# Patient Record
Sex: MALE | Race: WHITE | NOT HISPANIC OR LATINO | Employment: OTHER | URBAN - NONMETROPOLITAN AREA
[De-identification: names, ages, dates, MRNs, and addresses within clinical notes are randomized per-mention and may not be internally consistent; named-entity substitution may affect disease eponyms.]

---

## 2017-09-05 ENCOUNTER — OFFICE VISIT (OUTPATIENT)
Dept: FAMILY MEDICINE CLINIC | Facility: CLINIC | Age: 42
End: 2017-09-05

## 2017-09-05 VITALS
BODY MASS INDEX: 28.28 KG/M2 | DIASTOLIC BLOOD PRESSURE: 80 MMHG | OXYGEN SATURATION: 97 % | HEIGHT: 71 IN | TEMPERATURE: 98.2 F | WEIGHT: 202 LBS | SYSTOLIC BLOOD PRESSURE: 140 MMHG | HEART RATE: 80 BPM

## 2017-09-05 DIAGNOSIS — Z83.3 FAMILY HISTORY OF DIABETES MELLITUS IN MOTHER: ICD-10-CM

## 2017-09-05 DIAGNOSIS — N52.9 ERECTILE DYSFUNCTION, UNSPECIFIED ERECTILE DYSFUNCTION TYPE: Primary | ICD-10-CM

## 2017-09-05 DIAGNOSIS — I10 ESSENTIAL HYPERTENSION: ICD-10-CM

## 2017-09-05 DIAGNOSIS — R35.1 NOCTURIA: ICD-10-CM

## 2017-09-05 DIAGNOSIS — Z13.1 SCREENING FOR DIABETES MELLITUS: ICD-10-CM

## 2017-09-05 DIAGNOSIS — E78.2 MIXED HYPERLIPIDEMIA: ICD-10-CM

## 2017-09-05 DIAGNOSIS — Z13.9 SCREENING: ICD-10-CM

## 2017-09-05 DIAGNOSIS — K21.9 GASTROESOPHAGEAL REFLUX DISEASE WITHOUT ESOPHAGITIS: ICD-10-CM

## 2017-09-05 PROBLEM — E78.5 HYPERLIPIDEMIA: Status: ACTIVE | Noted: 2017-09-05

## 2017-09-05 LAB
ALBUMIN SERPL-MCNC: 5 G/DL (ref 3.5–5)
ALBUMIN/GLOB SERPL: 1.4 G/DL (ref 1.5–2.5)
ALP SERPL-CCNC: 120 U/L (ref 40–129)
ALT SERPL W P-5'-P-CCNC: 25 U/L (ref 10–44)
ANION GAP SERPL CALCULATED.3IONS-SCNC: 8.7 MMOL/L (ref 3.6–11.2)
AST SERPL-CCNC: 26 U/L (ref 10–34)
BASOPHILS # BLD AUTO: 0.04 10*3/MM3 (ref 0–0.3)
BASOPHILS NFR BLD AUTO: 0.5 % (ref 0–2)
BILIRUB SERPL-MCNC: 0.5 MG/DL (ref 0.2–1.8)
BUN BLD-MCNC: 8 MG/DL (ref 7–21)
BUN/CREAT SERPL: 8.3 (ref 7–25)
CALCIUM SPEC-SCNC: 10.4 MG/DL (ref 7.7–10)
CHLORIDE SERPL-SCNC: 106 MMOL/L (ref 99–112)
CHOLEST SERPL-MCNC: 170 MG/DL (ref 0–200)
CO2 SERPL-SCNC: 28.3 MMOL/L (ref 24.3–31.9)
CREAT BLD-MCNC: 0.96 MG/DL (ref 0.43–1.29)
DEPRECATED RDW RBC AUTO: 43.1 FL (ref 37–54)
EOSINOPHIL # BLD AUTO: 0.14 10*3/MM3 (ref 0–0.7)
EOSINOPHIL NFR BLD AUTO: 1.8 % (ref 0–5)
ERYTHROCYTE [DISTWIDTH] IN BLOOD BY AUTOMATED COUNT: 13.4 % (ref 11.5–14.5)
GFR SERPL CREATININE-BSD FRML MDRD: 86 ML/MIN/1.73
GLOBULIN UR ELPH-MCNC: 3.7 GM/DL
GLUCOSE BLD-MCNC: 100 MG/DL (ref 70–110)
HCT VFR BLD AUTO: 45 % (ref 42–52)
HDLC SERPL-MCNC: 44 MG/DL (ref 60–100)
HGB BLD-MCNC: 14.7 G/DL (ref 14–18)
IMM GRANULOCYTES # BLD: 0.02 10*3/MM3 (ref 0–0.03)
IMM GRANULOCYTES NFR BLD: 0.3 % (ref 0–0.5)
LDLC SERPL CALC-MCNC: 111 MG/DL (ref 0–100)
LDLC/HDLC SERPL: 2.53 {RATIO}
LYMPHOCYTES # BLD AUTO: 2.43 10*3/MM3 (ref 1–3)
LYMPHOCYTES NFR BLD AUTO: 31.8 % (ref 21–51)
MCH RBC QN AUTO: 29.2 PG (ref 27–33)
MCHC RBC AUTO-ENTMCNC: 32.7 G/DL (ref 33–37)
MCV RBC AUTO: 89.5 FL (ref 80–94)
MONOCYTES # BLD AUTO: 0.84 10*3/MM3 (ref 0.1–0.9)
MONOCYTES NFR BLD AUTO: 11 % (ref 0–10)
NEUTROPHILS # BLD AUTO: 4.17 10*3/MM3 (ref 1.4–6.5)
NEUTROPHILS NFR BLD AUTO: 54.6 % (ref 30–70)
OSMOLALITY SERPL CALC.SUM OF ELEC: 283.4 MOSM/KG (ref 273–305)
PLATELET # BLD AUTO: 298 10*3/MM3 (ref 130–400)
PMV BLD AUTO: 12.2 FL (ref 6–10)
POTASSIUM BLD-SCNC: 4.1 MMOL/L (ref 3.5–5.3)
PROT SERPL-MCNC: 8.7 G/DL (ref 6–8)
RBC # BLD AUTO: 5.03 10*6/MM3 (ref 4.7–6.1)
SODIUM BLD-SCNC: 143 MMOL/L (ref 135–153)
TRIGL SERPL-MCNC: 73 MG/DL (ref 0–150)
TSH SERPL DL<=0.05 MIU/L-ACNC: 1.02 MIU/ML (ref 0.55–4.78)
VIT B12 BLD-MCNC: 378 PG/ML (ref 211–911)
VLDLC SERPL-MCNC: 14.6 MG/DL
WBC NRBC COR # BLD: 7.64 10*3/MM3 (ref 4.5–12.5)

## 2017-09-05 PROCEDURE — 82607 VITAMIN B-12: CPT | Performed by: NURSE PRACTITIONER

## 2017-09-05 PROCEDURE — 99204 OFFICE O/P NEW MOD 45 MIN: CPT | Performed by: NURSE PRACTITIONER

## 2017-09-05 PROCEDURE — 80061 LIPID PANEL: CPT | Performed by: NURSE PRACTITIONER

## 2017-09-05 PROCEDURE — 85025 COMPLETE CBC W/AUTO DIFF WBC: CPT | Performed by: NURSE PRACTITIONER

## 2017-09-05 PROCEDURE — 84153 ASSAY OF PSA TOTAL: CPT | Performed by: NURSE PRACTITIONER

## 2017-09-05 PROCEDURE — 84443 ASSAY THYROID STIM HORMONE: CPT | Performed by: NURSE PRACTITIONER

## 2017-09-05 PROCEDURE — 84154 ASSAY OF PSA FREE: CPT | Performed by: NURSE PRACTITIONER

## 2017-09-05 PROCEDURE — 80053 COMPREHEN METABOLIC PANEL: CPT | Performed by: NURSE PRACTITIONER

## 2017-09-05 PROCEDURE — 36415 COLL VENOUS BLD VENIPUNCTURE: CPT | Performed by: NURSE PRACTITIONER

## 2017-09-05 RX ORDER — TRAMADOL HYDROCHLORIDE 50 MG/1
TABLET ORAL
Refills: 2 | COMMUNITY
Start: 2017-08-25 | End: 2019-08-06

## 2017-09-05 RX ORDER — GABAPENTIN 300 MG/1
600 CAPSULE ORAL 3 TIMES DAILY
Refills: 5 | COMMUNITY
Start: 2017-08-10 | End: 2023-01-25 | Stop reason: DRUGHIGH

## 2017-09-05 RX ORDER — ESOMEPRAZOLE MAGNESIUM 40 MG/1
40 CAPSULE, DELAYED RELEASE ORAL
COMMUNITY
End: 2017-09-05 | Stop reason: SDUPTHER

## 2017-09-05 RX ORDER — LOSARTAN POTASSIUM AND HYDROCHLOROTHIAZIDE 25; 100 MG/1; MG/1
1 TABLET ORAL DAILY
COMMUNITY
End: 2017-09-05 | Stop reason: SDUPTHER

## 2017-09-05 RX ORDER — OXYCODONE HYDROCHLORIDE 10 MG/1
10 TABLET ORAL EVERY 4 HOURS PRN
Refills: 0 | COMMUNITY
Start: 2017-08-31

## 2017-09-05 RX ORDER — PRAVASTATIN SODIUM 20 MG
20 TABLET ORAL DAILY
Qty: 30 TABLET | Refills: 4 | Status: SHIPPED | OUTPATIENT
Start: 2017-09-05 | End: 2018-01-30 | Stop reason: SDUPTHER

## 2017-09-05 RX ORDER — AMLODIPINE BESYLATE 10 MG/1
10 TABLET ORAL DAILY
COMMUNITY
End: 2017-09-05 | Stop reason: SDUPTHER

## 2017-09-05 RX ORDER — FAMOTIDINE 40 MG/1
40 TABLET, FILM COATED ORAL DAILY
COMMUNITY
End: 2017-09-05 | Stop reason: SDUPTHER

## 2017-09-05 RX ORDER — SILDENAFIL 100 MG/1
50 TABLET, FILM COATED ORAL DAILY PRN
Qty: 10 TABLET | Refills: 4 | Status: SHIPPED | OUTPATIENT
Start: 2017-09-05 | End: 2018-01-30

## 2017-09-05 RX ORDER — IBUPROFEN 800 MG/1
TABLET ORAL
Refills: 5 | COMMUNITY
Start: 2017-08-10 | End: 2019-01-28 | Stop reason: SDUPTHER

## 2017-09-05 RX ORDER — PRAVASTATIN SODIUM 20 MG
20 TABLET ORAL DAILY
COMMUNITY
End: 2017-09-05 | Stop reason: SDUPTHER

## 2017-09-05 RX ORDER — AMLODIPINE BESYLATE 10 MG/1
10 TABLET ORAL DAILY
Qty: 30 TABLET | Refills: 4 | Status: SHIPPED | OUTPATIENT
Start: 2017-09-05 | End: 2018-01-30 | Stop reason: SDUPTHER

## 2017-09-05 RX ORDER — ESOMEPRAZOLE MAGNESIUM 40 MG/1
40 CAPSULE, DELAYED RELEASE ORAL
Qty: 30 CAPSULE | Refills: 4 | Status: SHIPPED | OUTPATIENT
Start: 2017-09-05 | End: 2018-01-30 | Stop reason: SDUPTHER

## 2017-09-05 RX ORDER — METAXALONE 800 MG/1
800 TABLET ORAL DAILY
Refills: 1 | COMMUNITY
Start: 2017-08-25

## 2017-09-05 RX ORDER — LOSARTAN POTASSIUM AND HYDROCHLOROTHIAZIDE 25; 100 MG/1; MG/1
1 TABLET ORAL DAILY
Qty: 30 TABLET | Refills: 4 | Status: SHIPPED | OUTPATIENT
Start: 2017-09-05 | End: 2018-01-30 | Stop reason: SDUPTHER

## 2017-09-05 NOTE — PROGRESS NOTES
Subjective   Taiwo Pierce is a 42 y.o. male.     Chief Complaint   Patient presents with   • Hypertension   • Hyperlipidemia   • Back Pain     surgery for lumbar disc, Dr. Joshi 2014.    • Osteoarthritis     left knee surgery, [facility] more than 10 years    • Allergic Rhinitis   • Depression     patient feels this is controlled    • Heartburn   • Knee Pain       History of Present Illness     Hypertension - patient reports his hypertension is under good control with current Hyzaar and Norvasc.  Reports having had blood pressure problems for years.  He does feel he is stable on current medications.    Hyperlipidemia - no recent evaluation of a lipid panel.  Patient takes pravastatin, denies side effects.    Chronic back pain - patient is currently under management of pain clinic for his chronic pain.  He reports having had back surgery on his lumbar spine several years ago performed by Dr. Joshi in [facility].    Depression - patient states that his depression is resolved.  When he initially became disabled and needed back surgery he became depressed.  He states he feels his depression came from not being able to work full-time any longer.  He did take antidepressants for a few months.  He does report that he had side effects from antidepressant medication of erectile dysfunction.  He denies any thoughts of hurting self or others.  He is currently engaged to be  and reports being happy.    GERD - patient states he is currently taking Prevacid and Nexium.  She reports that the Prevacid does not control his symptoms.  The Nexium does control his symptoms alone.  He does have some breakthrough heartburn with certain foods or if he eats prior to reclining.    Family history of type 2 diabetes.  His mother had diabetes.  He has never had any symptoms.    Erectile dysfunction - patient reports he has recently developed erectile dysfunction.  In the past he had some difficulty with erectile dysfunction  while on antidepressants.  He reports that in the mornings he does not have any difficulty with obtaining or maintaining an erection.  If he attempts to have sex in the evening he does have difficulty maintaining an erection.  In the past Viagra did work.  He would like to try Cialis but states he is unable to afford the cost.  He has not been evaluated by urology for this complaint.  He does report having nocturia.       The following portions of the patient's history were reviewed and updated as appropriate: allergies, current medications, past family history, past medical history, past social history, past surgical history and problem list.    Review of Systems   Constitutional: Negative for activity change, appetite change, chills, diaphoresis, fatigue, fever and unexpected weight change.   HENT: Negative.  Negative for ear pain, facial swelling, hearing loss, sinus pressure, sore throat, trouble swallowing and voice change.    Eyes: Negative for pain, discharge and visual disturbance.   Respiratory: Negative for apnea, cough, chest tightness, shortness of breath and wheezing.    Cardiovascular: Negative for chest pain, palpitations and leg swelling.   Gastrointestinal: Negative for abdominal pain, blood in stool, constipation, diarrhea, nausea and vomiting.   Genitourinary: Negative for decreased urine volume, dysuria, flank pain, penile pain, penile swelling, testicular pain and urgency.        Frequent night time urination    Musculoskeletal: Positive for arthralgias and back pain. Negative for myalgias, neck pain and neck stiffness.        Under the care of pain clinic   Skin: Negative for color change and rash.   Neurological: Negative.  Negative for headaches.   Hematological: Negative.    Psychiatric/Behavioral: Negative for behavioral problems, confusion, dysphoric mood and suicidal ideas. The patient is not nervous/anxious.    All other systems reviewed and are negative.      Objective     /80   "Pulse 80  Temp 98.2 °F (36.8 °C) (Temporal Artery )   Ht 71\" (180.3 cm)  Wt 202 lb (91.6 kg)  SpO2 97%  BMI 28.17 kg/m2  Hospital Outpatient Visit on 07/08/2014   Component Date Value Ref Range Status   • Creatinine 07/08/2014 0.9  0.6 - 1.3 mg/mL Final       Physical Exam   Constitutional: He is oriented to person, place, and time. He appears well-developed and well-nourished. No distress.   HENT:   Head: Normocephalic.   Right Ear: External ear normal.   Left Ear: External ear normal.   Nose: Nose normal.   Mouth/Throat: Oropharynx is clear and moist.   Eyes: Pupils are equal, round, and reactive to light.   Neck: Normal range of motion. Neck supple. No tracheal deviation present. No thyromegaly present.   Cardiovascular: Normal rate, regular rhythm and normal heart sounds.  Exam reveals no gallop and no friction rub.    No murmur heard.  Pulmonary/Chest: Effort normal and breath sounds normal. No respiratory distress. He has no wheezes. He has no rales. He exhibits no tenderness.   Abdominal: Soft. Bowel sounds are normal. He exhibits no distension and no mass. There is no tenderness. There is no rebound and no guarding.   Musculoskeletal: Normal range of motion.   Neurological: He is alert and oriented to person, place, and time. He has normal reflexes.   CN 2-12 grossly intact    Skin: Skin is warm and dry. No rash noted. He is not diaphoretic. No erythema.   Psychiatric: He has a normal mood and affect. His speech is normal and behavior is normal. Judgment and thought content normal. Cognition and memory are normal.   Vitals reviewed.      Assessment/Plan     Problem List Items Addressed This Visit        Cardiovascular and Mediastinum    Hyperlipidemia    Relevant Medications    esomeprazole (nexIUM) 40 MG capsule    pravastatin (PRAVACHOL) 20 MG tablet    Hypertension    Relevant Medications    amLODIPine (NORVASC) 10 MG tablet    losartan-hydrochlorothiazide (HYZAAR) 100-25 MG per tablet    pravastatin " (PRAVACHOL) 20 MG tablet    Other Relevant Orders    CBC & Differential    Comprehensive Metabolic Panel    Lipid Panel    TSH    MicroAlbumin, Urine, Random    Vitamin B12    CBC Auto Differential       Digestive    GERD (gastroesophageal reflux disease)    Relevant Medications    esomeprazole (nexIUM) 40 MG capsule       Genitourinary    Erectile dysfunction - Primary    Relevant Medications    sildenafil (VIAGRA) 100 MG tablet    Other Relevant Orders    Ambulatory Referral to Urology    Testosterone    PSA, Total & Free       Other    Family history of diabetes mellitus in mother    Screening for diabetes mellitus      Other Visit Diagnoses     Screening        Relevant Orders    CBC & Differential    Lipid Panel    TSH    MicroAlbumin, Urine, Random    Vitamin B12    Testosterone    PSA, Total & Free    CBC Auto Differential    Nocturia        Relevant Orders    Testosterone    PSA, Total & Free        New patient establish care.  Send for old records.    Thorough review of current medications has been performed today.  We will take him to to help his erectile dysfunction by referring him to urology and starting him on Viagra.  We have reviewed the possible side effects and recent stopped this medication or seek emergent medical attention.  Stop  Prevacid.  Continue Nexium daily.   Fasting labs today.  I have discussed diagnosis in detail today allowing time for questions and answers. Pt is aware of reasons to seek urgent or emergent medical care as well as reasons to return to the clinic for evaluation. Possible side effects, interactions and progression of symptoms discussed as well. Pt / family states understanding.   Medication changes or referrals will be placed once lab results are reviewed.  Follow-up 3-4 months, sooner if needed.           This document has been electronically signed by:  ENRICO Caceres, NP-C

## 2017-09-07 LAB
PSA FREE MFR SERPL: 30 %
PSA FREE SERPL-MCNC: 0.09 NG/ML
PSA SERPL-MCNC: 0.3 NG/ML (ref 0–4)

## 2017-09-25 ENCOUNTER — OFFICE VISIT (OUTPATIENT)
Dept: UROLOGY | Facility: CLINIC | Age: 42
End: 2017-09-25

## 2017-09-25 VITALS
WEIGHT: 201 LBS | HEART RATE: 67 BPM | DIASTOLIC BLOOD PRESSURE: 99 MMHG | HEIGHT: 71 IN | SYSTOLIC BLOOD PRESSURE: 141 MMHG | BODY MASS INDEX: 28.14 KG/M2

## 2017-09-25 DIAGNOSIS — N52.2 DRUG-INDUCED ERECTILE DYSFUNCTION: Primary | ICD-10-CM

## 2017-09-25 LAB
BILIRUB BLD-MCNC: NEGATIVE MG/DL
CLARITY, POC: CLEAR
COLOR UR: YELLOW
GLUCOSE UR STRIP-MCNC: NEGATIVE MG/DL
KETONES UR QL: NEGATIVE
LEUKOCYTE EST, POC: NEGATIVE
NITRITE UR-MCNC: NEGATIVE MG/ML
PH UR: 6 [PH] (ref 5–8)
PROT UR STRIP-MCNC: NEGATIVE MG/DL
RBC # UR STRIP: NEGATIVE /UL
SP GR UR: 1.02 (ref 1–1.03)
UROBILINOGEN UR QL: NORMAL

## 2017-09-25 PROCEDURE — 99214 OFFICE O/P EST MOD 30 MIN: CPT | Performed by: NURSE PRACTITIONER

## 2017-09-25 PROCEDURE — 81003 URINALYSIS AUTO W/O SCOPE: CPT | Performed by: NURSE PRACTITIONER

## 2017-09-25 RX ORDER — SILDENAFIL CITRATE 20 MG/1
TABLET ORAL
Qty: 30 TABLET | Refills: 6 | Status: SHIPPED | OUTPATIENT
Start: 2017-09-25 | End: 2018-01-25 | Stop reason: SDUPTHER

## 2017-09-25 NOTE — PROGRESS NOTES
Chief Complaint:          Chief Complaint   Patient presents with   • Erectile Dysfunction       HPI:   42 y.o. male being seen in the office today with his girlfriend for history of erectile dysfunction. Patient does have issues with his back and is status post back surgery 3 years ago for 3 herniated disks in the lumbar region of the L4-S1. He is on chronic pain medication of oxycodone along with medication for hypertension and cholesterol. Patient denies any difficulty with morning erections but does state at times he has difficulty getting and maintaining an erection. He states is used Viagra in the past which did help his symptoms but could not afford the medication.  Urinalysis today reveals negative results.    HPI        Past Medical History:        Past Medical History:   Diagnosis Date   • GERD (gastroesophageal reflux disease)    • Hyperlipidemia    • Hypertension          Current Meds:     Current Outpatient Prescriptions   Medication Sig Dispense Refill   • amLODIPine (NORVASC) 10 MG tablet Take 1 tablet by mouth Daily. 30 tablet 4   • esomeprazole (nexIUM) 40 MG capsule Take 1 capsule by mouth Every Morning Before Breakfast. 30 capsule 4   • gabapentin (NEURONTIN) 300 MG capsule TAKE 2 CS TID  5   • ibuprofen (ADVIL,MOTRIN) 800 MG tablet TK 1 T PO QD PRN P  5   • losartan-hydrochlorothiazide (HYZAAR) 100-25 MG per tablet Take 1 tablet by mouth Daily. 30 tablet 4   • metaxalone (SKELAXIN) 800 MG tablet   1   • oxyCODONE (ROXICODONE) 10 MG tablet TK 1 T PO  TID PRN P  0   • pravastatin (PRAVACHOL) 20 MG tablet Take 1 tablet by mouth Daily. 30 tablet 4   • sildenafil (VIAGRA) 100 MG tablet Take 0.5 tablets by mouth Daily As Needed for erectile dysfunction. 10 tablet 4   • traMADol (ULTRAM) 50 MG tablet TK 1 T PO QID PRN P  2   • sildenafil (REVATIO) 20 MG tablet Take 3 -5 tablets as directed as needed 30 tablet 6     No current facility-administered medications for this visit.         Allergies:       Allergies   Allergen Reactions   • Augmentin [Amoxicillin-Pot Clavulanate]    • Metoprolol         Past Surgical History:     Past Surgical History:   Procedure Laterality Date   • BACK SURGERY     • KNEE ARTHROSCOPY           Social History:     Social History     Social History   • Marital status: Single     Spouse name: N/A   • Number of children: N/A   • Years of education: N/A     Occupational History   • Not on file.     Social History Main Topics   • Smoking status: Never Smoker   • Smokeless tobacco: Current User     Types: Snuff   • Alcohol use No   • Drug use: No   • Sexual activity: Defer     Other Topics Concern   • Not on file     Social History Narrative       Family History:     Family History   Problem Relation Age of Onset   • Breast cancer Mother    • Prostate cancer Paternal Grandfather    • Prostate cancer Maternal Grandfather        Review of Systems:     Review of Systems   Constitutional: Negative for chills, fatigue and fever.   Respiratory: Negative for cough, shortness of breath and wheezing.    Cardiovascular: Negative for leg swelling.   Gastrointestinal: Negative for abdominal pain, nausea and vomiting.   Musculoskeletal: Positive for back pain. Negative for joint swelling.   Neurological: Negative for dizziness and headaches.   Psychiatric/Behavioral: Negative for confusion.       Physical Exam:     Physical Exam   Constitutional: He is oriented to person, place, and time. He appears well-developed and well-nourished. No distress.   Abdominal: Soft. Bowel sounds are normal. He exhibits no distension and no mass. There is no tenderness. There is no rebound and no guarding. No hernia.   Musculoskeletal: Normal range of motion.   Neurological: He is alert and oriented to person, place, and time.   Skin: Skin is warm and dry. No rash noted. He is not diaphoretic. No pallor.   Psychiatric: He has a normal mood and affect. His behavior is normal. Judgment and thought content normal.    Nursing note and vitals reviewed.      Procedure:     No notes on file      Assessment:     Encounter Diagnosis   Name Primary?   • Drug-induced erectile dysfunction Yes     Orders Placed This Encounter   Procedures   • POC Urinalysis Dipstick, Automated       Plan:   Discussed erectile dysfunction in detail with the patient including have a long-term pain control medications currently to problems with erectile dysfunction with smoking, hypertension and cholesterol. We will give the patient prescription for sildenafil 20 mg to take 3-5 tablets as directed as needed. He is to return to the office in 6 months for follow-up.    Counseling was given to patient and family for the following topics diagnostic results, patient and family education, impressions and risks and benefits of treatment options. and the interim medical history and current results were reviewed.  A treatment plan with follow-up was made. Total time of the encounter was 25 minutes and 25 minutes were spent discussing Drug-induced erectile dysfunction [N52.2] face-to-face.       This document has been electronically signed by ENRICO Gallegos September 25, 2017 12:48 PM

## 2018-01-25 DIAGNOSIS — N52.2 DRUG-INDUCED ERECTILE DYSFUNCTION: ICD-10-CM

## 2018-01-25 RX ORDER — SILDENAFIL CITRATE 20 MG/1
TABLET ORAL
Qty: 30 TABLET | Refills: 6 | Status: SHIPPED | OUTPATIENT
Start: 2018-01-25 | End: 2018-01-30 | Stop reason: SDUPTHER

## 2018-01-25 RX ORDER — SILDENAFIL CITRATE 20 MG/1
TABLET ORAL
Qty: 30 TABLET | Refills: 6 | Status: SHIPPED | OUTPATIENT
Start: 2018-01-25 | End: 2018-01-25 | Stop reason: SDUPTHER

## 2018-01-30 ENCOUNTER — OFFICE VISIT (OUTPATIENT)
Dept: FAMILY MEDICINE CLINIC | Facility: CLINIC | Age: 43
End: 2018-01-30

## 2018-01-30 VITALS
HEART RATE: 77 BPM | DIASTOLIC BLOOD PRESSURE: 82 MMHG | SYSTOLIC BLOOD PRESSURE: 120 MMHG | HEIGHT: 71 IN | BODY MASS INDEX: 28.14 KG/M2 | TEMPERATURE: 97.7 F | WEIGHT: 201 LBS | OXYGEN SATURATION: 97 %

## 2018-01-30 DIAGNOSIS — I10 ESSENTIAL HYPERTENSION: ICD-10-CM

## 2018-01-30 DIAGNOSIS — K21.9 GASTROESOPHAGEAL REFLUX DISEASE WITHOUT ESOPHAGITIS: Primary | ICD-10-CM

## 2018-01-30 DIAGNOSIS — N52.2 DRUG-INDUCED ERECTILE DYSFUNCTION: ICD-10-CM

## 2018-01-30 DIAGNOSIS — E78.2 MIXED HYPERLIPIDEMIA: ICD-10-CM

## 2018-01-30 PROCEDURE — 99214 OFFICE O/P EST MOD 30 MIN: CPT | Performed by: NURSE PRACTITIONER

## 2018-01-30 RX ORDER — AMLODIPINE BESYLATE 10 MG/1
10 TABLET ORAL DAILY
Qty: 30 TABLET | Refills: 5 | Status: SHIPPED | OUTPATIENT
Start: 2018-01-30 | End: 2018-07-26 | Stop reason: SDUPTHER

## 2018-01-30 RX ORDER — SILDENAFIL CITRATE 20 MG/1
TABLET ORAL
Qty: 30 TABLET | Refills: 6 | Status: SHIPPED | OUTPATIENT
Start: 2018-01-30 | End: 2018-07-26 | Stop reason: SDUPTHER

## 2018-01-30 RX ORDER — PRAVASTATIN SODIUM 20 MG
20 TABLET ORAL DAILY
Qty: 30 TABLET | Refills: 5 | Status: SHIPPED | OUTPATIENT
Start: 2018-01-30 | End: 2018-10-01 | Stop reason: SDUPTHER

## 2018-01-30 RX ORDER — LOSARTAN POTASSIUM AND HYDROCHLOROTHIAZIDE 25; 100 MG/1; MG/1
1 TABLET ORAL DAILY
Qty: 30 TABLET | Refills: 5 | Status: SHIPPED | OUTPATIENT
Start: 2018-01-30 | End: 2018-07-26 | Stop reason: SDUPTHER

## 2018-01-30 RX ORDER — ESOMEPRAZOLE MAGNESIUM 40 MG/1
40 CAPSULE, DELAYED RELEASE ORAL
Qty: 30 CAPSULE | Refills: 5 | Status: SHIPPED | OUTPATIENT
Start: 2018-01-30 | End: 2018-07-26 | Stop reason: SDUPTHER

## 2018-01-30 NOTE — PROGRESS NOTES
"Subjective   Taiwo Pierce is a 42 y.o. male.     Chief Complaint   Patient presents with   • Hypertension       History of Present Illness       Erectile dysfunction-improved with current prescription.    Hypertension-patient reports that Sildenafil has greatly reduced his blood pressure. He is compliant with all his bp medication and denies any side effects. Does monitor bp randomly.    GERD-patient reports that he did not receive the Nexium and has been taking and prescription of Prilosec.  He does report the Nexium controlled his symptoms better.  He has symptoms several days a week.    Pain-patient is under the care of pain clinic.    Hyperlipidemia-patient is currently taking Pravachol and watching his diet.      The following portions of the patient's history were reviewed and updated as appropriate: allergies, current medications, past family history, past medical history, past social history, past surgical history and problem list.    Review of Systems   Constitutional: Negative.    Eyes: Negative.    Respiratory: Negative.    Cardiovascular: Negative.    Gastrointestinal: Negative.    Endocrine: Negative.    Genitourinary: Negative.    Musculoskeletal: Positive for back pain, joint swelling and neck pain.   Skin: Negative.    Allergic/Immunologic: Negative.    Neurological: Positive for headaches.   Hematological: Negative.    Psychiatric/Behavioral: Negative.    All other systems reviewed and are negative.      Objective     /82  Pulse 77  Temp 97.7 °F (36.5 °C) (Temporal Artery )   Ht 180.3 cm (71\")  Wt 91.2 kg (201 lb)  SpO2 97%  BMI 28.03 kg/m2  Office Visit on 09/25/2017   Component Date Value Ref Range Status   • Color 09/25/2017 Yellow  Yellow, Straw, Dark Yellow, June Final   • Clarity, UA 09/25/2017 Clear  Clear Final   • Glucose, UA 09/25/2017 Negative  Negative, 1000 mg/dL (3+) mg/dL Final   • Bilirubin 09/25/2017 Negative  Negative Final   • Ketones, UA 09/25/2017 Negative  " Negative Final   • Specific Gravity  09/25/2017 1.020  1.005 - 1.030 Final   • Blood, UA 09/25/2017 Negative  Negative Final   • pH, Urine 09/25/2017 6.0  5.0 - 8.0 Final   • Protein, POC 09/25/2017 Negative  Negative mg/dL Final   • Urobilinogen, UA 09/25/2017 Normal  Normal Final   • Leukocytes 09/25/2017 Negative  Negative Final   • Nitrite, UA 09/25/2017 Negative  Negative Final       Physical Exam   Constitutional: He is oriented to person, place, and time. He appears well-developed and well-nourished. No distress.   HENT:   Head: Normocephalic.   Right Ear: External ear normal.   Left Ear: External ear normal.   Nose: Nose normal.   Mouth/Throat: Oropharynx is clear and moist.   Eyes: Pupils are equal, round, and reactive to light.   Neck: Normal range of motion. Neck supple. No tracheal deviation present. No thyromegaly present.   Cardiovascular: Normal rate, regular rhythm and normal heart sounds.  Exam reveals no gallop and no friction rub.    No murmur heard.  Pulmonary/Chest: Effort normal and breath sounds normal. No respiratory distress. He has no wheezes. He has no rales. He exhibits no tenderness.   Abdominal: Soft. Bowel sounds are normal. He exhibits no distension and no mass. There is no tenderness. There is no rebound and no guarding.   Musculoskeletal: Normal range of motion.        Lumbar back: He exhibits tenderness.   Neurological: He is alert and oriented to person, place, and time. He has normal reflexes.   CN 2-12 grossly intact    Skin: Skin is warm and dry. No rash noted. He is not diaphoretic. No erythema.   Psychiatric: He has a normal mood and affect. His speech is normal and behavior is normal. Judgment and thought content normal. Cognition and memory are normal.   Vitals reviewed.      Assessment/Plan     Problem List Items Addressed This Visit        Cardiovascular and Mediastinum    Hyperlipidemia    Relevant Medications    pravastatin (PRAVACHOL) 20 MG tablet    esomeprazole  (nexIUM) 40 MG capsule    Other Relevant Orders    CBC & Differential    Comprehensive Metabolic Panel    TSH    MicroAlbumin, Urine, Random - Urine, Clean Catch    Lipid Panel    Hypertension    Relevant Medications    sildenafil (REVATIO) 20 MG tablet    pravastatin (PRAVACHOL) 20 MG tablet    losartan-hydrochlorothiazide (HYZAAR) 100-25 MG per tablet    amLODIPine (NORVASC) 10 MG tablet    Other Relevant Orders    CBC & Differential    Comprehensive Metabolic Panel    TSH    MicroAlbumin, Urine, Random - Urine, Clean Catch    Lipid Panel       Digestive    GERD (gastroesophageal reflux disease) - Primary    Relevant Medications    esomeprazole (nexIUM) 40 MG capsule       Genitourinary    Erectile dysfunction    Relevant Medications    sildenafil (REVATIO) 20 MG tablet        Medication list has been reviewed with patient.  Refill routine medication.  Take Nexium as prescribed.  I have discussed diagnosis in detail today allowing time for questions and answers. Pt is aware of reasons to seek urgent or emergent medical care as well as reasons to return to the clinic for evaluation. Possible side effects, interactions and progression of symptoms discussed as well. Pt / family states understanding.   Emotional support and active listening provided.   Recommend follow up in 4-6 months, fasting labs the week prior.   Have requested staff try to PA Sildenafil for hypertension.       Errors in dictation may reflect use of voice recognition software and not all errors in transcription may have been detected prior to signing.                  This document has been electronically signed by:  ENRICO Caceres, NP-C

## 2018-02-09 ENCOUNTER — TELEPHONE (OUTPATIENT)
Dept: FAMILY MEDICINE CLINIC | Facility: CLINIC | Age: 43
End: 2018-02-09

## 2018-02-09 NOTE — TELEPHONE ENCOUNTER
----- Message from Maria Guadalupe Ny sent at 2/5/2018  1:04 PM EST -----      ----- Message -----     From: ENRICO Leone     Sent: 1/30/2018   3:28 PM       [facility]    Needs generic viagra PA'd   Helps with blood pressure   I have sent the form to [facility] for viagra PA

## 2018-04-05 ENCOUNTER — OFFICE VISIT (OUTPATIENT)
Dept: UROLOGY | Facility: CLINIC | Age: 43
End: 2018-04-05

## 2018-04-05 VITALS — BODY MASS INDEX: 28.14 KG/M2 | HEIGHT: 71 IN | WEIGHT: 201 LBS

## 2018-04-05 DIAGNOSIS — N52.8 OTHER MALE ERECTILE DYSFUNCTION: Primary | ICD-10-CM

## 2018-04-05 DIAGNOSIS — I10 ESSENTIAL HYPERTENSION: ICD-10-CM

## 2018-04-05 PROCEDURE — 99213 OFFICE O/P EST LOW 20 MIN: CPT | Performed by: NURSE PRACTITIONER

## 2018-04-05 RX ORDER — SILDENAFIL CITRATE 20 MG/1
TABLET ORAL
Qty: 90 TABLET | Refills: 5 | Status: SHIPPED | OUTPATIENT
Start: 2018-04-05 | End: 2018-07-26

## 2018-04-05 NOTE — PROGRESS NOTES
Chief Complaint:          Chief Complaint   Patient presents with   • Erectile Dysfunction       HPI:   42 y.o. male seen in the office today for a 6 month follow-up for history of erectile dysfunction.  He is currently on Sildenafil as needed and states the medication works well also helping with his blood pressure.  Needs a refill on the medication.    HPI        Past Medical History:        Past Medical History:   Diagnosis Date   • GERD (gastroesophageal reflux disease)    • Hyperlipidemia    • Hypertension          Current Meds:     Current Outpatient Prescriptions   Medication Sig Dispense Refill   • amLODIPine (NORVASC) 10 MG tablet Take 1 tablet by mouth Daily. 30 tablet 5   • esomeprazole (nexIUM) 40 MG capsule Take 1 capsule by mouth Every Morning Before Breakfast. 30 capsule 5   • gabapentin (NEURONTIN) 300 MG capsule TAKE 2 CS TID  5   • ibuprofen (ADVIL,MOTRIN) 800 MG tablet TK 1 T PO QD PRN P  5   • losartan-hydrochlorothiazide (HYZAAR) 100-25 MG per tablet Take 1 tablet by mouth Daily. 30 tablet 5   • metaxalone (SKELAXIN) 800 MG tablet   1   • oxyCODONE (ROXICODONE) 10 MG tablet TK 1 T PO  TID PRN P  0   • pravastatin (PRAVACHOL) 20 MG tablet Take 1 tablet by mouth Daily. 30 tablet 5   • sildenafil (REVATIO) 20 MG tablet Take 1 tablet daily as needed 30 tablet 6   • traMADol (ULTRAM) 50 MG tablet TK 1 T PO QID PRN P  2   • sildenafil (REVATIO) 20 MG tablet Use 3 - 5 tablets as directed as needed 90 tablet 5     No current facility-administered medications for this visit.         Allergies:      Allergies   Allergen Reactions   • Augmentin [Amoxicillin-Pot Clavulanate]    • Metoprolol         Past Surgical History:     Past Surgical History:   Procedure Laterality Date   • BACK SURGERY     • KNEE ARTHROSCOPY           Social History:     Social History     Social History   • Marital status: Single     Spouse name: N/A   • Number of children: N/A   • Years of education: N/A     Occupational History   •  Not on file.     Social History Main Topics   • Smoking status: Never Smoker   • Smokeless tobacco: Current User     Types: Snuff   • Alcohol use No   • Drug use: No   • Sexual activity: Defer     Other Topics Concern   • Not on file     Social History Narrative   • No narrative on file       Family History:     Family History   Problem Relation Age of Onset   • Breast cancer Mother    • Prostate cancer Paternal Grandfather    • Prostate cancer Maternal Grandfather        Review of Systems:     Review of Systems   Constitutional: Negative for chills, fatigue and fever.   Respiratory: Negative for cough, shortness of breath and wheezing.    Cardiovascular: Negative for leg swelling.   Gastrointestinal: Negative for abdominal pain, nausea and vomiting.   Genitourinary: Negative for difficulty urinating, frequency and urgency.   Musculoskeletal: Positive for back pain. Negative for joint swelling.   Neurological: Negative for dizziness and headaches.   Psychiatric/Behavioral: Negative for confusion.     IPSS Questionnaire (AUA-7):  Over the past month…    1)  How often have you had a sensation of not emptying your bladder completely after you finish urinating?  5 - Almost always   2)  How often have you had to urinate again less than two hours after you finished urinating? 0 - Not at all   3)  How often have you found you stopped and started again several times when you urinated?  0 - Not at all   4) How difficult have you found it to postpone urination?  5 - Almost always   5) How often have you had a weak urinary stream?  0 - Not at all   6) How often have you had to push or strain to begin urination?  1 - Less than 1 time in 5   7) How many times did you most typically get up to urinate from the time you went to bed until the time you got up in the morning?  2 - 2 times   Total score:  0-7 mildly symptomatic    8-19 moderately symptomatic    20-35 severely symptomatic     I have reviewed the following portions of  the patient's history: allergies, current medications, past family history, past medical history, past social history, past surgical history, problem list and ROS and confirm it's accurate.    Physical Exam:     Physical Exam   Constitutional: He is oriented to person, place, and time. He appears well-developed and well-nourished. No distress.   Abdominal: Soft. Bowel sounds are normal. He exhibits no distension and no mass. There is no tenderness. There is no rebound and no guarding. No hernia.   Musculoskeletal: Normal range of motion.   Neurological: He is alert and oriented to person, place, and time.   Skin: Skin is warm and dry. No rash noted. He is not diaphoretic. No pallor.   Psychiatric: He has a normal mood and affect. His behavior is normal. Judgment and thought content normal.   Nursing note and vitals reviewed.      Procedure:     No notes on file      Assessment:     Encounter Diagnoses   Name Primary?   • Other male erectile dysfunction Yes   • Essential hypertension      No orders of the defined types were placed in this encounter.      Plan:   Will give a refill prescription for his Sildenafil to take 3 - 5 tablets as directed as needed.  He is to return to the office in one year for follow-up and medical management.  Patient is a smoker and I discussed health issues that can occur with the use of tobacco products including different forms of cancer ie: lung and bladder cancer along with mouth and throat cancer, hypertension, circulatory problems - verbalized understanding.  He is not ready to stop smoking.    Discussed the patient's BMI with him. BMI is above normal parameters. Follow-up plan includes:  educational material.    I advised the patient of the risks in continuing to use tobacco, and I provided this patient with smoking cessation educational materials.    During this visit, I spent > 3-10 minutes counseling the patient regarding smoking cessation.    Counseling was given to patient for  the following topics patient and family education including: as discussed in the plan above. The interim medical history and current results were reviewed.  A treatment plan with follow-up was made for Other male erectile dysfunction [N52.8]. I spent 20 minutes face to face with Taiwo Pierce and 15 was spent in counseling.     This document has been electronically signed by ENRICO Gallegos April 5, 2018 3:44 PM

## 2018-04-17 ENCOUNTER — TELEPHONE (OUTPATIENT)
Dept: UROLOGY | Facility: CLINIC | Age: 43
End: 2018-04-17

## 2018-04-18 ENCOUNTER — TELEPHONE (OUTPATIENT)
Dept: UROLOGY | Facility: CLINIC | Age: 43
End: 2018-04-18

## 2018-04-19 ENCOUNTER — TELEPHONE (OUTPATIENT)
Dept: UROLOGY | Facility: CLINIC | Age: 43
End: 2018-04-19

## 2018-04-19 NOTE — TELEPHONE ENCOUNTER
Called patient back to talk about sildenafil denial. He stated that PA was denied due to diagnosis of erectile dysfunction. Patient stated that insurance requires Jennifer to change diagnosis to hypertension. I explained to patient that legally Jennifer cannot use that diagnosis as it is not a urology diagnosis code. I explained that he should get in contact with his PCP to determine who would be able to use that diagnosis as she had used it for this medication in the past.

## 2018-07-26 ENCOUNTER — OFFICE VISIT (OUTPATIENT)
Dept: FAMILY MEDICINE CLINIC | Facility: CLINIC | Age: 43
End: 2018-07-26

## 2018-07-26 VITALS
OXYGEN SATURATION: 99 % | HEART RATE: 77 BPM | TEMPERATURE: 98.5 F | HEIGHT: 71 IN | WEIGHT: 211 LBS | SYSTOLIC BLOOD PRESSURE: 126 MMHG | BODY MASS INDEX: 29.54 KG/M2 | DIASTOLIC BLOOD PRESSURE: 86 MMHG

## 2018-07-26 DIAGNOSIS — N52.2 DRUG-INDUCED ERECTILE DYSFUNCTION: ICD-10-CM

## 2018-07-26 DIAGNOSIS — J30.2 ACUTE SEASONAL ALLERGIC RHINITIS, UNSPECIFIED TRIGGER: Primary | ICD-10-CM

## 2018-07-26 DIAGNOSIS — K21.9 GASTROESOPHAGEAL REFLUX DISEASE WITHOUT ESOPHAGITIS: ICD-10-CM

## 2018-07-26 DIAGNOSIS — E78.2 MIXED HYPERLIPIDEMIA: ICD-10-CM

## 2018-07-26 DIAGNOSIS — N52.9 ERECTILE DYSFUNCTION, UNSPECIFIED ERECTILE DYSFUNCTION TYPE: ICD-10-CM

## 2018-07-26 DIAGNOSIS — I10 ESSENTIAL HYPERTENSION: ICD-10-CM

## 2018-07-26 DIAGNOSIS — R35.1 NOCTURIA: ICD-10-CM

## 2018-07-26 LAB
ALBUMIN SERPL-MCNC: 4.7 G/DL (ref 3.5–5)
ALBUMIN UR-MCNC: 0.6 MG/L
ALBUMIN/GLOB SERPL: 1.4 G/DL (ref 1.5–2.5)
ALP SERPL-CCNC: 89 U/L (ref 40–129)
ALT SERPL W P-5'-P-CCNC: 27 U/L (ref 10–44)
ANION GAP SERPL CALCULATED.3IONS-SCNC: 9 MMOL/L (ref 3.6–11.2)
AST SERPL-CCNC: 23 U/L (ref 10–34)
BASOPHILS # BLD AUTO: 0.04 10*3/MM3 (ref 0–0.3)
BASOPHILS NFR BLD AUTO: 0.5 % (ref 0–2)
BILIRUB SERPL-MCNC: 0.5 MG/DL (ref 0.2–1.8)
BUN BLD-MCNC: 10 MG/DL (ref 7–21)
BUN/CREAT SERPL: 11.1 (ref 7–25)
CALCIUM SPEC-SCNC: 9.5 MG/DL (ref 7.7–10)
CHLORIDE SERPL-SCNC: 101 MMOL/L (ref 99–112)
CHOLEST SERPL-MCNC: 128 MG/DL (ref 0–200)
CO2 SERPL-SCNC: 29 MMOL/L (ref 24.3–31.9)
CREAT BLD-MCNC: 0.9 MG/DL (ref 0.43–1.29)
DEPRECATED RDW RBC AUTO: 42.1 FL (ref 37–54)
EOSINOPHIL # BLD AUTO: 0.15 10*3/MM3 (ref 0–0.7)
EOSINOPHIL NFR BLD AUTO: 1.9 % (ref 0–5)
ERYTHROCYTE [DISTWIDTH] IN BLOOD BY AUTOMATED COUNT: 13.1 % (ref 11.5–14.5)
GFR SERPL CREATININE-BSD FRML MDRD: 92 ML/MIN/1.73
GLOBULIN UR ELPH-MCNC: 3.4 GM/DL
GLUCOSE BLD-MCNC: 93 MG/DL (ref 70–110)
HCT VFR BLD AUTO: 40.8 % (ref 42–52)
HDLC SERPL-MCNC: 42 MG/DL (ref 60–100)
HGB BLD-MCNC: 13.9 G/DL (ref 14–18)
IMM GRANULOCYTES # BLD: 0.01 10*3/MM3 (ref 0–0.03)
IMM GRANULOCYTES NFR BLD: 0.1 % (ref 0–0.5)
LDLC SERPL CALC-MCNC: 64 MG/DL (ref 0–100)
LDLC/HDLC SERPL: 1.52 {RATIO}
LYMPHOCYTES # BLD AUTO: 2.45 10*3/MM3 (ref 1–3)
LYMPHOCYTES NFR BLD AUTO: 31.6 % (ref 21–51)
MCH RBC QN AUTO: 30.3 PG (ref 27–33)
MCHC RBC AUTO-ENTMCNC: 34.1 G/DL (ref 33–37)
MCV RBC AUTO: 89.1 FL (ref 80–94)
MONOCYTES # BLD AUTO: 1.12 10*3/MM3 (ref 0.1–0.9)
MONOCYTES NFR BLD AUTO: 14.4 % (ref 0–10)
NEUTROPHILS # BLD AUTO: 3.99 10*3/MM3 (ref 1.4–6.5)
NEUTROPHILS NFR BLD AUTO: 51.5 % (ref 30–70)
OSMOLALITY SERPL CALC.SUM OF ELEC: 276.3 MOSM/KG (ref 273–305)
PLATELET # BLD AUTO: 316 10*3/MM3 (ref 130–400)
PMV BLD AUTO: 11.1 FL (ref 6–10)
POTASSIUM BLD-SCNC: 3.7 MMOL/L (ref 3.5–5.3)
PROT SERPL-MCNC: 8.1 G/DL (ref 6–8)
RBC # BLD AUTO: 4.58 10*6/MM3 (ref 4.7–6.1)
SODIUM BLD-SCNC: 139 MMOL/L (ref 135–153)
TESTOST SERPL-MCNC: 515.44 NG/DL (ref 123.06–813.86)
TRIGL SERPL-MCNC: 111 MG/DL (ref 0–150)
TSH SERPL DL<=0.05 MIU/L-ACNC: 1.32 MIU/ML (ref 0.55–4.78)
VLDLC SERPL-MCNC: 22.2 MG/DL
WBC NRBC COR # BLD: 7.76 10*3/MM3 (ref 4.5–12.5)

## 2018-07-26 PROCEDURE — 84443 ASSAY THYROID STIM HORMONE: CPT | Performed by: NURSE PRACTITIONER

## 2018-07-26 PROCEDURE — 82043 UR ALBUMIN QUANTITATIVE: CPT | Performed by: NURSE PRACTITIONER

## 2018-07-26 PROCEDURE — 80053 COMPREHEN METABOLIC PANEL: CPT | Performed by: NURSE PRACTITIONER

## 2018-07-26 PROCEDURE — 36415 COLL VENOUS BLD VENIPUNCTURE: CPT | Performed by: NURSE PRACTITIONER

## 2018-07-26 PROCEDURE — 99214 OFFICE O/P EST MOD 30 MIN: CPT | Performed by: NURSE PRACTITIONER

## 2018-07-26 PROCEDURE — 85025 COMPLETE CBC W/AUTO DIFF WBC: CPT | Performed by: NURSE PRACTITIONER

## 2018-07-26 PROCEDURE — 84403 ASSAY OF TOTAL TESTOSTERONE: CPT | Performed by: NURSE PRACTITIONER

## 2018-07-26 PROCEDURE — 80061 LIPID PANEL: CPT | Performed by: NURSE PRACTITIONER

## 2018-07-26 RX ORDER — AMLODIPINE BESYLATE 10 MG/1
10 TABLET ORAL DAILY
Qty: 30 TABLET | Refills: 5 | Status: SHIPPED | OUTPATIENT
Start: 2018-07-26 | End: 2019-01-28 | Stop reason: SDUPTHER

## 2018-07-26 RX ORDER — LOSARTAN POTASSIUM AND HYDROCHLOROTHIAZIDE 25; 100 MG/1; MG/1
1 TABLET ORAL DAILY
Qty: 30 TABLET | Refills: 5 | Status: SHIPPED | OUTPATIENT
Start: 2018-07-26 | End: 2019-01-28 | Stop reason: SDUPTHER

## 2018-07-26 RX ORDER — ESOMEPRAZOLE MAGNESIUM 40 MG/1
40 CAPSULE, DELAYED RELEASE ORAL
Qty: 30 CAPSULE | Refills: 5 | Status: SHIPPED | OUTPATIENT
Start: 2018-07-26 | End: 2019-01-28 | Stop reason: SDUPTHER

## 2018-07-26 RX ORDER — FLUTICASONE PROPIONATE 50 MCG
2 SPRAY, SUSPENSION (ML) NASAL DAILY
Qty: 1 BOTTLE | Refills: 5 | Status: SHIPPED | OUTPATIENT
Start: 2018-07-26 | End: 2019-01-28

## 2018-07-26 RX ORDER — SILDENAFIL CITRATE 20 MG/1
20 TABLET ORAL 3 TIMES DAILY
Qty: 90 TABLET | Refills: 3 | Status: SHIPPED | OUTPATIENT
Start: 2018-07-26 | End: 2019-01-28 | Stop reason: SDUPTHER

## 2018-07-26 NOTE — PROGRESS NOTES
Subjective   Taiwo Pierce is a 43 y.o. male.     Chief Complaint   Patient presents with   • Heartburn   • Erectile Dysfunction       History of Present Illness     Patient is here today to follow-up on multiple chronic processes.  Hyperlipidemia-working on diet and exercise.  Receives pravastatin.  No side effects.  He has had some weight gain due to decreased mobility and increased appetite.  GERD-stable with Nexium  Erectile dysfunction-stable with current regimen  Hypertension-currently receives Hyzaar and sildenafil.     Patient is going to have fasting labs today.    Chronic pain-patient is under the care of pain clinic where he receives controlled medication as well as Skelaxin and ibuprofen.        The following portions of the patient's history were reviewed and updated as appropriate: allergies, current medications, past family history, past medical history, past social history, past surgical history and problem list.    Review of Systems   Constitutional: Positive for unexpected weight change (gain ). Negative for activity change and appetite change.   HENT: Positive for congestion and sneezing. Negative for rhinorrhea and sore throat.    Eyes: Negative for discharge, redness and itching.   Respiratory: Negative for cough, chest tightness and shortness of breath.    Cardiovascular: Positive for leg swelling. Negative for chest pain and palpitations.   Gastrointestinal: Negative for abdominal pain, constipation, diarrhea and vomiting.   Endocrine: Negative.    Genitourinary: Negative for difficulty urinating, dysuria and urgency.   Musculoskeletal: Positive for arthralgias, back pain and neck pain.   Skin: Negative.    Allergic/Immunologic: Positive for environmental allergies.   Neurological: Positive for headaches (occasional). Negative for dizziness, weakness and numbness.   Psychiatric/Behavioral: Negative for behavioral problems, self-injury and suicidal ideas.   All other systems reviewed and are  "negative.      Objective     /86   Pulse 77   Temp 98.5 °F (36.9 °C) (Temporal Artery )   Ht 180.3 cm (71\")   Wt 95.7 kg (211 lb)   SpO2 99%   BMI 29.43 kg/m²   Office Visit on 09/25/2017   Component Date Value Ref Range Status   • Color 09/25/2017 Yellow  Yellow, Straw, Dark Yellow, June Final   • Clarity, UA 09/25/2017 Clear  Clear Final   • Glucose, UA 09/25/2017 Negative  Negative, 1000 mg/dL (3+) mg/dL Final   • Bilirubin 09/25/2017 Negative  Negative Final   • Ketones, UA 09/25/2017 Negative  Negative Final   • Specific Gravity  09/25/2017 1.020  1.005 - 1.030 Final   • Blood, UA 09/25/2017 Negative  Negative Final   • pH, Urine 09/25/2017 6.0  5.0 - 8.0 Final   • Protein, POC 09/25/2017 Negative  Negative mg/dL Final   • Urobilinogen, UA 09/25/2017 Normal  Normal Final   • Leukocytes 09/25/2017 Negative  Negative Final   • Nitrite, UA 09/25/2017 Negative  Negative Final       Physical Exam   Constitutional: He is oriented to person, place, and time. He appears well-developed and well-nourished. No distress.   HENT:   Head: Normocephalic and atraumatic.   Right Ear: External ear normal.   Left Ear: External ear normal.   Nose: Mucosal edema present. Right sinus exhibits no maxillary sinus tenderness and no frontal sinus tenderness. Left sinus exhibits no maxillary sinus tenderness and no frontal sinus tenderness.   Mouth/Throat: Oropharynx is clear and moist and mucous membranes are normal. No oropharyngeal exudate.   Eyes: Pupils are equal, round, and reactive to light. Conjunctivae are normal. Right eye exhibits no discharge. Left eye exhibits no discharge.   Neck: Normal range of motion. Neck supple. No tracheal deviation present. No thyromegaly present.   Cardiovascular: Normal rate, regular rhythm and normal heart sounds.  Exam reveals no gallop and no friction rub.    No murmur heard.  Pulmonary/Chest: Effort normal and breath sounds normal. No respiratory distress. He has no wheezes. He has " no rales. He exhibits no tenderness.   Abdominal: Soft. Bowel sounds are normal. He exhibits no distension and no mass. There is no tenderness. There is no rebound and no guarding.   Musculoskeletal:        Lumbar back: He exhibits decreased range of motion and tenderness.   Lymphadenopathy:     He has no cervical adenopathy.   Neurological: He is alert and oriented to person, place, and time. He has normal reflexes.   CN 2-12 grossly intact    Skin: Skin is warm and dry. Capillary refill takes less than 2 seconds. No rash noted. He is not diaphoretic. No erythema.   Psychiatric: He has a normal mood and affect. His speech is normal and behavior is normal. Judgment and thought content normal. Cognition and memory are normal.       Assessment/Plan     Problem List Items Addressed This Visit        Cardiovascular and Mediastinum    Hyperlipidemia    Relevant Medications    esomeprazole (nexIUM) 40 MG capsule    Other Relevant Orders    CBC Auto Differential    Hypertension    Relevant Medications    sildenafil (REVATIO) 20 MG tablet    losartan-hydrochlorothiazide (HYZAAR) 100-25 MG per tablet    amLODIPine (NORVASC) 10 MG tablet    Other Relevant Orders    CBC Auto Differential       Digestive    GERD (gastroesophageal reflux disease)    Relevant Medications    esomeprazole (nexIUM) 40 MG capsule       Genitourinary    Erectile dysfunction      Other Visit Diagnoses     Acute seasonal allergic rhinitis, unspecified trigger    -  Primary    Relevant Medications    fluticasone (FLONASE) 50 MCG/ACT nasal spray    Nocturia            Fasting labs today.  Refill routine medications.  Continue current medications.     Patient's Body mass index is 29.43 kg/m². BMI is above normal parameters. Recommendations include: exercise counseling and nutrition counseling.      I have discussed diagnosis in detail today allowing time for questions and answers. Pt is aware of reasons to seek urgent or emergent medical care as well as  reasons to return to the clinic for evaluation. Possible side effects, interactions and progression of symptoms discussed as well. Pt / family states understanding.   Emotional support and active listening provided.   Follow up 3 months, sooner if needed.   Routine labs every 3-6 months.     Errors in dictation may reflect use of voice recognition software and not all errors in transcription may have been detected prior to signing.           This document has been electronically signed by:  ENRICO Caceres, NP-C

## 2018-07-31 ENCOUNTER — OFFICE VISIT (OUTPATIENT)
Dept: FAMILY MEDICINE CLINIC | Facility: CLINIC | Age: 43
End: 2018-07-31

## 2018-07-31 VITALS
TEMPERATURE: 98.5 F | WEIGHT: 204 LBS | DIASTOLIC BLOOD PRESSURE: 80 MMHG | OXYGEN SATURATION: 97 % | HEART RATE: 71 BPM | SYSTOLIC BLOOD PRESSURE: 120 MMHG | BODY MASS INDEX: 28.56 KG/M2 | HEIGHT: 71 IN

## 2018-07-31 DIAGNOSIS — J02.9 SORE THROAT: Primary | ICD-10-CM

## 2018-07-31 DIAGNOSIS — Z09 ENCOUNTER FOR EXAMINATION FOLLOWING TREATMENT AT HOSPITAL: ICD-10-CM

## 2018-07-31 LAB
EXPIRATION DATE: NORMAL
INTERNAL CONTROL: NORMAL
Lab: NORMAL
S PYO AG THROAT QL: NEGATIVE

## 2018-07-31 PROCEDURE — 96372 THER/PROPH/DIAG INJ SC/IM: CPT | Performed by: NURSE PRACTITIONER

## 2018-07-31 PROCEDURE — 99214 OFFICE O/P EST MOD 30 MIN: CPT | Performed by: NURSE PRACTITIONER

## 2018-07-31 PROCEDURE — 87880 STREP A ASSAY W/OPTIC: CPT | Performed by: NURSE PRACTITIONER

## 2018-07-31 RX ORDER — SULFAMETHOXAZOLE AND TRIMETHOPRIM 800; 160 MG/1; MG/1
1 TABLET ORAL 2 TIMES DAILY
Qty: 20 TABLET | Refills: 0 | Status: SHIPPED | OUTPATIENT
Start: 2018-07-31 | End: 2018-08-10

## 2018-07-31 RX ORDER — ACETAMINOPHEN 325 MG/1
650 TABLET ORAL EVERY 6 HOURS PRN
Qty: 30 TABLET | Refills: 0 | Status: SHIPPED | OUTPATIENT
Start: 2018-07-31 | End: 2019-01-28

## 2018-07-31 RX ORDER — CEFTRIAXONE 1 G/1
1 INJECTION, POWDER, FOR SOLUTION INTRAMUSCULAR; INTRAVENOUS ONCE
Status: COMPLETED | OUTPATIENT
Start: 2018-07-31 | End: 2018-07-31

## 2018-07-31 RX ADMIN — CEFTRIAXONE 1 G: 1 INJECTION, POWDER, FOR SOLUTION INTRAMUSCULAR; INTRAVENOUS at 17:13

## 2018-07-31 NOTE — ASSESSMENT & PLAN NOTE
Current outpatient and discharge medications have been reconciled for the patient.  Reviewed by: ENRICO Leone

## 2018-07-31 NOTE — PROGRESS NOTES
Subjective   Taiwo Pierce is a 43 y.o. male.     Chief Complaint   Patient presents with   • URI     Emergency room follow-up  History of Present Illness     Patient presents today with sore throat ×3 days.  He was seen at the [facility] over the weekend.  We have performed a rapid strep test today which is negative.  Patient reports that he has been exposed to strep and feels as if he is strep positive.  His only medication allergy is amoxicillin.  He has tolerated Keflex in the past.  His throat is severely sore.  Drinking fluid helps.  No problems swallowing.      The following portions of the patient's history were reviewed and updated as appropriate: allergies, current medications, past family history, past medical history, past social history, past surgical history and problem list.    Review of Systems   Constitutional: Negative for appetite change, fatigue and unexpected weight change.   HENT: Positive for sore throat. Negative for congestion, ear pain, nosebleeds, postnasal drip, rhinorrhea, trouble swallowing and voice change.    Eyes: Negative for pain and visual disturbance.   Respiratory: Negative for cough, choking, shortness of breath and wheezing.    Cardiovascular: Negative for chest pain and palpitations.   Gastrointestinal: Negative for abdominal pain, blood in stool, constipation and diarrhea.   Endocrine: Negative for cold intolerance and polydipsia.   Genitourinary: Negative for difficulty urinating, flank pain and hematuria.   Musculoskeletal: Positive for back pain (chronic). Negative for arthralgias, gait problem, joint swelling and myalgias.   Skin: Negative for color change and rash.   Allergic/Immunologic: Negative.    Neurological: Positive for headaches. Negative for syncope and numbness.   Hematological: Negative.    Psychiatric/Behavioral: Negative for sleep disturbance and suicidal ideas.   All other systems reviewed and are negative.      Objective     /80    "Pulse 71   Temp 98.5 °F (36.9 °C) (Tympanic)   Ht 180.3 cm (71\")   Wt 92.5 kg (204 lb)   SpO2 97%   BMI 28.45 kg/m²   Office Visit on 07/26/2018   Component Date Value Ref Range Status   • Testosterone, Total 07/26/2018 515.44  123.06 - 813.86 ng/dL Final   • Glucose 07/26/2018 93  70 - 110 mg/dL Final   • BUN 07/26/2018 10  7 - 21 mg/dL Final   • Creatinine 07/26/2018 0.90  0.43 - 1.29 mg/dL Final   • Sodium 07/26/2018 139  135 - 153 mmol/L Final   • Potassium 07/26/2018 3.7  3.5 - 5.3 mmol/L Final   • Chloride 07/26/2018 101  99 - 112 mmol/L Final   • CO2 07/26/2018 29.0  24.3 - 31.9 mmol/L Final   • Calcium 07/26/2018 9.5  7.7 - 10.0 mg/dL Final   • Total Protein 07/26/2018 8.1* 6.0 - 8.0 g/dL Final   • Albumin 07/26/2018 4.70  3.50 - 5.00 g/dL Final   • ALT (SGPT) 07/26/2018 27  10 - 44 U/L Final   • AST (SGOT) 07/26/2018 23  10 - 34 U/L Final   • Alkaline Phosphatase 07/26/2018 89  40 - 129 U/L Final   • Total Bilirubin 07/26/2018 0.5  0.2 - 1.8 mg/dL Final   • eGFR Non African Amer 07/26/2018 92  >60 mL/min/1.73 Final   • Globulin 07/26/2018 3.4  gm/dL Final   • A/G Ratio 07/26/2018 1.4* 1.5 - 2.5 g/dL Final   • BUN/Creatinine Ratio 07/26/2018 11.1  7.0 - 25.0 Final   • Anion Gap 07/26/2018 9.0  3.6 - 11.2 mmol/L Final   • TSH 07/26/2018 1.316  0.550 - 4.780 mIU/mL Final   • Microalbumin, Urine 07/26/2018 0.6  mg/L Final   • Total Cholesterol 07/26/2018 128  0 - 200 mg/dL Final   • Triglycerides 07/26/2018 111  0 - 150 mg/dL Final   • HDL Cholesterol 07/26/2018 42* 60 - 100 mg/dL Final   • LDL Cholesterol  07/26/2018 64  0 - 100 mg/dL Final   • VLDL Cholesterol 07/26/2018 22.2  mg/dL Final   • LDL/HDL Ratio 07/26/2018 1.52   Final   • WBC 07/26/2018 7.76  4.50 - 12.50 10*3/mm3 Final   • RBC 07/26/2018 4.58* 4.70 - 6.10 10*6/mm3 Final   • Hemoglobin 07/26/2018 13.9* 14.0 - 18.0 g/dL Final   • Hematocrit 07/26/2018 40.8* 42.0 - 52.0 % Final   • MCV 07/26/2018 89.1  80.0 - 94.0 fL Final   • MCH 07/26/2018 " 30.3  27.0 - 33.0 pg Final   • MCHC 07/26/2018 34.1  33.0 - 37.0 g/dL Final   • RDW 07/26/2018 13.1  11.5 - 14.5 % Final   • RDW-SD 07/26/2018 42.1  37.0 - 54.0 fl Final   • MPV 07/26/2018 11.1* 6.0 - 10.0 fL Final   • Platelets 07/26/2018 316  130 - 400 10*3/mm3 Final   • Neutrophil % 07/26/2018 51.5  30.0 - 70.0 % Final   • Lymphocyte % 07/26/2018 31.6  21.0 - 51.0 % Final   • Monocyte % 07/26/2018 14.4* 0.0 - 10.0 % Final   • Eosinophil % 07/26/2018 1.9  0.0 - 5.0 % Final   • Basophil % 07/26/2018 0.5  0.0 - 2.0 % Final   • Immature Grans % 07/26/2018 0.1  0.0 - 0.5 % Final   • Neutrophils, Absolute 07/26/2018 3.99  1.40 - 6.50 10*3/mm3 Final   • Lymphocytes, Absolute 07/26/2018 2.45  1.00 - 3.00 10*3/mm3 Final   • Monocytes, Absolute 07/26/2018 1.12* 0.10 - 0.90 10*3/mm3 Final   • Eosinophils, Absolute 07/26/2018 0.15  0.00 - 0.70 10*3/mm3 Final   • Basophils, Absolute 07/26/2018 0.04  0.00 - 0.30 10*3/mm3 Final   • Immature Grans, Absolute 07/26/2018 0.01  0.00 - 0.03 10*3/mm3 Final   • Osmolality Calc 07/26/2018 276.3  273.0 - 305.0 mOsm/kg Final       Physical Exam   Constitutional: He appears well-developed and well-nourished. He appears ill. No distress.   43-year-old male sitting on exam room table appearing acutely ill.   HENT:   Right Ear: Hearing, tympanic membrane, external ear and ear canal normal.   Left Ear: Hearing, tympanic membrane, external ear and ear canal normal.   Nose: Nose normal. No mucosal edema or rhinorrhea. Right sinus exhibits no maxillary sinus tenderness and no frontal sinus tenderness. Left sinus exhibits no maxillary sinus tenderness and no frontal sinus tenderness.   Mouth/Throat: Mucous membranes are normal. No uvula swelling. Oropharyngeal exudate and posterior oropharyngeal erythema present. No posterior oropharyngeal edema. Tonsils are 2+ on the right. Tonsils are 2+ on the left. Tonsillar exudate.   Eyes: Pupils are equal, round, and reactive to light. Conjunctivae are  normal. Right eye exhibits no discharge. Left eye exhibits no discharge.   Neck: Neck supple. No thyromegaly present.   Cardiovascular: Normal rate, regular rhythm and normal heart sounds.    No murmur heard.  Pulmonary/Chest: Effort normal and breath sounds normal. No respiratory distress. He has no wheezes. He has no rales. He exhibits no tenderness.   Abdominal: Soft. Bowel sounds are normal. There is no tenderness.   Lymphadenopathy:        Head (right side): Tonsillar adenopathy present.        Head (left side): Tonsillar adenopathy present.     He has cervical adenopathy.        Right cervical: Deep cervical adenopathy present.        Left cervical: Deep cervical adenopathy present.   Skin: Skin is warm and dry. Capillary refill takes less than 2 seconds. No rash noted. He is not diaphoretic. No erythema. No pallor.   Psychiatric: He has a normal mood and affect. His speech is normal and behavior is normal. Judgment and thought content normal.   Vitals reviewed.      Assessment/Plan     Problem List Items Addressed This Visit        Other    Encounter for examination following treatment at hospital    Current Assessment & Plan     Current outpatient and discharge medications have been reconciled for the patient.  Reviewed by: ENRICO Leone             Other Visit Diagnoses     Sore throat    -  Primary    Relevant Medications    cefTRIAXone (ROCEPHIN) injection 1 g (Completed)    sulfamethoxazole-trimethoprim (BACTRIM DS,SEPTRA DS) 800-160 MG per tablet    acetaminophen (TYLENOL) 325 MG tablet    Other Relevant Orders    POCT rapid strep A (Completed)        Fluids, rest, symptomatic treatment advised. Steam therapy. Dispose of tooth bush after 24 hours of starting antibiotics if ordered. Complete antibiotics as ordered.  Discussed possible side effects/interactions of medication. Discussed symptoms to report as well as reasons to seek urgent or emergent medical attention. Understanding  stated.   Recommend follow up in 2-3 days if not improved, sooner if needed.     Emergency department notes from 07/29/2018 reviewed and discussed with patient today.    Suspect false negative rapid strep.           Patient's Body mass index is 28.45 kg/m². BMI is above normal parameters. Recommendations include: exercise counseling and nutrition counseling.    I have discussed diagnosis in detail today allowing time for questions and answers. Pt is aware of reasons to seek urgent or emergent medical care as well as reasons to return to the clinic for evaluation. Possible side effects, interactions and progression of symptoms discussed as well. Pt / family states understanding.   Emotional support and active listening provided.         Errors in dictation may reflect use of voice recognition software and not all errors in transcription may have been detected prior to signing.       This document has been electronically signed by:  ENRICO Caceres, NP-C

## 2018-08-02 ENCOUNTER — OFFICE VISIT (OUTPATIENT)
Dept: FAMILY MEDICINE CLINIC | Facility: CLINIC | Age: 43
End: 2018-08-02

## 2018-08-02 VITALS
OXYGEN SATURATION: 93 % | TEMPERATURE: 98.7 F | HEIGHT: 71 IN | DIASTOLIC BLOOD PRESSURE: 90 MMHG | SYSTOLIC BLOOD PRESSURE: 120 MMHG | HEART RATE: 90 BPM | BODY MASS INDEX: 28 KG/M2 | WEIGHT: 200 LBS

## 2018-08-02 DIAGNOSIS — J06.9 ACUTE URI: Primary | ICD-10-CM

## 2018-08-02 DIAGNOSIS — J02.8 PHARYNGITIS DUE TO OTHER ORGANISM: ICD-10-CM

## 2018-08-02 PROBLEM — J02.9 PHARYNGITIS: Status: ACTIVE | Noted: 2018-08-02

## 2018-08-02 PROCEDURE — 87070 CULTURE OTHR SPECIMN AEROBIC: CPT | Performed by: NURSE PRACTITIONER

## 2018-08-02 PROCEDURE — 99214 OFFICE O/P EST MOD 30 MIN: CPT | Performed by: NURSE PRACTITIONER

## 2018-08-02 PROCEDURE — 96372 THER/PROPH/DIAG INJ SC/IM: CPT | Performed by: NURSE PRACTITIONER

## 2018-08-02 PROCEDURE — 87205 SMEAR GRAM STAIN: CPT | Performed by: NURSE PRACTITIONER

## 2018-08-02 RX ORDER — METHYLPREDNISOLONE ACETATE 80 MG/ML
80 INJECTION, SUSPENSION INTRA-ARTICULAR; INTRALESIONAL; INTRAMUSCULAR; SOFT TISSUE ONCE
Status: COMPLETED | OUTPATIENT
Start: 2018-08-02 | End: 2018-08-02

## 2018-08-02 RX ORDER — KETOROLAC TROMETHAMINE 30 MG/ML
80 INJECTION, SOLUTION INTRAMUSCULAR; INTRAVENOUS ONCE
Status: COMPLETED | OUTPATIENT
Start: 2018-08-02 | End: 2018-08-02

## 2018-08-02 RX ADMIN — KETOROLAC TROMETHAMINE 80 MG: 30 INJECTION, SOLUTION INTRAMUSCULAR; INTRAVENOUS at 15:40

## 2018-08-02 RX ADMIN — METHYLPREDNISOLONE ACETATE 80 MG: 80 INJECTION, SUSPENSION INTRA-ARTICULAR; INTRALESIONAL; INTRAMUSCULAR; SOFT TISSUE at 15:41

## 2018-08-02 NOTE — PROGRESS NOTES
"Subjective   Taiwo Pierce is a 43 y.o. male.     Chief Complaint   Patient presents with   • URI       History of Present Illness     URI - continued sore throat and fatigue. Decreased appetite as swallowing feels like his throat is on \" fire\". He has been drinking well but not eating much. Has some weight loss. Recent labs on file and have been reviewed from earlier this month. He was seen in the ER last week. Seen earlier this week in office.  Treated with Rocephin injection and sent home on Bactrim.  Rapid strep was negative.    Reports his mouth is sore and has a bad odor and white patches throughout.    Fever is resolved.  Some occasional diarrhea and cramping in his abdomen.  He denies any chest pain or shortness of breath.  Patient reports he was seen by cardiology recently with no abnormal findings per patient.  He declines my recommendation for an EKG today.      The following portions of the patient's history were reviewed and updated as appropriate: allergies, current medications, past family history, past medical history, past social history, past surgical history and problem list.    Review of Systems   Constitutional: Positive for fatigue and unexpected weight change (loss). Negative for appetite change.   HENT: Positive for mouth sores and sore throat. Negative for congestion, ear pain, nosebleeds, postnasal drip, rhinorrhea, trouble swallowing and voice change.    Eyes: Negative for pain and visual disturbance.   Respiratory: Negative for cough, shortness of breath and wheezing.    Cardiovascular: Negative for chest pain and palpitations.   Gastrointestinal: Positive for diarrhea and nausea. Negative for abdominal pain, blood in stool and constipation.   Endocrine: Negative for cold intolerance and polydipsia.   Genitourinary: Negative for difficulty urinating, flank pain and hematuria.   Musculoskeletal: Positive for back pain. Negative for arthralgias, gait problem, joint swelling and myalgias. " "  Skin: Negative for color change and rash.   Allergic/Immunologic: Negative.    Neurological: Positive for dizziness and headaches. Negative for syncope and numbness.   Hematological: Negative.    Psychiatric/Behavioral: Negative for sleep disturbance and suicidal ideas.   All other systems reviewed and are negative.      Objective     /90   Pulse 90   Temp 98.7 °F (37.1 °C) (Tympanic)   Ht 180.3 cm (71\")   Wt 90.7 kg (200 lb)   SpO2 93%   BMI 27.89 kg/m²   Office Visit on 07/31/2018   Component Date Value Ref Range Status   • Rapid Strep A Screen 07/31/2018 Negative  Negative, VALID, INVALID, Not Performed Final   • Internal Control 07/31/2018 Passed  Passed Final   • Lot Number 07/31/2018 [id number]   Final   • Expiration Date 07/31/2018 07/31/2019   Final       Physical Exam   Constitutional: He is oriented to person, place, and time. Vital signs are normal. He appears well-developed and well-nourished. No distress.   HENT:   Head: Atraumatic.   Mouth/Throat: Oral lesions (white patches ) present. No uvula swelling. Oropharyngeal exudate and posterior oropharyngeal erythema present. No posterior oropharyngeal edema. Tonsils are 2+ on the right. Tonsils are 2+ on the left.   Eyes: Pupils are equal, round, and reactive to light. Conjunctivae are normal. Right eye exhibits no discharge. Left eye exhibits no discharge.   Neck: Neck supple. No thyromegaly present.   Cardiovascular: Normal rate, regular rhythm and normal heart sounds.    No murmur heard.  Pulmonary/Chest: Effort normal and breath sounds normal. No respiratory distress. He has no wheezes. He has no rales. He exhibits no tenderness.   Abdominal: Soft. Bowel sounds are normal. There is no tenderness.   Lymphadenopathy:        Head (right side): Tonsillar adenopathy present.        Head (left side): Tonsillar adenopathy present.     He has cervical adenopathy.        Right cervical: Deep cervical adenopathy present.        Left cervical: Deep " cervical adenopathy present.   Neurological: He is alert and oriented to person, place, and time. No cranial nerve deficit. Coordination normal.   Skin: Skin is warm and dry. Capillary refill takes less than 2 seconds. No rash noted. He is not diaphoretic. No erythema. No pallor.   Psychiatric: He has a normal mood and affect. His speech is normal and behavior is normal. Judgment and thought content normal. Cognition and memory are normal.   Vitals reviewed.      Assessment/Plan     Problem List Items Addressed This Visit        Respiratory    Pharyngitis    Relevant Medications    ketorolac (TORADOL) injection 80 mg (Completed)    methylPREDNISolone acetate (DEPO-medrol) injection 80 mg (Completed)    Other Relevant Orders    Wound Culture - Wound, Esophagus    Acute URI - Primary    Relevant Medications    ketorolac (TORADOL) injection 80 mg (Completed)    methylPREDNISolone acetate (DEPO-medrol) injection 80 mg (Completed)    Other Relevant Orders    Wound Culture - Wound, Esophagus          Magic mouth wash 3-4 times a day.  Affording her drinking for at least 30 minutes after use.  The prescription has been scanned to chart and given to the patient.   Patient declines EKG today stating he is not having chest pain and has been seen by cardiology.  States understanding the risk.  Throat culture today.  Rocephin injection, tolerated well.  Depo-Medrol 80 mg injection, tolerated well.           Patient's Body mass index is 27.89 kg/m². BMI is within normal parameters. No follow-up required.      I have discussed diagnosis in detail today allowing time for questions and answers. Pt is aware of reasons to seek urgent or emergent medical care as well as reasons to return to the clinic for evaluation. Possible side effects, interactions and progression of symptoms discussed as well. Pt / family states understanding.   Emotional support and active listening provided.     Fluids, rest, symptomatic treatment advised.  Steam therapy. Dispose of tooth bush after 24 hours of starting antibiotics if ordered. Complete antibiotics as ordered.  Discussed possible side effects/interactions of medication. Discussed symptoms to report as well as reasons to seek urgent or emergent medical attention. Understanding stated.   Recommend follow up in 2-3 days if not improved, sooner if needed.       Errors in dictation may reflect use of voice recognition software and not all errors in transcription may have been detected prior to signing.           This document has been electronically signed by:  ENRICO Caceres, NP-C

## 2018-08-04 LAB
BACTERIA SPEC AEROBE CULT: NORMAL
GRAM STN SPEC: NORMAL

## 2018-10-01 DIAGNOSIS — I10 ESSENTIAL HYPERTENSION: ICD-10-CM

## 2018-10-02 RX ORDER — PRAVASTATIN SODIUM 20 MG
TABLET ORAL
Qty: 30 TABLET | Refills: 5 | Status: SHIPPED | OUTPATIENT
Start: 2018-10-02 | End: 2019-01-28 | Stop reason: SDUPTHER

## 2019-01-28 ENCOUNTER — OFFICE VISIT (OUTPATIENT)
Dept: FAMILY MEDICINE CLINIC | Facility: CLINIC | Age: 44
End: 2019-01-28

## 2019-01-28 VITALS
TEMPERATURE: 98.1 F | HEIGHT: 71 IN | OXYGEN SATURATION: 97 % | DIASTOLIC BLOOD PRESSURE: 90 MMHG | HEART RATE: 101 BPM | WEIGHT: 215 LBS | SYSTOLIC BLOOD PRESSURE: 125 MMHG | BODY MASS INDEX: 30.1 KG/M2

## 2019-01-28 DIAGNOSIS — K21.9 GASTROESOPHAGEAL REFLUX DISEASE WITHOUT ESOPHAGITIS: Primary | ICD-10-CM

## 2019-01-28 DIAGNOSIS — E78.2 MIXED HYPERLIPIDEMIA: ICD-10-CM

## 2019-01-28 DIAGNOSIS — N52.9 ERECTILE DYSFUNCTION, UNSPECIFIED ERECTILE DYSFUNCTION TYPE: ICD-10-CM

## 2019-01-28 DIAGNOSIS — R79.9 ABNORMAL FINDING OF BLOOD CHEMISTRY: ICD-10-CM

## 2019-01-28 DIAGNOSIS — I10 ESSENTIAL HYPERTENSION: ICD-10-CM

## 2019-01-28 DIAGNOSIS — G89.4 CHRONIC PAIN SYNDROME: ICD-10-CM

## 2019-01-28 DIAGNOSIS — Z12.5 SCREENING FOR PROSTATE CANCER: ICD-10-CM

## 2019-01-28 DIAGNOSIS — E55.9 VITAMIN D DEFICIENCY: ICD-10-CM

## 2019-01-28 PROBLEM — N42.9 DISORDER OF PROSTATE, UNSPECIFIED: Status: ACTIVE | Noted: 2019-01-28

## 2019-01-28 PROBLEM — J06.9 ACUTE URI: Status: RESOLVED | Noted: 2018-08-02 | Resolved: 2019-01-28

## 2019-01-28 PROBLEM — Z09 ENCOUNTER FOR EXAMINATION FOLLOWING TREATMENT AT HOSPITAL: Status: RESOLVED | Noted: 2018-07-31 | Resolved: 2019-01-28

## 2019-01-28 PROCEDURE — G0402 INITIAL PREVENTIVE EXAM: HCPCS | Performed by: NURSE PRACTITIONER

## 2019-01-28 PROCEDURE — 96160 PT-FOCUSED HLTH RISK ASSMT: CPT | Performed by: NURSE PRACTITIONER

## 2019-01-28 RX ORDER — ESOMEPRAZOLE MAGNESIUM 40 MG/1
40 CAPSULE, DELAYED RELEASE ORAL
Qty: 30 CAPSULE | Refills: 5 | Status: SHIPPED | OUTPATIENT
Start: 2019-01-28 | End: 2020-01-23 | Stop reason: ALTCHOICE

## 2019-01-28 RX ORDER — LOSARTAN POTASSIUM AND HYDROCHLOROTHIAZIDE 25; 100 MG/1; MG/1
1 TABLET ORAL DAILY
Qty: 30 TABLET | Refills: 5 | Status: SHIPPED | OUTPATIENT
Start: 2019-01-28 | End: 2019-08-06 | Stop reason: SDUPTHER

## 2019-01-28 RX ORDER — SILDENAFIL CITRATE 20 MG/1
20 TABLET ORAL 3 TIMES DAILY
Qty: 90 TABLET | Refills: 3 | Status: SHIPPED | OUTPATIENT
Start: 2019-01-28 | End: 2019-08-06 | Stop reason: SDUPTHER

## 2019-01-28 RX ORDER — PRAVASTATIN SODIUM 20 MG
20 TABLET ORAL DAILY
Qty: 30 TABLET | Refills: 5 | Status: SHIPPED | OUTPATIENT
Start: 2019-01-28 | End: 2019-08-06

## 2019-01-28 RX ORDER — IBUPROFEN 800 MG/1
800 TABLET ORAL EVERY 8 HOURS PRN
Qty: 30 TABLET | Refills: 5 | Status: SHIPPED | OUTPATIENT
Start: 2019-01-28 | End: 2019-08-06 | Stop reason: SDUPTHER

## 2019-01-28 RX ORDER — AMLODIPINE BESYLATE 10 MG/1
10 TABLET ORAL DAILY
Qty: 30 TABLET | Refills: 5 | Status: SHIPPED | OUTPATIENT
Start: 2019-01-28 | End: 2019-08-06

## 2019-01-28 NOTE — PROGRESS NOTES
QUICK REFERENCE INFORMATION:  The ABCs of the Annual Wellness Visit    Initial wellness visit     HEALTH RISK ASSESSMENT    1975    Recent Hospitalizations:  No hospitalization(s) within the last year..        Current Medical Providers:  Patient Care Team:  Helena Romero APRN as PCP - General (Family Medicine)        Smoking Status:  Social History     Tobacco Use   Smoking Status Never Smoker   Smokeless Tobacco Current User   • Types: Snuff       Alcohol Consumption:  Social History     Substance and Sexual Activity   Alcohol Use No       Depression Screen:   PHQ-2/PHQ-9 Depression Screening 1/28/2019   Little interest or pleasure in doing things 0   Feeling down, depressed, or hopeless 0   Total Score 0     Fall Risk Assessment  Fallen in past 6 months: 5--> Yes  Mental Status: 0--> no mental status change  Mobility: 2--> Requires assistance- transfer, walker, etc.  Medications: 0--> No meds  Total Fall Risk Score: 7    Below are four things you can do to prevent falls:   1. Begin an exercise program to improve your leg strength & balance  2. Ask your doctor or pharmacist to review your medicines   3. Get annual eye check-ups & update your eyeglasses  4. Make your home safer by:  · Removing clutter & tripping hazards  · Putting railings on all stairs & adding grab bars in the bathroom  · Having good lighting, especially on stairs    Contact your local community or senior center for information on exercise, fall prevention programs, or options for improving home safety.    Health Habits and Functional and Cognitive Screening:  Functional & Cognitive Status 1/28/2019   Do you have difficulty preparing food and eating? No   Do you have difficulty bathing yourself, getting dressed or grooming yourself? Yes   Do you have difficulty using the toilet? No   Do you have difficulty moving around from place to place? Yes   Do you have trouble with steps or getting out of a bed or a chair? Yes   In the  past year have you fallen or experienced a near fall? Yes   Current Diet Well Balanced Diet   Dental Exam Up to date   Eye Exam Up to date   Exercise (times per week) 0 times per week   Current Exercise Activities Include None   Do you need help using the phone?  No   Are you deaf or do you have serious difficulty hearing?  No   Do you need help with transportation? No   Do you need help shopping? No   Do you need help preparing meals?  No   Do you need help with housework?  No   Do you need help with laundry? No   Do you need help taking your medications? No   Do you need help managing money? No   Do you ever drive or ride in a car without wearing a seat belt? No   Have you felt unusual stress, anger or loneliness in the last month? Yes   Who do you live with? Spouse   If you need help, do you have trouble finding someone available to you? No   Have you been bothered in the last four weeks by sexual problems? No   Do you have difficulty concentrating, remembering or making decisions? Yes     PHQ-9 Depression Screening  Little interest or pleasure in doing things? 0   Feeling down, depressed, or hopeless? 0   Trouble falling or staying asleep, or sleeping too much?  0   Feeling tired or having little energy?  0   Poor appetite or overeating?  0   Feeling bad about yourself - or that you are a failure or have let yourself or your family down?  0   Trouble concentrating on things, such as reading the newspaper or watching television?  0   Moving or speaking so slowly that other people could have noticed? Or the opposite - being so fidgety or restless that you have been moving around a lot more than usual?  0   Thoughts that you would be better off dead, or of hurting yourself in some way?  0   PHQ-9 Total Score 0   If you checked off any problems, how difficult have these problems made it for you to do your work, take care of things at home, or get along with other people?  0         Does the patient have evidence of  cognitive impairment? No    Aspirin use counseling: Taking ASA appropriately as indicated      Recent Lab Results:  CMP:  Lab Results   Component Value Date    BUN 10 07/26/2018    CREATININE 0.90 07/26/2018    EGFRIFNONA 92 07/26/2018    BCR 11.1 07/26/2018     07/26/2018    K 3.7 07/26/2018    CO2 29.0 07/26/2018    CALCIUM 9.5 07/26/2018    ALBUMIN 4.70 07/26/2018    BILITOT 0.5 07/26/2018    ALKPHOS 89 07/26/2018    AST 23 07/26/2018    ALT 27 07/26/2018     Lipid Panel:  Lab Results   Component Value Date    CHOL 128 07/26/2018    TRIG 111 07/26/2018    HDL 42 (L) 07/26/2018    VLDL 22.2 07/26/2018    LDLHDL 1.52 07/26/2018     HbA1c:       Visual Acuity:   Visual Acuity Screening    Right eye Left eye Both eyes   Without correction:      With correction: 20/25 20/25 20/20   hearing adequate per whisper test       Age-appropriate Screening Schedule:  Refer to the list below for future screening recommendations based on patient's age, sex and/or medical conditions. Orders for these recommended tests are listed in the plan section. The patient has been provided with a written plan.    Health Maintenance   Topic Date Due   • TDAP/TD VACCINES (1 - Tdap) 01/29/2020 (Originally [birth year]/1994)   • LIPID PANEL  07/26/2019   • INFLUENZA VACCINE  Completed        Subjective   History of Present Illness    Erectile dysfunction  Hypertension  Chronic pain syndrome  Hyperlipidemia  GERD   Taiwo Pierce is a 43 y.o. male who presents for an Subsequent Wellness Visit.    GERD-stable with Nexium    Erectile dysfunction-improved with Sildenafil.  Has been seen by urology in the past.  Declines further urology consult.    Central hypertension-stable with Norvasc and Hyzaar.    Chronic pain-patient is under the care of pain clinic where he receives Neurontin, Skelaxin, oxycodone and tramadol.  Patient states his chronic back pain.  Patient rates his chronic pain as 8 on pain scale rating of 0-10.  Pain is located his neck and  low back.      Lipidemia-working on diet and exercise.  Currently receives protocol.      The following portions of the patient's history were reviewed and updated as appropriate: allergies, current medications, past family history, past medical history, past social history, past surgical history and problem list.    Outpatient Medications Prior to Visit   Medication Sig Dispense Refill   • gabapentin (NEURONTIN) 300 MG capsule TAKE 2 CS TID  5   • metaxalone (SKELAXIN) 800 MG tablet   1   • oxyCODONE (ROXICODONE) 10 MG tablet TK 1 T PO  TID PRN P  0   • traMADol (ULTRAM) 50 MG tablet TK 1 T PO QID PRN P  2   • acetaminophen (TYLENOL) 325 MG tablet Take 2 tablets by mouth Every 6 (Six) Hours As Needed for Mild Pain  or Fever. 30 tablet 0   • amLODIPine (NORVASC) 10 MG tablet Take 1 tablet by mouth Daily. 30 tablet 5   • esomeprazole (nexIUM) 40 MG capsule Take 1 capsule by mouth Every Morning Before Breakfast. 30 capsule 5   • ibuprofen (ADVIL,MOTRIN) 800 MG tablet TK 1 T PO QD PRN P  5   • losartan-hydrochlorothiazide (HYZAAR) 100-25 MG per tablet Take 1 tablet by mouth Daily. 30 tablet 5   • pravastatin (PRAVACHOL) 20 MG tablet TAKE ONE TABLET BY MOUTH EVERY DAY 30 tablet 5   • sildenafil (REVATIO) 20 MG tablet Take 1 tablet by mouth 3 (Three) Times a Day. Take 1 tablet daily as needed 90 tablet 3   • fluticasone (FLONASE) 50 MCG/ACT nasal spray 2 sprays into the nostril(s) as directed by provider Daily. Administer 2 sprays in each nostril for each dose. 1 bottle 5     No facility-administered medications prior to visit.        Patient Active Problem List   Diagnosis   • GERD (gastroesophageal reflux disease)   • Hyperlipidemia   • Hypertension   • Erectile dysfunction   • Family history of diabetes mellitus in mother   • Screening for prostate cancer   • Pharyngitis   • Chronic pain syndrome   • Disorder of prostate, unspecified   • Vitamin D deficiency        Advance Care Planning:  has NO advance directive -  not interested in additional information    Identification of Risk Factors:  Risk factors include: weight , unhealthy diet, cardiovascular risk and chronic pain.    Review of Systems   Constitutional: Positive for appetite change. Negative for activity change, fatigue and unexpected weight change.   HENT: Negative for congestion, ear pain, nosebleeds, postnasal drip, rhinorrhea, sore throat, trouble swallowing and voice change.    Eyes: Negative for pain and visual disturbance.   Respiratory: Negative for cough, shortness of breath and wheezing.    Cardiovascular: Negative for chest pain, palpitations and leg swelling.   Gastrointestinal: Negative for abdominal pain, blood in stool, constipation and diarrhea.   Endocrine: Negative for cold intolerance and polydipsia.   Genitourinary: Negative for difficulty urinating, flank pain and hematuria.   Musculoskeletal: Positive for arthralgias and back pain. Negative for gait problem, joint swelling and myalgias.   Skin: Negative for color change and rash.   Allergic/Immunologic: Negative.    Neurological: Positive for weakness (leg). Negative for syncope, numbness and headaches.   Hematological: Negative.    Psychiatric/Behavioral: Negative for sleep disturbance and suicidal ideas.   All other systems reviewed and are negative.      Compared to one year ago, the patient feels his physical health is better.  Compared to one year ago, the patient feels his mental health is better.    Objective     Physical Exam   Constitutional: He is oriented to person, place, and time. He appears well-developed and well-nourished. No distress.   HENT:   Head: Normocephalic and atraumatic.   Right Ear: External ear normal.   Left Ear: External ear normal.   Nose: Nose normal.   Mouth/Throat: Oropharynx is clear and moist. No oropharyngeal exudate.   Eyes: Conjunctivae and EOM are normal. Pupils are equal, round, and reactive to light. Right eye exhibits no discharge. Left eye exhibits no  "discharge.   Neck: Normal range of motion. Neck supple. No tracheal deviation present. No thyromegaly present.   Cardiovascular: Normal rate, regular rhythm, normal heart sounds and intact distal pulses. Exam reveals no gallop and no friction rub.   No murmur heard.  Pulmonary/Chest: Effort normal and breath sounds normal. No respiratory distress. He has no wheezes. He has no rales. He exhibits no tenderness.   Abdominal: Soft. Bowel sounds are normal. He exhibits no distension and no mass. There is no tenderness. There is no rebound and no guarding.   Musculoskeletal: Normal range of motion.        Lumbar back: He exhibits tenderness, pain and spasm.   Decreased lumbar curvature, there is pain with forward flexion. DTR's +2. No edema.    Lymphadenopathy:     He has no cervical adenopathy.   Neurological: He is alert and oriented to person, place, and time. He has normal reflexes.   CN 2-12 grossly intact    Skin: Skin is warm and dry. Capillary refill takes less than 2 seconds. No rash noted. He is not diaphoretic. No erythema. No pallor.   Psychiatric: He has a normal mood and affect. His speech is normal and behavior is normal. Judgment and thought content normal. His affect is not inappropriate. Cognition and memory are normal.   Nursing note and vitals reviewed.      Vitals:    01/28/19 1246   BP: 125/90   Pulse: 101   Temp: 98.1 °F (36.7 °C)   TempSrc: Temporal   SpO2: 97%   Weight: 97.5 kg (215 lb)   Height: 180.3 cm (71\")       Patient's Body mass index is 29.99 kg/m². BMI is above normal parameters. Recommendations include: exercise counseling and nutrition counseling.       Assessment/Plan   Patient Self-Management and Personalized Health Advice  The patient has been provided with information about: diet, exercise, weight management, prevention of cardiac or vascular disease and supplements and preventive services including:   · Advance directive, Diabetes screening, see lab orders, Exercise counseling " provided, Influenza vaccine, Nutrition counseling provided.    Visit Diagnoses:    ICD-10-CM ICD-9-CM   1. Gastroesophageal reflux disease without esophagitis K21.9 530.81   2. Essential hypertension I10 401.9   3. Mixed hyperlipidemia E78.2 272.2   4. Screening for prostate cancer Z12.5 V76.44   5. Abnormal finding of blood chemistry  R79.9 790.6   6. Vitamin D deficiency  E55.9 268.9   7. Erectile dysfunction, unspecified erectile dysfunction type N52.9 607.84   8. Chronic pain syndrome G89.4 338.4       Orders Placed This Encounter   Procedures   • Comprehensive Metabolic Panel     Standing Status:   Future     Standing Expiration Date:   1/29/2020   • TSH     Standing Status:   Future     Standing Expiration Date:   1/28/2020   • Hemoglobin A1c     Standing Status:   Future     Standing Expiration Date:   1/28/2020   • Vitamin D 25 Hydroxy     Standing Status:   Future     Standing Expiration Date:   1/28/2020   • Uric Acid     Standing Status:   Future     Standing Expiration Date:   1/28/2020   • Lipid Panel     Standing Status:   Future     Standing Expiration Date:   1/28/2020   • Vitamin B12     Standing Status:   Future     Standing Expiration Date:   1/28/2020   • PSA, Total & Free     Standing Status:   Future     Standing Expiration Date:   1/28/2020   • CBC & Differential     Standing Status:   Future     Standing Expiration Date:   1/28/2020     Order Specific Question:   Manual Differential     Answer:   No       Outpatient Encounter Medications as of 1/28/2019   Medication Sig Dispense Refill   • amLODIPine (NORVASC) 10 MG tablet Take 1 tablet by mouth Daily. 30 tablet 5   • esomeprazole (nexIUM) 40 MG capsule Take 1 capsule by mouth Every Morning Before Breakfast. 30 capsule 5   • gabapentin (NEURONTIN) 300 MG capsule TAKE 2 CS TID  5   • ibuprofen (ADVIL,MOTRIN) 800 MG tablet Take 1 tablet by mouth Every 8 (Eight) Hours As Needed for Mild Pain . 30 tablet 5   • losartan-hydrochlorothiazide  (HYZAAR) 100-25 MG per tablet Take 1 tablet by mouth Daily. 30 tablet 5   • metaxalone (SKELAXIN) 800 MG tablet   1   • oxyCODONE (ROXICODONE) 10 MG tablet TK 1 T PO  TID PRN P  0   • pravastatin (PRAVACHOL) 20 MG tablet Take 1 tablet by mouth Daily. 30 tablet 5   • sildenafil (REVATIO) 20 MG tablet Take 1 tablet by mouth 3 (Three) Times a Day. Take 1 tablet daily as needed 90 tablet 3   • traMADol (ULTRAM) 50 MG tablet TK 1 T PO QID PRN P  2   • [DISCONTINUED] acetaminophen (TYLENOL) 325 MG tablet Take 2 tablets by mouth Every 6 (Six) Hours As Needed for Mild Pain  or Fever. 30 tablet 0   • [DISCONTINUED] amLODIPine (NORVASC) 10 MG tablet Take 1 tablet by mouth Daily. 30 tablet 5   • [DISCONTINUED] esomeprazole (nexIUM) 40 MG capsule Take 1 capsule by mouth Every Morning Before Breakfast. 30 capsule 5   • [DISCONTINUED] ibuprofen (ADVIL,MOTRIN) 800 MG tablet TK 1 T PO QD PRN P  5   • [DISCONTINUED] losartan-hydrochlorothiazide (HYZAAR) 100-25 MG per tablet Take 1 tablet by mouth Daily. 30 tablet 5   • [DISCONTINUED] pravastatin (PRAVACHOL) 20 MG tablet TAKE ONE TABLET BY MOUTH EVERY DAY 30 tablet 5   • [DISCONTINUED] sildenafil (REVATIO) 20 MG tablet Take 1 tablet by mouth 3 (Three) Times a Day. Take 1 tablet daily as needed 90 tablet 3   • [DISCONTINUED] fluticasone (FLONASE) 50 MCG/ACT nasal spray 2 sprays into the nostril(s) as directed by provider Daily. Administer 2 sprays in each nostril for each dose. 1 bottle 5     No facility-administered encounter medications on file as of 1/28/2019.      Medicare wellness exam has been performed today.  Medication list has been reviewed and discussed with patient.  Recommended preventative and screenings has been discussed with patient.  Initial medicare wellness exam today.  Emotional support and active listening provided.   I have discussed diagnosis in detail today allowing time for questions and answers. Pt is aware of reasons to seek urgent or emergent medical care  as well as reasons to return to the clinic for evaluation. Possible side effects, interactions and progression of symptoms discussed as well. Pt / family states understanding.   Pt has been instructed today regarding low fat heart smart diet. Weight management and routine exercise has been recommended. Avoid high fat foods, starchy foods and processed foods. Increase lean meats, fresh vegetables and fresh fruits.     Medications will be provided.  Patient will remain under the care of pain management.   fasting labs every 6 months.  We will continue to do frequent PSA levels as patient's father had prostate cancer.    Reviewed use of high risk medication in the elderly: no  Reviewed for potential of harmful drug interactions in the elderly: not applicable    Follow Up:  Return in about 6 months (around 7/28/2019) for labs one week prior.       An After Visit Summary and PPPS with all of these plans were given to the patient.

## 2019-03-05 DIAGNOSIS — E78.2 MIXED HYPERLIPIDEMIA: ICD-10-CM

## 2019-03-05 DIAGNOSIS — I10 ESSENTIAL HYPERTENSION: ICD-10-CM

## 2019-03-05 RX ORDER — AMLODIPINE BESYLATE 10 MG/1
10 TABLET ORAL DAILY
Qty: 30 TABLET | Refills: 5 | Status: SHIPPED | OUTPATIENT
Start: 2019-03-05 | End: 2020-01-23 | Stop reason: SDUPTHER

## 2019-03-05 RX ORDER — LOSARTAN POTASSIUM AND HYDROCHLOROTHIAZIDE 25; 100 MG/1; MG/1
1 TABLET ORAL DAILY
Qty: 30 TABLET | Refills: 5 | Status: SHIPPED | OUTPATIENT
Start: 2019-03-05 | End: 2019-08-06

## 2019-03-05 RX ORDER — ESOMEPRAZOLE MAGNESIUM 40 MG/1
40 CAPSULE, DELAYED RELEASE ORAL
Qty: 30 CAPSULE | Refills: 5 | Status: SHIPPED | OUTPATIENT
Start: 2019-03-05 | End: 2019-08-06

## 2019-03-05 RX ORDER — PRAVASTATIN SODIUM 20 MG
TABLET ORAL
Qty: 30 TABLET | Refills: 5 | Status: SHIPPED | OUTPATIENT
Start: 2019-03-05 | End: 2019-08-06 | Stop reason: SDUPTHER

## 2019-08-06 ENCOUNTER — OFFICE VISIT (OUTPATIENT)
Dept: FAMILY MEDICINE CLINIC | Facility: CLINIC | Age: 44
End: 2019-08-06

## 2019-08-06 VITALS
BODY MASS INDEX: 31.36 KG/M2 | SYSTOLIC BLOOD PRESSURE: 120 MMHG | HEART RATE: 88 BPM | OXYGEN SATURATION: 97 % | DIASTOLIC BLOOD PRESSURE: 88 MMHG | HEIGHT: 71 IN | TEMPERATURE: 98.7 F | WEIGHT: 224 LBS

## 2019-08-06 DIAGNOSIS — I10 ESSENTIAL HYPERTENSION: ICD-10-CM

## 2019-08-06 DIAGNOSIS — G89.4 CHRONIC PAIN SYNDROME: ICD-10-CM

## 2019-08-06 DIAGNOSIS — E55.9 VITAMIN D DEFICIENCY: ICD-10-CM

## 2019-08-06 DIAGNOSIS — E78.2 MIXED HYPERLIPIDEMIA: Primary | ICD-10-CM

## 2019-08-06 DIAGNOSIS — K21.9 GASTROESOPHAGEAL REFLUX DISEASE WITHOUT ESOPHAGITIS: ICD-10-CM

## 2019-08-06 PROBLEM — Z12.5 SCREENING FOR PROSTATE CANCER: Status: RESOLVED | Noted: 2017-09-05 | Resolved: 2019-08-06

## 2019-08-06 PROBLEM — J02.9 PHARYNGITIS: Status: RESOLVED | Noted: 2018-08-02 | Resolved: 2019-08-06

## 2019-08-06 PROCEDURE — 99214 OFFICE O/P EST MOD 30 MIN: CPT | Performed by: NURSE PRACTITIONER

## 2019-08-06 RX ORDER — LOSARTAN POTASSIUM AND HYDROCHLOROTHIAZIDE 25; 100 MG/1; MG/1
1 TABLET ORAL DAILY
Qty: 30 TABLET | Refills: 5 | Status: SHIPPED | OUTPATIENT
Start: 2019-08-06 | End: 2020-01-23 | Stop reason: SDUPTHER

## 2019-08-06 RX ORDER — SILDENAFIL CITRATE 20 MG/1
20 TABLET ORAL 3 TIMES DAILY
Qty: 90 TABLET | Refills: 3 | Status: SHIPPED | OUTPATIENT
Start: 2019-08-06 | End: 2020-08-06

## 2019-08-06 RX ORDER — PRAVASTATIN SODIUM 20 MG
20 TABLET ORAL DAILY
Qty: 30 TABLET | Refills: 5 | Status: SHIPPED | OUTPATIENT
Start: 2019-08-06 | End: 2020-01-23 | Stop reason: SDUPTHER

## 2019-08-06 RX ORDER — IBUPROFEN 800 MG/1
800 TABLET ORAL EVERY 8 HOURS PRN
Qty: 30 TABLET | Refills: 5 | Status: SHIPPED | OUTPATIENT
Start: 2019-08-06 | End: 2020-09-21

## 2019-08-06 NOTE — ASSESSMENT & PLAN NOTE
Remain under the care of a pain clinic.  I have discussed side effects of chronic pain medication and risk of addiction.

## 2019-08-06 NOTE — ASSESSMENT & PLAN NOTE
Lipid abnormalities are unchanged.  Nutritional counseling was provided. and Pharmacotherapy as ordered.  Lipids will be reassessed 3-6 months.  Have fasting labs next week as ordered.

## 2019-08-06 NOTE — PROGRESS NOTES
Subjective   Taiwo Pierce is a 44 y.o. male.     Chief Complaint   Patient presents with   • Hypertension   • Erectile Dysfunction       History of Present Illness:    Hypertension-stable on Norvasc and sildenafil.  Patient does report that he has been eating a lot more sodium than usual.  He also has some weight gain.    Hyperlipidemia-taking Pravachol.  Did not have labs as ordered for last week.    GERD-stable on Nexium.    Vitamin D deficiency-patient is taking an over-the-counter supplement.    Chronic pain syndrome- patient is currently treated by pain clinic where he receives oxycodone and gabapentin.    Erectile dysfunction- improved.    Obesity-patient states that he eats one large meal a day but snacks throughout the evening.  His food of choice to snack on his potato chips.  His wife reports that he will eat a bag of potato chips by himself.  Patient does not exercise routinely due to chronic back pain.    The following portions of the patient's history were reviewed and updated as appropriate:  Allergies, current medications, past family history, past medical history, past social history, past surgical history and problem list.    Review of Systems   Constitutional: Positive for unexpected weight change (Gain). Negative for activity change, appetite change, chills, fatigue and fever.   HENT: Negative for congestion, ear pain, nosebleeds, postnasal drip, rhinorrhea, sore throat, trouble swallowing and voice change.    Eyes: Negative for pain and visual disturbance.   Respiratory: Negative for cough, shortness of breath and wheezing.    Cardiovascular: Negative for chest pain, palpitations and leg swelling.   Gastrointestinal: Negative for abdominal pain, blood in stool, constipation and diarrhea.   Endocrine: Negative for cold intolerance and polydipsia.   Genitourinary: Negative for difficulty urinating, dysuria, flank pain, frequency and hematuria.   Musculoskeletal: Positive for arthralgias and back  "pain. Negative for gait problem, joint swelling and myalgias.   Skin: Negative for color change and rash.   Allergic/Immunologic: Positive for environmental allergies.   Neurological: Positive for weakness. Negative for syncope, numbness and headaches.   Hematological: Negative.    Psychiatric/Behavioral: Negative for agitation, behavioral problems, dysphoric mood, self-injury, sleep disturbance and suicidal ideas. The patient is not nervous/anxious.    All other systems reviewed and are negative.      Objective     /88   Pulse 88   Temp 98.7 °F (37.1 °C) (Temporal)   Ht 180.3 cm (71\")   Wt 102 kg (224 lb)   SpO2 97%   BMI 31.24 kg/m²   Office Visit on 08/02/2018   Component Date Value Ref Range Status   • Wound Culture 08/02/2018 Moderate growth (3+) Normal Oral Ann   Final   • Gram Stain 08/02/2018 Moderate (3+) WBCs seen   Final   • Gram Stain 08/02/2018 Moderate (3+) Gram negative bacilli   Final   • Gram Stain 08/02/2018 Few (2+) Gram positive cocci in pairs   Final   • Gram Stain 08/02/2018 Rare (1+) Gram positive bacilli   Final       Physical Exam   Constitutional: He is oriented to person, place, and time. Vital signs are normal. He appears well-developed and well-nourished. No distress.   Talkative and friendly 44-year-old male.  He is here with his wife today.   HENT:   Head: Normocephalic.   Right Ear: External ear normal.   Left Ear: External ear normal.   Nose: Nose normal.   Mouth/Throat: Oropharynx is clear and moist. No oropharyngeal exudate.   Eyes: Conjunctivae, EOM and lids are normal. Pupils are equal, round, and reactive to light. Right eye exhibits no discharge. Left eye exhibits no discharge.   Neck: Normal range of motion. Neck supple. No tracheal deviation present. No thyromegaly present.   Cardiovascular: Normal rate, regular rhythm and normal heart sounds. Exam reveals no gallop and no friction rub.   No murmur heard.  Pulmonary/Chest: Effort normal and breath sounds normal. " No respiratory distress. He has no wheezes. He has no rales. He exhibits no tenderness.   Abdominal: Soft. Normal appearance and bowel sounds are normal. He exhibits no distension and no mass. There is no tenderness. There is no rebound and no guarding.   Musculoskeletal: Normal range of motion.   Lymphadenopathy:     He has no cervical adenopathy.   Neurological: He is alert and oriented to person, place, and time. He has normal reflexes.   CN 2-12 grossly intact    Skin: Skin is warm and dry. Capillary refill takes less than 2 seconds. No rash noted. He is not diaphoretic. No erythema.   Psychiatric: He has a normal mood and affect. His speech is normal and behavior is normal. Judgment and thought content normal. Cognition and memory are normal.   Vitals reviewed.      Assessment/Plan     Problem List Items Addressed This Visit        Cardiovascular and Mediastinum    Hyperlipidemia - Primary    Current Assessment & Plan     Lipid abnormalities are unchanged.  Nutritional counseling was provided. and Pharmacotherapy as ordered.  Lipids will be reassessed 3-6 months.  Have fasting labs next week as ordered.         Relevant Medications    pravastatin (PRAVACHOL) 20 MG tablet    Hypertension    Current Assessment & Plan     Hypertension is improving with treatment.  Dietary sodium restriction.  Weight loss.  Regular aerobic exercise.  Continue current medications.  Blood pressure will be reassessed at the next regular appointment.         Relevant Medications    losartan-hydrochlorothiazide (HYZAAR) 100-25 MG per tablet    pravastatin (PRAVACHOL) 20 MG tablet    sildenafil (REVATIO) 20 MG tablet       Digestive    GERD (gastroesophageal reflux disease)    Current Assessment & Plan     Recommend weight loss.  GERD diet discussed and recommended.         Vitamin D deficiency     Current Assessment & Plan     Recommend daily over-the-counter supplement of at least 400 IU            Nervous and Auditory    Chronic pain  syndrome    Overview     Under care of pain clinic          Current Assessment & Plan     Remain under the care of a pain clinic.  I have discussed side effects of chronic pain medication and risk of addiction.                    Patient's Body mass index is 31.24 kg/m². BMI is above normal parameters. Recommendations include: exercise counseling and nutrition counseling.  I recommend patient eat no more than 1600 gabriella a day.  I recommend he make a low-carb food choices and eliminate potato chips/snack foods from his diet.    Goal is 20 pound weight loss over the next 6 months.      I have discussed diagnosis in detail today allowing time for questions and answers. Patient is aware of reasons to seek urgent or emergent medical care as well as reasons to return to the clinic for evaluation. Possible side effects, interactions and progression of symptoms discussed as well. Patient / family states understanding.   Emotional support and active listening provided.     Instructed patient to have his routine labs performed within the next week fasting.  Patient states he will and that the reason for not having them done prior was insurance changes.    Refill routine medications.    Follow-up in 3-6 months, sooner if needed.    Dictated utilizing Dragon dictation. Errors in dictation may reflect use of voice recognition software and not all errors in transcription may have been detected prior to signing.           This document has been electronically signed by:  ENRICO Caceres, NP-C

## 2019-12-12 ENCOUNTER — PRIOR AUTHORIZATION (OUTPATIENT)
Dept: FAMILY MEDICINE CLINIC | Facility: CLINIC | Age: 44
End: 2019-12-12

## 2020-01-23 ENCOUNTER — TELEPHONE (OUTPATIENT)
Dept: FAMILY MEDICINE CLINIC | Facility: CLINIC | Age: 45
End: 2020-01-23

## 2020-01-23 ENCOUNTER — OFFICE VISIT (OUTPATIENT)
Dept: FAMILY MEDICINE CLINIC | Facility: CLINIC | Age: 45
End: 2020-01-23

## 2020-01-23 VITALS
WEIGHT: 226 LBS | SYSTOLIC BLOOD PRESSURE: 180 MMHG | TEMPERATURE: 97.9 F | OXYGEN SATURATION: 97 % | DIASTOLIC BLOOD PRESSURE: 110 MMHG | HEART RATE: 72 BPM | BODY MASS INDEX: 31.64 KG/M2 | HEIGHT: 71 IN

## 2020-01-23 DIAGNOSIS — K21.9 GASTROESOPHAGEAL REFLUX DISEASE WITHOUT ESOPHAGITIS: ICD-10-CM

## 2020-01-23 DIAGNOSIS — R79.9 ABNORMAL FINDING OF BLOOD CHEMISTRY, UNSPECIFIED: ICD-10-CM

## 2020-01-23 DIAGNOSIS — F32.9 REACTIVE DEPRESSION: ICD-10-CM

## 2020-01-23 DIAGNOSIS — G89.4 CHRONIC PAIN SYNDROME: Primary | ICD-10-CM

## 2020-01-23 DIAGNOSIS — E55.9 VITAMIN D DEFICIENCY: ICD-10-CM

## 2020-01-23 DIAGNOSIS — I10 ESSENTIAL HYPERTENSION: ICD-10-CM

## 2020-01-23 PROCEDURE — 96372 THER/PROPH/DIAG INJ SC/IM: CPT | Performed by: NURSE PRACTITIONER

## 2020-01-23 PROCEDURE — 99214 OFFICE O/P EST MOD 30 MIN: CPT | Performed by: NURSE PRACTITIONER

## 2020-01-23 RX ORDER — HYDROCHLOROTHIAZIDE 25 MG/1
25 TABLET ORAL EVERY MORNING
Qty: 30 TABLET | Refills: 5 | Status: SHIPPED | OUTPATIENT
Start: 2020-01-23 | End: 2020-09-21 | Stop reason: SDUPTHER

## 2020-01-23 RX ORDER — VALSARTAN 320 MG/1
320 TABLET ORAL DAILY
Qty: 30 TABLET | Refills: 5 | Status: SHIPPED | OUTPATIENT
Start: 2020-01-23 | End: 2020-09-21 | Stop reason: SDUPTHER

## 2020-01-23 RX ORDER — KETOROLAC TROMETHAMINE 30 MG/ML
60 INJECTION, SOLUTION INTRAMUSCULAR; INTRAVENOUS ONCE
Status: COMPLETED | OUTPATIENT
Start: 2020-01-23 | End: 2020-01-23

## 2020-01-23 RX ORDER — AMLODIPINE BESYLATE 10 MG/1
10 TABLET ORAL DAILY
Qty: 30 TABLET | Refills: 5 | Status: SHIPPED | OUTPATIENT
Start: 2020-01-23 | End: 2020-09-21 | Stop reason: SDUPTHER

## 2020-01-23 RX ORDER — PRAVASTATIN SODIUM 20 MG
20 TABLET ORAL DAILY
Qty: 30 TABLET | Refills: 5 | Status: SHIPPED | OUTPATIENT
Start: 2020-01-23 | End: 2020-08-10

## 2020-01-23 RX ORDER — HYDROXYZINE HYDROCHLORIDE 25 MG/1
25 TABLET, FILM COATED ORAL NIGHTLY PRN
Qty: 30 TABLET | Refills: 1 | Status: SHIPPED | OUTPATIENT
Start: 2020-01-23 | End: 2020-03-30

## 2020-01-23 RX ORDER — DEXLANSOPRAZOLE 60 MG/1
60 CAPSULE, DELAYED RELEASE ORAL DAILY
Qty: 30 CAPSULE | Refills: 5 | Status: SHIPPED | OUTPATIENT
Start: 2020-01-23 | End: 2020-01-28

## 2020-01-23 RX ORDER — AMLODIPINE BESYLATE 10 MG/1
10 TABLET ORAL DAILY
Qty: 30 TABLET | Refills: 5 | Status: SHIPPED | OUTPATIENT
Start: 2020-01-23 | End: 2020-01-23 | Stop reason: SDUPTHER

## 2020-01-23 RX ORDER — DULOXETIN HYDROCHLORIDE 30 MG/1
30 CAPSULE, DELAYED RELEASE ORAL DAILY
Qty: 30 CAPSULE | Refills: 5 | Status: SHIPPED | OUTPATIENT
Start: 2020-01-23 | End: 2020-07-23

## 2020-01-23 RX ADMIN — KETOROLAC TROMETHAMINE 60 MG: 30 INJECTION, SOLUTION INTRAMUSCULAR; INTRAVENOUS at 12:12

## 2020-01-23 NOTE — ASSESSMENT & PLAN NOTE
Declines physical therapy and behavioral health consult for chronic pain evaluation/counseling.  I have encouraged patient to stop taking excessive amounts of ibuprofen due to effects on kidneys and blood pressure.  Discussed the risk of a GI bleed and stomach irritation.  Patient states understanding.

## 2020-01-23 NOTE — ASSESSMENT & PLAN NOTE
Hypertension is worsening.  Dietary sodium restriction.  Weight loss.  Regular aerobic exercise.  Medication changes per orders.  Ambulatory blood pressure monitoring.     Clonidine 0.1 mg in office today.  Stop with hydrochlorothiazide.  We will start Diovan 320 mg along with hydrochlorothiazide 25 mg daily.  Monitor blood pressure and report any reading greater than 150/90 or less than 100/60.    Blood pressure will be reassessed in 4 weeks.

## 2020-01-23 NOTE — PROGRESS NOTES
Subjective   Taiwo Pierce is a 44 y.o. male.     Chief Complaint   Patient presents with   • go over meds     High blood pressure  Back pain     History of Present Illness:    Hypertension - pt states that his bp went up after being taken off of tramadol. Tramadol is no longer paid for by workers comp. He is taking 4 oxycodone daily from a pain clinic.  Patient reports receiving sildenafil 20 mg 3 times a day for blood pressure.  He wants to know if we can get this approved because his insurance will not pay for it.  He is purchasing and taking.    Chronic pain - pt states that he is not sleeping well with out tramadol. He was not weaned off the tramadol and is having some body discomfort. Reports that the increase of the oxycodone does not control his pain as well as it was before. States his pain is rated 6 on psr of 0-10 today, he took an oxycodone 2 hours ago.  He wants to know if someone will help him with his pain.    Patient was scheduled in January 2019 for routine labs which he has yet to have.  Patient states he cannot have them today because he wants to wait until his insurance switches over to a [employer] in February so he will not have a co-pay.    Tobacco abuse- patient does not wish to quit smoking.  States he uses smoking to help with his anxiety/chronic pain.    The following portions of the patient's history, chief complaint and ROS were reviewed and updated as appropriate per provider:  Allergies, current medications, past family history, past medical history, past social history, past surgical history and problem list.        Review of Systems   Constitutional: Positive for activity change and fatigue. Negative for appetite change, chills, diaphoresis, fever and unexpected weight change.   HENT: Negative for congestion, sinus pressure, sinus pain, sneezing, sore throat and trouble swallowing.    Eyes: Negative.    Respiratory: Negative.  Negative for choking, chest tightness, shortness of  "breath and wheezing.    Cardiovascular: Negative for chest pain, palpitations and leg swelling.   Gastrointestinal: Negative for abdominal pain, constipation, diarrhea, nausea and vomiting.   Endocrine: Negative.    Genitourinary: Negative for dysuria and frequency.   Musculoskeletal: Positive for arthralgias and back pain. Negative for gait problem and neck stiffness.   Allergic/Immunologic: Positive for environmental allergies. Negative for food allergies and immunocompromised state.   Neurological: Positive for headaches (When his blood pressure is up). Negative for dizziness, seizures, weakness, light-headedness and numbness.   Psychiatric/Behavioral: Positive for agitation and sleep disturbance. Negative for confusion, self-injury and suicidal ideas. The patient is nervous/anxious.        Objective     BP (!) 180/110   Pulse 72   Temp 97.9 °F (36.6 °C) (Temporal)   Ht 180.3 cm (71\")   Wt 103 kg (226 lb)   SpO2 97%   BMI 31.52 kg/m²   Office Visit on 08/02/2018   Component Date Value Ref Range Status   • Wound Culture 08/02/2018 Moderate growth (3+) Normal Oral Ann   Final   • Gram Stain 08/02/2018 Moderate (3+) WBCs seen   Final   • Gram Stain 08/02/2018 Moderate (3+) Gram negative bacilli   Final   • Gram Stain 08/02/2018 Few (2+) Gram positive cocci in pairs   Final   • Gram Stain 08/02/2018 Rare (1+) Gram positive bacilli   Final       Physical Exam   Constitutional: He is oriented to person, place, and time. He appears well-developed and well-nourished. No distress.   Hypertensive 44-year-old male here today with his wife.  He is complaining of chronic pain exacerbation.   HENT:   Head: Normocephalic.   Right Ear: Tympanic membrane, external ear and ear canal normal.   Left Ear: Tympanic membrane, external ear and ear canal normal.   Nose: Nose normal. No mucosal edema. Right sinus exhibits no maxillary sinus tenderness and no frontal sinus tenderness. Left sinus exhibits no maxillary sinus " tenderness and no frontal sinus tenderness.   Mouth/Throat: Uvula is midline, oropharynx is clear and moist and mucous membranes are normal. No oropharyngeal exudate.   Eyes: Pupils are equal, round, and reactive to light. Conjunctivae, EOM and lids are normal. Right eye exhibits no discharge. Left eye exhibits no discharge.   Neck: Normal range of motion. Neck supple. No tracheal deviation present. No thyromegaly present.   Cardiovascular: Normal rate, regular rhythm, normal heart sounds and normal pulses. Exam reveals no gallop and no friction rub.   No murmur heard.  Clonidine 0.1 mg with blood pressure repeated 20 minutes later with improvement in reading.  BP 20 minutes after clonidine 150/90.   Pulmonary/Chest: Effort normal and breath sounds normal. No respiratory distress. He has no wheezes. He has no rales. He exhibits no tenderness.   Abdominal: Soft. Normal appearance and bowel sounds are normal. He exhibits no distension and no mass. There is no tenderness. There is no rebound and no guarding.   Musculoskeletal: Normal range of motion.        Thoracic back: He exhibits tenderness.        Lumbar back: He exhibits tenderness and spasm.   Lymphadenopathy:     He has no cervical adenopathy.   Neurological: He is alert and oriented to person, place, and time. He has normal reflexes.   CN 2-12 grossly intact    Skin: Skin is warm and dry. Capillary refill takes less than 2 seconds. No rash noted. He is not diaphoretic. No erythema.   Psychiatric: His speech is normal. Judgment and thought content normal. His affect is angry and blunt. He is agitated. Cognition and memory are normal. He is attentive.   Vitals reviewed.      Assessment/Plan     Problem List Items Addressed This Visit        Cardiovascular and Mediastinum    Hypertension    Current Assessment & Plan     Hypertension is worsening.  Dietary sodium restriction.  Weight loss.  Regular aerobic exercise.  Medication changes per orders.  Ambulatory  blood pressure monitoring.     Clonidine 0.1 mg in office today.  Stop with hydrochlorothiazide.  We will start Diovan 320 mg along with hydrochlorothiazide 25 mg daily.  Monitor blood pressure and report any reading greater than 150/90 or less than 100/60.    Blood pressure will be reassessed in 4 weeks.         Relevant Medications    valsartan (DIOVAN) 320 MG tablet    hydroCHLOROthiazide (HYDRODIURIL) 25 MG tablet    pravastatin (PRAVACHOL) 20 MG tablet    amLODIPine (NORVASC) 10 MG tablet    Other Relevant Orders    CBC & Differential    Comprehensive Metabolic Panel    TSH    Hemoglobin A1c    Vitamin D 25 Hydroxy    Vitamin B12    Lipid Panel    Microalbumin / Creatinine Urine Ratio - Urine, Clean Catch       Digestive    GERD (gastroesophageal reflux disease)    Current Assessment & Plan     Stop excessive ibuprofen use.  Discussed effects on stomach.  Stop Nexium due to noneffective.  Start Dexilant daily.  Weight loss recommended.  Avoid spicy foods.  Stop using smokeless tobacco.  Report failure to improve.         Relevant Medications    dexlansoprazole (DEXILANT) 60 MG capsule    Vitamin D deficiency     Relevant Orders    Vitamin D 25 Hydroxy       Nervous and Auditory    Chronic pain syndrome - Primary    Overview     Under care of pain clinic          Current Assessment & Plan     Declines physical therapy and behavioral health consult for chronic pain evaluation/counseling.  I have encouraged patient to stop taking excessive amounts of ibuprofen due to effects on kidneys and blood pressure.  Discussed the risk of a GI bleed and stomach irritation.  Patient states understanding.         Relevant Medications    ketorolac (TORADOL) injection 60 mg (Completed)    Other Relevant Orders    CBC & Differential    Comprehensive Metabolic Panel    TSH    Hemoglobin A1c    Vitamin D 25 Hydroxy    Vitamin B12    Lipid Panel    Microalbumin / Creatinine Urine Ratio - Urine, Clean Catch       Other    Reactive  depression    Overview     Due to chronic pain and medication adjustments at pain clinic          Relevant Medications    DULoxetine (CYMBALTA) 30 MG capsule    hydrOXYzine (ATARAX) 25 MG tablet    Other Relevant Orders    CBC & Differential    Comprehensive Metabolic Panel    TSH    Hemoglobin A1c    Vitamin D 25 Hydroxy    Vitamin B12    Lipid Panel    Microalbumin / Creatinine Urine Ratio - Urine, Clean Catch      Other Visit Diagnoses     Abnormal finding of blood chemistry, unspecified         Relevant Orders    Hemoglobin A1c            I strongly recommend patient see behavioral health for counseling regarding his anxiety/depression as well as chronic pain.  Patient declines.  I have strongly recommended patient go to physical therapy for his chronic pain.  Patient again declines.    We will start Cymbalta 30 mg daily as this may be helpful with his chronic pain as well as he is depressive symptoms.  He denies any thoughts of hurting self or others.  I will also give him some Vistaril which he may take at bedtime for anxiety/insomnia.  Patient has been counseled regarding possible side effects and interactions of medications.    We are going to stop his Hyzaar as discussed under diagnosis of hypertension.           Patient's Body mass index is 31.52 kg/m². BMI is above normal parameters. Recommendations include: exercise counseling and nutrition counseling.    Taiwo Pierce  reports that he has never smoked. His smokeless tobacco use includes snuff.. I have educated him on the risk of diseases from using tobacco products such as cancer, COPD and heart diease.     I advised him to quit and he is not willing to quit.    I spent 3 minutes counseling the patient.           I have discussed diagnosis in detail today allowing time for questions and answers. Patient is aware of reasons to seek urgent or emergent medical care as well as reasons to return to the clinic for evaluation. Possible side effects,  interactions and progression of symptoms discussed as well. Patient / family states understanding.   Emotional support and active listening provided.       I would like screening labs next week and return to see me in 2-4 weeks, bring BP long a that time.   Start Cymbalta, discussed this may help him with his chronic pain as well as depressive symptoms that I feel are related to his chronic pain.  Fasting labs at least 1 week prior to next appointment.    I would like to see him back in 2-4 weeks, sooner if needed.  Discussed reasons to seek immediate medical attention with patient stating understanding.            This document has been electronically signed by:  ENRICO Caceres, NP-C

## 2020-01-23 NOTE — ASSESSMENT & PLAN NOTE
Stop excessive ibuprofen use.  Discussed effects on stomach.  Stop Nexium due to noneffective.  Start Dexilant daily.  Weight loss recommended.  Avoid spicy foods.  Stop using smokeless tobacco.  Report failure to improve.

## 2020-01-23 NOTE — TELEPHONE ENCOUNTER
Samara ordered me to give 1 clonidine and recheck blood pressure in 20 min after 20 min his blood pressure was 160/110 samara told me to give brigida another clonidine and wait his blood pressure did come down to 150/100 he left after that

## 2020-01-28 DIAGNOSIS — K21.9 GASTROESOPHAGEAL REFLUX DISEASE WITHOUT ESOPHAGITIS: Primary | ICD-10-CM

## 2020-01-28 RX ORDER — LANSOPRAZOLE 30 MG/1
30 CAPSULE, DELAYED RELEASE ORAL DAILY
Qty: 30 CAPSULE | Refills: 5 | Status: SHIPPED | OUTPATIENT
Start: 2020-01-28 | End: 2020-07-23

## 2020-01-29 ENCOUNTER — TELEPHONE (OUTPATIENT)
Dept: FAMILY MEDICINE CLINIC | Facility: CLINIC | Age: 45
End: 2020-01-29

## 2020-01-29 NOTE — TELEPHONE ENCOUNTER
----- Message from ENRICO Leone sent at 1/28/2020  9:28 AM EST -----  Notify the patient  ----- Message -----  From: Lindsey Acuna MA  Sent: 1/23/2020  11:50 AM EST  To: ENRICO Leone    Mima stated that the insurance does not require a PA. Its 8 dollar copay for him.   ----- Message -----  From: Helena Romero APRN  Sent: 1/23/2020  11:45 AM EST  To: Lindsey Acuna MA    Get a PA on Dexilant

## 2020-01-30 ENCOUNTER — TELEPHONE (OUTPATIENT)
Dept: FAMILY MEDICINE CLINIC | Facility: CLINIC | Age: 45
End: 2020-01-30

## 2020-01-30 DIAGNOSIS — I10 ESSENTIAL HYPERTENSION: Primary | ICD-10-CM

## 2020-01-30 NOTE — TELEPHONE ENCOUNTER
----- Message from ENRICO Leone sent at 1/30/2020  8:11 AM EST -----  Regarding: FW: Visit Follow-Up Question  Contact: [phone]    Let us refer him to cardiology for further evaluation.  Also he needs an appointment to see me or 1 of the other providers to do additional blood pressure medication adjustment.  Remind him to seek immediate medical attention with any blood pressure elevation does not respond to medication or other signs or symptoms of distress.      ----- Message -----  From: Lindsey Acuna MA  Sent: 1/29/2020   4:43 PM EST  To: ENRICO Leone  Subject: FW: Visit Follow-Up Question                         ----- Message -----  From: Taiwo Pierce  Sent: 1/29/2020   4:29 PM EST  To: [facility]  Subject: Visit Follow-Up Question                         Here is a list I have been recording since I visited on the 23rd of January 2020. This list is of my blood pressure. I have been taking the meds you gave me and it seems to not come down. I believe if we can't get my pain under control then we can't get my blood pressure where it needs to be. I do a follow up with my back doctor in a couple weeks and I will get with him to see what he can do to help me. I also come back into your office within a week. So if you have any good ideas please let me know because I sure don't want to have a stroke or heart attack.

## 2020-02-20 ENCOUNTER — TELEPHONE (OUTPATIENT)
Dept: FAMILY MEDICINE CLINIC | Facility: CLINIC | Age: 45
End: 2020-02-20

## 2020-02-20 NOTE — TELEPHONE ENCOUNTER
----- Message from Lindsey Acuna MA sent at 2/19/2020  1:57 PM EST -----  Regarding: FW: Prescription Question  Contact: [phone]      ----- Message -----  From: Taiwo Pierce  Sent: 2/19/2020   1:44 PM EST  To: Vane [facility]  Subject: Prescription Question                            Now that I received my new insurance I would greatly appreciate it if you could send proof to my insurance that I am taking the sildenafil 20mg 3x daily for high blood pressure. I just recently went a bought my 30 day supply and the cost is expensive so the insurance stayed to me that they would cover it with the doctors approval that it's being used for blood pressure only and which it is. I'm hoping what I paid this month already they can back pay me and continue on. Thanks for your help and support. Just hoping this insurance doesn't lie to us like the [employer] did. This change of new insurance is so they would cover the sildenafil. Thanks again. Also if you have a opening Friday I need to be seen for the yearly exam. If not I can call next week to make a appointment. By the way we're my blood pressure has been so high lately it's got a lot to do with the medication I'm coming off from which being the tramadol. Also I noticed 30 min after taking the sildenafil it took my blood  pressure to normal which is were its suppose to be. So I know for a fact that the sildenafil does help my blood pressure and possibly it's keeping me from having a stroke. Thanks Taiwo    I have pa the med

## 2020-03-28 DIAGNOSIS — F32.9 REACTIVE DEPRESSION: ICD-10-CM

## 2020-03-30 RX ORDER — HYDROXYZINE HYDROCHLORIDE 25 MG/1
TABLET, FILM COATED ORAL
Qty: 30 TABLET | Refills: 3 | Status: SHIPPED | OUTPATIENT
Start: 2020-03-30 | End: 2020-09-21 | Stop reason: SDUPTHER

## 2020-07-23 DIAGNOSIS — F32.9 REACTIVE DEPRESSION: ICD-10-CM

## 2020-07-23 RX ORDER — VALSARTAN 160 MG/1
TABLET ORAL
Qty: 180 TABLET | Refills: 0 | Status: SHIPPED | OUTPATIENT
Start: 2020-07-23 | End: 2020-09-21

## 2020-07-23 RX ORDER — DULOXETIN HYDROCHLORIDE 30 MG/1
CAPSULE, DELAYED RELEASE ORAL
Qty: 90 CAPSULE | Refills: 4 | Status: SHIPPED | OUTPATIENT
Start: 2020-07-23 | End: 2020-11-30 | Stop reason: SDUPTHER

## 2020-07-23 RX ORDER — LANSOPRAZOLE 30 MG/1
CAPSULE, DELAYED RELEASE ORAL
Qty: 90 CAPSULE | Refills: 4 | Status: SHIPPED | OUTPATIENT
Start: 2020-07-23 | End: 2020-09-21 | Stop reason: SDUPTHER

## 2020-08-06 DIAGNOSIS — I10 ESSENTIAL HYPERTENSION: ICD-10-CM

## 2020-08-06 RX ORDER — SILDENAFIL CITRATE 20 MG/1
TABLET ORAL
Qty: 90 TABLET | Refills: 2 | Status: SHIPPED | OUTPATIENT
Start: 2020-08-06 | End: 2020-11-11

## 2020-08-10 DIAGNOSIS — I10 ESSENTIAL HYPERTENSION: ICD-10-CM

## 2020-08-10 RX ORDER — PRAVASTATIN SODIUM 20 MG
TABLET ORAL
Qty: 90 TABLET | Refills: 4 | Status: SHIPPED | OUTPATIENT
Start: 2020-08-10 | End: 2020-09-13

## 2020-08-31 DIAGNOSIS — M79.605 PAIN OF LEFT LOWER EXTREMITY: Primary | ICD-10-CM

## 2020-09-02 ENCOUNTER — HOSPITAL ENCOUNTER (OUTPATIENT)
Dept: GENERAL RADIOLOGY | Facility: HOSPITAL | Age: 45
Discharge: HOME | End: 2020-09-02
Admitting: NURSE PRACTITIONER

## 2020-09-02 ENCOUNTER — LAB (OUTPATIENT)
Dept: LAB | Facility: HOSPITAL | Age: 45
End: 2020-09-02

## 2020-09-02 DIAGNOSIS — G89.4 CHRONIC PAIN SYNDROME: ICD-10-CM

## 2020-09-02 DIAGNOSIS — F32.9 REACTIVE DEPRESSION: ICD-10-CM

## 2020-09-02 DIAGNOSIS — I10 ESSENTIAL HYPERTENSION: ICD-10-CM

## 2020-09-02 DIAGNOSIS — E55.9 VITAMIN D DEFICIENCY: ICD-10-CM

## 2020-09-02 DIAGNOSIS — R79.9 ABNORMAL FINDING OF BLOOD CHEMISTRY, UNSPECIFIED: ICD-10-CM

## 2020-09-02 DIAGNOSIS — M79.605 PAIN OF LEFT LOWER EXTREMITY: ICD-10-CM

## 2020-09-02 LAB
ALBUMIN SERPL-MCNC: 5.15 G/DL (ref 3.5–5.2)
ALBUMIN/GLOB SERPL: 1.3 G/DL
ALP SERPL-CCNC: 101 U/L (ref 39–117)
ALT SERPL W P-5'-P-CCNC: 74 U/L (ref 1–41)
ANION GAP SERPL CALCULATED.3IONS-SCNC: 13.6 MMOL/L (ref 5–15)
AST SERPL-CCNC: 51 U/L (ref 1–40)
BILIRUB SERPL-MCNC: 0.4 MG/DL (ref 0–1.2)
BUN SERPL-MCNC: 10 MG/DL (ref 6–20)
BUN/CREAT SERPL: 9.3 (ref 7–25)
CALCIUM SPEC-SCNC: 10.3 MG/DL (ref 8.6–10.5)
CHLORIDE SERPL-SCNC: 98 MMOL/L (ref 98–107)
CHOLEST SERPL-MCNC: 179 MG/DL (ref 0–200)
CO2 SERPL-SCNC: 27.4 MMOL/L (ref 22–29)
CREAT SERPL-MCNC: 1.08 MG/DL (ref 0.76–1.27)
GFR SERPL CREATININE-BSD FRML MDRD: 74 ML/MIN/1.73
GLOBULIN UR ELPH-MCNC: 3.9 GM/DL
GLUCOSE SERPL-MCNC: 86 MG/DL (ref 65–99)
HDLC SERPL-MCNC: 50 MG/DL (ref 40–60)
LDLC SERPL CALC-MCNC: 105 MG/DL (ref 0–100)
LDLC/HDLC SERPL: 2.09 {RATIO}
POTASSIUM SERPL-SCNC: 3.6 MMOL/L (ref 3.5–5.2)
PROT SERPL-MCNC: 9 G/DL (ref 6–8.5)
SODIUM SERPL-SCNC: 139 MMOL/L (ref 136–145)
TRIGL SERPL-MCNC: 122 MG/DL (ref 0–150)
TSH SERPL DL<=0.05 MIU/L-ACNC: 1.68 UIU/ML (ref 0.27–4.2)
VLDLC SERPL-MCNC: 24.4 MG/DL

## 2020-09-02 PROCEDURE — 80061 LIPID PANEL: CPT

## 2020-09-02 PROCEDURE — 82306 VITAMIN D 25 HYDROXY: CPT

## 2020-09-02 PROCEDURE — 73630 X-RAY EXAM OF FOOT: CPT | Performed by: RADIOLOGY

## 2020-09-02 PROCEDURE — 73630 X-RAY EXAM OF FOOT: CPT

## 2020-09-02 PROCEDURE — 80053 COMPREHEN METABOLIC PANEL: CPT

## 2020-09-02 PROCEDURE — 84443 ASSAY THYROID STIM HORMONE: CPT

## 2020-09-02 PROCEDURE — 82570 ASSAY OF URINE CREATININE: CPT

## 2020-09-02 PROCEDURE — 82607 VITAMIN B-12: CPT

## 2020-09-02 PROCEDURE — 82043 UR ALBUMIN QUANTITATIVE: CPT

## 2020-09-03 ENCOUNTER — TELEPHONE (OUTPATIENT)
Dept: FAMILY MEDICINE CLINIC | Facility: CLINIC | Age: 45
End: 2020-09-03

## 2020-09-03 LAB
25(OH)D3 SERPL-MCNC: 46.6 NG/ML (ref 30–100)
VIT B12 BLD-MCNC: 523 PG/ML (ref 211–946)

## 2020-09-03 NOTE — TELEPHONE ENCOUNTER
----- Message from ENRICO Leone sent at 9/3/2020 11:05 AM EDT -----  Liver enzymes are elevated.  He needs to avoid all Tylenol products, avoid alcohol intake.  Keep appointment with me in 2 weeks to further discuss.      Taiwo said that he takes a lot of motrin for his back pain and he says that his foot is killing him the xray did not show anything he will be here for his appt in 2 weeks

## 2020-09-04 LAB
CREAT 24H UR-MCNC: 110.5 MG/DL
MICROALBUMIN UR-MCNC: 6.1 UG/ML
MICROALBUMIN/CREAT UR: 6 MG/G CREAT (ref 0–29)

## 2020-09-11 DIAGNOSIS — I10 ESSENTIAL HYPERTENSION: ICD-10-CM

## 2020-09-13 RX ORDER — PRAVASTATIN SODIUM 20 MG
TABLET ORAL
Qty: 30 TABLET | Refills: 1 | Status: SHIPPED | OUTPATIENT
Start: 2020-09-13 | End: 2020-09-21 | Stop reason: SDUPTHER

## 2020-09-21 ENCOUNTER — OFFICE VISIT (OUTPATIENT)
Dept: FAMILY MEDICINE CLINIC | Facility: CLINIC | Age: 45
End: 2020-09-21

## 2020-09-21 VITALS
DIASTOLIC BLOOD PRESSURE: 94 MMHG | SYSTOLIC BLOOD PRESSURE: 143 MMHG | WEIGHT: 232 LBS | HEIGHT: 71 IN | BODY MASS INDEX: 32.48 KG/M2

## 2020-09-21 DIAGNOSIS — E78.2 MIXED HYPERLIPIDEMIA: ICD-10-CM

## 2020-09-21 DIAGNOSIS — F32.9 REACTIVE DEPRESSION: ICD-10-CM

## 2020-09-21 DIAGNOSIS — Z12.11 SPECIAL SCREENING FOR MALIGNANT NEOPLASMS, COLON: ICD-10-CM

## 2020-09-21 DIAGNOSIS — Z12.5 SCREENING FOR PROSTATE CANCER: ICD-10-CM

## 2020-09-21 DIAGNOSIS — Z12.11 SCREENING FOR COLON CANCER: ICD-10-CM

## 2020-09-21 DIAGNOSIS — R79.9 ABNORMAL FINDING OF BLOOD CHEMISTRY, UNSPECIFIED: ICD-10-CM

## 2020-09-21 DIAGNOSIS — Z12.11 COLON CANCER SCREENING: ICD-10-CM

## 2020-09-21 DIAGNOSIS — Z12.5 ENCOUNTER FOR PROSTATE CANCER SCREENING: ICD-10-CM

## 2020-09-21 DIAGNOSIS — G25.81 RESTLESS LEG SYNDROME: Primary | ICD-10-CM

## 2020-09-21 DIAGNOSIS — I10 ESSENTIAL HYPERTENSION: ICD-10-CM

## 2020-09-21 DIAGNOSIS — J06.9 ACUTE URI: ICD-10-CM

## 2020-09-21 DIAGNOSIS — E55.9 VITAMIN D DEFICIENCY: ICD-10-CM

## 2020-09-21 PROCEDURE — G0402 INITIAL PREVENTIVE EXAM: HCPCS | Performed by: NURSE PRACTITIONER

## 2020-09-21 RX ORDER — GUAIFENESIN/DEXTROMETHORPHAN 100-10MG/5
5 SYRUP ORAL 3 TIMES DAILY PRN
Qty: 1 EACH | Refills: 0 | Status: SHIPPED | OUTPATIENT
Start: 2020-09-21 | End: 2020-11-30

## 2020-09-21 RX ORDER — CLONIDINE HYDROCHLORIDE 0.1 MG/1
0.1 TABLET ORAL 2 TIMES DAILY PRN
Qty: 60 TABLET | Refills: 5 | Status: SHIPPED | OUTPATIENT
Start: 2020-09-21 | End: 2020-11-30 | Stop reason: SDUPTHER

## 2020-09-21 RX ORDER — ASPIRIN 81 MG/1
81 TABLET ORAL DAILY
Qty: 90 TABLET | Refills: 3 | Status: SHIPPED | OUTPATIENT
Start: 2020-09-21 | End: 2020-09-21 | Stop reason: SDUPTHER

## 2020-09-21 RX ORDER — AMLODIPINE BESYLATE 10 MG/1
10 TABLET ORAL DAILY
Qty: 30 TABLET | Refills: 5 | Status: SHIPPED | OUTPATIENT
Start: 2020-09-21 | End: 2020-11-30 | Stop reason: SDUPTHER

## 2020-09-21 RX ORDER — PRAVASTATIN SODIUM 20 MG
20 TABLET ORAL DAILY
Qty: 30 TABLET | Refills: 5 | Status: SHIPPED | OUTPATIENT
Start: 2020-09-21 | End: 2020-11-30 | Stop reason: SDUPTHER

## 2020-09-21 RX ORDER — ASPIRIN 81 MG/1
81 TABLET ORAL DAILY
Qty: 90 TABLET | Refills: 3 | Status: SHIPPED | OUTPATIENT
Start: 2020-09-21 | End: 2020-11-30 | Stop reason: SDUPTHER

## 2020-09-21 RX ORDER — HYDROXYZINE HYDROCHLORIDE 25 MG/1
25 TABLET, FILM COATED ORAL EVERY 8 HOURS PRN
Qty: 30 TABLET | Refills: 5 | Status: SHIPPED | OUTPATIENT
Start: 2020-09-21 | End: 2020-11-30 | Stop reason: SDUPTHER

## 2020-09-21 RX ORDER — SULFAMETHOXAZOLE AND TRIMETHOPRIM 800; 160 MG/1; MG/1
1 TABLET ORAL 2 TIMES DAILY
Qty: 20 TABLET | Refills: 0 | Status: SHIPPED | OUTPATIENT
Start: 2020-09-21 | End: 2020-10-01

## 2020-09-21 RX ORDER — ROPINIROLE 0.5 MG/1
0.5 TABLET, FILM COATED ORAL NIGHTLY
Qty: 30 TABLET | Refills: 5 | Status: SHIPPED | OUTPATIENT
Start: 2020-09-21 | End: 2020-11-30 | Stop reason: SDUPTHER

## 2020-09-21 RX ORDER — LANSOPRAZOLE 30 MG/1
30 CAPSULE, DELAYED RELEASE ORAL DAILY
Qty: 90 CAPSULE | Refills: 1 | Status: SHIPPED | OUTPATIENT
Start: 2020-09-21 | End: 2020-11-30 | Stop reason: SDUPTHER

## 2020-09-21 RX ORDER — HYDROCHLOROTHIAZIDE 25 MG/1
25 TABLET ORAL EVERY MORNING
Qty: 30 TABLET | Refills: 5 | Status: SHIPPED | OUTPATIENT
Start: 2020-09-21 | End: 2020-11-30 | Stop reason: SDUPTHER

## 2020-09-21 RX ORDER — VALSARTAN 320 MG/1
320 TABLET ORAL DAILY
Qty: 30 TABLET | Refills: 5 | Status: SHIPPED | OUTPATIENT
Start: 2020-09-21 | End: 2020-11-30 | Stop reason: SDUPTHER

## 2020-10-01 ENCOUNTER — OFFICE VISIT (OUTPATIENT)
Dept: FAMILY MEDICINE CLINIC | Facility: CLINIC | Age: 45
End: 2020-10-01

## 2020-10-01 VITALS — HEIGHT: 71 IN | WEIGHT: 233 LBS | BODY MASS INDEX: 32.62 KG/M2

## 2020-10-01 DIAGNOSIS — Z12.11 SPECIAL SCREENING FOR MALIGNANT NEOPLASMS, COLON: Primary | ICD-10-CM

## 2020-10-01 DIAGNOSIS — Z12.11 ENCOUNTER FOR SCREENING FOR MALIGNANT NEOPLASM OF COLON: ICD-10-CM

## 2020-10-01 DIAGNOSIS — G25.81 RESTLESS LEG: ICD-10-CM

## 2020-10-01 PROCEDURE — 99441 PR PHYS/QHP TELEPHONE EVALUATION 5-10 MIN: CPT | Performed by: NURSE PRACTITIONER

## 2020-10-01 NOTE — PROGRESS NOTES
"Establish patient called office of APRN today to discuss:   CC    Check up on requip and restless leg      You have chosen to receive care through a telephone visit today. Do you consent to use a telephone visit for your medical care today? Yes   Requesting telehealth visit due to coronavirus pandemic.      Brief HPI/ROS obtained as follows:    Recently added Requip to his medication regimen.  Patient reports that his leg cramps are much improved.  He is sleeping much better at night.  He feels that Requip 0.5 mg dosage is helping control his restless leg syndrome.  He continues to have chronic pain for which she is under a pain management clinic.    Ht 180.3 cm (71\")   Wt 106 kg (233 lb)   BMI 32.50 kg/m²     Review of Systems   Constitutional: Negative for activity change, appetite change, chills and fever.   HENT: Negative for congestion, sinus pressure, sinus pain, sneezing and sore throat.    Eyes: Negative.    Respiratory: Negative for cough, chest tightness and shortness of breath.    Cardiovascular: Negative.    Endocrine: Negative.    Genitourinary: Negative for difficulty urinating, flank pain and hematuria.   Musculoskeletal: Positive for arthralgias, back pain and neck pain.   Skin: Negative.    Allergic/Immunologic: Positive for environmental allergies. Negative for food allergies.   Neurological: Negative for seizures, syncope and speech difficulty.   Hematological: Negative.    Psychiatric/Behavioral: Negative for behavioral problems, confusion, self-injury and suicidal ideas.       The current allergy list and medication list was reviewed with patient for accuracy.     Assessment     Diagnoses and all orders for this visit:    Special screening for malignant neoplasms, colon  Comments:  Attempting to order Cologuard.  Epic will not allow this to be ordered with any diagnosis.  I have requested lab staff attempt.    Restless leg  Comments:  Much improved with Requip.  Continue current dose.  Will " adjust as indicated.    Coronavirus precautions have been reviewed and discussed.  I have discussed the CDC recommendations  of social distancing, hand washing and disinfecting commonly touched items. Reviewed need to notify PCP and self quarantine with mild symptoms.  Discussed procedure to obtain Covid-19 testing and notification of PCP/health dept/ED/Urgent Center if symptoms begin. Understanding verbalized.    Emotional support and active listening provided.   I have discussed diagnosis in detail today allowing time for questions and answers. Pt is aware of reasons to seek urgent or emergent medical care as well as reasons to return to the clinic for evaluation. Possible side effects, interactions and progression of symptoms discussed as well. Pt / family states understanding.            Patient instructed and advised to call if symptoms are increasing or new symptoms occur.    Understands reasons for urgent and emergent care.  Patient (& family) verbalized agreement for treatment plan.       Follow up in 2-3 months, sooner if needed.      This visit has been rescheduled as a phone visit to comply with patient safety concerns in accordance with CDC recommendations. Total time of discussion was 8 minutes.

## 2020-10-13 DIAGNOSIS — M79.672 FOOT PAIN, BILATERAL: Primary | ICD-10-CM

## 2020-10-13 DIAGNOSIS — M79.671 FOOT PAIN, BILATERAL: Primary | ICD-10-CM

## 2020-10-13 DIAGNOSIS — K21.9 GASTROESOPHAGEAL REFLUX DISEASE WITHOUT ESOPHAGITIS: ICD-10-CM

## 2020-10-15 RX ORDER — VALSARTAN 160 MG/1
TABLET ORAL
Qty: 180 TABLET | Refills: 0 | Status: SHIPPED | OUTPATIENT
Start: 2020-10-15 | End: 2020-10-19

## 2020-10-19 RX ORDER — VALSARTAN 160 MG/1
TABLET ORAL
Qty: 180 TABLET | Refills: 0 | Status: SHIPPED | OUTPATIENT
Start: 2020-10-19 | End: 2020-11-30

## 2020-11-11 DIAGNOSIS — I10 ESSENTIAL HYPERTENSION: ICD-10-CM

## 2020-11-11 RX ORDER — SILDENAFIL CITRATE 20 MG/1
TABLET ORAL
Qty: 90 TABLET | Refills: 1 | Status: SHIPPED | OUTPATIENT
Start: 2020-11-11 | End: 2020-11-30 | Stop reason: SDUPTHER

## 2020-11-30 ENCOUNTER — OFFICE VISIT (OUTPATIENT)
Dept: FAMILY MEDICINE CLINIC | Facility: CLINIC | Age: 45
End: 2020-11-30

## 2020-11-30 VITALS — WEIGHT: 221 LBS | BODY MASS INDEX: 30.94 KG/M2 | HEIGHT: 71 IN

## 2020-11-30 DIAGNOSIS — G25.81 RESTLESS LEG SYNDROME: ICD-10-CM

## 2020-11-30 DIAGNOSIS — G89.4 CHRONIC PAIN SYNDROME: Primary | ICD-10-CM

## 2020-11-30 DIAGNOSIS — E78.2 MIXED HYPERLIPIDEMIA: ICD-10-CM

## 2020-11-30 DIAGNOSIS — F32.9 REACTIVE DEPRESSION: ICD-10-CM

## 2020-11-30 DIAGNOSIS — I10 ESSENTIAL HYPERTENSION: ICD-10-CM

## 2020-11-30 PROCEDURE — 99441 PR PHYS/QHP TELEPHONE EVALUATION 5-10 MIN: CPT | Performed by: NURSE PRACTITIONER

## 2020-11-30 RX ORDER — CLONIDINE HYDROCHLORIDE 0.1 MG/1
0.1 TABLET ORAL 2 TIMES DAILY PRN
Qty: 60 TABLET | Refills: 5 | Status: SHIPPED | OUTPATIENT
Start: 2020-11-30 | End: 2021-07-20

## 2020-11-30 RX ORDER — SILDENAFIL CITRATE 20 MG/1
20 TABLET ORAL 3 TIMES DAILY PRN
Qty: 90 TABLET | Refills: 5 | Status: SHIPPED | OUTPATIENT
Start: 2020-11-30 | End: 2021-10-11 | Stop reason: SDUPTHER

## 2020-11-30 RX ORDER — VALSARTAN 320 MG/1
320 TABLET ORAL DAILY
Qty: 30 TABLET | Refills: 5 | Status: SHIPPED | OUTPATIENT
Start: 2020-11-30 | End: 2021-10-11 | Stop reason: SDUPTHER

## 2020-11-30 RX ORDER — PRAVASTATIN SODIUM 20 MG
20 TABLET ORAL DAILY
Qty: 30 TABLET | Refills: 5 | Status: SHIPPED | OUTPATIENT
Start: 2020-11-30 | End: 2021-10-11 | Stop reason: SDUPTHER

## 2020-11-30 RX ORDER — LANSOPRAZOLE 30 MG/1
30 CAPSULE, DELAYED RELEASE ORAL DAILY
Qty: 90 CAPSULE | Refills: 1 | Status: SHIPPED | OUTPATIENT
Start: 2020-11-30 | End: 2021-10-11 | Stop reason: SDUPTHER

## 2020-11-30 RX ORDER — HYDROXYZINE HYDROCHLORIDE 25 MG/1
25 TABLET, FILM COATED ORAL EVERY 8 HOURS PRN
Qty: 30 TABLET | Refills: 5 | Status: SHIPPED | OUTPATIENT
Start: 2020-11-30 | End: 2021-01-28

## 2020-11-30 RX ORDER — ASPIRIN 81 MG/1
81 TABLET ORAL DAILY
Qty: 90 TABLET | Refills: 3 | Status: SHIPPED | OUTPATIENT
Start: 2020-11-30 | End: 2021-10-11 | Stop reason: SDUPTHER

## 2020-11-30 RX ORDER — DULOXETIN HYDROCHLORIDE 30 MG/1
30 CAPSULE, DELAYED RELEASE ORAL DAILY
Qty: 90 CAPSULE | Refills: 4 | Status: SHIPPED | OUTPATIENT
Start: 2020-11-30 | End: 2021-10-11 | Stop reason: SDUPTHER

## 2020-11-30 RX ORDER — ROPINIROLE 0.5 MG/1
0.5 TABLET, FILM COATED ORAL NIGHTLY
Qty: 30 TABLET | Refills: 5 | Status: SHIPPED | OUTPATIENT
Start: 2020-11-30 | End: 2021-10-11 | Stop reason: SDUPTHER

## 2020-11-30 RX ORDER — AMLODIPINE BESYLATE 10 MG/1
10 TABLET ORAL DAILY
Qty: 30 TABLET | Refills: 5 | Status: SHIPPED | OUTPATIENT
Start: 2020-11-30 | End: 2021-10-11 | Stop reason: SDUPTHER

## 2020-11-30 RX ORDER — HYDROCHLOROTHIAZIDE 25 MG/1
25 TABLET ORAL EVERY MORNING
Qty: 30 TABLET | Refills: 5 | Status: SHIPPED | OUTPATIENT
Start: 2020-11-30 | End: 2021-07-20

## 2020-11-30 NOTE — PROGRESS NOTES
"Establish patient called office of APRN today to discuss:   CC    Hypertension   Restless leg     You have chosen to receive care through a telephone visit today. Do you consent to use a telephone visit for your medical care today? Yes   Requesting telehealth visit due to local surgeon coronavirus cases.    Brief HPI/ROS obtained as follows:      Hypertension-patient has been watching his blood pressure closely and reports readings within acceptable ranges.  Compliant with medication and trying to watch his diet.  Working on weight loss.  Needs refill on his routine medications.  Most recent bp was one week ago 132/90 which is a big improvement.   Heart rate running in 70-80 range.     Reactive depression-patient feels he has depression at times due to his chronic pain.  He currently receives Cymbalta which is helpful for his pain as well as depression.  Denies thoughts of hurting self or others.    Restless leg syndrome-stable with Requip.    Chronic pain-under the care of pain management.  Pain does interfere with ability to perform leisure activities and interferes with sleep/rest.    GERD-patient reports that his heartburn is stable with no recent exacerbation.  Tries to avoid spicy foods.  Currently takes Prevacid 30 mg daily.  Patient states he understands risk and possible side effects of Prevacid.    Ht 180.3 cm (71\")   Wt 100 kg (221 lb)   BMI 30.82 kg/m²       The following portions of the patient's history, chief complaint and ROS were reviewed and updated as appropriate per provider:  Allergies, current medications, past family history, past medical history, past social history, past surgical history and problem list.    Review of Systems   Constitutional: Negative for activity change, appetite change, chills, fatigue and fever.   HENT: Negative for congestion, ear pain, facial swelling, hearing loss, sinus pressure, sore throat, trouble swallowing and voice change.    Eyes: Negative for pain, " "discharge and visual disturbance.   Respiratory: Negative for apnea, cough, chest tightness, shortness of breath and wheezing.    Cardiovascular: Negative for chest pain, palpitations and leg swelling.   Gastrointestinal: Negative for abdominal pain, blood in stool, constipation, diarrhea, nausea and vomiting.   Endocrine: Negative.    Genitourinary: Negative for difficulty urinating, dysuria and urgency.   Musculoskeletal: Positive for arthralgias and back pain. Negative for joint swelling and neck stiffness.   Skin: Negative.  Negative for color change.   Allergic/Immunologic: Positive for environmental allergies. Negative for food allergies and immunocompromised state.   Neurological: Negative for dizziness, facial asymmetry and headaches.   Hematological: Negative.    Psychiatric/Behavioral: Positive for sleep disturbance (Pain). Negative for confusion, dysphoric mood, self-injury and suicidal ideas. The patient is not nervous/anxious.        The current allergy list and medication list was reviewed with patient for accuracy.     Assessment     Ht 180.3 cm (71\")   Wt 100 kg (221 lb)   BMI 30.82 kg/m²     Alert and oriented x3.  Respirations not labored with conversation.  No cough.  Speech normal.  Hearing adequate.  Cooperative.  Mood normal.  Thought process normal.  Very talkative and friendly. Polite conversation.     Diagnoses and all orders for this visit:    1. Chronic pain syndrome (Primary)    2. Essential hypertension  -     sildenafil (REVATIO) 20 MG tablet; Take 1 tablet by mouth 3 (Three) Times a Day As Needed (blood pressure).  Dispense: 90 tablet; Refill: 5  -     amLODIPine (NORVASC) 10 MG tablet; Take 1 tablet by mouth Daily.  Dispense: 30 tablet; Refill: 5  -     aspirin (aspirin) 81 MG EC tablet; Take 1 tablet by mouth Daily.  Dispense: 90 tablet; Refill: 3  -     hydroCHLOROthiazide (HYDRODIURIL) 25 MG tablet; Take 1 tablet by mouth Every Morning.  Dispense: 30 tablet; Refill: 5  -     " valsartan (Diovan) 320 MG tablet; Take 1 tablet by mouth Daily.  Dispense: 30 tablet; Refill: 5    3. Restless leg syndrome  -     cloNIDine (Catapres) 0.1 MG tablet; Take 1 tablet by mouth 2 (Two) Times a Day As Needed for High Blood Pressure.  Dispense: 60 tablet; Refill: 5  -     rOPINIRole (Requip) 0.5 MG tablet; Take 1 tablet by mouth Every Night. Take 1 hour before bedtime.  Dispense: 30 tablet; Refill: 5    4. Reactive depression  -     DULoxetine (CYMBALTA) 30 MG capsule; Take 1 capsule by mouth Daily.  Dispense: 90 capsule; Refill: 4  -     hydrOXYzine (ATARAX) 25 MG tablet; Take 1 tablet by mouth Every 8 (Eight) Hours As Needed for Itching.  Dispense: 30 tablet; Refill: 5    5. Mixed hyperlipidemia  -     pravastatin (PRAVACHOL) 20 MG tablet; Take 1 tablet by mouth Daily.  Dispense: 30 tablet; Refill: 5    Other orders  -     lansoprazole (PREVACID) 30 MG capsule; Take 1 capsule by mouth Daily.  Dispense: 90 capsule; Refill: 1      Current Outpatient Medications:   •  amLODIPine (NORVASC) 10 MG tablet, Take 1 tablet by mouth Daily., Disp: 30 tablet, Rfl: 5  •  aspirin (aspirin) 81 MG EC tablet, Take 1 tablet by mouth Daily., Disp: 90 tablet, Rfl: 3  •  cloNIDine (Catapres) 0.1 MG tablet, Take 1 tablet by mouth 2 (Two) Times a Day As Needed for High Blood Pressure., Disp: 60 tablet, Rfl: 5  •  DULoxetine (CYMBALTA) 30 MG capsule, Take 1 capsule by mouth Daily., Disp: 90 capsule, Rfl: 4  •  gabapentin (NEURONTIN) 300 MG capsule, TAKE 2 CS TID, Disp: , Rfl: 5  •  hydroCHLOROthiazide (HYDRODIURIL) 25 MG tablet, Take 1 tablet by mouth Every Morning., Disp: 30 tablet, Rfl: 5  •  hydrOXYzine (ATARAX) 25 MG tablet, Take 1 tablet by mouth Every 8 (Eight) Hours As Needed for Itching., Disp: 30 tablet, Rfl: 5  •  lansoprazole (PREVACID) 30 MG capsule, Take 1 capsule by mouth Daily., Disp: 90 capsule, Rfl: 1  •  metaxalone (SKELAXIN) 800 MG tablet, , Disp: , Rfl: 1  •  oxyCODONE (ROXICODONE) 10 MG tablet, TK 1 T PO   TID PRN P, Disp: , Rfl: 0  •  pravastatin (PRAVACHOL) 20 MG tablet, Take 1 tablet by mouth Daily., Disp: 30 tablet, Rfl: 5  •  rOPINIRole (Requip) 0.5 MG tablet, Take 1 tablet by mouth Every Night. Take 1 hour before bedtime., Disp: 30 tablet, Rfl: 5  •  sildenafil (REVATIO) 20 MG tablet, Take 1 tablet by mouth 3 (Three) Times a Day As Needed (blood pressure)., Disp: 90 tablet, Rfl: 5  •  valsartan (Diovan) 320 MG tablet, Take 1 tablet by mouth Daily., Disp: 30 tablet, Rfl: 5    Remain under the care of pain management for controlled medication.  Patient has been instructed and counseled regarding opioid /controlled medication misuse, side effects, possible interactions and risk of addiction.  Patient has been instructed on risk for oversedation, respiratory suppression and even death.  We have discussed proper storage and disposal of controlled medication.    I have reviewed medication list and discussed with patient the possible side effects and interactions.  We have discussed purpose for medication, route, dosage, frequency.  Refill routine medications.    Pt has been instructed today regarding low fat heart smart diet. Weight management and routine exercise has been recommended. Avoid high fat foods, starchy foods and processed foods. Increase lean meats, fresh vegetables and fresh fruits.     Patient's Body mass index is 30.82 kg/m². BMI is above normal parameters. Recommendations include: exercise counseling and nutrition counseling.      Proper body mechanics has been reviewed and discussed today.      I have discussed diagnosis in detail today allowing time for questions and answers. Pt is aware of reasons to seek urgent or emergent medical care as well as reasons to return to the clinic for evaluation. Possible side effects, interactions and progression of symptoms discussed as well. Pt / family states understanding.       Patient instructed and advised to call if symptoms are increasing or new symptoms  occur.    Understands reasons for urgent and emergent care.  Patient (& family) verbalized agreement for treatment plan.     Follow up in 3 - 6 months. Routine labs fasting one week prior to next office visit. Return sooner if needed.     This visit has been rescheduled as a phone visit to comply with patient safety concerns in accordance with CDC recommendations. Total time of discussion was 10 minutes.

## 2021-01-18 DIAGNOSIS — R10.9 STOMACH PAIN: Primary | ICD-10-CM

## 2021-01-28 DIAGNOSIS — F32.9 REACTIVE DEPRESSION: ICD-10-CM

## 2021-01-28 RX ORDER — HYDROXYZINE HYDROCHLORIDE 25 MG/1
TABLET, FILM COATED ORAL
Qty: 30 TABLET | Refills: 4 | Status: SHIPPED | OUTPATIENT
Start: 2021-01-28 | End: 2021-10-11 | Stop reason: SDUPTHER

## 2021-03-18 ENCOUNTER — BULK ORDERING (OUTPATIENT)
Dept: CASE MANAGEMENT | Facility: OTHER | Age: 46
End: 2021-03-18

## 2021-03-18 DIAGNOSIS — Z23 IMMUNIZATION DUE: ICD-10-CM

## 2021-03-22 ENCOUNTER — OFFICE VISIT (OUTPATIENT)
Dept: UROLOGY | Facility: CLINIC | Age: 46
End: 2021-03-22

## 2021-03-22 VITALS — HEIGHT: 71 IN | BODY MASS INDEX: 30.86 KG/M2 | WEIGHT: 220.46 LBS | TEMPERATURE: 98.4 F

## 2021-03-22 DIAGNOSIS — N28.89 RIGHT RENAL MASS: Primary | ICD-10-CM

## 2021-03-22 PROCEDURE — 99213 OFFICE O/P EST LOW 20 MIN: CPT | Performed by: UROLOGY

## 2021-03-22 NOTE — PROGRESS NOTES
"Chief Complaint:          Chief Complaint   Patient presents with   • MASS/ CYSTO ON KIDNEY       HPI:   45 y.o. male accompanied by his son referred with a mass or cyst on his kidney.  Apparently he was admitted last week to [facility] with white right upper quadrant pain and low back pain.  He is kept for 4 days he was told he had a \"attack of pancreatitis\" and had a concomitant mass of his right kidney he is lost 22 pounds he has had a cholecystectomy is hypertensive he has no complaints problems he reports no lower urinary tract symptomatology particularly irritative symptoms such as frequency urgency dysuria and obstructive symptomatology particularly dribbling, hesitancy, intermittency.  Family history of kidney disease etc.  He did not bring a disc or information and I explained to him getting information from [facility] is very difficult I will get it get back with him      Past Medical History:        Past Medical History:   Diagnosis Date   • GERD (gastroesophageal reflux disease)    • Hyperlipidemia    • Hypertension          Current Meds:     Current Outpatient Medications   Medication Sig Dispense Refill   • amLODIPine (NORVASC) 10 MG tablet Take 1 tablet by mouth Daily. 30 tablet 5   • aspirin (aspirin) 81 MG EC tablet Take 1 tablet by mouth Daily. 90 tablet 3   • cloNIDine (Catapres) 0.1 MG tablet Take 1 tablet by mouth 2 (Two) Times a Day As Needed for High Blood Pressure. 60 tablet 5   • DULoxetine (CYMBALTA) 30 MG capsule Take 1 capsule by mouth Daily. 90 capsule 4   • gabapentin (NEURONTIN) 300 MG capsule TAKE 2 CS TID  5   • hydroCHLOROthiazide (HYDRODIURIL) 25 MG tablet Take 1 tablet by mouth Every Morning. 30 tablet 5   • hydrOXYzine (ATARAX) 25 MG tablet TAKE ONE TABLET BY MOUTH EVERY 8 HOURS AS NEEDED FOR ITCHING 30 tablet 4   • lansoprazole (PREVACID) 30 MG capsule Take 1 capsule by mouth Daily. 90 capsule 1   • metaxalone (SKELAXIN) 800 MG tablet   1   • oxyCODONE (ROXICODONE) 10 MG " tablet TK 1 T PO  TID PRN P  0   • pravastatin (PRAVACHOL) 20 MG tablet Take 1 tablet by mouth Daily. 30 tablet 5   • rOPINIRole (Requip) 0.5 MG tablet Take 1 tablet by mouth Every Night. Take 1 hour before bedtime. 30 tablet 5   • sildenafil (REVATIO) 20 MG tablet Take 1 tablet by mouth 3 (Three) Times a Day As Needed (blood pressure). 90 tablet 5   • valsartan (Diovan) 320 MG tablet Take 1 tablet by mouth Daily. 30 tablet 5     No current facility-administered medications for this visit.        Allergies:      Allergies   Allergen Reactions   • Augmentin [Amoxicillin-Pot Clavulanate]    • Metoprolol         Past Surgical History:     Past Surgical History:   Procedure Laterality Date   • BACK SURGERY     • KNEE ARTHROSCOPY           Social History:     Social History     Socioeconomic History   • Marital status:      Spouse name: Not on file   • Number of children: Not on file   • Years of education: Not on file   • Highest education level: Not on file   Tobacco Use   • Smoking status: Never Smoker   • Smokeless tobacco: Current User     Types: Snuff   Substance and Sexual Activity   • Alcohol use: No   • Drug use: No   • Sexual activity: Defer       Family History:     Family History   Problem Relation Age of Onset   • Breast cancer Mother    • Prostate cancer Paternal Grandfather    • Prostate cancer Maternal Grandfather        Review of Systems:     Review of Systems   Constitutional: Negative.    HENT: Negative.    Eyes: Negative.    Respiratory: Negative.    Cardiovascular: Negative.    Gastrointestinal: Negative.    Endocrine: Negative.    Musculoskeletal: Negative.    Allergic/Immunologic: Negative.    Neurological: Negative.    Hematological: Negative.    Psychiatric/Behavioral: Negative.        Physical Exam:     Physical Exam  Vitals and nursing note reviewed.   Constitutional:       Appearance: He is well-developed.   HENT:      Head: Normocephalic and atraumatic.   Eyes:      Conjunctiva/sclera:  Conjunctivae normal.      Pupils: Pupils are equal, round, and reactive to light.   Cardiovascular:      Rate and Rhythm: Normal rate and regular rhythm.      Heart sounds: Normal heart sounds.   Pulmonary:      Effort: Pulmonary effort is normal.      Breath sounds: Normal breath sounds.   Abdominal:      General: Bowel sounds are normal.      Palpations: Abdomen is soft.   Musculoskeletal:         General: Normal range of motion.      Cervical back: Normal range of motion.   Skin:     General: Skin is warm and dry.   Neurological:      Mental Status: He is alert and oriented to person, place, and time.      Deep Tendon Reflexes: Reflexes are normal and symmetric.   Psychiatric:         Behavior: Behavior normal.         Thought Content: Thought content normal.         Judgment: Judgment normal.         I have reviewed the following portions of the patient's history: allergies, current medications, past family history, past medical history, past social history, past surgical history, problem list and ROS and confirm it's accurate.      Procedure:       Assessment/Plan:   Right renal mass-of uncertain origin will need to get the CT scan [facility]  BPH: Discussed the pathophysiology of BPH and obstruction.  We discussed the static and dynamic effect effects of BPH as well as using 5 alpha reductase inhibitors versus alpha blockade.  We discussed the indications for transurethral surgery as well.  And/ or other therapeutic options available including all of the newer techniques.  He is asymptomatic  PSA testing-I am recommending a PSA blood test that stands for prostate specific antigen.  I discussed the pathophysiology of PSA testing indicating its use in the diagnosis and management of prostate cancer.  I discussed the normal range being 0-4 but more appropriately being much closer to 0-2 in a normal male.  I discussed the fact that after certain age we don't recommend PSA testing especially in view of numerous  comorbidities.  That this will not be a useful test.  I discussed many of the things that can artificially raised PSA including a recent infection, urinary tract infection, recent sexual intercourse.  Where even the type of movement such as manipulation of the prostate  riding a bicycle.  After all this is taken into account when the test is reviewed  the most important use of PSA which is the velocity measurement in other words the change of PSA with time as a very important factor in the use and that we look for greater than 20% rise over a year to help us make the prediction of prostate cancer.  I also discussed that the use with prostate cancer indicating that after a radical prostatectomy the PSA should be 0 and any rise indicates an early biochemical recurrence does not have a PSA on record and was encouraged to get 1            Patient's Body mass index is 30.76 kg/m². BMI is above normal parameters. Recommendations include: educational material.              This document has been electronically signed by NASIM ORTIZ MD March 22, 2021 15:43 EDT

## 2021-03-29 ENCOUNTER — TELEPHONE (OUTPATIENT)
Dept: UROLOGY | Facility: CLINIC | Age: 46
End: 2021-03-29

## 2021-03-29 DIAGNOSIS — N28.89 RIGHT RENAL MASS: Primary | ICD-10-CM

## 2021-03-29 NOTE — TELEPHONE ENCOUNTER
Pt has been waiting on call from Dr. Martínez after he has reviewed his records from [place].  They are in his chart.

## 2021-03-30 PROBLEM — N28.89 RIGHT RENAL MASS: Status: ACTIVE | Noted: 2021-03-30

## 2021-04-29 ENCOUNTER — HOSPITAL ENCOUNTER (OUTPATIENT)
Dept: MRI IMAGING | Facility: HOSPITAL | Age: 46
Discharge: HOME | End: 2021-04-29
Admitting: UROLOGY

## 2021-04-29 DIAGNOSIS — N28.89 RIGHT RENAL MASS: ICD-10-CM

## 2021-04-29 PROCEDURE — 74183 MRI ABD W/O CNTR FLWD CNTR: CPT

## 2021-04-29 PROCEDURE — 74183 MRI ABD W/O CNTR FLWD CNTR: CPT | Performed by: RADIOLOGY

## 2021-04-29 PROCEDURE — 0 GADOBENATE DIMEGLUMINE 529 MG/ML SOLUTION: Performed by: UROLOGY

## 2021-04-29 PROCEDURE — A9577 INJ MULTIHANCE: HCPCS | Performed by: UROLOGY

## 2021-04-29 RX ADMIN — GADOBENATE DIMEGLUMINE 20 ML: 529 INJECTION, SOLUTION INTRAVENOUS at 14:56

## 2021-07-19 DIAGNOSIS — I10 ESSENTIAL HYPERTENSION: ICD-10-CM

## 2021-07-19 DIAGNOSIS — G25.81 RESTLESS LEG SYNDROME: ICD-10-CM

## 2021-07-20 RX ORDER — HYDROCHLOROTHIAZIDE 25 MG/1
TABLET ORAL
Qty: 90 TABLET | Refills: 4 | Status: SHIPPED | OUTPATIENT
Start: 2021-07-20 | End: 2021-10-11 | Stop reason: SDUPTHER

## 2021-07-20 RX ORDER — CLONIDINE HYDROCHLORIDE 0.1 MG/1
TABLET ORAL
Qty: 180 TABLET | Refills: 4 | Status: SHIPPED | OUTPATIENT
Start: 2021-07-20 | End: 2021-10-11 | Stop reason: SDUPTHER

## 2021-10-11 DIAGNOSIS — I10 ESSENTIAL HYPERTENSION: ICD-10-CM

## 2021-10-11 DIAGNOSIS — F32.9 REACTIVE DEPRESSION: ICD-10-CM

## 2021-10-11 DIAGNOSIS — E78.2 MIXED HYPERLIPIDEMIA: ICD-10-CM

## 2021-10-11 DIAGNOSIS — G25.81 RESTLESS LEG SYNDROME: ICD-10-CM

## 2021-10-11 RX ORDER — PRAVASTATIN SODIUM 20 MG
20 TABLET ORAL DAILY
Qty: 30 TABLET | Refills: 5 | Status: SHIPPED | OUTPATIENT
Start: 2021-10-11 | End: 2022-07-21 | Stop reason: SDUPTHER

## 2021-10-11 RX ORDER — LANSOPRAZOLE 30 MG/1
30 CAPSULE, DELAYED RELEASE ORAL DAILY
Qty: 90 CAPSULE | Refills: 1 | Status: SHIPPED | OUTPATIENT
Start: 2021-10-11 | End: 2022-07-21 | Stop reason: SDUPTHER

## 2021-10-11 RX ORDER — AMLODIPINE BESYLATE 10 MG/1
10 TABLET ORAL DAILY
Qty: 30 TABLET | Refills: 5 | Status: SHIPPED | OUTPATIENT
Start: 2021-10-11 | End: 2022-07-21 | Stop reason: SDUPTHER

## 2021-10-11 RX ORDER — HYDROXYZINE HYDROCHLORIDE 25 MG/1
25 TABLET, FILM COATED ORAL EVERY 8 HOURS PRN
Qty: 30 TABLET | Refills: 4 | Status: SHIPPED | OUTPATIENT
Start: 2021-10-11 | End: 2022-07-21 | Stop reason: SDUPTHER

## 2021-10-11 RX ORDER — ROPINIROLE 0.5 MG/1
0.5 TABLET, FILM COATED ORAL NIGHTLY
Qty: 30 TABLET | Refills: 5 | Status: SHIPPED | OUTPATIENT
Start: 2021-10-11 | End: 2022-07-21 | Stop reason: SDUPTHER

## 2021-10-11 RX ORDER — CLONIDINE HYDROCHLORIDE 0.1 MG/1
0.1 TABLET ORAL 4 TIMES DAILY
Qty: 180 TABLET | Refills: 4 | Status: SHIPPED | OUTPATIENT
Start: 2021-10-11 | End: 2022-07-21

## 2021-10-11 RX ORDER — HYDROCHLOROTHIAZIDE 25 MG/1
25 TABLET ORAL EVERY MORNING
Qty: 90 TABLET | Refills: 4 | Status: SHIPPED | OUTPATIENT
Start: 2021-10-11 | End: 2022-07-21 | Stop reason: SDUPTHER

## 2021-10-11 RX ORDER — VALSARTAN 320 MG/1
320 TABLET ORAL DAILY
Qty: 30 TABLET | Refills: 5 | Status: SHIPPED | OUTPATIENT
Start: 2021-10-11 | End: 2022-07-21 | Stop reason: SDUPTHER

## 2021-10-11 RX ORDER — ASPIRIN 81 MG/1
81 TABLET ORAL DAILY
Qty: 90 TABLET | Refills: 3 | Status: SHIPPED | OUTPATIENT
Start: 2021-10-11 | End: 2022-07-21 | Stop reason: SDUPTHER

## 2021-10-11 RX ORDER — SILDENAFIL CITRATE 20 MG/1
20 TABLET ORAL 3 TIMES DAILY PRN
Qty: 90 TABLET | Refills: 5 | Status: SHIPPED | OUTPATIENT
Start: 2021-10-11 | End: 2022-10-27

## 2021-10-11 RX ORDER — DULOXETIN HYDROCHLORIDE 30 MG/1
30 CAPSULE, DELAYED RELEASE ORAL DAILY
Qty: 90 CAPSULE | Refills: 4 | Status: SHIPPED | OUTPATIENT
Start: 2021-10-11 | End: 2022-07-21 | Stop reason: SDUPTHER

## 2021-10-11 NOTE — TELEPHONE ENCOUNTER
Pt is going to schedule a follow up with a provider in [place] since he has moved. I told him he would need to schedule this before next month to continue getting his medication refills.

## 2022-03-29 RX ORDER — LANSOPRAZOLE 30 MG/1
CAPSULE, DELAYED RELEASE ORAL
Qty: 90 CAPSULE | Refills: 1 | OUTPATIENT
Start: 2022-03-29

## 2022-05-01 DIAGNOSIS — I10 ESSENTIAL HYPERTENSION: ICD-10-CM

## 2022-05-02 RX ORDER — VALSARTAN 320 MG/1
TABLET ORAL
Qty: 90 TABLET | OUTPATIENT
Start: 2022-05-02

## 2022-05-28 DIAGNOSIS — I10 ESSENTIAL HYPERTENSION: ICD-10-CM

## 2022-05-31 RX ORDER — VALSARTAN 320 MG/1
TABLET ORAL
Qty: 90 TABLET | OUTPATIENT
Start: 2022-05-31

## 2022-06-06 DIAGNOSIS — I10 ESSENTIAL HYPERTENSION: ICD-10-CM

## 2022-06-06 RX ORDER — VALSARTAN 320 MG/1
TABLET ORAL
Qty: 90 TABLET | OUTPATIENT
Start: 2022-06-06

## 2022-07-21 ENCOUNTER — OFFICE VISIT (OUTPATIENT)
Dept: FAMILY MEDICINE CLINIC | Facility: CLINIC | Age: 47
End: 2022-07-21

## 2022-07-21 ENCOUNTER — TELEPHONE (OUTPATIENT)
Dept: FAMILY MEDICINE CLINIC | Facility: CLINIC | Age: 47
End: 2022-07-21

## 2022-07-21 VITALS
OXYGEN SATURATION: 99 % | RESPIRATION RATE: 16 BRPM | TEMPERATURE: 96.9 F | SYSTOLIC BLOOD PRESSURE: 134 MMHG | BODY MASS INDEX: 30.21 KG/M2 | DIASTOLIC BLOOD PRESSURE: 82 MMHG | WEIGHT: 215.8 LBS | HEART RATE: 75 BPM | HEIGHT: 71 IN

## 2022-07-21 DIAGNOSIS — I10 ESSENTIAL HYPERTENSION: ICD-10-CM

## 2022-07-21 DIAGNOSIS — Z11.59 ENCOUNTER FOR HEPATITIS C SCREENING TEST FOR LOW RISK PATIENT: ICD-10-CM

## 2022-07-21 DIAGNOSIS — E78.2 MIXED HYPERLIPIDEMIA: ICD-10-CM

## 2022-07-21 DIAGNOSIS — G47.9 SLEEP DISTURBANCE: ICD-10-CM

## 2022-07-21 DIAGNOSIS — Z12.5 PROSTATE CANCER SCREENING: ICD-10-CM

## 2022-07-21 DIAGNOSIS — R53.83 FATIGUE, UNSPECIFIED TYPE: ICD-10-CM

## 2022-07-21 DIAGNOSIS — E66.9 OBESITY (BMI 30-39.9): ICD-10-CM

## 2022-07-21 DIAGNOSIS — G25.81 RESTLESS LEG SYNDROME: ICD-10-CM

## 2022-07-21 DIAGNOSIS — E55.9 VITAMIN D DEFICIENCY: ICD-10-CM

## 2022-07-21 DIAGNOSIS — K21.9 GASTROESOPHAGEAL REFLUX DISEASE, UNSPECIFIED WHETHER ESOPHAGITIS PRESENT: ICD-10-CM

## 2022-07-21 DIAGNOSIS — R63.0 DECREASED APPETITE: ICD-10-CM

## 2022-07-21 DIAGNOSIS — Z76.89 ENCOUNTER TO ESTABLISH CARE: Primary | ICD-10-CM

## 2022-07-21 DIAGNOSIS — R73.9 ELEVATED BLOOD SUGAR: ICD-10-CM

## 2022-07-21 DIAGNOSIS — G89.4 CHRONIC PAIN SYNDROME: ICD-10-CM

## 2022-07-21 DIAGNOSIS — R74.8 ABNORMAL LIVER ENZYMES: ICD-10-CM

## 2022-07-21 DIAGNOSIS — Z12.11 COLON CANCER SCREENING: ICD-10-CM

## 2022-07-21 DIAGNOSIS — F32.9 REACTIVE DEPRESSION: ICD-10-CM

## 2022-07-21 PROCEDURE — 85025 COMPLETE CBC W/AUTO DIFF WBC: CPT | Performed by: NURSE PRACTITIONER

## 2022-07-21 PROCEDURE — 83036 HEMOGLOBIN GLYCOSYLATED A1C: CPT | Performed by: NURSE PRACTITIONER

## 2022-07-21 PROCEDURE — 82977 ASSAY OF GGT: CPT | Performed by: NURSE PRACTITIONER

## 2022-07-21 PROCEDURE — 84466 ASSAY OF TRANSFERRIN: CPT | Performed by: NURSE PRACTITIONER

## 2022-07-21 PROCEDURE — 84443 ASSAY THYROID STIM HORMONE: CPT | Performed by: NURSE PRACTITIONER

## 2022-07-21 PROCEDURE — 82728 ASSAY OF FERRITIN: CPT | Performed by: NURSE PRACTITIONER

## 2022-07-21 PROCEDURE — 82607 VITAMIN B-12: CPT | Performed by: NURSE PRACTITIONER

## 2022-07-21 PROCEDURE — 80053 COMPREHEN METABOLIC PANEL: CPT | Performed by: NURSE PRACTITIONER

## 2022-07-21 PROCEDURE — 86803 HEPATITIS C AB TEST: CPT | Performed by: NURSE PRACTITIONER

## 2022-07-21 PROCEDURE — 82248 BILIRUBIN DIRECT: CPT | Performed by: NURSE PRACTITIONER

## 2022-07-21 PROCEDURE — 82306 VITAMIN D 25 HYDROXY: CPT | Performed by: NURSE PRACTITIONER

## 2022-07-21 PROCEDURE — 83540 ASSAY OF IRON: CPT | Performed by: NURSE PRACTITIONER

## 2022-07-21 PROCEDURE — 99214 OFFICE O/P EST MOD 30 MIN: CPT | Performed by: NURSE PRACTITIONER

## 2022-07-21 PROCEDURE — 86706 HEP B SURFACE ANTIBODY: CPT | Performed by: NURSE PRACTITIONER

## 2022-07-21 PROCEDURE — 80061 LIPID PANEL: CPT | Performed by: NURSE PRACTITIONER

## 2022-07-21 PROCEDURE — 86705 HEP B CORE ANTIBODY IGM: CPT | Performed by: NURSE PRACTITIONER

## 2022-07-21 PROCEDURE — 83735 ASSAY OF MAGNESIUM: CPT | Performed by: NURSE PRACTITIONER

## 2022-07-21 RX ORDER — ROPINIROLE 0.5 MG/1
0.5 TABLET, FILM COATED ORAL NIGHTLY
Qty: 30 TABLET | Refills: 2 | Status: SHIPPED | OUTPATIENT
Start: 2022-07-21

## 2022-07-21 RX ORDER — PRAVASTATIN SODIUM 20 MG
20 TABLET ORAL DAILY
Qty: 30 TABLET | Refills: 2 | Status: SHIPPED | OUTPATIENT
Start: 2022-07-21 | End: 2022-11-01

## 2022-07-21 RX ORDER — DULOXETIN HYDROCHLORIDE 30 MG/1
30 CAPSULE, DELAYED RELEASE ORAL DAILY
Qty: 90 CAPSULE | Refills: 0 | Status: SHIPPED | OUTPATIENT
Start: 2022-07-21 | End: 2022-10-21 | Stop reason: SDUPTHER

## 2022-07-21 RX ORDER — HYDROXYZINE HYDROCHLORIDE 25 MG/1
25 TABLET, FILM COATED ORAL EVERY 8 HOURS PRN
Qty: 90 TABLET | Refills: 0 | Status: SHIPPED | OUTPATIENT
Start: 2022-07-21 | End: 2022-10-21 | Stop reason: SDUPTHER

## 2022-07-21 RX ORDER — VALSARTAN 320 MG/1
320 TABLET ORAL DAILY
Qty: 30 TABLET | Refills: 2 | Status: SHIPPED | OUTPATIENT
Start: 2022-07-21 | End: 2022-10-26 | Stop reason: SDUPTHER

## 2022-07-21 RX ORDER — AMLODIPINE BESYLATE 10 MG/1
10 TABLET ORAL DAILY
Qty: 90 TABLET | Refills: 0 | Status: SHIPPED | OUTPATIENT
Start: 2022-07-21 | End: 2022-10-21 | Stop reason: SDUPTHER

## 2022-07-21 RX ORDER — SILDENAFIL CITRATE 20 MG/1
20 TABLET ORAL 3 TIMES DAILY PRN
Qty: 90 TABLET | Refills: 0 | Status: CANCELLED | OUTPATIENT
Start: 2022-07-21

## 2022-07-21 RX ORDER — LANSOPRAZOLE 30 MG/1
30 CAPSULE, DELAYED RELEASE ORAL DAILY
Qty: 90 CAPSULE | Refills: 0 | Status: SHIPPED | OUTPATIENT
Start: 2022-07-21 | End: 2022-10-21 | Stop reason: SDUPTHER

## 2022-07-21 RX ORDER — ASPIRIN 81 MG/1
81 TABLET ORAL DAILY
Qty: 90 TABLET | Refills: 0 | Status: SHIPPED | OUTPATIENT
Start: 2022-07-21 | End: 2022-10-21 | Stop reason: SDUPTHER

## 2022-07-21 RX ORDER — HYDROCHLOROTHIAZIDE 25 MG/1
25 TABLET ORAL EVERY MORNING
Qty: 90 TABLET | Refills: 0 | Status: SHIPPED | OUTPATIENT
Start: 2022-07-21 | End: 2022-10-21 | Stop reason: SDUPTHER

## 2022-07-21 NOTE — PROGRESS NOTES
Chief Complaint  Fatigue ( ), Anorexia (Loss of Appetite), and Pain (Chronic Pain in Neck and Back)    Subjective          Taiwo Pierce is a 47 y.o. male who presents today to [facility] for initial evaluation     HPI:   History of Present Illness    Presents to clinic to establish care. C/o feeling tired and loss of appetite for the past 5-6 months. Associated symptoms: None.  Reports history significant for chronic neck and back pain for which he follows with pain clinic.  Also has history of high blood pressure and high cholesterol.  Family hx of prostate cancer and would like PSA screening.  No other concerns or issues at this time.    The following portions of the patient's history were reviewed and updated as appropriate: allergies, current medications, past family history, past medical history, past social history, past surgical history and problem list.    Objective     Allergy:   Allergies   Allergen Reactions   • Amoxicillin-Pot Clavulanate Unknown - Low Severity   • Metoprolol         Current Medications:   Current Outpatient Medications   Medication Sig Dispense Refill   • amLODIPine (NORVASC) 10 MG tablet Take 1 tablet by mouth Daily. 90 tablet 0   • aspirin (aspirin) 81 MG EC tablet Take 1 tablet by mouth Daily. 90 tablet 0   • DULoxetine (CYMBALTA) 30 MG capsule Take 1 capsule by mouth Daily. 90 capsule 0   • gabapentin (NEURONTIN) 300 MG capsule Take 600 mg by mouth 3 (Three) Times a Day.  5   • hydroCHLOROthiazide (HYDRODIURIL) 25 MG tablet Take 1 tablet by mouth Every Morning. 90 tablet 0   • hydrOXYzine (ATARAX) 25 MG tablet Take 1 tablet by mouth Every 8 (Eight) Hours As Needed for Itching. 90 tablet 0   • lansoprazole (PREVACID) 30 MG capsule Take 1 capsule by mouth Daily. 90 capsule 0   • metaxalone (SKELAXIN) 800 MG tablet   1   • oxyCODONE (ROXICODONE) 10 MG tablet Take 10 mg by mouth Every 4 (Four) Hours As Needed.  0   • pravastatin (PRAVACHOL) 20 MG  tablet Take 1 tablet by mouth Daily. 30 tablet 2   • rOPINIRole (Requip) 0.5 MG tablet Take 1 tablet by mouth Every Night. Take 1 hour before bedtime. 30 tablet 2   • sildenafil (REVATIO) 20 MG tablet Take 1 tablet by mouth 3 (Three) Times a Day As Needed (blood pressure). 90 tablet 5   • valsartan (Diovan) 320 MG tablet Take 1 tablet by mouth Daily. 30 tablet 2     No current facility-administered medications for this visit.       Past Medical History:  Past Medical History:   Diagnosis Date   • Arthritis October 2007    Arthritis in back and neck   • GERD (gastroesophageal reflux disease)    • Headache 2007    Headaches due to disc in neck.   • Hyperlipidemia    • Hypertension    • Low back pain 10/05/09    Hurt back and had back surgery 04/16/2014. Hit a nerve       Past Surgical History:  Past Surgical History:   Procedure Laterality Date   • BACK SURGERY     • FRACTURE SURGERY  April 2014    Back surgery that failed.   • KNEE ARTHROSCOPY         Social History:  Social History     Socioeconomic History   • Marital status:    Tobacco Use   • Smoking status: Never Smoker   • Smokeless tobacco: Current User     Types: Snuff   Vaping Use   • Vaping Use: Never used   Substance and Sexual Activity   • Alcohol use: No   • Drug use: No   • Sexual activity: Yes     Partners: Female     Birth control/protection: None       Family History:  Family History   Problem Relation Age of Onset   • Hyperlipidemia Father    • Hypertension Father    • Breast cancer Mother    • Cancer Mother         Breast cancer   • Depression Mother         Depression nerve problems   • Prostate cancer Paternal Grandfather    • Cancer Paternal Grandfather         Prostate cancer   • Hyperlipidemia Paternal Grandfather    • Prostate cancer Maternal Grandfather    • Arthritis Maternal Grandfather    • Cancer Maternal Grandfather         Prostate cancer   • Heart disease Maternal Grandfather         Heart attack@90   • Hyperlipidemia Maternal  "Grandfather    • Cancer Maternal Grandmother         Lung cancer   • Depression Maternal Grandmother         Depression nerve problems   • Stroke Maternal Grandmother         Brain anurism   • Arthritis Paternal Uncle    • Early death Sister         Brain didnt develop completely       Review of Systems:  Review of Systems   Respiratory: Negative for shortness of breath.    Cardiovascular: Negative for chest pain, palpitations and leg swelling.   Gastrointestinal: Negative for abdominal pain, constipation, nausea and vomiting.       Vital Signs:   /82 (BP Location: Right arm, Patient Position: Sitting, Cuff Size: Large Adult)   Pulse 75   Temp 96.9 °F (36.1 °C) (Temporal)   Resp 16   Ht 180.3 cm (71\")   Wt 97.9 kg (215 lb 12.8 oz)   SpO2 99%   BMI 30.10 kg/m²      Physical Exam:  Physical Exam  Vitals and nursing note reviewed.   Constitutional:       General: He is not in acute distress.     Appearance: Normal appearance. He is not ill-appearing or toxic-appearing.   HENT:      Head: Normocephalic.   Eyes:      Pupils: Pupils are equal, round, and reactive to light.   Neck:      Vascular: No carotid bruit.   Cardiovascular:      Rate and Rhythm: Normal rate and regular rhythm.      Heart sounds: Normal heart sounds.   Pulmonary:      Effort: Pulmonary effort is normal. No respiratory distress.      Breath sounds: Normal breath sounds.   Abdominal:      General: Bowel sounds are normal.      Palpations: Abdomen is soft.   Musculoskeletal:         General: No swelling.   Skin:     General: Skin is warm and dry.      Capillary Refill: Capillary refill takes less than 2 seconds.      Coloration: Skin is not pale.   Neurological:      General: No focal deficit present.      Mental Status: He is alert and oriented to person, place, and time.   Psychiatric:         Mood and Affect: Mood normal.         Behavior: Behavior normal.         Thought Content: Thought content normal.         Judgment: Judgment " normal.                  Assessment and Plan   Diagnoses and all orders for this visit:    1. Encounter to establish care (Primary)    2. Essential hypertension  -     amLODIPine (NORVASC) 10 MG tablet; Take 1 tablet by mouth Daily.  Dispense: 90 tablet; Refill: 0  -     aspirin (aspirin) 81 MG EC tablet; Take 1 tablet by mouth Daily.  Dispense: 90 tablet; Refill: 0  -     hydroCHLOROthiazide (HYDRODIURIL) 25 MG tablet; Take 1 tablet by mouth Every Morning.  Dispense: 90 tablet; Refill: 0  -     valsartan (Diovan) 320 MG tablet; Take 1 tablet by mouth Daily.  Dispense: 30 tablet; Refill: 2  -     CBC & Differential  -     Comprehensive Metabolic Panel  -     Hemoglobin A1c  -     Lipid Panel  -     MicroAlbumin, Urine, Random - Urine, Clean Catch  -     TSH Rfx On Abnormal To Free T4  -     Vitamin B12  -     Vitamin D 25 Hydroxy  Medication refilled.  Diet lifestyle modifications to control condition.    3. Reactive depression  -     DULoxetine (CYMBALTA) 30 MG capsule; Take 1 capsule by mouth Daily.  Dispense: 90 capsule; Refill: 0  -     CBC & Differential  -     Comprehensive Metabolic Panel  -     Hemoglobin A1c  -     Lipid Panel  -     TSH Rfx On Abnormal To Free T4  -     Vitamin B12  -     Vitamin D 25 Hydroxy  Medication refilled    4. Mixed hyperlipidemia  -     aspirin (aspirin) 81 MG EC tablet; Take 1 tablet by mouth Daily.  Dispense: 90 tablet; Refill: 0  -     pravastatin (PRAVACHOL) 20 MG tablet; Take 1 tablet by mouth Daily.  Dispense: 30 tablet; Refill: 2  -     CBC & Differential  -     Comprehensive Metabolic Panel  -     Hemoglobin A1c  -     Lipid Panel  -     TSH Rfx On Abnormal To Free T4  -     Vitamin B12  -     Vitamin D 25 Hydroxy  Medication refilled.  Diet lifestyle modifications to control condition.    5. Restless leg syndrome  -     rOPINIRole (Requip) 0.5 MG tablet; Take 1 tablet by mouth Every Night. Take 1 hour before bedtime.  Dispense: 30 tablet; Refill: 2  -     CBC &  Differential  -     Comprehensive Metabolic Panel  -     Hemoglobin A1c  -     Lipid Panel  -     TSH Rfx On Abnormal To Free T4  -     Vitamin B12  -     Vitamin D 25 Hydroxy  Medication refilled    6. Encounter for hepatitis C screening test for low risk patient  -     Hepatitis C antibody; Future  -     CBC & Differential  -     Comprehensive Metabolic Panel  -     Hemoglobin A1c  -     Lipid Panel  -     Hepatitis C antibody  Agreeable to hep C screening    7. Elevated blood sugar  -     CBC & Differential  -     Comprehensive Metabolic Panel  -     Hemoglobin A1c  -     Lipid Panel  Screening lab ordered    8. Fatigue, unspecified type  -     CBC & Differential  -     Comprehensive Metabolic Panel  -     Hemoglobin A1c  -     Lipid Panel  -     TSH Rfx On Abnormal To Free T4  -     Vitamin B12  -     Vitamin D 25 Hydroxy  Work-up initiated    9. Vitamin D deficiency  -     Vitamin D 25 Hydroxy  Will evaluate level    10. Chronic pain syndrome  -     DULoxetine (CYMBALTA) 30 MG capsule; Take 1 capsule by mouth Daily.  Dispense: 90 capsule; Refill: 0  Medication refilled.  Continue to follow with pain management.    11. Sleep disturbance  -     hydrOXYzine (ATARAX) 25 MG tablet; Take 1 tablet by mouth Every 8 (Eight) Hours As Needed for Itching.  Dispense: 90 tablet; Refill: 0  Medication refilled    12. Gastroesophageal reflux disease, unspecified whether esophagitis present  -     lansoprazole (PREVACID) 30 MG capsule; Take 1 capsule by mouth Daily.  Dispense: 90 capsule; Refill: 0  Medication refilled.  Diet lifestyle modifications to control condition.     13. Prostate cancer screening  System will not allow PSA screening order due to insurance coverage.  Recommend patient call his insurance to verify coverage.  Patient can sign ABN waiver if he is agreeable.    14. Colon cancer screening  System will not allow colon cancer screening order due to insurance coverage.  Recommend patient call his insurance to  verify coverage.  Patient can sign ABN waiver if he is agreeable.    15. Obesity (BMI 30-39.9)  Diet and lifestyle modifications to promote weight loss    16.  Decreased appetite  Work-up initiated    Discussed possible differential diagnoses, testing, treatment, recommended non-pharmacological interventions, risks, warning signs to monitor for that would indicate need for follow-up in clinic or ER. If no improvement with these regimens or you have new or worsening symptoms follow-up. Patient verbalizes understanding and agreement with plan of care. Denies further needs or concerns.     Patient was given instructions and counseling regarding her condition and for health maintenance advised.    BMI is >= 30 and <35. (Class 1 Obesity). The following options were offered after discussion;: weight loss educational material (shared in after visit summary), exercise counseling/recommendations and nutrition counseling/recommendations       I spent 45 minutes caring for patient on this date of service. This time includes time spent by me in the following activities: preparing for the visit, reviewing tests, obtaining and/or reviewing a separately obtained history, performing a medically appropriate examination and/or evaluation, counseling and educating the patient/family/caregiver, ordering medications, tests, or procedures and documenting information in the medical record    Meds ordered during this visit:  New Medications Ordered This Visit   Medications   • amLODIPine (NORVASC) 10 MG tablet     Sig: Take 1 tablet by mouth Daily.     Dispense:  90 tablet     Refill:  0   • aspirin (aspirin) 81 MG EC tablet     Sig: Take 1 tablet by mouth Daily.     Dispense:  90 tablet     Refill:  0   • DULoxetine (CYMBALTA) 30 MG capsule     Sig: Take 1 capsule by mouth Daily.     Dispense:  90 capsule     Refill:  0   • hydroCHLOROthiazide (HYDRODIURIL) 25 MG tablet     Sig: Take 1 tablet by mouth Every Morning.     Dispense:  90  tablet     Refill:  0   • hydrOXYzine (ATARAX) 25 MG tablet     Sig: Take 1 tablet by mouth Every 8 (Eight) Hours As Needed for Itching.     Dispense:  90 tablet     Refill:  0   • lansoprazole (PREVACID) 30 MG capsule     Sig: Take 1 capsule by mouth Daily.     Dispense:  90 capsule     Refill:  0   • pravastatin (PRAVACHOL) 20 MG tablet     Sig: Take 1 tablet by mouth Daily.     Dispense:  30 tablet     Refill:  2   • rOPINIRole (Requip) 0.5 MG tablet     Sig: Take 1 tablet by mouth Every Night. Take 1 hour before bedtime.     Dispense:  30 tablet     Refill:  2   • valsartan (Diovan) 320 MG tablet     Sig: Take 1 tablet by mouth Daily.     Dispense:  30 tablet     Refill:  2       Patient Instructions:  Patient instructions given for the following visit diagnosis:    ICD-10-CM ICD-9-CM   1. Encounter to establish care  Z76.89 V65.8   2. Essential hypertension  I10 401.9   3. Reactive depression  F32.9 300.4   4. Mixed hyperlipidemia  E78.2 272.2   5. Restless leg syndrome  G25.81 333.94   6. Encounter for hepatitis C screening test for low risk patient  Z11.59 V73.89   7. Elevated blood sugar  R73.9 790.29   8. Fatigue, unspecified type  R53.83 780.79   9. Vitamin D deficiency  E55.9 268.9   10. Chronic pain syndrome  G89.4 338.4   11. Sleep disturbance  G47.9 780.50   12. Gastroesophageal reflux disease, unspecified whether esophagitis present  K21.9 530.81   13. Prostate cancer screening  Z12.5 V76.44   14. Colon cancer screening  Z12.11 V76.51   15. Obesity (BMI 30-39.9)  E66.9 278.00   16. Decreased appetite  R63.0 783.0       Follow Up   Return in about 3 months (around 10/21/2022) for Recheck, Sooner if needed.        This document has been electronically signed by ENRICO Coon  July 21, 2022 17:33 EDT    Patient was given instructions and counseling regarding his condition or for health maintenance advice. Please see specific information pulled into the AVS if appropriate.     Part of this note may be  an electronic transcription/translation of spoken language to printed text using the Dragon Dictation System.

## 2022-07-21 NOTE — TELEPHONE ENCOUNTER
I called the pt and he stated that he would contact his insurance company and contact us back with their determination. The pt stated that he thought he had had the test done before multiple years ago so was unsure why it was not being approved.

## 2022-07-21 NOTE — TELEPHONE ENCOUNTER
----- Message from ENRICO Coon sent at 7/21/2022  5:29 PM EDT -----  System will not allow order for colon cancer or PSA screening due to insurance coverage.  Recommend patient call his insurance to verify coverage. Patient can sign ABN waiver if he is agreeable.

## 2022-07-22 ENCOUNTER — TELEPHONE (OUTPATIENT)
Dept: FAMILY MEDICINE CLINIC | Facility: CLINIC | Age: 47
End: 2022-07-22

## 2022-07-22 DIAGNOSIS — R74.8 ABNORMAL LIVER ENZYMES: ICD-10-CM

## 2022-07-22 DIAGNOSIS — Z11.59 ENCOUNTER FOR SCREENING FOR OTHER VIRAL DISEASES: ICD-10-CM

## 2022-07-22 DIAGNOSIS — R53.83 FATIGUE, UNSPECIFIED TYPE: Primary | ICD-10-CM

## 2022-07-22 LAB
25(OH)D3 SERPL-MCNC: 41.5 NG/ML (ref 30–100)
ALBUMIN SERPL-MCNC: 4.5 G/DL (ref 3.5–5.2)
ALBUMIN/GLOB SERPL: 1 G/DL
ALP SERPL-CCNC: 204 U/L (ref 39–117)
ALT SERPL W P-5'-P-CCNC: 119 U/L (ref 1–41)
ANION GAP SERPL CALCULATED.3IONS-SCNC: 18.1 MMOL/L (ref 5–15)
AST SERPL-CCNC: 172 U/L (ref 1–40)
BASOPHILS # BLD AUTO: 0.06 10*3/MM3 (ref 0–0.2)
BASOPHILS NFR BLD AUTO: 0.5 % (ref 0–1.5)
BILIRUB CONJ SERPL-MCNC: <0.2 MG/DL (ref 0–0.3)
BILIRUB SERPL-MCNC: 0.7 MG/DL (ref 0–1.2)
BUN SERPL-MCNC: 8 MG/DL (ref 6–20)
BUN/CREAT SERPL: 9.3 (ref 7–25)
CALCIUM SPEC-SCNC: 9.7 MG/DL (ref 8.6–10.5)
CHLORIDE SERPL-SCNC: 95 MMOL/L (ref 98–107)
CHOLEST SERPL-MCNC: 195 MG/DL (ref 0–200)
CO2 SERPL-SCNC: 24.9 MMOL/L (ref 22–29)
CREAT SERPL-MCNC: 0.86 MG/DL (ref 0.76–1.27)
DEPRECATED RDW RBC AUTO: 45.6 FL (ref 37–54)
EGFRCR SERPLBLD CKD-EPI 2021: 107.5 ML/MIN/1.73
EOSINOPHIL # BLD AUTO: 0.1 10*3/MM3 (ref 0–0.4)
EOSINOPHIL NFR BLD AUTO: 0.9 % (ref 0.3–6.2)
ERYTHROCYTE [DISTWIDTH] IN BLOOD BY AUTOMATED COUNT: 13.5 % (ref 12.3–15.4)
FERRITIN SERPL-MCNC: 522 NG/ML (ref 30–400)
GGT SERPL-CCNC: 1012 U/L (ref 8–61)
GLOBULIN UR ELPH-MCNC: 4.5 GM/DL
GLUCOSE SERPL-MCNC: 102 MG/DL (ref 65–99)
HBA1C MFR BLD: 6.2 % (ref 4.8–5.6)
HBV CORE IGM SERPL QL IA: NORMAL
HBV SURFACE AB SER RIA-ACNC: NORMAL
HCT VFR BLD AUTO: 44.6 % (ref 37.5–51)
HCV AB SER DONR QL: NORMAL
HDLC SERPL-MCNC: 48 MG/DL (ref 40–60)
HGB BLD-MCNC: 15.6 G/DL (ref 13–17.7)
IMM GRANULOCYTES # BLD AUTO: 0.05 10*3/MM3 (ref 0–0.05)
IMM GRANULOCYTES NFR BLD AUTO: 0.4 % (ref 0–0.5)
IRON 24H UR-MRATE: 92 MCG/DL (ref 59–158)
IRON 24H UR-MRATE: 92 MCG/DL (ref 59–158)
IRON SATN MFR SERPL: 21 % (ref 20–50)
LDLC SERPL CALC-MCNC: 113 MG/DL (ref 0–100)
LDLC/HDLC SERPL: 2.24 {RATIO}
LYMPHOCYTES # BLD AUTO: 2 10*3/MM3 (ref 0.7–3.1)
LYMPHOCYTES NFR BLD AUTO: 17 % (ref 19.6–45.3)
MAGNESIUM SERPL-MCNC: 2 MG/DL (ref 1.6–2.6)
MCH RBC QN AUTO: 32.5 PG (ref 26.6–33)
MCHC RBC AUTO-ENTMCNC: 35 G/DL (ref 31.5–35.7)
MCV RBC AUTO: 92.9 FL (ref 79–97)
MONOCYTES # BLD AUTO: 0.95 10*3/MM3 (ref 0.1–0.9)
MONOCYTES NFR BLD AUTO: 8.1 % (ref 5–12)
NEUTROPHILS NFR BLD AUTO: 73.1 % (ref 42.7–76)
NEUTROPHILS NFR BLD AUTO: 8.6 10*3/MM3 (ref 1.7–7)
NRBC BLD AUTO-RTO: 0 /100 WBC (ref 0–0.2)
PLATELET # BLD AUTO: 353 10*3/MM3 (ref 140–450)
PMV BLD AUTO: 12.1 FL (ref 6–12)
POTASSIUM SERPL-SCNC: 3 MMOL/L (ref 3.5–5.2)
PROT SERPL-MCNC: 9 G/DL (ref 6–8.5)
RBC # BLD AUTO: 4.8 10*6/MM3 (ref 4.14–5.8)
SODIUM SERPL-SCNC: 138 MMOL/L (ref 136–145)
TIBC SERPL-MCNC: 437 MCG/DL (ref 298–536)
TRANSFERRIN SERPL-MCNC: 293 MG/DL (ref 200–360)
TRIGL SERPL-MCNC: 198 MG/DL (ref 0–150)
TSH SERPL DL<=0.05 MIU/L-ACNC: 2.04 UIU/ML (ref 0.27–4.2)
VIT B12 BLD-MCNC: 482 PG/ML (ref 211–946)
VLDLC SERPL-MCNC: 34 MG/DL (ref 5–40)
WBC NRBC COR # BLD: 11.76 10*3/MM3 (ref 3.4–10.8)

## 2022-07-22 RX ORDER — POTASSIUM CHLORIDE 750 MG/1
10 TABLET, EXTENDED RELEASE ORAL 2 TIMES DAILY
Qty: 180 TABLET | Refills: 2 | Status: SHIPPED | OUTPATIENT
Start: 2022-07-22 | End: 2022-10-26

## 2022-07-22 NOTE — TELEPHONE ENCOUNTER
----- Message from ENRICO Coon sent at 7/22/2022  9:32 AM EDT -----  Please call patient regarding results.     Potassium is low.  Supplement sent to pharmacy.    Liver enzymes elevated.  Any history of liver issues?    Hemoglobin A1c and cholesterol are abnormal.  To improve, increase omega-3 with fish oil supplement and diet, decrease calorie intake, carbohydrates, saturated fats.  Beneficial diets include Mediterranean and Dash. Increase aerobic exercise like walking, running, swimming, stair climbing, bike riding etc.    Patient notified and states he has been told before he has a fatty liver.

## 2022-07-25 NOTE — TELEPHONE ENCOUNTER
Please call patient to let him know that I have ordered a liver ultrasound      Patient notified reports he had one about 3 months ago at [facility] & does not want to have another ,record requested.

## 2022-07-27 DIAGNOSIS — E78.2 MIXED HYPERLIPIDEMIA: ICD-10-CM

## 2022-07-27 RX ORDER — PRAVASTATIN SODIUM 20 MG
TABLET ORAL
Qty: 30 TABLET | Refills: 2 | OUTPATIENT
Start: 2022-07-27

## 2022-08-04 ENCOUNTER — TELEPHONE (OUTPATIENT)
Dept: FAMILY MEDICINE CLINIC | Facility: CLINIC | Age: 47
End: 2022-08-04

## 2022-08-11 ENCOUNTER — APPOINTMENT (OUTPATIENT)
Dept: ULTRASOUND IMAGING | Facility: HOSPITAL | Age: 47
End: 2022-08-11

## 2022-08-18 ENCOUNTER — TELEPHONE (OUTPATIENT)
Dept: FAMILY MEDICINE CLINIC | Facility: CLINIC | Age: 47
End: 2022-08-18

## 2022-10-16 DIAGNOSIS — E78.2 MIXED HYPERLIPIDEMIA: ICD-10-CM

## 2022-10-16 DIAGNOSIS — I10 ESSENTIAL HYPERTENSION: ICD-10-CM

## 2022-10-16 DIAGNOSIS — G89.4 CHRONIC PAIN SYNDROME: ICD-10-CM

## 2022-10-16 DIAGNOSIS — F32.9 REACTIVE DEPRESSION: ICD-10-CM

## 2022-10-18 RX ORDER — PRAVASTATIN SODIUM 20 MG
TABLET ORAL
Qty: 90 TABLET | OUTPATIENT
Start: 2022-10-18

## 2022-10-18 RX ORDER — DULOXETIN HYDROCHLORIDE 30 MG/1
CAPSULE, DELAYED RELEASE ORAL
Qty: 90 CAPSULE | Refills: 0 | OUTPATIENT
Start: 2022-10-18

## 2022-10-18 RX ORDER — HYDROCHLOROTHIAZIDE 25 MG/1
TABLET ORAL
Qty: 90 TABLET | Refills: 0 | OUTPATIENT
Start: 2022-10-18

## 2022-10-18 RX ORDER — AMLODIPINE BESYLATE 10 MG/1
TABLET ORAL
Qty: 90 TABLET | Refills: 0 | OUTPATIENT
Start: 2022-10-18

## 2022-10-18 RX ORDER — VALSARTAN 320 MG/1
TABLET ORAL
Qty: 90 TABLET | OUTPATIENT
Start: 2022-10-18

## 2022-10-21 ENCOUNTER — OFFICE VISIT (OUTPATIENT)
Dept: FAMILY MEDICINE CLINIC | Facility: CLINIC | Age: 47
End: 2022-10-21

## 2022-10-21 VITALS
DIASTOLIC BLOOD PRESSURE: 86 MMHG | BODY MASS INDEX: 30.04 KG/M2 | WEIGHT: 214.6 LBS | OXYGEN SATURATION: 95 % | HEART RATE: 86 BPM | SYSTOLIC BLOOD PRESSURE: 126 MMHG | HEIGHT: 71 IN | TEMPERATURE: 97.1 F

## 2022-10-21 DIAGNOSIS — Z00.00 MEDICARE ANNUAL WELLNESS VISIT, SUBSEQUENT: Primary | ICD-10-CM

## 2022-10-21 DIAGNOSIS — F32.9 REACTIVE DEPRESSION: ICD-10-CM

## 2022-10-21 DIAGNOSIS — L30.9 DERMATITIS: ICD-10-CM

## 2022-10-21 DIAGNOSIS — R73.03 PREDIABETES: ICD-10-CM

## 2022-10-21 DIAGNOSIS — R74.8 ELEVATED LIVER ENZYMES: ICD-10-CM

## 2022-10-21 DIAGNOSIS — E78.2 MIXED HYPERLIPIDEMIA: ICD-10-CM

## 2022-10-21 DIAGNOSIS — G47.9 SLEEP DISTURBANCE: ICD-10-CM

## 2022-10-21 DIAGNOSIS — R52 MILD PAIN: ICD-10-CM

## 2022-10-21 DIAGNOSIS — I10 ESSENTIAL HYPERTENSION: ICD-10-CM

## 2022-10-21 DIAGNOSIS — E66.3 OVERWEIGHT (BMI 25.0-29.9): ICD-10-CM

## 2022-10-21 DIAGNOSIS — K21.9 GASTROESOPHAGEAL REFLUX DISEASE, UNSPECIFIED WHETHER ESOPHAGITIS PRESENT: ICD-10-CM

## 2022-10-21 DIAGNOSIS — G89.4 CHRONIC PAIN SYNDROME: ICD-10-CM

## 2022-10-21 PROCEDURE — 84443 ASSAY THYROID STIM HORMONE: CPT | Performed by: NURSE PRACTITIONER

## 2022-10-21 PROCEDURE — 87340 HEPATITIS B SURFACE AG IA: CPT | Performed by: NURSE PRACTITIONER

## 2022-10-21 PROCEDURE — 80061 LIPID PANEL: CPT | Performed by: NURSE PRACTITIONER

## 2022-10-21 PROCEDURE — 85025 COMPLETE CBC W/AUTO DIFF WBC: CPT | Performed by: NURSE PRACTITIONER

## 2022-10-21 PROCEDURE — G0439 PPPS, SUBSEQ VISIT: HCPCS | Performed by: NURSE PRACTITIONER

## 2022-10-21 PROCEDURE — 1170F FXNL STATUS ASSESSED: CPT | Performed by: NURSE PRACTITIONER

## 2022-10-21 PROCEDURE — 1125F AMNT PAIN NOTED PAIN PRSNT: CPT | Performed by: NURSE PRACTITIONER

## 2022-10-21 PROCEDURE — 82043 UR ALBUMIN QUANTITATIVE: CPT | Performed by: NURSE PRACTITIONER

## 2022-10-21 PROCEDURE — 80053 COMPREHEN METABOLIC PANEL: CPT | Performed by: NURSE PRACTITIONER

## 2022-10-21 PROCEDURE — 83036 HEMOGLOBIN GLYCOSYLATED A1C: CPT | Performed by: NURSE PRACTITIONER

## 2022-10-21 PROCEDURE — 1159F MED LIST DOCD IN RCRD: CPT | Performed by: NURSE PRACTITIONER

## 2022-10-21 RX ORDER — IBUPROFEN 800 MG/1
800 TABLET ORAL EVERY 8 HOURS PRN
Qty: 90 TABLET | Refills: 0 | Status: SHIPPED | OUTPATIENT
Start: 2022-10-21 | End: 2023-01-24 | Stop reason: SDUPTHER

## 2022-10-21 RX ORDER — HYDROXYZINE HYDROCHLORIDE 25 MG/1
25 TABLET, FILM COATED ORAL EVERY 8 HOURS PRN
Qty: 90 TABLET | Refills: 0 | Status: SHIPPED | OUTPATIENT
Start: 2022-10-21 | End: 2022-10-26

## 2022-10-21 RX ORDER — LANSOPRAZOLE 30 MG/1
30 CAPSULE, DELAYED RELEASE ORAL DAILY
Qty: 90 CAPSULE | Refills: 0 | Status: SHIPPED | OUTPATIENT
Start: 2022-10-21 | End: 2023-01-24 | Stop reason: SDUPTHER

## 2022-10-21 RX ORDER — KETOCONAZOLE 20 MG/ML
SHAMPOO TOPICAL 2 TIMES WEEKLY
Qty: 120 ML | Refills: 2 | Status: SHIPPED | OUTPATIENT
Start: 2022-10-24 | End: 2023-01-24 | Stop reason: SDUPTHER

## 2022-10-21 RX ORDER — AMLODIPINE BESYLATE 10 MG/1
10 TABLET ORAL DAILY
Qty: 90 TABLET | Refills: 0 | Status: SHIPPED | OUTPATIENT
Start: 2022-10-21 | End: 2023-01-24 | Stop reason: SDUPTHER

## 2022-10-21 RX ORDER — HYDROCHLOROTHIAZIDE 25 MG/1
25 TABLET ORAL EVERY MORNING
Qty: 90 TABLET | Refills: 0 | Status: SHIPPED | OUTPATIENT
Start: 2022-10-21 | End: 2023-01-24 | Stop reason: SDUPTHER

## 2022-10-21 RX ORDER — ASPIRIN 81 MG/1
81 TABLET ORAL DAILY
Qty: 90 TABLET | Refills: 0 | Status: SHIPPED | OUTPATIENT
Start: 2022-10-21 | End: 2023-02-07 | Stop reason: SDUPTHER

## 2022-10-21 RX ORDER — DULOXETIN HYDROCHLORIDE 30 MG/1
30 CAPSULE, DELAYED RELEASE ORAL DAILY
Qty: 90 CAPSULE | Refills: 0 | Status: SHIPPED | OUTPATIENT
Start: 2022-10-21 | End: 2023-01-25

## 2022-10-21 NOTE — PROGRESS NOTES
The ABCs of the Annual Wellness Visit  Subsequent Medicare Wellness Visit    Chief Complaint   Patient presents with   • Medicare Wellness-subsequent      Subjective    History of Present Illness:  Taiwo Pierce is a 47 y.o. male who presents for a Subsequent Medicare Wellness Visit and medication refill.  Reports he is tolerating medications well with no issues or side effects.  Also requesting refills on ketoconazole shampoo for dry scalp and ibuprofen for which he takes occasionally for mild pain.  No other issues or concerns at this time.      The following portions of the patient's history were reviewed and   updated as appropriate: allergies, current medications, past family history, past medical history, past social history, past surgical history and problem list.    Compared to one year ago, the patient feels his physical   health is the same.    Compared to one year ago, the patient feels his mental   health is the same.    Recent Hospitalizations:  He was not admitted to the hospital during the last year.       Current Medical Providers:  Patient Care Team:  Chely Otoole APRN as PCP - General (Nurse Practitioner)    Outpatient Medications Prior to Visit   Medication Sig Dispense Refill   • gabapentin (NEURONTIN) 300 MG capsule Take 600 mg by mouth 3 (Three) Times a Day.  5   • metaxalone (SKELAXIN) 800 MG tablet   1   • oxyCODONE (ROXICODONE) 10 MG tablet Take 10 mg by mouth Every 4 (Four) Hours As Needed.  0   • potassium chloride (K-DUR,KLOR-CON) 10 MEQ CR tablet Take 1 tablet by mouth 2 (Two) Times a Day. 180 tablet 2   • pravastatin (PRAVACHOL) 20 MG tablet Take 1 tablet by mouth Daily. 30 tablet 2   • rOPINIRole (Requip) 0.5 MG tablet Take 1 tablet by mouth Every Night. Take 1 hour before bedtime. 30 tablet 2   • sildenafil (REVATIO) 20 MG tablet Take 1 tablet by mouth 3 (Three) Times a Day As Needed (blood pressure). 90 tablet 5   • valsartan (Diovan) 320 MG tablet Take 1 tablet by mouth Daily. 30  tablet 2   • amLODIPine (NORVASC) 10 MG tablet Take 1 tablet by mouth Daily. 90 tablet 0   • aspirin (aspirin) 81 MG EC tablet Take 1 tablet by mouth Daily. 90 tablet 0   • DULoxetine (CYMBALTA) 30 MG capsule Take 1 capsule by mouth Daily. 90 capsule 0   • hydroCHLOROthiazide (HYDRODIURIL) 25 MG tablet Take 1 tablet by mouth Every Morning. 90 tablet 0   • hydrOXYzine (ATARAX) 25 MG tablet Take 1 tablet by mouth Every 8 (Eight) Hours As Needed for Itching. 90 tablet 0   • lansoprazole (PREVACID) 30 MG capsule Take 1 capsule by mouth Daily. 90 capsule 0     No facility-administered medications prior to visit.       Opioid medication/s are on active medication list.  and I have evaluated his active treatment plan and pain score trends (see table).  Vitals:    10/21/22 1313   PainSc:   4   PainLoc: Back     I have reviewed the chart for potential of high risk medication and harmful drug interactions in the elderly.            Aspirin is on active medication list. Aspirin use is indicated based on review of current medical condition/s. Pros and cons of this therapy have been discussed today. Benefits of this medication outweigh potential harm.  Patient has been encouraged to continue taking this medication.  .      Patient Active Problem List   Diagnosis   • GERD (gastroesophageal reflux disease)   • Hyperlipidemia   • Hypertension   • Erectile dysfunction   • Family history of diabetes mellitus in mother   • Chronic pain syndrome   • Disorder of prostate, unspecified   • Vitamin D deficiency    • Reactive depression   • Restless leg   • Right renal mass     Advance Care Planning  Advance Directive is not on file.  ACP discussion was held with the patient during this visit. Patient does not have an advance directive, information provided.    Review of Systems   Respiratory: Negative for shortness of breath.    Cardiovascular: Negative for chest pain, palpitations and leg swelling.   Gastrointestinal: Negative for  "abdominal pain, nausea and vomiting.   Psychiatric/Behavioral: Positive for sleep disturbance. Negative for self-injury and suicidal ideas.        Objective    Vitals:    10/21/22 1313   BP: 126/86   BP Location: Right arm   Patient Position: Sitting   Cuff Size: Adult   Pulse: 86   Temp: 97.1 °F (36.2 °C)   SpO2: 95%   Weight: 97.3 kg (214 lb 9.6 oz)   Height: 180.3 cm (71\")   PainSc:   4   PainLoc: Back     Estimated body mass index is 29.93 kg/m² as calculated from the following:    Height as of this encounter: 180.3 cm (71\").    Weight as of this encounter: 97.3 kg (214 lb 9.6 oz).    BMI is >= 25 and <30. (Overweight) The following options were offered after discussion;: weight loss educational material (shared in after visit summary), exercise counseling/recommendations, nutrition counseling/recommendations and pharmacological intervention options      Does the patient have evidence of cognitive impairment? No    Physical Exam  Vitals and nursing note reviewed.   Constitutional:       General: He is not in acute distress.     Appearance: Normal appearance. He is not ill-appearing or toxic-appearing.   HENT:      Head: Normocephalic.   Neck:      Vascular: No carotid bruit.   Cardiovascular:      Rate and Rhythm: Normal rate and regular rhythm.      Heart sounds: Normal heart sounds.   Pulmonary:      Effort: Pulmonary effort is normal. No respiratory distress.      Breath sounds: Normal breath sounds.   Abdominal:      General: Bowel sounds are normal.      Palpations: Abdomen is soft.   Musculoskeletal:         General: No swelling.   Skin:     General: Skin is warm and dry.      Coloration: Skin is not pale.   Neurological:      General: No focal deficit present.      Mental Status: He is alert and oriented to person, place, and time.   Psychiatric:         Mood and Affect: Mood normal.         Behavior: Behavior normal.         Thought Content: Thought content normal.         Judgment: Judgment normal. "                 HEALTH RISK ASSESSMENT    Smoking Status:  Social History     Tobacco Use   Smoking Status Never   Smokeless Tobacco Current   • Types: Snuff     Alcohol Consumption:  Social History     Substance and Sexual Activity   Alcohol Use No     Fall Risk Screen:    CHARLENEADI Fall Risk Assessment was completed, and patient is at HIGH risk for falls. Assessment completed on:10/21/2022    Depression Screening:  PHQ-2/PHQ-9 Depression Screening 10/21/2022   Retired Total Score -   Little Interest or Pleasure in Doing Things 0-->not at all   Feeling Down, Depressed or Hopeless 0-->not at all   Trouble Falling or Staying Asleep, or Sleeping Too Much -   Feeling Tired or Having Little Energy -   Poor Appetite or Overeating -   Feeling Bad about Yourself - or that You are a Failure or Have Let Yourself or Your Family Down -   Trouble Concentrating on Things, Such as Reading the Newspaper or Watching Television -   Moving or Speaking So Slowly that Other People Could Have Noticed? Or the Opposite - Being So Fidgety -   Thoughts that You Would be Better Off Dead or of Hurting Yourself in Some Way -   PHQ-9: Brief Depression Severity Measure Score 0   If You Checked Off Any Problems, How Difficult Have These Problems Made It For You to Do Your Work, Take Care of Things at Home, or Get Along with Other People? -       Health Habits and Functional and Cognitive Screening:  Functional & Cognitive Status 10/21/2022   Do you have difficulty preparing food and eating? No   Do you have difficulty bathing yourself, getting dressed or grooming yourself? No   Do you have difficulty using the toilet? No   Do you have difficulty moving around from place to place? No   Do you have trouble with steps or getting out of a bed or a chair? No   Current Diet Well Balanced Diet   Dental Exam Not up to date   Eye Exam Up to date   Exercise (times per week) 2 times per week   Current Exercises Include Walking   Current Exercise Activities  Include -   Do you need help using the phone?  No   Are you deaf or do you have serious difficulty hearing?  No   Do you need help with transportation? No   Do you need help shopping? No   Do you need help preparing meals?  No   Do you need help with housework?  No   Do you need help with laundry? No   Do you need help taking your medications? No   Do you need help managing money? No   Do you ever drive or ride in a car without wearing a seat belt? No   Have you felt unusual stress, anger or loneliness in the last month? No   Who do you live with? Spouse   If you need help, do you have trouble finding someone available to you? No   Have you been bothered in the last four weeks by sexual problems? -   Do you have difficulty concentrating, remembering or making decisions? Yes       Age-appropriate Screening Schedule:  Refer to the list below for future screening recommendations based on patient's age, sex and/or medical conditions. Orders for these recommended tests are listed in the plan section. The patient has been provided with a written plan.    Health Maintenance   Topic Date Due   • INFLUENZA VACCINE  03/31/2023 (Originally 8/1/2022)   • LIPID PANEL  07/21/2023   • TDAP/TD VACCINES (2 - Td or Tdap) 08/15/2028              Assessment & Plan   CMS Preventative Services Quick Reference  Risk Factors Identified During Encounter  Alcohol Misuse  Cardiovascular Disease  Chronic Pain   Depression/Dysphoria  Drug Use/Abuse Identified or Suspected  Fall Risk-High or Moderate  Immunizations Discussed/Encouraged (specific Immunizations; Td, Tdap, Hepatitis A Vaccine/Series, Hepatitis B Vaccine/Series, Influenza, Pneumococcal 23, Prevnar 20 (Pneumococcal 20-valent conjugate), Vaxneuvance (Pneumococcal 15-valent conjugate), Shingrix and COVID19  Inadequate Social Support, Isolation, Loneliness, Lack of Transportation, Financial Difficulties, or Caregiver Stress   Inactivity/Sedentary  Obesity/Overweight    Polypharmacy  Tobacco Use/Dependance (use dotphrase .tobaccocessation for documentation)  The above risks/problems have been discussed with the patient.  Follow up actions/plans if indicated are seen below in the Assessment/Plan Section.  Pertinent information has been shared with the patient in the After Visit Summary.  Taiwo Pierce  reports that he has never smoked. His smokeless tobacco use includes snuff.. I have educated him on the risk of diseases from using tobacco products such as cancer, COPD and heart disease.     I advised him to quit and he is not willing to quit.    I spent 3  minutes counseling the patient.       Diagnoses and all orders for this visit:    1. Medicare annual wellness visit, subsequent (Primary)    2. Essential hypertension  -     amLODIPine (NORVASC) 10 MG tablet; Take 1 tablet by mouth Daily.  Dispense: 90 tablet; Refill: 0  -     aspirin 81 MG EC tablet; Take 1 tablet by mouth Daily.  Dispense: 90 tablet; Refill: 0  -     hydroCHLOROthiazide (HYDRODIURIL) 25 MG tablet; Take 1 tablet by mouth Every Morning.  Dispense: 90 tablet; Refill: 0  -     CBC & Differential  -     Comprehensive Metabolic Panel  -     Hemoglobin A1c  -     Lipid Panel  -     MicroAlbumin, Urine, Random - Urine, Clean Catch  -     TSH Rfx On Abnormal To Free T4    3. Mixed hyperlipidemia  -     aspirin 81 MG EC tablet; Take 1 tablet by mouth Daily.  Dispense: 90 tablet; Refill: 0  -     CBC & Differential  -     Comprehensive Metabolic Panel  -     Hemoglobin A1c  -     Lipid Panel  -     MicroAlbumin, Urine, Random - Urine, Clean Catch  -     TSH Rfx On Abnormal To Free T4    4. Reactive depression  -     DULoxetine (CYMBALTA) 30 MG capsule; Take 1 capsule by mouth Daily.  Dispense: 90 capsule; Refill: 0    5. Chronic pain syndrome  -     DULoxetine (CYMBALTA) 30 MG capsule; Take 1 capsule by mouth Daily.  Dispense: 90 capsule; Refill: 0    6. Sleep disturbance  -     hydrOXYzine (ATARAX) 25 MG tablet; Take  1 tablet by mouth Every 8 (Eight) Hours As Needed for Itching.  Dispense: 90 tablet; Refill: 0    7. Gastroesophageal reflux disease, unspecified whether esophagitis present  -     lansoprazole (PREVACID) 30 MG capsule; Take 1 capsule by mouth Daily.  Dispense: 90 capsule; Refill: 0    8. Prediabetes  -     Hemoglobin A1c    9. Elevated liver enzymes    10. Overweight (BMI 25.0-29.9)    11. Dermatitis  -     ketoconazole (NIZORAL) 2 % shampoo; Apply  topically to the appropriate area as directed 2 (Two) Times a Week.  Dispense: 120 mL; Refill: 2    12. Mild pain  -     ibuprofen (ADVIL,MOTRIN) 800 MG tablet; Take 1 tablet by mouth Every 8 (Eight) Hours As Needed for Moderate Pain.  Dispense: 90 tablet; Refill: 0    1.  Follow-up annually for wellness visit.  2, 3, 7, 8, 10.  Diet and lifestyle modifications to control condition.  4, 5, 6. medications refilled.  9.  Patient admits he did not have a liver ultrasound done.  Reports he has not been drinking alcohol for the past 2 months.   11, 12.  Medication refilled.    Other: Agrees to return for fasting labs   Agreeable to monitor blood pressure, keep log, bring log to follow-up appointment.  Follow-up sooner for readings outside of established parameters.    Discussed possible differential diagnoses, testing, treatment, recommended non-pharmacological interventions and/or lifestyle modifications, risks, warning signs to monitor for that would indicate need for follow-up in clinic or ER. If no improvement with this plan of care or you develop new or worsening symptoms, follow-up. Patient and/or guardian verbalizes understanding and agreement with plan of care. Denies further needs or concerns.    I spent 30 minutes caring for patient on this date of service. This time includes time spent by me in the following activities: preparing for the visit, reviewing tests, obtaining and/or reviewing a separately obtained history, performing a medically appropriate examination  and/or evaluation, counseling and educating the patient/family/caregiver, ordering medications, tests, or procedures and documenting information in the medical record.    Follow Up:   Return in about 3 months (around 1/21/2023) for Recheck, Sooner if needed.     An After Visit Summary and PPPS were made available to the patient.

## 2022-10-22 LAB
ALBUMIN SERPL-MCNC: 4.4 G/DL (ref 3.5–5.2)
ALBUMIN UR-MCNC: <1.2 MG/DL
ALBUMIN/GLOB SERPL: 1.2 G/DL
ALP SERPL-CCNC: 113 U/L (ref 39–117)
ALT SERPL W P-5'-P-CCNC: 110 U/L (ref 1–41)
ANION GAP SERPL CALCULATED.3IONS-SCNC: 11.2 MMOL/L (ref 5–15)
AST SERPL-CCNC: 104 U/L (ref 1–40)
BASOPHILS # BLD AUTO: 0.07 10*3/MM3 (ref 0–0.2)
BASOPHILS NFR BLD AUTO: 0.9 % (ref 0–1.5)
BILIRUB SERPL-MCNC: 0.2 MG/DL (ref 0–1.2)
BUN SERPL-MCNC: 6 MG/DL (ref 6–20)
BUN/CREAT SERPL: 6.5 (ref 7–25)
CALCIUM SPEC-SCNC: 10.1 MG/DL (ref 8.6–10.5)
CHLORIDE SERPL-SCNC: 98 MMOL/L (ref 98–107)
CHOLEST SERPL-MCNC: 201 MG/DL (ref 0–200)
CO2 SERPL-SCNC: 28.8 MMOL/L (ref 22–29)
CREAT SERPL-MCNC: 0.92 MG/DL (ref 0.76–1.27)
DEPRECATED RDW RBC AUTO: 46.6 FL (ref 37–54)
EGFRCR SERPLBLD CKD-EPI 2021: 103.2 ML/MIN/1.73
EOSINOPHIL # BLD AUTO: 0.15 10*3/MM3 (ref 0–0.4)
EOSINOPHIL NFR BLD AUTO: 1.8 % (ref 0.3–6.2)
ERYTHROCYTE [DISTWIDTH] IN BLOOD BY AUTOMATED COUNT: 13.5 % (ref 12.3–15.4)
GLOBULIN UR ELPH-MCNC: 3.6 GM/DL
GLUCOSE SERPL-MCNC: 161 MG/DL (ref 65–99)
HBA1C MFR BLD: 6.1 % (ref 4.8–5.6)
HBV SURFACE AG SERPL QL IA: NORMAL
HCT VFR BLD AUTO: 40.6 % (ref 37.5–51)
HDLC SERPL-MCNC: 48 MG/DL (ref 40–60)
HGB BLD-MCNC: 13.9 G/DL (ref 13–17.7)
IMM GRANULOCYTES # BLD AUTO: 0.02 10*3/MM3 (ref 0–0.05)
IMM GRANULOCYTES NFR BLD AUTO: 0.2 % (ref 0–0.5)
LDLC SERPL CALC-MCNC: 127 MG/DL (ref 0–100)
LDLC/HDLC SERPL: 2.58 {RATIO}
LYMPHOCYTES # BLD AUTO: 2.35 10*3/MM3 (ref 0.7–3.1)
LYMPHOCYTES NFR BLD AUTO: 28.8 % (ref 19.6–45.3)
MCH RBC QN AUTO: 32.6 PG (ref 26.6–33)
MCHC RBC AUTO-ENTMCNC: 34.2 G/DL (ref 31.5–35.7)
MCV RBC AUTO: 95.3 FL (ref 79–97)
MONOCYTES # BLD AUTO: 0.93 10*3/MM3 (ref 0.1–0.9)
MONOCYTES NFR BLD AUTO: 11.4 % (ref 5–12)
NEUTROPHILS NFR BLD AUTO: 4.64 10*3/MM3 (ref 1.7–7)
NEUTROPHILS NFR BLD AUTO: 56.9 % (ref 42.7–76)
NRBC BLD AUTO-RTO: 0 /100 WBC (ref 0–0.2)
PLATELET # BLD AUTO: 405 10*3/MM3 (ref 140–450)
PMV BLD AUTO: 12.5 FL (ref 6–12)
POTASSIUM SERPL-SCNC: 3.3 MMOL/L (ref 3.5–5.2)
PROT SERPL-MCNC: 8 G/DL (ref 6–8.5)
RBC # BLD AUTO: 4.26 10*6/MM3 (ref 4.14–5.8)
SODIUM SERPL-SCNC: 138 MMOL/L (ref 136–145)
TRIGL SERPL-MCNC: 146 MG/DL (ref 0–150)
TSH SERPL DL<=0.05 MIU/L-ACNC: 2.11 UIU/ML (ref 0.27–4.2)
VLDLC SERPL-MCNC: 26 MG/DL (ref 5–40)
WBC NRBC COR # BLD: 8.16 10*3/MM3 (ref 3.4–10.8)

## 2022-10-25 DIAGNOSIS — I10 ESSENTIAL HYPERTENSION: ICD-10-CM

## 2022-10-25 RX ORDER — SILDENAFIL CITRATE 20 MG/1
20 TABLET ORAL 3 TIMES DAILY PRN
Qty: 90 TABLET | Refills: 5 | OUTPATIENT
Start: 2022-10-25

## 2022-10-25 RX ORDER — VALSARTAN 320 MG/1
320 TABLET ORAL DAILY
Qty: 30 TABLET | Refills: 2 | Status: CANCELLED | OUTPATIENT
Start: 2022-10-25

## 2022-10-25 RX ORDER — SILDENAFIL CITRATE 20 MG/1
TABLET ORAL
Qty: 90 TABLET | Refills: 5 | OUTPATIENT
Start: 2022-10-25

## 2022-10-26 DIAGNOSIS — I10 ESSENTIAL HYPERTENSION: ICD-10-CM

## 2022-10-26 RX ORDER — VALSARTAN 320 MG/1
320 TABLET ORAL DAILY
Qty: 90 TABLET | Refills: 0 | Status: SHIPPED | OUTPATIENT
Start: 2022-10-26 | End: 2023-01-24 | Stop reason: SDUPTHER

## 2022-10-26 RX ORDER — SILDENAFIL CITRATE 20 MG/1
20 TABLET ORAL 3 TIMES DAILY PRN
Qty: 90 TABLET | Refills: 5 | Status: CANCELLED | OUTPATIENT
Start: 2022-10-26

## 2022-10-26 RX ORDER — SILDENAFIL CITRATE 20 MG/1
20 TABLET ORAL 3 TIMES DAILY PRN
Qty: 90 TABLET | Refills: 5 | OUTPATIENT
Start: 2022-10-26

## 2022-10-26 RX ORDER — POTASSIUM CHLORIDE 20 MEQ/1
20 TABLET, EXTENDED RELEASE ORAL 2 TIMES DAILY
Qty: 60 TABLET | Refills: 2 | Status: SHIPPED | OUTPATIENT
Start: 2022-10-26 | End: 2023-01-24 | Stop reason: SDUPTHER

## 2022-10-26 NOTE — TELEPHONE ENCOUNTER
----- Message from ENRICO Coon sent at 10/26/2022 11:42 AM EDT -----  Please call patient regarding results.     Potassium is still low.  Potassium chloride supplement increased to 20 meq twice daily.    Hemoglobin A1c elevated to prediabetic range and cholesterol is abnormal. To improve, decrease calorie intake, carbohydrates, saturated fats. Increase aerobic exercise like walking, running, swimming, stair climbing, bike riding etc.    Liver enzymes elevated but stable.  Continue to refrain from alcohol consumption and work on diet and exercise to treat fatty liver.      Spoke with patient & he verbalized understanding,reports he cannot exercise due to his back & he only eats one meal a day & its usually Baked Chicken & vegetables,eats very little carbs or saturated fats so he doesn't know how much more he can do with that.Requested refills pended.

## 2022-10-26 NOTE — TELEPHONE ENCOUNTER
"Valsartan refilled.    In regards to the sildenafil, has he been diagnosed with \"pulmonary\" hypertension?  If so where was he diagnosed with this?  I will need those records prior to refilling this.    Being addressed thru My chart.  " 36.9

## 2022-10-26 NOTE — TELEPHONE ENCOUNTER
"Valsartan refilled.    In regards to the sildenafil, has he been diagnosed with \"pulmonary\" hypertension?  If so where was he diagnosed with this?  I will need those records prior to refilling this.  "

## 2022-10-27 RX ORDER — SILDENAFIL 50 MG/1
50 TABLET, FILM COATED ORAL DAILY PRN
Qty: 10 TABLET | Refills: 0 | Status: SHIPPED | OUTPATIENT
Start: 2022-10-27 | End: 2023-01-24

## 2022-11-01 DIAGNOSIS — E78.2 MIXED HYPERLIPIDEMIA: ICD-10-CM

## 2022-11-01 RX ORDER — PRAVASTATIN SODIUM 20 MG
TABLET ORAL
Qty: 90 TABLET | Refills: 0 | Status: SHIPPED | OUTPATIENT
Start: 2022-11-01 | End: 2023-02-20 | Stop reason: SDUPTHER

## 2022-11-20 DIAGNOSIS — R52 MILD PAIN: ICD-10-CM

## 2022-11-22 RX ORDER — IBUPROFEN 800 MG/1
TABLET ORAL
Qty: 90 TABLET | Refills: 0 | OUTPATIENT
Start: 2022-11-22

## 2023-01-19 DIAGNOSIS — G89.4 CHRONIC PAIN SYNDROME: ICD-10-CM

## 2023-01-19 DIAGNOSIS — E78.2 MIXED HYPERLIPIDEMIA: ICD-10-CM

## 2023-01-19 DIAGNOSIS — K21.9 GASTROESOPHAGEAL REFLUX DISEASE, UNSPECIFIED WHETHER ESOPHAGITIS PRESENT: ICD-10-CM

## 2023-01-19 DIAGNOSIS — L30.9 DERMATITIS: ICD-10-CM

## 2023-01-19 DIAGNOSIS — F32.9 REACTIVE DEPRESSION: ICD-10-CM

## 2023-01-19 DIAGNOSIS — I10 ESSENTIAL HYPERTENSION: ICD-10-CM

## 2023-01-19 DIAGNOSIS — R52 MILD PAIN: ICD-10-CM

## 2023-01-20 DIAGNOSIS — I10 ESSENTIAL HYPERTENSION: ICD-10-CM

## 2023-01-20 RX ORDER — VALSARTAN 320 MG/1
TABLET ORAL
Qty: 90 TABLET | Refills: 0 | OUTPATIENT
Start: 2023-01-20

## 2023-01-20 RX ORDER — POTASSIUM CHLORIDE 20 MEQ/1
20 TABLET, EXTENDED RELEASE ORAL 2 TIMES DAILY
Qty: 60 TABLET | Refills: 2 | OUTPATIENT
Start: 2023-01-20

## 2023-01-20 RX ORDER — VALSARTAN 320 MG/1
320 TABLET ORAL DAILY
Qty: 90 TABLET | Refills: 0 | OUTPATIENT
Start: 2023-01-20

## 2023-01-20 RX ORDER — DULOXETIN HYDROCHLORIDE 30 MG/1
CAPSULE, DELAYED RELEASE ORAL
Qty: 90 CAPSULE | Refills: 0 | OUTPATIENT
Start: 2023-01-20

## 2023-01-20 RX ORDER — KETOCONAZOLE 20 MG/ML
SHAMPOO TOPICAL 2 TIMES WEEKLY
Qty: 120 ML | Refills: 2 | OUTPATIENT
Start: 2023-01-23

## 2023-01-20 RX ORDER — SILDENAFIL 50 MG/1
50 TABLET, FILM COATED ORAL DAILY PRN
Qty: 10 TABLET | Refills: 0 | OUTPATIENT
Start: 2023-01-20

## 2023-01-20 RX ORDER — IBUPROFEN 800 MG/1
800 TABLET ORAL EVERY 8 HOURS PRN
Qty: 90 TABLET | Refills: 0 | OUTPATIENT
Start: 2023-01-20

## 2023-01-20 RX ORDER — LANSOPRAZOLE 30 MG/1
30 CAPSULE, DELAYED RELEASE ORAL DAILY
Qty: 90 CAPSULE | Refills: 0 | OUTPATIENT
Start: 2023-01-20

## 2023-01-20 RX ORDER — AMLODIPINE BESYLATE 10 MG/1
TABLET ORAL
Qty: 90 TABLET | Refills: 0 | OUTPATIENT
Start: 2023-01-20

## 2023-01-20 RX ORDER — DULOXETIN HYDROCHLORIDE 30 MG/1
30 CAPSULE, DELAYED RELEASE ORAL DAILY
Qty: 90 CAPSULE | Refills: 0 | OUTPATIENT
Start: 2023-01-20

## 2023-01-20 RX ORDER — PRAVASTATIN SODIUM 20 MG
20 TABLET ORAL DAILY
Qty: 90 TABLET | Refills: 0 | OUTPATIENT
Start: 2023-01-20

## 2023-01-20 RX ORDER — HYDROCHLOROTHIAZIDE 25 MG/1
25 TABLET ORAL EVERY MORNING
Qty: 90 TABLET | Refills: 0 | OUTPATIENT
Start: 2023-01-20

## 2023-01-20 RX ORDER — HYDROCHLOROTHIAZIDE 25 MG/1
TABLET ORAL
Qty: 90 TABLET | Refills: 0 | OUTPATIENT
Start: 2023-01-20

## 2023-01-20 RX ORDER — AMLODIPINE BESYLATE 10 MG/1
10 TABLET ORAL DAILY
Qty: 90 TABLET | Refills: 0 | OUTPATIENT
Start: 2023-01-20

## 2023-01-24 ENCOUNTER — OFFICE VISIT (OUTPATIENT)
Dept: FAMILY MEDICINE CLINIC | Facility: CLINIC | Age: 48
End: 2023-01-24
Payer: MEDICARE

## 2023-01-24 VITALS
RESPIRATION RATE: 16 BRPM | BODY MASS INDEX: 31.92 KG/M2 | WEIGHT: 228 LBS | TEMPERATURE: 97.5 F | OXYGEN SATURATION: 97 % | HEART RATE: 85 BPM | DIASTOLIC BLOOD PRESSURE: 76 MMHG | SYSTOLIC BLOOD PRESSURE: 134 MMHG | HEIGHT: 71 IN

## 2023-01-24 DIAGNOSIS — I10 ESSENTIAL HYPERTENSION: Primary | ICD-10-CM

## 2023-01-24 DIAGNOSIS — E78.2 MIXED HYPERLIPIDEMIA: ICD-10-CM

## 2023-01-24 DIAGNOSIS — L30.9 DERMATITIS: ICD-10-CM

## 2023-01-24 DIAGNOSIS — R74.8 ELEVATED LIVER ENZYMES: ICD-10-CM

## 2023-01-24 DIAGNOSIS — R73.03 PREDIABETES: ICD-10-CM

## 2023-01-24 DIAGNOSIS — G89.29 OTHER CHRONIC PAIN: ICD-10-CM

## 2023-01-24 DIAGNOSIS — K21.9 GASTROESOPHAGEAL REFLUX DISEASE, UNSPECIFIED WHETHER ESOPHAGITIS PRESENT: ICD-10-CM

## 2023-01-24 PROCEDURE — 99214 OFFICE O/P EST MOD 30 MIN: CPT | Performed by: NURSE PRACTITIONER

## 2023-01-24 RX ORDER — PRAVASTATIN SODIUM 20 MG
20 TABLET ORAL DAILY
Qty: 90 TABLET | Refills: 0 | Status: CANCELLED | OUTPATIENT
Start: 2023-01-24

## 2023-01-24 RX ORDER — GABAPENTIN 800 MG/1
800 TABLET ORAL 3 TIMES DAILY
COMMUNITY
Start: 2023-01-08

## 2023-01-24 NOTE — PROGRESS NOTES
Chief Complaint  Hypertension    Subjective          Taiwo Pierce is a 47 y.o. male who presents today to [facility] for follow up    HPI:   History of Present Illness    Presents to clinic for follow-up and medication refill.  Reports he is tolerating medications well with no issues or side effects.  Reports blood pressure sometimes gets high at home but comes down after taking his pain medication. Does not have log and does not remember blood pressure readings. Reports this occurs more towards the evening.  Associated symptoms: Face sometimes feels flushed.  Chronic pain managed by pain clinic and gabapentin was increased to 800 mg TID.  No other issues or concerns at this time.      The following portions of the patient's history were reviewed and updated as appropriate: allergies, current medications, past family history, past medical history, past social history, past surgical history and problem list.    Objective     Allergy:   Allergies   Allergen Reactions   • Amoxicillin-Pot Clavulanate Unknown - Low Severity   • Metoprolol         Current Medications:   Current Outpatient Medications   Medication Sig Dispense Refill   • amLODIPine (NORVASC) 10 MG tablet Take 1 tablet by mouth Daily. 90 tablet 0   • gabapentin (NEURONTIN) 800 MG tablet Take 800 mg by mouth 3 (Three) Times a Day.     • hydroCHLOROthiazide (HYDRODIURIL) 25 MG tablet Take 1 tablet by mouth Every Morning. 90 tablet 0   • ibuprofen (ADVIL,MOTRIN) 800 MG tablet Take 1 tablet by mouth Every 8 (Eight) Hours As Needed for Moderate Pain. 90 tablet 0   • [START ON 1/26/2023] ketoconazole (NIZORAL) 2 % shampoo Apply  topically to the appropriate area as directed 2 (Two) Times a Week. 120 mL 2   • lansoprazole (PREVACID) 30 MG capsule Take 1 capsule by mouth Daily. 90 capsule 0   • metaxalone (SKELAXIN) 800 MG tablet Take 800 mg by mouth Daily.  1   • oxyCODONE (ROXICODONE) 10 MG tablet Take 10 mg by mouth Every 4  (Four) Hours As Needed.  0   • potassium chloride (K-DUR,KLOR-CON) 20 MEQ CR tablet Take 1 tablet by mouth 2 (Two) Times a Day. 60 tablet 2   • pravastatin (PRAVACHOL) 20 MG tablet TAKE ONE TABLET BY MOUTH DAILY 90 tablet 0   • rOPINIRole (Requip) 0.5 MG tablet Take 1 tablet by mouth Every Night. Take 1 hour before bedtime. (Patient taking differently: Take 0.5 mg by mouth As Needed. Take 1 hour before bedtime.) 30 tablet 2   • valsartan (Diovan) 320 MG tablet Take 1 tablet by mouth Daily. 90 tablet 0   • aspirin 81 MG EC tablet Take 1 tablet by mouth Daily. 90 tablet 0     No current facility-administered medications for this visit.       Past Medical History:  Past Medical History:   Diagnosis Date   • Arthritis October 2007    Arthritis in back and neck   • GERD (gastroesophageal reflux disease)    • Headache 2007    Headaches due to disc in neck.   • Hyperlipidemia    • Hypertension    • Low back pain 10/05/09    Hurt back and had back surgery 04/16/2014. Hit a nerve       Past Surgical History:  Past Surgical History:   Procedure Laterality Date   • BACK SURGERY     • FRACTURE SURGERY  April 2014    Back surgery that failed.   • KNEE ARTHROSCOPY         Social History:  Social History     Socioeconomic History   • Marital status:    Tobacco Use   • Smoking status: Never   • Smokeless tobacco: Current     Types: Snuff   Vaping Use   • Vaping Use: Never used   Substance and Sexual Activity   • Alcohol use: No   • Drug use: No   • Sexual activity: Yes     Partners: Female     Birth control/protection: None       Family History:  Family History   Problem Relation Age of Onset   • Hyperlipidemia Father    • Hypertension Father    • Breast cancer Mother    • Cancer Mother         Breast cancer   • Depression Mother         Depression nerve problems   • Prostate cancer Paternal Grandfather    • Cancer Paternal Grandfather         Prostate cancer   • Hyperlipidemia Paternal Grandfather    • Prostate cancer  "Maternal Grandfather    • Arthritis Maternal Grandfather    • Cancer Maternal Grandfather         Prostate cancer   • Heart disease Maternal Grandfather         Heart attack@90   • Hyperlipidemia Maternal Grandfather    • Cancer Maternal Grandmother         Lung cancer   • Depression Maternal Grandmother         Depression nerve problems   • Stroke Maternal Grandmother         Brain anurism   • Arthritis Paternal Uncle    • Early death Sister         Brain didnt develop completely       Review of Systems:  Review of Systems   Eyes: Negative for visual disturbance.   Respiratory: Negative for shortness of breath.    Cardiovascular: Negative for chest pain, palpitations and leg swelling.   Gastrointestinal: Negative for abdominal pain.   Neurological: Negative for dizziness, facial asymmetry, weakness, numbness and headaches.       Vital Signs:   /76 (BP Location: Right arm, Patient Position: Sitting, Cuff Size: Large Adult)   Pulse 85   Temp 97.5 °F (36.4 °C) (Temporal)   Resp 16   Ht 180.3 cm (71\")   Wt 103 kg (228 lb)   SpO2 97%   BMI 31.80 kg/m²      Physical Exam:  Physical Exam  Vitals and nursing note reviewed.   Constitutional:       General: He is not in acute distress.     Appearance: Normal appearance. He is not ill-appearing or toxic-appearing.   HENT:      Head: Normocephalic.   Eyes:      Conjunctiva/sclera: Conjunctivae normal.      Pupils: Pupils are equal, round, and reactive to light.   Neck:      Vascular: No carotid bruit.   Cardiovascular:      Rate and Rhythm: Normal rate and regular rhythm.      Heart sounds: Normal heart sounds.   Pulmonary:      Effort: Pulmonary effort is normal. No respiratory distress.      Breath sounds: Normal breath sounds.   Abdominal:      General: Bowel sounds are normal.      Palpations: Abdomen is soft.   Musculoskeletal:         General: No swelling.   Skin:     General: Skin is warm and dry.      Capillary Refill: Capillary refill takes less than 2 " seconds.      Coloration: Skin is not pale.   Neurological:      Mental Status: He is alert and oriented to person, place, and time. Mental status is at baseline.   Psychiatric:         Mood and Affect: Mood normal.         Behavior: Behavior normal.         Thought Content: Thought content normal.         Judgment: Judgment normal.                  Assessment and Plan   Diagnoses and all orders for this visit:    1. Essential hypertension (Primary)  -     amLODIPine (NORVASC) 10 MG tablet; Take 1 tablet by mouth Daily.  Dispense: 90 tablet; Refill: 0  -     hydroCHLOROthiazide (HYDRODIURIL) 25 MG tablet; Take 1 tablet by mouth Every Morning.  Dispense: 90 tablet; Refill: 0  -     potassium chloride (K-DUR,KLOR-CON) 20 MEQ CR tablet; Take 1 tablet by mouth 2 (Two) Times a Day.  Dispense: 60 tablet; Refill: 2  -     valsartan (Diovan) 320 MG tablet; Take 1 tablet by mouth Daily.  Dispense: 90 tablet; Refill: 0  -     CBC & Differential  -     Comprehensive Metabolic Panel  -     Hemoglobin A1c  -     Lipid Panel  -     TSH Rfx On Abnormal To Free T4    2. Other chronic pain  -     ibuprofen (ADVIL,MOTRIN) 800 MG tablet; Take 1 tablet by mouth Every 8 (Eight) Hours As Needed for Moderate Pain.  Dispense: 90 tablet; Refill: 0  -     CBC & Differential  -     Comprehensive Metabolic Panel  -     Hemoglobin A1c  -     Lipid Panel  -     TSH Rfx On Abnormal To Free T4    3. Dermatitis  -     ketoconazole (NIZORAL) 2 % shampoo; Apply  topically to the appropriate area as directed 2 (Two) Times a Week.  Dispense: 120 mL; Refill: 2  -     CBC & Differential  -     Comprehensive Metabolic Panel  -     Hemoglobin A1c  -     Lipid Panel  -     TSH Rfx On Abnormal To Free T4    4. Gastroesophageal reflux disease, unspecified whether esophagitis present  -     lansoprazole (PREVACID) 30 MG capsule; Take 1 capsule by mouth Daily.  Dispense: 90 capsule; Refill: 0  -     CBC & Differential  -     Comprehensive Metabolic Panel  -      Hemoglobin A1c  -     Lipid Panel  -     TSH Rfx On Abnormal To Free T4    5. Mixed hyperlipidemia  -     CBC & Differential  -     Comprehensive Metabolic Panel  -     Hemoglobin A1c  -     Lipid Panel  -     TSH Rfx On Abnormal To Free T4    6. Prediabetes  -     CBC & Differential  -     Comprehensive Metabolic Panel  -     Hemoglobin A1c  -     Lipid Panel  -     TSH Rfx On Abnormal To Free T4    7. Elevated liver enzymes    1.  Low-sodium DASH diet and lifestyle modifications to control condition.  Patient is agreeable to monitor blood pressure, keep log, bring log to follow-up appointment, follow-up sooner for readings outside of established parameters.  Agreeable to follow-up in 2 weeks to reevaluate blood pressure readings. Asymptomatic at this time.  2.  Nonpharmacological interventions as discussed to control pain.  Continue to follow with pain clinic for further management.  3.  Medication refilled.  Skin care as directed.  4, 5, 6, 7.  Diet and lifestyle modifications to control condition.  5, 7.  Will reevaluate liver enzymes prior to continuing statin.  Provided patient with printed educational resources.      Discussed possible differential diagnoses, testing, treatment, recommended non-pharmacological interventions, risks, warning signs to monitor for that would indicate need for follow-up in clinic or ER. If no improvement with these regimens or you have new or worsening symptoms follow-up. Patient verbalizes understanding and agreement with plan of care. Denies further needs or concerns.     Patient was given instructions and counseling regarding her condition and for health maintenance advised.    BMI is >= 30 and <35. (Class 1 Obesity). The following options were offered after discussion;: weight loss educational material (shared in after visit summary), exercise counseling/recommendations and nutrition counseling/recommendations       I spent 30 minutes caring for patient on this date of  service. This time includes time spent by me in the following activities: preparing for the visit, reviewing tests, obtaining and/or reviewing a separately obtained history, performing a medically appropriate examination and/or evaluation, counseling and educating the patient/family/caregiver, ordering medications, tests, or procedures and documenting information in the medical record    Meds ordered during this visit:  New Medications Ordered This Visit   Medications   • amLODIPine (NORVASC) 10 MG tablet     Sig: Take 1 tablet by mouth Daily.     Dispense:  90 tablet     Refill:  0   • hydroCHLOROthiazide (HYDRODIURIL) 25 MG tablet     Sig: Take 1 tablet by mouth Every Morning.     Dispense:  90 tablet     Refill:  0   • ibuprofen (ADVIL,MOTRIN) 800 MG tablet     Sig: Take 1 tablet by mouth Every 8 (Eight) Hours As Needed for Moderate Pain.     Dispense:  90 tablet     Refill:  0   • ketoconazole (NIZORAL) 2 % shampoo     Sig: Apply  topically to the appropriate area as directed 2 (Two) Times a Week.     Dispense:  120 mL     Refill:  2   • lansoprazole (PREVACID) 30 MG capsule     Sig: Take 1 capsule by mouth Daily.     Dispense:  90 capsule     Refill:  0   • potassium chloride (K-DUR,KLOR-CON) 20 MEQ CR tablet     Sig: Take 1 tablet by mouth 2 (Two) Times a Day.     Dispense:  60 tablet     Refill:  2   • valsartan (Diovan) 320 MG tablet     Sig: Take 1 tablet by mouth Daily.     Dispense:  90 tablet     Refill:  0       Patient Instructions:  Patient instructions given for the following visit diagnosis:    ICD-10-CM ICD-9-CM   1. Essential hypertension  I10 401.9   2. Other chronic pain  G89.29 338.29   3. Dermatitis  L30.9 692.9   4. Gastroesophageal reflux disease, unspecified whether esophagitis present  K21.9 530.81   5. Mixed hyperlipidemia  E78.2 272.2   6. Prediabetes  R73.03 790.29   7. Elevated liver enzymes  R74.8 790.5       Follow Up   Return in about 2 weeks (around 2/7/2023) for Recheck, Sooner if  needed.        This document has been electronically signed by ENRICO Coon  January 25, 2023 09:15 EST    Patient was given instructions and counseling regarding his condition or for health maintenance advice. Please see specific information pulled into the AVS if appropriate.     Part of this note may be an electronic transcription/translation of spoken language to printed text using the Dragon Dictation System.

## 2023-01-25 RX ORDER — POTASSIUM CHLORIDE 20 MEQ/1
20 TABLET, EXTENDED RELEASE ORAL 2 TIMES DAILY
Qty: 60 TABLET | Refills: 2 | Status: SHIPPED | OUTPATIENT
Start: 2023-01-25

## 2023-01-25 RX ORDER — KETOCONAZOLE 20 MG/ML
SHAMPOO TOPICAL 2 TIMES WEEKLY
Qty: 120 ML | Refills: 2 | Status: SHIPPED | OUTPATIENT
Start: 2023-01-26

## 2023-01-25 RX ORDER — IBUPROFEN 800 MG/1
800 TABLET ORAL EVERY 8 HOURS PRN
Qty: 90 TABLET | Refills: 0 | Status: SHIPPED | OUTPATIENT
Start: 2023-01-25

## 2023-01-25 RX ORDER — LANSOPRAZOLE 30 MG/1
30 CAPSULE, DELAYED RELEASE ORAL DAILY
Qty: 90 CAPSULE | Refills: 0 | Status: SHIPPED | OUTPATIENT
Start: 2023-01-25

## 2023-01-25 RX ORDER — HYDROCHLOROTHIAZIDE 25 MG/1
25 TABLET ORAL EVERY MORNING
Qty: 90 TABLET | Refills: 0 | Status: SHIPPED | OUTPATIENT
Start: 2023-01-25

## 2023-01-25 RX ORDER — AMLODIPINE BESYLATE 10 MG/1
10 TABLET ORAL DAILY
Qty: 90 TABLET | Refills: 0 | Status: SHIPPED | OUTPATIENT
Start: 2023-01-25

## 2023-01-25 RX ORDER — VALSARTAN 320 MG/1
320 TABLET ORAL DAILY
Qty: 90 TABLET | Refills: 0 | Status: SHIPPED | OUTPATIENT
Start: 2023-01-25

## 2023-01-28 DIAGNOSIS — E78.2 MIXED HYPERLIPIDEMIA: ICD-10-CM

## 2023-01-30 RX ORDER — PRAVASTATIN SODIUM 20 MG
TABLET ORAL
Qty: 90 TABLET | Refills: 0 | OUTPATIENT
Start: 2023-01-30

## 2023-02-07 ENCOUNTER — OFFICE VISIT (OUTPATIENT)
Dept: FAMILY MEDICINE CLINIC | Facility: CLINIC | Age: 48
End: 2023-02-07
Payer: MEDICARE

## 2023-02-07 VITALS
BODY MASS INDEX: 31.5 KG/M2 | TEMPERATURE: 97.8 F | HEIGHT: 71 IN | RESPIRATION RATE: 16 BRPM | SYSTOLIC BLOOD PRESSURE: 124 MMHG | DIASTOLIC BLOOD PRESSURE: 80 MMHG | WEIGHT: 225 LBS | OXYGEN SATURATION: 96 % | HEART RATE: 83 BPM

## 2023-02-07 DIAGNOSIS — R73.03 PREDIABETES: ICD-10-CM

## 2023-02-07 DIAGNOSIS — I10 ESSENTIAL HYPERTENSION: Primary | ICD-10-CM

## 2023-02-07 DIAGNOSIS — G89.29 OTHER CHRONIC PAIN: ICD-10-CM

## 2023-02-07 DIAGNOSIS — E78.2 MIXED HYPERLIPIDEMIA: ICD-10-CM

## 2023-02-07 PROCEDURE — 99214 OFFICE O/P EST MOD 30 MIN: CPT | Performed by: NURSE PRACTITIONER

## 2023-02-07 RX ORDER — ASPIRIN 81 MG/1
81 TABLET ORAL DAILY
Qty: 90 TABLET | Refills: 0 | Status: SHIPPED | OUTPATIENT
Start: 2023-02-07

## 2023-02-07 NOTE — PROGRESS NOTES
Chief Complaint  Hypertension    Subjective          Taiwo Pierce is a 47 y.o. male who presents today to [facility] for follow up    HPI:   History of Present Illness    Presents to follow-up for hypertension.  Reports his blood pressure is high in the morning and perfect in the evenings.  Presents with blood pressure log-see scanned documents.  Reports his BP is higher in the morning due to his pain.  Currently follows with pain clinic for management of chronic pain.  Patient reports he takes his blood pressure medication in the morning, rechecks it an hour or 2 later, and reports it is usually down to normal.  Today patient reports his blood pressure this morning was 147/99.  Denies any associated symptoms.  Patient notes he did not have his labs done but is willing to have them done today.  No other issues or concerns at this time.        The following portions of the patient's history were reviewed and updated as appropriate: allergies, current medications, past family history, past medical history, past social history, past surgical history and problem list.    Objective     Allergy:   Allergies   Allergen Reactions   • Amoxicillin-Pot Clavulanate Unknown - Low Severity   • Metoprolol         Current Medications:   Current Outpatient Medications   Medication Sig Dispense Refill   • amLODIPine (NORVASC) 10 MG tablet Take 1 tablet by mouth Daily. 90 tablet 0   • aspirin 81 MG EC tablet Take 1 tablet by mouth Daily. 90 tablet 0   • gabapentin (NEURONTIN) 800 MG tablet Take 800 mg by mouth 3 (Three) Times a Day.     • hydroCHLOROthiazide (HYDRODIURIL) 25 MG tablet Take 1 tablet by mouth Every Morning. 90 tablet 0   • ibuprofen (ADVIL,MOTRIN) 800 MG tablet Take 1 tablet by mouth Every 8 (Eight) Hours As Needed for Moderate Pain. 90 tablet 0   • ketoconazole (NIZORAL) 2 % shampoo Apply  topically to the appropriate area as directed 2 (Two) Times a Week. 120 mL 2   • lansoprazole  (PREVACID) 30 MG capsule Take 1 capsule by mouth Daily. 90 capsule 0   • metaxalone (SKELAXIN) 800 MG tablet Take 800 mg by mouth Daily.  1   • oxyCODONE (ROXICODONE) 10 MG tablet Take 10 mg by mouth Every 4 (Four) Hours As Needed.  0   • potassium chloride (K-DUR,KLOR-CON) 20 MEQ CR tablet Take 1 tablet by mouth 2 (Two) Times a Day. 60 tablet 2   • pravastatin (PRAVACHOL) 20 MG tablet TAKE ONE TABLET BY MOUTH DAILY 90 tablet 0   • rOPINIRole (Requip) 0.5 MG tablet Take 1 tablet by mouth Every Night. Take 1 hour before bedtime. (Patient taking differently: Take 0.5 mg by mouth As Needed. Take 1 hour before bedtime.) 30 tablet 2   • valsartan (Diovan) 320 MG tablet Take 1 tablet by mouth Daily. 90 tablet 0     No current facility-administered medications for this visit.       Past Medical History:  Past Medical History:   Diagnosis Date   • Arthritis October 2007    Arthritis in back and neck   • GERD (gastroesophageal reflux disease)    • Headache 2007    Headaches due to disc in neck.   • Hyperlipidemia    • Hypertension    • Low back pain 10/05/09    Hurt back and had back surgery 04/16/2014. Hit a nerve       Past Surgical History:  Past Surgical History:   Procedure Laterality Date   • BACK SURGERY     • FRACTURE SURGERY  April 2014    Back surgery that failed.   • KNEE ARTHROSCOPY         Social History:  Social History     Socioeconomic History   • Marital status:    Tobacco Use   • Smoking status: Never   • Smokeless tobacco: Current     Types: Snuff   Vaping Use   • Vaping Use: Never used   Substance and Sexual Activity   • Alcohol use: No   • Drug use: No   • Sexual activity: Yes     Partners: Female     Birth control/protection: None       Family History:  Family History   Problem Relation Age of Onset   • Hyperlipidemia Father    • Hypertension Father    • Breast cancer Mother    • Cancer Mother         Breast cancer   • Depression Mother         Depression nerve problems   • Prostate cancer  "Paternal Grandfather    • Cancer Paternal Grandfather         Prostate cancer   • Hyperlipidemia Paternal Grandfather    • Prostate cancer Maternal Grandfather    • Arthritis Maternal Grandfather    • Cancer Maternal Grandfather         Prostate cancer   • Heart disease Maternal Grandfather         Heart attack@90   • Hyperlipidemia Maternal Grandfather    • Cancer Maternal Grandmother         Lung cancer   • Depression Maternal Grandmother         Depression nerve problems   • Stroke Maternal Grandmother         Brain anurism   • Arthritis Paternal Uncle    • Early death Sister         Brain didnt develop completely       Review of Systems:  Review of Systems   Respiratory: Negative for shortness of breath.    Cardiovascular: Negative for chest pain and palpitations.   Musculoskeletal: Negative for neck pain.   Neurological: Negative for headaches.       Vital Signs:   /80 (BP Location: Right arm, Patient Position: Sitting, Cuff Size: Large Adult)   Pulse 83   Temp 97.8 °F (36.6 °C) (Temporal)   Resp 16   Ht 180.3 cm (71\")   Wt 102 kg (225 lb)   SpO2 96%   BMI 31.38 kg/m²      Physical Exam:  Physical Exam  Vitals and nursing note reviewed.   Constitutional:       General: He is not in acute distress.     Appearance: Normal appearance. He is not ill-appearing or toxic-appearing.   HENT:      Head: Normocephalic.   Cardiovascular:      Rate and Rhythm: Normal rate and regular rhythm.      Heart sounds: Normal heart sounds.   Pulmonary:      Effort: Pulmonary effort is normal. No respiratory distress.      Breath sounds: Normal breath sounds.   Abdominal:      General: Bowel sounds are normal.      Palpations: Abdomen is soft.   Musculoskeletal:         General: No swelling.   Skin:     General: Skin is warm and dry.      Coloration: Skin is not pale.   Neurological:      General: No focal deficit present.      Mental Status: He is alert and oriented to person, place, and time.   Psychiatric:         " Mood and Affect: Mood normal.         Behavior: Behavior normal.         Thought Content: Thought content normal.         Judgment: Judgment normal.                  Assessment and Plan   Diagnoses and all orders for this visit:    1. Essential hypertension (Primary)  -     aspirin 81 MG EC tablet; Take 1 tablet by mouth Daily.  Dispense: 90 tablet; Refill: 0    2. Mixed hyperlipidemia  -     aspirin 81 MG EC tablet; Take 1 tablet by mouth Daily.  Dispense: 90 tablet; Refill: 0    3. Prediabetes    4. Other chronic pain      Continue diet and lifestyle modifications to control conditions.  Agreeable to have fasting labs.    Discussed several treatment options with patient which he declines at this time.  Declines PT.  Declines referrals for further evaluation.  Declines to update imaging.    Discussed possible differential diagnoses, testing, treatment, recommended non-pharmacological interventions, risks, warning signs to monitor for that would indicate need for follow-up in clinic or ER. If no improvement with these regimens or you have new or worsening symptoms follow-up. Patient verbalizes understanding and agreement with plan of care. Denies further needs or concerns.     Patient was given instructions and counseling regarding her condition and for health maintenance advised.    BMI is >= 30 and <35. (Class 1 Obesity). The following options were offered after discussion;: weight loss educational material (shared in after visit summary), exercise counseling/recommendations and nutrition counseling/recommendations       I spent 30 minutes caring for patient on this date of service. This time includes time spent by me in the following activities: preparing for the visit, reviewing tests, obtaining and/or reviewing a separately obtained history, performing a medically appropriate examination and/or evaluation, counseling and educating the patient/family/caregiver, ordering medications, tests, or procedures and  documenting information in the medical record    Meds ordered during this visit:  New Medications Ordered This Visit   Medications   • aspirin 81 MG EC tablet     Sig: Take 1 tablet by mouth Daily.     Dispense:  90 tablet     Refill:  0       Patient Instructions:  Patient instructions given for the following visit diagnosis:    ICD-10-CM ICD-9-CM   1. Essential hypertension  I10 401.9   2. Mixed hyperlipidemia  E78.2 272.2   3. Prediabetes  R73.03 790.29   4. Other chronic pain  G89.29 338.29       Follow Up   Return in about 3 months (around 5/7/2023) for Recheck, Sooner if needed.        This document has been electronically signed by ENRICO Coon  February 7, 2023 12:14 EST    Patient was given instructions and counseling regarding his condition or for health maintenance advice. Please see specific information pulled into the AVS if appropriate.     Part of this note may be an electronic transcription/translation of spoken language to printed text using the Dragon Dictation System.

## 2023-02-13 ENCOUNTER — TELEPHONE (OUTPATIENT)
Dept: FAMILY MEDICINE CLINIC | Facility: CLINIC | Age: 48
End: 2023-02-13
Payer: MEDICARE

## 2023-02-13 NOTE — TELEPHONE ENCOUNTER
I contacted the patient regarding overdue fasting labs, he said that he will come in next week to get those done.

## 2023-02-20 DIAGNOSIS — G89.4 CHRONIC PAIN SYNDROME: ICD-10-CM

## 2023-02-20 DIAGNOSIS — F32.9 REACTIVE DEPRESSION: ICD-10-CM

## 2023-02-20 DIAGNOSIS — E78.2 MIXED HYPERLIPIDEMIA: ICD-10-CM

## 2023-02-20 RX ORDER — PRAVASTATIN SODIUM 20 MG
20 TABLET ORAL DAILY
Qty: 90 TABLET | Refills: 0 | Status: SHIPPED | OUTPATIENT
Start: 2023-02-20 | End: 2023-02-21

## 2023-02-21 RX ORDER — DULOXETIN HYDROCHLORIDE 30 MG/1
CAPSULE, DELAYED RELEASE ORAL
Qty: 90 CAPSULE | Refills: 0 | Status: SHIPPED | OUTPATIENT
Start: 2023-02-21

## 2023-02-21 RX ORDER — PRAVASTATIN SODIUM 20 MG
TABLET ORAL
Qty: 90 TABLET | Refills: 0 | Status: SHIPPED | OUTPATIENT
Start: 2023-02-21

## 2023-04-11 ENCOUNTER — TRANSCRIBE ORDERS (OUTPATIENT)
Dept: ADMINISTRATIVE | Facility: HOSPITAL | Age: 48
End: 2023-04-11
Payer: MEDICARE

## 2023-04-11 DIAGNOSIS — M54.2 CERVICALGIA: Primary | ICD-10-CM

## 2023-04-20 ENCOUNTER — TRANSCRIBE ORDERS (OUTPATIENT)
Dept: ADMINISTRATIVE | Facility: HOSPITAL | Age: 48
End: 2023-04-20
Payer: MEDICARE

## 2023-04-20 DIAGNOSIS — M54.2 CERVICALGIA: Primary | ICD-10-CM

## 2023-04-23 DIAGNOSIS — I10 ESSENTIAL HYPERTENSION: ICD-10-CM

## 2023-04-28 DIAGNOSIS — I10 ESSENTIAL HYPERTENSION: ICD-10-CM

## 2023-04-28 RX ORDER — VALSARTAN 320 MG/1
TABLET ORAL
Qty: 90 TABLET | Refills: 0 | OUTPATIENT
Start: 2023-04-28

## 2023-04-28 RX ORDER — HYDROCHLOROTHIAZIDE 25 MG/1
TABLET ORAL
Qty: 90 TABLET | Refills: 0 | OUTPATIENT
Start: 2023-04-28

## 2023-04-28 RX ORDER — AMLODIPINE BESYLATE 10 MG/1
TABLET ORAL
Qty: 90 TABLET | Refills: 0 | OUTPATIENT
Start: 2023-04-28

## 2023-04-28 NOTE — TELEPHONE ENCOUNTER
Did not have labs done. Keep follow up and have labs done.       Spoke with patient & he verbalized understanding,is out of medications stated he will come in Monday fasting & have his labs drawn.

## 2023-05-01 ENCOUNTER — LAB (OUTPATIENT)
Dept: FAMILY MEDICINE CLINIC | Facility: CLINIC | Age: 48
End: 2023-05-01
Payer: MEDICARE

## 2023-05-01 PROCEDURE — 84443 ASSAY THYROID STIM HORMONE: CPT | Performed by: NURSE PRACTITIONER

## 2023-05-01 PROCEDURE — 80061 LIPID PANEL: CPT | Performed by: NURSE PRACTITIONER

## 2023-05-01 PROCEDURE — 80053 COMPREHEN METABOLIC PANEL: CPT | Performed by: NURSE PRACTITIONER

## 2023-05-02 ENCOUNTER — OFFICE VISIT (OUTPATIENT)
Dept: FAMILY MEDICINE CLINIC | Facility: CLINIC | Age: 48
End: 2023-05-02
Payer: MEDICARE

## 2023-05-02 ENCOUNTER — TELEPHONE (OUTPATIENT)
Dept: FAMILY MEDICINE CLINIC | Facility: CLINIC | Age: 48
End: 2023-05-02

## 2023-05-02 VITALS
TEMPERATURE: 98.2 F | WEIGHT: 221 LBS | SYSTOLIC BLOOD PRESSURE: 118 MMHG | RESPIRATION RATE: 16 BRPM | BODY MASS INDEX: 30.94 KG/M2 | HEIGHT: 71 IN | OXYGEN SATURATION: 95 % | DIASTOLIC BLOOD PRESSURE: 72 MMHG | HEART RATE: 94 BPM

## 2023-05-02 DIAGNOSIS — I10 ESSENTIAL HYPERTENSION: ICD-10-CM

## 2023-05-02 DIAGNOSIS — F32.9 REACTIVE DEPRESSION: ICD-10-CM

## 2023-05-02 DIAGNOSIS — E78.2 MIXED HYPERLIPIDEMIA: ICD-10-CM

## 2023-05-02 DIAGNOSIS — K21.9 GASTROESOPHAGEAL REFLUX DISEASE, UNSPECIFIED WHETHER ESOPHAGITIS PRESENT: ICD-10-CM

## 2023-05-02 DIAGNOSIS — G89.4 CHRONIC PAIN SYNDROME: ICD-10-CM

## 2023-05-02 DIAGNOSIS — L30.9 DERMATITIS: ICD-10-CM

## 2023-05-02 LAB
BASOPHILS # BLD AUTO: 0.13 10*3/MM3 (ref 0–0.2)
BASOPHILS NFR BLD AUTO: 1.4 % (ref 0–1.5)
EOSINOPHIL # BLD AUTO: 0.16 10*3/MM3 (ref 0–0.4)
EOSINOPHIL NFR BLD AUTO: 1.7 % (ref 0.3–6.2)
ERYTHROCYTE [DISTWIDTH] IN BLOOD BY AUTOMATED COUNT: 13.7 % (ref 12.3–15.4)
HBA1C MFR BLD: 5.7 % (ref 4.8–5.6)
HCT VFR BLD AUTO: 44.7 % (ref 37.5–51)
HGB BLD-MCNC: 15.5 G/DL (ref 13–17.7)
IMM GRANULOCYTES # BLD AUTO: 0.02 10*3/MM3 (ref 0–0.05)
IMM GRANULOCYTES NFR BLD AUTO: 0.2 % (ref 0–0.5)
LYMPHOCYTES # BLD AUTO: 2.64 10*3/MM3 (ref 0.7–3.1)
LYMPHOCYTES NFR BLD AUTO: 27.9 % (ref 19.6–45.3)
MCH RBC QN AUTO: 32.7 PG (ref 26.6–33)
MCHC RBC AUTO-ENTMCNC: 34.7 G/DL (ref 31.5–35.7)
MCV RBC AUTO: 94.3 FL (ref 79–97)
MONOCYTES # BLD AUTO: 0.82 10*3/MM3 (ref 0.1–0.9)
MONOCYTES NFR BLD AUTO: 8.7 % (ref 5–12)
NEUTROPHILS # BLD AUTO: 5.68 10*3/MM3 (ref 1.7–7)
NEUTROPHILS NFR BLD AUTO: 60.1 % (ref 42.7–76)
NRBC BLD AUTO-RTO: 0 /100 WBC (ref 0–0.2)
PLATELET # BLD AUTO: 324 10*3/MM3 (ref 140–450)
RBC # BLD AUTO: 4.74 10*6/MM3 (ref 4.14–5.8)
WBC # BLD AUTO: 9.45 10*3/MM3 (ref 3.4–10.8)

## 2023-05-02 PROCEDURE — 3074F SYST BP LT 130 MM HG: CPT | Performed by: NURSE PRACTITIONER

## 2023-05-02 PROCEDURE — 1159F MED LIST DOCD IN RCRD: CPT | Performed by: NURSE PRACTITIONER

## 2023-05-02 PROCEDURE — 99214 OFFICE O/P EST MOD 30 MIN: CPT | Performed by: NURSE PRACTITIONER

## 2023-05-02 PROCEDURE — 1160F RVW MEDS BY RX/DR IN RCRD: CPT | Performed by: NURSE PRACTITIONER

## 2023-05-02 PROCEDURE — 3078F DIAST BP <80 MM HG: CPT | Performed by: NURSE PRACTITIONER

## 2023-05-02 RX ORDER — AMLODIPINE BESYLATE 10 MG/1
10 TABLET ORAL DAILY
Qty: 90 TABLET | Refills: 0 | Status: SHIPPED | OUTPATIENT
Start: 2023-05-02

## 2023-05-02 RX ORDER — IBUPROFEN 800 MG/1
800 TABLET ORAL EVERY 8 HOURS PRN
Qty: 90 TABLET | Refills: 0 | Status: SHIPPED | OUTPATIENT
Start: 2023-05-02

## 2023-05-02 RX ORDER — DULOXETIN HYDROCHLORIDE 30 MG/1
30 CAPSULE, DELAYED RELEASE ORAL DAILY
Qty: 90 CAPSULE | Refills: 0 | Status: SHIPPED | OUTPATIENT
Start: 2023-05-02

## 2023-05-02 RX ORDER — VALSARTAN 320 MG/1
320 TABLET ORAL DAILY
Qty: 90 TABLET | Refills: 0 | Status: SHIPPED | OUTPATIENT
Start: 2023-05-02

## 2023-05-02 RX ORDER — KETOCONAZOLE 20 MG/ML
SHAMPOO TOPICAL 2 TIMES WEEKLY
Qty: 120 ML | Refills: 2 | Status: SHIPPED | OUTPATIENT
Start: 2023-05-04

## 2023-05-02 RX ORDER — HYDROXYZINE HYDROCHLORIDE 25 MG/1
25 TABLET, FILM COATED ORAL 3 TIMES DAILY PRN
COMMUNITY
Start: 2023-02-21

## 2023-05-02 RX ORDER — LANSOPRAZOLE 30 MG/1
30 CAPSULE, DELAYED RELEASE ORAL DAILY
Qty: 90 CAPSULE | Refills: 0 | Status: SHIPPED | OUTPATIENT
Start: 2023-05-02

## 2023-05-02 RX ORDER — POTASSIUM CHLORIDE 20 MEQ/1
20 TABLET, EXTENDED RELEASE ORAL 2 TIMES DAILY
Qty: 60 TABLET | Refills: 2 | Status: SHIPPED | OUTPATIENT
Start: 2023-05-02

## 2023-05-02 RX ORDER — ROPINIROLE 0.5 MG/1
0.5 TABLET, FILM COATED ORAL NIGHTLY
Qty: 30 TABLET | Refills: 2 | Status: CANCELLED | OUTPATIENT
Start: 2023-05-02

## 2023-05-02 RX ORDER — PRAVASTATIN SODIUM 20 MG
20 TABLET ORAL DAILY
Qty: 90 TABLET | Refills: 0 | Status: SHIPPED | OUTPATIENT
Start: 2023-05-02

## 2023-05-02 RX ORDER — HYDROCHLOROTHIAZIDE 25 MG/1
25 TABLET ORAL EVERY MORNING
Qty: 90 TABLET | Refills: 0 | Status: SHIPPED | OUTPATIENT
Start: 2023-05-02

## 2023-05-02 RX ORDER — ASPIRIN 81 MG/1
81 TABLET ORAL DAILY
Qty: 90 TABLET | Refills: 0 | Status: SHIPPED | OUTPATIENT
Start: 2023-05-02

## 2023-05-02 NOTE — TELEPHONE ENCOUNTER
----- Message from Taiwo Pierce sent at 5/2/2023 11:42 AM EDT -----  Regarding: Medical question   Contact: [phone]  If you would could u please message me or call me at this number [phone]. Got a medical question I forgot to ask u      Spoke with patient he was trying to add you on Facebook & the pic did not look like you to him he had me look it was you!

## 2023-05-02 NOTE — PROGRESS NOTES
Chief Complaint  Hypertension    Subjective          Taiwo Pierce is a 47 y.o. male who presents today to [facility] for follow up    HPI:   History of Present Illness    Presents to clinic for follow-up and medication refill. Long is tolerating medications well with no issues or side effects. Denies any other issues or concerns at this time.       The following portions of the patient's history were reviewed and updated as appropriate: allergies, current medications, past family history, past medical history, past social history, past surgical history and problem list.    Objective     Allergy:   Allergies   Allergen Reactions   • Amoxicillin-Pot Clavulanate Unknown - Low Severity   • Metoprolol         Current Medications:   Current Outpatient Medications   Medication Sig Dispense Refill   • amLODIPine (NORVASC) 10 MG tablet Take 1 tablet by mouth Daily. 90 tablet 0   • aspirin 81 MG EC tablet Take 1 tablet by mouth Daily. 90 tablet 0   • DULoxetine (CYMBALTA) 30 MG capsule Take 1 capsule by mouth Daily. 90 capsule 0   • gabapentin (NEURONTIN) 800 MG tablet Take 1 tablet by mouth 3 (Three) Times a Day.     • hydroCHLOROthiazide (HYDRODIURIL) 25 MG tablet Take 1 tablet by mouth Every Morning. 90 tablet 0   • hydrOXYzine (ATARAX) 25 MG tablet Take 1 tablet by mouth 3 (Three) Times a Day As Needed.     • ibuprofen (ADVIL,MOTRIN) 800 MG tablet Take 1 tablet by mouth Every 8 (Eight) Hours As Needed for Moderate Pain. 90 tablet 0   • [START ON 5/4/2023] ketoconazole (NIZORAL) 2 % shampoo Apply  topically to the appropriate area as directed 2 (Two) Times a Week. 120 mL 2   • lansoprazole (PREVACID) 30 MG capsule Take 1 capsule by mouth Daily. 90 capsule 0   • metaxalone (SKELAXIN) 800 MG tablet Take 1 tablet by mouth Daily.  1   • oxyCODONE (ROXICODONE) 10 MG tablet Take 1 tablet by mouth Every 4 (Four) Hours As Needed.  0   • potassium chloride (K-DUR,KLOR-CON) 20 MEQ CR tablet  Take 1 tablet by mouth 2 (Two) Times a Day. 60 tablet 2   • pravastatin (PRAVACHOL) 20 MG tablet Take 1 tablet by mouth Daily. 90 tablet 0   • rOPINIRole (Requip) 0.5 MG tablet Take 1 tablet by mouth Every Night. Take 1 hour before bedtime. (Patient taking differently: Take 1 tablet by mouth As Needed. Take 1 hour before bedtime.) 30 tablet 2   • valsartan (Diovan) 320 MG tablet Take 1 tablet by mouth Daily. 90 tablet 0     No current facility-administered medications for this visit.       Past Medical History:  Past Medical History:   Diagnosis Date   • Arthritis October 2007    Arthritis in back and neck   • GERD (gastroesophageal reflux disease)    • Headache 2007    Headaches due to disc in neck.   • Hyperlipidemia    • Hypertension    • Low back pain 10/05/09    Hurt back and had back surgery 04/16/2014. Hit a nerve       Past Surgical History:  Past Surgical History:   Procedure Laterality Date   • BACK SURGERY     • FRACTURE SURGERY  April 2014    Back surgery that failed.   • KNEE ARTHROSCOPY         Social History:  Social History     Socioeconomic History   • Marital status:    Tobacco Use   • Smoking status: Never   • Smokeless tobacco: Current     Types: Snuff   Vaping Use   • Vaping Use: Never used   Substance and Sexual Activity   • Alcohol use: No   • Drug use: No   • Sexual activity: Yes     Partners: Female     Birth control/protection: None       Family History:  Family History   Problem Relation Age of Onset   • Hyperlipidemia Father    • Hypertension Father    • Breast cancer Mother    • Cancer Mother         Breast cancer   • Depression Mother         Depression nerve problems   • Prostate cancer Paternal Grandfather    • Cancer Paternal Grandfather         Prostate cancer   • Hyperlipidemia Paternal Grandfather    • Prostate cancer Maternal Grandfather    • Arthritis Maternal Grandfather    • Cancer Maternal Grandfather         Prostate cancer   • Heart disease Maternal Grandfather        "  Heart attack@90   • Hyperlipidemia Maternal Grandfather    • Cancer Maternal Grandmother         Lung cancer   • Depression Maternal Grandmother         Depression nerve problems   • Stroke Maternal Grandmother         Brain anurism   • Arthritis Paternal Uncle    • Early death Sister         Brain didnt develop completely       Review of Systems:  Review of Systems   Respiratory: Negative for shortness of breath.    Cardiovascular: Negative for chest pain, palpitations and leg swelling.   Psychiatric/Behavioral: Negative for self-injury and suicidal ideas.       Vital Signs:   /72 (BP Location: Right arm, Patient Position: Sitting, Cuff Size: Adult)   Pulse 94   Temp 98.2 °F (36.8 °C) (Temporal)   Resp 16   Ht 180.3 cm (71\")   Wt 100 kg (221 lb)   SpO2 95%   BMI 30.82 kg/m²      Physical Exam:  Physical Exam  Vitals and nursing note reviewed.   Constitutional:       General: He is not in acute distress.     Appearance: Normal appearance. He is not ill-appearing or toxic-appearing.   HENT:      Head: Normocephalic.   Cardiovascular:      Rate and Rhythm: Normal rate and regular rhythm.      Heart sounds: Normal heart sounds.   Pulmonary:      Effort: Pulmonary effort is normal. No respiratory distress.      Breath sounds: Normal breath sounds.   Skin:     General: Skin is warm and dry.      Coloration: Skin is not pale.   Neurological:      Mental Status: He is alert and oriented to person, place, and time. Mental status is at baseline.   Psychiatric:         Mood and Affect: Mood normal.         Behavior: Behavior normal.         Thought Content: Thought content normal.         Judgment: Judgment normal.                  Assessment and Plan   Diagnoses and all orders for this visit:    1. Essential hypertension  -     valsartan (Diovan) 320 MG tablet; Take 1 tablet by mouth Daily.  Dispense: 90 tablet; Refill: 0  -     amLODIPine (NORVASC) 10 MG tablet; Take 1 tablet by mouth Daily.  Dispense: 90 " tablet; Refill: 0  -     aspirin 81 MG EC tablet; Take 1 tablet by mouth Daily.  Dispense: 90 tablet; Refill: 0  -     hydroCHLOROthiazide (HYDRODIURIL) 25 MG tablet; Take 1 tablet by mouth Every Morning.  Dispense: 90 tablet; Refill: 0  -     potassium chloride (K-DUR,KLOR-CON) 20 MEQ CR tablet; Take 1 tablet by mouth 2 (Two) Times a Day.  Dispense: 60 tablet; Refill: 2    2. Mixed hyperlipidemia  -     aspirin 81 MG EC tablet; Take 1 tablet by mouth Daily.  Dispense: 90 tablet; Refill: 0  -     pravastatin (PRAVACHOL) 20 MG tablet; Take 1 tablet by mouth Daily.  Dispense: 90 tablet; Refill: 0    3. Reactive depression  -     DULoxetine (CYMBALTA) 30 MG capsule; Take 1 capsule by mouth Daily.  Dispense: 90 capsule; Refill: 0    4. Chronic pain syndrome  -     DULoxetine (CYMBALTA) 30 MG capsule; Take 1 capsule by mouth Daily.  Dispense: 90 capsule; Refill: 0  -     ibuprofen (ADVIL,MOTRIN) 800 MG tablet; Take 1 tablet by mouth Every 8 (Eight) Hours As Needed for Moderate Pain.  Dispense: 90 tablet; Refill: 0    5. Dermatitis  -     ketoconazole (NIZORAL) 2 % shampoo; Apply  topically to the appropriate area as directed 2 (Two) Times a Week.  Dispense: 120 mL; Refill: 2    6. Gastroesophageal reflux disease, unspecified whether esophagitis present  -     lansoprazole (PREVACID) 30 MG capsule; Take 1 capsule by mouth Daily.  Dispense: 90 capsule; Refill: 0    Monitor blood pressure, keep log, bring log to follow-up appointment, follow-up sooner for readings outside of established parameters.    Diet and lifestyle modifications to control conditions.    Discussed possible differential diagnoses, testing, treatment, recommended non-pharmacological interventions, risks, warning signs to monitor for that would indicate need for follow-up in clinic or ER. If no improvement with these regimens or you have new or worsening symptoms follow-up. Patient verbalizes understanding and agreement with plan of care. Denies further  needs or concerns.     Patient was given instructions and counseling regarding her condition and for health maintenance advised.    BMI is >= 30 and <35. (Class 1 Obesity). The following options were offered after discussion;: weight loss educational material (shared in after visit summary), exercise counseling/recommendations and nutrition counseling/recommendations       I spent 30 minutes caring for patient on this date of service. This time includes time spent by me in the following activities: preparing for the visit, reviewing tests, obtaining and/or reviewing a separately obtained history, performing a medically appropriate examination and/or evaluation, counseling and educating the patient/family/caregiver, ordering medications, tests, or procedures and documenting information in the medical record    Meds ordered during this visit:  New Medications Ordered This Visit   Medications   • valsartan (Diovan) 320 MG tablet     Sig: Take 1 tablet by mouth Daily.     Dispense:  90 tablet     Refill:  0   • amLODIPine (NORVASC) 10 MG tablet     Sig: Take 1 tablet by mouth Daily.     Dispense:  90 tablet     Refill:  0   • aspirin 81 MG EC tablet     Sig: Take 1 tablet by mouth Daily.     Dispense:  90 tablet     Refill:  0   • DULoxetine (CYMBALTA) 30 MG capsule     Sig: Take 1 capsule by mouth Daily.     Dispense:  90 capsule     Refill:  0   • hydroCHLOROthiazide (HYDRODIURIL) 25 MG tablet     Sig: Take 1 tablet by mouth Every Morning.     Dispense:  90 tablet     Refill:  0   • ibuprofen (ADVIL,MOTRIN) 800 MG tablet     Sig: Take 1 tablet by mouth Every 8 (Eight) Hours As Needed for Moderate Pain.     Dispense:  90 tablet     Refill:  0   • ketoconazole (NIZORAL) 2 % shampoo     Sig: Apply  topically to the appropriate area as directed 2 (Two) Times a Week.     Dispense:  120 mL     Refill:  2   • lansoprazole (PREVACID) 30 MG capsule     Sig: Take 1 capsule by mouth Daily.     Dispense:  90 capsule     Refill:   0   • potassium chloride (K-DUR,KLOR-CON) 20 MEQ CR tablet     Sig: Take 1 tablet by mouth 2 (Two) Times a Day.     Dispense:  60 tablet     Refill:  2   • pravastatin (PRAVACHOL) 20 MG tablet     Sig: Take 1 tablet by mouth Daily.     Dispense:  90 tablet     Refill:  0       Patient Instructions:  Patient instructions given for the following visit diagnosis:    ICD-10-CM ICD-9-CM   1. Essential hypertension  I10 401.9   2. Mixed hyperlipidemia  E78.2 272.2   3. Reactive depression  F32.9 300.4   4. Chronic pain syndrome  G89.4 338.4   5. Dermatitis  L30.9 692.9   6. Gastroesophageal reflux disease, unspecified whether esophagitis present  K21.9 530.81       Follow Up   Return in about 3 months (around 8/2/2023) for Recheck, Sooner if needed.        This document has been electronically signed by ENRICO Coon  May 2, 2023 11:38 EDT    Patient was given instructions and counseling regarding his condition or for health maintenance advice. Please see specific information pulled into the AVS if appropriate.     Part of this note may be an electronic transcription/translation of spoken language to printed text using the Dragon Dictation System.

## 2023-05-03 ENCOUNTER — TELEPHONE (OUTPATIENT)
Dept: FAMILY MEDICINE CLINIC | Facility: CLINIC | Age: 48
End: 2023-05-03
Payer: MEDICARE

## 2023-05-03 LAB
ALBUMIN SERPL-MCNC: 5.1 G/DL (ref 4–5)
ALBUMIN/GLOB SERPL: 1.5 {RATIO} (ref 1.2–2.2)
ALP SERPL-CCNC: 135 IU/L (ref 44–121)
ALT SERPL-CCNC: 158 IU/L (ref 0–44)
AST SERPL-CCNC: 188 IU/L (ref 0–40)
BILIRUB SERPL-MCNC: 0.5 MG/DL (ref 0–1.2)
BUN SERPL-MCNC: 15 MG/DL (ref 6–24)
BUN/CREAT SERPL: 15 (ref 9–20)
CALCIUM SERPL-MCNC: 9.8 MG/DL (ref 8.7–10.2)
CHLORIDE SERPL-SCNC: 98 MMOL/L (ref 96–106)
CHOLEST SERPL-MCNC: 224 MG/DL (ref 100–199)
CO2 SERPL-SCNC: 22 MMOL/L (ref 20–29)
CREAT SERPL-MCNC: 0.98 MG/DL (ref 0.76–1.27)
EGFRCR SERPLBLD CKD-EPI 2021: 96 ML/MIN/1.73
GLOBULIN SER CALC-MCNC: 3.5 G/DL (ref 1.5–4.5)
GLUCOSE SERPL-MCNC: 89 MG/DL (ref 70–99)
HDLC SERPL-MCNC: 80 MG/DL
LDLC SERPL CALC-MCNC: 124 MG/DL (ref 0–99)
POTASSIUM SERPL-SCNC: 4 MMOL/L (ref 3.5–5.2)
PROT SERPL-MCNC: 8.6 G/DL (ref 6–8.5)
SODIUM SERPL-SCNC: 140 MMOL/L (ref 134–144)
TRIGL SERPL-MCNC: 116 MG/DL (ref 0–149)
TSH SERPL DL<=0.005 MIU/L-ACNC: 0.77 UIU/ML (ref 0.45–4.5)
VLDLC SERPL CALC-MCNC: 20 MG/DL (ref 5–40)

## 2023-05-03 NOTE — TELEPHONE ENCOUNTER
----- Message from ENRICO Coon sent at 5/3/2023 10:28 AM EDT -----  Please call patient regarding results.     Hemoglobin A1c has improved. Great job!   Cholesterol is abnormal.    Continue to work on diet and exercise.  Liver enzymes have elevated.  Has he been drinking more alcohol?  If so, cut back on alcohol.   Have him hold his pravastatin for now as that can worsen liver enzymes.       Spoke with patient & he verbalized understanding,denies drinking any more than his usual but will cut back.

## 2023-05-04 NOTE — TELEPHONE ENCOUNTER
Yes, cut back on alcohol and continue to work on diet. Avoid tylenol. Please have him schedule a follow up with Carly in about 1 month to have these rechecked. Sooner if he develops any symptoms.                   Note: has refused further work up several times

## 2023-05-04 NOTE — TELEPHONE ENCOUNTER
Yes, cut back on alcohol and continue to work on diet. Avoid tylenol. Please have him schedule a follow up with Carly in about 1 month to have these rechecked. Sooner if he develops any symptoms.                   Note: has refused further work up several times       Left a message to return call.      Patient notified & verbalized understanding,FU scheduled.

## 2023-06-13 ENCOUNTER — OFFICE VISIT (OUTPATIENT)
Dept: NEUROSURGERY | Facility: CLINIC | Age: 48
End: 2023-06-13
Payer: COMMERCIAL

## 2023-06-13 VITALS — BODY MASS INDEX: 30.94 KG/M2 | WEIGHT: 221 LBS | HEIGHT: 71 IN | TEMPERATURE: 98 F

## 2023-06-13 DIAGNOSIS — M47.22 CERVICAL SPONDYLOSIS WITH RADICULOPATHY: Primary | ICD-10-CM

## 2023-06-13 DIAGNOSIS — M50.30 DDD (DEGENERATIVE DISC DISEASE), CERVICAL: ICD-10-CM

## 2023-06-13 PROCEDURE — 99203 OFFICE O/P NEW LOW 30 MIN: CPT | Performed by: NEUROLOGICAL SURGERY

## 2023-06-13 NOTE — PROGRESS NOTES
Patient: Taiwo Pierce  : 1975    Primary Care Provider: Carly Harp APRN    Requesting Provider: Dr. Efrain Blue        History    Chief Complaint: Neck and left upper extremity pain with sensory alteration.    History of Present Illness: Mr. Pierce is a 48-year-old gentleman who is known to our service.  I saw him in  for some neck pain with sensory alteration involving the left upper extremity.  In 2014 he underwent lumbar surgery by my former colleague Dr. Stratton.  He now describes a 2 to 3-month history of neck pain that extends down the left arm.  It does involve the shoulder blade.  He has sensory alteration that involves the left thumb, index finger, and middle finger.  Symptoms are worse with head turning to the left or extending his neck.  He is already on oxycodone and gabapentin for his chronic low back difficulties.  He has never smoked.    Review of Systems   Constitutional:  Positive for appetite change. Negative for activity change, chills, diaphoresis, fatigue, fever and unexpected weight change.   HENT:  Negative for congestion, dental problem, drooling, ear discharge, ear pain, facial swelling, hearing loss, mouth sores, nosebleeds, postnasal drip, rhinorrhea, sinus pressure, sinus pain, sneezing, sore throat, tinnitus, trouble swallowing and voice change.    Eyes:  Negative for photophobia, pain, discharge, redness, itching and visual disturbance.   Respiratory:  Negative for apnea, cough, choking, chest tightness, shortness of breath, wheezing and stridor.    Cardiovascular:  Negative for chest pain, palpitations and leg swelling.   Gastrointestinal:  Negative for abdominal distention, abdominal pain, anal bleeding, blood in stool, constipation, diarrhea, nausea, rectal pain and vomiting.   Endocrine: Negative for cold intolerance, heat intolerance, polydipsia, polyphagia and polyuria.   Genitourinary:  Negative for decreased urine volume, difficulty urinating,  "dysuria, enuresis, flank pain, frequency, genital sores, hematuria, penile discharge, penile pain, penile swelling, scrotal swelling, testicular pain and urgency.   Musculoskeletal:  Positive for back pain, neck pain and neck stiffness. Negative for arthralgias, gait problem, joint swelling and myalgias.   Skin:  Negative for color change, pallor, rash and wound.   Allergic/Immunologic: Negative for environmental allergies, food allergies and immunocompromised state.   Neurological:  Negative for dizziness, tremors, seizures, syncope, facial asymmetry, speech difficulty, weakness, light-headedness, numbness and headaches.   Hematological:  Negative for adenopathy. Does not bruise/bleed easily.   Psychiatric/Behavioral:  Negative for agitation, behavioral problems, confusion, decreased concentration, dysphoric mood, hallucinations, self-injury, sleep disturbance and suicidal ideas. The patient is not nervous/anxious and is not hyperactive.      The patient's past medical history, past surgical history, family history, and social history have been reviewed at length in the electronic medical record.      Physical Exam:   Temp 98 °F (36.7 °C)   Ht 180.3 cm (71\")   Wt 100 kg (221 lb)   BMI 30.82 kg/m²   CONSTITUTIONAL: Patient is well-nourished, pleasant and appears stated age.  MUSCULOSKELETAL:  Neck tenderness to palpation is not observed.   ROM in the neck is limited in all directions.  NEUROLOGICAL:  Orientation, memory, attention span, language function, and cognition have been examined and are intact.  Strength is intact in the upper and lower extremities to direct testing.  Muscle tone is normal throughout.  Station and gait are normal limping.  He utilizes a cane.  Sensation is intact to light touch testing throughout.  Deep tendon reflexes are 1+ and symmetrical.  Marily's Sign is negative bilaterally. No clonus is elicited.  Coordination is intact.      Medical Decision Making    Data Review:   (All " imaging studies were personally reviewed unless stated otherwise)  MRI of the cervical spine dated 4/24/2023 demonstrates some loss of the normal lordosis.  There is some degenerative disc disease.  There is broad-based disc-osteophyte that narrows the recesses, left greater than right at C5-6.  There is some soft disc herniation leftward at C6-7 that likely compromises the C7 nerve root.    Diagnosis:   Left C6/7 radiculopathy due to spondylosis and disc protrusion.    Treatment Options:   I have referred the patient for physical therapy.  He will follow-up in several weeks.  If he is not doing better then I would recommend ACDF C5-7.       Diagnosis Plan   1. Cervical spondylosis with radiculopathy        2. DDD (degenerative disc disease), cervical            Scribed for Julio C Bryson MD by Sofiya Sahni CALLY 6/13/2023 11:10 EDT      I, Dr. Bryson, personally performed the services described in the documentation, as scribed in my presence, and it is both accurate and complete.

## 2023-07-11 PROBLEM — M47.22 CERVICAL SPONDYLOSIS WITH RADICULOPATHY: Status: ACTIVE | Noted: 2023-07-11

## 2023-08-02 ENCOUNTER — OFFICE VISIT (OUTPATIENT)
Dept: FAMILY MEDICINE CLINIC | Facility: CLINIC | Age: 48
End: 2023-08-02
Payer: MEDICARE

## 2023-08-02 VITALS
HEART RATE: 90 BPM | OXYGEN SATURATION: 95 % | WEIGHT: 211.2 LBS | DIASTOLIC BLOOD PRESSURE: 80 MMHG | BODY MASS INDEX: 29.57 KG/M2 | HEIGHT: 71 IN | TEMPERATURE: 98.2 F | SYSTOLIC BLOOD PRESSURE: 134 MMHG

## 2023-08-02 DIAGNOSIS — Z12.11 SCREEN FOR COLON CANCER: ICD-10-CM

## 2023-08-02 DIAGNOSIS — G89.4 CHRONIC PAIN SYNDROME: ICD-10-CM

## 2023-08-02 DIAGNOSIS — K21.9 GASTROESOPHAGEAL REFLUX DISEASE, UNSPECIFIED WHETHER ESOPHAGITIS PRESENT: ICD-10-CM

## 2023-08-02 DIAGNOSIS — I10 ESSENTIAL HYPERTENSION: Primary | ICD-10-CM

## 2023-08-02 DIAGNOSIS — F32.9 REACTIVE DEPRESSION: ICD-10-CM

## 2023-08-02 DIAGNOSIS — E78.2 MIXED HYPERLIPIDEMIA: ICD-10-CM

## 2023-08-02 PROCEDURE — 99214 OFFICE O/P EST MOD 30 MIN: CPT | Performed by: FAMILY MEDICINE

## 2023-08-02 RX ORDER — SILDENAFIL 25 MG/1
25 TABLET, FILM COATED ORAL DAILY PRN
COMMUNITY
End: 2023-08-02 | Stop reason: SDUPTHER

## 2023-08-02 RX ORDER — IBUPROFEN 800 MG/1
800 TABLET ORAL EVERY 8 HOURS PRN
Qty: 90 TABLET | Refills: 0 | Status: SHIPPED | OUTPATIENT
Start: 2023-08-02

## 2023-08-02 RX ORDER — PRAVASTATIN SODIUM 20 MG
20 TABLET ORAL DAILY
Qty: 90 TABLET | Refills: 0 | Status: SHIPPED | OUTPATIENT
Start: 2023-08-02

## 2023-08-02 RX ORDER — LANSOPRAZOLE 30 MG/1
30 CAPSULE, DELAYED RELEASE ORAL DAILY
Qty: 90 CAPSULE | Refills: 0 | Status: SHIPPED | OUTPATIENT
Start: 2023-08-02

## 2023-08-02 RX ORDER — SILDENAFIL 25 MG/1
25 TABLET, FILM COATED ORAL DAILY PRN
Qty: 30 TABLET | Refills: 2 | Status: SHIPPED | OUTPATIENT
Start: 2023-08-02

## 2023-08-02 RX ORDER — HYDROCHLOROTHIAZIDE 25 MG/1
25 TABLET ORAL EVERY MORNING
Qty: 90 TABLET | Refills: 0 | Status: SHIPPED | OUTPATIENT
Start: 2023-08-02

## 2023-08-02 RX ORDER — POTASSIUM CHLORIDE 20 MEQ/1
20 TABLET, EXTENDED RELEASE ORAL 2 TIMES DAILY
Qty: 60 TABLET | Refills: 2 | Status: SHIPPED | OUTPATIENT
Start: 2023-08-02

## 2023-08-02 RX ORDER — ASPIRIN 81 MG/1
81 TABLET ORAL DAILY
Qty: 90 TABLET | Refills: 0 | Status: SHIPPED | OUTPATIENT
Start: 2023-08-02

## 2023-08-02 RX ORDER — DULOXETIN HYDROCHLORIDE 30 MG/1
30 CAPSULE, DELAYED RELEASE ORAL DAILY
Qty: 90 CAPSULE | Refills: 0 | Status: SHIPPED | OUTPATIENT
Start: 2023-08-02

## 2023-08-02 RX ORDER — AMLODIPINE BESYLATE 10 MG/1
10 TABLET ORAL DAILY
Qty: 90 TABLET | Refills: 0 | Status: SHIPPED | OUTPATIENT
Start: 2023-08-02

## 2023-08-02 RX ORDER — VALSARTAN 320 MG/1
320 TABLET ORAL DAILY
Qty: 90 TABLET | Refills: 0 | Status: SHIPPED | OUTPATIENT
Start: 2023-08-02

## 2023-08-07 ENCOUNTER — TELEPHONE (OUTPATIENT)
Dept: NEUROSURGERY | Facility: CLINIC | Age: 48
End: 2023-08-07
Payer: COMMERCIAL

## 2023-08-07 NOTE — TELEPHONE ENCOUNTER
Caller: PAULINE    Relationship: WC     Best call back number: [phone]    What form or medical record are you requesting: SURGERY UPDATES    Who is requesting this form or medical record from you: EMPLOYER    Timeframe paperwork needed: ASAP    Additional notes: PAULINE HAS BEEN CALLING THE SURGERY LINE AND NO ONE ANSWERS. SHE NEEDS TO CONFIRM SURGERY TIME--HOW LONG PT STAYS IN HOSPITAL, HOW LONG OFF WORK AND TREATMENT PLAN AFTER SURGERY.     PLEASE CALL HER BACK TO DISCUSS    THANK YOU,

## 2023-08-07 NOTE — TELEPHONE ENCOUNTER
Tracie was not available at callback, but left her a detailed message on her questions and our phone number if she were to have any further questions.

## 2023-08-14 ENCOUNTER — PRE-ADMISSION TESTING (OUTPATIENT)
Dept: PREADMISSION TESTING | Facility: HOSPITAL | Age: 48
End: 2023-08-14
Payer: COMMERCIAL

## 2023-08-14 VITALS — BODY MASS INDEX: 29.34 KG/M2 | WEIGHT: 209.55 LBS | HEIGHT: 71 IN

## 2023-08-14 DIAGNOSIS — M47.22 CERVICAL SPONDYLOSIS WITH RADICULOPATHY: ICD-10-CM

## 2023-08-14 LAB
DEPRECATED RDW RBC AUTO: 47.3 FL (ref 37–54)
ERYTHROCYTE [DISTWIDTH] IN BLOOD BY AUTOMATED COUNT: 13.2 % (ref 12.3–15.4)
HCT VFR BLD AUTO: 45.1 % (ref 37.5–51)
HGB BLD-MCNC: 15.4 G/DL (ref 13–17.7)
MCH RBC QN AUTO: 33 PG (ref 26.6–33)
MCHC RBC AUTO-ENTMCNC: 34.1 G/DL (ref 31.5–35.7)
MCV RBC AUTO: 96.6 FL (ref 79–97)
PLATELET # BLD AUTO: 304 10*3/MM3 (ref 140–450)
PMV BLD AUTO: 10.6 FL (ref 6–12)
POTASSIUM SERPL-SCNC: 3.5 MMOL/L (ref 3.5–5.2)
RBC # BLD AUTO: 4.67 10*6/MM3 (ref 4.14–5.8)
WBC NRBC COR # BLD: 8.3 10*3/MM3 (ref 3.4–10.8)

## 2023-08-14 PROCEDURE — 85027 COMPLETE CBC AUTOMATED: CPT

## 2023-08-14 PROCEDURE — 93005 ELECTROCARDIOGRAM TRACING: CPT

## 2023-08-14 PROCEDURE — 36415 COLL VENOUS BLD VENIPUNCTURE: CPT

## 2023-08-14 PROCEDURE — 84132 ASSAY OF SERUM POTASSIUM: CPT

## 2023-08-14 PROCEDURE — 87081 CULTURE SCREEN ONLY: CPT

## 2023-08-14 NOTE — PAT
An arrival time for procedure was not provided during PAT visit. If patient had any questions or concerns about their arrival time, they were instructed to contact their surgeon/physician.  Additionally, if the patient referred to an arrival time that was acquired from their my chart account, patient was encouraged to verify that time with their surgeon/physician. Arrival times are NOT provided in Pre Admission Testing Department.    Patient to apply Chlorhexadine wipes  to surgical area (as instructed) the night before procedure and the AM of procedure. Wipes provided.    Patient instructed to drink 20 ounces of Gatorade and it needs to be completed 1 hour (for Main OR patients) or 2 hours (scheduled  section & [facility] patients) before given arrival time for procedure (NO RED Gatorade)    Patient verbalized understanding.    Per Anesthesia Request, patient instructed not to take their ACE/ARB medications on the AM of surgery.    Patient viewed general PAT education video as instructed in their preoperative information received from their surgeon.  Patient stated the general PAT education video was viewed in its entirety and survey completed.  Copies of PAT general education handouts (Incentive Spirometry, Meds to Beds Program, Patient Belongings, Pre-op skin preparation instructions, Blood Glucose testing, Visitor policy, Surgery FAQ, Code H) distributed to patient if not printed. Education related to the PAT pass and skin preparation for surgery (if applicable) completed in PAT as a reinforcement to PAT education video. Patient instructed to return PAT pass provided today as well as completed skin preparation sheet (if applicable) on the day of procedure.     Additionally if patient had not viewed video yet but intended to view it at home or in our waiting area, then referred them to the handout with QR code/link provided during PAT visit.  Instructed patient to complete survey after viewing the video in  its entirety.  Encouraged patient/family to read [facility] general education handouts thoroughly and notify [facility] staff with any questions or concerns. Patient verbalized understanding of all information and priority content.    EKG ON CHART AND IN EPIC FROM 08/14/2023

## 2023-08-15 LAB — MRSA SPEC QL CULT: NORMAL

## 2023-08-18 LAB
QT INTERVAL: 408 MS
QTC INTERVAL: 440 MS

## 2023-08-20 ENCOUNTER — ANESTHESIA EVENT (OUTPATIENT)
Dept: PERIOP | Facility: HOSPITAL | Age: 48
End: 2023-08-20
Payer: OTHER MISCELLANEOUS

## 2023-08-20 RX ORDER — SODIUM CHLORIDE 0.9 % (FLUSH) 0.9 %
10 SYRINGE (ML) INJECTION EVERY 12 HOURS SCHEDULED
Status: CANCELLED | OUTPATIENT
Start: 2023-08-20

## 2023-08-20 RX ORDER — SODIUM CHLORIDE 0.9 % (FLUSH) 0.9 %
10 SYRINGE (ML) INJECTION AS NEEDED
Status: CANCELLED | OUTPATIENT
Start: 2023-08-20

## 2023-08-20 RX ORDER — SODIUM CHLORIDE 9 MG/ML
40 INJECTION, SOLUTION INTRAVENOUS AS NEEDED
Status: CANCELLED | OUTPATIENT
Start: 2023-08-20

## 2023-08-21 ENCOUNTER — APPOINTMENT (OUTPATIENT)
Dept: GENERAL RADIOLOGY | Facility: HOSPITAL | Age: 48
End: 2023-08-21
Payer: OTHER MISCELLANEOUS

## 2023-08-21 ENCOUNTER — TELEPHONE (OUTPATIENT)
Dept: NEUROSURGERY | Facility: CLINIC | Age: 48
End: 2023-08-21

## 2023-08-21 ENCOUNTER — HOSPITAL ENCOUNTER (OUTPATIENT)
Facility: HOSPITAL | Age: 48
Discharge: HOME | End: 2023-08-21
Attending: NEUROLOGICAL SURGERY | Admitting: NEUROLOGICAL SURGERY
Payer: OTHER MISCELLANEOUS

## 2023-08-21 ENCOUNTER — ANESTHESIA (OUTPATIENT)
Dept: PERIOP | Facility: HOSPITAL | Age: 48
End: 2023-08-21
Payer: OTHER MISCELLANEOUS

## 2023-08-21 VITALS
DIASTOLIC BLOOD PRESSURE: 91 MMHG | TEMPERATURE: 97.6 F | RESPIRATION RATE: 18 BRPM | HEART RATE: 68 BPM | WEIGHT: 206 LBS | OXYGEN SATURATION: 93 % | SYSTOLIC BLOOD PRESSURE: 150 MMHG | BODY MASS INDEX: 28.84 KG/M2 | HEIGHT: 71 IN

## 2023-08-21 DIAGNOSIS — M47.22 CERVICAL SPONDYLOSIS WITH RADICULOPATHY: ICD-10-CM

## 2023-08-21 DIAGNOSIS — Z98.1 S/P CERVICAL SPINAL FUSION: Primary | ICD-10-CM

## 2023-08-21 PROCEDURE — 63710000001 OXYCODONE 10 MG TABLET: Performed by: NEUROLOGICAL SURGERY

## 2023-08-21 PROCEDURE — 25010000002 DROPERIDOL PER 5 MG

## 2023-08-21 PROCEDURE — 25010000002 DEXAMETHASONE PER 1 MG

## 2023-08-21 PROCEDURE — 22845 INSERT SPINE FIXATION DEVICE: CPT | Performed by: NEUROLOGICAL SURGERY

## 2023-08-21 PROCEDURE — A9270 NON-COVERED ITEM OR SERVICE: HCPCS | Performed by: NEUROLOGICAL SURGERY

## 2023-08-21 PROCEDURE — 22551 ARTHRD ANT NTRBDY CERVICAL: CPT | Performed by: NEUROLOGICAL SURGERY

## 2023-08-21 PROCEDURE — 22552 ARTHRD ANT NTRBD CERVICAL EA: CPT | Performed by: NEUROLOGICAL SURGERY

## 2023-08-21 PROCEDURE — C1713 ANCHOR/SCREW BN/BN,TIS/BN: HCPCS | Performed by: NEUROLOGICAL SURGERY

## 2023-08-21 PROCEDURE — 25010000002 FENTANYL CITRATE (PF) 100 MCG/2ML SOLUTION

## 2023-08-21 PROCEDURE — 25010000002 ONDANSETRON PER 1 MG

## 2023-08-21 PROCEDURE — 25010000002 HYDROMORPHONE 1 MG/ML SOLUTION

## 2023-08-21 PROCEDURE — 22845 INSERT SPINE FIXATION DEVICE: CPT | Performed by: PHYSICIAN ASSISTANT

## 2023-08-21 PROCEDURE — 76000 FLUOROSCOPY <1 HR PHYS/QHP: CPT

## 2023-08-21 PROCEDURE — 22552 ARTHRD ANT NTRBD CERVICAL EA: CPT | Performed by: PHYSICIAN ASSISTANT

## 2023-08-21 PROCEDURE — 63710000001 FAMOTIDINE 20 MG TABLET: Performed by: NEUROLOGICAL SURGERY

## 2023-08-21 PROCEDURE — 20931 SP BONE ALGRFT STRUCT ADD-ON: CPT | Performed by: NEUROLOGICAL SURGERY

## 2023-08-21 PROCEDURE — S0260 H&P FOR SURGERY: HCPCS | Performed by: NURSE PRACTITIONER

## 2023-08-21 PROCEDURE — 25010000002 CEFAZOLIN IN DEXTROSE 2000 MG/ 100 ML SOLUTION: Performed by: NEUROLOGICAL SURGERY

## 2023-08-21 PROCEDURE — 25010000002 FENTANYL CITRATE (PF) 50 MCG/ML SOLUTION

## 2023-08-21 PROCEDURE — 25010000002 PROPOFOL 10 MG/ML EMULSION

## 2023-08-21 PROCEDURE — 22551 ARTHRD ANT NTRBDY CERVICAL: CPT | Performed by: PHYSICIAN ASSISTANT

## 2023-08-21 PROCEDURE — 25010000002 SUGAMMADEX 200 MG/2ML SOLUTION

## 2023-08-21 DEVICE — HEMOST ABS SURGIFOAM SZ100 8X12 10MM: Type: IMPLANTABLE DEVICE | Site: SPINE CERVICAL | Status: FUNCTIONAL

## 2023-08-21 DEVICE — PLATE 3002035 ZEVO 35MM 2 LVL
Type: IMPLANTABLE DEVICE | Site: SPINE CERVICAL | Status: FUNCTIONAL
Brand: ZEVO™ ANTERIOR CERVICAL PLATE SYSTEM

## 2023-08-21 DEVICE — BONE LORDOTIC ASR 6X14X11 FZD: Type: IMPLANTABLE DEVICE | Site: SPINE CERVICAL | Status: FUNCTIONAL

## 2023-08-21 DEVICE — FLOSEAL WITH RECOTHROM - 10ML.
Type: IMPLANTABLE DEVICE | Site: SPINE CERVICAL | Status: FUNCTIONAL
Brand: FLOSEAL HEMOSTATIC MATRIX

## 2023-08-21 DEVICE — CLIP LIGAT VASC HORIZON TI MD BLU 6CT: Type: IMPLANTABLE DEVICE | Site: NECK | Status: FUNCTIONAL

## 2023-08-21 RX ORDER — SODIUM CHLORIDE 9 MG/ML
40 INJECTION, SOLUTION INTRAVENOUS AS NEEDED
Status: DISCONTINUED | OUTPATIENT
Start: 2023-08-21 | End: 2023-08-21 | Stop reason: HOSPADM

## 2023-08-21 RX ORDER — SODIUM CHLORIDE 9 MG/ML
INJECTION, SOLUTION INTRAVENOUS AS NEEDED
Status: DISCONTINUED | OUTPATIENT
Start: 2023-08-21 | End: 2023-08-21 | Stop reason: HOSPADM

## 2023-08-21 RX ORDER — PROPOFOL 10 MG/ML
VIAL (ML) INTRAVENOUS AS NEEDED
Status: DISCONTINUED | OUTPATIENT
Start: 2023-08-21 | End: 2023-08-21 | Stop reason: SURG

## 2023-08-21 RX ORDER — NALOXONE HCL 0.4 MG/ML
0.4 VIAL (ML) INJECTION AS NEEDED
Status: DISCONTINUED | OUTPATIENT
Start: 2023-08-21 | End: 2023-08-21 | Stop reason: HOSPADM

## 2023-08-21 RX ORDER — FAMOTIDINE 20 MG/1
20 TABLET, FILM COATED ORAL
Status: COMPLETED | OUTPATIENT
Start: 2023-08-21 | End: 2023-08-21

## 2023-08-21 RX ORDER — MAGNESIUM HYDROXIDE 1200 MG/15ML
LIQUID ORAL AS NEEDED
Status: DISCONTINUED | OUTPATIENT
Start: 2023-08-21 | End: 2023-08-21 | Stop reason: HOSPADM

## 2023-08-21 RX ORDER — HYDRALAZINE HYDROCHLORIDE 20 MG/ML
5 INJECTION INTRAMUSCULAR; INTRAVENOUS
Status: DISCONTINUED | OUTPATIENT
Start: 2023-08-21 | End: 2023-08-21 | Stop reason: HOSPADM

## 2023-08-21 RX ORDER — DEXMEDETOMIDINE HYDROCHLORIDE 100 UG/ML
INJECTION, SOLUTION INTRAVENOUS AS NEEDED
Status: DISCONTINUED | OUTPATIENT
Start: 2023-08-21 | End: 2023-08-21 | Stop reason: SURG

## 2023-08-21 RX ORDER — SODIUM CHLORIDE 0.9 % (FLUSH) 0.9 %
3-10 SYRINGE (ML) INJECTION AS NEEDED
Status: DISCONTINUED | OUTPATIENT
Start: 2023-08-21 | End: 2023-08-21 | Stop reason: HOSPADM

## 2023-08-21 RX ORDER — LIDOCAINE HYDROCHLORIDE 10 MG/ML
INJECTION, SOLUTION EPIDURAL; INFILTRATION; INTRACAUDAL; PERINEURAL AS NEEDED
Status: DISCONTINUED | OUTPATIENT
Start: 2023-08-21 | End: 2023-08-21 | Stop reason: SURG

## 2023-08-21 RX ORDER — DROPERIDOL 2.5 MG/ML
0.62 INJECTION, SOLUTION INTRAMUSCULAR; INTRAVENOUS ONCE AS NEEDED
Status: DISCONTINUED | OUTPATIENT
Start: 2023-08-21 | End: 2023-08-21 | Stop reason: HOSPADM

## 2023-08-21 RX ORDER — PROMETHAZINE HYDROCHLORIDE 25 MG/1
25 TABLET ORAL ONCE AS NEEDED
Status: DISCONTINUED | OUTPATIENT
Start: 2023-08-21 | End: 2023-08-21 | Stop reason: HOSPADM

## 2023-08-21 RX ORDER — FENTANYL CITRATE 50 UG/ML
INJECTION, SOLUTION INTRAMUSCULAR; INTRAVENOUS
Status: COMPLETED
Start: 2023-08-21 | End: 2023-08-21

## 2023-08-21 RX ORDER — ONDANSETRON 2 MG/ML
4 INJECTION INTRAMUSCULAR; INTRAVENOUS ONCE AS NEEDED
Status: DISCONTINUED | OUTPATIENT
Start: 2023-08-21 | End: 2023-08-21 | Stop reason: HOSPADM

## 2023-08-21 RX ORDER — ROCURONIUM BROMIDE 10 MG/ML
INJECTION, SOLUTION INTRAVENOUS AS NEEDED
Status: DISCONTINUED | OUTPATIENT
Start: 2023-08-21 | End: 2023-08-21 | Stop reason: SURG

## 2023-08-21 RX ORDER — IPRATROPIUM BROMIDE AND ALBUTEROL SULFATE 2.5; .5 MG/3ML; MG/3ML
3 SOLUTION RESPIRATORY (INHALATION) ONCE AS NEEDED
Status: DISCONTINUED | OUTPATIENT
Start: 2023-08-21 | End: 2023-08-21 | Stop reason: HOSPADM

## 2023-08-21 RX ORDER — EPHEDRINE SULFATE 50 MG/ML
INJECTION INTRAVENOUS AS NEEDED
Status: DISCONTINUED | OUTPATIENT
Start: 2023-08-21 | End: 2023-08-21 | Stop reason: SURG

## 2023-08-21 RX ORDER — PROMETHAZINE HYDROCHLORIDE 25 MG/1
25 SUPPOSITORY RECTAL ONCE AS NEEDED
Status: DISCONTINUED | OUTPATIENT
Start: 2023-08-21 | End: 2023-08-21 | Stop reason: HOSPADM

## 2023-08-21 RX ORDER — HYDROCODONE BITARTRATE AND ACETAMINOPHEN 5; 325 MG/1; MG/1
1 TABLET ORAL ONCE AS NEEDED
Status: DISCONTINUED | OUTPATIENT
Start: 2023-08-21 | End: 2023-08-21 | Stop reason: HOSPADM

## 2023-08-21 RX ORDER — OXYCODONE HYDROCHLORIDE 10 MG/1
10 TABLET ORAL ONCE
Status: COMPLETED | OUTPATIENT
Start: 2023-08-21 | End: 2023-08-21

## 2023-08-21 RX ORDER — CEFAZOLIN SODIUM 2 G/100ML
2 INJECTION, SOLUTION INTRAVENOUS ONCE
Status: COMPLETED | OUTPATIENT
Start: 2023-08-21 | End: 2023-08-21

## 2023-08-21 RX ORDER — LIDOCAINE HYDROCHLORIDE 10 MG/ML
0.5 INJECTION, SOLUTION EPIDURAL; INFILTRATION; INTRACAUDAL; PERINEURAL ONCE AS NEEDED
Status: COMPLETED | OUTPATIENT
Start: 2023-08-21 | End: 2023-08-21

## 2023-08-21 RX ORDER — LABETALOL HYDROCHLORIDE 5 MG/ML
5 INJECTION, SOLUTION INTRAVENOUS
Status: DISCONTINUED | OUTPATIENT
Start: 2023-08-21 | End: 2023-08-21 | Stop reason: HOSPADM

## 2023-08-21 RX ORDER — HYDROMORPHONE HYDROCHLORIDE 1 MG/ML
0.5 INJECTION, SOLUTION INTRAMUSCULAR; INTRAVENOUS; SUBCUTANEOUS
Status: DISCONTINUED | OUTPATIENT
Start: 2023-08-21 | End: 2023-08-21 | Stop reason: HOSPADM

## 2023-08-21 RX ORDER — MIDAZOLAM HYDROCHLORIDE 1 MG/ML
1 INJECTION INTRAMUSCULAR; INTRAVENOUS
Status: DISCONTINUED | OUTPATIENT
Start: 2023-08-21 | End: 2023-08-21 | Stop reason: HOSPADM

## 2023-08-21 RX ORDER — FENTANYL CITRATE 50 UG/ML
50 INJECTION, SOLUTION INTRAMUSCULAR; INTRAVENOUS
Status: DISCONTINUED | OUTPATIENT
Start: 2023-08-21 | End: 2023-08-21 | Stop reason: HOSPADM

## 2023-08-21 RX ORDER — DROPERIDOL 2.5 MG/ML
0.62 INJECTION, SOLUTION INTRAMUSCULAR; INTRAVENOUS
Status: DISCONTINUED | OUTPATIENT
Start: 2023-08-21 | End: 2023-08-21 | Stop reason: HOSPADM

## 2023-08-21 RX ORDER — FENTANYL CITRATE 50 UG/ML
INJECTION, SOLUTION INTRAMUSCULAR; INTRAVENOUS AS NEEDED
Status: DISCONTINUED | OUTPATIENT
Start: 2023-08-21 | End: 2023-08-21 | Stop reason: SURG

## 2023-08-21 RX ORDER — DEXAMETHASONE SODIUM PHOSPHATE 4 MG/ML
INJECTION, SOLUTION INTRA-ARTICULAR; INTRALESIONAL; INTRAMUSCULAR; INTRAVENOUS; SOFT TISSUE AS NEEDED
Status: DISCONTINUED | OUTPATIENT
Start: 2023-08-21 | End: 2023-08-21 | Stop reason: SURG

## 2023-08-21 RX ORDER — MEPERIDINE HYDROCHLORIDE 25 MG/ML
12.5 INJECTION INTRAMUSCULAR; INTRAVENOUS; SUBCUTANEOUS
Status: DISCONTINUED | OUTPATIENT
Start: 2023-08-21 | End: 2023-08-21 | Stop reason: HOSPADM

## 2023-08-21 RX ORDER — ONDANSETRON 2 MG/ML
INJECTION INTRAMUSCULAR; INTRAVENOUS AS NEEDED
Status: DISCONTINUED | OUTPATIENT
Start: 2023-08-21 | End: 2023-08-21 | Stop reason: SURG

## 2023-08-21 RX ORDER — DROPERIDOL 2.5 MG/ML
INJECTION, SOLUTION INTRAMUSCULAR; INTRAVENOUS
Status: COMPLETED
Start: 2023-08-21 | End: 2023-08-21

## 2023-08-21 RX ORDER — SODIUM CHLORIDE, SODIUM LACTATE, POTASSIUM CHLORIDE, CALCIUM CHLORIDE 600; 310; 30; 20 MG/100ML; MG/100ML; MG/100ML; MG/100ML
9 INJECTION, SOLUTION INTRAVENOUS CONTINUOUS
Status: DISCONTINUED | OUTPATIENT
Start: 2023-08-21 | End: 2023-08-21 | Stop reason: HOSPADM

## 2023-08-21 RX ORDER — SODIUM CHLORIDE 0.9 % (FLUSH) 0.9 %
3 SYRINGE (ML) INJECTION EVERY 12 HOURS SCHEDULED
Status: DISCONTINUED | OUTPATIENT
Start: 2023-08-21 | End: 2023-08-21 | Stop reason: HOSPADM

## 2023-08-21 RX ADMIN — FENTANYL CITRATE 50 MCG: 50 INJECTION, SOLUTION INTRAMUSCULAR; INTRAVENOUS at 09:57

## 2023-08-21 RX ADMIN — FENTANYL CITRATE 50 MCG: 50 INJECTION, SOLUTION INTRAMUSCULAR; INTRAVENOUS at 10:58

## 2023-08-21 RX ADMIN — OXYCODONE HYDROCHLORIDE 10 MG: 10 TABLET ORAL at 12:11

## 2023-08-21 RX ADMIN — DEXMEDETOMIDINE HYDROCHLORIDE 8 MCG: 100 INJECTION, SOLUTION INTRAVENOUS at 06:53

## 2023-08-21 RX ADMIN — DEXMEDETOMIDINE HYDROCHLORIDE 4 MCG: 100 INJECTION, SOLUTION INTRAVENOUS at 07:00

## 2023-08-21 RX ADMIN — FENTANYL CITRATE 50 MCG: 50 INJECTION, SOLUTION INTRAMUSCULAR; INTRAVENOUS at 10:21

## 2023-08-21 RX ADMIN — FENTANYL CITRATE 50 MCG: 50 INJECTION, SOLUTION INTRAMUSCULAR; INTRAVENOUS at 07:19

## 2023-08-21 RX ADMIN — SUGAMMADEX 200 MG: 100 INJECTION, SOLUTION INTRAVENOUS at 09:27

## 2023-08-21 RX ADMIN — HYDROMORPHONE HYDROCHLORIDE 0.5 MG: 1 INJECTION, SOLUTION INTRAMUSCULAR; INTRAVENOUS; SUBCUTANEOUS at 10:36

## 2023-08-21 RX ADMIN — FENTANYL CITRATE 25 MCG: 50 INJECTION, SOLUTION INTRAMUSCULAR; INTRAVENOUS at 08:20

## 2023-08-21 RX ADMIN — SODIUM CHLORIDE, POTASSIUM CHLORIDE, SODIUM LACTATE AND CALCIUM CHLORIDE 9 ML/HR: 600; 310; 30; 20 INJECTION, SOLUTION INTRAVENOUS at 06:15

## 2023-08-21 RX ADMIN — LIDOCAINE HYDROCHLORIDE 50 MG: 10 INJECTION, SOLUTION EPIDURAL; INFILTRATION; INTRACAUDAL; PERINEURAL at 07:00

## 2023-08-21 RX ADMIN — DEXMEDETOMIDINE HYDROCHLORIDE 4 MCG: 100 INJECTION, SOLUTION INTRAVENOUS at 08:53

## 2023-08-21 RX ADMIN — FENTANYL CITRATE 25 MCG: 50 INJECTION, SOLUTION INTRAMUSCULAR; INTRAVENOUS at 09:30

## 2023-08-21 RX ADMIN — EPHEDRINE SULFATE 5 MG: 50 INJECTION INTRAVENOUS at 08:00

## 2023-08-21 RX ADMIN — DROPERIDOL 0.62 MG: 2.5 INJECTION, SOLUTION INTRAMUSCULAR; INTRAVENOUS at 09:57

## 2023-08-21 RX ADMIN — PROPOFOL 250 MG: 10 INJECTION, EMULSION INTRAVENOUS at 07:00

## 2023-08-21 RX ADMIN — DEXMEDETOMIDINE HYDROCHLORIDE 4 MCG: 100 INJECTION, SOLUTION INTRAVENOUS at 08:20

## 2023-08-21 RX ADMIN — DEXMEDETOMIDINE HYDROCHLORIDE 4 MCG: 100 INJECTION, SOLUTION INTRAVENOUS at 09:02

## 2023-08-21 RX ADMIN — CEFAZOLIN SODIUM 2 G: 2 INJECTION, SOLUTION INTRAVENOUS at 07:03

## 2023-08-21 RX ADMIN — DEXMEDETOMIDINE HYDROCHLORIDE 4 MCG: 100 INJECTION, SOLUTION INTRAVENOUS at 08:51

## 2023-08-21 RX ADMIN — ONDANSETRON 4 MG: 2 INJECTION INTRAMUSCULAR; INTRAVENOUS at 06:53

## 2023-08-21 RX ADMIN — FAMOTIDINE 20 MG: 20 TABLET ORAL at 06:20

## 2023-08-21 RX ADMIN — DEXMEDETOMIDINE HYDROCHLORIDE 8 MCG: 100 INJECTION, SOLUTION INTRAVENOUS at 07:19

## 2023-08-21 RX ADMIN — DEXMEDETOMIDINE HYDROCHLORIDE 4 MCG: 100 INJECTION, SOLUTION INTRAVENOUS at 08:28

## 2023-08-21 RX ADMIN — LIDOCAINE HYDROCHLORIDE 0.5 ML: 10 INJECTION, SOLUTION EPIDURAL; INFILTRATION; INTRACAUDAL; PERINEURAL at 06:15

## 2023-08-21 RX ADMIN — ROCURONIUM BROMIDE 100 MG: 10 SOLUTION INTRAVENOUS at 07:00

## 2023-08-21 RX ADMIN — FENTANYL CITRATE 100 MCG: 50 INJECTION, SOLUTION INTRAMUSCULAR; INTRAVENOUS at 07:00

## 2023-08-21 RX ADMIN — DEXAMETHASONE SODIUM PHOSPHATE 10 MG: 4 INJECTION, SOLUTION INTRAMUSCULAR; INTRAVENOUS at 07:04

## 2023-08-21 RX ADMIN — ROCURONIUM BROMIDE 10 MG: 10 SOLUTION INTRAVENOUS at 08:20

## 2023-08-21 NOTE — ANESTHESIA PROCEDURE NOTES
Airway  Urgency: elective    Date/Time: 8/21/2023 7:02 AM  Airway not difficult    General Information and Staff    Patient location during procedure: OR    Indications and Patient Condition  Indications for airway management: airway protection    Preoxygenated: yes  MILS not maintained throughout  Mask difficulty assessment: 2 - vent by mask + OA or adjuvant +/- NMBA    Final Airway Details  Final airway type: endotracheal airway      Successful airway: ETT  Cuffed: yes   Successful intubation technique: direct laryngoscopy and video laryngoscopy  Endotracheal tube insertion site: oral  Blade: Glasgow  Blade size: 4  ETT size (mm): 7.5  Cormack-Lehane Classification: grade I - full view of glottis  Placement verified by: chest auscultation and capnometry   Cuff volume (mL): 5  Measured from: lips  ETT/EBT  to lips (cm): 21  Number of attempts at approach: 1  Assessment: lips, teeth, and gum same as pre-op and atraumatic intubation    Additional Comments  Negative epigastric sounds, Breath sound equal bilaterally with symmetric chest rise and fall

## 2023-08-21 NOTE — H&P
Pre-Op H&P  Taiwo Pierce  [id number]  1975      Chief complaint: Neck pain      Subjective:  Patient is a 48 y.o.male presents for scheduled surgery by Dr. Bryson. He anticipates a CERVICAL DISCECTOMY ANTERIOR WITH FUSION C5-7  today. He has a long history of neck pain. The pain radiates down the LUE. No saddle anesthesia. He reports numbness left thumb and first finger. Physical therapy has not been helpful.      Review of Systems:  Constitutional-- No fever, chills or sweats. No fatigue.  CV-- No chest pain, palpitation or syncope. +HTN, HLD  Resp-- No SOB, cough, hemoptysis  Skin--No rashes or lesions      Allergies:   Allergies   Allergen Reactions    Augmentin [Amoxicillin-Pot Clavulanate] Hives and Swelling     Throat swelling    Metoprolol Unknown - Low Severity         Home Meds:  Medications Prior to Admission   Medication Sig Dispense Refill Last Dose    amLODIPine (NORVASC) 10 MG tablet Take 1 tablet by mouth Daily. 90 tablet 0 8/21/2023 at 0500    aspirin 81 MG EC tablet Take 1 tablet by mouth Daily. 90 tablet 0 8/20/2023 at 0900    DULoxetine (CYMBALTA) 30 MG capsule Take 1 capsule by mouth Daily. 90 capsule 0 8/21/2023 at 0500    gabapentin (NEURONTIN) 800 MG tablet Take 1 tablet by mouth 3 (Three) Times a Day.   8/21/2023 at 0500    hydroCHLOROthiazide (HYDRODIURIL) 25 MG tablet Take 1 tablet by mouth Every Morning. 90 tablet 0 8/21/2023 at 0500    hydrOXYzine (ATARAX) 25 MG tablet Take 1 tablet by mouth 3 (Three) Times a Day As Needed.   8/21/2023 at 0500    ibuprofen (ADVIL,MOTRIN) 800 MG tablet Take 1 tablet by mouth Every 8 (Eight) Hours As Needed for Moderate Pain. 90 tablet 0 8/20/2023 at 2000    lansoprazole (PREVACID) 30 MG capsule Take 1 capsule by mouth Daily. 90 capsule 0 8/21/2023 at 0500    metaxalone (SKELAXIN) 800 MG tablet Take 1 tablet by mouth Daily.  1 8/20/2023 at 1800    oxyCODONE (ROXICODONE) 10 MG tablet Take 1 tablet by mouth Every 4 (Four) Hours As Needed.  0  "8/21/2023 at 0500    potassium chloride (K-DUR,KLOR-CON) 20 MEQ CR tablet Take 1 tablet by mouth 2 (Two) Times a Day. 60 tablet 2 8/20/2023 at 0900    pravastatin (PRAVACHOL) 20 MG tablet Take 1 tablet by mouth Daily. 90 tablet 0 8/21/2023 at 0500    valsartan (Diovan) 320 MG tablet Take 1 tablet by mouth Daily. 90 tablet 0 8/20/2023 at 0900    ketoconazole (NIZORAL) 2 % shampoo Apply  topically to the appropriate area as directed 2 (Two) Times a Week. 120 mL 2 8/17/2023    sildenafil (VIAGRA) 25 MG tablet Take 1 tablet by mouth Daily As Needed for Erectile Dysfunction. 30 tablet 2 More than a month         PMH:   Past Medical History:   Diagnosis Date    Arthritis October 2007    Arthritis in back and neck    GERD (gastroesophageal reflux disease)     Headache 2007    Headaches due to disc in neck.    Hyperlipidemia     Hypertension     Low back pain 10/05/09    Hurt back and had back surgery 04/16/2014. Hit a nerve     PSH:    Past Surgical History:   Procedure Laterality Date    FRACTURE SURGERY Left     Foot    KNEE ARTHROSCOPY Left     LUMBAR DISCECTOMY  04/16/2014    LT L5-S1 Discectomy, L4-5 Lami with Disc - Dr. Esequiel Joshi       Immunization History:  Influenza: No  Pneumococcal: No  Tetanus: UTD  Covid : No    Social History:   Tobacco:   Social History     Tobacco Use   Smoking Status Never   Smokeless Tobacco Current    Types: Snuff      Alcohol:     Social History     Substance and Sexual Activity   Alcohol Use Yes    Comment: OCCASIONALLY         Physical Exam:/98 (BP Location: Right arm, Patient Position: Lying)   Pulse 74   Temp 97.4 øF (36.3 øC) (Temporal)   Resp 16   Ht 180.3 cm (71\")   Wt 93.4 kg (206 lb)   SpO2 96%   BMI 28.73 kg/mý       General Appearance:    Alert, cooperative, no distress, appears stated age   Head:    Normocephalic, without obvious abnormality, atraumatic   Lungs:     Clear to auscultation bilaterally, respirations unlabored    Heart:   Regular rate and rhythm, S1 " and S2 normal    Abdomen:    Soft without tenderness   Extremities:   Extremities normal, atraumatic, no cyanosis or edema   Skin:   Skin color, texture, turgor normal, no rashes or lesions   Neurologic:   Grossly intact     Results Review:     LABS:  Lab Results   Component Value Date    WBC 8.30 08/14/2023    HGB 15.4 08/14/2023    HCT 45.1 08/14/2023    MCV 96.6 08/14/2023     08/14/2023    NEUTROABS 5.68 05/01/2023    GLUCOSE 89 05/01/2023    BUN 15 05/01/2023    CREATININE 0.98 05/01/2023    EGFRIFNONA 74 09/02/2020     05/01/2023    K 3.5 08/14/2023    CL 98 05/01/2023    CO2 22 05/01/2023    MG 2.0 07/21/2022    CALCIUM 9.8 05/01/2023    ALBUMIN 5.1 (H) 05/01/2023     (H) 05/01/2023     (H) 05/01/2023    BILITOT 0.5 05/01/2023       RADIOLOGY:  Imaging Results (Last 72 Hours)       ** No results found for the last 72 hours. **            I reviewed the patient's new clinical results.    Cancer Staging (if applicable)  Cancer Patient: __ yes __no __unknown; If yes, clinical stage T:__ N:__M:__, stage group or __N/A      Impression: cervical spondylosis with radiculopathy      Plan: CERVICAL DISCECTOMY ANTERIOR WITH FUSION C5-7       Yumiko Temple, ENRICO   8/21/2023   06:41 EDT

## 2023-08-21 NOTE — ANESTHESIA POSTPROCEDURE EVALUATION
Patient: Taiwo Pierce    Procedure Summary       Date: 08/21/23 Room / [facility]:  [facility]    Anesthesia Start: 0653 Anesthesia Stop: 0954    Procedure: CERVICAL DISCECTOMY ANTERIOR WITH FUSION C5-7 (Spine Cervical) Diagnosis:       Cervical spondylosis with radiculopathy      (Cervical spondylosis with radiculopathy [M47.22])    Surgeons: uJlio C Bryson MD Provider: Lex Li MD    Anesthesia Type: general ASA Status: 2            Anesthesia Type: general    Vitals  Vitals Value Taken Time   /82 08/21/23 0954   Temp 97.6 øF (36.4 øC) 08/21/23 0954   Pulse 76 08/21/23 0954   Resp 18 08/21/23 0954   SpO2 94 % 08/21/23 0954           Post Anesthesia Care and Evaluation    Patient location during evaluation: PACU  Patient participation: complete - patient participated  Level of consciousness: awake and alert  Pain management: adequate    Airway patency: patent  Anesthetic complications: No anesthetic complications  PONV Status: none  Cardiovascular status: hemodynamically stable and acceptable  Respiratory status: nonlabored ventilation, acceptable and nasal cannula  Hydration status: acceptable

## 2023-08-21 NOTE — TELEPHONE ENCOUNTER
Can someone please pend in an order for AP and lateral cervical spine x-rays needed for 3wks F/U Post Cervical discectomy w/ Fusion. Thank you.

## 2023-08-21 NOTE — ANESTHESIA PREPROCEDURE EVALUATION
Anesthesia Evaluation                  Airway   Mallampati: I  TM distance: >3 FB  Neck ROM: full  No difficulty expected  Dental      Pulmonary    Cardiovascular     ECG reviewed    (+) hypertension      Neuro/Psych  (+) headaches, numbness, psychiatric history  GI/Hepatic/Renal/Endo    (+) GERD    Musculoskeletal     Abdominal    Substance History      OB/GYN          Other   arthritis,                   Anesthesia Plan    ASA 2     general     intravenous induction     Anesthetic plan, risks, benefits, and alternatives have been provided, discussed and informed consent has been obtained with: patient.    Plan discussed with CRNA.    CODE STATUS:

## 2023-08-21 NOTE — LETTER
[facility]  [facility]    Patient: Taiwo Pierce  : 1975      Dear Ms. Harp,    I just completed the C5-7 ACDF on Mr. Pierce to address his cervical spondylosis and radiculopathy.  Thus far all is going well.  He will likely go home in several hours and then follow-up in our clinic in about 3 weeks.      With kindest regards,    Julio C Bryson MD  23  09:33 EDT

## 2023-08-21 NOTE — OP NOTE
NEUROSURGICAL OPERATIVE NOTE        PREOPERATIVE DIAGNOSIS:    Cervical spondylosis with radiculopathy      POSTOPERATIVE DIAGNOSIS:  Same      PROCEDURE:  1.  Arthrodesis C5-6 and C6-7  2.  Anterior cervical discectomy with microdissection at C5-6 and C6-7  3.  Zevo anterior plating C5-7  4.  Composite allografting C5-6 and C6-7      SURGEON:  Julio C Bryson M.D.      ASSISTANT: Magda Cho PA-C    PAC assisted with:   Suctioning   Retraction   Tying   Suturing   Closing   Application of dressing   Skilled neurosurgery PA assistance was necessary to perform this procedure.        ANESTHESIA:  General      ESTIMATED BLOOD LOSS: Minimal      SPECIMEN: None      DRAINS: None      COMPLICATIONS:  None      Spinal Surgery Levels Completed:2 Levels        CLINICAL NOTE:  The patient is a 48-year-old gentleman with ongoing and progressive neck and left upper extremity pain.  Studies demonstrate broad-based spurring as well as some disc protrusion with associated nerve root compromise.  As such, he presents at this time for ACDF.  The nature of the procedure as well as the potential risks, complications, limitations, and alternatives to the procedure were discussed at length with the patient and the patient has agreed to proceed with surgery.      TECHNICAL NOTE:  The patient was brought to the operating room in the supine position and general endotracheal anesthesia was achieved. His neck was mildly extended with a small roll placed transversely under his shoulders.  His anterior neck was prepared and draped in usual fashion.  A mildly curved incision from the midline rightward was fashioned approximately one-half finger width below the level of the cricothyroid membrane.  Underlying subcutaneous tissues were undermined.  The platysma was divided longitudinally.  A plane medial to the belly of the sternocleidomastoid muscle was pursued to provide exposure to the anterior spine.  Disk space was marked, localizing  radiograph was obtained and longus colli muscle was mobilized from the anterior spine with cautery.  Self-retaining retractor provided side-to-side exposure.  Beginning at C5-6 the disk was incised and evacuated piecemeal with an array of pituitary rongeurs, Hayley curettes and Kerrison punches.  After subtotal diskectomy the operating microscope was brought into use.  Distraction screws were utilized.  A combination of overgrown ligament and endplate osteophyte were resected using Kerrison punches and curettes. The posterior longitudinal ligament was taken down with Hayley knife and Hayley curettes. The neural foramina were widely decompressed.  Good decompression was achieved.  Endplates were decorticated.  A small ledge of bone was left posteriorly on each endplate.  A 6 mm lordotic composite allograft was then impacted into place.  Attention was turned to the level below.  The distraction screw at C5 was removed and placed at C7.  The entire procedure was repeated at the C6-7 level.  In addition to the spondylosis soft disk herniation was noted in the recess on the left side as expected.  This was removed.  Once again posterior longitudinal ligament was taken down, the neural foramina were widely decompressed, endplates were decorticated and a small ledge of bone was left posteriorly on each endplate.  Another 6 mm lordotic composite allograft was impacted into place.  Distraction screws were removed.  Bleeding points were controlled with bone wax.  Anterior osteophytes were resected with the drill.  A 35 mm ZEVO plate was then affixed to the anterior spine from C5 to C7 using 13 mm variable angled screws bilaterally at each level.  Locking cam was engaged x 3.  The wound was washed out with saline solution.  Some very modest bleeding points were controlled with bipolar cautery.  The platysma was reapproximated in interrupted fashion with 3-0 Vicryl suture.  The skin was closed in a running subcuticular fashion  with 3-0 Vicryl suture.  A dermal sealant and sterile dressing were applied.  Completion fluoroscopy confirmed good positioning of the construct from C5-7.               Julio C Bryson M.D.

## 2023-08-22 NOTE — TELEPHONE ENCOUNTER
Talked to and he is sched for Post Op on 09/12/23 @2pm w/ Joelle Mullen PA-C. Pt is aware to get Xrays prior to appt. He appreciated the call.

## 2023-08-24 DIAGNOSIS — M47.22 CERVICAL SPONDYLOSIS WITH RADICULOPATHY: Primary | ICD-10-CM

## 2023-08-24 RX ORDER — CETIRIZINE HYDROCHLORIDE 5 MG/1
5 TABLET, CHEWABLE ORAL DAILY
Qty: 10 TABLET | Refills: 0 | Status: SHIPPED | OUTPATIENT
Start: 2023-08-24 | End: 2024-08-23

## 2023-08-24 NOTE — TELEPHONE ENCOUNTER
Provider:  Braydon  Surgery/Procedure:  C5- 7 ACDF   Surgery/Procedure Date:  08/21/23  Last visit:   07/11/23  Next visit: 09/12/23     Reason for call:    Pt called office c/o incisional swelling and redness.  Call was transferred directly to me.  Pt reports 2 days ago he began having redness, swelling and some itching in a rectangular shape around his incision.  This has continued to progress and today pt reports that the area is also very hot to the touch.  He denies fever, drainage, or any other incisional problems. He denies trouble swallowing or breathing when upright, but when lying flat, pt reports having some difficulty breathing and swallowing due to the pressure from the swollen incision area.      Pt has uploaded a picture of his incision area.  Patient Entered Attachment - image.jpg (08/24/2023)

## 2023-09-01 DIAGNOSIS — Z98.1 S/P CERVICAL SPINAL FUSION: ICD-10-CM

## 2023-09-01 DIAGNOSIS — M47.22 CERVICAL SPONDYLOSIS WITH RADICULOPATHY: Primary | ICD-10-CM

## 2023-09-01 RX ORDER — METHYLPREDNISOLONE 4 MG/1
TABLET ORAL
Qty: 21 TABLET | Refills: 0 | Status: SHIPPED | OUTPATIENT
Start: 2023-09-01

## 2023-09-07 ENCOUNTER — HOSPITAL ENCOUNTER (OUTPATIENT)
Dept: GENERAL RADIOLOGY | Facility: HOSPITAL | Age: 48
Discharge: HOME | End: 2023-09-07
Admitting: NEUROLOGICAL SURGERY
Payer: COMMERCIAL

## 2023-09-07 PROCEDURE — 72040 X-RAY EXAM NECK SPINE 2-3 VW: CPT

## 2023-09-12 ENCOUNTER — OFFICE VISIT (OUTPATIENT)
Dept: NEUROSURGERY | Facility: CLINIC | Age: 48
End: 2023-09-12
Payer: COMMERCIAL

## 2023-09-12 VITALS
HEIGHT: 71 IN | DIASTOLIC BLOOD PRESSURE: 68 MMHG | WEIGHT: 212 LBS | TEMPERATURE: 97.3 F | SYSTOLIC BLOOD PRESSURE: 112 MMHG | BODY MASS INDEX: 29.68 KG/M2

## 2023-09-12 DIAGNOSIS — Z98.1 S/P CERVICAL SPINAL FUSION: ICD-10-CM

## 2023-09-12 DIAGNOSIS — M47.22 CERVICAL SPONDYLOSIS WITH RADICULOPATHY: Primary | ICD-10-CM

## 2023-09-12 DIAGNOSIS — M50.30 DDD (DEGENERATIVE DISC DISEASE), CERVICAL: ICD-10-CM

## 2023-09-12 PROCEDURE — 99024 POSTOP FOLLOW-UP VISIT: CPT | Performed by: PHYSICIAN ASSISTANT

## 2023-09-12 RX ORDER — OXYCODONE HYDROCHLORIDE 15 MG/1
1 TABLET ORAL EVERY 6 HOURS
COMMUNITY
Start: 2023-08-26

## 2023-09-12 NOTE — PROGRESS NOTES
Patient: Taiwo Pierce  : 1975  Chart #: [id number]    Date of Service: 2023    CHIEF COMPLAINT: Cervical spondylosis with radiculopathy     History of Present Illness Mr. Pierce is a 48-year-old gentleman with a history of chronic neck and back difficulties.  He had surgery in the lumbar spine with Dr. Joshi, remotely.  More recently, he presented with ongoing and progressive neck and left upper extremity pain.  Preoperative studies demonstrated broad-based spurring as well as disc protrusion with associated nerve root compromise.  As such, on 2023 he underwent ACDF C5-7.  Surgery was without overt intraoperative complication.    Today patient is 2 weeks postop.  Arm pain has improved as has the sensory alteration in his hand.  He complains of ongoing pain in the left scapula.  He feels neck stiffness.  No incisional issues.  .      Past Medical History:   Diagnosis Date    Arthritis 2007    Arthritis in back and neck    GERD (gastroesophageal reflux disease)     Headache     Headaches due to disc in neck.    Hyperlipidemia     Hypertension     Low back pain 10/05/09    Hurt back and had back surgery 2014. Hit a nerve         Current Outpatient Medications:     amLODIPine (NORVASC) 10 MG tablet, Take 1 tablet by mouth Daily., Disp: 90 tablet, Rfl: 0    aspirin 81 MG EC tablet, Take 1 tablet by mouth Daily., Disp: 90 tablet, Rfl: 0    cetirizine (ZyrTEC) 5 MG chewable tablet, Chew 1 tablet Daily., Disp: 10 tablet, Rfl: 0    DULoxetine (CYMBALTA) 30 MG capsule, Take 1 capsule by mouth Daily., Disp: 90 capsule, Rfl: 0    gabapentin (NEURONTIN) 800 MG tablet, Take 1 tablet by mouth 3 (Three) Times a Day., Disp: , Rfl:     hydroCHLOROthiazide (HYDRODIURIL) 25 MG tablet, Take 1 tablet by mouth Every Morning., Disp: 90 tablet, Rfl: 0    hydrOXYzine (ATARAX) 25 MG tablet, Take 1 tablet by mouth 3 (Three) Times a Day As Needed., Disp: , Rfl:     ibuprofen (ADVIL,MOTRIN) 800 MG  tablet, Take 1 tablet by mouth Every 8 (Eight) Hours As Needed for Moderate Pain., Disp: 90 tablet, Rfl: 0    ketoconazole (NIZORAL) 2 % shampoo, Apply  topically to the appropriate area as directed 2 (Two) Times a Week., Disp: 120 mL, Rfl: 2    lansoprazole (PREVACID) 30 MG capsule, Take 1 capsule by mouth Daily., Disp: 90 capsule, Rfl: 0    metaxalone (SKELAXIN) 800 MG tablet, Take 1 tablet by mouth Daily., Disp: , Rfl: 1    oxyCODONE (ROXICODONE) 10 MG tablet, Take 1 tablet by mouth Every 4 (Four) Hours As Needed., Disp: , Rfl: 0    oxyCODONE (ROXICODONE) 15 MG immediate release tablet, Take 1 tablet by mouth Every 6 (Six) Hours., Disp: , Rfl:     potassium chloride (K-DUR,KLOR-CON) 20 MEQ CR tablet, Take 1 tablet by mouth 2 (Two) Times a Day., Disp: 60 tablet, Rfl: 2    pravastatin (PRAVACHOL) 20 MG tablet, Take 1 tablet by mouth Daily., Disp: 90 tablet, Rfl: 0    sildenafil (VIAGRA) 25 MG tablet, Take 1 tablet by mouth Daily As Needed for Erectile Dysfunction., Disp: 30 tablet, Rfl: 2    valsartan (Diovan) 320 MG tablet, Take 1 tablet by mouth Daily., Disp: 90 tablet, Rfl: 0    Past Surgical History:   Procedure Laterality Date    ANTERIOR CERVICAL DISCECTOMY W/ FUSION N/A 8/21/2023    Procedure: CERVICAL DISCECTOMY ANTERIOR WITH FUSION C5-7;  Surgeon: Julio C Bryson MD;  Location: [facility];  Service: Neurosurgery;  Laterality: N/A;    FRACTURE SURGERY Left     Foot    KNEE ARTHROSCOPY Left     LUMBAR DISCECTOMY  04/16/2014    LT L5-S1 Discectomy, L4-5 Lami with Disc - Dr. Esequiel Joshi       Social History     Socioeconomic History    Marital status:    Tobacco Use    Smoking status: Never    Smokeless tobacco: Current     Types: Snuff   Vaping Use    Vaping Use: Never used   Substance and Sexual Activity    Alcohol use: Yes     Comment: OCCASIONALLY    Drug use: No    Sexual activity: Yes     Partners: Female     Birth control/protection: None         Review of Systems    Objective   Vital Signs:  "Blood pressure 112/68, temperature 97.3 °F (36.3 °C), temperature source Infrared, height 180.3 cm (71\"), weight 96.2 kg (212 lb).  Physical Exam  Vitals and nursing note reviewed.   Constitutional:       General: He is not in acute distress.     Appearance: He is well-developed.   HENT:      Head: Normocephalic and atraumatic.   Skin:     Comments: Well healed anterior neck incision    Psychiatric:         Behavior: Behavior normal.         Thought Content: Thought content normal.          Independent review of radiographic imaging: Plain films of the cervical spine demonstrate good placement and alignment of surgical construct C5-7    Assessment & Plan   Diagnosis: Cervical spondylosis with radiculopathy     Medical Decision Making: Patient is 3 weeks postop and doing well.  Radicular symptoms have steadily improved and I anticipate, with more time, will continue to improve.  Activity limitations and restrictions were discussed.  He will follow-up with Dr. Bryson in 6-8 weeks to check his progress.  Call the office in the interim with any questions or concerns        Diagnoses and all orders for this visit:    1. Cervical spondylosis with radiculopathy (Primary)    2. DDD (degenerative disc disease), cervical    3. S/P cervical spinal fusion                                  Radha Mullen PA-C  Patient Care Team:  Carly Harp APRN as PCP - General (Nurse Practitioner)  Efrain Blue MD as Referring Physician (Physical Medicine and Rehabilitation)  Julio C Bryson MD as Surgeon (Neurosurgery)              Answers submitted by the patient for this visit:  Primary Reason for Visit (Submitted on 9/11/2023)  What is the primary reason for your visit?: Other  Other (Submitted on 9/11/2023)  Please describe your symptoms.: Got a knot still were the incision is and i hurt so bad from shoulder to shoulder.  Have you had these symptoms before?: Yes  How long have you been having these symptoms?: " Greater than 2 weeks

## 2023-10-13 ENCOUNTER — TELEPHONE (OUTPATIENT)
Dept: NEUROSURGERY | Facility: CLINIC | Age: 48
End: 2023-10-13
Payer: COMMERCIAL

## 2023-10-13 ENCOUNTER — OFFICE VISIT (OUTPATIENT)
Dept: FAMILY MEDICINE CLINIC | Facility: CLINIC | Age: 48
End: 2023-10-13
Payer: COMMERCIAL

## 2023-10-13 VITALS
HEIGHT: 71 IN | BODY MASS INDEX: 28.84 KG/M2 | TEMPERATURE: 98 F | OXYGEN SATURATION: 97 % | DIASTOLIC BLOOD PRESSURE: 82 MMHG | WEIGHT: 206 LBS | HEART RATE: 73 BPM | SYSTOLIC BLOOD PRESSURE: 134 MMHG

## 2023-10-13 DIAGNOSIS — R73.03 PREDIABETES: ICD-10-CM

## 2023-10-13 DIAGNOSIS — G89.29 OTHER CHRONIC PAIN: ICD-10-CM

## 2023-10-13 DIAGNOSIS — R10.11 RUQ PAIN: Primary | ICD-10-CM

## 2023-10-13 PROCEDURE — 80053 COMPREHEN METABOLIC PANEL: CPT | Performed by: FAMILY MEDICINE

## 2023-10-13 PROCEDURE — 85025 COMPLETE CBC W/AUTO DIFF WBC: CPT | Performed by: FAMILY MEDICINE

## 2023-10-13 PROCEDURE — 36415 COLL VENOUS BLD VENIPUNCTURE: CPT | Performed by: FAMILY MEDICINE

## 2023-10-13 RX ORDER — CYCLOBENZAPRINE HCL 10 MG
10 TABLET ORAL 3 TIMES DAILY PRN
Qty: 90 TABLET | Refills: 0 | Status: SHIPPED | OUTPATIENT
Start: 2023-10-13

## 2023-10-13 NOTE — TELEPHONE ENCOUNTER
Provider:  Braydon  Surgery/Procedure:  ACDF C5-7  Surgery/Procedure Date:  8/21/23  Last visit:   9/12/23  Next visit: 10/24/23     Reason for call:  Mr. Wilson called and requested us to send in a new muscle relaxer. He is currently taking Metaxalone 800mg, but reports he is not getting any relief, continues to have a lot of muscle tightness in his neck/shoulder as well as pain.

## 2023-10-13 NOTE — PROGRESS NOTES
"Taiwo Pierce     VITALS: Blood pressure 134/82, pulse 73, temperature 98 °F (36.7 °C), temperature source Temporal, height 180.3 cm (71\"), weight 93.4 kg (206 lb), SpO2 97%.    Subjective  Chief Complaint  Insomnia and Back Pain (Lower right side)    Subjective          History of Present Illness:  Patient is a 48 y.o. male with a medical history significant for hypertension, hyperlipidemia, and GERD who presents to clinic for medical follow-up. He is complaining about insomnia and back pain.    Patient had neck surgery, which was complicated. He has been in so much pain that he can not stand it. He got hold of his back doctor, who raised his oxycodone from 10 mg to 15 mg. He was doing pretty decent the whole month, he slept good, and it did not hurt as bad. He was taken off of it and put him back on TENS. Since then, he has done another struggle. He has used everything on the sun. His last resort was that he drank a little bit each night before he went to bed. He has more problems. He knows his liver is inflamed. He denies vomiting or nausea. He has not been sleeping for 3 nights. He might sleep 1 hour, but he wakes up tosses and turns. He has talked to his pain clinic, but they do not help him. He took 4 melatonin 10 mg last night. He has some nerve damage where this has went too far too long. It burns right across his shoulders into his shoulder blades. He can hardly lay on his back or sides. He has been on Skelaxin for about 12 to 14 years. His body is immune to it. The doctor told him that it is still working. When he first started, it would take the pain away. He does not want to go to his alternatives, especially drinking. He was just sleeping, and it has gotten him in worse shape. He has arthritis in his neck and back. He wants to have surgery on his knee. He is having problems with his knee. He cannot go anywhere or do nothing. He gets depressed.    The following portions of the patient's history " were reviewed and updated as appropriate: allergies, current medications, past family history, past medical history, past social history, past surgical history and problem list.    Past Medical History  Past Medical History:   Diagnosis Date    Arthritis October 2007    Arthritis in back and neck    GERD (gastroesophageal reflux disease)     Headache 2007    Headaches due to disc in neck.    Hyperlipidemia     Hypertension     Low back pain 10/05/09    Hurt back and had back surgery 04/16/2014. Hit a nerve       Surgical History  Past Surgical History:   Procedure Laterality Date    ANTERIOR CERVICAL DISCECTOMY W/ FUSION N/A 8/21/2023    Procedure: CERVICAL DISCECTOMY ANTERIOR WITH FUSION C5-7;  Surgeon: Julio C Bryson MD;  Location: [facility];  Service: Neurosurgery;  Laterality: N/A;    FRACTURE SURGERY Left     Foot    KNEE ARTHROSCOPY Left     LUMBAR DISCECTOMY  04/16/2014    LT L5-S1 Discectomy, L4-5 Lami with Disc - Dr. Esequiel Joshi       Family History  Family History   Problem Relation Age of Onset    Hyperlipidemia Father     Hypertension Father     Breast cancer Mother     Cancer Mother         Breast cancer    Depression Mother         Depression nerve problems    Prostate cancer Paternal Grandfather     Cancer Paternal Grandfather         Prostate cancer    Hyperlipidemia Paternal Grandfather     Prostate cancer Maternal Grandfather     Arthritis Maternal Grandfather     Cancer Maternal Grandfather         Prostate cancer    Heart disease Maternal Grandfather         Heart attack@90    Hyperlipidemia Maternal Grandfather     Cancer Maternal Grandmother         Lung cancer    Depression Maternal Grandmother         Depression nerve problems    Stroke Maternal Grandmother         Brain anurism    Arthritis Paternal Uncle     Early death Sister         Brain didnt develop completely       Social History  Social History     Socioeconomic History    Marital status:    Tobacco Use    Smoking status:  "Never    Smokeless tobacco: Current     Types: Snuff   Vaping Use    Vaping Use: Never used   Substance and Sexual Activity    Alcohol use: Yes     Comment: OCCASIONALLY    Drug use: No    Sexual activity: Yes     Partners: Female     Birth control/protection: None       Objective   Vital Signs:   /82 (BP Location: Left arm, Patient Position: Sitting, Cuff Size: Adult)   Pulse 73   Temp 98 °F (36.7 °C) (Temporal)   Ht 180.3 cm (71\")   Wt 93.4 kg (206 lb)   SpO2 97%   BMI 28.73 kg/m²     Physical Exam     Gen: Patient in NAD. Pleasant and answers appropriately. A&Ox3.    Skin: Warm and dry with normal turgor. No purpura, rashes, or unusual pigmentation noted. Hair is normal in appearance and distribution.    HEENT: NC/AT. No lesions noted. Conjunctiva clear, sclera nonicteric. PERRL. EOMI without nystagmus or strabismus. Fundi appear benign. No hemorrhages or exudates of eyes. Auditory canals are patent bilaterally without lesions. TMs intact,  nonerythematous, nonbulging without lesions. Nasal mucosa pink, nonerythematous, and nonedematous. Frontal and maxillary sinuses are nontender. O/P nonerythematous and moist without exudate.    Neck: Supple without lymph nodes palpated. FROM.     Lungs: CTA B/L without rales, rhonchi, crackles, or wheezes.    Heart: RRR. S1 and S2 normal. No S3 or S4. No MRGT.    Abd: Soft, nontender,nondistended. (+)BSx4 quadrants.     Extrem: No CCE. Radial pulses 2+/4 and equal B/L. FROMx4. No bone, joint, or muscle tenderness noted.    Neuro: No focal motor/sensory deficits.    Procedures       Assessment and Plan    This is a 48-year-old male who presents to clinic for medical follow-up.    Diagnoses and all orders for this visit:    1. RUQ pain (Primary)  -     Comprehensive Metabolic Panel; Future  -     CBC Auto Differential; Future  -     Comprehensive Metabolic Panel  -     CBC Auto Differential    2. Prediabetes  -     Comprehensive Metabolic Panel; Future  -     " Comprehensive Metabolic Panel    3. Other chronic pain  -     Comprehensive Metabolic Panel; Future  -     Comprehensive Metabolic Panel        1. Back pain.  - I advised the patient to contact neurosurgery to discuss his pain medications.    2. Insomnia.  - I advised the patient to contact his pain clinic to discuss his pain medications.    3. Abdominal pain.  - I will order labs to check his liver.    Problem List Items Addressed This Visit    None  Visit Diagnoses       RUQ pain    -  Primary    Relevant Orders    Comprehensive Metabolic Panel    CBC Auto Differential    Prediabetes        Relevant Orders    Comprehensive Metabolic Panel    Other chronic pain        Relevant Orders    Comprehensive Metabolic Panel                      Follow Up   Return (already has an appt)LABS.  Findings and plans discussed with patient who verbalizes understanding and agreement. Will followup with patient once results are in. Patient was given instructions and counseling regarding his condition or for health maintenance advice. Please see specific information pulled into the AVS if appropriate.       Transcribed from ambient dictation for Sherlyn Centeno MD by Otilia Hdez.  10/13/23   11:21 EDT    Patient or patient representative verbalized consent to the visit recording.  I have personally performed the services described in this document as transcribed by the above individual, and it is both accurate and complete.

## 2023-10-14 LAB
ALBUMIN SERPL-MCNC: 4.7 G/DL (ref 3.5–5.2)
ALBUMIN/GLOB SERPL: 1.4 G/DL
ALP SERPL-CCNC: 124 U/L (ref 39–117)
ALT SERPL W P-5'-P-CCNC: 114 U/L (ref 1–41)
ANION GAP SERPL CALCULATED.3IONS-SCNC: 13.2 MMOL/L (ref 5–15)
AST SERPL-CCNC: 147 U/L (ref 1–40)
BASOPHILS # BLD AUTO: 0.05 10*3/MM3 (ref 0–0.2)
BASOPHILS NFR BLD AUTO: 0.7 % (ref 0–1.5)
BILIRUB SERPL-MCNC: 0.6 MG/DL (ref 0–1.2)
BUN SERPL-MCNC: 13 MG/DL (ref 6–20)
BUN/CREAT SERPL: 16.7 (ref 7–25)
CALCIUM SPEC-SCNC: 9.9 MG/DL (ref 8.6–10.5)
CHLORIDE SERPL-SCNC: 98 MMOL/L (ref 98–107)
CO2 SERPL-SCNC: 28.8 MMOL/L (ref 22–29)
CREAT SERPL-MCNC: 0.78 MG/DL (ref 0.76–1.27)
DEPRECATED RDW RBC AUTO: 46.1 FL (ref 37–54)
EGFRCR SERPLBLD CKD-EPI 2021: 110 ML/MIN/1.73
EOSINOPHIL # BLD AUTO: 0.08 10*3/MM3 (ref 0–0.4)
EOSINOPHIL NFR BLD AUTO: 1.1 % (ref 0.3–6.2)
ERYTHROCYTE [DISTWIDTH] IN BLOOD BY AUTOMATED COUNT: 13.1 % (ref 12.3–15.4)
GLOBULIN UR ELPH-MCNC: 3.4 GM/DL
GLUCOSE SERPL-MCNC: 132 MG/DL (ref 65–99)
HCT VFR BLD AUTO: 42.2 % (ref 37.5–51)
HGB BLD-MCNC: 14.4 G/DL (ref 13–17.7)
IMM GRANULOCYTES # BLD AUTO: 0.02 10*3/MM3 (ref 0–0.05)
IMM GRANULOCYTES NFR BLD AUTO: 0.3 % (ref 0–0.5)
LYMPHOCYTES # BLD AUTO: 1.18 10*3/MM3 (ref 0.7–3.1)
LYMPHOCYTES NFR BLD AUTO: 16.5 % (ref 19.6–45.3)
MCH RBC QN AUTO: 32.3 PG (ref 26.6–33)
MCHC RBC AUTO-ENTMCNC: 34.1 G/DL (ref 31.5–35.7)
MCV RBC AUTO: 94.6 FL (ref 79–97)
MONOCYTES # BLD AUTO: 0.87 10*3/MM3 (ref 0.1–0.9)
MONOCYTES NFR BLD AUTO: 12.2 % (ref 5–12)
NEUTROPHILS NFR BLD AUTO: 4.93 10*3/MM3 (ref 1.7–7)
NEUTROPHILS NFR BLD AUTO: 69.2 % (ref 42.7–76)
NRBC BLD AUTO-RTO: 0 /100 WBC (ref 0–0.2)
PLATELET # BLD AUTO: 258 10*3/MM3 (ref 140–450)
PMV BLD AUTO: 12.7 FL (ref 6–12)
POTASSIUM SERPL-SCNC: 3.8 MMOL/L (ref 3.5–5.2)
PROT SERPL-MCNC: 8.1 G/DL (ref 6–8.5)
RBC # BLD AUTO: 4.46 10*6/MM3 (ref 4.14–5.8)
SODIUM SERPL-SCNC: 140 MMOL/L (ref 136–145)
WBC NRBC COR # BLD: 7.13 10*3/MM3 (ref 3.4–10.8)

## 2023-10-24 ENCOUNTER — OFFICE VISIT (OUTPATIENT)
Dept: NEUROSURGERY | Facility: CLINIC | Age: 48
End: 2023-10-24
Payer: OTHER MISCELLANEOUS

## 2023-10-24 VITALS — TEMPERATURE: 98.2 F | WEIGHT: 203 LBS | HEIGHT: 71 IN | BODY MASS INDEX: 28.42 KG/M2

## 2023-10-24 DIAGNOSIS — M47.22 CERVICAL SPONDYLOSIS WITH RADICULOPATHY: Primary | ICD-10-CM

## 2023-10-24 PROCEDURE — 99024 POSTOP FOLLOW-UP VISIT: CPT | Performed by: NEUROLOGICAL SURGERY

## 2023-10-24 RX ORDER — CYCLOBENZAPRINE HCL 10 MG
10 TABLET ORAL 3 TIMES DAILY PRN
Qty: 90 TABLET | Refills: 0 | Status: SHIPPED | OUTPATIENT
Start: 2023-10-24

## 2023-10-24 NOTE — PROGRESS NOTES
Patient: Taiwo Pierce  : 1975    Primary Care Provider: Carly Harp APRN    Requesting Provider: As above        History    Chief Complaint: Neck and left upper extremity pain with sensory alteration.    History of Present Illness: Mr. Pierce is a 48-year-old gentleman with a history of chronic neck and back difficulties.  He had surgery in the lumbar spine with Dr. Joshi, remotely.  More recently, he presented with ongoing and progressive neck and left upper extremity pain.  Preoperative studies demonstrated broad-based spurring as well as disc protrusion with associated nerve root compromise.  As such, on 2023 he underwent ACDF C5-7.  Surgery was without overt intraoperative complication.  His arm symptoms have resolved.  He complains of some pain on the dorsum of his neck and in the upper mid back.  He has ongoing low back problems with occasional sciatica.    Review of Systems   Constitutional:  Negative for activity change, appetite change, chills, diaphoresis, fatigue, fever and unexpected weight change.   HENT:  Negative for congestion, dental problem, drooling, ear discharge, ear pain, facial swelling, hearing loss, mouth sores, nosebleeds, postnasal drip, rhinorrhea, sinus pressure, sinus pain, sneezing, sore throat, tinnitus, trouble swallowing and voice change.    Eyes:  Negative for photophobia, pain, discharge, redness, itching and visual disturbance.   Respiratory:  Negative for apnea, cough, choking, chest tightness, shortness of breath, wheezing and stridor.    Cardiovascular:  Negative for chest pain, palpitations and leg swelling.   Gastrointestinal:  Negative for abdominal distention, abdominal pain, anal bleeding, blood in stool, constipation, diarrhea, nausea, rectal pain and vomiting.   Endocrine: Negative for cold intolerance, heat intolerance, polydipsia, polyphagia and polyuria.   Genitourinary:  Negative for decreased urine volume, difficulty urinating, dysuria,  "enuresis, flank pain, frequency, genital sores, hematuria and urgency.   Musculoskeletal:  Positive for neck pain and neck stiffness. Negative for arthralgias, back pain, gait problem, joint swelling and myalgias.   Skin:  Negative for color change, pallor, rash and wound.   Allergic/Immunologic: Negative for environmental allergies, food allergies and immunocompromised state.   Neurological:  Negative for dizziness, tremors, seizures, syncope, facial asymmetry, speech difficulty, weakness, light-headedness, numbness and headaches.   Hematological:  Negative for adenopathy. Does not bruise/bleed easily.   Psychiatric/Behavioral:  Negative for agitation, behavioral problems, confusion, decreased concentration, dysphoric mood, hallucinations, self-injury, sleep disturbance and suicidal ideas. The patient is not nervous/anxious and is not hyperactive.    All other systems reviewed and are negative.      The patient's past medical history, past surgical history, family history, and social history have been reviewed at length in the electronic medical record.      Physical Exam:   Temp 98.2 °F (36.8 °C) (Infrared)   Ht 180.3 cm (71\")   Wt 92.1 kg (203 lb)   BMI 28.31 kg/m²   Right anterior cervical incision looks great.    Medical Decision Making    Data Review:   (All imaging studies were personally reviewed unless stated otherwise)  Plain films of the cervical spine dated 9/7/2023 demonstrate good positioning of his construct from C5-7.    Diagnosis:   1.  Cervical spondylosis with radiculopathy status post ACDF C5-7.  2.  Chronic low back pain.    Treatment Options:   The patient will follow-up in 6-8 months with new plain films of the cervical spine to assess his fusion construct.  If his back difficulties increase then I will be happy to assess that.          I, Dr. Bryson, personally performed the services described in the documentation, as scribed in my presence, and it is both accurate and complete.  "

## 2023-10-28 DIAGNOSIS — K21.9 GASTROESOPHAGEAL REFLUX DISEASE, UNSPECIFIED WHETHER ESOPHAGITIS PRESENT: ICD-10-CM

## 2023-10-28 DIAGNOSIS — I10 ESSENTIAL HYPERTENSION: ICD-10-CM

## 2023-10-30 RX ORDER — HYDROCHLOROTHIAZIDE 25 MG/1
25 TABLET ORAL EVERY MORNING
Qty: 90 TABLET | Refills: 0 | Status: SHIPPED | OUTPATIENT
Start: 2023-10-30

## 2023-10-30 RX ORDER — AMLODIPINE BESYLATE 10 MG/1
10 TABLET ORAL DAILY
Qty: 90 TABLET | Refills: 0 | Status: SHIPPED | OUTPATIENT
Start: 2023-10-30

## 2023-10-30 RX ORDER — LANSOPRAZOLE 30 MG/1
30 CAPSULE, DELAYED RELEASE ORAL DAILY
Qty: 90 CAPSULE | Refills: 0 | Status: SHIPPED | OUTPATIENT
Start: 2023-10-30

## 2023-10-30 RX ORDER — VALSARTAN 320 MG/1
320 TABLET ORAL DAILY
Qty: 90 TABLET | Refills: 0 | Status: SHIPPED | OUTPATIENT
Start: 2023-10-30

## 2023-12-11 DIAGNOSIS — G89.4 CHRONIC PAIN SYNDROME: ICD-10-CM

## 2023-12-11 DIAGNOSIS — F32.9 REACTIVE DEPRESSION: ICD-10-CM

## 2023-12-11 RX ORDER — DULOXETIN HYDROCHLORIDE 30 MG/1
30 CAPSULE, DELAYED RELEASE ORAL DAILY
Qty: 90 CAPSULE | Refills: 0 | Status: SHIPPED | OUTPATIENT
Start: 2023-12-11

## 2023-12-21 ENCOUNTER — HOSPITAL ENCOUNTER (EMERGENCY)
Facility: HOSPITAL | Age: 48
Discharge: HOME | End: 2023-12-21
Attending: EMERGENCY MEDICINE
Payer: MEDICARE

## 2023-12-21 VITALS
WEIGHT: 199.6 LBS | SYSTOLIC BLOOD PRESSURE: 167 MMHG | HEIGHT: 71 IN | RESPIRATION RATE: 18 BRPM | BODY MASS INDEX: 27.94 KG/M2 | DIASTOLIC BLOOD PRESSURE: 99 MMHG | TEMPERATURE: 98.4 F | HEART RATE: 90 BPM | OXYGEN SATURATION: 94 %

## 2023-12-21 DIAGNOSIS — G89.28 OTHER CHRONIC POSTPROCEDURAL PAIN: Primary | ICD-10-CM

## 2023-12-21 LAB
BACTERIA UR QL AUTO: ABNORMAL /HPF
BILIRUB UR QL STRIP: ABNORMAL
CLARITY UR: CLEAR
COLOR UR: ABNORMAL
GLUCOSE UR STRIP-MCNC: NEGATIVE MG/DL
HGB UR QL STRIP.AUTO: NEGATIVE
HYALINE CASTS UR QL AUTO: ABNORMAL /LPF
KETONES UR QL STRIP: ABNORMAL
LEUKOCYTE ESTERASE UR QL STRIP.AUTO: NEGATIVE
NITRITE UR QL STRIP: NEGATIVE
PH UR STRIP.AUTO: 6 [PH] (ref 5–8)
PROT UR QL STRIP: ABNORMAL
RBC # UR STRIP: ABNORMAL /HPF
REF LAB TEST METHOD: ABNORMAL
SP GR UR STRIP: 1.03 (ref 1–1.03)
SQUAMOUS #/AREA URNS HPF: ABNORMAL /HPF
UROBILINOGEN UR QL STRIP: ABNORMAL
WBC # UR STRIP: ABNORMAL /HPF

## 2023-12-21 PROCEDURE — 25010000002 HYDROMORPHONE 1 MG/ML SOLUTION: Performed by: EMERGENCY MEDICINE

## 2023-12-21 PROCEDURE — 96375 TX/PRO/DX INJ NEW DRUG ADDON: CPT

## 2023-12-21 PROCEDURE — 96374 THER/PROPH/DIAG INJ IV PUSH: CPT

## 2023-12-21 PROCEDURE — 81001 URINALYSIS AUTO W/SCOPE: CPT | Performed by: EMERGENCY MEDICINE

## 2023-12-21 PROCEDURE — 99283 EMERGENCY DEPT VISIT LOW MDM: CPT

## 2023-12-21 PROCEDURE — 25010000002 ONDANSETRON PER 1 MG: Performed by: EMERGENCY MEDICINE

## 2023-12-21 PROCEDURE — 25010000002 KETOROLAC TROMETHAMINE PER 15 MG: Performed by: EMERGENCY MEDICINE

## 2023-12-21 RX ORDER — ONDANSETRON 2 MG/ML
4 INJECTION INTRAMUSCULAR; INTRAVENOUS ONCE
Status: COMPLETED | OUTPATIENT
Start: 2023-12-21 | End: 2023-12-21

## 2023-12-21 RX ORDER — KETOROLAC TROMETHAMINE 30 MG/ML
30 INJECTION, SOLUTION INTRAMUSCULAR; INTRAVENOUS ONCE
Status: COMPLETED | OUTPATIENT
Start: 2023-12-21 | End: 2023-12-21

## 2023-12-21 RX ORDER — SODIUM CHLORIDE 0.9 % (FLUSH) 0.9 %
10 SYRINGE (ML) INJECTION AS NEEDED
Status: DISCONTINUED | OUTPATIENT
Start: 2023-12-21 | End: 2023-12-21 | Stop reason: HOSPADM

## 2023-12-21 RX ADMIN — HYDROMORPHONE HYDROCHLORIDE 1 MG: 1 INJECTION, SOLUTION INTRAMUSCULAR; INTRAVENOUS; SUBCUTANEOUS at 02:30

## 2023-12-21 RX ADMIN — KETOROLAC TROMETHAMINE 30 MG: 30 INJECTION, SOLUTION INTRAMUSCULAR; INTRAVENOUS at 02:30

## 2023-12-21 RX ADMIN — ONDANSETRON 4 MG: 2 INJECTION INTRAMUSCULAR; INTRAVENOUS at 02:30

## 2023-12-21 NOTE — ED PROVIDER NOTES
Subjective   History of Present Illness  48-year-old male presents to the ER with chief complaint of cervical pain as well as lumbar pain.  Patient does take oxycodone 10 mg for his pain.  Patient states that he is having a flareup.        Review of Systems   Constitutional: Negative.  Negative for fever.   HENT: Negative.     Respiratory: Negative.     Cardiovascular: Negative.  Negative for chest pain.   Gastrointestinal: Negative.  Negative for abdominal pain.   Endocrine: Negative.    Genitourinary: Negative.  Negative for dysuria.   Musculoskeletal:  Positive for back pain and neck pain.   Skin: Negative.    Neurological: Negative.    Psychiatric/Behavioral: Negative.     All other systems reviewed and are negative.      Past Medical History:   Diagnosis Date    Arthritis October 2007    Arthritis in back and neck    GERD (gastroesophageal reflux disease)     Headache 2007    Headaches due to disc in neck.    Hyperlipidemia     Hypertension     Low back pain 10/05/09    Hurt back and had back surgery 04/16/2014. Hit a nerve       Allergies   Allergen Reactions    Augmentin [Amoxicillin-Pot Clavulanate] Hives and Swelling     Throat swelling    Metoprolol Unknown - Low Severity       Past Surgical History:   Procedure Laterality Date    ANTERIOR CERVICAL DISCECTOMY W/ FUSION N/A 8/21/2023    Procedure: CERVICAL DISCECTOMY ANTERIOR WITH FUSION C5-7;  Surgeon: Julio C Bryson MD;  Location: [facility];  Service: Neurosurgery;  Laterality: N/A;    FRACTURE SURGERY Left     Foot    KNEE ARTHROSCOPY Left     LUMBAR DISCECTOMY  04/16/2014    LT L5-S1 Discectomy, L4-5 Lami with Disc - Dr. Esequiel Joshi       Family History   Problem Relation Age of Onset    Hyperlipidemia Father     Hypertension Father     Breast cancer Mother     Cancer Mother         Breast cancer    Depression Mother         Depression nerve problems    Prostate cancer Paternal Grandfather     Cancer Paternal Grandfather         Prostate cancer     Hyperlipidemia Paternal Grandfather     Prostate cancer Maternal Grandfather     Arthritis Maternal Grandfather     Cancer Maternal Grandfather         Prostate cancer    Heart disease Maternal Grandfather         Heart attack@90    Hyperlipidemia Maternal Grandfather     Cancer Maternal Grandmother         Lung cancer    Depression Maternal Grandmother         Depression nerve problems    Stroke Maternal Grandmother         Brain anurism    Arthritis Paternal Uncle     Early death Sister         Brain didnt develop completely       Social History     Socioeconomic History    Marital status:    Tobacco Use    Smoking status: Never    Smokeless tobacco: Current     Types: Snuff   Vaping Use    Vaping Use: Never used   Substance and Sexual Activity    Alcohol use: Yes     Comment: OCCASIONALLY    Drug use: No    Sexual activity: Yes     Partners: Female     Birth control/protection: None           Objective   Physical Exam  Vitals and nursing note reviewed.   Constitutional:       General: He is not in acute distress.     Appearance: He is well-developed. He is not diaphoretic.   HENT:      Head: Normocephalic and atraumatic.      Right Ear: External ear normal.      Left Ear: External ear normal.      Nose: Nose normal.   Eyes:      Conjunctiva/sclera: Conjunctivae normal.   Neck:      Vascular: No JVD.      Trachea: No tracheal deviation.   Cardiovascular:      Rate and Rhythm: Normal rate and regular rhythm.      Heart sounds: Normal heart sounds. No murmur heard.  Pulmonary:      Effort: Pulmonary effort is normal. No respiratory distress.      Breath sounds: Normal breath sounds. No wheezing.   Abdominal:      General: Bowel sounds are normal.      Palpations: Abdomen is soft.      Tenderness: There is no abdominal tenderness.   Musculoskeletal:         General: Tenderness present. No deformity.      Cervical back: Normal range of motion and neck supple. Tenderness present.      Comments: Lumbar and  cervical tenderness.  Patient does have surgical scars.   Skin:     General: Skin is warm and dry.      Coloration: Skin is not pale.      Findings: No erythema or rash.   Neurological:      Mental Status: He is alert and oriented to person, place, and time.      Cranial Nerves: No cranial nerve deficit.   Psychiatric:         Behavior: Behavior normal.         Thought Content: Thought content normal.         Procedures           ED Course                                             Medical Decision Making  48-year-old with chronic pain.    Problems Addressed:  Other chronic postprocedural pain: acute illness or injury     Details: Patient did respond appropriately to medications that were stable Emergency Department.  Encouraged patient to continue to follow-up with his outside care team.    Amount and/or Complexity of Data Reviewed  External Data Reviewed: notes.    Risk  Prescription drug management.        Final diagnoses:   Other chronic postprocedural pain       ED Disposition  ED Disposition       ED Disposition   Discharge    Condition   Stable    Comment   --               [facility] EMERGENCY DEPARTMENT  [address] [place] [address] [id number] [phone]    If symptoms worsen         Medication List      No changes were made to your prescriptions during this visit.            Eduard Garcia II, PA  12/21/23 [id number]

## 2023-12-21 NOTE — ED TRIAGE NOTES
MEDICAL SCREENING:    Reason for Visit: Pain    Patient initially seen in triage.  The patient was advised further evaluation and diagnostic testing will be needed, some of the treatment and testing will be initiated in the lobby in order to begin the process.  The patient will be returned to the waiting area for the time being and possibly be re-assessed by a subsequent ED provider.  The patient will be brought back to the treatment area in as timely manner as possible.

## 2023-12-22 ENCOUNTER — APPOINTMENT (OUTPATIENT)
Dept: CT IMAGING | Facility: HOSPITAL | Age: 48
DRG: 439 | End: 2023-12-22
Payer: MEDICARE

## 2023-12-22 ENCOUNTER — APPOINTMENT (OUTPATIENT)
Dept: ULTRASOUND IMAGING | Facility: HOSPITAL | Age: 48
DRG: 439 | End: 2023-12-22
Payer: MEDICARE

## 2023-12-22 ENCOUNTER — APPOINTMENT (OUTPATIENT)
Dept: GENERAL RADIOLOGY | Facility: HOSPITAL | Age: 48
DRG: 439 | End: 2023-12-22
Payer: MEDICARE

## 2023-12-22 ENCOUNTER — HOSPITAL ENCOUNTER (INPATIENT)
Facility: HOSPITAL | Age: 48
LOS: 3 days | Discharge: LEFT BEFORE DISCHARGE | DRG: 439 | End: 2023-12-25
Attending: STUDENT IN AN ORGANIZED HEALTH CARE EDUCATION/TRAINING PROGRAM | Admitting: INTERNAL MEDICINE
Payer: MEDICARE

## 2023-12-22 ENCOUNTER — APPOINTMENT (OUTPATIENT)
Dept: MRI IMAGING | Facility: HOSPITAL | Age: 48
DRG: 439 | End: 2023-12-22
Payer: MEDICARE

## 2023-12-22 DIAGNOSIS — K85.90 ACUTE PANCREATITIS, UNSPECIFIED COMPLICATION STATUS, UNSPECIFIED PANCREATITIS TYPE: Primary | ICD-10-CM

## 2023-12-22 PROBLEM — K85.20 ALCOHOLIC PANCREATITIS: Status: ACTIVE | Noted: 2023-12-22

## 2023-12-22 LAB
ALBUMIN SERPL-MCNC: 4.3 G/DL (ref 3.5–5.2)
ALBUMIN/GLOB SERPL: 1 G/DL
ALP SERPL-CCNC: 164 U/L (ref 39–117)
ALT SERPL W P-5'-P-CCNC: 104 U/L (ref 1–41)
ANION GAP SERPL CALCULATED.3IONS-SCNC: 26.9 MMOL/L (ref 5–15)
APTT PPP: 25.7 SECONDS (ref 26.5–34.5)
AST SERPL-CCNC: 293 U/L (ref 1–40)
BASOPHILS # BLD AUTO: 0.03 10*3/MM3 (ref 0–0.2)
BASOPHILS NFR BLD AUTO: 0.3 % (ref 0–1.5)
BILIRUB SERPL-MCNC: 1.9 MG/DL (ref 0–1.2)
BUN SERPL-MCNC: 15 MG/DL (ref 6–20)
BUN/CREAT SERPL: 15.2 (ref 7–25)
CALCIUM SPEC-SCNC: 9.7 MG/DL (ref 8.6–10.5)
CHLORIDE SERPL-SCNC: 90 MMOL/L (ref 98–107)
CHOLEST SERPL-MCNC: 286 MG/DL (ref 0–200)
CO2 SERPL-SCNC: 15.1 MMOL/L (ref 22–29)
CREAT SERPL-MCNC: 0.99 MG/DL (ref 0.76–1.27)
CRP SERPL-MCNC: 2.31 MG/DL (ref 0–0.5)
D-LACTATE SERPL-SCNC: 0.9 MMOL/L (ref 0.5–2)
D-LACTATE SERPL-SCNC: 0.9 MMOL/L (ref 0.5–2)
DEPRECATED RDW RBC AUTO: 47.9 FL (ref 37–54)
EGFRCR SERPLBLD CKD-EPI 2021: 94 ML/MIN/1.73
EOSINOPHIL # BLD AUTO: 0.03 10*3/MM3 (ref 0–0.4)
EOSINOPHIL NFR BLD AUTO: 0.3 % (ref 0.3–6.2)
ERYTHROCYTE [DISTWIDTH] IN BLOOD BY AUTOMATED COUNT: 13.4 % (ref 12.3–15.4)
GEN 5 2HR TROPONIN T REFLEX: <6 NG/L
GLOBULIN UR ELPH-MCNC: 4.1 GM/DL
GLUCOSE SERPL-MCNC: 132 MG/DL (ref 65–99)
HAV IGM SERPL QL IA: NORMAL
HBV CORE IGM SERPL QL IA: NORMAL
HBV SURFACE AG SERPL QL IA: NORMAL
HCT VFR BLD AUTO: 40 % (ref 37.5–51)
HCV AB SER DONR QL: NORMAL
HDLC SERPL-MCNC: 95 MG/DL (ref 40–60)
HGB BLD-MCNC: 13.5 G/DL (ref 13–17.7)
HOLD SPECIMEN: NORMAL
HOLD SPECIMEN: NORMAL
IMM GRANULOCYTES # BLD AUTO: 0.03 10*3/MM3 (ref 0–0.05)
IMM GRANULOCYTES NFR BLD AUTO: 0.3 % (ref 0–0.5)
INR PPP: 1.1 (ref 0.9–1.1)
LDLC SERPL CALC-MCNC: 161 MG/DL (ref 0–100)
LDLC/HDLC SERPL: 1.65 {RATIO}
LIPASE SERPL-CCNC: 2321 U/L (ref 13–60)
LYMPHOCYTES # BLD AUTO: 0.93 10*3/MM3 (ref 0.7–3.1)
LYMPHOCYTES NFR BLD AUTO: 10 % (ref 19.6–45.3)
MAGNESIUM SERPL-MCNC: 2.5 MG/DL (ref 1.6–2.6)
MCH RBC QN AUTO: 32.6 PG (ref 26.6–33)
MCHC RBC AUTO-ENTMCNC: 33.8 G/DL (ref 31.5–35.7)
MCV RBC AUTO: 96.6 FL (ref 79–97)
MONOCYTES # BLD AUTO: 0.62 10*3/MM3 (ref 0.1–0.9)
MONOCYTES NFR BLD AUTO: 6.7 % (ref 5–12)
NEUTROPHILS NFR BLD AUTO: 7.66 10*3/MM3 (ref 1.7–7)
NEUTROPHILS NFR BLD AUTO: 82.4 % (ref 42.7–76)
NRBC BLD AUTO-RTO: 0 /100 WBC (ref 0–0.2)
PLATELET # BLD AUTO: 191 10*3/MM3 (ref 140–450)
PMV BLD AUTO: 10.5 FL (ref 6–12)
POTASSIUM SERPL-SCNC: 3.3 MMOL/L (ref 3.5–5.2)
PROCALCITONIN SERPL-MCNC: 0.23 NG/ML (ref 0–0.25)
PROT SERPL-MCNC: 8.4 G/DL (ref 6–8.5)
PROTHROMBIN TIME: 14.7 SECONDS (ref 12.1–14.7)
QT INTERVAL: 382 MS
QTC INTERVAL: 446 MS
RBC # BLD AUTO: 4.14 10*6/MM3 (ref 4.14–5.8)
SODIUM SERPL-SCNC: 132 MMOL/L (ref 136–145)
TRIGL SERPL-MCNC: 173 MG/DL (ref 0–150)
TROPONIN T DELTA: NORMAL
TROPONIN T SERPL HS-MCNC: <6 NG/L
VLDLC SERPL-MCNC: 30 MG/DL (ref 5–40)
WBC NRBC COR # BLD AUTO: 9.3 10*3/MM3 (ref 3.4–10.8)
WHOLE BLOOD HOLD COAG: NORMAL
WHOLE BLOOD HOLD SPECIMEN: NORMAL

## 2023-12-22 PROCEDURE — 83690 ASSAY OF LIPASE: CPT | Performed by: EMERGENCY MEDICINE

## 2023-12-22 PROCEDURE — 93010 ELECTROCARDIOGRAM REPORT: CPT | Performed by: INTERNAL MEDICINE

## 2023-12-22 PROCEDURE — 93005 ELECTROCARDIOGRAM TRACING: CPT | Performed by: STUDENT IN AN ORGANIZED HEALTH CARE EDUCATION/TRAINING PROGRAM

## 2023-12-22 PROCEDURE — 84145 PROCALCITONIN (PCT): CPT | Performed by: HOSPITALIST

## 2023-12-22 PROCEDURE — 83735 ASSAY OF MAGNESIUM: CPT | Performed by: HOSPITALIST

## 2023-12-22 PROCEDURE — 25010000002 ONDANSETRON PER 1 MG: Performed by: EMERGENCY MEDICINE

## 2023-12-22 PROCEDURE — 25010000002 ONDANSETRON PER 1 MG: Performed by: NURSE PRACTITIONER

## 2023-12-22 PROCEDURE — 81223 CFTR GENE FULL SEQUENCE: CPT | Performed by: INTERNAL MEDICINE

## 2023-12-22 PROCEDURE — 74177 CT ABD & PELVIS W/CONTRAST: CPT

## 2023-12-22 PROCEDURE — 80074 ACUTE HEPATITIS PANEL: CPT | Performed by: NURSE PRACTITIONER

## 2023-12-22 PROCEDURE — 85610 PROTHROMBIN TIME: CPT | Performed by: HOSPITALIST

## 2023-12-22 PROCEDURE — 25010000002 MORPHINE PER 10 MG: Performed by: EMERGENCY MEDICINE

## 2023-12-22 PROCEDURE — 71045 X-RAY EXAM CHEST 1 VIEW: CPT

## 2023-12-22 PROCEDURE — 86140 C-REACTIVE PROTEIN: CPT | Performed by: HOSPITALIST

## 2023-12-22 PROCEDURE — 85025 COMPLETE CBC W/AUTO DIFF WBC: CPT | Performed by: STUDENT IN AN ORGANIZED HEALTH CARE EDUCATION/TRAINING PROGRAM

## 2023-12-22 PROCEDURE — 25810000003 SODIUM CHLORIDE 0.9 % SOLUTION: Performed by: INTERNAL MEDICINE

## 2023-12-22 PROCEDURE — 25010000002 HYDROMORPHONE 1 MG/ML SOLUTION: Performed by: NURSE PRACTITIONER

## 2023-12-22 PROCEDURE — 99223 1ST HOSP IP/OBS HIGH 75: CPT | Performed by: HOSPITALIST

## 2023-12-22 PROCEDURE — 87040 BLOOD CULTURE FOR BACTERIA: CPT | Performed by: INTERNAL MEDICINE

## 2023-12-22 PROCEDURE — 83605 ASSAY OF LACTIC ACID: CPT | Performed by: INTERNAL MEDICINE

## 2023-12-22 PROCEDURE — 74181 MRI ABDOMEN W/O CONTRAST: CPT | Performed by: RADIOLOGY

## 2023-12-22 PROCEDURE — 36415 COLL VENOUS BLD VENIPUNCTURE: CPT

## 2023-12-22 PROCEDURE — 82787 IGG 1 2 3 OR 4 EACH: CPT | Performed by: INTERNAL MEDICINE

## 2023-12-22 PROCEDURE — 83605 ASSAY OF LACTIC ACID: CPT | Performed by: HOSPITALIST

## 2023-12-22 PROCEDURE — 76705 ECHO EXAM OF ABDOMEN: CPT | Performed by: RADIOLOGY

## 2023-12-22 PROCEDURE — 25010000002 FENTANYL CITRATE (PF) 50 MCG/ML SOLUTION: Performed by: EMERGENCY MEDICINE

## 2023-12-22 PROCEDURE — 99285 EMERGENCY DEPT VISIT HI MDM: CPT

## 2023-12-22 PROCEDURE — 25010000002 HEPARIN (PORCINE) PER 1000 UNITS: Performed by: INTERNAL MEDICINE

## 2023-12-22 PROCEDURE — 80061 LIPID PANEL: CPT | Performed by: EMERGENCY MEDICINE

## 2023-12-22 PROCEDURE — 81404 MOPATH PROCEDURE LEVEL 5: CPT | Performed by: INTERNAL MEDICINE

## 2023-12-22 PROCEDURE — 80053 COMPREHEN METABOLIC PANEL: CPT | Performed by: STUDENT IN AN ORGANIZED HEALTH CARE EDUCATION/TRAINING PROGRAM

## 2023-12-22 PROCEDURE — 74181 MRI ABDOMEN W/O CONTRAST: CPT

## 2023-12-22 PROCEDURE — 85730 THROMBOPLASTIN TIME PARTIAL: CPT | Performed by: HOSPITALIST

## 2023-12-22 PROCEDURE — 25010000002 HYDROMORPHONE 1 MG/ML SOLUTION: Performed by: HOSPITALIST

## 2023-12-22 PROCEDURE — 76705 ECHO EXAM OF ABDOMEN: CPT

## 2023-12-22 PROCEDURE — 84484 ASSAY OF TROPONIN QUANT: CPT | Performed by: STUDENT IN AN ORGANIZED HEALTH CARE EDUCATION/TRAINING PROGRAM

## 2023-12-22 PROCEDURE — 25810000003 SODIUM CHLORIDE 0.9 % SOLUTION: Performed by: NURSE PRACTITIONER

## 2023-12-22 PROCEDURE — 25010000002 THIAMINE PER 100 MG: Performed by: HOSPITALIST

## 2023-12-22 PROCEDURE — 25810000003 SODIUM CHLORIDE 0.9 % SOLUTION: Performed by: EMERGENCY MEDICINE

## 2023-12-22 PROCEDURE — 74177 CT ABD & PELVIS W/CONTRAST: CPT | Performed by: RADIOLOGY

## 2023-12-22 PROCEDURE — 71045 X-RAY EXAM CHEST 1 VIEW: CPT | Performed by: RADIOLOGY

## 2023-12-22 PROCEDURE — 25510000001 IOPAMIDOL 61 % SOLUTION: Performed by: STUDENT IN AN ORGANIZED HEALTH CARE EDUCATION/TRAINING PROGRAM

## 2023-12-22 PROCEDURE — 25010000002 HYDRALAZINE PER 20 MG: Performed by: EMERGENCY MEDICINE

## 2023-12-22 PROCEDURE — 25010000002 MORPHINE PER 10 MG: Performed by: NURSE PRACTITIONER

## 2023-12-22 PROCEDURE — 25010000002 KETOROLAC TROMETHAMINE PER 15 MG: Performed by: NURSE PRACTITIONER

## 2023-12-22 RX ORDER — ONDANSETRON 2 MG/ML
4 INJECTION INTRAMUSCULAR; INTRAVENOUS ONCE
Status: COMPLETED | OUTPATIENT
Start: 2023-12-22 | End: 2023-12-22

## 2023-12-22 RX ORDER — OXYCODONE HYDROCHLORIDE 10 MG/1
10 TABLET ORAL 4 TIMES DAILY
COMMUNITY

## 2023-12-22 RX ORDER — THIAMINE HYDROCHLORIDE 100 MG/ML
200 INJECTION, SOLUTION INTRAMUSCULAR; INTRAVENOUS EVERY 8 HOURS SCHEDULED
Status: DISCONTINUED | OUTPATIENT
Start: 2023-12-22 | End: 2023-12-25 | Stop reason: HOSPADM

## 2023-12-22 RX ORDER — BISACODYL 10 MG
10 SUPPOSITORY, RECTAL RECTAL DAILY PRN
Status: DISCONTINUED | OUTPATIENT
Start: 2023-12-22 | End: 2023-12-25 | Stop reason: HOSPADM

## 2023-12-22 RX ORDER — AMOXICILLIN 250 MG
2 CAPSULE ORAL 2 TIMES DAILY
Status: DISCONTINUED | OUTPATIENT
Start: 2023-12-22 | End: 2023-12-25 | Stop reason: HOSPADM

## 2023-12-22 RX ORDER — POLYETHYLENE GLYCOL 3350 17 G/17G
17 POWDER, FOR SOLUTION ORAL DAILY PRN
Status: DISCONTINUED | OUTPATIENT
Start: 2023-12-22 | End: 2023-12-25 | Stop reason: HOSPADM

## 2023-12-22 RX ORDER — FENTANYL CITRATE 50 UG/ML
25 INJECTION, SOLUTION INTRAMUSCULAR; INTRAVENOUS
Status: DISCONTINUED | OUTPATIENT
Start: 2023-12-22 | End: 2023-12-22

## 2023-12-22 RX ORDER — SODIUM CHLORIDE 0.9 % (FLUSH) 0.9 %
10 SYRINGE (ML) INJECTION EVERY 12 HOURS SCHEDULED
Status: DISCONTINUED | OUTPATIENT
Start: 2023-12-22 | End: 2023-12-25 | Stop reason: HOSPADM

## 2023-12-22 RX ORDER — ONDANSETRON 2 MG/ML
4 INJECTION INTRAMUSCULAR; INTRAVENOUS EVERY 6 HOURS PRN
Status: DISCONTINUED | OUTPATIENT
Start: 2023-12-22 | End: 2023-12-25 | Stop reason: HOSPADM

## 2023-12-22 RX ORDER — BISACODYL 5 MG/1
5 TABLET, DELAYED RELEASE ORAL DAILY PRN
Status: DISCONTINUED | OUTPATIENT
Start: 2023-12-22 | End: 2023-12-25 | Stop reason: HOSPADM

## 2023-12-22 RX ORDER — HYDRALAZINE HYDROCHLORIDE 20 MG/ML
10 INJECTION INTRAMUSCULAR; INTRAVENOUS EVERY 6 HOURS PRN
Status: DISCONTINUED | OUTPATIENT
Start: 2023-12-22 | End: 2023-12-25 | Stop reason: HOSPADM

## 2023-12-22 RX ORDER — KETOROLAC TROMETHAMINE 30 MG/ML
30 INJECTION, SOLUTION INTRAMUSCULAR; INTRAVENOUS ONCE
Status: COMPLETED | OUTPATIENT
Start: 2023-12-22 | End: 2023-12-22

## 2023-12-22 RX ORDER — SODIUM CHLORIDE 9 MG/ML
150 INJECTION, SOLUTION INTRAVENOUS CONTINUOUS
Status: DISCONTINUED | OUTPATIENT
Start: 2023-12-22 | End: 2023-12-24

## 2023-12-22 RX ORDER — HYDROMORPHONE HYDROCHLORIDE 1 MG/ML
0.5 INJECTION, SOLUTION INTRAMUSCULAR; INTRAVENOUS; SUBCUTANEOUS
Status: DISCONTINUED | OUTPATIENT
Start: 2023-12-22 | End: 2023-12-22

## 2023-12-22 RX ORDER — HYDRALAZINE HYDROCHLORIDE 20 MG/ML
10 INJECTION INTRAMUSCULAR; INTRAVENOUS ONCE
Status: COMPLETED | OUTPATIENT
Start: 2023-12-22 | End: 2023-12-22

## 2023-12-22 RX ORDER — PHENOBARBITAL, HYOSCYAMINE SULFATE, ATROPINE SULFATE AND SCOPOLAMINE HYDROBROMIDE .0194; .1037; 16.2; .0065 MG/1; MG/1; MG/1; MG/1
2 TABLET ORAL ONCE
Status: COMPLETED | OUTPATIENT
Start: 2023-12-22 | End: 2023-12-22

## 2023-12-22 RX ORDER — PANTOPRAZOLE SODIUM 40 MG/10ML
40 INJECTION, POWDER, LYOPHILIZED, FOR SOLUTION INTRAVENOUS ONCE
Status: COMPLETED | OUTPATIENT
Start: 2023-12-22 | End: 2023-12-22

## 2023-12-22 RX ORDER — OXYCODONE HYDROCHLORIDE 10 MG/1
10 TABLET ORAL
Status: CANCELLED | OUTPATIENT
Start: 2023-12-23

## 2023-12-22 RX ORDER — ASPIRIN 325 MG
325 TABLET ORAL ONCE
Status: COMPLETED | OUTPATIENT
Start: 2023-12-22 | End: 2023-12-22

## 2023-12-22 RX ORDER — METHION/INOS/CHOL BT/B COM/LIV 110MG-86MG
100 CAPSULE ORAL DAILY
Status: DISCONTINUED | OUTPATIENT
Start: 2023-12-28 | End: 2023-12-25 | Stop reason: HOSPADM

## 2023-12-22 RX ORDER — HEPARIN SODIUM 5000 [USP'U]/ML
5000 INJECTION, SOLUTION INTRAVENOUS; SUBCUTANEOUS EVERY 8 HOURS SCHEDULED
Status: DISCONTINUED | OUTPATIENT
Start: 2023-12-22 | End: 2023-12-25 | Stop reason: HOSPADM

## 2023-12-22 RX ORDER — ALUMINA, MAGNESIA, AND SIMETHICONE 2400; 2400; 240 MG/30ML; MG/30ML; MG/30ML
15 SUSPENSION ORAL ONCE
Status: COMPLETED | OUTPATIENT
Start: 2023-12-22 | End: 2023-12-22

## 2023-12-22 RX ORDER — LIDOCAINE HYDROCHLORIDE 20 MG/ML
15 SOLUTION OROPHARYNGEAL ONCE
Status: COMPLETED | OUTPATIENT
Start: 2023-12-22 | End: 2023-12-22

## 2023-12-22 RX ORDER — SODIUM CHLORIDE 0.9 % (FLUSH) 0.9 %
10 SYRINGE (ML) INJECTION AS NEEDED
Status: DISCONTINUED | OUTPATIENT
Start: 2023-12-22 | End: 2023-12-25 | Stop reason: HOSPADM

## 2023-12-22 RX ORDER — SODIUM CHLORIDE 9 MG/ML
40 INJECTION, SOLUTION INTRAVENOUS AS NEEDED
Status: DISCONTINUED | OUTPATIENT
Start: 2023-12-22 | End: 2023-12-25 | Stop reason: HOSPADM

## 2023-12-22 RX ADMIN — ALUMINUM HYDROXIDE, MAGNESIUM HYDROXIDE, AND DIMETHICONE 15 ML: 400; 400; 40 SUSPENSION ORAL at 15:33

## 2023-12-22 RX ADMIN — HYDROMORPHONE HYDROCHLORIDE 1 MG: 1 INJECTION, SOLUTION INTRAMUSCULAR; INTRAVENOUS; SUBCUTANEOUS at 20:29

## 2023-12-22 RX ADMIN — PHENOBARBITAL, HYOSCYAMINE SULFATE, ATROPINE SULFATE, SCOPOLAMINE HYDROBROMIDE 32.4 MG: 16.2; .1037; .0194; .0065 TABLET ORAL at 15:33

## 2023-12-22 RX ADMIN — MORPHINE SULFATE 4 MG: 4 INJECTION, SOLUTION INTRAMUSCULAR; INTRAVENOUS at 18:35

## 2023-12-22 RX ADMIN — PANTOPRAZOLE SODIUM 40 MG: 40 INJECTION, POWDER, FOR SOLUTION INTRAVENOUS at 15:33

## 2023-12-22 RX ADMIN — HYDROMORPHONE HYDROCHLORIDE 1 MG: 1 INJECTION, SOLUTION INTRAMUSCULAR; INTRAVENOUS; SUBCUTANEOUS at 22:32

## 2023-12-22 RX ADMIN — SODIUM CHLORIDE 1000 ML: 9 INJECTION, SOLUTION INTRAVENOUS at 11:11

## 2023-12-22 RX ADMIN — KETOROLAC TROMETHAMINE 30 MG: 30 INJECTION, SOLUTION INTRAMUSCULAR; INTRAVENOUS at 11:11

## 2023-12-22 RX ADMIN — FOLIC ACID 1 MG: 5 INJECTION, SOLUTION INTRAMUSCULAR; INTRAVENOUS; SUBCUTANEOUS at 22:15

## 2023-12-22 RX ADMIN — Medication 10 ML: at 20:23

## 2023-12-22 RX ADMIN — MORPHINE SULFATE 3 MG: 4 INJECTION, SOLUTION INTRAMUSCULAR; INTRAVENOUS at 17:14

## 2023-12-22 RX ADMIN — FENTANYL CITRATE 25 MCG: 50 INJECTION, SOLUTION INTRAMUSCULAR; INTRAVENOUS at 15:02

## 2023-12-22 RX ADMIN — HEPARIN SODIUM 5000 UNITS: 5000 INJECTION INTRAVENOUS; SUBCUTANEOUS at 22:14

## 2023-12-22 RX ADMIN — HYDROMORPHONE HYDROCHLORIDE 1 MG: 1 INJECTION, SOLUTION INTRAMUSCULAR; INTRAVENOUS; SUBCUTANEOUS at 12:47

## 2023-12-22 RX ADMIN — HYDROMORPHONE HYDROCHLORIDE 1 MG: 1 INJECTION, SOLUTION INTRAMUSCULAR; INTRAVENOUS; SUBCUTANEOUS at 22:03

## 2023-12-22 RX ADMIN — SODIUM CHLORIDE 75 ML/HR: 9 INJECTION, SOLUTION INTRAVENOUS at 20:22

## 2023-12-22 RX ADMIN — HYDRALAZINE HYDROCHLORIDE 10 MG: 20 INJECTION INTRAMUSCULAR; INTRAVENOUS at 19:36

## 2023-12-22 RX ADMIN — HYDROMORPHONE HYDROCHLORIDE 1 MG: 1 INJECTION, SOLUTION INTRAMUSCULAR; INTRAVENOUS; SUBCUTANEOUS at 11:45

## 2023-12-22 RX ADMIN — SODIUM CHLORIDE 1000 ML: 9 INJECTION, SOLUTION INTRAVENOUS at 18:37

## 2023-12-22 RX ADMIN — ONDANSETRON 4 MG: 2 INJECTION INTRAMUSCULAR; INTRAVENOUS at 11:11

## 2023-12-22 RX ADMIN — ASPIRIN 325 MG: 325 TABLET ORAL at 11:11

## 2023-12-22 RX ADMIN — ONDANSETRON 4 MG: 2 INJECTION INTRAMUSCULAR; INTRAVENOUS at 19:40

## 2023-12-22 RX ADMIN — MORPHINE SULFATE 4 MG: 4 INJECTION, SOLUTION INTRAMUSCULAR; INTRAVENOUS at 15:50

## 2023-12-22 RX ADMIN — IOPAMIDOL 70 ML: 612 INJECTION, SOLUTION INTRAVENOUS at 14:13

## 2023-12-22 RX ADMIN — THIAMINE HYDROCHLORIDE 200 MG: 100 INJECTION, SOLUTION INTRAMUSCULAR; INTRAVENOUS at 22:15

## 2023-12-22 RX ADMIN — LIDOCAINE HYDROCHLORIDE 15 ML: 20 SOLUTION ORAL; TOPICAL at 15:34

## 2023-12-22 RX ADMIN — MORPHINE SULFATE 4 MG: 4 INJECTION, SOLUTION INTRAMUSCULAR; INTRAVENOUS at 14:29

## 2023-12-22 NOTE — ED PROVIDER NOTES
Subjective   History of Present Illness  Patient is a 48-year-old male who presents today complaining of right sided upper abdominal pain as well as pain radiating to the chest.  Patient reports he has had some shortness of breath.  Patient reports pain started last night.  Patient reports that upon awakening this morning pain was worse.  Patient denies any alleviating worsening factors.  Patient denies fever.        Review of Systems   Constitutional: Negative.    HENT: Negative.     Eyes: Negative.    Respiratory: Negative.     Cardiovascular: Negative.    Gastrointestinal:  Positive for nausea and vomiting.   Endocrine: Negative.    Genitourinary: Negative.    Musculoskeletal: Negative.    Skin: Negative.    Allergic/Immunologic: Negative.    Neurological: Negative.    Hematological: Negative.    Psychiatric/Behavioral: Negative.         Past Medical History:   Diagnosis Date    Arthritis October 2007    Arthritis in back and neck    GERD (gastroesophageal reflux disease)     Headache 2007    Headaches due to disc in neck.    Hyperlipidemia     Hypertension     Low back pain 10/05/09    Hurt back and had back surgery 04/16/2014. Hit a nerve       Allergies   Allergen Reactions    Augmentin [Amoxicillin-Pot Clavulanate] Hives and Swelling     Throat swelling    Metoprolol Unknown - Low Severity       Past Surgical History:   Procedure Laterality Date    ANTERIOR CERVICAL DISCECTOMY W/ FUSION N/A 8/21/2023    Procedure: CERVICAL DISCECTOMY ANTERIOR WITH FUSION C5-7;  Surgeon: Julio C Bryson MD;  Location: [facility];  Service: Neurosurgery;  Laterality: N/A;    FRACTURE SURGERY Left     Foot    KNEE ARTHROSCOPY Left     LUMBAR DISCECTOMY  04/16/2014    LT L5-S1 Discectomy, L4-5 Lami with Disc - Dr. Esequiel Joshi       Family History   Problem Relation Age of Onset    Hyperlipidemia Father     Hypertension Father     Breast cancer Mother     Cancer Mother         Breast cancer    Depression Mother         Depression  nerve problems    Prostate cancer Paternal Grandfather     Cancer Paternal Grandfather         Prostate cancer    Hyperlipidemia Paternal Grandfather     Prostate cancer Maternal Grandfather     Arthritis Maternal Grandfather     Cancer Maternal Grandfather         Prostate cancer    Heart disease Maternal Grandfather         Heart attack@90    Hyperlipidemia Maternal Grandfather     Cancer Maternal Grandmother         Lung cancer    Depression Maternal Grandmother         Depression nerve problems    Stroke Maternal Grandmother         Brain anurism    Arthritis Paternal Uncle     Early death Sister         Brain didnt develop completely       Social History     Socioeconomic History    Marital status:    Tobacco Use    Smoking status: Never    Smokeless tobacco: Current     Types: Snuff   Vaping Use    Vaping Use: Never used   Substance and Sexual Activity    Alcohol use: Yes     Comment: OCCASIONALLY    Drug use: No    Sexual activity: Yes     Partners: Female     Birth control/protection: None           Objective   Physical Exam  Vitals and nursing note reviewed.   Constitutional:       Appearance: He is well-developed.   HENT:      Head: Normocephalic.      Right Ear: External ear normal.      Left Ear: External ear normal.   Eyes:      Conjunctiva/sclera: Conjunctivae normal.      Pupils: Pupils are equal, round, and reactive to light.   Cardiovascular:      Rate and Rhythm: Normal rate and regular rhythm.      Heart sounds: Normal heart sounds.   Pulmonary:      Effort: Pulmonary effort is normal.      Breath sounds: Normal breath sounds.   Abdominal:      General: Bowel sounds are normal.      Palpations: Abdomen is soft.   Musculoskeletal:         General: Normal range of motion.      Cervical back: Normal range of motion and neck supple.   Skin:     General: Skin is warm and dry.      Capillary Refill: Capillary refill takes less than 2 seconds.   Neurological:      Mental Status: He is alert and  oriented to person, place, and time.   Psychiatric:         Behavior: Behavior normal.         Thought Content: Thought content normal.         Procedures           Electronically signed by ENRICO Bear, 12/22/23, 2:22 PM EST.      ED Course  ED Course as of 12/22/23 1819   Fri Dec 22, 2023   1052 EKG notes sinus rhythm.  82 bpm.  I directly evaluated the EKG of this patient and noted no acute abnormalities.  Electronically signed by Moi Mary DO, 12/22/23, 10:52 AM EST.   [SF]   1406 XR Chest 1 View [MANA]   1421 Endorsed to Dr. Gan [MANA]   1527 Lipase(!): 2,321 [JYOTHI]   1603 Total Bilirubin(!): 1.9 [JYOTHI]      ED Course User Index  [MANA] Tin Garcia APRN  [JYOTHI] Merritt Gan MD  [SF] Moi Mary DO                                 [facility]  Patient presents with severe abdominal pain multiple labs and images are pending.    Follow-up examination patient is reporting pain operatively appears to be slightly uncomfortable yet not a proportion.  Vitals are stable.  Heart and lungs unremarkable clear linear speech tenderness in the epigastric region negative Londono sign well-perfused good peripheral pulses    Medical Decision Making After reviewing HPI, review of systems available studies and history, producing an:    Assessment and plan:   This patient on my examination is a nontoxic does have pain in the epigastric region will add a lipase, follow-up lipase is extremely elevated correlating with a CT scan showing acute pancreatitis.  No common bile duct dilation or stone appreciated.  Did briefly discussed the case with , recommends a MRCP to rule out stone before admission here.    At this point lower suspicion given his overall picture this appears to be acute pancreatitis without a stone involvement.  Patient does not utilize alcohol will check a lipid panel presently.    Discussed with  laboratory, radiology studies, diagnosis and action plan, and  follow-up.  Patient verbalized understanding and agreement    Clinical impression:  Acute pancreatitis  Abdominal pain secondary above  Nausea: Secondary to above      Disposition: Admit  Condition: Stable     This note was performed, in part, by voice-recognition software and may contain dictated related graphical errors including word substitution errors may exist.  These areas have been corrected to the best of my ability, however some errors may remain due to prioritization of patient care.    Under the 21st Century Cures Act, medical progress note should be visible to the patient.  It must be taken into consideration that these notes will include professional medical terminology, protocols and practices that may be somewhat confusing without interpretation from your medical team.  It is strongly advisable to review this documentation with your primary care provider.  The intent of this progress note and other documents are to communicate information between medical professionals involved in your care and to serve as future reference for physicians or other professionals when reviewing your case                          Medical Decision Making  Amount and/or Complexity of Data Reviewed  Labs: ordered.  Radiology: ordered. Decision-making details documented in ED Course.  ECG/medicine tests: ordered.    Risk  OTC drugs.  Prescription drug management.        Final diagnoses:   None       ED Disposition  ED Disposition       None            No follow-up provider specified.       Medication List      No changes were made to your prescriptions during this visit.            Merritt Gan MD  12/22/23 2013

## 2023-12-23 ENCOUNTER — APPOINTMENT (OUTPATIENT)
Dept: CARDIOLOGY | Facility: HOSPITAL | Age: 48
DRG: 439 | End: 2023-12-23
Payer: MEDICARE

## 2023-12-23 LAB
ALBUMIN SERPL-MCNC: 4.1 G/DL (ref 3.5–5.2)
ALBUMIN/GLOB SERPL: 1 G/DL
ALP SERPL-CCNC: 158 U/L (ref 39–117)
ALT SERPL W P-5'-P-CCNC: 97 U/L (ref 1–41)
ANION GAP SERPL CALCULATED.3IONS-SCNC: 18.2 MMOL/L (ref 5–15)
AST SERPL-CCNC: 231 U/L (ref 1–40)
BH CV ECHO MEAS - ACS: 2 CM
BH CV ECHO MEAS - AO MAX PG: 6.3 MMHG
BH CV ECHO MEAS - AO MEAN PG: 4 MMHG
BH CV ECHO MEAS - AO ROOT DIAM: 3.3 CM
BH CV ECHO MEAS - AO V2 MAX: 125 CM/SEC
BH CV ECHO MEAS - AO V2 VTI: 22.3 CM
BH CV ECHO MEAS - EDV(CUBED): 110.6 ML
BH CV ECHO MEAS - EDV(MOD-SP4): 71.3 ML
BH CV ECHO MEAS - EF(MOD-BP): 61 %
BH CV ECHO MEAS - EF(MOD-SP4): 61.9 %
BH CV ECHO MEAS - ESV(CUBED): 32.8 ML
BH CV ECHO MEAS - ESV(MOD-SP4): 27.2 ML
BH CV ECHO MEAS - FS: 33.3 %
BH CV ECHO MEAS - IVS/LVPW: 0.9 CM
BH CV ECHO MEAS - IVSD: 0.9 CM
BH CV ECHO MEAS - LA DIMENSION: 4.5 CM
BH CV ECHO MEAS - LAT PEAK E' VEL: 13.9 CM/SEC
BH CV ECHO MEAS - LV DIASTOLIC VOL/BSA (35-75): 33.9 CM2
BH CV ECHO MEAS - LV MASS(C)D: 158.8 GRAMS
BH CV ECHO MEAS - LV SYSTOLIC VOL/BSA (12-30): 12.9 CM2
BH CV ECHO MEAS - LVIDD: 4.8 CM
BH CV ECHO MEAS - LVIDS: 3.2 CM
BH CV ECHO MEAS - LVOT AREA: 3.5 CM2
BH CV ECHO MEAS - LVOT DIAM: 2.1 CM
BH CV ECHO MEAS - LVPWD: 1 CM
BH CV ECHO MEAS - MED PEAK E' VEL: 8.7 CM/SEC
BH CV ECHO MEAS - MV A MAX VEL: 85.1 CM/SEC
BH CV ECHO MEAS - MV E MAX VEL: 79 CM/SEC
BH CV ECHO MEAS - MV E/A: 0.93
BH CV ECHO MEAS - PA ACC TIME: 0.09 SEC
BH CV ECHO MEAS - RAP SYSTOLE: 10 MMHG
BH CV ECHO MEAS - RVSP: 38.5 MMHG
BH CV ECHO MEAS - SI(MOD-SP4): 21 ML/M2
BH CV ECHO MEAS - SV(MOD-SP4): 44.1 ML
BH CV ECHO MEAS - TAPSE (>1.6): 1.61 CM
BH CV ECHO MEAS - TR MAX PG: 28.5 MMHG
BH CV ECHO MEAS - TR MAX VEL: 267 CM/SEC
BH CV ECHO MEASUREMENTS AVERAGE E/E' RATIO: 6.99
BILIRUB SERPL-MCNC: 1.5 MG/DL (ref 0–1.2)
BUN SERPL-MCNC: 16 MG/DL (ref 6–20)
BUN/CREAT SERPL: 16.5 (ref 7–25)
CALCIUM SPEC-SCNC: 9.2 MG/DL (ref 8.6–10.5)
CHLORIDE SERPL-SCNC: 97 MMOL/L (ref 98–107)
CO2 SERPL-SCNC: 17.8 MMOL/L (ref 22–29)
CREAT SERPL-MCNC: 0.97 MG/DL (ref 0.76–1.27)
D-LACTATE SERPL-SCNC: 1.6 MMOL/L (ref 0.5–2)
DEPRECATED RDW RBC AUTO: 51.2 FL (ref 37–54)
EGFRCR SERPLBLD CKD-EPI 2021: 96.3 ML/MIN/1.73
ERYTHROCYTE [DISTWIDTH] IN BLOOD BY AUTOMATED COUNT: 13.7 % (ref 12.3–15.4)
GEN 5 2HR TROPONIN T REFLEX: <6 NG/L
GLOBULIN UR ELPH-MCNC: 4.3 GM/DL
GLUCOSE BLDC GLUCOMTR-MCNC: 129 MG/DL (ref 70–130)
GLUCOSE SERPL-MCNC: 144 MG/DL (ref 65–99)
HCT VFR BLD AUTO: 40.1 % (ref 37.5–51)
HGB BLD-MCNC: 13.4 G/DL (ref 13–17.7)
LEFT ATRIUM VOLUME INDEX: 25.6 ML/M2
LIPASE SERPL-CCNC: 2047 U/L (ref 13–60)
MCH RBC QN AUTO: 33.4 PG (ref 26.6–33)
MCHC RBC AUTO-ENTMCNC: 33.4 G/DL (ref 31.5–35.7)
MCV RBC AUTO: 100 FL (ref 79–97)
PLATELET # BLD AUTO: 156 10*3/MM3 (ref 140–450)
PMV BLD AUTO: 11 FL (ref 6–12)
POTASSIUM SERPL-SCNC: 4 MMOL/L (ref 3.5–5.2)
PROT SERPL-MCNC: 8.4 G/DL (ref 6–8.5)
QT INTERVAL: 336 MS
QTC INTERVAL: 464 MS
RBC # BLD AUTO: 4.01 10*6/MM3 (ref 4.14–5.8)
SODIUM SERPL-SCNC: 133 MMOL/L (ref 136–145)
TROPONIN T DELTA: NORMAL
TROPONIN T SERPL HS-MCNC: <6 NG/L
WBC NRBC COR # BLD AUTO: 15.25 10*3/MM3 (ref 3.4–10.8)

## 2023-12-23 PROCEDURE — 25010000002 HYDROMORPHONE 1 MG/ML SOLUTION: Performed by: HOSPITALIST

## 2023-12-23 PROCEDURE — 93010 ELECTROCARDIOGRAM REPORT: CPT | Performed by: INTERNAL MEDICINE

## 2023-12-23 PROCEDURE — 99233 SBSQ HOSP IP/OBS HIGH 50: CPT | Performed by: INTERNAL MEDICINE

## 2023-12-23 PROCEDURE — 82948 REAGENT STRIP/BLOOD GLUCOSE: CPT

## 2023-12-23 PROCEDURE — 25010000002 AZTREONAM PER 500 MG: Performed by: INTERNAL MEDICINE

## 2023-12-23 PROCEDURE — 84484 ASSAY OF TROPONIN QUANT: CPT | Performed by: INTERNAL MEDICINE

## 2023-12-23 PROCEDURE — 85027 COMPLETE CBC AUTOMATED: CPT | Performed by: INTERNAL MEDICINE

## 2023-12-23 PROCEDURE — 80053 COMPREHEN METABOLIC PANEL: CPT | Performed by: INTERNAL MEDICINE

## 2023-12-23 PROCEDURE — 25010000002 METRONIDAZOLE 500 MG/100ML SOLUTION: Performed by: INTERNAL MEDICINE

## 2023-12-23 PROCEDURE — 83690 ASSAY OF LIPASE: CPT | Performed by: HOSPITALIST

## 2023-12-23 PROCEDURE — 93005 ELECTROCARDIOGRAM TRACING: CPT | Performed by: INTERNAL MEDICINE

## 2023-12-23 PROCEDURE — 25010000002 HEPARIN (PORCINE) PER 1000 UNITS: Performed by: INTERNAL MEDICINE

## 2023-12-23 PROCEDURE — 93306 TTE W/DOPPLER COMPLETE: CPT

## 2023-12-23 PROCEDURE — 93306 TTE W/DOPPLER COMPLETE: CPT | Performed by: INTERNAL MEDICINE

## 2023-12-23 PROCEDURE — 83605 ASSAY OF LACTIC ACID: CPT | Performed by: INTERNAL MEDICINE

## 2023-12-23 PROCEDURE — 25010000002 THIAMINE PER 100 MG: Performed by: HOSPITALIST

## 2023-12-23 PROCEDURE — 25010000002 LABETALOL 5 MG/ML SOLUTION

## 2023-12-23 PROCEDURE — 25810000003 SODIUM CHLORIDE 0.9 % SOLUTION: Performed by: HOSPITALIST

## 2023-12-23 RX ORDER — HYDROCHLOROTHIAZIDE 25 MG/1
25 TABLET ORAL EVERY MORNING
Status: CANCELLED | OUTPATIENT
Start: 2023-12-23

## 2023-12-23 RX ORDER — CYCLOBENZAPRINE HCL 10 MG
10 TABLET ORAL 3 TIMES DAILY PRN
Status: DISCONTINUED | OUTPATIENT
Start: 2023-12-23 | End: 2023-12-25 | Stop reason: HOSPADM

## 2023-12-23 RX ORDER — POTASSIUM CHLORIDE 20 MEQ/1
20 TABLET, EXTENDED RELEASE ORAL 2 TIMES DAILY
Status: CANCELLED | OUTPATIENT
Start: 2023-12-23

## 2023-12-23 RX ORDER — CALCIUM CARBONATE 500 MG/1
1 TABLET, CHEWABLE ORAL 3 TIMES DAILY PRN
Status: DISCONTINUED | OUTPATIENT
Start: 2023-12-23 | End: 2023-12-25 | Stop reason: HOSPADM

## 2023-12-23 RX ORDER — PRAVASTATIN SODIUM 40 MG
20 TABLET ORAL DAILY
Status: DISCONTINUED | OUTPATIENT
Start: 2023-12-23 | End: 2023-12-25 | Stop reason: HOSPADM

## 2023-12-23 RX ORDER — GABAPENTIN 400 MG/1
800 CAPSULE ORAL EVERY 8 HOURS SCHEDULED
Status: DISCONTINUED | OUTPATIENT
Start: 2023-12-23 | End: 2023-12-25 | Stop reason: HOSPADM

## 2023-12-23 RX ORDER — AMLODIPINE BESYLATE 10 MG/1
10 TABLET ORAL DAILY
Status: DISCONTINUED | OUTPATIENT
Start: 2023-12-23 | End: 2023-12-25 | Stop reason: HOSPADM

## 2023-12-23 RX ORDER — DULOXETIN HYDROCHLORIDE 30 MG/1
30 CAPSULE, DELAYED RELEASE ORAL DAILY
Status: DISCONTINUED | OUTPATIENT
Start: 2023-12-23 | End: 2023-12-25 | Stop reason: HOSPADM

## 2023-12-23 RX ORDER — METRONIDAZOLE 500 MG/100ML
500 INJECTION, SOLUTION INTRAVENOUS EVERY 8 HOURS
Qty: 2100 ML | Refills: 0 | Status: DISCONTINUED | OUTPATIENT
Start: 2023-12-23 | End: 2023-12-25 | Stop reason: HOSPADM

## 2023-12-23 RX ORDER — NITROGLYCERIN 0.4 MG/1
TABLET SUBLINGUAL
Status: DISPENSED
Start: 2023-12-23 | End: 2023-12-24

## 2023-12-23 RX ORDER — VALSARTAN 160 MG/1
320 TABLET ORAL DAILY
Status: CANCELLED | OUTPATIENT
Start: 2023-12-23

## 2023-12-23 RX ORDER — LABETALOL HYDROCHLORIDE 5 MG/ML
10 INJECTION, SOLUTION INTRAVENOUS ONCE
Status: COMPLETED | OUTPATIENT
Start: 2023-12-23 | End: 2023-12-23

## 2023-12-23 RX ORDER — PANTOPRAZOLE SODIUM 40 MG/1
40 TABLET, DELAYED RELEASE ORAL
Status: DISCONTINUED | OUTPATIENT
Start: 2023-12-24 | End: 2023-12-25 | Stop reason: HOSPADM

## 2023-12-23 RX ADMIN — HEPARIN SODIUM 5000 UNITS: 5000 INJECTION INTRAVENOUS; SUBCUTANEOUS at 14:39

## 2023-12-23 RX ADMIN — AMLODIPINE BESYLATE 10 MG: 10 TABLET ORAL at 18:09

## 2023-12-23 RX ADMIN — ANTACID TABLETS 1 TABLET: 500 TABLET, CHEWABLE ORAL at 23:01

## 2023-12-23 RX ADMIN — HYDROMORPHONE HYDROCHLORIDE 2 MG: 1 INJECTION, SOLUTION INTRAMUSCULAR; INTRAVENOUS; SUBCUTANEOUS at 10:02

## 2023-12-23 RX ADMIN — HYDROMORPHONE HYDROCHLORIDE 2 MG: 1 INJECTION, SOLUTION INTRAMUSCULAR; INTRAVENOUS; SUBCUTANEOUS at 21:55

## 2023-12-23 RX ADMIN — SODIUM CHLORIDE 2 G: 9 INJECTION, SOLUTION INTRAVENOUS at 23:38

## 2023-12-23 RX ADMIN — SODIUM CHLORIDE 150 ML/HR: 9 INJECTION, SOLUTION INTRAVENOUS at 12:18

## 2023-12-23 RX ADMIN — AZTREONAM 2 G: 2 INJECTION, POWDER, LYOPHILIZED, FOR SOLUTION INTRAMUSCULAR; INTRAVENOUS at 14:38

## 2023-12-23 RX ADMIN — THIAMINE HYDROCHLORIDE 200 MG: 100 INJECTION, SOLUTION INTRAMUSCULAR; INTRAVENOUS at 05:04

## 2023-12-23 RX ADMIN — HEPARIN SODIUM 5000 UNITS: 5000 INJECTION INTRAVENOUS; SUBCUTANEOUS at 05:05

## 2023-12-23 RX ADMIN — HYDROMORPHONE HYDROCHLORIDE 2 MG: 1 INJECTION, SOLUTION INTRAMUSCULAR; INTRAVENOUS; SUBCUTANEOUS at 18:01

## 2023-12-23 RX ADMIN — HYDROMORPHONE HYDROCHLORIDE 2 MG: 1 INJECTION, SOLUTION INTRAMUSCULAR; INTRAVENOUS; SUBCUTANEOUS at 14:12

## 2023-12-23 RX ADMIN — GABAPENTIN 800 MG: 400 CAPSULE ORAL at 21:00

## 2023-12-23 RX ADMIN — METRONIDAZOLE 500 MG: 500 INJECTION, SOLUTION INTRAVENOUS at 14:32

## 2023-12-23 RX ADMIN — HYDROMORPHONE HYDROCHLORIDE 2 MG: 1 INJECTION, SOLUTION INTRAMUSCULAR; INTRAVENOUS; SUBCUTANEOUS at 04:06

## 2023-12-23 RX ADMIN — FOLIC ACID 1 MG: 5 INJECTION, SOLUTION INTRAMUSCULAR; INTRAVENOUS; SUBCUTANEOUS at 08:12

## 2023-12-23 RX ADMIN — GABAPENTIN 800 MG: 400 CAPSULE ORAL at 16:11

## 2023-12-23 RX ADMIN — HYDROMORPHONE HYDROCHLORIDE 2 MG: 1 INJECTION, SOLUTION INTRAMUSCULAR; INTRAVENOUS; SUBCUTANEOUS at 02:13

## 2023-12-23 RX ADMIN — THIAMINE HYDROCHLORIDE 200 MG: 100 INJECTION, SOLUTION INTRAMUSCULAR; INTRAVENOUS at 21:54

## 2023-12-23 RX ADMIN — LABETALOL HYDROCHLORIDE 10 MG: 5 INJECTION, SOLUTION INTRAVENOUS at 01:04

## 2023-12-23 RX ADMIN — HYDROMORPHONE HYDROCHLORIDE 2 MG: 1 INJECTION, SOLUTION INTRAMUSCULAR; INTRAVENOUS; SUBCUTANEOUS at 23:52

## 2023-12-23 RX ADMIN — HYDROMORPHONE HYDROCHLORIDE 2 MG: 1 INJECTION, SOLUTION INTRAMUSCULAR; INTRAVENOUS; SUBCUTANEOUS at 00:26

## 2023-12-23 RX ADMIN — HYDROMORPHONE HYDROCHLORIDE 2 MG: 1 INJECTION, SOLUTION INTRAMUSCULAR; INTRAVENOUS; SUBCUTANEOUS at 08:05

## 2023-12-23 RX ADMIN — CYCLOBENZAPRINE 10 MG: 10 TABLET, FILM COATED ORAL at 16:39

## 2023-12-23 RX ADMIN — Medication 10 ML: at 08:05

## 2023-12-23 RX ADMIN — HYDROMORPHONE HYDROCHLORIDE 2 MG: 1 INJECTION, SOLUTION INTRAMUSCULAR; INTRAVENOUS; SUBCUTANEOUS at 12:12

## 2023-12-23 RX ADMIN — SODIUM CHLORIDE 150 ML/HR: 9 INJECTION, SOLUTION INTRAVENOUS at 05:04

## 2023-12-23 RX ADMIN — HYDROMORPHONE HYDROCHLORIDE 2 MG: 1 INJECTION, SOLUTION INTRAMUSCULAR; INTRAVENOUS; SUBCUTANEOUS at 16:10

## 2023-12-23 RX ADMIN — METRONIDAZOLE 500 MG: 500 INJECTION, SOLUTION INTRAVENOUS at 21:59

## 2023-12-23 RX ADMIN — DULOXETINE HYDROCHLORIDE 30 MG: 30 CAPSULE, DELAYED RELEASE ORAL at 18:09

## 2023-12-23 RX ADMIN — THIAMINE HYDROCHLORIDE 200 MG: 100 INJECTION, SOLUTION INTRAMUSCULAR; INTRAVENOUS at 18:09

## 2023-12-23 RX ADMIN — Medication 10 ML: at 20:55

## 2023-12-23 RX ADMIN — HEPARIN SODIUM 5000 UNITS: 5000 INJECTION INTRAVENOUS; SUBCUTANEOUS at 21:06

## 2023-12-23 RX ADMIN — HYDROMORPHONE HYDROCHLORIDE 2 MG: 1 INJECTION, SOLUTION INTRAMUSCULAR; INTRAVENOUS; SUBCUTANEOUS at 06:04

## 2023-12-23 RX ADMIN — PRAVASTATIN SODIUM 20 MG: 40 TABLET ORAL at 18:09

## 2023-12-23 NOTE — H&P
Hospitalist History and Physical        Patient Identification  Name: Taiwo Pierce  Age/Sex: 48 y.o. male  :  1975        MRN: [id number]  Visit Number: [id number]  Admit Date: 2023   PCP: Provider, No Known          Chief complaint abdominal pain    History of Present Illness:  Patient is a 48 y.o. male with history of arthritis particularly in lower back, GERD, HA, HTN, and HLD, who presents with complaints of severe epigastric abdominal pain. He reports the pain started yesterday evening. He describes it as sharp with radiation to his RUQ and mid-back. He reports associated nausea with 2 bouts of vomiting. He initially denied drinking alcohol, but his wife said this was not true. He then said he used to drink but quit a long time ago. In actuality, he quit 6 days ago because his wife threatened to divorce him if he didn't. Prior to that, he drank 8 oz  of vodka per day. He reports drinking to cope with his back pain. He denies any tremor, confusion, hallucinations or agitation since he quit drinking, and his wife denies witnessing any such symptoms either. He also takes roxicodone 10mg 4 times per day and gabapentin 800mg 3 times per day. In the ED, he was hypertensive up to 164/113 (takes HCTZ and valsartan for HTN at home). Labs showed negative troponin x2, Na 132, K+ 3.3, cl 90, bicarb 15, anion gap 26.9, BUN 15, Cr 0.99, glucose 132, mag 2.5, alk phos 164, , , total bilirubin 1.9. Lipid panel showed elevated TC, LDL and HDL, and triglycerides also elevated but only 173. CRP was elevated at 2.31, lactate normal, lipase was elevated at 2321, and procalcitonin and WBC were normal. INR was normal at 1.10 with protime 14.7. Viral hepatitis panel was non-reactive. UA did not suggest UTI. CT abdomen/pelvis showed acute pancreatitis with fatty liver. GB US showed distended BG but no stones and normal common bile duct. MRCP showed severe acute pancreatitis, but no pseudocyst  formation, no dilated pancreatic duct, and no acute process in the liver, gallbladder, adrenal glands, spleen or left kidney (some small cysts noted on right kidney). Patient has been admitted for further management of alcoholic pancreatitis.     Review of Systems  Review of Systems   Constitutional:  Negative for activity change, appetite change, chills, diaphoresis, fatigue, fever and unexpected weight change.   HENT:  Negative for congestion, postnasal drip, rhinorrhea, sinus pressure, sinus pain and sore throat.    Eyes:  Negative for photophobia, pain, discharge, redness, itching and visual disturbance.   Respiratory:  Negative for cough, shortness of breath and wheezing.    Cardiovascular:  Negative for chest pain, palpitations and leg swelling.   Gastrointestinal:  Positive for abdominal pain, nausea and vomiting. Negative for abdominal distention, constipation and diarrhea.   Endocrine: Negative for cold intolerance, heat intolerance, polydipsia, polyphagia and polyuria.   Genitourinary:  Negative for difficulty urinating, dysuria, flank pain, frequency and hematuria.   Musculoskeletal:  Positive for arthralgias and back pain. Negative for joint swelling, myalgias, neck pain and neck stiffness.   Skin:  Negative for color change, pallor, rash and wound.   Neurological:  Negative for dizziness, tremors, seizures, syncope, weakness, light-headedness, numbness and headaches.   Hematological:  Negative for adenopathy. Does not bruise/bleed easily.   Psychiatric/Behavioral:  Negative for agitation, behavioral problems, confusion and hallucinations.        History  Past Medical History:   Diagnosis Date    Arthritis October 2007    Arthritis in back and neck    GERD (gastroesophageal reflux disease)     Headache 2007    Headaches due to disc in neck.    Hyperlipidemia     Hypertension     Low back pain 10/05/09    Hurt back and had back surgery 04/16/2014. Hit a nerve     Past Surgical History:   Procedure  Laterality Date    ANTERIOR CERVICAL DISCECTOMY W/ FUSION N/A 8/21/2023    Procedure: CERVICAL DISCECTOMY ANTERIOR WITH FUSION C5-7;  Surgeon: Julio C Bryson MD;  Location: [facility];  Service: Neurosurgery;  Laterality: N/A;    FRACTURE SURGERY Left     Foot    KNEE ARTHROSCOPY Left     LUMBAR DISCECTOMY  04/16/2014    LT L5-S1 Discectomy, L4-5 Lami with Disc - Dr. Esequiel Joshi     Family History   Problem Relation Age of Onset    Hyperlipidemia Father     Hypertension Father     Breast cancer Mother     Cancer Mother         Breast cancer    Depression Mother         Depression nerve problems    Prostate cancer Paternal Grandfather     Cancer Paternal Grandfather         Prostate cancer    Hyperlipidemia Paternal Grandfather     Prostate cancer Maternal Grandfather     Arthritis Maternal Grandfather     Cancer Maternal Grandfather         Prostate cancer    Heart disease Maternal Grandfather         Heart attack@90    Hyperlipidemia Maternal Grandfather     Cancer Maternal Grandmother         Lung cancer    Depression Maternal Grandmother         Depression nerve problems    Stroke Maternal Grandmother         Brain anurism    Arthritis Paternal Uncle     Early death Sister         Brain didnt develop completely     Social History     Tobacco Use    Smoking status: Never    Smokeless tobacco: Current     Types: Snuff   Vaping Use    Vaping Use: Never used   Substance Use Topics    Alcohol use: Yes     Comment: OCCASIONALLY    Drug use: No     Medications Prior to Admission   Medication Sig Dispense Refill Last Dose    amLODIPine (NORVASC) 10 MG tablet TAKE 1 TABLET BY MOUTH DAILY 90 tablet 0     aspirin 81 MG EC tablet Take 1 tablet by mouth Daily. 90 tablet 0     cetirizine (ZyrTEC) 5 MG chewable tablet Chew 1 tablet Daily. 10 tablet 0     cyclobenzaprine (FLEXERIL) 10 MG tablet Take 1 tablet by mouth 3 (Three) Times a Day As Needed for Muscle Spasms. 90 tablet 0     DULoxetine (CYMBALTA) 30 MG capsule TAKE 1  CAPSULE BY MOUTH DAILY 90 capsule 0     gabapentin (NEURONTIN) 800 MG tablet Take 1 tablet by mouth 3 (Three) Times a Day.       hydroCHLOROthiazide (HYDRODIURIL) 25 MG tablet TAKE ONE TABLET BY MOUTH EVERY MORNING 90 tablet 0     hydrOXYzine (ATARAX) 25 MG tablet Take 1 tablet by mouth 3 (Three) Times a Day As Needed.       ibuprofen (ADVIL,MOTRIN) 800 MG tablet Take 1 tablet by mouth Every 8 (Eight) Hours As Needed for Moderate Pain. 90 tablet 0     ketoconazole (NIZORAL) 2 % shampoo Apply  topically to the appropriate area as directed 2 (Two) Times a Week. 120 mL 2     lansoprazole (PREVACID) 30 MG capsule TAKE 1 CAPSULE BY MOUTH DAILY 90 capsule 0     metaxalone (SKELAXIN) 800 MG tablet Take 1 tablet by mouth Daily.  1     oxyCODONE (ROXICODONE) 10 MG tablet Take 1 tablet by mouth Every 4 (Four) Hours As Needed.  0     oxyCODONE (ROXICODONE) 15 MG immediate release tablet Take 10 mg by mouth Every 6 (Six) Hours.       potassium chloride (K-DUR,KLOR-CON) 20 MEQ CR tablet Take 1 tablet by mouth 2 (Two) Times a Day. 60 tablet 2     pravastatin (PRAVACHOL) 20 MG tablet Take 1 tablet by mouth Daily. 90 tablet 0     sildenafil (VIAGRA) 25 MG tablet Take 1 tablet by mouth Daily As Needed for Erectile Dysfunction. 30 tablet 2     valsartan (DIOVAN) 320 MG tablet TAKE 1 TABLET BY MOUTH DAILY 90 tablet 0      Allergies:  Augmentin [amoxicillin-pot clavulanate] and Metoprolol    Objective     Vital Signs  Temp:  [98.1 °F (36.7 °C)] 98.1 °F (36.7 °C)  Heart Rate:  [66-94] 66  Resp:  [20] 20  BP: (137-164)/() 158/110  Body mass index is 27.75 kg/m².    Physical Exam:  Physical Exam  Constitutional:       General: He is not in acute distress.     Appearance: Normal appearance. He is ill-appearing.   HENT:      Head: Normocephalic and atraumatic.      Right Ear: External ear normal.      Left Ear: External ear normal.      Nose: Nose normal.      Mouth/Throat:      Mouth: Mucous membranes are moist.      Pharynx:  Oropharynx is clear.   Eyes:      Extraocular Movements: Extraocular movements intact.      Conjunctiva/sclera: Conjunctivae normal.      Pupils: Pupils are equal, round, and reactive to light.   Cardiovascular:      Rate and Rhythm: Normal rate and regular rhythm.      Pulses: Normal pulses.      Heart sounds: Normal heart sounds. No murmur heard.  Pulmonary:      Effort: Pulmonary effort is normal. No respiratory distress.      Breath sounds: Normal breath sounds. No wheezing or rales.   Abdominal:      General: Abdomen is flat. Bowel sounds are normal. There is no distension.      Palpations: Abdomen is soft.      Tenderness: There is abdominal tenderness (epigastric region and RUQ). There is no guarding.   Musculoskeletal:         General: Normal range of motion.      Cervical back: Normal range of motion and neck supple. No tenderness.      Right lower leg: No edema.      Left lower leg: No edema.   Lymphadenopathy:      Cervical: No cervical adenopathy.   Skin:     General: Skin is warm and dry.      Capillary Refill: Capillary refill takes less than 2 seconds.      Coloration: Skin is not jaundiced.      Findings: No bruising or lesion.   Neurological:      General: No focal deficit present.      Mental Status: He is alert and oriented to person, place, and time. Mental status is at baseline.      Comments: No tremor on exam   Psychiatric:         Mood and Affect: Mood normal.         Behavior: Behavior normal.         Thought Content: Thought content normal.           Results Review:       Lab Results:  Results from last 7 days   Lab Units 12/22/23  1101   WBC 10*3/mm3 9.30   HEMOGLOBIN g/dL 13.5   PLATELETS 10*3/mm3 191     Results from last 7 days   Lab Units 12/22/23  1329   CRP mg/dL 2.31*     Results from last 7 days   Lab Units 12/22/23  1134   SODIUM mmol/L 132*   POTASSIUM mmol/L 3.3*   CHLORIDE mmol/L 90*   CO2 mmol/L 15.1*   BUN mg/dL 15   CREATININE mg/dL 0.99   CALCIUM mg/dL 9.7   GLUCOSE mg/dL  "132*         No results found for: \"HGBA1C\"  Results from last 7 days   Lab Units 12/22/23  1134   BILIRUBIN mg/dL 1.9*   ALK PHOS U/L 164*   AST (SGOT) U/L 293*   ALT (SGPT) U/L 104*     Results from last 7 days   Lab Units 12/22/23  1329 12/22/23  1134   HSTROP T ng/L <6 <6         Results from last 7 days   Lab Units 12/22/23  1917   INR  1.10           I have reviewed the patient's laboratory results.    Imaging:  Imaging Results (Last 72 Hours)       Procedure Component Value Units Date/Time    MRI abdomen wo contrast mrcp [[id number]] Collected: 12/22/23 1802     Updated: 12/22/23 1810    Narrative:      PROCEDURE: MRI examination of the abdomen performed without IV contrast  according to an MRCP protocol. The examination was performed with T2  haste and T2 fat saturation sequences. Reconstruction images were  performed and projected in multiple planes according to the MRCP  protocol. Additional imaging performed in the coronal and axial imaging  planes.     HISTORY: Pancreatitis. Acute initial episode. Acute pancreatitis.     COMPARISON: None.     FINDINGS:     Normal heart size with no pericardial effusion.  No pleural effusion.  Small volume of free fluid in the right upper quadrant.  Small volume of free fluid in the left upper quadrant adjacent to the  spleen.  Severe stranding around the pancreatic parenchyma most consistent with  acute pancreatitis related changes. Edematous appearance to the pancreas  with stranding anterior to the pancreas and within the retroperitoneum  posterior to the pancreas as well. Stranding extends to the left upper  quadrant and upper left colic gutter as well as the right  retroperitoneum towards the upper right colic gutter.  No pseudocyst formation.  Small right parapelvic renal cyst.  Small exophytic cyst at the midpole of the right kidney.  No acute process seen in the gallbladder.  No dilated pancreatic duct.  No dilated common bile duct.  No renal obstruction.  No " abdominal aortic aneurysm or retroperitoneal hemorrhage.  No free air.  No bowel obstruction.  No abscess or hematoma.       Impression:         1.  Severe acute pancreatitis.  2.  No pseudocyst formation. No dilated pancreatic duct.  3.  No acute process seen in the liver, gallbladder, adrenal glands,  spleen, and left kidney.  4.  Small right parapelvic renal cyst.  5.  Small exophytic cyst at the junction of the mid and lower pole of  the right kidney.  6.  No abscess or hematoma.  7.  No free air.     This report was finalized on 12/22/2023 6:08 PM by Ugo Ogden MD.       US Gallbladder [[id number]] Collected: 12/22/23 1552     Updated: 12/22/23 1555    Narrative:      EXAM:    US Abdomen Limited, Gallbladder     EXAM DATE:    12/22/2023 2:25 PM     CLINICAL HISTORY:    abdominal pain     TECHNIQUE:    Real-time ultrasound of the right upper quadrant with image  documentation.     COMPARISON:    No relevant prior studies available.     FINDINGS:    Liver:  Fatty infiltration of the liver.    Gallbladder:  Distended gallbladder.  No gallstones.    Common bile duct:  Unremarkable as visualized.  No stones.  No  dilation.  Common bile duct measures 0.2 cm in diameter.    Pancreas:  Unremarkable as visualized.       Impression:        Distended gallbladder.        This report was finalized on 12/22/2023 3:53 PM by Dr. Steve Hinojosa MD.       CT Abdomen Pelvis With Contrast [[id number]] Collected: 12/22/23 1420     Updated: 12/22/23 1423    Narrative:      EXAM:    CT Abdomen and Pelvis With Intravenous Contrast     EXAM DATE:    12/22/2023 1:45 PM     CLINICAL HISTORY:    abdominal pain     TECHNIQUE:    Axial computed tomography images of the abdomen and pelvis with  intravenous contrast.  Sagittal and coronal reformatted images were  created and reviewed.  This CT exam was performed using one or more of  the following dose reduction techniques:  automated exposure control,  adjustment of the mA and/or kV  according to patient size, and/or use of  iterative reconstruction technique.     COMPARISON:    No relevant prior studies available.     FINDINGS:    LUNG BASES:  Unremarkable as visualized.  No mass.  No consolidation.      ABDOMEN:    LIVER:  Fatty infiltration of the liver.    GALLBLADDER AND BILE DUCTS:  Unremarkable as visualized.  No calcified  stones.  No ductal dilation.    PANCREAS:  Stranding around the pancreas consistent with acute  pancreatitis.  No ductal dilation.    SPLEEN:  Unremarkable as visualized.  No splenomegaly.    ADRENALS:  Unremarkable as visualized.  No mass.    KIDNEYS AND URETERS:  Right renal cyst appears simple.  No  hydronephrosis.    STOMACH AND BOWEL:  Unremarkable as visualized.  No obstruction.  No  mucosal thickening.      PELVIS:    APPENDIX:  No findings to suggest acute appendicitis.    BLADDER:  Unremarkable as visualized.  No mass.    REPRODUCTIVE:  Unremarkable as visualized.      ABDOMEN and PELVIS:    INTRAPERITONEAL SPACE:  Unremarkable as visualized.  No free air.  No  significant fluid collection.    BONES/JOINTS:  Degenerative disc disease throughout the lumbar spine.   Degenerative facet arthropathy throughout the lumbar spine, most  prominent in the lower lumbar spine.  No acute fracture.  No  dislocation.    SOFT TISSUES:  Unremarkable as visualized.    VASCULATURE:  Atherosclerotic disease.  No abdominal aortic aneurysm.    LYMPH NODES:  Unremarkable as visualized.  No enlarged lymph nodes.       Impression:      1.  Stranding around the pancreas consistent with acute pancreatitis.  2.  Fatty infiltration of the liver.  3.  Degenerative changes lumbar spine as described.        This report was finalized on 12/22/2023 2:21 PM by Dr. Steve Hinojosa MD.       XR Chest 1 View [[id number]] Collected: 12/22/23 1230     Updated: 12/22/23 1233    Narrative:      EXAM:    XR Chest, 1 View     EXAM DATE:    12/22/2023 11:04 AM     CLINICAL HISTORY:    Chest Pain Triage  Protocol     TECHNIQUE:    Frontal view of the chest.     COMPARISON:    No relevant prior studies available.     FINDINGS:    LUNGS AND PLEURAL SPACES:  Unremarkable as visualized.  No  consolidation.  No pneumothorax.    HEART:  Unremarkable as visualized.  No cardiomegaly.    MEDIASTINUM:  Unremarkable as visualized.    BONES/JOINTS:  Unremarkable as visualized.       Impression:        Unremarkable exam. No acute cardiopulmonary findings identified.        This report was finalized on 12/22/2023 12:30 PM by Dr. Steve Hinojosa MD.               I have personally reviewed the patient's radiologic imaging.        EKG:   Normal sinus rhythm, HR 82, QTc 446  Nonspecific ST abnormality  Abnormal ECG  When compared with ECG of 14-AUG-2023 14:45,  No significant change was found  Confirmed by Mytrle Paul ([id number]) on 12/22/2023 6:20:19 PM    I have personally reviewed the patient's EKG. No overt ST changes appreciated.         Assessment & Plan     # Acute pancreatitis, suspect alcoholic in origin  # Alcoholic hepatitis as well, with AST/ALT ratio >2:1, hyperbilirubinemia  -Treat conservatively with bowel rest (ice chips only), aggressive IV fluid hydration, analgesics and antiemetics.     - Maddrey's discriminant score only 14, so steroids not indicated  - Continue to trend lipase  - Advance diet as pain and nausea subside    # Alcoholism  - now 6 days out from last drink  - No signs/symptoms of withdrawal thus far  - [facility] protocol ordered  - Supplement with thiamine and folate    # Hypertension, uncontrolled  - likely pain-induced  - IV dilaudid on board  - Hold home meds as HCTZ can be associated with pancreatitis and NPO status anyhow. Will add IV hydralazine PRN    # Hypokalemia  - replace per protocol  - Mag within normal limits    DVT Prophylaxis: SQ heparin    Estimated Length of Stay >2 midnights    I discussed the patient's findings, assessment and plan with the patient, his wife, and his father at bedside,  along with nursing staff on [facility].    Patient is high risk due to acute pancreatitis requiring parenteral opioids for pain control, alcohol abuse at risk for withdrawal    Yury Scott MD  12/22/23  20:23 EST

## 2023-12-23 NOTE — PROGRESS NOTES
[facility] HOSPITALIST PROGRESS NOTE     Patient Identification:  Name:  Taiwo Pierce  Age:  48 y.o.  Sex:  male  :  1975  MRN:  [id number]  Visit Number:  [id number]  ROOM: [address] [place] [address]     Primary Care Provider:  Provider, No Known    Length of stay in inpatient status:  1    Subjective     Chief Compliant:    Chief Complaint   Patient presents with    Chest Pain    Shortness of Breath     History of Presenting Illness:    Patient remains ill today, no acute events overnight, no new complaints, denies any fevers or chills, endorses persisting 9/10 pain, epigastric area, requiring frequent IV Dilaudid, on IVFs, WBC count worse and more tachycardic today, added IV antibiotics, added clear liquid diet, family at bedside, questions answered, updated on plan of care.  Objective     Current Hospital Meds:  aztreonam, 2 g, Intravenous, Once  aztreonam, 2 g, Intravenous, Q8H  folic acid 1 mg in sodium chloride 0.9 % 50 mL IVPB, 1 mg, Intravenous, Daily  heparin (porcine), 5,000 Units, Subcutaneous, Q8H  metroNIDAZOLE, 500 mg, Intravenous, Q8H  senna-docusate sodium, 2 tablet, Oral, BID  sodium chloride, 10 mL, Intravenous, Q12H  thiamine (B-1) IV, 200 mg, Intravenous, Q8H   Followed by  [START ON 2023] thiamine, 100 mg, Oral, Daily      sodium chloride, 150 mL/hr, Last Rate: 150 mL/hr (23 1218)      ----------------------------------------------------------------------------------------------------------------------  Vital Signs:  Temp:  [97.9 °F (36.6 °C)-98.6 °F (37 °C)] 98.6 °F (37 °C)  Heart Rate:  [] 112  Resp:  [18-20] 20  BP: (137-180)/(100-122) 155/109  SpO2:  [92 %-100 %] 92 %  on   ;   Device (Oxygen Therapy): room air  Body mass index is 27.8 kg/m².      Intake/Output Summary (Last 24 hours) at 2023 1255  Last data filed at 2023 0002  Gross per 24 hour   Intake --   Output 0 ml   Net 0 ml     "  ----------------------------------------------------------------------------------------------------------------------  Physical exam:  Constitutional:  Well-developed and well-nourished.  Mild acute distress. Mild discomfort  HENT:  Head:  Normocephalic and atraumatic.  Mouth:  Moist mucous membranes.    Eyes:  Conjunctivae and EOM are normal. No scleral icterus.    Neck:  Neck supple.  No JVD present.    Cardiovascular:  Tachycardic, regular rhythm and normal heart sounds with no murmur.  Pulmonary/Chest:  No respiratory distress, no wheezes, no crackles, with normal breath sounds and good air movement.  Abdominal:  Soft. No distension and no tenderness.   Musculoskeletal:  No tenderness, and no deformity.  No red or swollen joints anywhere.    Neurological:  Alert and oriented to person, place, and time.  No gross focal deficits   Skin:  Skin is warm and dry. No rash noted. No pallor.   Peripheral vascular:  No clubbing, no cyanosis, no edema.  Psychiatric: Appropriate mood and affect    Edited by: Salvatore Naik MD at 12/23/2023 1239  ----------------------------------------------------------------------------------------------------------------------  WBC/HGB/HCT/PLT   15.25/13.4/40.1/156 (12/23 0502)  BUN/CREAT/GLUC/ALT/AST/KRISTA/LIP    16/0.97/144/97/231/--/2,047 (12/23 0501)  LYTES - Na/K/Cl/CO2: 133*/4.0/97*/17.8* (12/23 0501)  COAG - PT/INR/PTT: 14.7/1.10/25.7* (12/22 1917)     No results found for: \"URINECX\"  No results found for: \"BLOODCX\"    I have personally looked at the labs and they are summarized above.  ----------------------------------------------------------------------------------------------------------------------  Detailed radiology reports for the last 24 hours:  MRI abdomen wo contrast mrcp    Result Date: 12/22/2023   1.  Severe acute pancreatitis. 2.  No pseudocyst formation. No dilated pancreatic duct. 3.  No acute process seen in the liver, gallbladder, adrenal glands, spleen, and " left kidney. 4.  Small right parapelvic renal cyst. 5.  Small exophytic cyst at the junction of the mid and lower pole of the right kidney. 6.  No abscess or hematoma. 7.  No free air.  This report was finalized on 12/22/2023 6:08 PM by Ugo Ogden MD.      US Gallbladder    Result Date: 12/22/2023    Distended gallbladder.   This report was finalized on 12/22/2023 3:53 PM by Dr. Steve Hinojosa MD.      CT Abdomen Pelvis With Contrast    Result Date: 12/22/2023  1.  Stranding around the pancreas consistent with acute pancreatitis. 2.  Fatty infiltration of the liver. 3.  Degenerative changes lumbar spine as described.   This report was finalized on 12/22/2023 2:21 PM by Dr. Steve Hinojosa MD.      XR Chest 1 View    Result Date: 12/22/2023    Unremarkable exam. No acute cardiopulmonary findings identified.   This report was finalized on 12/22/2023 12:30 PM by Dr. Steve Hinojosa MD.     Assessment & Plan    48M PMH History Arthritis, Chronic Lower Back Pain, GERD, Hypertension, Hyperlipidemia, Alcohol abuse, presented to [facility] emergency room 12/23 with complaints of abdominal pain.    #Acute Pancreatitis suspected due to Alcohol, now SIRS +  - Admission labs showed WBC count 9K->15K, CRP 2.31, Lipase 2300, lactate 0.9, blood cultures pending  - CT Abd/Pelvis showed stranding around pancreas with acute pancreatitis, fatty liver  - MRCP showed severe acute pancreatitis, no pseudocyst, no dilated pancreatic ducts, CBD dilation  - Given presence of acute onset persistent, severe, epigastric pain radiating to back, serum lipase/amylase > 3x upper limit of normal, characteristic findings on imaging, patient meets criteria for acute pancreatitis diagnosis.  - Given patient showing signs and symptoms of Sepsis with concern for underlying infection; Will empirically start on antibiotics  - Continue mIVFs and replace electrolytes as indicated per protocol  - Will treat pain with as needed IV narcotics for now,  will consider PCA if difficult to control  - Will add as needed IV antiemetics for nausea and vomiting  - Continue to monitor on telemetry, trend labs in AM, monitor for clinical improvement.   - If patient develops local complications such as peripancreatic fluid collection, pseudocyst, necrotic collection or walled-off necrosis; Will consult General Surgery     #Alcohol Use Disorder, Severe, with Dependence, recent cessation, monitor for withdrawal   #Acute Mild Alcohol Hepatitis  - Patient with history Alcohol Dependence, reportedly drinks stopped drinking significant amount 6 days prior to admission  - CT Abd/Pelvis showed mild fatty liver  - Status post IVF bolus in ER, continue mIVFs  - Replacing Thiamine and Folate  - Continue CIWA protocol   - Calculated Maddrey's Discriminant Function and is < 32, no steroids indicated at this time    #Hypertension/Hyperlipidemia  - Review home medications and resume as indicated, hold home Hydrochlorothiazide, as needed Hydralazine on board   - Continue to monitor on telemetry, strict I/O's, trend heart rate and blood pressure    #Electrolyte Abnormalities  - Acute Mild Hypokalemia - Replacing, on protocol    #Exophytic R kidney Cyst  - Small exophytic R renal cysts in mid-lower pole; Would refer to Urology outpatient upon discharge    #Gastroesophageal Reflux Disease  - Continue home PPI    #Anxiety/Depression  - Continue home regimen    F: Oral  E: Monitor & Replace as needed   N: Diet: Liquid Diets; Clear Liquid; Fluid Consistency: Thin (IDDSI 0)  PPx: SQH  Code Status (Patient has no pulse and is not breathing): CPR   Medical Interventions (Patient has pulse or is breathing): Full Support     Dispo: Pending workup and clinical improvement    *This patient is considered high risk secondary to acute pancreatitis, monitoring for drug withdrawal, signs and symptoms systemic infection as is SIRS +, requiring IV narcotic for pain control     Edited by: Salvatore Naik MD at  12/23/2023 1255  [facility]

## 2023-12-23 NOTE — PLAN OF CARE
Goal Outcome Evaluation:         Pt has had a non eventful day except for being in pain. He gets 2 mg of hydromorphone q2h that brings down the pain to 8/10. He was strict NPO for most of the morning but diet was advanced to clear liquids. He has been tolerating water. Pleasant. He currently is screening positive for simple sepsis with , WBC >15, and abdominal pain. I made Dr. Naik aware. Will continue to follow plan of care.       Update: 1822  Pt started complaining of lower mid sternal chest pain 9/10, tender upon palpitation, does not hurt when he holds his breath but hurts when he breaths in and out. He said it first started when he first started drinking water and got worse with sprite. Stat EKG and troponin obtained. See results. Pt stated the pain would go away if he took his acid reflux medication he has prescribed at home. I made Dr. Naik aware and he started his home medications. He received prn dilaudid 6 times this shift that he stated the lowest his pain got on a scale of 0/10 was a 6. I made Dr. Naik aware. He has fluids running at 150 ml/hr. He is being transferred to [address] [place] [address] A&O  in no distress with IV in right AC. Report given to RAYMON Willson. Pt stated he will make his family aware.

## 2023-12-23 NOTE — PLAN OF CARE
Goal Outcome Evaluation:      Pt was admitted to 3S this shift. Pt has complained of pain this shift, PRN medication was administered, see MAR. NS infusing at 150ml/hr. No signs and symptoms of acute distress noted. No complaints of chest pain or SOB reported.

## 2023-12-24 LAB
ALBUMIN SERPL-MCNC: 3.4 G/DL (ref 3.5–5.2)
ALBUMIN/GLOB SERPL: 0.8 G/DL
ALP SERPL-CCNC: 136 U/L (ref 39–117)
ALT SERPL W P-5'-P-CCNC: 73 U/L (ref 1–41)
ANION GAP SERPL CALCULATED.3IONS-SCNC: 13.1 MMOL/L (ref 5–15)
AST SERPL-CCNC: 177 U/L (ref 1–40)
BILIRUB SERPL-MCNC: 1.5 MG/DL (ref 0–1.2)
BUN SERPL-MCNC: 12 MG/DL (ref 6–20)
BUN/CREAT SERPL: 16.9 (ref 7–25)
CALCIUM SPEC-SCNC: 8.7 MG/DL (ref 8.6–10.5)
CHLORIDE SERPL-SCNC: 95 MMOL/L (ref 98–107)
CO2 SERPL-SCNC: 18.9 MMOL/L (ref 22–29)
CREAT SERPL-MCNC: 0.71 MG/DL (ref 0.76–1.27)
DEPRECATED RDW RBC AUTO: 53.2 FL (ref 37–54)
EGFRCR SERPLBLD CKD-EPI 2021: 113.2 ML/MIN/1.73
ERYTHROCYTE [DISTWIDTH] IN BLOOD BY AUTOMATED COUNT: 14 % (ref 12.3–15.4)
GLOBULIN UR ELPH-MCNC: 4.1 GM/DL
GLUCOSE SERPL-MCNC: 102 MG/DL (ref 65–99)
HCT VFR BLD AUTO: 36.8 % (ref 37.5–51)
HGB BLD-MCNC: 11.9 G/DL (ref 13–17.7)
LIPASE SERPL-CCNC: 212 U/L (ref 13–60)
MCH RBC QN AUTO: 33.1 PG (ref 26.6–33)
MCHC RBC AUTO-ENTMCNC: 32.3 G/DL (ref 31.5–35.7)
MCV RBC AUTO: 102.2 FL (ref 79–97)
PLATELET # BLD AUTO: 117 10*3/MM3 (ref 140–450)
PMV BLD AUTO: 11.4 FL (ref 6–12)
POTASSIUM SERPL-SCNC: 3.3 MMOL/L (ref 3.5–5.2)
POTASSIUM SERPL-SCNC: 3.6 MMOL/L (ref 3.5–5.2)
PROT SERPL-MCNC: 7.5 G/DL (ref 6–8.5)
QT INTERVAL: 348 MS
QTC INTERVAL: 470 MS
RBC # BLD AUTO: 3.6 10*6/MM3 (ref 4.14–5.8)
SODIUM SERPL-SCNC: 127 MMOL/L (ref 136–145)
WBC NRBC COR # BLD AUTO: 14.03 10*3/MM3 (ref 3.4–10.8)

## 2023-12-24 PROCEDURE — 25010000002 THIAMINE PER 100 MG: Performed by: HOSPITALIST

## 2023-12-24 PROCEDURE — 85027 COMPLETE CBC AUTOMATED: CPT | Performed by: INTERNAL MEDICINE

## 2023-12-24 PROCEDURE — 84132 ASSAY OF SERUM POTASSIUM: CPT | Performed by: HOSPITALIST

## 2023-12-24 PROCEDURE — 25010000002 METRONIDAZOLE 500 MG/100ML SOLUTION: Performed by: INTERNAL MEDICINE

## 2023-12-24 PROCEDURE — 93010 ELECTROCARDIOGRAM REPORT: CPT | Performed by: INTERNAL MEDICINE

## 2023-12-24 PROCEDURE — 94799 UNLISTED PULMONARY SVC/PX: CPT

## 2023-12-24 PROCEDURE — 25010000002 AZTREONAM PER 500 MG: Performed by: INTERNAL MEDICINE

## 2023-12-24 PROCEDURE — 99233 SBSQ HOSP IP/OBS HIGH 50: CPT | Performed by: INTERNAL MEDICINE

## 2023-12-24 PROCEDURE — 93005 ELECTROCARDIOGRAM TRACING: CPT

## 2023-12-24 PROCEDURE — 25010000002 HYDROMORPHONE 1 MG/ML SOLUTION: Performed by: HOSPITALIST

## 2023-12-24 PROCEDURE — 80053 COMPREHEN METABOLIC PANEL: CPT | Performed by: INTERNAL MEDICINE

## 2023-12-24 PROCEDURE — 83690 ASSAY OF LIPASE: CPT | Performed by: INTERNAL MEDICINE

## 2023-12-24 PROCEDURE — 25010000002 LEVOFLOXACIN PER 250 MG: Performed by: INTERNAL MEDICINE

## 2023-12-24 PROCEDURE — 25010000002 HEPARIN (PORCINE) PER 1000 UNITS: Performed by: INTERNAL MEDICINE

## 2023-12-24 PROCEDURE — 94761 N-INVAS EAR/PLS OXIMETRY MLT: CPT

## 2023-12-24 PROCEDURE — 25810000003 SODIUM CHLORIDE 0.9 % SOLUTION: Performed by: HOSPITALIST

## 2023-12-24 RX ORDER — LEVOFLOXACIN 5 MG/ML
750 INJECTION, SOLUTION INTRAVENOUS EVERY 24 HOURS
Qty: 900 ML | Refills: 0 | Status: DISCONTINUED | OUTPATIENT
Start: 2023-12-24 | End: 2023-12-25 | Stop reason: HOSPADM

## 2023-12-24 RX ORDER — POTASSIUM CHLORIDE 20 MEQ/1
40 TABLET, EXTENDED RELEASE ORAL EVERY 4 HOURS
Status: COMPLETED | OUTPATIENT
Start: 2023-12-24 | End: 2023-12-24

## 2023-12-24 RX ORDER — LORAZEPAM 2 MG/ML
1 INJECTION INTRAMUSCULAR
Status: DISCONTINUED | OUTPATIENT
Start: 2023-12-24 | End: 2023-12-25 | Stop reason: HOSPADM

## 2023-12-24 RX ORDER — LORAZEPAM 1 MG/1
1 TABLET ORAL EVERY 6 HOURS
Status: DISCONTINUED | OUTPATIENT
Start: 2023-12-26 | End: 2023-12-25 | Stop reason: HOSPADM

## 2023-12-24 RX ORDER — LORAZEPAM 2 MG/ML
2 INJECTION INTRAMUSCULAR
Status: DISCONTINUED | OUTPATIENT
Start: 2023-12-24 | End: 2023-12-25 | Stop reason: HOSPADM

## 2023-12-24 RX ORDER — LORAZEPAM 2 MG/1
2 TABLET ORAL EVERY 6 HOURS
Status: DISCONTINUED | OUTPATIENT
Start: 2023-12-25 | End: 2023-12-25 | Stop reason: HOSPADM

## 2023-12-24 RX ORDER — DIPHENHYDRAMINE HYDROCHLORIDE 50 MG/ML
25 INJECTION INTRAMUSCULAR; INTRAVENOUS ONCE
Status: DISCONTINUED | OUTPATIENT
Start: 2023-12-24 | End: 2023-12-25 | Stop reason: HOSPADM

## 2023-12-24 RX ORDER — BENZOCAINE/MENTHOL 6 MG-10 MG
1 LOZENGE MUCOUS MEMBRANE EVERY 12 HOURS SCHEDULED
Status: DISCONTINUED | OUTPATIENT
Start: 2023-12-24 | End: 2023-12-25 | Stop reason: HOSPADM

## 2023-12-24 RX ORDER — OXYCODONE HYDROCHLORIDE 10 MG/1
10 TABLET ORAL
Status: DISCONTINUED | OUTPATIENT
Start: 2023-12-24 | End: 2023-12-25

## 2023-12-24 RX ORDER — LORAZEPAM 1 MG/1
1 TABLET ORAL
Status: DISCONTINUED | OUTPATIENT
Start: 2023-12-24 | End: 2023-12-25 | Stop reason: HOSPADM

## 2023-12-24 RX ORDER — LORAZEPAM 2 MG/1
2 TABLET ORAL
Status: DISCONTINUED | OUTPATIENT
Start: 2023-12-24 | End: 2023-12-25 | Stop reason: HOSPADM

## 2023-12-24 RX ADMIN — THIAMINE HYDROCHLORIDE 200 MG: 100 INJECTION, SOLUTION INTRAMUSCULAR; INTRAVENOUS at 05:50

## 2023-12-24 RX ADMIN — POTASSIUM CHLORIDE 40 MEQ: 1500 TABLET, EXTENDED RELEASE ORAL at 07:34

## 2023-12-24 RX ADMIN — SODIUM CHLORIDE 150 ML/HR: 9 INJECTION, SOLUTION INTRAVENOUS at 07:33

## 2023-12-24 RX ADMIN — DULOXETINE HYDROCHLORIDE 30 MG: 30 CAPSULE, DELAYED RELEASE ORAL at 08:35

## 2023-12-24 RX ADMIN — PANTOPRAZOLE SODIUM 40 MG: 40 TABLET, DELAYED RELEASE ORAL at 05:48

## 2023-12-24 RX ADMIN — HEPARIN SODIUM 5000 UNITS: 5000 INJECTION INTRAVENOUS; SUBCUTANEOUS at 05:53

## 2023-12-24 RX ADMIN — GABAPENTIN 800 MG: 400 CAPSULE ORAL at 21:49

## 2023-12-24 RX ADMIN — LEVOFLOXACIN 750 MG: 5 INJECTION, SOLUTION INTRAVENOUS at 11:00

## 2023-12-24 RX ADMIN — LORAZEPAM 2 MG: 2 TABLET ORAL at 23:55

## 2023-12-24 RX ADMIN — OXYCODONE HYDROCHLORIDE 10 MG: 10 TABLET ORAL at 21:49

## 2023-12-24 RX ADMIN — METRONIDAZOLE 500 MG: 500 INJECTION, SOLUTION INTRAVENOUS at 13:12

## 2023-12-24 RX ADMIN — METRONIDAZOLE 500 MG: 500 INJECTION, SOLUTION INTRAVENOUS at 05:52

## 2023-12-24 RX ADMIN — AMLODIPINE BESYLATE 10 MG: 10 TABLET ORAL at 08:35

## 2023-12-24 RX ADMIN — OXYCODONE HYDROCHLORIDE 10 MG: 10 TABLET ORAL at 10:31

## 2023-12-24 RX ADMIN — HYDROMORPHONE HYDROCHLORIDE 2 MG: 1 INJECTION, SOLUTION INTRAMUSCULAR; INTRAVENOUS; SUBCUTANEOUS at 06:13

## 2023-12-24 RX ADMIN — HYDROCORTISONE 1 APPLICATION: 1 CREAM TOPICAL at 02:21

## 2023-12-24 RX ADMIN — PRAVASTATIN SODIUM 20 MG: 40 TABLET ORAL at 08:35

## 2023-12-24 RX ADMIN — HEPARIN SODIUM 5000 UNITS: 5000 INJECTION INTRAVENOUS; SUBCUTANEOUS at 21:49

## 2023-12-24 RX ADMIN — GABAPENTIN 800 MG: 400 CAPSULE ORAL at 13:12

## 2023-12-24 RX ADMIN — OXYCODONE HYDROCHLORIDE 10 MG: 10 TABLET ORAL at 18:25

## 2023-12-24 RX ADMIN — HEPARIN SODIUM 5000 UNITS: 5000 INJECTION INTRAVENOUS; SUBCUTANEOUS at 13:12

## 2023-12-24 RX ADMIN — HYDROMORPHONE HYDROCHLORIDE 2 MG: 1 INJECTION, SOLUTION INTRAMUSCULAR; INTRAVENOUS; SUBCUTANEOUS at 04:15

## 2023-12-24 RX ADMIN — SODIUM CHLORIDE 2 G: 9 INJECTION, SOLUTION INTRAVENOUS at 07:34

## 2023-12-24 RX ADMIN — Medication 10 ML: at 21:49

## 2023-12-24 RX ADMIN — DOCUSATE SODIUM 50 MG AND SENNOSIDES 8.6 MG 2 TABLET: 8.6; 5 TABLET, FILM COATED ORAL at 08:35

## 2023-12-24 RX ADMIN — HYDROCORTISONE 1 APPLICATION: 1 CREAM TOPICAL at 08:35

## 2023-12-24 RX ADMIN — GABAPENTIN 800 MG: 400 CAPSULE ORAL at 05:48

## 2023-12-24 RX ADMIN — THIAMINE HYDROCHLORIDE 200 MG: 100 INJECTION, SOLUTION INTRAMUSCULAR; INTRAVENOUS at 21:48

## 2023-12-24 RX ADMIN — OXYCODONE HYDROCHLORIDE 10 MG: 10 TABLET ORAL at 13:12

## 2023-12-24 RX ADMIN — HYDROCORTISONE 1 APPLICATION: 1 CREAM TOPICAL at 21:50

## 2023-12-24 RX ADMIN — POTASSIUM CHLORIDE 40 MEQ: 1500 TABLET, EXTENDED RELEASE ORAL at 10:31

## 2023-12-24 RX ADMIN — THIAMINE HYDROCHLORIDE 200 MG: 100 INJECTION, SOLUTION INTRAMUSCULAR; INTRAVENOUS at 14:50

## 2023-12-24 RX ADMIN — FOLIC ACID 1 MG: 5 INJECTION, SOLUTION INTRAMUSCULAR; INTRAVENOUS; SUBCUTANEOUS at 10:31

## 2023-12-24 RX ADMIN — HYDROMORPHONE HYDROCHLORIDE 2 MG: 1 INJECTION, SOLUTION INTRAMUSCULAR; INTRAVENOUS; SUBCUTANEOUS at 02:11

## 2023-12-24 NOTE — PLAN OF CARE
Goal Outcome Evaluation:  Plan of Care Reviewed With: patient           Outcome Evaluation: Patient is resting in bed, VSS on RA. C/O pain, see mar. No acute changes, will continue POC

## 2023-12-24 NOTE — PROGRESS NOTES
"    [facility] HOSPITALIST PROGRESS NOTE     Patient Identification:  Name:  Taiwo Pierce  Age:  48 y.o.  Sex:  male  :  1975  MRN:  [id number]  Visit Number:  [id number]  ROOM: [address] [place] [address]     Primary Care Provider:  Provider, No Known    Length of stay in inpatient status:  2    Subjective     Chief Compliant:    Chief Complaint   Patient presents with    Chest Pain    Shortness of Breath     History of Presenting Illness:    Patient remains ill but stable today, no acute events overnight, no new complaints, denies any fevers or chills, this AM told Nurse he was \"healed\" and ready to go home, patient has been requiring around the clock IV narcotics every 2hrs for pain, continued IVFs, IV antibiotics, and has only tolerated a liquid diet thus far, Nurse was notified of no plans to discharge patient home today, at that time he considered leaving AMA and requested a dose of IV Dilaudid before leaving for which I deferred to give if patient was going to be leaving the hospital, patient elected to wait to speak with me when I came to round on him, we discussed patient is not ready to be discharged today, patient disagreed stating he is doing better, I reminded him he feels better because of the level of interventions we have been giving him here, I suggested treating him with the same level of support he would get at home and if does well would consider discharge home tomorrow, though I also reiterated that I will not be on service tomorrow and I could not guarantee the next provider would feel he is appropriate for discharge at that time or not, we did agree to repeat lipase today, stop IVFs, stop IV narcotics and resume home oral narcotics, change to regular diet, change antibiotics from Azactam to Levaquin as would have oral regimen to go home to at that time, patient notes he is on 15mg oral narcotic dose at home, I pulled up his DANY in the room and showed him his last 3 months have been " at a 10mg dose, he stated his prescriber recently changed to 15mg and that he just hasn't picked it up yet, we called his pharmacy in [place] at [facility] and confirmed his dose is 10mg.  I have had extensive discussion with patient he is not ready for discharge today and the risks of leaving prior to being ready include worsening pancreatitis, worsening infection, sepsis, respiratory failure, and/or death.  If patient later chooses to leave AMA he is completely oriented and can make his own decisions at this time.   Objective     Current Hospital Meds:  amLODIPine, 10 mg, Oral, Daily  diphenhydrAMINE, 25 mg, Intravenous, Once  DULoxetine, 30 mg, Oral, Daily  folic acid 1 mg in sodium chloride 0.9 % 50 mL IVPB, 1 mg, Intravenous, Daily  gabapentin, 800 mg, Oral, Q8H  heparin (porcine), 5,000 Units, Subcutaneous, Q8H  hydrocortisone, 1 application , Topical, Q12H  levoFLOXacin, 750 mg, Intravenous, Q24H  metroNIDAZOLE, 500 mg, Intravenous, Q8H  oxyCODONE, 10 mg, Oral, 5x Daily  pantoprazole, 40 mg, Oral, Q AM  pravastatin, 20 mg, Oral, Daily  senna-docusate sodium, 2 tablet, Oral, BID  sodium chloride, 10 mL, Intravenous, Q12H  thiamine (B-1) IV, 200 mg, Intravenous, Q8H   Followed by  [START ON 12/28/2023] thiamine, 100 mg, Oral, Daily         ----------------------------------------------------------------------------------------------------------------------  Vital Signs:  Temp:  [98.1 °F (36.7 °C)-99.8 °F (37.7 °C)] 98.1 °F (36.7 °C)  Heart Rate:  [101-116] 109  Resp:  [18-22] 18  BP: (146-156)/() 146/109  SpO2:  [93 %-98 %] 98 %  on   ;   Device (Oxygen Therapy): room air  Body mass index is 27.8 kg/m².      Intake/Output Summary (Last 24 hours) at 12/24/2023 1243  Last data filed at 12/24/2023 0705  Gross per 24 hour   Intake 5781 ml   Output --   Net 5781 ml      ----------------------------------------------------------------------------------------------------------------------  Physical  "exam:  Constitutional:  Well-developed and well-nourished.  Mild acute distress. Mild discomfort  HENT:  Head:  Normocephalic and atraumatic.  Mouth:  Moist mucous membranes.    Eyes:  Conjunctivae and EOM are normal. No scleral icterus.    Neck:  Neck supple.  No JVD present.    Cardiovascular:  Tachycardic, regular rhythm and normal heart sounds with no murmur.  Pulmonary/Chest:  No respiratory distress, no wheezes, no crackles, with normal breath sounds and good air movement.  Abdominal:  Soft. No distension and no tenderness.   Musculoskeletal:  No tenderness, and no deformity.  No red or swollen joints anywhere.    Neurological:  Alert and oriented to person, place, and time.  No gross focal deficits   Skin:  Skin is warm and dry. No rash noted. No pallor.   Peripheral vascular:  No clubbing, no cyanosis, no edema.  Psychiatric: Appropriate mood and affect    Edited by: Salvatore Naik MD at 12/23/2023 1239  ----------------------------------------------------------------------------------------------------------------------  WBC/HGB/HCT/PLT   14.03/11.9/36.8/117 (12/24 0404)  BUN/CREAT/GLUC/ALT/AST/KRISTA/LIP    12/0.71/102/73/177/--/-- (12/24 0404)  LYTES - Na/K/Cl/CO2: 127*/3.6/95*/18.9* (12/24 0404)        No results found for: \"URINECX\"  Blood Culture   Date Value Ref Range Status   12/22/2023 No growth at 24 hours  Preliminary   12/22/2023 No growth at 24 hours  Preliminary       I have personally looked at the labs and they are summarized above.  ----------------------------------------------------------------------------------------------------------------------  Detailed radiology reports for the last 24 hours:  MRI abdomen wo contrast mrcp    Result Date: 12/22/2023   1.  Severe acute pancreatitis. 2.  No pseudocyst formation. No dilated pancreatic duct. 3.  No acute process seen in the liver, gallbladder, adrenal glands, spleen, and left kidney. 4.  Small right parapelvic renal cyst. 5.  Small exophytic " cyst at the junction of the mid and lower pole of the right kidney. 6.  No abscess or hematoma. 7.  No free air.  This report was finalized on 12/22/2023 6:08 PM by Ugo Ogden MD.      US Gallbladder    Result Date: 12/22/2023    Distended gallbladder.   This report was finalized on 12/22/2023 3:53 PM by Dr. Steve Hinojosa MD.      CT Abdomen Pelvis With Contrast    Result Date: 12/22/2023  1.  Stranding around the pancreas consistent with acute pancreatitis. 2.  Fatty infiltration of the liver. 3.  Degenerative changes lumbar spine as described.   This report was finalized on 12/22/2023 2:21 PM by Dr. Steve Hinojosa MD.     Assessment & Plan    48M PMH History Arthritis, Chronic Lower Back Pain, GERD, Hypertension, Hyperlipidemia, Alcohol abuse, presented to [facility] emergency room 12/23 with complaints of abdominal pain.    #Acute Pancreatitis suspected due to Alcohol, now SIRS +  - Admission labs showed WBC count 9K->15K, CRP 2.31, Lipase 2300, lactate 0.9, blood cultures pending  - CT Abd/Pelvis showed stranding around pancreas with acute pancreatitis, fatty liver  - MRCP showed severe acute pancreatitis, no pseudocyst, no dilated pancreatic ducts, CBD dilation  - Given presence of acute onset persistent, severe, epigastric pain radiating to back, serum lipase/amylase > 3x upper limit of normal, characteristic findings on imaging, patient meets criteria for acute pancreatitis diagnosis.  - Given patient showing signs and symptoms of Sepsis with concern for underlying infection; Will empirically start on antibiotics  - Patient remains ill, up until this AM has been on IVF, IV Antibiotics, requiring frequent IV narcotics for pain control.    - Will trial on home level support to see if patient will be able to return home; Discontinued IVFs, changed IV narcotics to home oral narcotics, changed Aztreonam to Levaquin, continued flagyl, changed to regular diet  - Continue to monitor on telemetry, trend  labs in AM, monitor for clinical improvement.     #Alcohol Use Disorder, Severe, with Dependence, recent cessation, monitor for withdrawal   #Acute Mild Alcohol Hepatitis  - Patient with history Alcohol Dependence, reportedly drinks stopped drinking significant amount 6 days prior to admission  - CT Abd/Pelvis showed mild fatty liver  - Status post IVF bolus in ER, status post IVFs as per above  - Replacing Thiamine and Folate  - Continue CIWA protocol   - Calculated Maddrey's Discriminant Function and is < 32, no steroids indicated at this time    #Hypertension/Hyperlipidemia  - Continue home Amlodipine, holding home Hydrochlorothiazide   - Continue to monitor on telemetry, strict I/O's, trend heart rate and blood pressure    #Electrolyte Abnormalities  - Acute Mild Hypokalemia - Replacing, on protocol    #Exophytic R kidney Cyst  - Small exophytic R renal cysts in mid-lower pole; Would refer to Urology outpatient upon discharge    #Gastroesophageal Reflux Disease  - Continue home PPI    #Anxiety/Depression  - Continue home regimen    F: Oral  E: Monitor & Replace as needed   N: Diet: Regular/House Diet; Fluid Consistency: Thin (IDDSI 0)  PPx: SQH  Code Status (Patient has no pulse and is not breathing): CPR   Medical Interventions (Patient has pulse or is breathing): Full Support     Dispo: Pending clinical improvement/stability, possibly home tomorrow    *This patient is considered high risk secondary to acute pancreatitis, monitoring for drug withdrawal, signs and symptoms systemic infection as is SIRS +, requiring IV narcotic for pain control     Edited by: Salvatore Naik MD at 12/24/2023 1241  [facility]

## 2023-12-24 NOTE — PLAN OF CARE
Goal Outcome Evaluation:  Plan of Care Reviewed With: patient           Outcome Evaluation: Pt transferred from  this shift. A&O on RA. Pt c/o pain every 2 hours and given PRN dilaudid. Tums given for heartburn, telemetry has been refused by pt. Will continue with POC.

## 2023-12-24 NOTE — NURSING NOTE
"States \"I need to go home.  My wife is so sick she can't hold her head up\".  Also states \"I am better now\".  Discussed the risks/benefits of leaving against medical advice.  Verbalized understanding.  MD notified.    "

## 2023-12-25 ENCOUNTER — HOSPITAL ENCOUNTER (INPATIENT)
Facility: HOSPITAL | Age: 48
LOS: 2 days | Discharge: HOME | DRG: 439 | End: 2023-12-27
Attending: STUDENT IN AN ORGANIZED HEALTH CARE EDUCATION/TRAINING PROGRAM | Admitting: INTERNAL MEDICINE
Payer: MEDICARE

## 2023-12-25 ENCOUNTER — APPOINTMENT (OUTPATIENT)
Dept: CT IMAGING | Facility: HOSPITAL | Age: 48
DRG: 439 | End: 2023-12-25
Payer: MEDICARE

## 2023-12-25 VITALS
TEMPERATURE: 98.2 F | BODY MASS INDEX: 27.9 KG/M2 | OXYGEN SATURATION: 97 % | WEIGHT: 199.3 LBS | SYSTOLIC BLOOD PRESSURE: 139 MMHG | DIASTOLIC BLOOD PRESSURE: 95 MMHG | HEIGHT: 71 IN | RESPIRATION RATE: 18 BRPM | HEART RATE: 94 BPM

## 2023-12-25 DIAGNOSIS — F19.10 SUBSTANCE ABUSE: ICD-10-CM

## 2023-12-25 DIAGNOSIS — F29 PSYCHOSIS, UNSPECIFIED PSYCHOSIS TYPE: Primary | ICD-10-CM

## 2023-12-25 DIAGNOSIS — G89.4 CHRONIC PAIN SYNDROME: ICD-10-CM

## 2023-12-25 PROBLEM — R41.0 DELIRIUM, DRUG-INDUCED: Status: ACTIVE | Noted: 2023-12-25

## 2023-12-25 PROBLEM — T50.905A DELIRIUM, DRUG-INDUCED: Status: ACTIVE | Noted: 2023-12-25

## 2023-12-25 LAB
ALBUMIN SERPL-MCNC: 3.2 G/DL (ref 3.5–5.2)
ALBUMIN SERPL-MCNC: 3.3 G/DL (ref 3.5–5.2)
ALBUMIN/GLOB SERPL: 0.8 G/DL
ALBUMIN/GLOB SERPL: 0.9 G/DL
ALP SERPL-CCNC: 103 U/L (ref 39–117)
ALP SERPL-CCNC: 116 U/L (ref 39–117)
ALT SERPL W P-5'-P-CCNC: 58 U/L (ref 1–41)
ALT SERPL W P-5'-P-CCNC: 61 U/L (ref 1–41)
AMMONIA BLD-SCNC: 37 UMOL/L (ref 16–60)
AMPHET+METHAMPHET UR QL: NEGATIVE
AMPHETAMINES UR QL: NEGATIVE
ANION GAP SERPL CALCULATED.3IONS-SCNC: 15.8 MMOL/L (ref 5–15)
ANION GAP SERPL CALCULATED.3IONS-SCNC: 18.4 MMOL/L (ref 5–15)
AST SERPL-CCNC: 113 U/L (ref 1–40)
AST SERPL-CCNC: 113 U/L (ref 1–40)
BARBITURATES UR QL SCN: NEGATIVE
BASOPHILS # BLD AUTO: 0.03 10*3/MM3 (ref 0–0.2)
BASOPHILS NFR BLD AUTO: 0.2 % (ref 0–1.5)
BENZODIAZ UR QL SCN: POSITIVE
BILIRUB SERPL-MCNC: 1.3 MG/DL (ref 0–1.2)
BILIRUB SERPL-MCNC: 1.3 MG/DL (ref 0–1.2)
BUN SERPL-MCNC: 10 MG/DL (ref 6–20)
BUN SERPL-MCNC: 13 MG/DL (ref 6–20)
BUN/CREAT SERPL: 16.4 (ref 7–25)
BUN/CREAT SERPL: 18.1 (ref 7–25)
BUPRENORPHINE SERPL-MCNC: NEGATIVE NG/ML
CALCIUM SPEC-SCNC: 8.8 MG/DL (ref 8.6–10.5)
CALCIUM SPEC-SCNC: 9.4 MG/DL (ref 8.6–10.5)
CANNABINOIDS SERPL QL: POSITIVE
CHLORIDE SERPL-SCNC: 98 MMOL/L (ref 98–107)
CHLORIDE SERPL-SCNC: 98 MMOL/L (ref 98–107)
CO2 SERPL-SCNC: 16.6 MMOL/L (ref 22–29)
CO2 SERPL-SCNC: 19.2 MMOL/L (ref 22–29)
COCAINE UR QL: NEGATIVE
CREAT SERPL-MCNC: 0.61 MG/DL (ref 0.76–1.27)
CREAT SERPL-MCNC: 0.72 MG/DL (ref 0.76–1.27)
DEPRECATED RDW RBC AUTO: 49 FL (ref 37–54)
DEPRECATED RDW RBC AUTO: 50.8 FL (ref 37–54)
EGFRCR SERPLBLD CKD-EPI 2021: 112.7 ML/MIN/1.73
EGFRCR SERPLBLD CKD-EPI 2021: 118.5 ML/MIN/1.73
EOSINOPHIL # BLD AUTO: 0.01 10*3/MM3 (ref 0–0.4)
EOSINOPHIL NFR BLD AUTO: 0.1 % (ref 0.3–6.2)
ERYTHROCYTE [DISTWIDTH] IN BLOOD BY AUTOMATED COUNT: 13.7 % (ref 12.3–15.4)
ERYTHROCYTE [DISTWIDTH] IN BLOOD BY AUTOMATED COUNT: 14 % (ref 12.3–15.4)
ETHANOL BLD-MCNC: <10 MG/DL (ref 0–10)
ETHANOL UR QL: <0.01 %
FENTANYL UR-MCNC: NEGATIVE NG/ML
GLOBULIN UR ELPH-MCNC: 3.5 GM/DL
GLOBULIN UR ELPH-MCNC: 4 GM/DL
GLUCOSE SERPL-MCNC: 118 MG/DL (ref 65–99)
GLUCOSE SERPL-MCNC: 126 MG/DL (ref 65–99)
HCT VFR BLD AUTO: 31.3 % (ref 37.5–51)
HCT VFR BLD AUTO: 33.2 % (ref 37.5–51)
HGB BLD-MCNC: 10.7 G/DL (ref 13–17.7)
HGB BLD-MCNC: 11 G/DL (ref 13–17.7)
IMM GRANULOCYTES # BLD AUTO: 0.34 10*3/MM3 (ref 0–0.05)
IMM GRANULOCYTES NFR BLD AUTO: 2.4 % (ref 0–0.5)
LYMPHOCYTES # BLD AUTO: 0.94 10*3/MM3 (ref 0.7–3.1)
LYMPHOCYTES NFR BLD AUTO: 6.7 % (ref 19.6–45.3)
MAGNESIUM SERPL-MCNC: 2.3 MG/DL (ref 1.6–2.6)
MCH RBC QN AUTO: 32.5 PG (ref 26.6–33)
MCH RBC QN AUTO: 32.9 PG (ref 26.6–33)
MCHC RBC AUTO-ENTMCNC: 33.1 G/DL (ref 31.5–35.7)
MCHC RBC AUTO-ENTMCNC: 34.2 G/DL (ref 31.5–35.7)
MCV RBC AUTO: 96.3 FL (ref 79–97)
MCV RBC AUTO: 98.2 FL (ref 79–97)
METHADONE UR QL SCN: NEGATIVE
MONOCYTES # BLD AUTO: 1.41 10*3/MM3 (ref 0.1–0.9)
MONOCYTES NFR BLD AUTO: 10 % (ref 5–12)
NEUTROPHILS NFR BLD AUTO: 11.4 10*3/MM3 (ref 1.7–7)
NEUTROPHILS NFR BLD AUTO: 80.6 % (ref 42.7–76)
NRBC BLD AUTO-RTO: 0 /100 WBC (ref 0–0.2)
OPIATES UR QL: POSITIVE
OXYCODONE UR QL SCN: POSITIVE
PCP UR QL SCN: NEGATIVE
PLATELET # BLD AUTO: 154 10*3/MM3 (ref 140–450)
PLATELET # BLD AUTO: 217 10*3/MM3 (ref 140–450)
PMV BLD AUTO: 10.5 FL (ref 6–12)
PMV BLD AUTO: 11.2 FL (ref 6–12)
POTASSIUM SERPL-SCNC: 3.3 MMOL/L (ref 3.5–5.2)
POTASSIUM SERPL-SCNC: 4 MMOL/L (ref 3.5–5.2)
PROT SERPL-MCNC: 6.7 G/DL (ref 6–8.5)
PROT SERPL-MCNC: 7.3 G/DL (ref 6–8.5)
RBC # BLD AUTO: 3.25 10*6/MM3 (ref 4.14–5.8)
RBC # BLD AUTO: 3.38 10*6/MM3 (ref 4.14–5.8)
SODIUM SERPL-SCNC: 133 MMOL/L (ref 136–145)
SODIUM SERPL-SCNC: 133 MMOL/L (ref 136–145)
TRICYCLICS UR QL SCN: NEGATIVE
WBC NRBC COR # BLD AUTO: 13.09 10*3/MM3 (ref 3.4–10.8)
WBC NRBC COR # BLD AUTO: 14.13 10*3/MM3 (ref 3.4–10.8)

## 2023-12-25 PROCEDURE — 80307 DRUG TEST PRSMV CHEM ANLYZR: CPT | Performed by: PHYSICIAN ASSISTANT

## 2023-12-25 PROCEDURE — 36415 COLL VENOUS BLD VENIPUNCTURE: CPT

## 2023-12-25 PROCEDURE — 25010000002 HEPARIN (PORCINE) PER 1000 UNITS: Performed by: INTERNAL MEDICINE

## 2023-12-25 PROCEDURE — 99285 EMERGENCY DEPT VISIT HI MDM: CPT

## 2023-12-25 PROCEDURE — 99223 1ST HOSP IP/OBS HIGH 75: CPT | Performed by: INTERNAL MEDICINE

## 2023-12-25 PROCEDURE — 80053 COMPREHEN METABOLIC PANEL: CPT | Performed by: INTERNAL MEDICINE

## 2023-12-25 PROCEDURE — 25010000002 NALOXONE HCL 2 MG/2ML SOLUTION PREFILLED SYRINGE: Performed by: PHYSICIAN ASSISTANT

## 2023-12-25 PROCEDURE — 25010000002 HYDRALAZINE PER 20 MG: Performed by: INTERNAL MEDICINE

## 2023-12-25 PROCEDURE — 25010000002 LORAZEPAM PER 2 MG

## 2023-12-25 PROCEDURE — 85025 COMPLETE CBC W/AUTO DIFF WBC: CPT | Performed by: PHYSICIAN ASSISTANT

## 2023-12-25 PROCEDURE — 25010000002 ZIPRASIDONE MESYLATE PER 10 MG: Performed by: INTERNAL MEDICINE

## 2023-12-25 PROCEDURE — 82140 ASSAY OF AMMONIA: CPT | Performed by: INTERNAL MEDICINE

## 2023-12-25 PROCEDURE — 25810000003 SODIUM CHLORIDE 0.9 % SOLUTION: Performed by: INTERNAL MEDICINE

## 2023-12-25 PROCEDURE — 83735 ASSAY OF MAGNESIUM: CPT | Performed by: PHYSICIAN ASSISTANT

## 2023-12-25 PROCEDURE — 25010000002 THIAMINE PER 100 MG: Performed by: INTERNAL MEDICINE

## 2023-12-25 PROCEDURE — 25010000002 LORAZEPAM PER 2 MG: Performed by: INTERNAL MEDICINE

## 2023-12-25 PROCEDURE — 82077 ASSAY SPEC XCP UR&BREATH IA: CPT | Performed by: PHYSICIAN ASSISTANT

## 2023-12-25 PROCEDURE — 70450 CT HEAD/BRAIN W/O DYE: CPT | Performed by: RADIOLOGY

## 2023-12-25 PROCEDURE — 80053 COMPREHEN METABOLIC PANEL: CPT | Performed by: PHYSICIAN ASSISTANT

## 2023-12-25 PROCEDURE — 85027 COMPLETE CBC AUTOMATED: CPT | Performed by: INTERNAL MEDICINE

## 2023-12-25 PROCEDURE — 70450 CT HEAD/BRAIN W/O DYE: CPT

## 2023-12-25 RX ORDER — SODIUM CHLORIDE 0.9 % (FLUSH) 0.9 %
10 SYRINGE (ML) INJECTION EVERY 12 HOURS SCHEDULED
Status: DISCONTINUED | OUTPATIENT
Start: 2023-12-25 | End: 2023-12-27 | Stop reason: HOSPADM

## 2023-12-25 RX ORDER — LORAZEPAM 2 MG/ML
1 INJECTION INTRAMUSCULAR
Status: DISCONTINUED | OUTPATIENT
Start: 2023-12-25 | End: 2023-12-27 | Stop reason: HOSPADM

## 2023-12-25 RX ORDER — GABAPENTIN 400 MG/1
800 CAPSULE ORAL EVERY 8 HOURS SCHEDULED
Status: CANCELLED | OUTPATIENT
Start: 2023-12-25

## 2023-12-25 RX ORDER — THIAMINE HYDROCHLORIDE 100 MG/ML
200 INJECTION, SOLUTION INTRAMUSCULAR; INTRAVENOUS EVERY 8 HOURS SCHEDULED
Status: DISCONTINUED | OUTPATIENT
Start: 2023-12-28 | End: 2023-12-25

## 2023-12-25 RX ORDER — BISACODYL 5 MG/1
5 TABLET, DELAYED RELEASE ORAL DAILY PRN
Status: DISCONTINUED | OUTPATIENT
Start: 2023-12-25 | End: 2023-12-26 | Stop reason: SDUPTHER

## 2023-12-25 RX ORDER — BISACODYL 10 MG
10 SUPPOSITORY, RECTAL RECTAL DAILY PRN
Status: DISCONTINUED | OUTPATIENT
Start: 2023-12-25 | End: 2023-12-27 | Stop reason: HOSPADM

## 2023-12-25 RX ORDER — PHENOBARBITAL 32.4 MG/1
32.4 TABLET ORAL ONCE
Status: DISCONTINUED | OUTPATIENT
Start: 2023-12-28 | End: 2023-12-27 | Stop reason: HOSPADM

## 2023-12-25 RX ORDER — CYCLOBENZAPRINE HCL 10 MG
10 TABLET ORAL 3 TIMES DAILY PRN
Status: CANCELLED | OUTPATIENT
Start: 2023-12-26

## 2023-12-25 RX ORDER — POLYETHYLENE GLYCOL 3350 17 G/17G
17 POWDER, FOR SOLUTION ORAL DAILY PRN
Status: DISCONTINUED | OUTPATIENT
Start: 2023-12-25 | End: 2023-12-26 | Stop reason: SDUPTHER

## 2023-12-25 RX ORDER — WATER 10 ML/10ML
INJECTION INTRAMUSCULAR; INTRAVENOUS; SUBCUTANEOUS
Status: DISPENSED
Start: 2023-12-25 | End: 2023-12-26

## 2023-12-25 RX ORDER — METAXALONE 800 MG/1
800 TABLET ORAL 3 TIMES DAILY PRN
COMMUNITY

## 2023-12-25 RX ORDER — LORAZEPAM 2 MG/1
2 TABLET ORAL EVERY 6 HOURS PRN
Status: DISCONTINUED | OUTPATIENT
Start: 2023-12-25 | End: 2023-12-27 | Stop reason: HOSPADM

## 2023-12-25 RX ORDER — LORAZEPAM 2 MG/ML
INJECTION INTRAMUSCULAR
Status: COMPLETED
Start: 2023-12-25 | End: 2023-12-25

## 2023-12-25 RX ORDER — METHION/INOS/CHOL BT/B COM/LIV 110MG-86MG
100 CAPSULE ORAL DAILY
Status: DISCONTINUED | OUTPATIENT
Start: 2023-12-31 | End: 2023-12-27 | Stop reason: HOSPADM

## 2023-12-25 RX ORDER — HYDROCHLOROTHIAZIDE 25 MG/1
25 TABLET ORAL DAILY
COMMUNITY
End: 2023-12-27 | Stop reason: HOSPADM

## 2023-12-25 RX ORDER — LANSOPRAZOLE 30 MG/1
30 CAPSULE, DELAYED RELEASE ORAL DAILY
COMMUNITY

## 2023-12-25 RX ORDER — LORAZEPAM 1 MG/1
1 TABLET ORAL
Status: DISCONTINUED | OUTPATIENT
Start: 2023-12-25 | End: 2023-12-27 | Stop reason: HOSPADM

## 2023-12-25 RX ORDER — PHENOBARBITAL 32.4 MG/1
32.4 TABLET ORAL ONCE
Status: DISCONTINUED | OUTPATIENT
Start: 2023-12-27 | End: 2023-12-27 | Stop reason: HOSPADM

## 2023-12-25 RX ORDER — DULOXETIN HYDROCHLORIDE 30 MG/1
30 CAPSULE, DELAYED RELEASE ORAL DAILY
COMMUNITY

## 2023-12-25 RX ORDER — AMLODIPINE BESYLATE 10 MG/1
10 TABLET ORAL DAILY
COMMUNITY

## 2023-12-25 RX ORDER — LORAZEPAM 2 MG/1
4 TABLET ORAL EVERY 6 HOURS PRN
Status: DISCONTINUED | OUTPATIENT
Start: 2023-12-25 | End: 2023-12-27 | Stop reason: HOSPADM

## 2023-12-25 RX ORDER — OXYCODONE HYDROCHLORIDE 10 MG/1
10 TABLET ORAL 4 TIMES DAILY
Status: CANCELLED | OUTPATIENT
Start: 2023-12-25

## 2023-12-25 RX ORDER — SODIUM CHLORIDE 0.9 % (FLUSH) 0.9 %
10 SYRINGE (ML) INJECTION AS NEEDED
Status: DISCONTINUED | OUTPATIENT
Start: 2023-12-25 | End: 2023-12-27 | Stop reason: HOSPADM

## 2023-12-25 RX ORDER — BISACODYL 10 MG
10 SUPPOSITORY, RECTAL RECTAL DAILY PRN
Status: DISCONTINUED | OUTPATIENT
Start: 2023-12-25 | End: 2023-12-26 | Stop reason: SDUPTHER

## 2023-12-25 RX ORDER — AMOXICILLIN 250 MG
2 CAPSULE ORAL 2 TIMES DAILY
Status: DISCONTINUED | OUTPATIENT
Start: 2023-12-25 | End: 2023-12-27 | Stop reason: HOSPADM

## 2023-12-25 RX ORDER — SODIUM CHLORIDE 9 MG/ML
40 INJECTION, SOLUTION INTRAVENOUS AS NEEDED
Status: DISCONTINUED | OUTPATIENT
Start: 2023-12-25 | End: 2023-12-27 | Stop reason: HOSPADM

## 2023-12-25 RX ORDER — AMOXICILLIN 250 MG
2 CAPSULE ORAL 2 TIMES DAILY
Status: DISCONTINUED | OUTPATIENT
Start: 2023-12-25 | End: 2023-12-26 | Stop reason: SDUPTHER

## 2023-12-25 RX ORDER — PANTOPRAZOLE SODIUM 40 MG/1
40 TABLET, DELAYED RELEASE ORAL
Status: CANCELLED | OUTPATIENT
Start: 2023-12-26

## 2023-12-25 RX ORDER — POTASSIUM CHLORIDE 20 MEQ/1
20 TABLET, EXTENDED RELEASE ORAL 2 TIMES DAILY
Status: CANCELLED | OUTPATIENT
Start: 2023-12-25

## 2023-12-25 RX ORDER — LORAZEPAM 2 MG/ML
2 INJECTION INTRAMUSCULAR ONCE
Status: COMPLETED | OUTPATIENT
Start: 2023-12-25 | End: 2023-12-25

## 2023-12-25 RX ORDER — ZIPRASIDONE MESYLATE 20 MG/ML
INJECTION, POWDER, LYOPHILIZED, FOR SOLUTION INTRAMUSCULAR
Status: DISPENSED
Start: 2023-12-25 | End: 2023-12-26

## 2023-12-25 RX ORDER — KETAMINE HYDROCHLORIDE 100 MG/ML
INJECTION, SOLUTION INTRAMUSCULAR; INTRAVENOUS
Status: COMPLETED
Start: 2023-12-25 | End: 2023-12-25

## 2023-12-25 RX ORDER — HYDROCHLOROTHIAZIDE 25 MG/1
25 TABLET ORAL DAILY
Status: CANCELLED | OUTPATIENT
Start: 2023-12-25

## 2023-12-25 RX ORDER — POTASSIUM CHLORIDE 20 MEQ/1
40 TABLET, EXTENDED RELEASE ORAL EVERY 4 HOURS
Status: COMPLETED | OUTPATIENT
Start: 2023-12-25 | End: 2023-12-25

## 2023-12-25 RX ORDER — KETAMINE HCL IN NACL, ISO-OSM 100MG/10ML
100 SYRINGE (ML) INJECTION ONCE
Status: DISCONTINUED | OUTPATIENT
Start: 2023-12-25 | End: 2023-12-27 | Stop reason: HOSPADM

## 2023-12-25 RX ORDER — LORAZEPAM 2 MG/ML
2 INJECTION INTRAMUSCULAR
Status: DISCONTINUED | OUTPATIENT
Start: 2023-12-25 | End: 2023-12-27 | Stop reason: HOSPADM

## 2023-12-25 RX ORDER — PRAVASTATIN SODIUM 40 MG
20 TABLET ORAL DAILY
Status: CANCELLED | OUTPATIENT
Start: 2023-12-25

## 2023-12-25 RX ORDER — THIAMINE HYDROCHLORIDE 100 MG/ML
200 INJECTION, SOLUTION INTRAMUSCULAR; INTRAVENOUS EVERY 8 HOURS SCHEDULED
Status: DISCONTINUED | OUTPATIENT
Start: 2023-12-25 | End: 2023-12-27 | Stop reason: HOSPADM

## 2023-12-25 RX ORDER — LORAZEPAM 2 MG/ML
4 INJECTION INTRAMUSCULAR ONCE
Status: COMPLETED | OUTPATIENT
Start: 2023-12-25 | End: 2023-12-25

## 2023-12-25 RX ORDER — HYDRALAZINE HYDROCHLORIDE 20 MG/ML
10 INJECTION INTRAMUSCULAR; INTRAVENOUS EVERY 6 HOURS PRN
Status: DISCONTINUED | OUTPATIENT
Start: 2023-12-25 | End: 2023-12-27 | Stop reason: HOSPADM

## 2023-12-25 RX ORDER — METAXALONE 800 MG/1
800 TABLET ORAL 3 TIMES DAILY PRN
Status: CANCELLED | OUTPATIENT
Start: 2023-12-25

## 2023-12-25 RX ORDER — METHION/INOS/CHOL BT/B COM/LIV 110MG-86MG
100 CAPSULE ORAL DAILY
Status: DISCONTINUED | OUTPATIENT
Start: 2024-01-02 | End: 2023-12-25

## 2023-12-25 RX ORDER — OXYCODONE HYDROCHLORIDE 10 MG/1
10 TABLET ORAL EVERY 6 HOURS PRN
Status: DISCONTINUED | OUTPATIENT
Start: 2023-12-25 | End: 2023-12-26

## 2023-12-25 RX ORDER — PHENOBARBITAL SODIUM 65 MG/ML
65 INJECTION INTRAMUSCULAR ONCE
Status: COMPLETED | OUTPATIENT
Start: 2023-12-26 | End: 2023-12-26

## 2023-12-25 RX ORDER — PHENOBARBITAL SODIUM 65 MG/ML
65 INJECTION INTRAMUSCULAR ONCE
Status: DISCONTINUED | OUTPATIENT
Start: 2023-12-27 | End: 2023-12-27 | Stop reason: HOSPADM

## 2023-12-25 RX ORDER — POLYETHYLENE GLYCOL 3350 17 G/17G
17 POWDER, FOR SOLUTION ORAL DAILY PRN
Status: DISCONTINUED | OUTPATIENT
Start: 2023-12-25 | End: 2023-12-27 | Stop reason: HOSPADM

## 2023-12-25 RX ORDER — DULOXETIN HYDROCHLORIDE 30 MG/1
30 CAPSULE, DELAYED RELEASE ORAL DAILY
Status: CANCELLED | OUTPATIENT
Start: 2023-12-26

## 2023-12-25 RX ORDER — NALOXONE HYDROCHLORIDE 1 MG/ML
2 INJECTION INTRAMUSCULAR; INTRAVENOUS; SUBCUTANEOUS ONCE
Status: COMPLETED | OUTPATIENT
Start: 2023-12-25 | End: 2023-12-25

## 2023-12-25 RX ORDER — BISACODYL 5 MG/1
5 TABLET, DELAYED RELEASE ORAL DAILY PRN
Status: DISCONTINUED | OUTPATIENT
Start: 2023-12-25 | End: 2023-12-27 | Stop reason: HOSPADM

## 2023-12-25 RX ORDER — LORAZEPAM 2 MG/1
4 TABLET ORAL
Status: DISCONTINUED | OUTPATIENT
Start: 2023-12-25 | End: 2023-12-27 | Stop reason: HOSPADM

## 2023-12-25 RX ORDER — VALSARTAN 160 MG/1
320 TABLET ORAL DAILY
Status: CANCELLED | OUTPATIENT
Start: 2023-12-25

## 2023-12-25 RX ORDER — LORAZEPAM 2 MG/1
2 TABLET ORAL
Status: DISCONTINUED | OUTPATIENT
Start: 2023-12-25 | End: 2023-12-27 | Stop reason: HOSPADM

## 2023-12-25 RX ORDER — AMLODIPINE BESYLATE 10 MG/1
10 TABLET ORAL DAILY
Status: CANCELLED | OUTPATIENT
Start: 2023-12-26

## 2023-12-25 RX ORDER — VALSARTAN 320 MG/1
320 TABLET ORAL DAILY
COMMUNITY

## 2023-12-25 RX ADMIN — SODIUM CHLORIDE 0.2 MCG/KG/HR: 9 INJECTION, SOLUTION INTRAVENOUS at 17:09

## 2023-12-25 RX ADMIN — NALOXONE HYDROCHLORIDE 2 MG: 1 INJECTION, SOLUTION INTRAMUSCULAR; INTRAVENOUS; SUBCUTANEOUS at 15:01

## 2023-12-25 RX ADMIN — LORAZEPAM 2 MG: 2 TABLET ORAL at 09:18

## 2023-12-25 RX ADMIN — ZIPRASIDONE MESYLATE 10 MG: 20 INJECTION, POWDER, LYOPHILIZED, FOR SOLUTION INTRAMUSCULAR at 17:08

## 2023-12-25 RX ADMIN — OXYCODONE HYDROCHLORIDE 10 MG: 10 TABLET ORAL at 07:49

## 2023-12-25 RX ADMIN — LORAZEPAM 2 MG: 2 INJECTION INTRAMUSCULAR; INTRAVENOUS at 17:24

## 2023-12-25 RX ADMIN — LORAZEPAM 2 MG: 2 INJECTION INTRAMUSCULAR; INTRAVENOUS at 16:48

## 2023-12-25 RX ADMIN — LORAZEPAM 2 MG: 2 TABLET ORAL at 06:12

## 2023-12-25 RX ADMIN — LORAZEPAM 4 MG: 2 INJECTION INTRAMUSCULAR; INTRAVENOUS at 18:56

## 2023-12-25 RX ADMIN — KETAMINE HYDROCHLORIDE 100 MG: 100 INJECTION, SOLUTION, CONCENTRATE INTRAMUSCULAR; INTRAVENOUS at 17:58

## 2023-12-25 RX ADMIN — POTASSIUM CHLORIDE 40 MEQ: 1500 TABLET, EXTENDED RELEASE ORAL at 07:49

## 2023-12-25 RX ADMIN — LORAZEPAM 2 MG: 2 INJECTION INTRAMUSCULAR; INTRAVENOUS at 17:48

## 2023-12-25 RX ADMIN — PRAVASTATIN SODIUM 20 MG: 40 TABLET ORAL at 08:18

## 2023-12-25 RX ADMIN — LORAZEPAM 2 MG: 2 INJECTION INTRAMUSCULAR at 16:48

## 2023-12-25 RX ADMIN — DULOXETINE HYDROCHLORIDE 30 MG: 30 CAPSULE, DELAYED RELEASE ORAL at 08:18

## 2023-12-25 RX ADMIN — FOLIC ACID 1 MG: 5 INJECTION, SOLUTION INTRAMUSCULAR; INTRAVENOUS; SUBCUTANEOUS at 22:06

## 2023-12-25 RX ADMIN — AMLODIPINE BESYLATE 10 MG: 10 TABLET ORAL at 08:19

## 2023-12-25 RX ADMIN — Medication 10 ML: at 22:38

## 2023-12-25 RX ADMIN — LORAZEPAM 2 MG: 2 INJECTION INTRAMUSCULAR at 16:17

## 2023-12-25 RX ADMIN — GABAPENTIN 800 MG: 400 CAPSULE ORAL at 06:12

## 2023-12-25 RX ADMIN — HYDRALAZINE HYDROCHLORIDE 10 MG: 20 INJECTION INTRAMUSCULAR; INTRAVENOUS at 22:06

## 2023-12-25 RX ADMIN — LORAZEPAM 2 MG: 2 INJECTION INTRAMUSCULAR at 15:46

## 2023-12-25 RX ADMIN — LORAZEPAM 2 MG: 2 INJECTION INTRAMUSCULAR; INTRAVENOUS at 15:46

## 2023-12-25 RX ADMIN — HYDROCORTISONE 1 APPLICATION: 1 CREAM TOPICAL at 08:19

## 2023-12-25 RX ADMIN — PANTOPRAZOLE SODIUM 40 MG: 40 TABLET, DELAYED RELEASE ORAL at 06:12

## 2023-12-25 RX ADMIN — ZIPRASIDONE MESYLATE 20 MG: 20 INJECTION, POWDER, LYOPHILIZED, FOR SOLUTION INTRAMUSCULAR at 18:39

## 2023-12-25 RX ADMIN — POTASSIUM CHLORIDE 40 MEQ: 1500 TABLET, EXTENDED RELEASE ORAL at 03:29

## 2023-12-25 RX ADMIN — THIAMINE HYDROCHLORIDE 200 MG: 100 INJECTION, SOLUTION INTRAMUSCULAR; INTRAVENOUS at 22:46

## 2023-12-25 RX ADMIN — LORAZEPAM 2 MG: 2 INJECTION INTRAMUSCULAR; INTRAVENOUS at 16:17

## 2023-12-25 RX ADMIN — HEPARIN SODIUM 5000 UNITS: 5000 INJECTION INTRAVENOUS; SUBCUTANEOUS at 06:12

## 2023-12-25 NOTE — NURSING NOTE
Pt has been talking about leaving the hospital for 2 days. Night shift reported that he went to  last night requesting to sign papers so that he could leave. Pt was supplied with AMA forms last night but pt decided not to leave at that time. Pt alert and oriented x4 this morning. Pt requested to leave.  And lead RN notified. Pt educated on risks of leaving AMA. Pt continued to insist on leaving. Pt supplied with AMA forms and pt signed the forms.  And lead RN notified of pt leaving.

## 2023-12-25 NOTE — PLAN OF CARE
Goal Outcome Evaluation:  Plan of Care Reviewed With: patient, father        Progress: declining  Outcome Evaluation: Pt. arrived to unit. Alert, oriented to self only. Pt. quickly become agitated and combative, security called for redirection, Ativan given per CIWA, see mar. Geodon given per MD. Precedex infusing per order. Pt. transferred to CCU for futher supervision.         Problem: Adult Inpatient Plan of Care  Goal: Plan of Care Review  Outcome: Ongoing, Progressing  Flowsheets (Taken 12/25/2023 1818)  Progress: declining  Plan of Care Reviewed With:   patient   father  Outcome Evaluation: Pt. arrived to unit. Alert, oriented to self only. Pt. quickly become agitated and combative, security called for redirection, Ativan given per CIWA, see mar. Geodon given per MD. Precedex infusing per order. Pt. transferred to CCU for futher supervision.  Goal: Patient-Specific Goal (Individualized)  Outcome: Ongoing, Progressing  Goal: Absence of Hospital-Acquired Illness or Injury  Outcome: Ongoing, Progressing  Intervention: Identify and Manage Fall Risk  Recent Flowsheet Documentation  Taken 12/25/2023 1645 by Eve Loya RN  Safety Promotion/Fall Prevention:   activity supervised   fall prevention program maintained   safety round/check completed  Intervention: Prevent Skin Injury  Recent Flowsheet Documentation  Taken 12/25/2023 1645 by Eve Loya RN  Skin Protection: adhesive use limited  Intervention: Prevent and Manage VTE (Venous Thromboembolism) Risk  Recent Flowsheet Documentation  Taken 12/25/2023 1645 by Eve Loya RN  Activity Management: activity encouraged  Goal: Optimal Comfort and Wellbeing  Outcome: Ongoing, Progressing  Intervention: Provide Person-Centered Care  Recent Flowsheet Documentation  Taken 12/25/2023 1645 by Eve Loya RN  Trust Relationship/Rapport:   care explained   choices provided   thoughts/feelings acknowledged  Goal: Readiness for Transition of Care  Outcome: Ongoing,  Progressing     Problem: Asthma Comorbidity  Goal: Maintenance of Asthma Control  Outcome: Ongoing, Progressing  Intervention: Maintain Asthma Symptom Control  Recent Flowsheet Documentation  Taken 12/25/2023 1645 by Eve Loya RN  Medication Review/Management: medications reviewed     Problem: Behavioral Health Comorbidity  Goal: Maintenance of Behavioral Health Symptom Control  Outcome: Ongoing, Progressing  Intervention: Maintain Behavioral Health Symptom Control  Recent Flowsheet Documentation  Taken 12/25/2023 1645 by Eve Loya RN  Medication Review/Management: medications reviewed     Problem: COPD (Chronic Obstructive Pulmonary Disease) Comorbidity  Goal: Maintenance of COPD Symptom Control  Outcome: Ongoing, Progressing  Intervention: Maintain COPD-Symptom Control  Recent Flowsheet Documentation  Taken 12/25/2023 1645 by Eve Loya RN  Supportive Measures:   active listening utilized   relaxation techniques promoted  Medication Review/Management: medications reviewed     Problem: Diabetes Comorbidity  Goal: Blood Glucose Level Within Targeted Range  Outcome: Ongoing, Progressing     Problem: Heart Failure Comorbidity  Goal: Maintenance of Heart Failure Symptom Control  Outcome: Ongoing, Progressing  Intervention: Maintain Heart Failure-Management  Recent Flowsheet Documentation  Taken 12/25/2023 1645 by Eve Loya RN  Medication Review/Management: medications reviewed     Problem: Hypertension Comorbidity  Goal: Blood Pressure in Desired Range  Outcome: Ongoing, Progressing  Intervention: Maintain Blood Pressure Management  Recent Flowsheet Documentation  Taken 12/25/2023 1645 by Eve Loya RN  Medication Review/Management: medications reviewed     Problem: Obstructive Sleep Apnea Risk or Actual Comorbidity Management  Goal: Unobstructed Breathing During Sleep  Outcome: Ongoing, Progressing     Problem: Osteoarthritis Comorbidity  Goal: Maintenance of Osteoarthritis Symptom  Control  Outcome: Ongoing, Progressing  Intervention: Maintain Osteoarthritis Symptom Control  Recent Flowsheet Documentation  Taken 12/25/2023 1645 by Eve Loya RN  Activity Management: activity encouraged  Medication Review/Management: medications reviewed     Problem: Pain Chronic (Persistent) (Comorbidity Management)  Goal: Acceptable Pain Control and Functional Ability  Outcome: Ongoing, Progressing  Intervention: Manage Persistent Pain  Recent Flowsheet Documentation  Taken 12/25/2023 1645 by Eve Loya RN  Medication Review/Management: medications reviewed  Intervention: Optimize Psychosocial Wellbeing  Recent Flowsheet Documentation  Taken 12/25/2023 1645 by Eve Loya RN  Supportive Measures:   active listening utilized   relaxation techniques promoted  Family/Support System Care: involvement promoted     Problem: Seizure Disorder Comorbidity  Goal: Maintenance of Seizure Control  Outcome: Ongoing, Progressing     Problem: Skin Injury Risk Increased  Goal: Skin Health and Integrity  Outcome: Ongoing, Progressing  Intervention: Optimize Skin Protection  Recent Flowsheet Documentation  Taken 12/25/2023 1645 by Eve Loya RN  Pressure Reduction Techniques: frequent weight shift encouraged  Pressure Reduction Devices: pressure-redistributing mattress utilized  Skin Protection: adhesive use limited

## 2023-12-25 NOTE — NURSING NOTE
Presented pt to Dr. Nash and all lab work. He feels patient needs to be admitted to the medical floor for concerns of delirium. ED provider aware.

## 2023-12-25 NOTE — ED PROVIDER NOTES
Subjective   History of Present Illness  48-year-old male presents secondary to hallucinations and confusion.  Patient was previously drinker.  He recently was in the hospital and signed out AGAINST MEDICAL ADVICE due to pancreatitis.  He states that his abdominal pain is fine.  He has been eating and drinking without difficulty.  He denies any suicidal homicidal ideation.  He states has been several days since he drank any alcohol.  He was previously in a recovery facility.  Patient states that he had been using oxycodone along with Neurontin.  He denies any other medical complaints at this time.  He has a past medical history of hyperlipidemia, hypertension, acid reflux, arthritis.  He presents with father by private vehicle.        Review of Systems   Constitutional: Negative.  Negative for fever.   HENT: Negative.     Respiratory: Negative.     Cardiovascular: Negative.  Negative for chest pain.   Gastrointestinal: Negative.  Negative for abdominal pain.   Endocrine: Negative.    Genitourinary: Negative.  Negative for dysuria.   Skin: Negative.    Neurological: Negative.    Psychiatric/Behavioral: Negative.  Negative for suicidal ideas.    All other systems reviewed and are negative.      Past Medical History:   Diagnosis Date    Arthritis October 2007    Arthritis in back and neck    GERD (gastroesophageal reflux disease)     Headache 2007    Headaches due to disc in neck.    Hyperlipidemia     Hypertension     Low back pain 10/05/09    Hurt back and had back surgery 04/16/2014. Hit a nerve       Allergies   Allergen Reactions    Augmentin [Amoxicillin-Pot Clavulanate] Hives and Swelling     Throat swelling    Metoprolol Unknown - Low Severity       Past Surgical History:   Procedure Laterality Date    ANTERIOR CERVICAL DISCECTOMY W/ FUSION N/A 8/21/2023    Procedure: CERVICAL DISCECTOMY ANTERIOR WITH FUSION C5-7;  Surgeon: Julio C Bryson MD;  Location: [facility];  Service: Neurosurgery;  Laterality: N/A;     FRACTURE SURGERY Left     Foot    KNEE ARTHROSCOPY Left     LUMBAR DISCECTOMY  04/16/2014    LT L5-S1 Discectomy, L4-5 Lami with Disc - Dr. Esequiel Joshi       Family History   Problem Relation Age of Onset    Hyperlipidemia Father     Hypertension Father     Breast cancer Mother     Cancer Mother         Breast cancer    Depression Mother         Depression nerve problems    Prostate cancer Paternal Grandfather     Cancer Paternal Grandfather         Prostate cancer    Hyperlipidemia Paternal Grandfather     Prostate cancer Maternal Grandfather     Arthritis Maternal Grandfather     Cancer Maternal Grandfather         Prostate cancer    Heart disease Maternal Grandfather         Heart attack@90    Hyperlipidemia Maternal Grandfather     Cancer Maternal Grandmother         Lung cancer    Depression Maternal Grandmother         Depression nerve problems    Stroke Maternal Grandmother         Brain anurism    Arthritis Paternal Uncle     Early death Sister         Brain didnt develop completely       Social History     Socioeconomic History    Marital status:    Tobacco Use    Smoking status: Never    Smokeless tobacco: Current     Types: Snuff   Vaping Use    Vaping Use: Never used   Substance and Sexual Activity    Alcohol use: Yes     Comment: drinks 8oz vodka daily, quit Saturday 12/16 because wife threatened to divorce him if he didn't.    Drug use: No    Sexual activity: Yes     Partners: Female     Birth control/protection: None           Objective   Physical Exam  Vitals and nursing note reviewed.   Constitutional:       General: He is not in acute distress.     Appearance: He is well-developed. He is not diaphoretic.   HENT:      Head: Normocephalic and atraumatic.      Right Ear: External ear normal.      Left Ear: External ear normal.      Nose: Nose normal.   Eyes:      Conjunctiva/sclera: Conjunctivae normal.      Pupils: Pupils are equal, round, and reactive to light.   Neck:      Vascular: No JVD.       Trachea: No tracheal deviation.   Cardiovascular:      Rate and Rhythm: Normal rate and regular rhythm.      Heart sounds: Normal heart sounds. No murmur heard.  Pulmonary:      Effort: Pulmonary effort is normal. No respiratory distress.      Breath sounds: Normal breath sounds. No wheezing.   Abdominal:      General: Bowel sounds are normal.      Palpations: Abdomen is soft.      Tenderness: There is no abdominal tenderness.   Musculoskeletal:         General: No deformity. Normal range of motion.      Cervical back: Normal range of motion and neck supple.   Skin:     General: Skin is warm and dry.      Coloration: Skin is not pale.      Findings: No erythema or rash.   Neurological:      Mental Status: He is alert and oriented to person, place, and time.      Cranial Nerves: No cranial nerve deficit.   Psychiatric:         Behavior: Behavior normal.         Thought Content: Thought content normal. Thought content does not include homicidal or suicidal ideation.         Procedures           ED Course  ED Course as of 12/25/23 1553   Mon Dec 25, 2023   1438 Reviewed with Dr. Isbell who is coming to see the patient.  He recommends giving the patient Narcan.   [JI]      ED Course User Index  [JI] Franck Garcia PA                                 Results for orders placed or performed during the hospital encounter of 12/25/23   Comprehensive Metabolic Panel    Specimen: Blood   Result Value Ref Range    Glucose 118 (H) 65 - 99 mg/dL    BUN 13 6 - 20 mg/dL    Creatinine 0.72 (L) 0.76 - 1.27 mg/dL    Sodium 133 (L) 136 - 145 mmol/L    Potassium 4.0 3.5 - 5.2 mmol/L    Chloride 98 98 - 107 mmol/L    CO2 16.6 (L) 22.0 - 29.0 mmol/L    Calcium 9.4 8.6 - 10.5 mg/dL    Total Protein 7.3 6.0 - 8.5 g/dL    Albumin 3.3 (L) 3.5 - 5.2 g/dL    ALT (SGPT) 61 (H) 1 - 41 U/L    AST (SGOT) 113 (H) 1 - 40 U/L    Alkaline Phosphatase 116 39 - 117 U/L    Total Bilirubin 1.3 (H) 0.0 - 1.2 mg/dL    Globulin 4.0 gm/dL    A/G Ratio  0.8 g/dL    BUN/Creatinine Ratio 18.1 7.0 - 25.0    Anion Gap 18.4 (H) 5.0 - 15.0 mmol/L    eGFR 112.7 >60.0 mL/min/1.73   Ethanol    Specimen: Blood   Result Value Ref Range    Ethanol <10 0 - 10 mg/dL    Ethanol % <0.010 %   Urine Drug Screen - Urine, Clean Catch    Specimen: Urine, Clean Catch   Result Value Ref Range    THC, Screen, Urine Positive (A) Negative    Phencyclidine (PCP), Urine Negative Negative    Cocaine Screen, Urine Negative Negative    Methamphetamine, Ur Negative Negative    Opiate Screen Positive (A) Negative    Amphetamine Screen, Urine Negative Negative    Benzodiazepine Screen, Urine Positive (A) Negative    Tricyclic Antidepressants Screen Negative Negative    Methadone Screen, Urine Negative Negative    Barbiturates Screen, Urine Negative Negative    Oxycodone Screen, Urine Positive (A) Negative    Buprenorphine, Screen, Urine Negative Negative   Magnesium    Specimen: Blood   Result Value Ref Range    Magnesium 2.3 1.6 - 2.6 mg/dL   CBC Auto Differential    Specimen: Blood   Result Value Ref Range    WBC 14.13 (H) 3.40 - 10.80 10*3/mm3    RBC 3.38 (L) 4.14 - 5.80 10*6/mm3    Hemoglobin 11.0 (L) 13.0 - 17.7 g/dL    Hematocrit 33.2 (L) 37.5 - 51.0 %    MCV 98.2 (H) 79.0 - 97.0 fL    MCH 32.5 26.6 - 33.0 pg    MCHC 33.1 31.5 - 35.7 g/dL    RDW 14.0 12.3 - 15.4 %    RDW-SD 50.8 37.0 - 54.0 fl    MPV 10.5 6.0 - 12.0 fL    Platelets 217 140 - 450 10*3/mm3    Neutrophil % 80.6 (H) 42.7 - 76.0 %    Lymphocyte % 6.7 (L) 19.6 - 45.3 %    Monocyte % 10.0 5.0 - 12.0 %    Eosinophil % 0.1 (L) 0.3 - 6.2 %    Basophil % 0.2 0.0 - 1.5 %    Immature Grans % 2.4 (H) 0.0 - 0.5 %    Neutrophils, Absolute 11.40 (H) 1.70 - 7.00 10*3/mm3    Lymphocytes, Absolute 0.94 0.70 - 3.10 10*3/mm3    Monocytes, Absolute 1.41 (H) 0.10 - 0.90 10*3/mm3    Eosinophils, Absolute 0.01 0.00 - 0.40 10*3/mm3    Basophils, Absolute 0.03 0.00 - 0.20 10*3/mm3    Immature Grans, Absolute 0.34 (H) 0.00 - 0.05 10*3/mm3    nRBC 0.0 0.0  - 0.2 /100 WBC   Fentanyl, Urine - Urine, Clean Catch    Specimen: Urine, Clean Catch   Result Value Ref Range    Fentanyl, Urine Negative Negative   Ammonia    Specimen: Blood   Result Value Ref Range    Ammonia 37 16 - 60 umol/L     No radiology results for the last day              Medical Decision Making  48-year-old male presents secondary to hallucinations and confusion.  Patient was previously drinker.  He recently was in the hospital and signed out AGAINST MEDICAL ADVICE due to pancreatitis.  He states that his abdominal pain is fine.  He has been eating and drinking without difficulty.  He denies any suicidal homicidal ideation.  He states has been several days since he drank any alcohol.  He was previously in a recovery facility.  Patient states that he had been using oxycodone along with Neurontin.  He denies any other medical complaints at this time.  He has a past medical history of hyperlipidemia, hypertension, acid reflux, arthritis.  He presents with father by private vehicle.    Problems Addressed:  Psychosis, unspecified psychosis type: complicated acute illness or injury  Substance abuse: complicated acute illness or injury    Amount and/or Complexity of Data Reviewed  Labs: ordered. Decision-making details documented in ED Course.  Radiology: ordered. Decision-making details documented in ED Course.  Discussion of management or test interpretation with external provider(s): Dr. Isbell    Risk  Prescription drug management.  Decision regarding hospitalization.  Risk Details: Patient was admitted to the PCU after evaluation by Dr. Isbell.        Final diagnoses:   Psychosis, unspecified psychosis type   Substance abuse       ED Disposition  ED Disposition       ED Disposition   Decision to Admit    Condition   --    Comment   Level of Care: Progressive Care [20]   Diagnosis: Delirium, drug-induced [297579]   Certification: I Certify That Inpatient Hospital Services Are Medically Necessary For  Greater Than 2 Midnights                 No follow-up provider specified.       Medication List      No changes were made to your prescriptions during this visit.            Franck Garcia PA  12/25/23 8300

## 2023-12-25 NOTE — ED NOTES
Patient is restless and trying to run away. Patient redirected by staff to reenter his room and discussed the issue with patient. Pt states that he got scared and aggravated with the provider that was speaking with him at the time.

## 2023-12-25 NOTE — NURSING NOTE
"Patient was brought in for an evaluation by his father. Per pt father the pt left AMA from this facility earlier this morning. Per pt father he called to check on the patient and the patient was driving and \"talking out of his head.\" He reports the patient has been hallucinating people and is very confused. Per pt father he reports the wife informed him the patient has a \"drinking problem\" but that his last drink was on Saturday 12/16/23. Per pt father the pt has no hx of mental illness or substance use but he is prescribed oxycodone for back pain.     Patient reports he is here because his father thinks he is a \"lunatic.\" Patient is disoriented to place and situation. He denies any substance use. He denies si, hi, and avh. Patient states, \"I told him I'm just this way because I haven't slept in 5 days.\" Patient responding to internal stimuli during assessment. Patient stated, \"Do you see those kids in there?' While pointing to the water Jug in the office. Ci[place] 14 cows 6.   "

## 2023-12-25 NOTE — DISCHARGE SUMMARY
[facility] HOSPITALISTS DISCHARGE SUMMARY    Patient Identification:  Name:  Taiwo Pierce  Age:  48 y.o.  Sex:  male  :  1975  MRN:  [id number]  Visit Number:  [id number]    Date of Admission: 2023  Date of Discharge:  2023    PCP: Provider, No Known    DISCHARGE DIAGNOSIS  #Acute Pancreatitis suspected due to Alcohol, SIRS +  #Alcohol Use Disorder, Severe, with Dependence, recent cessation, monitor for withdrawal   #Acute Mild Alcohol Hepatitis  #Hypertension/Hyperlipidemia  #Electrolyte Abnormalities  #Exophytic R kidney Cyst  #Gastroesophageal Reflux Disease  #Anxiety/Depression    CONSULTS   None    PROCEDURES PERFORMED  None    HOSPITAL COURSE  Patient is a 48 y.o. male presented on  to [facility] complaining of abdominal pain.  Please see the admitting history and physical for further details.      Mr. Pierce is our 47 yo M with hx OA, GERD, HTN, HLD, alcohol misuse who presents with abdominal pain and N/V. CT, symptoms, and elevated lipase all consistent with acute pancreatitis. Started on IVF, IV pain regimen, NPO. Improved back to regular diet yesterday with some signs of withdrawal on CIWA. He had been started on abx with no source of SIRS identified, likely reactive, consistent with normal procal. Please see progress note from this AM. Was notified by nursing staff this morning that patient left the hospital to retrieve his home oxycodone in his vehicle. He was refusing to give it to us and noted he was going to take his home medication regardless of what we gave him. I stopped the scheduled oxycodone we had been giving him. Reviewing overnight nursing notes and notes prior patient has been refusing much of care and threatening to leave AMA for last couple of days. Documented to be oriented. Patient left AMA this AM before I was able to evaluate him. Will make referral for patient to f/u with PCP and urology.     VITAL SIGNS:  Temp:  [98 °F (36.7  °C)-98.7 °F (37.1 °C)] 98.2 °F (36.8 °C)  Heart Rate:  [] 94  Resp:  [18] 18  BP: (139-154)/() 139/95  SpO2:  [97 %-98 %] 97 %  on   ;   Device (Oxygen Therapy): room air    Body mass index is 27.8 kg/m².  Wt Readings from Last 3 Encounters:   12/22/23 90.4 kg (199 lb 4.8 oz)   12/21/23 90.5 kg (199 lb 9.6 oz)   10/24/23 92.1 kg (203 lb)       PHYSICAL EXAM:  Patient left AMA before evaluation this AM    DISCHARGE DISPOSITION   Stable, AMA    DISCHARGE MEDICATIONS:     Discharge Medications        ASK your doctor about these medications        Instructions Start Date   amLODIPine 10 MG tablet  Commonly known as: NORVASC   10 mg, Oral, Daily      cyclobenzaprine 10 MG tablet  Commonly known as: FLEXERIL   10 mg, Oral, 3 Times Daily PRN      DULoxetine 30 MG capsule  Commonly known as: CYMBALTA   30 mg, Oral, Daily      gabapentin 800 MG tablet  Commonly known as: NEURONTIN   800 mg, Oral, 3 Times Daily      hydroCHLOROthiazide 25 MG tablet  Commonly known as: HYDRODIURIL   25 mg, Oral, Every Morning      lansoprazole 30 MG capsule  Commonly known as: PREVACID   30 mg, Oral, Daily      oxyCODONE 10 MG tablet  Commonly known as: ROXICODONE  Ask about: Which instructions should I use?   10 mg, Oral, 5 Times Daily      potassium chloride 20 MEQ CR tablet  Commonly known as: K-DUR,KLOR-CON   20 mEq, Oral, 2 Times Daily      pravastatin 20 MG tablet  Commonly known as: PRAVACHOL   20 mg, Oral, Daily      valsartan 320 MG tablet  Commonly known as: DIOVAN   320 mg, Oral, Daily                      TEST  RESULTS PENDING AT DISCHARGE  Pending Labs       Order Current Status    IgG 4 In process    Pancreatitis: 3-gene Panel - Blood, In process    Blood Culture - Blood, Arm, Left Preliminary result    Blood Culture - Blood, Arm, Right Preliminary result             CODE STATUS  Code Status and Medical Interventions:   Ordered at: 12/22/23 1827     Code Status (Patient has no pulse and is not breathing):    CPR  (Attempt to Resuscitate)     Medical Interventions (Patient has pulse or is breathing):    Full Support       Carlos Isbell MD  [facility]  12/25/23  09:42 EST    Please note that this discharge summary required more than 30 minutes to complete.

## 2023-12-25 NOTE — H&P
[facility]    Patient Identification:  Name:  Taiwo Pierce  Age:  48 y.o.  Sex:  male  :  1975  MRN:  [id number]   Visit Number:  [id number]  Admit Date: 2023   Room number:  P217/1P  Primary Care Physician:  Provider, No Known     Subjective     Chief complaint:    Chief Complaint   Patient presents with    Psychiatric Evaluation       History of presenting illness:  48 y.o. male with hx OA, GERD, HTN, HLD, alcohol misuse who presents with AMS. Patient had left AMA earlier in the day after being admitted -. He presented at that time with abdominal pain and N/V. CT, symptoms, and elevated lipase all consistent with acute pancreatitis. Started on IVF, IV pain regimen, NPO. Improved back to regular diet yesterday with some signs of withdrawal on CIWA. He had been started on abx with no source of SIRS identified, likely reactive, consistent with normal procal.Was notified by nursing staff this morning that patient left the hospital to retrieve his home oxycodone in his vehicle. He was refusing to give it to us and noted he was going to take his home medication regardless of what we gave him. I stopped the scheduled oxycodone we had been giving him. Reviewing overnight nursing notes and notes prior patient has been refusing much of care and threatening to leave AM for last couple of days. Documented to be oriented. Patient left [place] this AM before I was able to evaluate him.     Shortly after patient left hospital his father (who was bedside this evaluation helping provide history) to wish him astrid rashmi. He seemed confused and upon his and his wife's evaluation patient was unsteady on his feet and having hallucinations. Patient has been agitated at times but able to be oriented but he has got up out to the hallway a few times. He is still having hallucinations and seems hyperactive. Patient's father is not aware of any illicit or  synthetic substances and thought this may be alcohol withdrawal. Last known alcohol use was round 12/16. Patient's father noted he has not acted this way before and was not confused this way on initial presentation on 12/22. Denies headache or light sensitivity.   ---------------------------------------------------------------------------------------------------------------------   Review of Systems   Constitutional: Negative.    HENT: Negative.     Eyes: Negative.    Respiratory: Negative.     Cardiovascular: Negative.    Gastrointestinal: Negative.    Endocrine: Negative.    Genitourinary: Negative.    Musculoskeletal: Negative.    Skin: Negative.    Allergic/Immunologic: Negative.    Neurological: Negative.    Hematological: Negative.    Psychiatric/Behavioral:  Positive for confusion and hallucinations.      ---------------------------------------------------------------------------------------------------------------------   Past Medical History:   Diagnosis Date    Arthritis October 2007    Arthritis in back and neck    GERD (gastroesophageal reflux disease)     Headache 2007    Headaches due to disc in neck.    Hyperlipidemia     Hypertension     Low back pain 10/05/09    Hurt back and had back surgery 04/16/2014. Hit a nerve     Past Surgical History:   Procedure Laterality Date    ANTERIOR CERVICAL DISCECTOMY W/ FUSION N/A 8/21/2023    Procedure: CERVICAL DISCECTOMY ANTERIOR WITH FUSION C5-7;  Surgeon: Julio C Bryson MD;  Location: [facility];  Service: Neurosurgery;  Laterality: N/A;    FRACTURE SURGERY Left     Foot    KNEE ARTHROSCOPY Left     LUMBAR DISCECTOMY  04/16/2014    LT L5-S1 Discectomy, L4-5 Lami with Disc - Dr. Esequiel Joshi     Family History   Problem Relation Age of Onset    Hyperlipidemia Father     Hypertension Father     Breast cancer Mother     Cancer Mother         Breast cancer    Depression Mother         Depression nerve problems    Prostate cancer Paternal Grandfather     Cancer  Paternal Grandfather         Prostate cancer    Hyperlipidemia Paternal Grandfather     Prostate cancer Maternal Grandfather     Arthritis Maternal Grandfather     Cancer Maternal Grandfather         Prostate cancer    Heart disease Maternal Grandfather         Heart attack@90    Hyperlipidemia Maternal Grandfather     Cancer Maternal Grandmother         Lung cancer    Depression Maternal Grandmother         Depression nerve problems    Stroke Maternal Grandmother         Brain anurism    Arthritis Paternal Uncle     Early death Sister         Brain didnt develop completely     Social History     Socioeconomic History    Marital status:    Tobacco Use    Smoking status: Never    Smokeless tobacco: Current     Types: Snuff   Vaping Use    Vaping Use: Never used   Substance and Sexual Activity    Alcohol use: Yes     Comment: drinks 8oz vodka daily, quit Saturday 12/16 because wife threatened to divorce him if he didn't.    Drug use: No    Sexual activity: Yes     Partners: Female     Birth control/protection: None     ---------------------------------------------------------------------------------------------------------------------   Allergies:  Augmentin [amoxicillin-pot clavulanate] and Metoprolol  ---------------------------------------------------------------------------------------------------------------------   Medications below are reported home medications pulling from within the system; at this time, these medications have not been reconciled unless otherwise specified and are in the verification process for further verifcation as current home medications.    Prior to Admission Medications       Prescriptions Last Dose Informant Patient Reported? Taking?    amLODIPine (NORVASC) 10 MG tablet  Pharmacy No No    TAKE 1 TABLET BY MOUTH DAILY    cyclobenzaprine (FLEXERIL) 10 MG tablet  Pharmacy No No    Take 1 tablet by mouth 3 (Three) Times a Day As Needed for Muscle Spasms.    DULoxetine (CYMBALTA) 30  MG capsule  Pharmacy No No    TAKE 1 CAPSULE BY MOUTH DAILY    gabapentin (NEURONTIN) 800 MG tablet  Pharmacy Yes No    Take 1 tablet by mouth 3 (Three) Times a Day.    hydroCHLOROthiazide (HYDRODIURIL) 25 MG tablet  Pharmacy No No    TAKE ONE TABLET BY MOUTH EVERY MORNING    lansoprazole (PREVACID) 30 MG capsule  Pharmacy No No    TAKE 1 CAPSULE BY MOUTH DAILY    oxyCODONE (ROXICODONE) 10 MG tablet  Pharmacy Yes No    Take 1 tablet by mouth 5 (Five) Times a Day.    potassium chloride (K-DUR,KLOR-CON) 20 MEQ CR tablet  Pharmacy No No    Take 1 tablet by mouth 2 (Two) Times a Day.    pravastatin (PRAVACHOL) 20 MG tablet  Pharmacy No No    Take 1 tablet by mouth Daily.    valsartan (DIOVAN) 320 MG tablet  Pharmacy No No    TAKE 1 TABLET BY MOUTH DAILY          Objective     Vital Signs:  Temp:  [97.6 °F (36.4 °C)-98.7 °F (37.1 °C)] 97.6 °F (36.4 °C)  Heart Rate:  [] 101  Resp:  [18] 18  BP: (137-153)/(89-97) 137/97    No data found.  SpO2:  [97 %] 97 %  on   ;   Device (Oxygen Therapy): room air  Body mass index is 27.75 kg/m².    Wt Readings from Last 3 Encounters:   12/25/23 90.3 kg (199 lb)   12/22/23 90.4 kg (199 lb 4.8 oz)   12/21/23 90.5 kg (199 lb 9.6 oz)      ---------------------------------------------------------------------------------------------------------------------   Physical Exam:  Constitutional:  Well-developed and well-nourished.  No respiratory distress.      HENT:  Head: Normocephalic and atraumatic.  Mouth:  Moist mucous membranes.    Eyes:  Conjunctivae and EOM are normal.  Pupils are equal, round, and reactive to light.  No scleral icterus.  Cardiovascular:  Normal rate, regular rhythm and normal heart sounds with no murmur.  Pulmonary/Chest:  No respiratory distress, no wheezes, no crackles, with normal breath sounds and good air movement.  Abdominal:  Soft.  Bowel sounds are normal.  No distension and no tenderness.   Musculoskeletal:  No edema, no tenderness, and no deformity.  No  red or swollen joints anywhere.  No neck stiffness.   Neurological:  Alert and oriented to person only. Emotional fluctuating, agitated at times and apologetic at times. Frequently with visual hallucinations.   Skin:  Skin is warm and dry.  No rash noted.  No pallor.   Peripheral vascular:  No edema and strong pulses on all 4 extremities.    ---------------------------------------------------------------------------------------------------------------------  EKG:  Last QTC on 12/24 was 470  No orders to display       Telemetry:      I have personally looked at both the EKG and the telemetry strips.    Last echocardiogram:  Results for orders placed during the hospital encounter of 12/22/23    Adult Transthoracic Echo Complete W/ Cont if Necessary Per Protocol    Interpretation Summary    Left ventricular systolic function is normal. Calculated left ventricular EF = 61% Left ventricular ejection fraction appears to be 61 - 65%.    Left ventricular diastolic function is consistent with (grade I) impaired relaxation.    The left atrial cavity is mild to moderately dilated.    Estimated right ventricular systolic pressure from tricuspid regurgitation is mildly elevated (35-45 mmHg).    Mild pulmonary hypertension is present.    --------------------------------------------------------------------------------------------------------------------  Labs:  Results from last 7 days   Lab Units 12/25/23  1303 12/25/23  0032 12/24/23  0404 12/23/23  1434 12/23/23  0502 12/22/23 2029 12/22/23  1917 12/22/23  1329   PROCALCITONIN ng/mL  --   --   --   --   --   --  0.23  --    LACTATE mmol/L  --   --   --  1.6  --  0.9 0.9  --    CRP mg/dL  --   --   --   --   --   --   --  2.31*   WBC 10*3/mm3 14.13* 13.09* 14.03*  --    < >  --   --   --    HEMOGLOBIN g/dL 11.0* 10.7* 11.9*  --    < >  --   --   --    HEMATOCRIT % 33.2* 31.3* 36.8*  --    < >  --   --   --    MCV fL 98.2* 96.3 102.2*  --    < >  --   --   --    MCHC g/dL 33.1  "34.2 32.3  --    < >  --   --   --    PLATELETS 10*3/mm3 217 154 117*  --    < >  --   --   --    INR   --   --   --   --   --   --  1.10  --     < > = values in this interval not displayed.         Results from last 7 days   Lab Units 12/25/23  1303 12/25/23  0032 12/24/23  1305 12/24/23  0404 12/23/23  0501 12/22/23  1329   SODIUM mmol/L 133* 133*  --  127*   < >  --    POTASSIUM mmol/L 4.0 3.3* 3.3* 3.6   < >  --    MAGNESIUM mg/dL 2.3  --   --   --   --  2.5   CHLORIDE mmol/L 98 98  --  95*   < >  --    CO2 mmol/L 16.6* 19.2*  --  18.9*   < >  --    BUN mg/dL 13 10  --  12   < >  --    CREATININE mg/dL 0.72* 0.61*  --  0.71*   < >  --    CALCIUM mg/dL 9.4 8.8  --  8.7   < >  --    GLUCOSE mg/dL 118* 126*  --  102*   < >  --    ALBUMIN g/dL 3.3* 3.2*  --  3.4*   < >  --    BILIRUBIN mg/dL 1.3* 1.3*  --  1.5*   < >  --    ALK PHOS U/L 116 103  --  136*   < >  --    AST (SGOT) U/L 113* 113*  --  177*   < >  --    ALT (SGPT) U/L 61* 58*  --  73*   < >  --     < > = values in this interval not displayed.   Estimated Creatinine Clearance: 160.3 mL/min (A) (by C-G formula based on SCr of 0.72 mg/dL (L)).    Ammonia   Date Value Ref Range Status   12/25/2023 37 16 - 60 umol/L Final     Results from last 7 days   Lab Units 12/23/23  1654 12/23/23  1434 12/22/23  1329   HSTROP T ng/L <6 <6 <6     Results from last 7 days   Lab Units 12/22/23  1329   CHOLESTEROL mg/dL 286*   TRIGLYCERIDES mg/dL 173*   HDL CHOL mg/dL 95*   LDL CHOL mg/dL 161*     Glucose   Date/Time Value Ref Range Status   12/23/2023 1430 129 70 - 130 mg/dL Final     Lab Results   Component Value Date    TSH 0.765 05/01/2023     No results found for: \"PREGTESTUR\", \"PREGSERUM\", \"HCG\", \"HCGQUANT\"  Pain Management Panel  More data may exist         Latest Ref Rng & Units 12/25/2023 9/2/2020   Pain Management Panel   Creatinine, Urine Not Estab. mg/dL - 110.5    Amphetamine, Urine Qual Negative Negative  -   Barbiturates Screen, Urine Negative Negative  - " "  Benzodiazepine Screen, Urine Negative Positive  -   Buprenorphine, Screen, Urine Negative Negative  -   Cocaine Screen, Urine Negative Negative  -   Fentanyl, Urine Negative Negative  -   Methadone Screen , Urine Negative Negative  -   Methamphetamine, Ur Negative Negative  -     Brief Urine Lab Results  (Last result in the past 365 days)        Color   Clarity   Blood   Leuk Est   Nitrite   Protein   CREAT   Urine HCG        12/21/23 0127 Dark Yellow   Clear   Negative   Negative   Negative   100 mg/dL (2+)                 Blood Culture   Date Value Ref Range Status   12/22/2023 No growth at 2 days  Preliminary   12/22/2023 No growth at 2 days  Preliminary     No results found for: \"URINECX\"  No results found for: \"WOUNDCX\"  No results found for: \"STOOLCX\"    I have personally looked at the labs and they are summarized above.  ----------------------------------------------------------------------------------------------------------------------  Detailed radiology reports for the last 24 hours:    Imaging Results (Last 24 Hours)       Procedure Component Value Units Date/Time    CT Head Without Contrast [[id number]] Collected: 12/25/23 1552     Updated: 12/25/23 1555    Narrative:      CT HEAD WO CONTRAST-     CLINICAL INDICATION: delerium; F29-Unspecified psychosis not due to a  substance or known physiological condition; F19.10-Other psychoactive  substance abuse, uncomplicated        COMPARISON: None available     TECHNIQUE: Axial images of the brain were obtained with out intravenous  contrast.  Reformatted images were created in the sagittal and coronal  planes.     DOSE:     Radiation dose reduction techniques were utilized per ALARA protocol.  Automated exposure control was initiated through either or [employer] or  [employer] software packages by  protocol.        FINDINGS:    BRAIN:  Unremarkable.  No hemorrhage.  No significant white matter  disease.  No edema.       VENTRICLES: Probable " arachnoid cyst in the posterior fossa midline    BONES/JOINTS:  Unremarkable.  No acute fracture.       SOFT TISSUES:  Unremarkable.       SINUSES:  no air fluid levels       MASTOID AIR CELLS:  Unremarkable as visualized.  No mastoid effusion.          Impression:         1. No acute parenchymal mass, hemorrhage, or midline shift     This report was finalized on 12/25/2023 3:53 PM by Dr. Carmine Acharya MD.             Final impressions for the last 30 days of radiology reports:    CT Head Without Contrast    Result Date: 12/25/2023   1. No acute parenchymal mass, hemorrhage, or midline shift  This report was finalized on 12/25/2023 3:53 PM by Dr. Carmine Acharya MD.      MRI abdomen wo contrast mrcp    Result Date: 12/22/2023   1.  Severe acute pancreatitis. 2.  No pseudocyst formation. No dilated pancreatic duct. 3.  No acute process seen in the liver, gallbladder, adrenal glands, spleen, and left kidney. 4.  Small right parapelvic renal cyst. 5.  Small exophytic cyst at the junction of the mid and lower pole of the right kidney. 6.  No abscess or hematoma. 7.  No free air.  This report was finalized on 12/22/2023 6:08 PM by Ugo Ogden MD.      US Gallbladder    Result Date: 12/22/2023    Distended gallbladder.   This report was finalized on 12/22/2023 3:53 PM by Dr. Steve Hinojosa MD.      CT Abdomen Pelvis With Contrast    Result Date: 12/22/2023  1.  Stranding around the pancreas consistent with acute pancreatitis. 2.  Fatty infiltration of the liver. 3.  Degenerative changes lumbar spine as described.   This report was finalized on 12/22/2023 2:21 PM by Dr. Steve Hinojosa MD.      XR Chest 1 View    Result Date: 12/22/2023    Unremarkable exam. No acute cardiopulmonary findings identified.   This report was finalized on 12/22/2023 12:30 PM by Dr. Steve Hinojosa MD.     I have personally looked at the radiology images and read the final radiology report.    Assessment & Plan       #Acute psychosis w/  hallucinations   #Hyperactivity   #ETOH abuse   - Differential includes: ETOH withdrawal, THC or synthetic drug intoxication. Less likely but still considering: encephalitis, meningitis, primary psychiatric illness.   - Ammonia wnl. Personally reviewed CT head, no large hemorrhage or mass seen.  - Has not responded to 4 mg IV ativan. Patient not oriented or able to make decisions on his own. Not safe for him to leave at this time. Will place on hold if needed.   - Will try geodon as we get on precedex gtt. Will plan on try dose of ketamine if this does not work. Will try to avoid physical restraints but will use if absolutely needed.  - CIWA, high dose thiamine.    - If does not improve or febrile will attempt LP. Currently benefit does not outweigh risk. MRI also not currently able to be obtained.   - Psych consulted and will consider neurology consult pending clinical course      #Recent diagnosis acute alcohol induced pancreatitis and mild alcoholic hepatitis   - Tolerating regular diet. No residual pain. LFTs stable     #Exophytic R kidney cyst  - Outpatient urology f/u     #Chronic pain   - Continue home pain regimen PRN    #GERD    #Anxiety/depression     Code status: Full     Dispo: Given amount of sedatives using to keep patient from harming himself or staff, will transfer to CCU for close monitoring.     VTE Prophylaxis:   Mechanical Order History:        Ordered        Signed and Held  Place Sequential Compression Device  Once            Signed and Held  Maintain Sequential Compression Device  Continuous                          Pharmalogical Order History:       None              Carlos Isbell MD  [facility] Hospitalist  12/25/23  16:51 EST

## 2023-12-25 NOTE — PROGRESS NOTES
Upon arrival to CCU patient more agitated requiring security to help restrain. He had been aggressive toward staff causing danger to himself and others. He briefly responded to the geodon but no response to the IV ativan or precedex which was infusing at max dose. Patient given 50 mg (around 1/2 the recommended 1 /g/kg IV dose) with good response. Patient placed in soft upper extremity restraints but will remove when able. Continue precedex gtt and CIWA protocol. Patient high risk to require intubation.

## 2023-12-25 NOTE — PROGRESS NOTES
Was notified by nursing staff this morning that patient left the hospital to retrieve his home oxycodone in his vehicle. He was refusing to give it to us and noted he was going to take his home medication regardless of what we gave him. I stopped the scheduled oxycodone we had been giving him. Reviewing overnight nursing notes and notes prior patient has been refusing much of care and threatening to leave AMA for last couple of days. Documented to be oriented. Patient left AMA this AM before I was able to evaluate him.

## 2023-12-26 ENCOUNTER — APPOINTMENT (OUTPATIENT)
Dept: RESPIRATORY THERAPY | Facility: HOSPITAL | Age: 48
DRG: 439 | End: 2023-12-26
Payer: MEDICARE

## 2023-12-26 LAB
ANION GAP SERPL CALCULATED.3IONS-SCNC: 12.2 MMOL/L (ref 5–15)
ATMOSPHERIC PRESS: 724 MMHG
BASE EXCESS BLDV CALC-SCNC: -1.8 MMOL/L (ref 0–2)
BASOPHILS # BLD AUTO: 0.02 10*3/MM3 (ref 0–0.2)
BASOPHILS NFR BLD AUTO: 0.2 % (ref 0–1.5)
BDY SITE: ABNORMAL
BUN SERPL-MCNC: 13 MG/DL (ref 6–20)
BUN/CREAT SERPL: 22 (ref 7–25)
CALCIUM SPEC-SCNC: 8.6 MG/DL (ref 8.6–10.5)
CHLORIDE SERPL-SCNC: 108 MMOL/L (ref 98–107)
CO2 BLDA-SCNC: 22.5 MMOL/L (ref 22–33)
CO2 SERPL-SCNC: 21.8 MMOL/L (ref 22–29)
COHGB MFR BLD: 1.7 % (ref 0–5)
CREAT SERPL-MCNC: 0.59 MG/DL (ref 0.76–1.27)
DEPRECATED RDW RBC AUTO: 51.6 FL (ref 37–54)
EGFRCR SERPLBLD CKD-EPI 2021: 119.7 ML/MIN/1.73
EOSINOPHIL # BLD AUTO: 0.02 10*3/MM3 (ref 0–0.4)
EOSINOPHIL NFR BLD AUTO: 0.2 % (ref 0.3–6.2)
ERYTHROCYTE [DISTWIDTH] IN BLOOD BY AUTOMATED COUNT: 14.4 % (ref 12.3–15.4)
FOLATE SERPL-MCNC: 9.91 NG/ML (ref 4.78–24.2)
GLUCOSE SERPL-MCNC: 128 MG/DL (ref 65–99)
HCO3 BLDV-SCNC: 21.5 MMOL/L (ref 22–28)
HCT VFR BLD AUTO: 29.1 % (ref 37.5–51)
HGB BLD-MCNC: 9.8 G/DL (ref 13–17.7)
HGB BLDA-MCNC: 10.5 G/DL (ref 14–18)
IGG4 SER-MCNC: 7 MG/DL (ref 2–96)
IMM GRANULOCYTES # BLD AUTO: 0.32 10*3/MM3 (ref 0–0.05)
IMM GRANULOCYTES NFR BLD AUTO: 2.9 % (ref 0–0.5)
INHALED O2 CONCENTRATION: 21 %
LYMPHOCYTES # BLD AUTO: 1.31 10*3/MM3 (ref 0.7–3.1)
LYMPHOCYTES NFR BLD AUTO: 11.7 % (ref 19.6–45.3)
Lab: ABNORMAL
MCH RBC QN AUTO: 32.9 PG (ref 26.6–33)
MCHC RBC AUTO-ENTMCNC: 33.7 G/DL (ref 31.5–35.7)
MCV RBC AUTO: 97.7 FL (ref 79–97)
METHGB BLD QL: 0.1 % (ref 0–3)
MODALITY: ABNORMAL
MONOCYTES # BLD AUTO: 1.57 10*3/MM3 (ref 0.1–0.9)
MONOCYTES NFR BLD AUTO: 14 % (ref 5–12)
NEUTROPHILS NFR BLD AUTO: 7.97 10*3/MM3 (ref 1.7–7)
NEUTROPHILS NFR BLD AUTO: 71 % (ref 42.7–76)
NRBC BLD AUTO-RTO: 0 /100 WBC (ref 0–0.2)
OXYHGB MFR BLDV: 81.9 % (ref 45–75)
PCO2 BLDV: 31.2 MM HG (ref 41–51)
PH BLDV: 7.45 PH UNITS (ref 7.32–7.42)
PLATELET # BLD AUTO: 202 10*3/MM3 (ref 140–450)
PMV BLD AUTO: 10.4 FL (ref 6–12)
PO2 BLDV: 43.6 MM HG (ref 27–53)
POTASSIUM SERPL-SCNC: 3.7 MMOL/L (ref 3.5–5.2)
RBC # BLD AUTO: 2.98 10*6/MM3 (ref 4.14–5.8)
SAO2 % BLDCOV: 83.4 % (ref 45–75)
SODIUM SERPL-SCNC: 142 MMOL/L (ref 136–145)
T4 FREE SERPL-MCNC: 1.12 NG/DL (ref 0.93–1.7)
TSH SERPL DL<=0.05 MIU/L-ACNC: 0.27 UIU/ML (ref 0.27–4.2)
VENTILATOR MODE: ABNORMAL
VIT B12 BLD-MCNC: 982 PG/ML (ref 211–946)
WBC NRBC COR # BLD AUTO: 11.21 10*3/MM3 (ref 3.4–10.8)

## 2023-12-26 PROCEDURE — 80048 BASIC METABOLIC PNL TOTAL CA: CPT | Performed by: INTERNAL MEDICINE

## 2023-12-26 PROCEDURE — 82693 ASSAY OF ETHYLENE GLYCOL: CPT | Performed by: INTERNAL MEDICINE

## 2023-12-26 PROCEDURE — G0008 ADMIN INFLUENZA VIRUS VAC: HCPCS | Performed by: INTERNAL MEDICINE

## 2023-12-26 PROCEDURE — 99221 1ST HOSP IP/OBS SF/LOW 40: CPT | Performed by: STUDENT IN AN ORGANIZED HEALTH CARE EDUCATION/TRAINING PROGRAM

## 2023-12-26 PROCEDURE — 82746 ASSAY OF FOLIC ACID SERUM: CPT | Performed by: INTERNAL MEDICINE

## 2023-12-26 PROCEDURE — 25810000003 SODIUM CHLORIDE 0.9 % SOLUTION: Performed by: INTERNAL MEDICINE

## 2023-12-26 PROCEDURE — 95819 EEG AWAKE AND ASLEEP: CPT

## 2023-12-26 PROCEDURE — 82607 VITAMIN B-12: CPT | Performed by: INTERNAL MEDICINE

## 2023-12-26 PROCEDURE — 82820 HEMOGLOBIN-OXYGEN AFFINITY: CPT

## 2023-12-26 PROCEDURE — 25010000002 THIAMINE PER 100 MG: Performed by: INTERNAL MEDICINE

## 2023-12-26 PROCEDURE — 25010000002 LORAZEPAM PER 2 MG: Performed by: INTERNAL MEDICINE

## 2023-12-26 PROCEDURE — 95816 EEG AWAKE AND DROWSY: CPT | Performed by: PSYCHIATRY & NEUROLOGY

## 2023-12-26 PROCEDURE — 25010000002 PHENOBARBITAL PER 120 MG: Performed by: INTERNAL MEDICINE

## 2023-12-26 PROCEDURE — 99222 1ST HOSP IP/OBS MODERATE 55: CPT | Performed by: INTERNAL MEDICINE

## 2023-12-26 PROCEDURE — 84443 ASSAY THYROID STIM HORMONE: CPT | Performed by: INTERNAL MEDICINE

## 2023-12-26 PROCEDURE — 99233 SBSQ HOSP IP/OBS HIGH 50: CPT | Performed by: INTERNAL MEDICINE

## 2023-12-26 PROCEDURE — 84439 ASSAY OF FREE THYROXINE: CPT | Performed by: STUDENT IN AN ORGANIZED HEALTH CARE EDUCATION/TRAINING PROGRAM

## 2023-12-26 PROCEDURE — 90686 IIV4 VACC NO PRSV 0.5 ML IM: CPT | Performed by: INTERNAL MEDICINE

## 2023-12-26 PROCEDURE — 25010000002 INFLUENZA VAC SPLIT QUAD 0.5 ML SUSPENSION PREFILLED SYRINGE: Performed by: INTERNAL MEDICINE

## 2023-12-26 PROCEDURE — 82805 BLOOD GASES W/O2 SATURATION: CPT

## 2023-12-26 PROCEDURE — 80320 DRUG SCREEN QUANTALCOHOLS: CPT | Performed by: INTERNAL MEDICINE

## 2023-12-26 PROCEDURE — 85025 COMPLETE CBC W/AUTO DIFF WBC: CPT | Performed by: INTERNAL MEDICINE

## 2023-12-26 RX ORDER — OXYCODONE HYDROCHLORIDE 10 MG/1
10 TABLET ORAL EVERY 4 HOURS PRN
Status: DISCONTINUED | OUTPATIENT
Start: 2023-12-26 | End: 2023-12-27 | Stop reason: HOSPADM

## 2023-12-26 RX ADMIN — OXYCODONE HYDROCHLORIDE 10 MG: 10 TABLET ORAL at 14:58

## 2023-12-26 RX ADMIN — THIAMINE HYDROCHLORIDE 200 MG: 100 INJECTION, SOLUTION INTRAMUSCULAR; INTRAVENOUS at 22:30

## 2023-12-26 RX ADMIN — PHENOBARBITAL SODIUM 65 MG: 65 INJECTION INTRAMUSCULAR at 13:25

## 2023-12-26 RX ADMIN — THIAMINE HYDROCHLORIDE 200 MG: 100 INJECTION, SOLUTION INTRAMUSCULAR; INTRAVENOUS at 05:54

## 2023-12-26 RX ADMIN — OXYCODONE HYDROCHLORIDE 10 MG: 10 TABLET ORAL at 20:22

## 2023-12-26 RX ADMIN — Medication 10 ML: at 09:14

## 2023-12-26 RX ADMIN — LORAZEPAM 2 MG: 2 INJECTION, SOLUTION INTRAMUSCULAR; INTRAVENOUS at 07:57

## 2023-12-26 RX ADMIN — SODIUM CHLORIDE 1.5 MCG/KG/HR: 9 INJECTION, SOLUTION INTRAVENOUS at 00:04

## 2023-12-26 RX ADMIN — FOLIC ACID 1 MG: 5 INJECTION, SOLUTION INTRAMUSCULAR; INTRAVENOUS; SUBCUTANEOUS at 09:14

## 2023-12-26 RX ADMIN — INFLUENZA A VIRUS A/VICTORIA/4897/2022 IVR-238 (H1N1) ANTIGEN (FORMALDEHYDE INACTIVATED), INFLUENZA A VIRUS A/DARWIN/9/2021 SAN-010 (H3N2) ANTIGEN (FORMALDEHYDE INACTIVATED), INFLUENZA B VIRUS B/PHUKET/3073/2013 ANTIGEN (FORMALDEHYDE INACTIVATED), AND INFLUENZA B VIRUS B/MICHIGAN/01/2021 ANTIGEN (FORMALDEHYDE INACTIVATED) 0.5 ML: 15; 15; 15; 15 INJECTION, SUSPENSION INTRAMUSCULAR at 05:27

## 2023-12-26 RX ADMIN — SODIUM CHLORIDE 1.2 MCG/KG/HR: 9 INJECTION, SOLUTION INTRAVENOUS at 07:46

## 2023-12-26 RX ADMIN — LORAZEPAM 2 MG: 2 INJECTION INTRAMUSCULAR; INTRAVENOUS at 13:24

## 2023-12-26 RX ADMIN — THIAMINE HYDROCHLORIDE 200 MG: 100 INJECTION, SOLUTION INTRAMUSCULAR; INTRAVENOUS at 14:58

## 2023-12-26 NOTE — PLAN OF CARE
Goal Outcome Evaluation:              Outcome Evaluation: patient arrived to unit, precedex infusing, patient became agitated and combative, security at bedside to assist with redirection, PRN medications administered per order, Dr phan at bedside assisting with patient. peripheral IVs with precedex infusing. bed in lowest position, call light in reach, bed alarm set, VSS, WCTM         Problem: Adult Inpatient Plan of Care  Goal: Plan of Care Review  Outcome: Ongoing, Progressing  Flowsheets  Taken 12/25/2023 1950 by Merlin Tipton RN  Outcome Evaluation: patient arrived to unit, precedex infusing, patient became agitated and combative, security at bedside to assist with redirection, PRN medications administered per order, Dr phan at bedside assisting with patient. peripheral IVs with precedex infusing. bed in lowest position, call light in reach, bed alarm set, VSS, WCTM  Taken 12/25/2023 1818 by Eve Loya RN  Plan of Care Reviewed With:   patient   father  Goal: Patient-Specific Goal (Individualized)  Outcome: Ongoing, Progressing  Goal: Absence of Hospital-Acquired Illness or Injury  Outcome: Ongoing, Progressing  Intervention: Identify and Manage Fall Risk  Recent Flowsheet Documentation  Taken 12/25/2023 1730 by Merlin Tipton RN  Safety Promotion/Fall Prevention:   fall prevention program maintained   safety round/check completed  Intervention: Prevent Skin Injury  Recent Flowsheet Documentation  Taken 12/25/2023 1730 by Merlin Tipton, RN  Body Position: position changed independently  Skin Protection:   adhesive use limited   incontinence pads utilized   skin-to-device areas padded   skin-to-skin areas padded   transparent dressing maintained   tubing/devices free from skin contact  Intervention: Prevent and Manage VTE (Venous Thromboembolism) Risk  Recent Flowsheet Documentation  Taken 12/25/2023 1730 by Merlin Tipton, RN  Range of Motion:   active ROM (range of motion) encouraged   ROM (range of  motion) performed  Intervention: Prevent Infection  Recent Flowsheet Documentation  Taken 12/25/2023 1730 by Merlin Tipton, RN  Infection Prevention:   equipment surfaces disinfected   hand hygiene promoted   personal protective equipment utilized   rest/sleep promoted   single patient room provided   visitors restricted/screened  Goal: Optimal Comfort and Wellbeing  Outcome: Ongoing, Progressing  Intervention: Monitor Pain and Promote Comfort  Recent Flowsheet Documentation  Taken 12/25/2023 1730 by Merlin Tipton, RN  Pain Management Interventions:   care clustered   pillow support provided   position adjusted   quiet environment facilitated   see MAR  Intervention: Provide Person-Centered Care  Recent Flowsheet Documentation  Taken 12/25/2023 1730 by Merlin Tipton, RN  Trust Relationship/Rapport:   care explained   choices provided   empathic listening provided   emotional support provided   questions answered   questions encouraged   reassurance provided   thoughts/feelings acknowledged  Goal: Readiness for Transition of Care  Outcome: Ongoing, Progressing     Problem: Restraint, Nonviolent  Goal: Absence of Harm or Injury  Outcome: Ongoing, Progressing  Intervention: Implement Least Restrictive Safety Strategies  Recent Flowsheet Documentation  Taken 12/25/2023 1844 by Merlin Tipton, RN  Medical Device Protection: tubing secured  Less Restrictive Alternative:   1:1 observation maintained   appropriate expression promoted   bed alarm in use   calming techniques promoted   coping techniques promoted   emotional support provided   environment adjusted   family presence promoted   medication offered  De-Escalation Techniques:   1:1 observation initiated   appropriate behavior reinforced   diversional activity encouraged   increased round frequency   medication administered   quiet time facilitated   reoriented   stimulation decreased   verbally redirected  Diversional Activities: television  Taken 12/25/2023 1730 by  Merlin Tipton, RN  Diversional Activities: television  Intervention: Protect Dignity, Rights, and Personal Wellbeing  Recent Flowsheet Documentation  Taken 12/25/2023 1730 by Merlin Tipton, RN  Trust Relationship/Rapport:   care explained   choices provided   empathic listening provided   emotional support provided   questions answered   questions encouraged   reassurance provided   thoughts/feelings acknowledged  Intervention: Protect Skin and Joint Integrity  Recent Flowsheet Documentation  Taken 12/25/2023 1730 by Merlin Tipton, RN  Body Position: position changed independently  Range of Motion:   active ROM (range of motion) encouraged   ROM (range of motion) performed

## 2023-12-26 NOTE — PAYOR COMM NOTE
"CONTACT:  LOLIS GREEN MSN, RN  UTILIZATION MANAGEMENT DEPT.  [facility]  [address] [facility], [address]  PHONE:  [phone]  FAX: [phone]    PATIENT LEFT AMA ON 12/25/23. PENDING REF # [id number]     Taiwo Pierce (48 y.o. Male)       Date of Birth   1975    Social Security Number       Address   [address] [address]    Home Phone   [phone]    MRN   [id number]       [facility]    Marital Status                               Admission Date   12/22/23    Admission Type   Emergency    Admitting Provider   Salvatore Naik MD    Attending Provider       Department, Room/Bed   [facility] [address], [address]       Discharge Date   12/25/2023    Discharge Disposition   Left Against Medical Advice    Discharge Destination   Home                              Attending Provider: (none)   Allergies: Augmentin [Amoxicillin-pot Clavulanate], Metoprolol    Isolation: None   Infection: None   Code Status: CPR    Ht: 180.3 cm (71\")   Wt: 90.4 kg (199 lb 4.8 oz)    Admission Cmt: None   Principal Problem: Pancreatitis [K85.90]                   Active Insurance as of 12/22/2023       Primary Coverage       Payor Plan Insurance Group Employer/Plan Group    HUMANA MEDICARE REPLACEMENT HUMANA MEDICARE REPLACEMENT [id number]       Payor Plan Address Payor Plan Phone Number Payor Plan Fax Number Effective Dates    [address] [address] [phone]  1/1/2023 - None Entered    [facility] [id number]         Subscriber Name Subscriber Birth Date Member ID       TAIWO PIERCE 1975 [id number]                     Emergency Contacts        (Rel.) Home Phone Work Phone Mobile Phone    KRISTA PIERCE (Spouse) [phone] -- [phone]    Rosario Pierce [phone] -- --                 Discharge Summary        Carlos Isbell MD at 12/25/23 0942              [facility] HOSPITALISTS DISCHARGE SUMMARY    Patient Identification:  Name:  " Taiwo Pierce  Age:  48 y.o.  Sex:  male  :  1975  MRN:  [id number]  Visit Number:  [id number]    Date of Admission: 2023  Date of Discharge:  2023    PCP: Provider, No Known    DISCHARGE DIAGNOSIS  #Acute Pancreatitis suspected due to Alcohol, SIRS +  #Alcohol Use Disorder, Severe, with Dependence, recent cessation, monitor for withdrawal   #Acute Mild Alcohol Hepatitis  #Hypertension/Hyperlipidemia  #Electrolyte Abnormalities  #Exophytic R kidney Cyst  #Gastroesophageal Reflux Disease  #Anxiety/Depression    CONSULTS   None    PROCEDURES PERFORMED  None    HOSPITAL COURSE  Patient is a 48 y.o. male presented on  to [facility] complaining of abdominal pain.  Please see the admitting history and physical for further details.      Mr. Pierce is our 49 yo M with hx OA, GERD, HTN, HLD, alcohol misuse who presents with abdominal pain and N/V. CT, symptoms, and elevated lipase all consistent with acute pancreatitis. Started on IVF, IV pain regimen, NPO. Improved back to regular diet yesterday with some signs of withdrawal on CIWA. He had been started on abx with no source of SIRS identified, likely reactive, consistent with normal procal. Please see progress note from this AM. Was notified by nursing staff this morning that patient left the hospital to retrieve his home oxycodone in his vehicle. He was refusing to give it to us and noted he was going to take his home medication regardless of what we gave him. I stopped the scheduled oxycodone we had been giving him. Reviewing overnight nursing notes and notes prior patient has been refusing much of care and threatening to leave AMA for last couple of days. Documented to be oriented. Patient left AMA this AM before I was able to evaluate him. Will make referral for patient to f/u with PCP and urology.     VITAL SIGNS:  Temp:  [98 °F (36.7 °C)-98.7 °F (37.1 °C)] 98.2 °F (36.8 °C)  Heart Rate:  [] 94  Resp:  [18] 18  BP:  (139-154)/() 139/95  SpO2:  [97 %-98 %] 97 %  on   ;   Device (Oxygen Therapy): room air    Body mass index is 27.8 kg/m².  Wt Readings from Last 3 Encounters:   12/22/23 90.4 kg (199 lb 4.8 oz)   12/21/23 90.5 kg (199 lb 9.6 oz)   10/24/23 92.1 kg (203 lb)       PHYSICAL EXAM:  Patient left AMA before evaluation this AM    DISCHARGE DISPOSITION   Stable, AMA    DISCHARGE MEDICATIONS:     Discharge Medications        ASK your doctor about these medications        Instructions Start Date   amLODIPine 10 MG tablet  Commonly known as: NORVASC   10 mg, Oral, Daily      cyclobenzaprine 10 MG tablet  Commonly known as: FLEXERIL   10 mg, Oral, 3 Times Daily PRN      DULoxetine 30 MG capsule  Commonly known as: CYMBALTA   30 mg, Oral, Daily      gabapentin 800 MG tablet  Commonly known as: NEURONTIN   800 mg, Oral, 3 Times Daily      hydroCHLOROthiazide 25 MG tablet  Commonly known as: HYDRODIURIL   25 mg, Oral, Every Morning      lansoprazole 30 MG capsule  Commonly known as: PREVACID   30 mg, Oral, Daily      oxyCODONE 10 MG tablet  Commonly known as: ROXICODONE  Ask about: Which instructions should I use?   10 mg, Oral, 5 Times Daily      potassium chloride 20 MEQ CR tablet  Commonly known as: K-DUR,KLOR-CON   20 mEq, Oral, 2 Times Daily      pravastatin 20 MG tablet  Commonly known as: PRAVACHOL   20 mg, Oral, Daily      valsartan 320 MG tablet  Commonly known as: DIOVAN   320 mg, Oral, Daily                      TEST  RESULTS PENDING AT DISCHARGE  Pending Labs       Order Current Status    IgG 4 In process    Pancreatitis: 3-gene Panel - Blood, In process    Blood Culture - Blood, Arm, Left Preliminary result    Blood Culture - Blood, Arm, Right Preliminary result             CODE STATUS  Code Status and Medical Interventions:   Ordered at: 12/22/23 1827     Code Status (Patient has no pulse and is not breathing):    CPR (Attempt to Resuscitate)     Medical Interventions (Patient has pulse or is breathing):     Full Support       Carlos Isbell MD  [facility]  12/25/23  09:42 EST    Please note that this discharge summary required more than 30 minutes to complete.      Electronically signed by Carlos Isbell MD at 12/25/23 2676

## 2023-12-26 NOTE — PLAN OF CARE
Problem: Restraint, Nonviolent  Goal: Absence of Harm or Injury  12/26/2023 1721 by Cydney Stratton RN  Outcome: Met  12/26/2023 1719 by Cydney Stratton RN  Outcome: Ongoing, Progressing  Intervention: Implement Least Restrictive Safety Strategies  Recent Flowsheet Documentation  Taken 12/26/2023 1400 by Cydney Stratton RN  Diversional Activities: television  Taken 12/26/2023 1100 by Cydney Stratton RN  Medical Device Protection:   tubing secured   torso covered   IV pole/bag removed from visual field  Less Restrictive Alternative:   1:1 observation maintained   appropriate expression promoted   bed alarm in use   calming techniques promoted   coping techniques promoted   counseling provided   emotional support provided   environment adjusted   familiar comfort object encouraged   family presence promoted   medication offered   positive reinforcement provided   problem-solving encouraged   safety enhancements provided   self-care activities encouraged   security enhancements provided   sensory stimulation limited   surveillance provided  De-Escalation Techniques:   1:1 observation initiated   appropriate behavior reinforced   diversional activity encouraged   family involvement requested   increased round frequency   medication administered   medication offered   quiet time facilitated   reoriented   stimulation decreased   verbally redirected  Diversional Activities: television  Taken 12/26/2023 1000 by Cydney Stratton RN  Medical Device Protection:   tubing secured   torso covered   IV pole/bag removed from visual field  Less Restrictive Alternative:   1:1 observation maintained   appropriate expression promoted   bed alarm in use   calming techniques promoted   coping techniques promoted   counseling provided   emotional support provided   environment adjusted   family presence promoted   positive reinforcement provided   safety enhancements provided   security enhancements provided   sensory stimulation  limited   surveillance provided  De-Escalation Techniques:   1:1 observation initiated   appropriate behavior reinforced   diversional activity encouraged   family involvement requested   increased round frequency   medication administered   medication offered   quiet time facilitated   reoriented   stimulation decreased   verbally redirected  Diversional Activities: television  Taken 12/26/2023 0800 by Cydney Stratton RN  Medical Device Protection:   tubing secured   torso covered   IV pole/bag removed from visual field  Less Restrictive Alternative:   1:1 observation maintained   appropriate expression promoted   bed alarm in use   calming techniques promoted   coping techniques promoted   counseling provided   emotional support provided   environment adjusted   family presence promoted   positive reinforcement provided   safety enhancements provided   security enhancements provided   sensory stimulation limited   surveillance provided  De-Escalation Techniques:   1:1 observation initiated   appropriate behavior reinforced   diversional activity encouraged   family involvement requested   increased round frequency   medication administered   medication offered   quiet time facilitated   reoriented   stimulation decreased   verbally redirected  Diversional Activities: television  Taken 12/26/2023 0740 by Cydney Stratton RN  Diversional Activities: television  Taken 12/26/2023 0700 by Cydney Stratton RN  Medical Device Protection:   tubing secured   torso covered   IV pole/bag removed from visual field  Intervention: Protect Dignity, Rights, and Personal Wellbeing  Recent Flowsheet Documentation  Taken 12/26/2023 1400 by Cydney Stratton RN  Trust Relationship/Rapport: care explained  Taken 12/26/2023 0740 by Cydney Stratton RN  Trust Relationship/Rapport:   care explained   reassurance provided   thoughts/feelings acknowledged  Intervention: Protect Skin and Joint Integrity  Recent Flowsheet Documentation  Taken  12/26/2023 1600 by Cydney Stratton RN  Body Position: (patient sitting up to eat dinner at this time) sitting up in bed  Taken 12/26/2023 1400 by Cydney Stratton RN  Body Position: (patient up in chair) other (see comments)  Range of Motion:   active ROM (range of motion) encouraged   ROM (range of motion) performed  Taken 12/26/2023 1200 by Cydney Stratton RN  Body Position:   turned   left   side-lying  Taken 12/26/2023 1000 by Cydney Stratton RN  Body Position:   turned   right   side-lying  Taken 12/26/2023 0800 by Cydney Stratton RN  Body Position:   turned   left   side-lying  Taken 12/26/2023 0740 by Cydney Stratton RN  Range of Motion: ROM (range of motion) performed

## 2023-12-26 NOTE — NURSING NOTE
1844 Patient alert and answering orientation questions appropriately at this time. Patient asked when he would be leaving tonight. RN explained that the patient had not been discharged yet. Patient states that he wanted to leave AMA if he isn't going to be discharged. Dr. Isbell notified.    1845 Dr. Isbell at bedside with patient.    1859 Dr. Isbell explained to patient the risks of leaving AMA. Patient acknowledged the risks and still insisted to leave AMA. Patient signed AMA form at this time.    1921 Belongings returned from security.

## 2023-12-26 NOTE — CONSULTS
Consult Note Pulmonary     Referring Provider: Dr. Isbell  Reason for Consultation: Encephalopathy, critical care input      Chief complaint     Altered sensorium.  Drug overdose, withdrawal  History of present illness:    Patient has been on phenobarbital, IV Precedex.  Getting EEG.    Not really responsive.    Most of the information obtained through nursing and physician communication and chart review.    Patient is 48-year-old male with past medical history significant for alcohol abuse, GERD, hypertension, osteoarthritis.  He was admitted with altered mental status.  He recently signed out AMA.    On previous hospitalization on 22 December he was admitted with abdominal pain nausea vomiting.  Had an elevated lipase consistent with pancreatitis clinically improved but left AMA.    Shortly after patient left hospital his father found him confused, unsteady gait and found to be hallucinating.  Last known alcohol abuse was 12/16/2023.    Patient at the time of presentation denied headache or light sensitivity.            Drug screen positive for opiates benzodiazepine and THC.      Review of Systems  Cannot be obtained because of altered sensorium    History  Past Medical History:   Diagnosis Date    Arthritis October 2007    Arthritis in back and neck    GERD (gastroesophageal reflux disease)     Headache 2007    Headaches due to disc in neck.    Hyperlipidemia     Hypertension     Low back pain 10/05/09    Hurt back and had back surgery 04/16/2014. Hit a nerve   ,   Past Surgical History:   Procedure Laterality Date    ANTERIOR CERVICAL DISCECTOMY W/ FUSION N/A 8/21/2023    Procedure: CERVICAL DISCECTOMY ANTERIOR WITH FUSION C5-7;  Surgeon: Julio C Bryson MD;  Location: [facility];  Service: Neurosurgery;  Laterality: N/A;    FRACTURE SURGERY Left     Foot    KNEE ARTHROSCOPY Left     LUMBAR DISCECTOMY  04/16/2014    LT L5-S1 Discectomy, L4-5 Lami with Disc - Dr. Esequiel Joshi   ,   Family History   Problem  Relation Age of Onset    Hyperlipidemia Father     Hypertension Father     Breast cancer Mother     Cancer Mother         Breast cancer    Depression Mother         Depression nerve problems    Prostate cancer Paternal Grandfather     Cancer Paternal Grandfather         Prostate cancer    Hyperlipidemia Paternal Grandfather     Prostate cancer Maternal Grandfather     Arthritis Maternal Grandfather     Cancer Maternal Grandfather         Prostate cancer    Heart disease Maternal Grandfather         Heart attack@90    Hyperlipidemia Maternal Grandfather     Cancer Maternal Grandmother         Lung cancer    Depression Maternal Grandmother         Depression nerve problems    Stroke Maternal Grandmother         Brain anurism    Arthritis Paternal Uncle     Early death Sister         Brain didnt develop completely   ,   Social History     Tobacco Use    Smoking status: Never    Smokeless tobacco: Current     Types: Snuff   Vaping Use    Vaping Use: Never used   Substance Use Topics    Alcohol use: Yes     Comment: drinks 8oz vodka daily, quit Saturday 12/16 because wife threatened to divorce him if he didn't.    Drug use: No   ,   Medications Prior to Admission   Medication Sig Dispense Refill Last Dose    amLODIPine (NORVASC) 10 MG tablet Take 1 tablet by mouth Daily.   Unknown    cyclobenzaprine (FLEXERIL) 10 MG tablet Take 1 tablet by mouth 3 (Three) Times a Day As Needed for Muscle Spasms. 90 tablet 0 Unknown    DULoxetine (CYMBALTA) 30 MG capsule Take 1 capsule by mouth Daily.   Unknown    gabapentin (NEURONTIN) 800 MG tablet Take 1 tablet by mouth 3 (Three) Times a Day.   Unknown    hydroCHLOROthiazide (HYDRODIURIL) 25 MG tablet Take 1 tablet by mouth Daily.   Unknown    lansoprazole (PREVACID) 30 MG capsule Take 1 capsule by mouth Daily.   Unknown    metaxalone (SKELAXIN) 800 MG tablet Take 1 tablet by mouth 3 (Three) Times a Day As Needed for Muscle Spasms.   Unknown    oxyCODONE (ROXICODONE) 10 MG tablet Take  1 tablet by mouth 4 (Four) Times a Day.   Unknown    potassium chloride (K-DUR,KLOR-CON) 20 MEQ CR tablet Take 1 tablet by mouth 2 (Two) Times a Day. 60 tablet 2 Unknown    pravastatin (PRAVACHOL) 20 MG tablet Take 1 tablet by mouth Daily. 90 tablet 0 Unknown    valsartan (DIOVAN) 320 MG tablet Take 1 tablet by mouth Daily.   Unknown   , Scheduled Meds:  folic acid 1 mg in sodium chloride 0.9 % 50 mL IVPB, 1 mg, Intravenous, Daily  ketamine, 100 mg, Intravenous, Once  PHENobarbital, 65 mg, Intravenous, Once   Followed by  [START ON 12/27/2023] PHENobarbital, 65 mg, Intravenous, Once   Followed by  [START ON 12/27/2023] PHENobarbital, 32.4 mg, Oral, Once   Followed by  [START ON 12/28/2023] PHENobarbital, 32.4 mg, Oral, Once   Followed by  [START ON 12/28/2023] PHENobarbital, 32.4 mg, Oral, Once  senna-docusate sodium, 2 tablet, Oral, BID  sodium chloride, 10 mL, Intravenous, Q12H  sodium chloride, 10 mL, Intravenous, Q12H  thiamine (B-1) IV, 200 mg, Intravenous, Q8H   Followed by  [START ON 12/31/2023] thiamine, 100 mg, Oral, Daily    , Continuous Infusions:  dexmedetomidine, 0.2-1.5 mcg/kg/hr, Last Rate: 1 mcg/kg/hr (12/26/23 0800)     and Allergies:  Augmentin [amoxicillin-pot clavulanate] and Metoprolol    Objective     Vital Signs   Temp:  [97.6 °F (36.4 °C)-99.1 °F (37.3 °C)] 99 °F (37.2 °C)  Heart Rate:  [] 90  Resp:  [15-40] 18  BP: (121-155)/() 146/96    Physical Exam:          General-minimally responsive to physical stimuli, tremulous.  HEENT- pupils equally reactive to light, normal in size, no scleral icterus    Neck-supple, No JVD, no carotid bruit    Respiratory-bilateral air entry, Lear to auscultation.    Cardiovascular-  Normal S1 and S2. No S3, S4 or murmurs.      GI-nontender nondistended bowel sounds positive    CNS-all 4 extremities.  Extremities-no clubbing and edema            Results Review:    LABS:    Lab Results   Component Value Date    GLUCOSE 128 (H) 12/26/2023    BUN 13  12/26/2023    CREATININE 0.59 (L) 12/26/2023    EGFRIFNONA 74 09/02/2020    BCR 22.0 12/26/2023    CO2 21.8 (L) 12/26/2023    CALCIUM 8.6 12/26/2023    PROTENTOTREF 8.6 (H) 05/01/2023    ALBUMIN 3.3 (L) 12/25/2023    LABIL2 1.5 05/01/2023     (H) 12/25/2023    ALT 61 (H) 12/25/2023    WBC 11.21 (H) 12/26/2023    HGB 9.8 (L) 12/26/2023    HCT 29.1 (L) 12/26/2023    MCV 97.7 (H) 12/26/2023     12/26/2023     12/26/2023    K 3.7 12/26/2023     (H) 12/26/2023    ANIONGAP 12.2 12/26/2023       Lab Results   Component Value Date    INR 1.10 12/22/2023    PROTIME 14.7 12/22/2023       Results from last 7 days   Lab Units 12/22/23 1917   INR  1.10   APTT seconds 25.7*          I reviewed the patient's new clinical results.  I reviewed the patient's new imaging results and agree with the interpretation.      Assessment & Plan     #1 altered sensorium most likely drug related, overdose/withdrawal.  I would like to check methanol and ethylene glycol given initial labs suggestive of high anion gap metabolic acidosis.    Ammonia within normal range.    Phenobarb has been added.  Neurology has been consulted.  High risk of aspiration.    Continue IV thiamine.    Continue Precedex drip as needed.    May try a dose of ketamine.            GI- PPI prophylaxis      Endrocrinology- Maintain Blood sugar 140 -180        DVT prophylaxis-    I have personally reviewed labs, medication list, and x-rays.    I am really thankful to Dr. Isbell for having me participate in the care of this patient.  Total time spent 35 minutes    Ham Niño MD     12/26/23 12:41 EST

## 2023-12-26 NOTE — PLAN OF CARE
Goal Outcome Evaluation:  Plan of Care Reviewed With: patient     Discontinuation criteria for restraints not met.  Precedex infusing.  Currently on room air.  UO adequate. Afebrile.   Problem: Adult Inpatient Plan of Care  Goal: Plan of Care Review  Outcome: Ongoing, Progressing  Flowsheets (Taken 12/26/2023 0545)  Plan of Care Reviewed With: patient  Goal: Patient-Specific Goal (Individualized)  Outcome: Ongoing, Progressing  Goal: Absence of Hospital-Acquired Illness or Injury  Outcome: Ongoing, Progressing  Intervention: Identify and Manage Fall Risk  Recent Flowsheet Documentation  Taken 12/26/2023 0500 by Erika Gaytan RN  Safety Promotion/Fall Prevention:   safety round/check completed   fall prevention program maintained  Taken 12/26/2023 0400 by Erika Gaytan RN  Safety Promotion/Fall Prevention:   safety round/check completed   fall prevention program maintained  Taken 12/26/2023 0300 by Erika Gaytan RN  Safety Promotion/Fall Prevention:   safety round/check completed   fall prevention program maintained  Taken 12/26/2023 0200 by Erika Gaytan RN  Safety Promotion/Fall Prevention:   fall prevention program maintained   safety round/check completed  Taken 12/26/2023 0100 by Erika Gaytan RN  Safety Promotion/Fall Prevention:   safety round/check completed   fall prevention program maintained  Taken 12/26/2023 0000 by Erika Gaytan RN  Safety Promotion/Fall Prevention:   safety round/check completed   fall prevention program maintained  Taken 12/25/2023 2300 by Erika Gaytan RN  Safety Promotion/Fall Prevention:   safety round/check completed   fall prevention program maintained  Taken 12/25/2023 2200 by Erika Gaytan RN  Safety Promotion/Fall Prevention:   safety round/check completed   fall prevention program maintained  Taken 12/25/2023 2100 by Erika Gaytan RN  Safety Promotion/Fall Prevention:   safety round/check completed   fall prevention program maintained  Taken 12/25/2023 2000  by Lukas, Erika, RN  Safety Promotion/Fall Prevention:   safety round/check completed   fall prevention program maintained  Taken 12/25/2023 1900 by Erika Gaytan RN  Safety Promotion/Fall Prevention:   safety round/check completed   fall prevention program maintained  Intervention: Prevent Skin Injury  Recent Flowsheet Documentation  Taken 12/26/2023 0400 by Eriak Gaytan RN  Body Position:   left   turned  Taken 12/26/2023 0200 by Erika Gaytan RN  Body Position:   right   turned   side-lying  Taken 12/26/2023 0000 by Erika Gaytan RN  Body Position:   left   turned   side-lying  Taken 12/25/2023 2200 by Erika Gaytan RN  Body Position:   right   turned  Taken 12/25/2023 2000 by Erika Gaytan RN  Body Position:   turned   left   side-lying  Taken 12/25/2023 1930 by Erika Gaytan RN  Skin Protection:   skin-to-skin areas padded   adhesive use limited   incontinence pads utilized   pulse oximeter probe site changed   skin-to-device areas padded  Intervention: Prevent and Manage VTE (Venous Thromboembolism) Risk  Recent Flowsheet Documentation  Taken 12/26/2023 0200 by Erika Gaytan RN  Activity Management: bedrest  VTE Prevention/Management:   bilateral   sequential compression devices on  Taken 12/25/2023 1930 by Erika Gaytan RN  Activity Management: bedrest  VTE Prevention/Management: (SCDs ordered) other (see comments)  Range of Motion:   active ROM (range of motion) encouraged   ROM (range of motion) performed  Intervention: Prevent Infection  Recent Flowsheet Documentation  Taken 12/25/2023 2000 by Erika Gaytan RN  Infection Prevention:   single patient room provided   rest/sleep promoted  Goal: Optimal Comfort and Wellbeing  Outcome: Ongoing, Progressing  Intervention: Monitor Pain and Promote Comfort  Recent Flowsheet Documentation  Taken 12/26/2023 0200 by Eriak Gaytan RN  Pain Management Interventions:   care clustered   pillow support provided   position adjusted  Taken  12/25/2023 1930 by Erika Gaytan RN  Pain Management Interventions:   care clustered   position adjusted  Goal: Readiness for Transition of Care  Outcome: Ongoing, Progressing     Problem: Asthma Comorbidity  Goal: Maintenance of Asthma Control  Outcome: Ongoing, Progressing  Intervention: Maintain Asthma Symptom Control  Recent Flowsheet Documentation  Taken 12/26/2023 0400 by Erika Gaytan RN  Medication Review/Management: medications reviewed  Taken 12/26/2023 0200 by Erika Gaytan RN  Medication Review/Management: medications reviewed  Taken 12/26/2023 0000 by Erika Gaytan RN  Medication Review/Management: medications reviewed  Taken 12/25/2023 2100 by Erika Gaytan RN  Medication Review/Management: medications reviewed  Taken 12/25/2023 2000 by Erika Gaytan RN  Medication Review/Management: medications reviewed     Problem: Behavioral Health Comorbidity  Goal: Maintenance of Behavioral Health Symptom Control  Outcome: Ongoing, Progressing  Intervention: Maintain Behavioral Health Symptom Control  Recent Flowsheet Documentation  Taken 12/26/2023 0400 by Erika Gaytan RN  Medication Review/Management: medications reviewed  Taken 12/26/2023 0200 by Erika Gaytan RN  Medication Review/Management: medications reviewed  Taken 12/26/2023 0000 by Erika Gaytan RN  Medication Review/Management: medications reviewed  Taken 12/25/2023 2100 by Erika Gaytan RN  Medication Review/Management: medications reviewed  Taken 12/25/2023 2000 by Erika Gaytan RN  Medication Review/Management: medications reviewed     Problem: COPD (Chronic Obstructive Pulmonary Disease) Comorbidity  Goal: Maintenance of COPD Symptom Control  Outcome: Ongoing, Progressing  Intervention: Maintain COPD-Symptom Control  Recent Flowsheet Documentation  Taken 12/26/2023 0400 by Erika Gaytan RN  Medication Review/Management: medications reviewed  Taken 12/26/2023 0200 by Erika Gaytan RN  Medication Review/Management:  medications reviewed  Taken 12/26/2023 0000 by Erika Gaytan RN  Medication Review/Management: medications reviewed  Taken 12/25/2023 2100 by Erika Gaytan RN  Medication Review/Management: medications reviewed  Taken 12/25/2023 2000 by Erika Gaytan RN  Medication Review/Management: medications reviewed     Problem: Diabetes Comorbidity  Goal: Blood Glucose Level Within Targeted Range  Outcome: Ongoing, Progressing  Intervention: Monitor and Manage Glycemia  Recent Flowsheet Documentation  Taken 12/25/2023 1930 by Erika Gaytan RN  Glycemic Management: blood glucose monitored     Problem: Heart Failure Comorbidity  Goal: Maintenance of Heart Failure Symptom Control  Outcome: Ongoing, Progressing  Intervention: Maintain Heart Failure-Management  Recent Flowsheet Documentation  Taken 12/26/2023 0400 by Erika Gaytan RN  Medication Review/Management: medications reviewed  Taken 12/26/2023 0200 by Erika Gaytan RN  Medication Review/Management: medications reviewed  Taken 12/26/2023 0000 by Erika Gaytan RN  Medication Review/Management: medications reviewed  Taken 12/25/2023 2100 by Erika Gaytan RN  Medication Review/Management: medications reviewed  Taken 12/25/2023 2000 by Erika Gaytan RN  Medication Review/Management: medications reviewed     Problem: Hypertension Comorbidity  Goal: Blood Pressure in Desired Range  Outcome: Ongoing, Progressing  Intervention: Maintain Blood Pressure Management  Recent Flowsheet Documentation  Taken 12/26/2023 0400 by Erika Gaytan RN  Medication Review/Management: medications reviewed  Taken 12/26/2023 0200 by Erika Gaytan RN  Medication Review/Management: medications reviewed  Taken 12/26/2023 0000 by Erika Gaytan RN  Medication Review/Management: medications reviewed  Taken 12/25/2023 2100 by Erika Gaytan RN  Medication Review/Management: medications reviewed  Taken 12/25/2023 2000 by Erika Gaytan RN  Medication Review/Management: medications  reviewed     Problem: Obstructive Sleep Apnea Risk or Actual Comorbidity Management  Goal: Unobstructed Breathing During Sleep  Outcome: Ongoing, Progressing     Problem: Osteoarthritis Comorbidity  Goal: Maintenance of Osteoarthritis Symptom Control  Outcome: Ongoing, Progressing  Intervention: Maintain Osteoarthritis Symptom Control  Recent Flowsheet Documentation  Taken 12/26/2023 0400 by Erika Gaytan RN  Medication Review/Management: medications reviewed  Taken 12/26/2023 0200 by Erika Gaytan RN  Activity Management: bedrest  Assistive Device Utilized: lift device  Medication Review/Management: medications reviewed  Taken 12/26/2023 0000 by Erika Gaytan RN  Medication Review/Management: medications reviewed  Taken 12/25/2023 2100 by Erika Gaytan RN  Medication Review/Management: medications reviewed  Taken 12/25/2023 2000 by Erika Gaytan RN  Medication Review/Management: medications reviewed  Taken 12/25/2023 1930 by Erika Gaytan RN  Activity Management: bedrest  Assistive Device Utilized: lift device     Problem: Pain Chronic (Persistent) (Comorbidity Management)  Goal: Acceptable Pain Control and Functional Ability  Outcome: Ongoing, Progressing  Intervention: Manage Persistent Pain  Recent Flowsheet Documentation  Taken 12/26/2023 0400 by Erika Gaytan RN  Medication Review/Management: medications reviewed  Taken 12/26/2023 0200 by Erika Gaytan RN  Medication Review/Management: medications reviewed  Taken 12/26/2023 0000 by Erika Gaytan RN  Medication Review/Management: medications reviewed  Taken 12/25/2023 2100 by Erika Gaytan RN  Medication Review/Management: medications reviewed  Taken 12/25/2023 2000 by Erika Gaytan RN  Medication Review/Management: medications reviewed  Intervention: Develop Pain Management Plan  Recent Flowsheet Documentation  Taken 12/26/2023 0200 by Erika Gaytan RN  Pain Management Interventions:   care clustered   pillow support provided   position  adjusted  Taken 12/25/2023 1930 by Erika Gaytan RN  Pain Management Interventions:   care clustered   position adjusted  Intervention: Optimize Psychosocial Wellbeing  Recent Flowsheet Documentation  Taken 12/26/2023 0400 by Erika Gaytan RN  Diversional Activities: television  Taken 12/26/2023 0200 by Erika Gaytan RN  Diversional Activities: television  Taken 12/26/2023 0000 by Erika Gaytan RN  Diversional Activities: television  Taken 12/25/2023 2200 by Erika Gaytan RN  Diversional Activities: television  Taken 12/25/2023 2000 by Erika Gaytan RN  Diversional Activities: television  Taken 12/25/2023 1930 by Erika Gaytan RN  Diversional Activities: television  Family/Support System Care:   support provided   involvement promoted     Problem: Seizure Disorder Comorbidity  Goal: Maintenance of Seizure Control  Outcome: Ongoing, Progressing     Problem: Skin Injury Risk Increased  Goal: Skin Health and Integrity  Outcome: Ongoing, Progressing  Intervention: Optimize Skin Protection  Recent Flowsheet Documentation  Taken 12/26/2023 0400 by Erika Gaytan RN  Head of Bed (HOB) Positioning: HOB at 30-45 degrees  Taken 12/26/2023 0200 by Erika Gaytan RN  Head of Bed (HOB) Positioning: HOB at 30 degrees  Taken 12/26/2023 0000 by Erika Gaytan RN  Head of Bed (HOB) Positioning: HOB at 30-45 degrees  Taken 12/25/2023 2200 by Erika Gaytan RN  Head of Bed (HOB) Positioning: HOB at 30-45 degrees  Taken 12/25/2023 2000 by Erika Gaytan RN  Head of Bed (HOB) Positioning: HOB at 30 degrees  Taken 12/25/2023 1930 by Erika Gaytan RN  Pressure Reduction Techniques:   weight shift assistance provided   heels elevated off bed   pressure points protected  Pressure Reduction Devices:   pressure-redistributing mattress utilized   positioning supports utilized   heel offloading device utilized  Skin Protection:   skin-to-skin areas padded   adhesive use limited   incontinence pads utilized   pulse oximeter  probe site changed   skin-to-device areas padded     Problem: Fall Injury Risk  Goal: Absence of Fall and Fall-Related Injury  Outcome: Ongoing, Progressing  Intervention: Identify and Manage Contributors  Recent Flowsheet Documentation  Taken 12/26/2023 0400 by Erika Gaytan RN  Medication Review/Management: medications reviewed  Taken 12/26/2023 0200 by Erika Gaytan RN  Medication Review/Management: medications reviewed  Taken 12/26/2023 0000 by Erika Gaytan RN  Medication Review/Management: medications reviewed  Taken 12/25/2023 2100 by Erika Gaytan RN  Medication Review/Management: medications reviewed  Taken 12/25/2023 2000 by Erika Gaytan RN  Medication Review/Management: medications reviewed  Intervention: Promote Injury-Free Environment  Recent Flowsheet Documentation  Taken 12/26/2023 0500 by Erika Gaytan RN  Safety Promotion/Fall Prevention:   safety round/check completed   fall prevention program maintained  Taken 12/26/2023 0400 by Erika Gaytan RN  Safety Promotion/Fall Prevention:   safety round/check completed   fall prevention program maintained  Taken 12/26/2023 0300 by Erika Gaytan RN  Safety Promotion/Fall Prevention:   safety round/check completed   fall prevention program maintained  Taken 12/26/2023 0200 by Erika Gaytan RN  Safety Promotion/Fall Prevention:   fall prevention program maintained   safety round/check completed  Taken 12/26/2023 0100 by Erika Gaytan RN  Safety Promotion/Fall Prevention:   safety round/check completed   fall prevention program maintained  Taken 12/26/2023 0000 by Erika Gaytan RN  Safety Promotion/Fall Prevention:   safety round/check completed   fall prevention program maintained  Taken 12/25/2023 2300 by Erika Gaytan RN  Safety Promotion/Fall Prevention:   safety round/check completed   fall prevention program maintained  Taken 12/25/2023 2200 by Erika Gaytan RN  Safety Promotion/Fall Prevention:   safety round/check completed    fall prevention program maintained  Taken 12/25/2023 2100 by Erika Gaytan RN  Safety Promotion/Fall Prevention:   safety round/check completed   fall prevention program maintained  Taken 12/25/2023 2000 by Erika Gaytan RN  Safety Promotion/Fall Prevention:   safety round/check completed   fall prevention program maintained  Taken 12/25/2023 1900 by Erika Gaytan RN  Safety Promotion/Fall Prevention:   safety round/check completed   fall prevention program maintained     Problem: Restraint, Nonviolent  Goal: Absence of Harm or Injury  Outcome: Ongoing, Progressing  Intervention: Implement Least Restrictive Safety Strategies  Recent Flowsheet Documentation  Taken 12/26/2023 0400 by Erika Gaytan RN  Medical Device Protection:   tubing secured   IV pole/bag removed from visual field  Less Restrictive Alternative:   surveillance provided   security enhancements provided   safety enhancements provided   positive reinforcement provided   environment adjusted   emotional support provided   coping techniques promoted   calming techniques promoted   bed alarm in use   1:1 observation maintained  De-Escalation Techniques:   1:1 observation initiated   reoriented   stimulation decreased   verbally redirected  Diversional Activities: television  Taken 12/26/2023 0200 by Erika Gaytan RN  Medical Device Protection:   IV pole/bag removed from visual field   tubing secured  Less Restrictive Alternative:   surveillance provided   security enhancements provided   safety enhancements provided   positive reinforcement provided   environment adjusted   emotional support provided   coping techniques promoted   calming techniques promoted   bed alarm in use   1:1 observation maintained  De-Escalation Techniques:   verbally redirected   stimulation decreased   reoriented   quiet time facilitated   appropriate behavior reinforced   diversional activity encouraged   1:1 observation initiated  Diversional Activities:  television  Taken 12/26/2023 0000 by Erika Gaytan RN  Medical Device Protection:   tubing secured   torso covered   IV pole/bag removed from visual field  Less Restrictive Alternative:   surveillance provided   security enhancements provided   safety enhancements provided   positive reinforcement provided   environment adjusted   emotional support provided   coping techniques promoted   calming techniques promoted   bed alarm in use   1:1 observation maintained  De-Escalation Techniques:   stimulation decreased   verbally redirected   quiet time facilitated   increased round frequency   appropriate behavior reinforced   1:1 observation initiated  Diversional Activities: television  Taken 12/25/2023 2200 by Erika Gaytan RN  Medical Device Protection:   tubing secured   IV pole/bag removed from visual field  Less Restrictive Alternative:   surveillance provided   security enhancements provided   safety enhancements provided   positive reinforcement provided   environment adjusted   emotional support provided   coping techniques promoted   calming techniques promoted   bed alarm in use   1:1 observation maintained  De-Escalation Techniques:   reoriented   stimulation decreased   verbally redirected   increased round frequency   diversional activity encouraged   appropriate behavior reinforced  Diversional Activities: television  Taken 12/25/2023 2000 by Erika Gaytan RN  Medical Device Protection:   tubing secured   IV pole/bag removed from visual field  Less Restrictive Alternative:   surveillance provided   security enhancements provided   safety enhancements provided   positive reinforcement provided   environment adjusted   emotional support provided   coping techniques promoted   calming techniques promoted   bed alarm in use   1:1 observation maintained  De-Escalation Techniques:   appropriate behavior reinforced   diversional activity encouraged   increased round frequency   quiet time facilitated    reoriented   stimulation decreased   verbally redirected  Diversional Activities: television  Taken 12/25/2023 1930 by Erika Gaytan RN  Diversional Activities: television  Intervention: Protect Skin and Joint Integrity  Recent Flowsheet Documentation  Taken 12/26/2023 0400 by Erika Gaytan RN  Body Position:   left   turned  Taken 12/26/2023 0200 by Erika Gaytan RN  Body Position:   right   turned   side-lying  Taken 12/26/2023 0000 by Erika Gaytan RN  Body Position:   left   turned   side-lying  Taken 12/25/2023 2200 by Erika Gaytan RN  Body Position:   right   turned  Taken 12/25/2023 2000 by Erika Gaytan RN  Body Position:   turned   left   side-lying  Taken 12/25/2023 1930 by rEika Gaytan RN  Range of Motion:   active ROM (range of motion) encouraged   ROM (range of motion) performed

## 2023-12-26 NOTE — CONSULTS
"Neurology Consult Note    Consult Date: 12/26/2023    Referring MD: Yoav Gutierrez MD    Reason for Consult I have been asked to see the patient in neurological consultation to render advice and opinion regarding altered mental status with possible alcohol withdrawal    Taiwo Pierce is a 48 y.o. white male with known diagnosis of hypertension, mixed hyperlipidemia, low back pain, alcohol abuse who was recently admitted for alcoholic hepatitis left AMA yesterday then brought back to the hospital the same day for confusion, patient admitted to drinking alcohol heavily for several years.  Neurology consulted for altered mental status, reportedly the patient was significantly confused yesterday with agitation requiring security to help restrain, he was given Geodon currently on Precedex and CIWA protocol, this morning his confusion improved on my assessment he is still jittery and anxious, mild confusion with difficulty remembering things he remembered 1/3 objects after 5 minutes he was able to follow simple commands and 1 complex commands by touching his left ear with his right hand, his judgment was fair focal neurological symptoms and no seizure-like activity.  His labs showed low TSH, elevated liver enzyme, urine drug screen positive for THC, benzo and opioid alcohol level less than 10.    Past Medical History:   Diagnosis Date    Arthritis October 2007    Arthritis in back and neck    GERD (gastroesophageal reflux disease)     Headache 2007    Headaches due to disc in neck.    Hyperlipidemia     Hypertension     Low back pain 10/05/09    Hurt back and had back surgery 04/16/2014. Hit a nerve       Exam  /96   Pulse 90   Temp 99 °F (37.2 °C) (Axillary)   Resp 18   Ht 180.3 cm (71\")   Wt 91.2 kg (201 lb 1 oz)   SpO2 97%   BMI 28.04 kg/m²   Gen: Anxious, jittery, vitals reviewed  MS: oriented x2, recent/remote memory impaired remembered 1/3 objects after 5 minutes, was able to follow simple " commands and 1 complex commands, fair attention/concentration, language grossly intact, no neglect.  CN: visual acuity grossly normal, PERRL, EOMI, no facial droop, no dysarthria  Motor: Moves all extremities against gravity, normal tone  Sensory exam: Normal to light touch throughout  Abnormal movements: Mild tremor on his upper extremities    DATA:    Lab Results   Component Value Date    GLUCOSE 128 (H) 12/26/2023    CALCIUM 8.6 12/26/2023     12/26/2023    K 3.7 12/26/2023    CO2 21.8 (L) 12/26/2023     (H) 12/26/2023    BUN 13 12/26/2023    CREATININE 0.59 (L) 12/26/2023    EGFRIFNONA 74 09/02/2020    BCR 22.0 12/26/2023    ANIONGAP 12.2 12/26/2023     Lab Results   Component Value Date    WBC 11.21 (H) 12/26/2023    HGB 9.8 (L) 12/26/2023    HCT 29.1 (L) 12/26/2023    MCV 97.7 (H) 12/26/2023     12/26/2023       Lab review: WBC 11.2, hemoglobin 9.8, sodium 142, glucose 128, BUN 13, creatinine 0.59.  TSH low at 0.26 ammonia level normal    Imaging review: I personally reviewed his CT scan of the head which showed no acute abnormality    Diagnoses:  Altered mental status likely from alcohol withdrawal and possible underlying delirium, no suspicion for seizures at this time      PLAN:   1.  Continue CIWA protocol and thiamine replacement  2.  Follow delirium precautions as detailed below  3.  No need for EEG or MRI brain at this time  4.  Treat metabolic abnormalities  5.  Low TSH check free T4, vitamin B12 pending replace if less than 400    Delirium precautions:    1. Frequent reorientation, reminding patient not questioning patient   2. Shades up during day/down at night   3. TV off except for soft music only   4. Familiar objects at bedside   5. Encourage friends/family to spend the night   6. Minimize anticholingeric medications   7. Minimize polypharmacy   8. Minimize restraints, includes lines and/or foleys and/or feeding tubes as able   9. Minimize overnight checks/vitals to encourage  "restful consistent sleep patterns   10. Out of bed during day as much as possible     Plan discussed with patient, nursing staff, Dr. Meneses.    This was an audio and video enabled telemedicine encounter with the patient located at [facility] and myself at a remote location at [facility].    \"Dictated utilizing Dragon dictation\".      "

## 2023-12-26 NOTE — PLAN OF CARE
Goal Outcome Evaluation:  Plan of Care Reviewed With: patient, spouse, parent        Progress: no change  Outcome Evaluation: Patient alert this shift, but remains disoriented to place, time, situation. VSS, afebrile. RA. NSR. Precedex gtt able to be stopped this shift. Restraints discontinued. Sitter remains at bedside. Care ongoing.

## 2023-12-26 NOTE — PROGRESS NOTES
[facility] HOSPITALIST PROGRESS NOTE     Patient Identification:  Name:  Taiwo Pierce  Age:  48 y.o.  Sex:  male  :  1975  MRN:  [id number]  Visit Number:  [id number]  ROOM: [address]     Primary Care Provider:  Provider, No Known    Length of stay in inpatient status:  1    Subjective     Chief Compliant:    Chief Complaint   Patient presents with    Psychiatric Evaluation       History of Presenting Illness:    Overnight patient agitated after dose of ketamine. Initially had planned on giving trial of phenobarbital but patient finally became sedated enough to rest.     Patient continues to be confused per nursing staff. This AM nursing staff has been titrating down precedex. Initially on max dose was down from 1.5 to 1 on my evaluation. Patient still very lethargic after getting AM ativan. Nursing staff titrating down more. Otherwise vital signs stable on room air. HR was down around 60 but responded positively to turning down precedex. Patient has been intermittently talking with nursing staff this AM, still reporting hallucinations and confusion. Physically has not been violent with arms or legs but has been aggressive with verbal commands. Patient currently in soft restraints on upper extremities with sitter bedside in ICU.     ROS:  Otherwise 10 point ROS negative other than documented above in HPI.     Objective     Current Hospital Meds:folic acid 1 mg in sodium chloride 0.9 % 50 mL IVPB, 1 mg, Intravenous, Daily  ketamine, 100 mg, Intravenous, Once  PHENobarbital, 65 mg, Intravenous, Once   Followed by  [START ON 2023] PHENobarbital, 65 mg, Intravenous, Once   Followed by  [START ON 2023] PHENobarbital, 32.4 mg, Oral, Once   Followed by  [START ON 2023] PHENobarbital, 32.4 mg, Oral, Once   Followed by  [START ON 2023] PHENobarbital, 32.4 mg, Oral, Once  senna-docusate sodium, 2 tablet, Oral, BID  senna-docusate sodium, 2 tablet, Oral, BID  sodium  chloride, 10 mL, Intravenous, Q12H  sodium chloride, 10 mL, Intravenous, Q12H  thiamine (B-1) IV, 200 mg, Intravenous, Q8H   Followed by  [START ON 12/31/2023] thiamine, 100 mg, Oral, Daily    dexmedetomidine, 0.2-1.5 mcg/kg/hr, Last Rate: 1.2 mcg/kg/hr (12/26/23 0746)        Current Antimicrobial Therapy:  Anti-Infectives (From admission, onward)      None          Current Diuretic Therapy:  Diuretics (From admission, onward)      None          ----------------------------------------------------------------------------------------------------------------------  Vital Signs:  Temp:  [97.6 °F (36.4 °C)-99.1 °F (37.3 °C)] 99 °F (37.2 °C)  Heart Rate:  [] 59  Resp:  [15-40] 22  BP: (121-154)/() 145/88  SpO2:  [90 %-99 %] 93 %  on  Flow (L/min):  [1-4] 1;   Device (Oxygen Therapy): room air  Body mass index is 28.04 kg/m².    Wt Readings from Last 3 Encounters:   12/25/23 91.2 kg (201 lb 1 oz)   12/22/23 90.4 kg (199 lb 4.8 oz)   12/21/23 90.5 kg (199 lb 9.6 oz)     Intake & Output (last 3 days)         12/23 0701 12/24 0700 12/24 0701 12/25 0700 12/25 0701 12/26 0700 12/26 0701 12/27 0700    P.O.   0     I.V. (mL/kg)   439.3 (4.8)     IV Piggyback   50     Total Intake(mL/kg)   489.3 (5.4)     Urine (mL/kg/hr)   325     Emesis/NG output   0     Stool   0     Total Output   325     Net   +164.3             Urine Unmeasured Occurrence   3 x     Stool Unmeasured Occurrence   0 x     Emesis Unmeasured Occurrence   0 x           Diet: Cardiac Diets, Diabetic Diets; Healthy Heart (2-3 Na+); Consistent Carbohydrate; Texture: Regular Texture (IDDSI 7); Fluid Consistency: Thin (IDDSI 0)  ----------------------------------------------------------------------------------------------------------------------  Physical exam:  Constitutional:  Well-developed and well-nourished.  No respiratory distress.      HENT:  Head:  Normocephalic and atraumatic.  Mouth:  Moist mucous membranes.    Eyes:  Conjunctivae and EOM  are normal. No scleral icterus.    Neck:  Neck supple.  No JVD present.    Cardiovascular:  Normal rate, regular rhythm and normal heart sounds with no murmur.  Pulmonary/Chest:  No respiratory distress, no wheezes, no crackles, with normal breath sounds and good air movement.  Abdominal:  Soft.  Bowel sounds are normal.  No distension and no tenderness.   Musculoskeletal:  No edema, no tenderness, and no deformity.  No red or swollen joints anywhere.    Neurological:  Lethargic. Moves all extremities. No facial droop. Pupils equal and small without significant nystagmus.   Skin:  Skin is warm and dry. No rash noted. No pallor.   Peripheral vascular:  Pulses in all 4 extremities with no clubbing, no cyanosis, no edema.  ----------------------------------------------------------------------------------------------------------------------  Tele:    ----------------------------------------------------------------------------------------------------------------------  Results from last 7 days   Lab Units 12/26/23  0011 12/25/23  1303 12/25/23  0032 12/24/23  0404 12/23/23  1434 12/23/23  0502 12/22/23 2029 12/22/23  1917 12/22/23  1329   CRP mg/dL  --   --   --   --   --   --   --   --  2.31*   LACTATE mmol/L  --   --   --   --  1.6  --  0.9 0.9  --    WBC 10*3/mm3 11.21* 14.13* 13.09*   < >  --    < >  --   --   --    HEMOGLOBIN g/dL 9.8* 11.0* 10.7*   < >  --    < >  --   --   --    HEMATOCRIT % 29.1* 33.2* 31.3*   < >  --    < >  --   --   --    MCV fL 97.7* 98.2* 96.3   < >  --    < >  --   --   --    MCHC g/dL 33.7 33.1 34.2   < >  --    < >  --   --   --    PLATELETS 10*3/mm3 202 217 154   < >  --    < >  --   --   --    INR   --   --   --   --   --   --   --  1.10  --     < > = values in this interval not displayed.         Results from last 7 days   Lab Units 12/26/23  0011 12/25/23  1303 12/25/23  0032 12/24/23  1305 12/24/23  0404 12/23/23  0501 12/22/23  1329   SODIUM mmol/L 142 133* 133*  --  127*   < >  --  "   POTASSIUM mmol/L 3.7 4.0 3.3*   < > 3.6   < >  --    MAGNESIUM mg/dL  --  2.3  --   --   --   --  2.5   CHLORIDE mmol/L 108* 98 98  --  95*   < >  --    CO2 mmol/L 21.8* 16.6* 19.2*  --  18.9*   < >  --    BUN mg/dL 13 13 10  --  12   < >  --    CREATININE mg/dL 0.59* 0.72* 0.61*  --  0.71*   < >  --    CALCIUM mg/dL 8.6 9.4 8.8  --  8.7   < >  --    GLUCOSE mg/dL 128* 118* 126*  --  102*   < >  --    ALBUMIN g/dL  --  3.3* 3.2*  --  3.4*   < >  --    BILIRUBIN mg/dL  --  1.3* 1.3*  --  1.5*   < >  --    ALK PHOS U/L  --  116 103  --  136*   < >  --    AST (SGOT) U/L  --  113* 113*  --  177*   < >  --    ALT (SGPT) U/L  --  61* 58*  --  73*   < >  --     < > = values in this interval not displayed.   Estimated Creatinine Clearance: 176.9 mL/min (A) (by C-G formula based on SCr of 0.59 mg/dL (L)).  Ammonia   Date Value Ref Range Status   12/25/2023 37 16 - 60 umol/L Final     Results from last 7 days   Lab Units 12/23/23  1654 12/23/23  1434 12/22/23  1329   HSTROP T ng/L <6 <6 <6         Results from last 7 days   Lab Units 12/22/23  1329   CHOLESTEROL mg/dL 286*   TRIGLYCERIDES mg/dL 173*   HDL CHOL mg/dL 95*   LDL CHOL mg/dL 161*     Glucose   Date/Time Value Ref Range Status   12/23/2023 1430 129 70 - 130 mg/dL Final     Lab Results   Component Value Date    TSH 0.765 05/01/2023     No results found for: \"PREGTESTUR\", \"PREGSERUM\", \"HCG\", \"HCGQUANT\"  Pain Management Panel  More data may exist         Latest Ref Rng & Units 12/25/2023 9/2/2020   Pain Management Panel   Creatinine, Urine Not Estab. mg/dL - 110.5    Amphetamine, Urine Qual Negative Negative  -   Barbiturates Screen, Urine Negative Negative  -   Benzodiazepine Screen, Urine Negative Positive  -   Buprenorphine, Screen, Urine Negative Negative  -   Cocaine Screen, Urine Negative Negative  -   Fentanyl, Urine Negative Negative  -   Methadone Screen , Urine Negative Negative  -   Methamphetamine, Ur Negative Negative  -     Brief Urine Lab Results  " "(Last result in the past 365 days)        Color   Clarity   Blood   Leuk Est   Nitrite   Protein   CREAT   Urine HCG        12/21/23 0127 Dark Yellow   Clear   Negative   Negative   Negative   100 mg/dL (2+)                 Blood Culture   Date Value Ref Range Status   12/22/2023 No growth at 3 days  Preliminary   12/22/2023 No growth at 3 days  Preliminary     No results found for: \"URINECX\"  No results found for: \"WOUNDCX\"  No results found for: \"STOOLCX\"  No results found for: \"RESPCX\"  No results found for: \"AFBCX\"  Results from last 7 days   Lab Units 12/23/23  1434 12/22/23 2029 12/22/23  1917 12/22/23  1329   PROCALCITONIN ng/mL  --   --  0.23  --    LACTATE mmol/L 1.6 0.9 0.9  --    CRP mg/dL  --   --   --  2.31*       I have personally looked at the labs and they are summarized above.  ----------------------------------------------------------------------------------------------------------------------  Detailed radiology reports for the last 24 hours:    Imaging Results (Last 24 Hours)       Procedure Component Value Units Date/Time    CT Head Without Contrast [[id number]] Collected: 12/25/23 1552     Updated: 12/25/23 1555    Narrative:      CT HEAD WO CONTRAST-     CLINICAL INDICATION: delerium; F29-Unspecified psychosis not due to a  substance or known physiological condition; F19.10-Other psychoactive  substance abuse, uncomplicated        COMPARISON: None available     TECHNIQUE: Axial images of the brain were obtained with out intravenous  contrast.  Reformatted images were created in the sagittal and coronal  planes.     DOSE:     Radiation dose reduction techniques were utilized per ALARA protocol.  Automated exposure control was initiated through either [employer] or  [employer] software packages by  protocol.        FINDINGS:    BRAIN:  Unremarkable.  No hemorrhage.  No significant white matter  disease.  No edema.       VENTRICLES: Probable arachnoid cyst in the posterior fossa " midline    BONES/JOINTS:  Unremarkable.  No acute fracture.       SOFT TISSUES:  Unremarkable.       SINUSES:  no air fluid levels       MASTOID AIR CELLS:  Unremarkable as visualized.  No mastoid effusion.          Impression:         1. No acute parenchymal mass, hemorrhage, or midline shift     This report was finalized on 12/25/2023 3:53 PM by Dr. Carmine Acharya MD.             Assessment & Plan    #Acute psychosis w/ hallucinations   #Hyperactivity   #ETOH abuse   - Differential includes: ETOH withdrawal, THC or synthetic drug intoxication. Less likely but still considering: encephalitis/meningitis (No meningeal symptoms, afebrile), primary psychiatric illness (less likely given lack of history and age).   - Ammonia wnl.   - Personally reviewed CT head, no large hemorrhage or mass seen.  - Patient not oriented or able to make decisions on his own. Not safe for him to leave at this time. Will place on hold if needed.   - Continue precedex gtt. Wean as able.   - Continue PRN ativan CIWA, high dose thiamine.    - Will get TSH, folate, vitamin B12. Now that he is not as agitated will get EEG.   - Repeat UA, chest x-ray   - If does not improve or febrile will attempt LP. Currently benefit does not outweigh high risk. MRI also not currently able to be obtained.   - Will get MRI head when able.   - Currently in 2 point UE soft restraints. Will remove as soon as it is safe to do so.   - Psych, neurology, and intensivist consulted. Appreciate input.      #Recent diagnosis acute alcohol induced pancreatitis and mild alcoholic hepatitis   - Tolerating regular diet. No residual pain. LFTs stable      #Exophytic R kidney cyst  - Outpatient urology f/u      #Chronic pain   - Continue home pain regimen PRN     #GERD  #Anxiety/depression      Code status: Full      Dispo: Continue close monitoring in CCU. High risk of requiring intubation.         VTE Prophylaxis:   Mechanical Order History:        Ordered        12/25/23 0248   Place Sequential Compression Device  Once            12/25/23 1721  Maintain Sequential Compression Device  Continuous                          Pharmalogical Order History:       None            Carlos Isbell MD  [facility] Hospitalist  12/26/23  08:35 EST

## 2023-12-26 NOTE — CASE MANAGEMENT/SOCIAL WORK
Discharge Planning Assessment  [facility]     Patient Name: Taiwo Pierce  MRN: [id number]  Today's Date: 12/26/2023    Admit Date: 12/25/2023    Plan: SS received CM consult for Alcohol and drug use. SS spoke with pt spouse Melba and pt at bedside. Pt lives with spouse. Pt spouse states pt recently switch to [facility]  but is unsure of who his PCP is. Pt does not utilize HH services at this time. Pt states he has (s) cane via unknown provider. Pt does not have POA/living will. Pt spouse has requested rehab for alcohol and drug use. SS to follow and assist with discharge planning.   Discharge Needs Assessment       Row Name 12/26/23 1347       Living Environment    People in Home spouse    Name(s) of People in Home Melba - spouse    Current Living Arrangements home    Potentially Unsafe Housing Conditions none    Primary Care Provided by self    Provides Primary Care For no one    Family Caregiver if Needed spouse;parent(s)    Family Caregiver Names Melba - spouse    Quality of Family Relationships helpful;involved;supportive    Able to Return to Prior Arrangements yes       Resource/Environmental Concerns    Resource/Environmental Concerns none       Transition Planning    Patient/Family Anticipates Transition to other (see comments)  spouse requested rehab for Alcohol and drug.       Discharge Needs Assessment    Equipment Currently Used at Home cane, straight    Concerns to be Addressed --  Alcohol and drug use              Discharge Plan       Row Name 12/26/23 1534       Plan    Plan SS received CM consult for Alcohol and drug use. SS spoke with pt spouse Melba and pt at bedside. Pt lives with spouse. Pt spouse states pt recently switch to [facility]  but is unsure of who his PCP is. Pt does not utilize HH services at this time. Pt states he has (s) cane via unknown provider. Pt does not have POA/living will. Pt spouse has requested rehab for alcohol and drug use. SS to follow and assist with discharge  planning.             Expected Discharge Date and Time       Expected Discharge Date Expected Discharge Time    Dec 29, 2023            Demographic Summary       Row Name 12/26/23 1342       General Information    Admission Type inpatient    Arrived From home    Referral Source nursing  CM    Reason for Consult --  Alcohol and drug use    Preferred Language English               AUNG HolbrookW

## 2023-12-27 ENCOUNTER — READMISSION MANAGEMENT (OUTPATIENT)
Dept: CALL CENTER | Facility: HOSPITAL | Age: 48
End: 2023-12-27
Payer: MEDICARE

## 2023-12-27 VITALS
SYSTOLIC BLOOD PRESSURE: 147 MMHG | HEIGHT: 71 IN | OXYGEN SATURATION: 95 % | BODY MASS INDEX: 28.15 KG/M2 | HEART RATE: 54 BPM | TEMPERATURE: 98.6 F | DIASTOLIC BLOOD PRESSURE: 112 MMHG | RESPIRATION RATE: 21 BRPM | WEIGHT: 201.06 LBS

## 2023-12-27 LAB
ALBUMIN SERPL-MCNC: 3.1 G/DL (ref 3.5–5.2)
ALBUMIN/GLOB SERPL: 0.9 G/DL
ALP SERPL-CCNC: 121 U/L (ref 39–117)
ALT SERPL W P-5'-P-CCNC: 48 U/L (ref 1–41)
ANION GAP SERPL CALCULATED.3IONS-SCNC: 13.8 MMOL/L (ref 5–15)
AST SERPL-CCNC: 75 U/L (ref 1–40)
BACTERIA SPEC AEROBE CULT: NORMAL
BACTERIA SPEC AEROBE CULT: NORMAL
BASOPHILS # BLD AUTO: 0.02 10*3/MM3 (ref 0–0.2)
BASOPHILS NFR BLD AUTO: 0.2 % (ref 0–1.5)
BILIRUB SERPL-MCNC: 1 MG/DL (ref 0–1.2)
BUN SERPL-MCNC: 12 MG/DL (ref 6–20)
BUN/CREAT SERPL: 19 (ref 7–25)
CALCIUM SPEC-SCNC: 8.7 MG/DL (ref 8.6–10.5)
CHLORIDE SERPL-SCNC: 101 MMOL/L (ref 98–107)
CO2 SERPL-SCNC: 20.2 MMOL/L (ref 22–29)
CREAT SERPL-MCNC: 0.63 MG/DL (ref 0.76–1.27)
DEPRECATED RDW RBC AUTO: 51.8 FL (ref 37–54)
EGFRCR SERPLBLD CKD-EPI 2021: 117.3 ML/MIN/1.73
EOSINOPHIL # BLD AUTO: 0.06 10*3/MM3 (ref 0–0.4)
EOSINOPHIL NFR BLD AUTO: 0.7 % (ref 0.3–6.2)
ERYTHROCYTE [DISTWIDTH] IN BLOOD BY AUTOMATED COUNT: 14.6 % (ref 12.3–15.4)
GLOBULIN UR ELPH-MCNC: 3.4 GM/DL
GLUCOSE SERPL-MCNC: 108 MG/DL (ref 65–99)
HCT VFR BLD AUTO: 29.1 % (ref 37.5–51)
HGB BLD-MCNC: 10 G/DL (ref 13–17.7)
HYPOCHROMIA BLD QL: NORMAL
IMM GRANULOCYTES # BLD AUTO: 0.13 10*3/MM3 (ref 0–0.05)
IMM GRANULOCYTES NFR BLD AUTO: 1.5 % (ref 0–0.5)
LYMPHOCYTES # BLD AUTO: 1.51 10*3/MM3 (ref 0.7–3.1)
LYMPHOCYTES NFR BLD AUTO: 17.1 % (ref 19.6–45.3)
MAGNESIUM SERPL-MCNC: 2.2 MG/DL (ref 1.6–2.6)
MCH RBC QN AUTO: 33.2 PG (ref 26.6–33)
MCHC RBC AUTO-ENTMCNC: 34.4 G/DL (ref 31.5–35.7)
MCV RBC AUTO: 96.7 FL (ref 79–97)
MONOCYTES # BLD AUTO: 2.12 10*3/MM3 (ref 0.1–0.9)
MONOCYTES NFR BLD AUTO: 24 % (ref 5–12)
NEUTROPHILS NFR BLD AUTO: 5.01 10*3/MM3 (ref 1.7–7)
NEUTROPHILS NFR BLD AUTO: 56.5 % (ref 42.7–76)
NRBC BLD AUTO-RTO: 0.2 /100 WBC (ref 0–0.2)
PLAT MORPH BLD: NORMAL
PLATELET # BLD AUTO: 213 10*3/MM3 (ref 140–450)
PMV BLD AUTO: 11 FL (ref 6–12)
POTASSIUM SERPL-SCNC: 3.1 MMOL/L (ref 3.5–5.2)
PROT SERPL-MCNC: 6.5 G/DL (ref 6–8.5)
RBC # BLD AUTO: 3.01 10*6/MM3 (ref 4.14–5.8)
SODIUM SERPL-SCNC: 135 MMOL/L (ref 136–145)
STOMATOCYTES BLD QL SMEAR: NORMAL
WBC NRBC COR # BLD AUTO: 8.85 10*3/MM3 (ref 3.4–10.8)

## 2023-12-27 PROCEDURE — 25010000002 LORAZEPAM PER 2 MG: Performed by: INTERNAL MEDICINE

## 2023-12-27 PROCEDURE — 85007 BL SMEAR W/DIFF WBC COUNT: CPT | Performed by: INTERNAL MEDICINE

## 2023-12-27 PROCEDURE — 99232 SBSQ HOSP IP/OBS MODERATE 35: CPT | Performed by: NURSE PRACTITIONER

## 2023-12-27 PROCEDURE — 99221 1ST HOSP IP/OBS SF/LOW 40: CPT | Performed by: PSYCHIATRY & NEUROLOGY

## 2023-12-27 PROCEDURE — 25010000002 THIAMINE PER 100 MG: Performed by: INTERNAL MEDICINE

## 2023-12-27 PROCEDURE — 85025 COMPLETE CBC W/AUTO DIFF WBC: CPT | Performed by: INTERNAL MEDICINE

## 2023-12-27 PROCEDURE — 94761 N-INVAS EAR/PLS OXIMETRY MLT: CPT

## 2023-12-27 PROCEDURE — 99239 HOSP IP/OBS DSCHRG MGMT >30: CPT | Performed by: INTERNAL MEDICINE

## 2023-12-27 PROCEDURE — 80053 COMPREHEN METABOLIC PANEL: CPT | Performed by: INTERNAL MEDICINE

## 2023-12-27 PROCEDURE — 99232 SBSQ HOSP IP/OBS MODERATE 35: CPT | Performed by: INTERNAL MEDICINE

## 2023-12-27 PROCEDURE — 83735 ASSAY OF MAGNESIUM: CPT | Performed by: INTERNAL MEDICINE

## 2023-12-27 PROCEDURE — 94799 UNLISTED PULMONARY SVC/PX: CPT

## 2023-12-27 RX ORDER — GABAPENTIN 800 MG/1
400 TABLET ORAL 3 TIMES DAILY
Start: 2023-12-27

## 2023-12-27 RX ORDER — NALOXONE HYDROCHLORIDE 4 MG/.1ML
1 SPRAY NASAL AS NEEDED
Qty: 1 EACH | Refills: 0 | Status: SHIPPED | OUTPATIENT
Start: 2023-12-27

## 2023-12-27 RX ORDER — POTASSIUM CHLORIDE 20 MEQ/1
40 TABLET, EXTENDED RELEASE ORAL EVERY 4 HOURS
Status: DISCONTINUED | OUTPATIENT
Start: 2023-12-27 | End: 2023-12-27 | Stop reason: HOSPADM

## 2023-12-27 RX ADMIN — Medication 10 ML: at 08:18

## 2023-12-27 RX ADMIN — OXYCODONE HYDROCHLORIDE 10 MG: 10 TABLET ORAL at 04:20

## 2023-12-27 RX ADMIN — OXYCODONE HYDROCHLORIDE 10 MG: 10 TABLET ORAL at 08:26

## 2023-12-27 RX ADMIN — OXYCODONE HYDROCHLORIDE 10 MG: 10 TABLET ORAL at 00:19

## 2023-12-27 RX ADMIN — POTASSIUM CHLORIDE 40 MEQ: 1500 TABLET, EXTENDED RELEASE ORAL at 05:48

## 2023-12-27 RX ADMIN — Medication 10 ML: at 08:20

## 2023-12-27 RX ADMIN — POTASSIUM CHLORIDE 40 MEQ: 1500 TABLET, EXTENDED RELEASE ORAL at 08:18

## 2023-12-27 RX ADMIN — FOLIC ACID 1 MG: 5 INJECTION, SOLUTION INTRAMUSCULAR; INTRAVENOUS; SUBCUTANEOUS at 08:18

## 2023-12-27 RX ADMIN — LORAZEPAM 2 MG: 2 INJECTION INTRAMUSCULAR; INTRAVENOUS at 08:20

## 2023-12-27 RX ADMIN — THIAMINE HYDROCHLORIDE 200 MG: 100 INJECTION, SOLUTION INTRAMUSCULAR; INTRAVENOUS at 05:48

## 2023-12-27 RX ADMIN — DOCUSATE SODIUM 50 MG AND SENNOSIDES 8.6 MG 2 TABLET: 8.6; 5 TABLET, FILM COATED ORAL at 08:18
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
## 2023-12-27 NOTE — PROGRESS NOTES
Neurology Progress Note       Chief Complaint:  AMS/Possible ETOH withdrawl    Subjective    Subjective     Subjective:  No acute events overnight.  Patient reports feeling significantly improved compared to yesterday.  He is alert and oriented, cooperative, seen walking around the room, denies any new complaints.  Tells me he has little recollection of events yesterday.    Review of Systems   Constitutional:  Negative for chills and fever.   HENT: Negative.     Respiratory: Negative.     Cardiovascular: Negative.    Skin: Negative.    Neurological:  Negative for dizziness, tremors, seizures, speech difficulty, weakness and numbness.   Psychiatric/Behavioral:  The patient is nervous/anxious.             Objective    Objective      Temp:  [97.9 °F (36.6 °C)-98.8 °F (37.1 °C)] 98.6 °F (37 °C)  Heart Rate:  [50-98] 54  Resp:  [15-26] 21  BP: (113-154)/() 147/112    Neurological Exam  Mental Status  Awake and alert. Oriented to person, place, time and situation. Recent and remote memory are intact. Speech is normal. Language is fluent with no aphasia. Attention and concentration are normal. Fund of knowledge is appropriate for level of education.    Cranial Nerves  CN III, IV, VI: Extraocular movements intact bilaterally. Normal lids and orbits bilaterally.  CN VII: Full and symmetric facial movement.  CN IX, X: Palate elevates symmetrically  CN XII: Tongue midline without atrophy or fasciculations.    Motor  Normal muscle bulk throughout. Normal muscle tone. No abnormal involuntary movements.    Coordination    No obvious dysmetria.    Gait  Casual gait is normal including stance, stride, and arm swing.      Physical Exam  Constitutional:       General: He is awake.   Eyes:      General: Lids are normal.      Extraocular Movements: Extraocular movements intact.   Neurological:      Mental Status: He is alert.   Psychiatric:         Speech: Speech normal.         Hospital Meds:  Scheduled- folic acid 1 mg in  sodium chloride 0.9 % 50 mL IVPB, 1 mg, Intravenous, Daily  ketamine, 100 mg, Intravenous, Once  PHENobarbital, 65 mg, Intravenous, Once   Followed by  PHENobarbital, 32.4 mg, Oral, Once   Followed by  [START ON 12/28/2023] PHENobarbital, 32.4 mg, Oral, Once   Followed by  [START ON 12/28/2023] PHENobarbital, 32.4 mg, Oral, Once  potassium chloride ER, 40 mEq, Oral, Q4H  senna-docusate sodium, 2 tablet, Oral, BID  sodium chloride, 10 mL, Intravenous, Q12H  sodium chloride, 10 mL, Intravenous, Q12H  thiamine (B-1) IV, 200 mg, Intravenous, Q8H   Followed by  [START ON 12/31/2023] thiamine, 100 mg, Oral, Daily      Infusions- dexmedetomidine, 0.2-1.5 mcg/kg/hr, Last Rate: Stopped (12/26/23 1200)       PRNs-   senna-docusate sodium **AND** polyethylene glycol **AND** bisacodyl **AND** bisacodyl    Calcium Replacement - Follow Nurse / BPA Driven Protocol    hydrALAZINE    LORazepam **OR** LORazepam **OR** LORazepam **OR** LORazepam **OR** LORazepam **OR** LORazepam **OR** LORazepam **OR** LORazepam    Magnesium Standard Dose Replacement - Follow Nurse / BPA Driven Protocol    oxyCODONE    Phosphorus Replacement - Follow Nurse / BPA Driven Protocol    Potassium Replacement - Follow Nurse / BPA Driven Protocol    [COMPLETED] Insert Peripheral IV **AND** sodium chloride    sodium chloride    sodium chloride    sodium chloride    sodium chloride    Results Review:    I reviewed the patient's new clinical results.    Imaging Results (Last 24 Hours)       ** No results found for the last 24 hours. **          Results for orders placed during the hospital encounter of 12/22/23    Adult Transthoracic Echo Complete W/ Cont if Necessary Per Protocol    Interpretation Summary    Left ventricular systolic function is normal. Calculated left ventricular EF = 61% Left ventricular ejection fraction appears to be 61 - 65%.    Left ventricular diastolic function is consistent with (grade I) impaired relaxation.    The left atrial cavity  is mild to moderately dilated.    Estimated right ventricular systolic pressure from tricuspid regurgitation is mildly elevated (35-45 mmHg).    Mild pulmonary hypertension is present.    CT Head Without Contrast    Result Date: 12/25/2023   1. No acute parenchymal mass, hemorrhage, or midline shift  This report was finalized on 12/25/2023 3:53 PM by Dr. Carmine Acharya MD.      MRI abdomen wo contrast mrcp    Result Date: 12/22/2023   1.  Severe acute pancreatitis. 2.  No pseudocyst formation. No dilated pancreatic duct. 3.  No acute process seen in the liver, gallbladder, adrenal glands, spleen, and left kidney. 4.  Small right parapelvic renal cyst. 5.  Small exophytic cyst at the junction of the mid and lower pole of the right kidney. 6.  No abscess or hematoma. 7.  No free air.  This report was finalized on 12/22/2023 6:08 PM by Ugo Ogden MD.      US Gallbladder    Result Date: 12/22/2023    Distended gallbladder.   This report was finalized on 12/22/2023 3:53 PM by Dr. Steve Hinojosa MD.      CT Abdomen Pelvis With Contrast    Result Date: 12/22/2023  1.  Stranding around the pancreas consistent with acute pancreatitis. 2.  Fatty infiltration of the liver. 3.  Degenerative changes lumbar spine as described.   This report was finalized on 12/22/2023 2:21 PM by Dr. Steve Hinojosa MD.      XR Chest 1 View    Result Date: 12/22/2023    Unremarkable exam. No acute cardiopulmonary findings identified.   This report was finalized on 12/22/2023 12:30 PM by Dr. Steve Hinojosa MD.        Lab Results   Component Value Date    HGBA1C 5.70 (H) 05/01/2023      Lab Results   Component Value Date    CHOL 286 (H) 12/22/2023    CHLPL 224 (H) 05/01/2023    TRIG 173 (H) 12/22/2023    HDL 95 (H) 12/22/2023     (H) 12/22/2023      Lab Results   Component Value Date    WBC 8.85 12/27/2023    HGB 10.0 (L) 12/27/2023    HCT 29.1 (L) 12/27/2023    MCV 96.7 12/27/2023     12/27/2023      Lab Results   Component Value Date     GLUCOSE 108 (H) 12/27/2023    BUN 12 12/27/2023    CREATININE 0.63 (L) 12/27/2023    EGFRIFNONA 74 09/02/2020    BCR 19.0 12/27/2023    K 3.1 (L) 12/27/2023    CO2 20.2 (L) 12/27/2023    CALCIUM 8.7 12/27/2023    PROTENTOTREF 8.6 (H) 05/01/2023    ALBUMIN 3.1 (L) 12/27/2023    LABIL2 1.5 05/01/2023    AST 75 (H) 12/27/2023    ALT 48 (H) 12/27/2023      Lab Results   Component Value Date    TSH 0.269 (L) 12/26/2023     Lab Results   Component Value Date    [id number] 982 (H) 12/26/2023     Lab Results   Component Value Date    FOLATE 9.91 12/26/2023           Assessment/Plan     Assessment/Plan:  Taiwo Pierce is a 48 y.o. white male with known diagnosis of hypertension, mixed hyperlipidemia, low back pain, alcohol abuse who was recently admitted for alcoholic hepatitis left AMA 12/25/2023 then brought back to the hospital the same day for confusion, patient admitted to drinking alcohol heavily for several years.  Neurology consulted for altered mental status, reportedly the patient was significantly confused 12/25/2023 with agitation requiring security to help restrain, he was given Geodon.  Today he is back to his baseline, alert and cooperative, denies any new complaints.  Has little recollection of events yesterday.    His labs showed low TSH, elevated liver enzyme, urine drug screen positive for THC, benzo and opioids, alcohol level less than 10.  CT head negative for any acute changes.  B12 is 982.      #AMS, improved.  Initially felt this was likely from alcohol withdrawal however his last drink was a week before Jose making this less likely.  Given that he presented a few hours after leaving AMA, and urine positive for several substances, psychiatry feels substance use may be the main reason for his presentation      -No further neurological recommendations  -Tele-Neurology will sign off for now, please feel free to call with any questions/concerns.  Thank you again for the consult  -Psychiatry  has encouraged the patient to consider rehab    The case was discussed with the patient, and will discuss with primary team.    Sterling Parr, APRN  12/27/23  11:12 EST    This was an audio and video enabled telemedicine encounter.  Dr. Oh present for encounter via telemedicine.  Verbal consent taken.

## 2023-12-27 NOTE — CASE MANAGEMENT/SOCIAL WORK
Discharge Planning Assessment   Alvin     Patient Name: Taiwo Pierce  MRN: [id number]  Today's Date: 12/27/2023    Admit Date: 12/25/2023     Discharge Plan       Row Name 12/27/23 1147       Plan    Final Discharge Disposition Code 01 - home or self-care    Final Note Pt is being discharged home on this date. Pt had psych consult. SS noted At this point in time, patient does have capacity for medical decision making. No other needs identified.            AUNG HolbrookW

## 2023-12-27 NOTE — NURSING NOTE
Phenobarbital order # [id number]   [facility] # [id number]  Dispensed 12/25/23 2000     Bag was spiked but none of the drip was administered due to change in patient condition. The bag had been spiked and tubing primed only.  There is no way to waste medication in the omnicel due to drip being directly dispensed by pharmacy.  There was no way to waste the medication in flowsheets or on MAR due to order being discontinued.  This is to document medication was wasted.    Witness: Carly Norris RN

## 2023-12-27 NOTE — DISCHARGE INSTRUCTIONS
Please take medications as prescribed. Please go to follow-up appointments as recommended. Please seek medical attention if you have worsening of any symptoms.

## 2023-12-27 NOTE — DISCHARGE SUMMARY
[facility] HOSPITALISTS DISCHARGE SUMMARY    Patient Identification:  Name:  Taiwo Pierce  Age:  48 y.o.  Sex:  male  :  1975  MRN:  [id number]  Visit Number:  [id number]    Date of Admission: 2023  Date of Discharge:  2023    PCP: Provider, No Known    DISCHARGE DIAGNOSIS  #Acute psychosis w/ hallucinations and severe agitation  #ETOH abuse w/ withdrawal   #Recent diagnosis acute alcohol induced pancreatitis and mild alcoholic hepatitis   #Exophytic R kidney cyst  #Chronic pain   #GERD  #Anxiety/depression     CONSULTS   Neurology   Pulmonology   Psychiatry     PROCEDURES PERFORMED  None    HOSPITAL COURSE  Patient is a 48 y.o. male presented on  to [facility] complaining of AMS.  Please see the admitting history and physical for further details.      Mr. Pierce is our 48 y.o. male with hx OA, GERD, HTN, HLD, alcohol misuse who presents with AMS. Patient had left [facility] earlier in the day after being admitted -. He presented at that time with abdominal pain and N/V. CT, symptoms, and elevated lipase all consistent with acute pancreatitis. Started on IVF, IV pain regimen, NPO. Improved back to regular diet yesterday with some signs of withdrawal on CIWA. He had been started on abx with no source of SIRS identified, likely reactive, consistent with normal procal. Patient improved and ended up leaving [facility] from hospitalization on .  Shortly after patient left hospital his father (who was bedside on evaluation helping provide history) called patient to wish him astrid caraballo. He seemed confused and upon his and his wife's evaluation. Patient was unsteady on his feet and having hallucinations. Patient has been agitated at times but able to be oriented but he has got up out to the hallway a few times in the emergency room. Patient's father is not aware of any illicit or synthetic substances and thought this may be alcohol withdrawal. Last known  alcohol use was round 12/16. Patient's father noted he has not acted this way before and was not confused this way on initial presentation on 12/22. Denied headache or light sensitivity. Top of differential included ETOH withdrawal, THC or synthetic drug intoxication. Ammonia wnl. CT head obtained and unrevealing. No source of infection identified. Patient was not able to make decisions for himself and family agreeable with admission. Patient required high doses of sedation initially including ativan per BEREKET, geodon, precedex,  ketamine. He also required soft restraints for a time as he was being harmful to self and staff. Patient improved but mental status was still fluctuating somewhat yesterday. He was oriented and understanding of risk of leaving yesterday evening and decided to leave AMA. Along with his father, we were able to convince him to stay until today. Patient clearly oriented today and able to discuss risk/benefit of staying. He has only required 2 mg ativan overnight and today. No signs of withdrawal on exam. Psychiatry noted patient did not meet criteria for trillium admission and that he had capacity. Patient's father bedside this morning. He noted he was also answering questions appropriately for him this morning. Patient calmly told me he needed to get home but was interested in stopping ETOH, illicit substances. He noted he did not think he needed to stay longer for detox, but this was offered as an option.  I do think there is an component of polypharmacy. I decreased gabapentin and stopped one of the muscle relaxers he has not been receiving. I ordered narcan (75 daily morphine equivalents). Patient was agreeable to psychiatry referral. Will also have patient f/u with PCP and urology (exophytic cyst).     VITAL SIGNS:  Temp:  [97.9 °F (36.6 °C)-98.8 °F (37.1 °C)] 98.6 °F (37 °C)  Heart Rate:  [50-98] 54  Resp:  [15-26] 21  BP: (113-154)/() 147/112  SpO2:  [93 %-100 %] 95 %  on  Flow  (L/min):  [2] 2;   Device (Oxygen Therapy): nasal cannula;humidified    Body mass index is 28.04 kg/m².  Wt Readings from Last 3 Encounters:   12/25/23 91.2 kg (201 lb 1 oz)   12/22/23 90.4 kg (199 lb 4.8 oz)   12/21/23 90.5 kg (199 lb 9.6 oz)       PHYSICAL EXAM:  Constitutional:  Well-developed and well-nourished.  No respiratory distress.      HENT:  Head:  Normocephalic and atraumatic.  Mouth:  Moist mucous membranes.    Eyes:  Conjunctivae and EOM are normal.  Pupils are equal, round, and reactive to light.  No scleral icterus.    Cardiovascular:  Normal rate, regular rhythm and normal heart sounds with no murmur.  Pulmonary/Chest:  No respiratory distress, no wheezes, no crackles, with normal breath sounds and good air movement.  Abdominal:  Soft.  Bowel sounds are normal.  No distension and no tenderness.   Musculoskeletal:  No edema, no tenderness, and no deformity.  No red or swollen joints anywhere.    Neurological:  Alert and oriented to person, place, and time.  No gross neurological deficit.   Skin:  Skin is warm and dry. No rash noted. No pallor.   Peripheral vascular:  Strong pulses in all 4 extremities with no clubbing, no cyanosis, no edema.    DISCHARGE DISPOSITION   Stable    DISCHARGE MEDICATIONS:     Discharge Medications        New Medications        Instructions Start Date   naloxone 4 MG/0.1ML nasal spray  Commonly known as: NARCAN   1 spray, Nasal, As Needed             Changes to Medications        Instructions Start Date   gabapentin 800 MG tablet  Commonly known as: NEURONTIN  What changed: how much to take   400 mg, Oral, 3 Times Daily             Continue These Medications        Instructions Start Date   amLODIPine 10 MG tablet  Commonly known as: NORVASC   10 mg, Oral, Daily      DULoxetine 30 MG capsule  Commonly known as: CYMBALTA   30 mg, Oral, Daily      lansoprazole 30 MG capsule  Commonly known as: PREVACID   30 mg, Oral, Daily      metaxalone 800 MG tablet  Commonly known as:  SKELAXIN   800 mg, Oral, 3 Times Daily PRN      oxyCODONE 10 MG tablet  Commonly known as: ROXICODONE   10 mg, Oral, 4 Times Daily      potassium chloride 20 MEQ CR tablet  Commonly known as: K-DUR,KLOR-CON   20 mEq, Oral, 2 Times Daily      pravastatin 20 MG tablet  Commonly known as: PRAVACHOL   20 mg, Oral, Daily      valsartan 320 MG tablet  Commonly known as: DIOVAN   320 mg, Oral, Daily             Stop These Medications      cyclobenzaprine 10 MG tablet  Commonly known as: FLEXERIL     hydroCHLOROthiazide 25 MG tablet  Commonly known as: HYDRODIURIL                 Follow-up Information       Provider, No Known Follow up in 1 week(s).    Contact information:  [facility] [id number]  [phone]               [facility] Follow up in 1 week(s).    Specialty: Psychiatry  Contact information:  [address] [place] [id number] [phone]                            TEST  RESULTS PENDING AT DISCHARGE  Pending Labs       Order Current Status    Ethylene Glycol In process    Methanol In process             CODE STATUS  Code Status and Medical Interventions:   Ordered at: 12/25/23 1553     Code Status (Patient has no pulse and is not breathing):    CPR (Attempt to Resuscitate)     Medical Interventions (Patient has pulse or is breathing):    Full Support       Carlos Isbell MD  [facility] Hospital[facility]  12/27/23  11:31 EST    Please note that this discharge summary required more than 30 minutes to complete.

## 2023-12-27 NOTE — CONSULTS
Referring Provider: Sukhi  Reason for Consultation: AMS/Capacity      Chief complaint/Focus of Exam: AMS/Capacity    Subjective .     History of present illness: Patient is a 48-year-old male with a history of alcohol abuse, osteoarthritis, GERD, hypertension, hyperlipidemia who is currently admitted for AMS after representing for safety he was discharged [facility] previous admission for pancreatitis.  Patient was agitated, confused, and verbally combative at times and required soft restraints initially.  He was trying to leave last night AGAINST MEDICAL ADVICE and family was very upset and irate with the patient which caused him to be more confused and flustered.  This morning, he is alert and oriented to all questions.  He is able to discuss his medical problems and his hospitalization with me appropriately.  He denies SI/HI/AVH.  He reports no significant psychiatric history but does feel that he has some significant anxiety at baseline.  He reports his last drink was the week before Jose so alcohol withdrawal with delirium is less likely.  Given that he was insistent on taking his own medication and presented a few short hours after leaving the hospital again confuses other mental state, I suspect substance use may be the main contributing factor to his presentation.    Review of Systems  Pertinent items are noted in HPI    History  Past Medical History:   Diagnosis Date    Arthritis October 2007    Arthritis in back and neck    GERD (gastroesophageal reflux disease)     Headache 2007    Headaches due to disc in neck.    Hyperlipidemia     Hypertension     Low back pain 10/05/09    Hurt back and had back surgery 04/16/2014. Hit a nerve   ,   Past Surgical History:   Procedure Laterality Date    ANTERIOR CERVICAL DISCECTOMY W/ FUSION N/A 8/21/2023    Procedure: CERVICAL DISCECTOMY ANTERIOR WITH FUSION C5-7;  Surgeon: Julio C Bryson MD;  Location: [facility];  Service: Neurosurgery;  Laterality: N/A;     FRACTURE SURGERY Left     Foot    KNEE ARTHROSCOPY Left     LUMBAR DISCECTOMY  04/16/2014    LT L5-S1 Discectomy, L4-5 Lami with Disc - Dr. Esequiel Joshi   ,   Family History   Problem Relation Age of Onset    Hyperlipidemia Father     Hypertension Father     Breast cancer Mother     Cancer Mother         Breast cancer    Depression Mother         Depression nerve problems    Prostate cancer Paternal Grandfather     Cancer Paternal Grandfather         Prostate cancer    Hyperlipidemia Paternal Grandfather     Prostate cancer Maternal Grandfather     Arthritis Maternal Grandfather     Cancer Maternal Grandfather         Prostate cancer    Heart disease Maternal Grandfather         Heart attack@90    Hyperlipidemia Maternal Grandfather     Cancer Maternal Grandmother         Lung cancer    Depression Maternal Grandmother         Depression nerve problems    Stroke Maternal Grandmother         Brain anurism    Arthritis Paternal Uncle     Early death Sister         Brain didnt develop completely   ,   Social History     Socioeconomic History    Marital status:    Tobacco Use    Smoking status: Never    Smokeless tobacco: Current     Types: Snuff   Vaping Use    Vaping Use: Never used   Substance and Sexual Activity    Alcohol use: Yes     Comment: drinks 8oz vodka daily, quit Saturday 12/16 because wife threatened to divorce him if he didn't.    Drug use: No    Sexual activity: Yes     Partners: Female     Birth control/protection: None     E-cigarette/Vaping    E-cigarette/Vaping Use Never User      E-cigarette/Vaping Substances    Nicotine No     THC No     CBD No     Flavoring No      E-cigarette/Vaping Devices    Disposable No     Pre-filled or Refillable Cartridge No     Refillable Tank No     Pre-filled Pod No          ,   Medications Prior to Admission   Medication Sig Dispense Refill Last Dose    amLODIPine (NORVASC) 10 MG tablet Take 1 tablet by mouth Daily.   Unknown    cyclobenzaprine (FLEXERIL) 10 MG  tablet Take 1 tablet by mouth 3 (Three) Times a Day As Needed for Muscle Spasms. 90 tablet 0 Unknown    DULoxetine (CYMBALTA) 30 MG capsule Take 1 capsule by mouth Daily.   Unknown    gabapentin (NEURONTIN) 800 MG tablet Take 1 tablet by mouth 3 (Three) Times a Day.   Unknown    hydroCHLOROthiazide (HYDRODIURIL) 25 MG tablet Take 1 tablet by mouth Daily.   Unknown    lansoprazole (PREVACID) 30 MG capsule Take 1 capsule by mouth Daily.   Unknown    metaxalone (SKELAXIN) 800 MG tablet Take 1 tablet by mouth 3 (Three) Times a Day As Needed for Muscle Spasms.   Unknown    oxyCODONE (ROXICODONE) 10 MG tablet Take 1 tablet by mouth 4 (Four) Times a Day.   Unknown    potassium chloride (K-DUR,KLOR-CON) 20 MEQ CR tablet Take 1 tablet by mouth 2 (Two) Times a Day. 60 tablet 2 Unknown    pravastatin (PRAVACHOL) 20 MG tablet Take 1 tablet by mouth Daily. 90 tablet 0 Unknown    valsartan (DIOVAN) 320 MG tablet Take 1 tablet by mouth Daily.   Unknown   , Scheduled Meds:  folic acid 1 mg in sodium chloride 0.9 % 50 mL IVPB, 1 mg, Intravenous, Daily  ketamine, 100 mg, Intravenous, Once  PHENobarbital, 65 mg, Intravenous, Once   Followed by  PHENobarbital, 32.4 mg, Oral, Once   Followed by  [START ON 12/28/2023] PHENobarbital, 32.4 mg, Oral, Once   Followed by  [START ON 12/28/2023] PHENobarbital, 32.4 mg, Oral, Once  potassium chloride ER, 40 mEq, Oral, Q4H  senna-docusate sodium, 2 tablet, Oral, BID  sodium chloride, 10 mL, Intravenous, Q12H  sodium chloride, 10 mL, Intravenous, Q12H  thiamine (B-1) IV, 200 mg, Intravenous, Q8H   Followed by  [START ON 12/31/2023] thiamine, 100 mg, Oral, Daily   , Continuous Infusions:  dexmedetomidine, 0.2-1.5 mcg/kg/hr, Last Rate: Stopped (12/26/23 1200)   , PRN Meds:    senna-docusate sodium **AND** polyethylene glycol **AND** bisacodyl **AND** bisacodyl    Calcium Replacement - Follow Nurse / BPA Driven Protocol    hydrALAZINE    LORazepam **OR** LORazepam **OR** LORazepam **OR** LORazepam  **OR** LORazepam **OR** LORazepam **OR** LORazepam **OR** LORazepam    Magnesium Standard Dose Replacement - Follow Nurse / BPA Driven Protocol    oxyCODONE    Phosphorus Replacement - Follow Nurse / BPA Driven Protocol    Potassium Replacement - Follow Nurse / BPA Driven Protocol    [COMPLETED] Insert Peripheral IV **AND** sodium chloride    sodium chloride    sodium chloride    sodium chloride    sodium chloride, and Allergies:  Augmentin [amoxicillin-pot clavulanate] and Metoprolol    Objective     Vital Signs   Temp:  [97.9 °F (36.6 °C)-98.8 °F (37.1 °C)] 98.6 °F (37 °C)  Heart Rate:  [50-98] 55  Resp:  [15-26] 19  BP: (113-155)/() 129/86    Mental Status Exam:   Mental Status Exam:    Hygiene:   good  Cooperation:  Cooperative  Eye Contact:  Good  Psychomotor Behavior:  Appropriate  Affect:  Full range  Hopelessness: Denies  Speech:  Normal  Thought Progress:  Goal directed and Linear  Thought Content:  Normal and Mood congruent  Suicidal:  None  Homicidal:  None  Hallucinations:  None  Delusion:  None  Memory:  Deficits  Orientation:  Person, Place, Time, and Situation  Reliability:  poor  Insight:  Fair  Judgement:  Poor  Impulse Control:  Poor    Results Review:   I reviewed the patient's new clinical results.  Lab Results (last 24 hours)       Procedure Component Value Units Date/Time    Magnesium [[id number]]  (Normal) Collected: 12/27/23 0204    Specimen: Blood Updated: 12/27/23 0727     Magnesium 2.2 mg/dL     CBC & Differential [[id number]]  (Abnormal) Collected: 12/27/23 0204    Specimen: Blood Updated: 12/27/23 0408    Narrative:      The following orders were created for panel order CBC & Differential.  Procedure                               Abnormality         Status                     ---------                               -----------         ------                     CBC Auto Differential[[id number]]        Abnormal            Final result               Scan Slide[[id number]]                                        Final result                 Please view results for these tests on the individual orders.    CBC Auto Differential [[id number]]  (Abnormal) Collected: 12/27/23 0204    Specimen: Blood Updated: 12/27/23 0408     WBC 8.85 10*3/mm3      RBC 3.01 10*6/mm3      Hemoglobin 10.0 g/dL      Hematocrit 29.1 %      MCV 96.7 fL      MCH 33.2 pg      MCHC 34.4 g/dL      RDW 14.6 %      RDW-SD 51.8 fl      MPV 11.0 fL      Platelets 213 10*3/mm3      Neutrophil % 56.5 %      Lymphocyte % 17.1 %      Monocyte % 24.0 %      Eosinophil % 0.7 %      Basophil % 0.2 %      Immature Grans % 1.5 %      Neutrophils, Absolute 5.01 10*3/mm3      Lymphocytes, Absolute 1.51 10*3/mm3      Monocytes, Absolute 2.12 10*3/mm3      Eosinophils, Absolute 0.06 10*3/mm3      Basophils, Absolute 0.02 10*3/mm3      Immature Grans, Absolute 0.13 10*3/mm3      nRBC 0.2 /100 WBC     Scan Slide [[id number]] Collected: 12/27/23 0204    Specimen: Blood Updated: 12/27/23 0408     Hypochromia Slight/1+     Stomatocytes Slight/1+     Platelet Morphology Normal    Comprehensive Metabolic Panel [[id number]]  (Abnormal) Collected: 12/27/23 0204    Specimen: Blood Updated: 12/27/23 0352     Glucose 108 mg/dL      BUN 12 mg/dL      Creatinine 0.63 mg/dL      Sodium 135 mmol/L      Potassium 3.1 mmol/L      Chloride 101 mmol/L      CO2 20.2 mmol/L      Calcium 8.7 mg/dL      Total Protein 6.5 g/dL      Albumin 3.1 g/dL      ALT (SGPT) 48 U/L      AST (SGOT) 75 U/L      Alkaline Phosphatase 121 U/L      Total Bilirubin 1.0 mg/dL      Globulin 3.4 gm/dL      A/G Ratio 0.9 g/dL      BUN/Creatinine Ratio 19.0     Anion Gap 13.8 mmol/L      eGFR 117.3 mL/min/1.73     Narrative:      GFR Normal >60  Chronic Kidney Disease <60  Kidney Failure <15      Folate [[id number]]  (Normal) Collected: 12/26/23 0911    Specimen: Blood Updated: 12/26/23 1831     Folate 9.91 ng/mL     Narrative:      Results may be falsely increased if patient taking Biotin.       Vitamin B12 [[id number]]  (Abnormal) Collected: 12/26/23 0911    Specimen: Blood Updated: 12/26/23 1831     Vitamin B-12 982 pg/mL     Narrative:      Results may be falsely increased if patient taking Biotin.      Blood Gas, Venous With Co-Ox [[id number]]  (Abnormal) Collected: 12/26/23 1442    Specimen: Venous Blood Updated: 12/26/23 1443     Site Lab     pH, Venous 7.448 pH Units      pCO2, Venous 31.2 mm Hg      Comment: 84 Value below reference range        pO2, Venous 43.6 mm Hg      HCO3, Venous 21.5 mmol/L      Comment: 84 Value below reference range        Base Excess, Venous -1.8 mmol/L      O2 Saturation, Venous 83.4 %      Comment: 83 Value above reference range        Hemoglobin, Blood Gas 10.5 g/dL      Comment: 84 Value below reference range        CO2 Content 22.5 mmol/L      Barometric Pressure for Blood Gas 724 mmHg      Modality Room Air     FIO2 21 %      Ventilator Mode NA     Collected by [id number]     Comment: Meter: [id number]     :  [id number]        Oxyhemoglobin Venous 81.9 %      Comment: 83 Value above reference range        Carboxyhemoglobin Venous 1.7 %      Methemoglobin Venous 0.1 %     T4, Free [[id number]]  (Normal) Collected: 12/26/23 0011    Specimen: Blood Updated: 12/26/23 1414     Free T4 1.12 ng/dL     Narrative:      Results may be falsely increased if patient taking Biotin.      Methanol [[id number]] Collected: 12/26/23 0921    Specimen: Blood Updated: 12/26/23 0935    Ethylene Glycol [[id number]] Collected: 12/26/23 0911    Specimen: Blood Updated: 12/26/23 0935    TSH [[id number]]  (Abnormal) Collected: 12/26/23 0011    Specimen: Blood Updated: 12/26/23 0918     TSH 0.269 uIU/mL           Imaging Results (Last 24 Hours)       ** No results found for the last 24 hours. **              Assessment & Plan     Drug-induced delirium  -Patient's presentation and symptom history is most consistent with polysubstance use induced delirium given his controlled prescriptions and  extraneous substance use including alcohol and cannabis.  Patient is currently alert and oriented without any major concerning findings on mental status exam.  -Encouraged to consider rehab    Evaluation for medical decision making capacity  -At this point in time, patient does have capacity for medical decision making.  He is alert and oriented and able to answer all questions appropriately.      I discussed the patients findings and my recommendations with patient, nursing staff, and consulting provider    Daron Marie MD  12/27/23  09:01 EST

## 2023-12-27 NOTE — NURSING NOTE
Patient signed out AMA, and met with family in the hallway in PCU, security called and Dr. Isbell called. This was a elevated discusion that was moved to the PCU waiting room. House Patient Care Manager present. Patient family expressed concerns about AMA decision, and patient made the decision to stay tonight, for himself.

## 2023-12-27 NOTE — PROGRESS NOTES
Progress Note Pulmonary      Subjective patient is awake, off oxygen, communicative, denies any complaint.  Wants to go home    Interval History:     As described above    Review of Systems:    Reviewed ; unchanged       Vital Signs  Temp:  [97.9 °F (36.6 °C)-98.8 °F (37.1 °C)] 98.6 °F (37 °C)  Heart Rate:  [50-98] 61  Resp:  [15-26] 20  BP: (113-154)/() 136/79  Body mass index is 28.04 kg/m².    Intake/Output Summary (Last 24 hours) at 12/27/2023 1006  Last data filed at 12/27/2023 0546  Gross per 24 hour   Intake 3399.6 ml   Output 1075 ml   Net 2324.6 ml     No intake/output data recorded.    Physical Exam:  General- normal in appearance, not in any acute distress    HEENT- pupils equally reactive to light, normal in size, no scleral icterus    Neck- supple    No JVD, no carotid bruit    Respiratory-bilateral air entry, clear to auscultation cardiovascular-  Normal S1 and S2. No S3, S4 or murmurs.    GI-nontender nondistended bowel sounds positive    CNS-alert oriented x3, grossly nonfocal, no tremors    Extremities- pulses normal bilaterally , no clubbing and edema        Results Review:      Results from last 7 days   Lab Units 12/27/23  0204 12/26/23  0011 12/25/23  1303   WBC 10*3/mm3 8.85 11.21* 14.13*   HEMOGLOBIN g/dL 10.0* 9.8* 11.0*   PLATELETS 10*3/mm3 213 202 217     Results from last 7 days   Lab Units 12/27/23  0204 12/26/23  0011 12/25/23  1303 12/23/23  0501 12/22/23  1329   SODIUM mmol/L 135* 142 133*   < >  --    POTASSIUM mmol/L 3.1* 3.7 4.0   < >  --    CHLORIDE mmol/L 101 108* 98   < >  --    CO2 mmol/L 20.2* 21.8* 16.6*   < >  --    BUN mg/dL 12 13 13   < >  --    CREATININE mg/dL 0.63* 0.59* 0.72*   < >  --    CALCIUM mg/dL 8.7 8.6 9.4   < >  --    GLUCOSE mg/dL 108* 128* 118*   < >  --    MAGNESIUM mg/dL 2.2  --  2.3  --  2.5    < > = values in this interval not displayed.     Lab Results   Component Value Date    INR 1.10 12/22/2023    PROTIME 14.7 12/22/2023     Results from last 7  days   Lab Units 12/27/23  0204 12/25/23  1303 12/25/23  0032   ALK PHOS U/L 121* 116 103   BILIRUBIN mg/dL 1.0 1.3* 1.3*   ALT (SGPT) U/L 48* 61* 58*   AST (SGOT) U/L 75* 113* 113*         Imaging Results (Last 24 Hours)       ** No results found for the last 24 hours. **                 folic acid 1 mg in sodium chloride 0.9 % 50 mL IVPB, 1 mg, Intravenous, Daily  ketamine, 100 mg, Intravenous, Once  PHENobarbital, 65 mg, Intravenous, Once   Followed by  PHENobarbital, 32.4 mg, Oral, Once   Followed by  [START ON 12/28/2023] PHENobarbital, 32.4 mg, Oral, Once   Followed by  [START ON 12/28/2023] PHENobarbital, 32.4 mg, Oral, Once  potassium chloride ER, 40 mEq, Oral, Q4H  senna-docusate sodium, 2 tablet, Oral, BID  sodium chloride, 10 mL, Intravenous, Q12H  sodium chloride, 10 mL, Intravenous, Q12H  thiamine (B-1) IV, 200 mg, Intravenous, Q8H   Followed by  [START ON 12/31/2023] thiamine, 100 mg, Oral, Daily      dexmedetomidine, 0.2-1.5 mcg/kg/hr, Last Rate: Stopped (12/26/23 1200)        Medication Review:     Assessment & Plan     #1 initially admitted for altered sensorium most likely drug related.  Patient is back to his baseline mental status.     Transfer out of medical ICUGI- PPI prophylaxis.  Consider drug rehab if patient agrees        Endrocrinology- Maintain Blood sugar 140 -180           DVT prophylaxis-     I will sign off.  Reconsult as needed    Ham Niño MD  12/27/23  10:06 EST

## 2023-12-27 NOTE — PLAN OF CARE
Goal Outcome Evaluation:  Plan of Care Reviewed With: patient    No PRN medications given for CIWA.  PRN meds given for pain.    Problem: Adult Inpatient Plan of Care  Goal: Plan of Care Review  Outcome: Ongoing, Progressing  Flowsheets (Taken 12/27/2023 0438)  Plan of Care Reviewed With: patient  Goal: Patient-Specific Goal (Individualized)  Outcome: Ongoing, Progressing  Goal: Absence of Hospital-Acquired Illness or Injury  Outcome: Ongoing, Progressing  Intervention: Identify and Manage Fall Risk  Recent Flowsheet Documentation  Taken 12/27/2023 0400 by Erika Gaytan RN  Safety Promotion/Fall Prevention:   safety round/check completed   fall prevention program maintained  Taken 12/27/2023 0300 by Erika Gaytan RN  Safety Promotion/Fall Prevention: safety round/check completed  Taken 12/27/2023 0200 by Erika Gaytan RN  Safety Promotion/Fall Prevention:   safety round/check completed   fall prevention program maintained  Taken 12/27/2023 0100 by Erika Gaytan RN  Safety Promotion/Fall Prevention:   safety round/check completed   fall prevention program maintained  Taken 12/27/2023 0000 by Erika Gaytan RN  Safety Promotion/Fall Prevention: safety round/check completed  Taken 12/26/2023 2300 by Erika Gaytan RN  Safety Promotion/Fall Prevention: safety round/check completed  Taken 12/26/2023 2200 by Erika Gaytan RN  Safety Promotion/Fall Prevention:   safety round/check completed   fall prevention program maintained  Taken 12/26/2023 2100 by Erika Gaytan RN  Safety Promotion/Fall Prevention:   safety round/check completed   fall prevention program maintained  Taken 12/26/2023 2022 by Erika Gaytan RN  Safety Promotion/Fall Prevention:   nonskid shoes/slippers when out of bed   fall prevention program maintained   safety round/check completed  Taken 12/26/2023 1900 by Erika Gaytan RN  Safety Promotion/Fall Prevention:   safety round/check completed   fall prevention program  maintained  Intervention: Prevent Skin Injury  Recent Flowsheet Documentation  Taken 12/27/2023 0400 by Erika Gaytan RN  Body Position: position changed independently  Taken 12/27/2023 0200 by Erika Gaytan RN  Body Position: position changed independently  Taken 12/27/2023 0000 by Erika Gaytan RN  Body Position: position changed independently  Taken 12/26/2023 2200 by Erika Gaytan RN  Body Position: position changed independently  Taken 12/26/2023 2022 by Erika Gaytan RN  Body Position: position changed independently  Taken 12/26/2023 2000 by Erika Gaytan RN  Body Position: position changed independently  Intervention: Prevent and Manage VTE (Venous Thromboembolism) Risk  Recent Flowsheet Documentation  Taken 12/27/2023 0210 by Erika Gaytan RN  Activity Management: activity encouraged  VTE Prevention/Management:   sequential compression devices off   patient refused intervention  Taken 12/26/2023 2022 by Erika Gaytan RN  Activity Management: back to bed  VTE Prevention/Management: (patient educated on risk of vte, continues to refuse intervention)   patient refused intervention   other (see comments)  Goal: Optimal Comfort and Wellbeing  Outcome: Ongoing, Progressing  Intervention: Monitor Pain and Promote Comfort  Recent Flowsheet Documentation  Taken 12/27/2023 0019 by Erika Gaytan RN  Pain Management Interventions: see MAR  Taken 12/26/2023 2022 by Erika Gaytan RN  Pain Management Interventions: see MAR  Intervention: Provide Person-Centered Care  Recent Flowsheet Documentation  Taken 12/26/2023 2022 by Erika Gaytan RN  Trust Relationship/Rapport:   care explained   choices provided  Goal: Readiness for Transition of Care  Outcome: Ongoing, Progressing     Problem: Asthma Comorbidity  Goal: Maintenance of Asthma Control  Outcome: Ongoing, Progressing  Intervention: Maintain Asthma Symptom Control  Recent Flowsheet Documentation  Taken 12/27/2023 0400 by Erika Gaytan  RN  Medication Review/Management: medications reviewed     Problem: Behavioral Health Comorbidity  Goal: Maintenance of Behavioral Health Symptom Control  Outcome: Ongoing, Progressing  Intervention: Maintain Behavioral Health Symptom Control  Recent Flowsheet Documentation  Taken 12/27/2023 0400 by Erika Gaytan RN  Medication Review/Management: medications reviewed     Problem: COPD (Chronic Obstructive Pulmonary Disease) Comorbidity  Goal: Maintenance of COPD Symptom Control  Outcome: Ongoing, Progressing  Intervention: Maintain COPD-Symptom Control  Recent Flowsheet Documentation  Taken 12/27/2023 0400 by Erika Gaytan RN  Medication Review/Management: medications reviewed     Problem: Diabetes Comorbidity  Goal: Blood Glucose Level Within Targeted Range  Outcome: Ongoing, Progressing     Problem: Heart Failure Comorbidity  Goal: Maintenance of Heart Failure Symptom Control  Outcome: Ongoing, Progressing  Intervention: Maintain Heart Failure-Management  Recent Flowsheet Documentation  Taken 12/27/2023 0400 by Erika Gaytan RN  Medication Review/Management: medications reviewed     Problem: Hypertension Comorbidity  Goal: Blood Pressure in Desired Range  Outcome: Ongoing, Progressing  Intervention: Maintain Blood Pressure Management  Recent Flowsheet Documentation  Taken 12/27/2023 0400 by Erika Gaytan RN  Medication Review/Management: medications reviewed     Problem: Obstructive Sleep Apnea Risk or Actual Comorbidity Management  Goal: Unobstructed Breathing During Sleep  Outcome: Ongoing, Progressing     Problem: Osteoarthritis Comorbidity  Goal: Maintenance of Osteoarthritis Symptom Control  Outcome: Ongoing, Progressing  Intervention: Maintain Osteoarthritis Symptom Control  Recent Flowsheet Documentation  Taken 12/27/2023 0400 by Erika Gaytan RN  Medication Review/Management: medications reviewed  Taken 12/27/2023 0210 by Erika Gaytan RN  Activity Management: activity encouraged  Taken  12/26/2023 2022 by Erika Gaytan RN  Activity Management: back to bed     Problem: Pain Chronic (Persistent) (Comorbidity Management)  Goal: Acceptable Pain Control and Functional Ability  Outcome: Ongoing, Progressing  Intervention: Manage Persistent Pain  Recent Flowsheet Documentation  Taken 12/27/2023 0400 by Erika Gaytan RN  Medication Review/Management: medications reviewed  Intervention: Develop Pain Management Plan  Recent Flowsheet Documentation  Taken 12/27/2023 0019 by Erika Gaytan RN  Pain Management Interventions: see MAR  Taken 12/26/2023 2022 by Erika Gaytan RN  Pain Management Interventions: see MAR     Problem: Seizure Disorder Comorbidity  Goal: Maintenance of Seizure Control  Outcome: Ongoing, Progressing     Problem: Skin Injury Risk Increased  Goal: Skin Health and Integrity  Outcome: Ongoing, Progressing  Intervention: Optimize Skin Protection  Recent Flowsheet Documentation  Taken 12/27/2023 0400 by Erika Gaytan RN  Head of Bed (HOB) Positioning: HOB at 30-45 degrees  Taken 12/27/2023 0200 by Erika Gaytan RN  Head of Bed (HOB) Positioning: HOB at 30-45 degrees  Taken 12/27/2023 0000 by Erika Gaytan RN  Head of Bed (HOB) Positioning: HOB at 60-90 degrees  Taken 12/26/2023 2200 by Erika Gaytan RN  Head of Bed (HOB) Positioning: HOB at 45 degrees  Taken 12/26/2023 2000 by Erika Gaytan RN  Head of Bed (HOB) Positioning: HOB at 30-45 degrees     Problem: Fall Injury Risk  Goal: Absence of Fall and Fall-Related Injury  Outcome: Ongoing, Progressing  Intervention: Identify and Manage Contributors  Recent Flowsheet Documentation  Taken 12/27/2023 0400 by Erika Gaytan RN  Medication Review/Management: medications reviewed  Intervention: Promote Injury-Free Environment  Recent Flowsheet Documentation  Taken 12/27/2023 0400 by Erika Gaytan RN  Safety Promotion/Fall Prevention:   safety round/check completed   fall prevention program maintained  Taken 12/27/2023 0300 by  Lukas, Erika, RN  Safety Promotion/Fall Prevention: safety round/check completed  Taken 12/27/2023 0200 by Erika Gaytan RN  Safety Promotion/Fall Prevention:   safety round/check completed   fall prevention program maintained  Taken 12/27/2023 0100 by Erika Gaytan RN  Safety Promotion/Fall Prevention:   safety round/check completed   fall prevention program maintained  Taken 12/27/2023 0000 by Erika Gaytan RN  Safety Promotion/Fall Prevention: safety round/check completed  Taken 12/26/2023 2300 by Erika Gaytan RN  Safety Promotion/Fall Prevention: safety round/check completed  Taken 12/26/2023 2200 by Erika Gaytan RN  Safety Promotion/Fall Prevention:   safety round/check completed   fall prevention program maintained  Taken 12/26/2023 2100 by Erika Gaytan RN  Safety Promotion/Fall Prevention:   safety round/check completed   fall prevention program maintained  Taken 12/26/2023 2022 by Erika Gaytan RN  Safety Promotion/Fall Prevention:   nonskid shoes/slippers when out of bed   fall prevention program maintained   safety round/check completed  Taken 12/26/2023 1900 by Erika Gaytan RN  Safety Promotion/Fall Prevention:   safety round/check completed   fall prevention program maintained

## 2023-12-28 LAB
ETHYLENE GLYCOL SERPLBLD-MCNC: <5 MG/DL
METHANOL SERPL-MCNC: <.01 G/DL (ref 0–0.01)

## 2023-12-28 NOTE — OUTREACH NOTE
Prep Survey      Flowsheet Row Responses   [facility] patient discharged from? [facility]   Is LACE score < 7 ? No   Eligibility Not Eligible   What are the reasons patient is not eligible? Behavioral Health   Does the patient have one of the following disease processes/diagnoses(primary or secondary)? Other   Prep survey completed? Yes            DASHA ALAMO - Registered Nurse

## 2023-12-28 NOTE — PAYOR COMM NOTE
"[facility]  NPI:[id number]    Utilization Review  Contact: Anh Hess RN  Phone: [phone]  Fax:[phone]    DISCHARGE NOTIFICATION    PENDING REF # [id number]     Taiwo Pierce (48 y.o. Male)       Date of Birth   1975    Social Security Number       Address   [address] [id number]    Home Phone   [phone]    MRN   [id number]       Jewish   [place]    Marital Status                               Admission Date   12/25/23    Admission Type   Emergency    Admitting Provider   Carlos Isbell MD    Attending Provider       Department, Room/Bed   [facility] CRITICAL CARE, CC06/1C       Discharge Date   12/27/2023    Discharge Disposition   Home or Self Care    Discharge Destination                                 Attending Provider: (none)   Allergies: Augmentin [Amoxicillin-pot Clavulanate], Metoprolol    Isolation: None   Infection: None   Code Status: Prior    Ht: 180.3 cm (71\")   Wt: 91.2 kg (201 lb 1 oz)    Admission Cmt: None   Principal Problem: Delirium, drug-induced [R41.0,T50.905A]                   Active Insurance as of 12/25/2023       Primary Coverage       Payor Plan Insurance Group Employer/Plan Group    HUMANA MEDICARE REPLACEMENT HUMANA MEDICARE REPLACEMENT [id number]       Payor Plan Address Payor Plan Phone Number Payor Plan Fax Number Effective Dates    [address] [address] [phone]  1/1/2023 - None Entered    [facility] [id number]         Subscriber Name Subscriber Birth Date Member ID       TAIWO PIERCE 1975 [id number]                     Emergency Contacts        (Rel.) Home Phone Work Phone Mobile Phone    KRISTA PIERCE (Spouse) [phone] -- [phone]    Rosario Pierce [phone] -- --    Mario Francois (Relative) -- -- [phone]                 Discharge Summary        Carlos Isbell MD at 12/27/23 1131              [facility] HOSPITALISTS DISCHARGE " SUMMARY    Patient Identification:  Name:  Taiwo Pierce  Age:  48 y.o.  Sex:  male  :  1975  MRN:  [id number]  Visit Number:  [id number]    Date of Admission: 2023  Date of Discharge:  2023    PCP: Provider, No Known    DISCHARGE DIAGNOSIS  #Acute psychosis w/ hallucinations and severe agitation  #ETOH abuse w/ withdrawal   #Recent diagnosis acute alcohol induced pancreatitis and mild alcoholic hepatitis   #Exophytic R kidney cyst  #Chronic pain   #GERD  #Anxiety/depression     CONSULTS   Neurology   Pulmonology   Psychiatry     PROCEDURES PERFORMED  None    HOSPITAL COURSE  Patient is a 48 y.o. male presented on  to [facility] complaining of AMS.  Please see the admitting history and physical for further details.      Mr. Pierce is our 48 y.o. male with hx OA, GERD, HTN, HLD, alcohol misuse who presents with AMS. Patient had left AMA earlier in the day after being admitted -. He presented at that time with abdominal pain and N/V. CT, symptoms, and elevated lipase all consistent with acute pancreatitis. Started on IVF, IV pain regimen, NPO. Improved back to regular diet yesterday with some signs of withdrawal on CIWA. He had been started on abx with no source of SIRS identified, likely reactive, consistent with normal procal. Patient improved and ended up leaving AMA from hospitalization on .  Shortly after patient left hospital his father (who was bedside on evaluation helping provide history) called patient to wish him astrid rashmi. He seemed confused and upon his and his wife's evaluation. Patient was unsteady on his feet and having hallucinations. Patient has been agitated at times but able to be oriented but he has got up out to the hallway a few times in the emergency room. Patient's father is not aware of any illicit or synthetic substances and thought this may be alcohol withdrawal. Last known alcohol use was round . Patient's father noted he  has not acted this way before and was not confused this way on initial presentation on 12/22. Denied headache or light sensitivity. Top of differential included ETOH withdrawal, THC or synthetic drug intoxication. Ammonia wnl. CT head obtained and unrevealing. No source of infection identified. Patient was not able to make decisions for himself and family agreeable with admission. Patient required high doses of sedation initially including ativan per CIWA, geodon, precedex,  ketamine. He also required soft restraints for a time as he was being harmful to self and staff. Patient improved but mental status was still fluctuating somewhat yesterday. He was oriented and understanding of risk of leaving yesterday evening and decided to leave AMA. Along with his father, we were able to convince him to stay until today. Patient clearly oriented today and able to discuss risk/benefit of staying. He has only required 2 mg ativan overnight and today. No signs of withdrawal on exam. Psychiatry noted patient did not meet criteria for trillium admission and that he had capacity. Patient's father bedside this morning. He noted he was also answering questions appropriately for him this morning. Patient calmly told me he needed to get home but was interested in stopping ETOH, illicit substances. He noted he did not think he needed to stay longer for detox, but this was offered as an option.  I do think there is an component of polypharmacy. I decreased gabapentin and stopped one of the muscle relaxers he has not been receiving. I ordered narcan (75 daily morphine equivalents). Patient was agreeable to psychiatry referral. Will also have patient f/u with PCP and urology (exophytic cyst).     VITAL SIGNS:  Temp:  [97.9 °F (36.6 °C)-98.8 °F (37.1 °C)] 98.6 °F (37 °C)  Heart Rate:  [50-98] 54  Resp:  [15-26] 21  BP: (113-154)/() 147/112  SpO2:  [93 %-100 %] 95 %  on  Flow (L/min):  [2] 2;   Device (Oxygen Therapy): nasal  cannula;humidified    Body mass index is 28.04 kg/m².  Wt Readings from Last 3 Encounters:   12/25/23 91.2 kg (201 lb 1 oz)   12/22/23 90.4 kg (199 lb 4.8 oz)   12/21/23 90.5 kg (199 lb 9.6 oz)       PHYSICAL EXAM:  Constitutional:  Well-developed and well-nourished.  No respiratory distress.      HENT:  Head:  Normocephalic and atraumatic.  Mouth:  Moist mucous membranes.    Eyes:  Conjunctivae and EOM are normal.  Pupils are equal, round, and reactive to light.  No scleral icterus.    Cardiovascular:  Normal rate, regular rhythm and normal heart sounds with no murmur.  Pulmonary/Chest:  No respiratory distress, no wheezes, no crackles, with normal breath sounds and good air movement.  Abdominal:  Soft.  Bowel sounds are normal.  No distension and no tenderness.   Musculoskeletal:  No edema, no tenderness, and no deformity.  No red or swollen joints anywhere.    Neurological:  Alert and oriented to person, place, and time.  No gross neurological deficit.   Skin:  Skin is warm and dry. No rash noted. No pallor.   Peripheral vascular:  Strong pulses in all 4 extremities with no clubbing, no cyanosis, no edema.    DISCHARGE DISPOSITION   Stable    DISCHARGE MEDICATIONS:     Discharge Medications        New Medications        Instructions Start Date   naloxone 4 MG/0.1ML nasal spray  Commonly known as: NARCAN   1 spray, Nasal, As Needed             Changes to Medications        Instructions Start Date   gabapentin 800 MG tablet  Commonly known as: NEURONTIN  What changed: how much to take   400 mg, Oral, 3 Times Daily             Continue These Medications        Instructions Start Date   amLODIPine 10 MG tablet  Commonly known as: NORVASC   10 mg, Oral, Daily      DULoxetine 30 MG capsule  Commonly known as: CYMBALTA   30 mg, Oral, Daily      lansoprazole 30 MG capsule  Commonly known as: PREVACID   30 mg, Oral, Daily      metaxalone 800 MG tablet  Commonly known as: SKELAXIN   800 mg, Oral, 3 Times Daily PRN       oxyCODONE 10 MG tablet  Commonly known as: ROXICODONE   10 mg, Oral, 4 Times Daily      potassium chloride 20 MEQ CR tablet  Commonly known as: K-DUR,KLOR-CON   20 mEq, Oral, 2 Times Daily      pravastatin 20 MG tablet  Commonly known as: PRAVACHOL   20 mg, Oral, Daily      valsartan 320 MG tablet  Commonly known as: DIOVAN   320 mg, Oral, Daily             Stop These Medications      cyclobenzaprine 10 MG tablet  Commonly known as: FLEXERIL     hydroCHLOROthiazide 25 MG tablet  Commonly known as: HYDRODIURIL                 Follow-up Information       Provider, No Known Follow up in 1 week(s).    Contact information:  [facility] [address]  [phone]               [facility] ADULT PSYCHIATRIC Follow up in 1 week(s).    Specialty: Psychiatry  Contact information:  [address] [place] [id number] [phone]                            TEST  RESULTS PENDING AT DISCHARGE  Pending Labs       Order Current Status    Ethylene Glycol In process    Methanol In process             CODE STATUS  Code Status and Medical Interventions:   Ordered at: 12/25/23 1559     Code Status (Patient has no pulse and is not breathing):    CPR (Attempt to Resuscitate)     Medical Interventions (Patient has pulse or is breathing):    Full Support       Carlos Isbell MD  [facility] Hospitalist  12/27/23  11:31 EST    Please note that this discharge summary required more than 30 minutes to complete.      Electronically signed by Carlos Isbell MD at 12/27/23 8527

## 2024-01-22 VITALS
SYSTOLIC BLOOD PRESSURE: 147 MMHG | RESPIRATION RATE: 21 BRPM | BODY MASS INDEX: 28.15 KG/M2 | OXYGEN SATURATION: 95 % | HEIGHT: 71 IN | TEMPERATURE: 98.6 F | WEIGHT: 201.06 LBS | HEART RATE: 54 BPM | DIASTOLIC BLOOD PRESSURE: 112 MMHG

## 2024-01-22 NOTE — NURSING NOTE
Patient brought over from PCU with . Patient became aggressive, hitting at staff, yelling, kicking attempts to redirect unsuccessful, unable to obtain vital signs at this time due to aggression. Security and nursing staff at bedside. Brief hold started at 1740, lasting 3 minutes, and ended at 1743. IV medications administered per orders, Dr Isbell aware of situation.

## 2024-01-22 NOTE — NURSING NOTE
1 hour face to face completed, following a brief hold that began at 1740 and ended at 1743, patient is now resting in bed, with father at bedside, no more signs of aggression or agitation noted, VS are /86 , reported findings to Dr Isbell and need for brief hold to end was met per Dr Isbell.

## 2024-01-25 DIAGNOSIS — I10 ESSENTIAL HYPERTENSION: ICD-10-CM

## 2024-01-25 RX ORDER — HYDROCHLOROTHIAZIDE 25 MG/1
25 TABLET ORAL EVERY MORNING
Qty: 90 TABLET | Refills: 0 | OUTPATIENT
Start: 2024-01-25

## 2024-01-25 RX ORDER — AMLODIPINE BESYLATE 10 MG/1
10 TABLET ORAL DAILY
Qty: 90 TABLET | OUTPATIENT
Start: 2024-01-25

## 2024-01-25 RX ORDER — VALSARTAN 320 MG/1
320 TABLET ORAL DAILY
Qty: 90 TABLET | OUTPATIENT
Start: 2024-01-25

## 2024-01-25 RX ORDER — LANSOPRAZOLE 30 MG/1
30 CAPSULE, DELAYED RELEASE ORAL DAILY
Qty: 90 CAPSULE | OUTPATIENT
Start: 2024-01-25

## 2024-01-25 RX ORDER — POTASSIUM CHLORIDE 1500 MG/1
20 TABLET, EXTENDED RELEASE ORAL 2 TIMES DAILY
Qty: 180 TABLET | OUTPATIENT
Start: 2024-01-25

## 2024-02-22 ENCOUNTER — HOSPITAL ENCOUNTER (EMERGENCY)
Facility: HOSPITAL | Age: 49
Discharge: STILL ADMITTED OR ADMINISTRATIVE | DRG: 894 | End: 2024-02-23
Attending: EMERGENCY MEDICINE
Payer: MEDICARE

## 2024-02-22 DIAGNOSIS — F10.10 ALCOHOL ABUSE: Primary | ICD-10-CM

## 2024-02-22 LAB
ALBUMIN SERPL-MCNC: 4.1 G/DL (ref 3.5–5.2)
ALBUMIN/GLOB SERPL: 1 G/DL
ALP SERPL-CCNC: 168 U/L (ref 39–117)
ALT SERPL W P-5'-P-CCNC: 53 U/L (ref 1–41)
AMPHET+METHAMPHET UR QL: NEGATIVE
AMPHETAMINES UR QL: NEGATIVE
ANION GAP SERPL CALCULATED.3IONS-SCNC: 18.7 MMOL/L (ref 5–15)
AST SERPL-CCNC: 245 U/L (ref 1–40)
BACTERIA UR QL AUTO: NORMAL /HPF
BARBITURATES UR QL SCN: NEGATIVE
BASOPHILS # BLD AUTO: 0.09 10*3/MM3 (ref 0–0.2)
BASOPHILS NFR BLD AUTO: 1.3 % (ref 0–1.5)
BENZODIAZ UR QL SCN: NEGATIVE
BILIRUB SERPL-MCNC: 0.9 MG/DL (ref 0–1.2)
BILIRUB UR QL STRIP: NEGATIVE
BUN SERPL-MCNC: 11 MG/DL (ref 6–20)
BUN/CREAT SERPL: 14.3 (ref 7–25)
BUPRENORPHINE SERPL-MCNC: NEGATIVE NG/ML
CALCIUM SPEC-SCNC: 8.7 MG/DL (ref 8.6–10.5)
CANNABINOIDS SERPL QL: NEGATIVE
CHLORIDE SERPL-SCNC: 101 MMOL/L (ref 98–107)
CLARITY UR: CLEAR
CO2 SERPL-SCNC: 21.3 MMOL/L (ref 22–29)
COCAINE UR QL: NEGATIVE
COLOR UR: YELLOW
CREAT SERPL-MCNC: 0.77 MG/DL (ref 0.76–1.27)
DEPRECATED RDW RBC AUTO: 55 FL (ref 37–54)
EGFRCR SERPLBLD CKD-EPI 2021: 110.4 ML/MIN/1.73
EOSINOPHIL # BLD AUTO: 0.05 10*3/MM3 (ref 0–0.4)
EOSINOPHIL NFR BLD AUTO: 0.7 % (ref 0.3–6.2)
ERYTHROCYTE [DISTWIDTH] IN BLOOD BY AUTOMATED COUNT: 15.2 % (ref 12.3–15.4)
ETHANOL BLD-MCNC: 374 MG/DL (ref 0–10)
ETHANOL UR QL: 0.37 %
FENTANYL UR-MCNC: NEGATIVE NG/ML
GLOBULIN UR ELPH-MCNC: 4.2 GM/DL
GLUCOSE SERPL-MCNC: 101 MG/DL (ref 65–99)
GLUCOSE UR STRIP-MCNC: NEGATIVE MG/DL
HCT VFR BLD AUTO: 40.3 % (ref 37.5–51)
HGB BLD-MCNC: 13.6 G/DL (ref 13–17.7)
HGB UR QL STRIP.AUTO: NEGATIVE
HYALINE CASTS UR QL AUTO: NORMAL /LPF
IMM GRANULOCYTES # BLD AUTO: 0.01 10*3/MM3 (ref 0–0.05)
IMM GRANULOCYTES NFR BLD AUTO: 0.1 % (ref 0–0.5)
KETONES UR QL STRIP: ABNORMAL
LEUKOCYTE ESTERASE UR QL STRIP.AUTO: NEGATIVE
LYMPHOCYTES # BLD AUTO: 2.82 10*3/MM3 (ref 0.7–3.1)
LYMPHOCYTES NFR BLD AUTO: 39.6 % (ref 19.6–45.3)
MAGNESIUM SERPL-MCNC: 2.3 MG/DL (ref 1.6–2.6)
MCH RBC QN AUTO: 32.9 PG (ref 26.6–33)
MCHC RBC AUTO-ENTMCNC: 33.7 G/DL (ref 31.5–35.7)
MCV RBC AUTO: 97.3 FL (ref 79–97)
METHADONE UR QL SCN: NEGATIVE
MONOCYTES # BLD AUTO: 0.51 10*3/MM3 (ref 0.1–0.9)
MONOCYTES NFR BLD AUTO: 7.2 % (ref 5–12)
NEUTROPHILS NFR BLD AUTO: 3.65 10*3/MM3 (ref 1.7–7)
NEUTROPHILS NFR BLD AUTO: 51.1 % (ref 42.7–76)
NITRITE UR QL STRIP: NEGATIVE
NRBC BLD AUTO-RTO: 0 /100 WBC (ref 0–0.2)
OPIATES UR QL: NEGATIVE
OXYCODONE UR QL SCN: POSITIVE
PCP UR QL SCN: NEGATIVE
PH UR STRIP.AUTO: 6.5 [PH] (ref 5–8)
PLATELET # BLD AUTO: 223 10*3/MM3 (ref 140–450)
PMV BLD AUTO: 9.1 FL (ref 6–12)
POTASSIUM SERPL-SCNC: 3.4 MMOL/L (ref 3.5–5.2)
PROT SERPL-MCNC: 8.3 G/DL (ref 6–8.5)
PROT UR QL STRIP: ABNORMAL
RBC # BLD AUTO: 4.14 10*6/MM3 (ref 4.14–5.8)
RBC # UR STRIP: NORMAL /HPF
REF LAB TEST METHOD: NORMAL
SODIUM SERPL-SCNC: 141 MMOL/L (ref 136–145)
SP GR UR STRIP: 1.01 (ref 1–1.03)
SQUAMOUS #/AREA URNS HPF: NORMAL /HPF
TRICYCLICS UR QL SCN: NEGATIVE
UROBILINOGEN UR QL STRIP: ABNORMAL
WBC # UR STRIP: NORMAL /HPF
WBC NRBC COR # BLD AUTO: 7.13 10*3/MM3 (ref 3.4–10.8)

## 2024-02-22 PROCEDURE — 85025 COMPLETE CBC W/AUTO DIFF WBC: CPT | Performed by: PHYSICIAN ASSISTANT

## 2024-02-22 PROCEDURE — 93010 ELECTROCARDIOGRAM REPORT: CPT | Performed by: INTERNAL MEDICINE

## 2024-02-22 PROCEDURE — 36415 COLL VENOUS BLD VENIPUNCTURE: CPT

## 2024-02-22 PROCEDURE — 25010000002 KETOROLAC TROMETHAMINE PER 15 MG: Performed by: PHYSICIAN ASSISTANT

## 2024-02-22 PROCEDURE — 80053 COMPREHEN METABOLIC PANEL: CPT | Performed by: PHYSICIAN ASSISTANT

## 2024-02-22 PROCEDURE — 25010000002 THIAMINE HCL 200 MG/2ML SOLUTION: Performed by: PHYSICIAN ASSISTANT

## 2024-02-22 PROCEDURE — 83735 ASSAY OF MAGNESIUM: CPT | Performed by: PHYSICIAN ASSISTANT

## 2024-02-22 PROCEDURE — 99285 EMERGENCY DEPT VISIT HI MDM: CPT

## 2024-02-22 PROCEDURE — 80307 DRUG TEST PRSMV CHEM ANLYZR: CPT | Performed by: PHYSICIAN ASSISTANT

## 2024-02-22 PROCEDURE — 25810000003 SODIUM CHLORIDE 0.9 % SOLUTION: Performed by: PHYSICIAN ASSISTANT

## 2024-02-22 PROCEDURE — 93005 ELECTROCARDIOGRAM TRACING: CPT | Performed by: PHYSICIAN ASSISTANT

## 2024-02-22 PROCEDURE — 96375 TX/PRO/DX INJ NEW DRUG ADDON: CPT

## 2024-02-22 PROCEDURE — 96365 THER/PROPH/DIAG IV INF INIT: CPT

## 2024-02-22 PROCEDURE — 81001 URINALYSIS AUTO W/SCOPE: CPT | Performed by: PHYSICIAN ASSISTANT

## 2024-02-22 PROCEDURE — 82077 ASSAY SPEC XCP UR&BREATH IA: CPT | Performed by: PHYSICIAN ASSISTANT

## 2024-02-22 RX ORDER — KETOROLAC TROMETHAMINE 30 MG/ML
30 INJECTION, SOLUTION INTRAMUSCULAR; INTRAVENOUS ONCE
Status: COMPLETED | OUTPATIENT
Start: 2024-02-23 | End: 2024-02-22

## 2024-02-22 RX ORDER — SODIUM CHLORIDE 9 MG/ML
1000 INJECTION, SOLUTION INTRAVENOUS ONCE
Status: COMPLETED | OUTPATIENT
Start: 2024-02-22 | End: 2024-02-22

## 2024-02-22 RX ORDER — POTASSIUM CHLORIDE 20 MEQ/1
40 TABLET, EXTENDED RELEASE ORAL ONCE
Status: COMPLETED | OUTPATIENT
Start: 2024-02-22 | End: 2024-02-22

## 2024-02-22 RX ORDER — SODIUM CHLORIDE 0.9 % (FLUSH) 0.9 %
10 SYRINGE (ML) INJECTION AS NEEDED
Status: DISCONTINUED | OUTPATIENT
Start: 2024-02-22 | End: 2024-02-23 | Stop reason: HOSPADM

## 2024-02-22 RX ORDER — THIAMINE HYDROCHLORIDE 100 MG/ML
200 INJECTION, SOLUTION INTRAMUSCULAR; INTRAVENOUS ONCE
Status: COMPLETED | OUTPATIENT
Start: 2024-02-22 | End: 2024-02-22

## 2024-02-22 RX ADMIN — SODIUM CHLORIDE 1000 ML: 9 INJECTION, SOLUTION INTRAVENOUS at 23:53

## 2024-02-22 RX ADMIN — FOLIC ACID 1 MG: 5 INJECTION, SOLUTION INTRAMUSCULAR; INTRAVENOUS; SUBCUTANEOUS at 22:55

## 2024-02-22 RX ADMIN — SODIUM CHLORIDE 1000 ML: 9 INJECTION, SOLUTION INTRAVENOUS at 22:55

## 2024-02-22 RX ADMIN — KETOROLAC TROMETHAMINE 30 MG: 30 INJECTION, SOLUTION INTRAMUSCULAR; INTRAVENOUS at 23:53

## 2024-02-22 RX ADMIN — THIAMINE HYDROCHLORIDE 200 MG: 100 INJECTION, SOLUTION INTRAMUSCULAR; INTRAVENOUS at 22:56

## 2024-02-22 RX ADMIN — POTASSIUM CHLORIDE 40 MEQ: 1500 TABLET, EXTENDED RELEASE ORAL at 23:53

## 2024-02-23 ENCOUNTER — HOSPITAL ENCOUNTER (INPATIENT)
Facility: HOSPITAL | Age: 49
LOS: 1 days | Discharge: LEFT BEFORE DISCHARGE | DRG: 894 | End: 2024-02-24
Attending: PSYCHIATRY & NEUROLOGY | Admitting: PSYCHIATRY & NEUROLOGY
Payer: MEDICARE

## 2024-02-23 VITALS
HEIGHT: 70 IN | TEMPERATURE: 98.2 F | BODY MASS INDEX: 28.63 KG/M2 | DIASTOLIC BLOOD PRESSURE: 91 MMHG | OXYGEN SATURATION: 99 % | HEART RATE: 87 BPM | RESPIRATION RATE: 16 BRPM | WEIGHT: 200 LBS | SYSTOLIC BLOOD PRESSURE: 135 MMHG

## 2024-02-23 PROBLEM — F10.10 ALCOHOL ABUSE: Status: ACTIVE | Noted: 2024-02-23

## 2024-02-23 LAB
ETHANOL BLD-MCNC: 117 MG/DL (ref 0–10)
ETHANOL BLD-MCNC: 141 MG/DL (ref 0–10)
ETHANOL UR QL: 0.12 %
ETHANOL UR QL: 0.14 %
HAV IGM SERPL QL IA: NORMAL
HBV CORE IGM SERPL QL IA: NORMAL
HBV SURFACE AG SERPL QL IA: NORMAL
HCV AB SER DONR QL: NORMAL
HOLD SPECIMEN: NORMAL
HOLD SPECIMEN: NORMAL
WHOLE BLOOD HOLD COAG: NORMAL
WHOLE BLOOD HOLD SPECIMEN: NORMAL

## 2024-02-23 PROCEDURE — 80074 ACUTE HEPATITIS PANEL: CPT | Performed by: PSYCHIATRY & NEUROLOGY

## 2024-02-23 PROCEDURE — 82077 ASSAY SPEC XCP UR&BREATH IA: CPT | Performed by: STUDENT IN AN ORGANIZED HEALTH CARE EDUCATION/TRAINING PROGRAM

## 2024-02-23 PROCEDURE — HZ2ZZZZ DETOXIFICATION SERVICES FOR SUBSTANCE ABUSE TREATMENT: ICD-10-PCS | Performed by: PSYCHIATRY & NEUROLOGY

## 2024-02-23 PROCEDURE — 82077 ASSAY SPEC XCP UR&BREATH IA: CPT | Performed by: PHYSICIAN ASSISTANT

## 2024-02-23 PROCEDURE — 63710000001 ONDANSETRON ODT 4 MG TABLET DISPERSIBLE: Performed by: PSYCHIATRY & NEUROLOGY

## 2024-02-23 RX ORDER — PRAVASTATIN SODIUM 40 MG
20 TABLET ORAL DAILY
Status: CANCELLED | OUTPATIENT
Start: 2024-02-23

## 2024-02-23 RX ORDER — MULTIVITAMIN WITH IRON
2 TABLET ORAL DAILY
Status: DISCONTINUED | OUTPATIENT
Start: 2024-02-23 | End: 2024-02-24 | Stop reason: HOSPADM

## 2024-02-23 RX ORDER — LOPERAMIDE HYDROCHLORIDE 2 MG/1
2 CAPSULE ORAL
Status: DISCONTINUED | OUTPATIENT
Start: 2024-02-23 | End: 2024-02-24 | Stop reason: HOSPADM

## 2024-02-23 RX ORDER — ALUMINA, MAGNESIA, AND SIMETHICONE 2400; 2400; 240 MG/30ML; MG/30ML; MG/30ML
15 SUSPENSION ORAL EVERY 6 HOURS PRN
Status: DISCONTINUED | OUTPATIENT
Start: 2024-02-23 | End: 2024-02-24 | Stop reason: HOSPADM

## 2024-02-23 RX ORDER — GABAPENTIN 800 MG/1
800 TABLET ORAL 3 TIMES DAILY
COMMUNITY

## 2024-02-23 RX ORDER — BENZTROPINE MESYLATE 1 MG/ML
1 INJECTION INTRAMUSCULAR; INTRAVENOUS ONCE AS NEEDED
Status: DISCONTINUED | OUTPATIENT
Start: 2024-02-23 | End: 2024-02-24 | Stop reason: HOSPADM

## 2024-02-23 RX ORDER — DULOXETIN HYDROCHLORIDE 30 MG/1
30 CAPSULE, DELAYED RELEASE ORAL DAILY
Status: CANCELLED | OUTPATIENT
Start: 2024-02-23

## 2024-02-23 RX ORDER — BENZTROPINE MESYLATE 1 MG/1
2 TABLET ORAL ONCE AS NEEDED
Status: DISCONTINUED | OUTPATIENT
Start: 2024-02-23 | End: 2024-02-24 | Stop reason: HOSPADM

## 2024-02-23 RX ORDER — GABAPENTIN 400 MG/1
800 CAPSULE ORAL EVERY 8 HOURS SCHEDULED
Status: CANCELLED | OUTPATIENT
Start: 2024-02-23

## 2024-02-23 RX ORDER — HYDROXYZINE 50 MG/1
50 TABLET, FILM COATED ORAL EVERY 6 HOURS PRN
Status: DISCONTINUED | OUTPATIENT
Start: 2024-02-23 | End: 2024-02-24 | Stop reason: HOSPADM

## 2024-02-23 RX ORDER — IBUPROFEN 400 MG/1
400 TABLET ORAL EVERY 6 HOURS PRN
Status: DISCONTINUED | OUTPATIENT
Start: 2024-02-23 | End: 2024-02-24 | Stop reason: HOSPADM

## 2024-02-23 RX ORDER — LORAZEPAM 1 MG/1
1 TABLET ORAL
Status: DISCONTINUED | OUTPATIENT
Start: 2024-02-26 | End: 2024-02-24 | Stop reason: HOSPADM

## 2024-02-23 RX ORDER — METAXALONE 800 MG/1
800 TABLET ORAL 3 TIMES DAILY PRN
Status: DISCONTINUED | OUTPATIENT
Start: 2024-02-23 | End: 2024-02-24 | Stop reason: HOSPADM

## 2024-02-23 RX ORDER — LORAZEPAM 2 MG/1
2 TABLET ORAL
Status: DISCONTINUED | OUTPATIENT
Start: 2024-02-24 | End: 2024-02-24 | Stop reason: HOSPADM

## 2024-02-23 RX ORDER — AMLODIPINE BESYLATE 10 MG/1
10 TABLET ORAL DAILY
Status: DISCONTINUED | OUTPATIENT
Start: 2024-02-24 | End: 2024-02-24 | Stop reason: HOSPADM

## 2024-02-23 RX ORDER — LORAZEPAM 0.5 MG/1
0.5 TABLET ORAL EVERY 4 HOURS PRN
Status: DISCONTINUED | OUTPATIENT
Start: 2024-02-27 | End: 2024-02-24 | Stop reason: HOSPADM

## 2024-02-23 RX ORDER — HYDROCHLOROTHIAZIDE 25 MG/1
25 TABLET ORAL DAILY
Status: DISCONTINUED | OUTPATIENT
Start: 2024-02-24 | End: 2024-02-24 | Stop reason: HOSPADM

## 2024-02-23 RX ORDER — PANTOPRAZOLE SODIUM 40 MG/1
40 TABLET, DELAYED RELEASE ORAL
Status: DISCONTINUED | OUTPATIENT
Start: 2024-02-24 | End: 2024-02-24 | Stop reason: HOSPADM

## 2024-02-23 RX ORDER — TRAZODONE HYDROCHLORIDE 50 MG/1
50 TABLET ORAL NIGHTLY PRN
Status: DISCONTINUED | OUTPATIENT
Start: 2024-02-23 | End: 2024-02-24 | Stop reason: HOSPADM

## 2024-02-23 RX ORDER — ONDANSETRON 4 MG/1
4 TABLET, ORALLY DISINTEGRATING ORAL EVERY 6 HOURS PRN
Status: DISCONTINUED | OUTPATIENT
Start: 2024-02-23 | End: 2024-02-24 | Stop reason: HOSPADM

## 2024-02-23 RX ORDER — HYDROCHLOROTHIAZIDE 25 MG/1
25 TABLET ORAL DAILY
Status: CANCELLED | OUTPATIENT
Start: 2024-02-23

## 2024-02-23 RX ORDER — METAXALONE 800 MG/1
800 TABLET ORAL 3 TIMES DAILY PRN
Status: CANCELLED | OUTPATIENT
Start: 2024-02-23

## 2024-02-23 RX ORDER — OXYCODONE HYDROCHLORIDE 10 MG/1
10 TABLET ORAL
Status: DISCONTINUED | OUTPATIENT
Start: 2024-02-23 | End: 2024-02-24 | Stop reason: HOSPADM

## 2024-02-23 RX ORDER — PANTOPRAZOLE SODIUM 40 MG/1
40 TABLET, DELAYED RELEASE ORAL
Status: CANCELLED | OUTPATIENT
Start: 2024-02-24

## 2024-02-23 RX ORDER — VALSARTAN 160 MG/1
320 TABLET ORAL DAILY
Status: CANCELLED | OUTPATIENT
Start: 2024-02-23

## 2024-02-23 RX ORDER — FAMOTIDINE 20 MG/1
20 TABLET, FILM COATED ORAL 2 TIMES DAILY PRN
Status: DISCONTINUED | OUTPATIENT
Start: 2024-02-23 | End: 2024-02-24 | Stop reason: HOSPADM

## 2024-02-23 RX ORDER — BENZONATATE 100 MG/1
100 CAPSULE ORAL 3 TIMES DAILY PRN
Status: DISCONTINUED | OUTPATIENT
Start: 2024-02-23 | End: 2024-02-24 | Stop reason: HOSPADM

## 2024-02-23 RX ORDER — POTASSIUM CHLORIDE 20 MEQ/1
20 TABLET, EXTENDED RELEASE ORAL 2 TIMES DAILY
Status: CANCELLED | OUTPATIENT
Start: 2024-02-23

## 2024-02-23 RX ORDER — POTASSIUM CHLORIDE 20 MEQ/1
20 TABLET, EXTENDED RELEASE ORAL 2 TIMES DAILY WITH MEALS
Status: DISCONTINUED | OUTPATIENT
Start: 2024-02-23 | End: 2024-02-24 | Stop reason: HOSPADM

## 2024-02-23 RX ORDER — AMLODIPINE BESYLATE 10 MG/1
10 TABLET ORAL DAILY
Status: CANCELLED | OUTPATIENT
Start: 2024-02-23

## 2024-02-23 RX ORDER — OXYCODONE HYDROCHLORIDE 10 MG/1
10 TABLET ORAL ONCE
Status: COMPLETED | OUTPATIENT
Start: 2024-02-23 | End: 2024-02-23

## 2024-02-23 RX ORDER — LORAZEPAM 2 MG/1
2 TABLET ORAL EVERY 4 HOURS PRN
Status: DISCONTINUED | OUTPATIENT
Start: 2024-02-24 | End: 2024-02-24 | Stop reason: HOSPADM

## 2024-02-23 RX ORDER — ECHINACEA PURPUREA EXTRACT 125 MG
2 TABLET ORAL AS NEEDED
Status: DISCONTINUED | OUTPATIENT
Start: 2024-02-23 | End: 2024-02-24 | Stop reason: HOSPADM

## 2024-02-23 RX ORDER — DULOXETIN HYDROCHLORIDE 30 MG/1
30 CAPSULE, DELAYED RELEASE ORAL DAILY
Status: DISCONTINUED | OUTPATIENT
Start: 2024-02-24 | End: 2024-02-24 | Stop reason: HOSPADM

## 2024-02-23 RX ORDER — VALSARTAN 160 MG/1
320 TABLET ORAL DAILY
Status: DISCONTINUED | OUTPATIENT
Start: 2024-02-24 | End: 2024-02-24 | Stop reason: HOSPADM

## 2024-02-23 RX ORDER — LORAZEPAM 1 MG/1
1 TABLET ORAL EVERY 4 HOURS PRN
Status: DISCONTINUED | OUTPATIENT
Start: 2024-02-26 | End: 2024-02-24 | Stop reason: HOSPADM

## 2024-02-23 RX ORDER — OXYCODONE HYDROCHLORIDE 10 MG/1
10 TABLET ORAL
Status: CANCELLED | OUTPATIENT
Start: 2024-02-23

## 2024-02-23 RX ORDER — GABAPENTIN 400 MG/1
800 CAPSULE ORAL 3 TIMES DAILY
Status: DISCONTINUED | OUTPATIENT
Start: 2024-02-23 | End: 2024-02-24 | Stop reason: HOSPADM

## 2024-02-23 RX ORDER — LORAZEPAM 2 MG/1
2 TABLET ORAL
Status: DISPENSED | OUTPATIENT
Start: 2024-02-23 | End: 2024-02-24

## 2024-02-23 RX ORDER — HYDROCHLOROTHIAZIDE 25 MG/1
25 TABLET ORAL DAILY
COMMUNITY

## 2024-02-23 RX ORDER — LORAZEPAM 0.5 MG/1
0.5 TABLET ORAL
Status: DISCONTINUED | OUTPATIENT
Start: 2024-02-27 | End: 2024-02-24 | Stop reason: HOSPADM

## 2024-02-23 RX ADMIN — ONDANSETRON 4 MG: 4 TABLET, ORALLY DISINTEGRATING ORAL at 15:10

## 2024-02-23 RX ADMIN — IBUPROFEN 400 MG: 400 TABLET, FILM COATED ORAL at 21:02

## 2024-02-23 RX ADMIN — HYDROXYZINE HYDROCHLORIDE 50 MG: 50 TABLET ORAL at 15:10

## 2024-02-23 RX ADMIN — POTASSIUM CHLORIDE 20 MEQ: 1500 TABLET, EXTENDED RELEASE ORAL at 18:07

## 2024-02-23 RX ADMIN — OXYCODONE HYDROCHLORIDE 10 MG: 10 TABLET ORAL at 23:35

## 2024-02-23 RX ADMIN — OXYCODONE HYDROCHLORIDE 10 MG: 10 TABLET ORAL at 11:34

## 2024-02-23 RX ADMIN — OXYCODONE HYDROCHLORIDE 10 MG: 10 TABLET ORAL at 18:46

## 2024-02-23 RX ADMIN — LORAZEPAM 2 MG: 2 TABLET ORAL at 18:24

## 2024-02-23 RX ADMIN — Medication 100 MG: at 15:10

## 2024-02-23 RX ADMIN — IBUPROFEN 400 MG: 400 TABLET, FILM COATED ORAL at 15:10

## 2024-02-23 RX ADMIN — HYDROXYZINE HYDROCHLORIDE 50 MG: 50 TABLET ORAL at 21:02

## 2024-02-23 RX ADMIN — TRAZODONE HYDROCHLORIDE 50 MG: 50 TABLET ORAL at 21:03

## 2024-02-23 RX ADMIN — Medication 2 TABLET: at 15:10

## 2024-02-23 RX ADMIN — OXYCODONE HYDROCHLORIDE 10 MG: 10 TABLET ORAL at 06:15

## 2024-02-23 RX ADMIN — GABAPENTIN 800 MG: 400 CAPSULE ORAL at 21:02

## 2024-02-23 NOTE — NURSING NOTE
Report given to Irais. Patients father will bring pt some tobacco. Instructed him that if he brings it here I will send to the unit for him. Pt ready for admission.

## 2024-02-23 NOTE — PLAN OF CARE
Goal Outcome Evaluation:  Plan of Care Reviewed With: patient  Patient Agreement with Plan of Care: agrees     Progress: no change  Outcome Evaluation: Pt new admit he was here around [place] time and went about a month without any alcohol before relapse. Rates Anx 3 Dep 5 Denies SI/HI/AVH states appetite and sleep has been varied. Pt had back surgery 16 yrs ago due to injury his neck was 3.Reports constant pain and it elevates blood pressure at times. Prescribed pain medication. Alcohol consumption has been daily taking him 3-4 days to finish a half gallon. Pt denies any previous seizures associated with withdrawal or otherwise Calm courteous Alert and oriented

## 2024-02-23 NOTE — NURSING NOTE
Patient presents to intake and states that he wants to get off the alcohol. He states that he feels like he has let his dad and wife both down and his drinking is getting out of control. He states that he used to just have a drink here and there but now he is going thru a 5th of vodka over a 3 days period most of the time. Last drink was yesterday. Upon arrival to the ER last night his blood alcohol level was 374. At 1155 it was down to 117. While in the ER the patient did receive prn medication for pain. Pt has hx of chronic back pain issues and states that he has been on pain medications for about 14 yrs and he has to continue with that. He states that he knows that it is not good to be drinking and and be on the medication he is on. He reports that he is taking oxycodone 10 mg up to 5 times a day. He states he does not abuse this and it is his regular home medication. CIWA at this time is 4. But he did receive the pain medication and ivf while in the ER as well as toradol 30 mg injection.     Patient denies SI HI or AVH> he denies abusing any other substance other than alcohol.

## 2024-02-23 NOTE — ED PROVIDER NOTES
Subjective   History of Present Illness  48-year-old male who presents to the ED today with his father for a detox evaluation.  He states that he has been drinking heavily to relieve his chronic neck and back pain.  He states he has been drinking heavily for the last 2 and half months.  He cannot tell me how much he drinks a day.  He did drink this evening.  He denies any drug use.  He states he is prescribed oxycodone 5 times a day.  He denies any current withdrawal symptoms.  He denies any suicidal ideations.    History provided by:  Patient  Drug / Alcohol Assessment  Primary symptoms comment: requesting detox. This is a new problem. The current episode started 3 to 5 hours ago. The problem has not changed since onset.Suspected agents include alcohol. Pertinent negatives include no nausea and no vomiting. Associated medical issues include addiction treatment.       Review of Systems   Constitutional: Negative.    HENT: Negative.     Eyes: Negative.    Respiratory: Negative.     Cardiovascular: Negative.    Gastrointestinal: Negative.  Negative for nausea and vomiting.   Genitourinary: Negative.    Musculoskeletal: Negative.    Skin: Negative.    Neurological: Negative.    Psychiatric/Behavioral:  Negative for suicidal ideas.    All other systems reviewed and are negative.      Past Medical History:   Diagnosis Date   • Arthritis October 2007    Arthritis in back and neck   • GERD (gastroesophageal reflux disease)    • Headache 2007    Headaches due to disc in neck.   • Hyperlipidemia    • Hypertension    • Low back pain 10/05/09    Hurt back and had back surgery 04/16/2014. Hit a nerve       Allergies   Allergen Reactions   • Augmentin [Amoxicillin-Pot Clavulanate] Hives and Swelling     Throat swelling   • Metoprolol Unknown - Low Severity       Past Surgical History:   Procedure Laterality Date   • ANTERIOR CERVICAL DISCECTOMY W/ FUSION N/A 8/21/2023    Procedure: CERVICAL DISCECTOMY ANTERIOR WITH FUSION C5-7;   Surgeon: Julio C Bryson MD;  Location: [facility];  Service: Neurosurgery;  Laterality: N/A;   • FRACTURE SURGERY Left     Foot   • KNEE ARTHROSCOPY Left    • LUMBAR DISCECTOMY  04/16/2014    LT L5-S1 Discectomy, L4-5 Lami with Disc - Dr. Esequiel Joshi       Family History   Problem Relation Age of Onset   • Hyperlipidemia Father    • Hypertension Father    • Breast cancer Mother    • Cancer Mother         Breast cancer   • Depression Mother         Depression nerve problems   • Prostate cancer Paternal Grandfather    • Cancer Paternal Grandfather         Prostate cancer   • Hyperlipidemia Paternal Grandfather    • Prostate cancer Maternal Grandfather    • Arthritis Maternal Grandfather    • Cancer Maternal Grandfather         Prostate cancer   • Heart disease Maternal Grandfather         Heart attack@90   • Hyperlipidemia Maternal Grandfather    • Cancer Maternal Grandmother         Lung cancer   • Depression Maternal Grandmother         Depression nerve problems   • Stroke Maternal Grandmother         Brain anurism   • Arthritis Paternal Uncle    • Early death Sister         Brain didnt develop completely       Social History     Socioeconomic History   • Marital status:    Tobacco Use   • Smoking status: Never   • Smokeless tobacco: Current     Types: Snuff   Vaping Use   • Vaping Use: Never used   Substance and Sexual Activity   • Alcohol use: Yes     Comment: drinks 8oz vodka daily, quit Saturday 12/16 because wife threatened to divorce him if he didn't.   • Drug use: No   • Sexual activity: Yes     Partners: Female     Birth control/protection: None           Objective   Physical Exam  Vitals and nursing note reviewed.   Constitutional:       General: He is not in acute distress.     Appearance: Normal appearance. He is not diaphoretic.   HENT:      Head: Normocephalic and atraumatic.      Right Ear: External ear normal.      Left Ear: External ear normal.      Nose: Nose normal.      Mouth/Throat:       Mouth: Mucous membranes are dry.      Pharynx: No oropharyngeal exudate or posterior oropharyngeal erythema.   Eyes:      General: No scleral icterus.        Right eye: No discharge.         Left eye: No discharge.      Extraocular Movements: Extraocular movements intact.      Conjunctiva/sclera: Conjunctivae normal.      Pupils: Pupils are equal, round, and reactive to light.   Cardiovascular:      Rate and Rhythm: Normal rate and regular rhythm.      Pulses: Normal pulses.      Heart sounds: Normal heart sounds.   Pulmonary:      Effort: Pulmonary effort is normal.      Breath sounds: Normal breath sounds. No wheezing or rhonchi.   Chest:      Chest wall: No tenderness.   Abdominal:      General: Bowel sounds are normal.      Palpations: Abdomen is soft.      Tenderness: There is no abdominal tenderness.   Musculoskeletal:         General: Normal range of motion.      Cervical back: Normal range of motion and neck supple.   Skin:     General: Skin is warm and dry.      Capillary Refill: Capillary refill takes less than 2 seconds.   Neurological:      General: No focal deficit present.      Mental Status: He is alert and oriented to person, place, and time.   Psychiatric:         Mood and Affect: Mood normal.       Procedures       Results for orders placed or performed during the hospital encounter of 02/22/24   Comprehensive Metabolic Panel    Specimen: Arm, Right; Blood   Result Value Ref Range    Glucose 101 (H) 65 - 99 mg/dL    BUN 11 6 - 20 mg/dL    Creatinine 0.77 0.76 - 1.27 mg/dL    Sodium 141 136 - 145 mmol/L    Potassium 3.4 (L) 3.5 - 5.2 mmol/L    Chloride 101 98 - 107 mmol/L    CO2 21.3 (L) 22.0 - 29.0 mmol/L    Calcium 8.7 8.6 - 10.5 mg/dL    Total Protein 8.3 6.0 - 8.5 g/dL    Albumin 4.1 3.5 - 5.2 g/dL    ALT (SGPT) 53 (H) 1 - 41 U/L    AST (SGOT) 245 (H) 1 - 40 U/L    Alkaline Phosphatase 168 (H) 39 - 117 U/L    Total Bilirubin 0.9 0.0 - 1.2 mg/dL    Globulin 4.2 gm/dL    A/G Ratio 1.0 g/dL     BUN/Creatinine Ratio 14.3 7.0 - 25.0    Anion Gap 18.7 (H) 5.0 - 15.0 mmol/L    eGFR 110.4 >60.0 mL/min/1.73   Urinalysis With Microscopic If Indicated (No Culture) - Urine, Clean Catch    Specimen: Urine, Clean Catch   Result Value Ref Range    Color, UA Yellow Yellow, Straw    Appearance, UA Clear Clear    pH, UA 6.5 5.0 - 8.0    Specific Gravity, UA 1.013 1.005 - 1.030    Glucose, UA Negative Negative    Ketones, UA 40 mg/dL (2+) (A) Negative    Bilirubin, UA Negative Negative    Blood, UA Negative Negative    Protein, UA 30 mg/dL (1+) (A) Negative    Leuk Esterase, UA Negative Negative    Nitrite, UA Negative Negative    Urobilinogen, UA 1.0 E.U./dL 0.2 - 1.0 E.U./dL   Ethanol    Specimen: Arm, Right; Blood   Result Value Ref Range    Ethanol 374 (H) 0 - 10 mg/dL    Ethanol % 0.374 %   Urine Drug Screen - Urine, Clean Catch    Specimen: Urine, Clean Catch   Result Value Ref Range    THC, Screen, Urine Negative Negative    Phencyclidine (PCP), Urine Negative Negative    Cocaine Screen, Urine Negative Negative    Methamphetamine, Ur Negative Negative    Opiate Screen Negative Negative    Amphetamine Screen, Urine Negative Negative    Benzodiazepine Screen, Urine Negative Negative    Tricyclic Antidepressants Screen Negative Negative    Methadone Screen, Urine Negative Negative    Barbiturates Screen, Urine Negative Negative    Oxycodone Screen, Urine Positive (A) Negative    Buprenorphine, Screen, Urine Negative Negative   Magnesium    Specimen: Arm, Right; Blood   Result Value Ref Range    Magnesium 2.3 1.6 - 2.6 mg/dL   CBC Auto Differential    Specimen: Arm, Right; Blood   Result Value Ref Range    WBC 7.13 3.40 - 10.80 10*3/mm3    RBC 4.14 4.14 - 5.80 10*6/mm3    Hemoglobin 13.6 13.0 - 17.7 g/dL    Hematocrit 40.3 37.5 - 51.0 %    MCV 97.3 (H) 79.0 - 97.0 fL    MCH 32.9 26.6 - 33.0 pg    MCHC 33.7 31.5 - 35.7 g/dL    RDW 15.2 12.3 - 15.4 %    RDW-SD 55.0 (H) 37.0 - 54.0 fl    MPV 9.1 6.0 - 12.0 fL    Platelets  223 140 - 450 10*3/mm3    Neutrophil % 51.1 42.7 - 76.0 %    Lymphocyte % 39.6 19.6 - 45.3 %    Monocyte % 7.2 5.0 - 12.0 %    Eosinophil % 0.7 0.3 - 6.2 %    Basophil % 1.3 0.0 - 1.5 %    Immature Grans % 0.1 0.0 - 0.5 %    Neutrophils, Absolute 3.65 1.70 - 7.00 10*3/mm3    Lymphocytes, Absolute 2.82 0.70 - 3.10 10*3/mm3    Monocytes, Absolute 0.51 0.10 - 0.90 10*3/mm3    Eosinophils, Absolute 0.05 0.00 - 0.40 10*3/mm3    Basophils, Absolute 0.09 0.00 - 0.20 10*3/mm3    Immature Grans, Absolute 0.01 0.00 - 0.05 10*3/mm3    nRBC 0.0 0.0 - 0.2 /100 WBC   Fentanyl, Urine - Urine, Clean Catch    Specimen: Urine, Clean Catch   Result Value Ref Range    Fentanyl, Urine Negative Negative   Urinalysis, Microscopic Only - Urine, Clean Catch    Specimen: Urine, Clean Catch   Result Value Ref Range    RBC, UA 0-2 None Seen, 0-2 /HPF    WBC, UA 0-2 None Seen, 0-2 /HPF    Bacteria, UA None Seen None Seen /HPF    Squamous Epithelial Cells, UA 0-2 None Seen, 0-2 /HPF    Hyaline Casts, UA None Seen None Seen /LPF    Methodology Automated Microscopy    ECG 12 Lead QT Measurement   Result Value Ref Range    QT Interval 370 ms    QTC Interval 464 ms   Green Top (Gel)   Result Value Ref Range    Extra Tube Hold for add-ons.    Lavender Top   Result Value Ref Range    Extra Tube hold for add-on    Gold Top - SST   Result Value Ref Range    Extra Tube Hold for add-ons.    Light Blue Top   Result Value Ref Range    Extra Tube Hold for add-ons.           ED Course  ED Course as of 02/23/24 0721   Thu Feb 22, 2024   2336 Ethanol(!): 374 []   2344 Patient and father advised of patient's alcohol level and approximate wait time before he can be evaluated by intake. [AH]   2353 Patient is requesting medication for back pain - toradol ordered []   Fri Feb 23, 2024   0131 EKG performed at 2235 shows sinus rhythm rate of 95 Axis within normal limits no STEMI  Electronically signed by Veronica Sommers MD, 02/23/24, 1:31 AM EST.   [PL]   1486  Patient is awake and alert, tolerating PO fluids, no acute distress. He states he is having neck and back and takes Oxycodone 10 mg QID. He is not interested in stopping this medication, he reports he is only here for help with his alcohol abuse. Medication has been ordered for him. []   0658 Endorsed to Dr. Lal []   0740 Patient care taken over midlevel provider at shift change.  Currently pending serum ethanol at 09 100 with discussion for alcohol detox.  He is prescribed chronic opiates for Jason review.  Electronically signed by Laura Lal DO, 02/23/24, 7:21 AM EST.   [LK]      ED Course User Index  [AH] Melba Shepard PA  [LK] Laura Lal DO  [PL] Veronica Sommers MD                                             Medical Decision Making  Problems Addressed:  Alcohol abuse: complicated acute illness or injury    Amount and/or Complexity of Data Reviewed  Labs: ordered. Decision-making details documented in ED Course.  ECG/medicine tests: ordered.    Risk  Prescription drug management.        Final diagnoses:   Alcohol abuse   Electronically signed by KEYUR Harper, 02/22/24, 11:48 PM EST.     ED Disposition  ED Disposition       None            No follow-up provider specified.       Medication List      No changes were made to your prescriptions during this visit.            Laura Lal DO  02/24/24 0203

## 2024-02-24 VITALS
TEMPERATURE: 98.4 F | RESPIRATION RATE: 18 BRPM | HEART RATE: 78 BPM | HEIGHT: 71 IN | DIASTOLIC BLOOD PRESSURE: 97 MMHG | BODY MASS INDEX: 25.28 KG/M2 | SYSTOLIC BLOOD PRESSURE: 140 MMHG | OXYGEN SATURATION: 96 % | WEIGHT: 180.6 LBS

## 2024-02-24 DIAGNOSIS — I10 ESSENTIAL HYPERTENSION: ICD-10-CM

## 2024-02-24 PROCEDURE — 99239 HOSP IP/OBS DSCHRG MGMT >30: CPT | Performed by: PSYCHIATRY & NEUROLOGY

## 2024-02-24 RX ADMIN — GABAPENTIN 800 MG: 400 CAPSULE ORAL at 08:12

## 2024-02-24 RX ADMIN — Medication 2 TABLET: at 08:12

## 2024-02-24 RX ADMIN — OXYCODONE HYDROCHLORIDE 10 MG: 10 TABLET ORAL at 07:27

## 2024-02-24 RX ADMIN — DULOXETINE HYDROCHLORIDE 30 MG: 30 CAPSULE, DELAYED RELEASE ORAL at 08:12

## 2024-02-24 RX ADMIN — VALSARTAN 320 MG: 160 TABLET, FILM COATED ORAL at 08:12

## 2024-02-24 RX ADMIN — LORAZEPAM 2 MG: 2 TABLET ORAL at 08:12

## 2024-02-24 RX ADMIN — AMLODIPINE BESYLATE 10 MG: 10 TABLET ORAL at 08:12

## 2024-02-24 RX ADMIN — Medication 100 MG: at 08:12

## 2024-02-24 RX ADMIN — POTASSIUM CHLORIDE 20 MEQ: 1500 TABLET, EXTENDED RELEASE ORAL at 08:12

## 2024-02-24 RX ADMIN — HYDROCHLOROTHIAZIDE 25 MG: 25 TABLET ORAL at 08:12

## 2024-02-24 RX ADMIN — PANTOPRAZOLE SODIUM 40 MG: 40 TABLET, DELAYED RELEASE ORAL at 06:33

## 2024-02-24 NOTE — H&P
INITIAL PSYCHIATRIC HISTORY & PHYSICAL    Patient Identification:  Name:   Taiwo Pierce  Age:   48 y.o.  Sex:   male  :   1975  MRN:   [id number]  Visit Number:   [id number]  Primary Care Physician:   Provider, No Known    SUBJECTIVE    CC/Focus of Exam: Detox    HPI: Taiwo Pierce is a 48 y.o. male who was admitted on 2024 with complaints of alcohol use and withdrawals. The patient reports a long history of substance use. First use was 48 years old. Over time the use increased and the patient  continued to use despite negative consequences including relationship problems, social and financial problems. The patient endorses symptoms of tolerance and withdrawals and ongoing cravings to use. Has tried to cut down and stop but has not been successful. Spends too much time and resources in pursuit of substance use. Longest period of sobriety is reported to be 2 to 3 weeks.  Currently using 1 pint of vodka  Last use 2024  Withdrawal symptoms patient denies any  Patient denies any substance abuse but states he is prescribed oxycodone.  Patient denies any tobacco use.  Patient denies any history of seizures with withdrawal.  Patient states being lonely as a reason he relapsed.  Patient states his drinking as a stressor in his life.  Patient denies any history of physical, mental, or sexual abuse.  Patient rates his appetite as poor.  Patient rates his sleep as poor.  Patient denies any nightmares.  Patient denies any anxiety.  Patient denies any depression.  Patient denies any cravings.  Patient's CIWA was 4.  Patient denies any suicidal ideation.  Patient denies any homicidal ideation.  Patient denies any hallucinations.  Patient was admitted to [facility] psychiat[facility] for further safety and stabilization.    Available medical/psychiatric records reviewed and incorporated into the current document.     PAST PSYCHIATRIC HX: Patient has had no prior admissions.  Patient denies any  outpatient care.    SUBSTANCE USE HX: UDS was positive for oxycodone.  See HPI for current use.    SOCIAL HX: Patient states he was born in [facility].  Patient states he was raised in [facility].  Patient states he currently resides with his family in [facility].  Patient states he is  and has 3 children that live with him and his wife.  Patient states he is disabled and currently draws disability.  Patient states he has 1 year of college education.  Patient denies any legal issues.    Past Medical History:   Diagnosis Date    Alcohol abuse     AMS (altered mental status)     Arthritis October 2007    Arthritis in back and neck    GERD (gastroesophageal reflux disease)     Headache 2007    Headaches due to disc in neck.    Hyperlipidemia     Hypertension     Low back pain 10/05/09    Hurt back and had back surgery 04/16/2014. Hit a nerve    Pancreatitis        Past Surgical History:   Procedure Laterality Date    ANTERIOR CERVICAL DISCECTOMY W/ FUSION N/A 8/21/2023    Procedure: CERVICAL DISCECTOMY ANTERIOR WITH FUSION C5-7;  Surgeon: Julio C Bryson MD;  Location: Replaced by [facility];  Service: Neurosurgery;  Laterality: N/A;    FRACTURE SURGERY Left     Foot    KNEE ARTHROSCOPY Left     LUMBAR DISCECTOMY  04/16/2014    LT L5-S1 Discectomy, L4-5 Lami with Disc - Dr. Esequiel Joshi       Family History   Problem Relation Age of Onset    Hyperlipidemia Father     Hypertension Father     Breast cancer Mother     Cancer Mother         Breast cancer    Depression Mother         Depression nerve problems    Prostate cancer Paternal Grandfather     Cancer Paternal Grandfather         Prostate cancer    Hyperlipidemia Paternal Grandfather     Prostate cancer Maternal Grandfather     Arthritis Maternal Grandfather     Cancer Maternal Grandfather         Prostate cancer    Heart disease Maternal Grandfather         Heart attack@90    Hyperlipidemia Maternal Grandfather     Cancer Maternal Grandmother          Lung cancer    Depression Maternal Grandmother         Depression nerve problems    Stroke Maternal Grandmother         Brain anurism    Arthritis Paternal Uncle     Early death Sister         Brain didnt develop completely         Medications Prior to Admission   Medication Sig Dispense Refill Last Dose    amLODIPine (NORVASC) 10 MG tablet Take 1 tablet by mouth Daily.   2/23/2024    DULoxetine (CYMBALTA) 30 MG capsule Take 1 capsule by mouth Daily.   2/23/2024    gabapentin (NEURONTIN) 800 MG tablet Take 1 tablet by mouth 3 (Three) Times a Day.   2/23/2024    hydroCHLOROthiazide 25 MG tablet Take 1 tablet by mouth Daily.   2/23/2024    lansoprazole (PREVACID) 30 MG capsule Take 1 capsule by mouth Daily.   2/23/2024    metaxalone (SKELAXIN) 800 MG tablet Take 1 tablet by mouth 3 (Three) Times a Day As Needed for Muscle Spasms.   2/23/2024    oxyCODONE (ROXICODONE) 10 MG tablet Take 1 tablet by mouth 5 (Five) Times a Day.   2/23/2024    potassium chloride (K-DUR,KLOR-CON) 20 MEQ CR tablet Take 1 tablet by mouth 2 (Two) Times a Day. 60 tablet 2 2/23/2024    pravastatin (PRAVACHOL) 20 MG tablet Take 1 tablet by mouth Daily. 90 tablet 0 2/23/2024    valsartan (DIOVAN) 320 MG tablet Take 1 tablet by mouth Daily.   2/23/2024           ALLERGIES:  Augmentin [amoxicillin-pot clavulanate] and Metoprolol    Temp:  [97.5 °F (36.4 °C)-98.4 °F (36.9 °C)] 98.4 °F (36.9 °C)  Heart Rate:  [] 78  Resp:  [16-18] 18  BP: (123-151)/() 140/97    REVIEW OF SYSTEMS:  Review of Systems   Constitutional: Negative.    HENT: Negative.     Eyes: Negative.    Respiratory: Negative.     Cardiovascular: Negative.    Gastrointestinal: Negative.    Endocrine: Negative.    Genitourinary: Negative.    Musculoskeletal: Negative.    Skin: Negative.    Allergic/Immunologic: Negative.    Neurological: Negative.    Hematological: Negative.    Psychiatric/Behavioral: Negative.     OBJECTIVE    PHYSICAL EXAM:  Physical Exam  Vitals and  nursing note reviewed.   Constitutional:       Appearance: He is well-developed.   HENT:      Head: Normocephalic and atraumatic.      Right Ear: External ear normal.      Left Ear: External ear normal.      Nose: Nose normal.   Eyes:      Pupils: Pupils are equal, round, and reactive to light.   Pulmonary:      Effort: Pulmonary effort is normal. No respiratory distress.      Breath sounds: Normal breath sounds.   Abdominal:      General: There is no distension.      Palpations: Abdomen is soft.   Musculoskeletal:         General: No deformity. Normal range of motion.      Cervical back: Normal range of motion and neck supple.   Skin:     General: Skin is warm.      Findings: No rash.   Neurological:      Mental Status: He is alert and oriented to person, place, and time.      Coordination: Coordination normal.       MENTAL STATUS EXAM:               Hygiene:   fair  Cooperation:  Cooperative  Eye Contact:  Good  Psychomotor Behavior:  Appropriate  Affect:  Appropriate  Hopelessness: 5  Speech:  Normal  Linear  Thought Content:  Normal  Suicidal:  None  Homicidal:  None  Hallucinations:  None  Delusion:  None  Memory:  Intact  Orientation:  Person, Place, Time, and Situation  Reliability:  fair  Insight:  Fair  Judgement:  Poor  Impulse Control:  Poor      Imaging Results (Last 24 Hours)       ** No results found for the last 24 hours. **             ECG/EMG Results (most recent)       None             Lab Results   Component Value Date    GLUCOSE 101 (H) 02/22/2024    BUN 11 02/22/2024    CREATININE 0.77 02/22/2024    EGFRIFNONA 74 09/02/2020    BCR 14.3 02/22/2024    CO2 21.3 (L) 02/22/2024    CALCIUM 8.7 02/22/2024    PROTENTOTREF 8.6 (H) 05/01/2023    ALBUMIN 4.1 02/22/2024    LABIL2 1.5 05/01/2023     (H) 02/22/2024    ALT 53 (H) 02/22/2024       Lab Results   Component Value Date    WBC 7.13 02/22/2024    HGB 13.6 02/22/2024    HCT 40.3 02/22/2024    MCV 97.3 (H) 02/22/2024     02/22/2024        Pain Management Panel  More data exists         Latest Ref Rng & Units 2/22/2024 12/25/2023   Pain Management Panel   Amphetamine, Urine Qual Negative Negative  Negative    Barbiturates Screen, Urine Negative Negative  Negative    Benzodiazepine Screen, Urine Negative Negative  Positive    Buprenorphine, Screen, Urine Negative Negative  Negative    Cocaine Screen, Urine Negative Negative  Negative    Fentanyl, Urine Negative Negative  Negative    Methadone Screen , Urine Negative Negative  Negative    Methamphetamine, Ur Negative Negative  Negative        Brief Urine Lab Results  (Last result in the past 365 days)        Color   Clarity   Blood   Leuk Est   Nitrite   Protein   CREAT   Urine HCG        02/22/24 2243 Yellow   Clear   Negative   Negative   Negative   30 mg/dL (1+)                   Reviewed labs and studies done with this admission.       ASSESSMENT & PLAN:        Alcohol abuse  -Patient denies he has a serious alcohol problems and reports no active withdrawals and states he wants to leave. He is encouraged to stay and complete the detox but he is not interested in it. He is made aware of the risks of alcohol withdrawals including but not limited to seizure, delirium and even death. The patient expressed understanding and states he doesn't feel he needs help at this time. He will be discharged AMA per his insistence.     Hospital bed: No        Written by America Anderson acting as scribe for Dr.Mazhar Parson signature on this note affirms that the note adequately documents the care provided.   This note was generated using a scribe,   America Anderson MA  02/24/24  10:23 AM Deon MILLS MD, personally performed the services described in this documentation as scribed by the above named individual in my presence, and it is both accurate and complete.

## 2024-02-24 NOTE — NURSING NOTE
Discharge AMA order placed per MD. Discussed with pt the risks of leaving AMA, including pain, suffering, disability, and/or death. Also discussed benefits of staying, including continued treatment, teaching, testing and medications as indicated. Pt verbalized understanding, but continued to express wishes to leave. AMA form signed. Pt d/c with family

## 2024-02-24 NOTE — DISCHARGE SUMMARY
":  1975  MRN:  [id number]  Visit Number:  [id number]      Date of Admission:2024   Date of Discharge:  2024    Discharge Diagnosis:  Principal Problem:    Alcohol abuse        Admission Diagnosis:  Alcohol abuse [F10.10]     HPI  Taiwo Pierce is a 48 y.o. male who was admitted on 2024 with complaints of alcohol use and withdrawals.   For details please see H&P dated 24.    Hospital Course  Patient is a 48 y.o. male presented with alcohol use and withdrawals. The patient was intoxicated when he first arrived at the ED and was given IV fluids and pain medications and at that time he reported that he wanted to continue his pain medications and was interested in treatment for alcohol abuse. He was admitted to the detox recovery unit and started on Ativan detox and continued on his home medications on 24. On 24 he reported feeling better and denied any alcohol withdrawals and also reported he didn't think he needed to be in the detox treatment as he was only drinking heavily in the recent past. The patient was informed about the risks of untreated alcohol withdrawals but he was adamant on leaving and as he didn't meet criteria for a hold, he was discharged AMA.      Mental Status Exam upon discharge:   Mood \"good\"   Affect-congruent, appropriate, stable  Thought Content-goal directed, no delusional material present  Thought process-linear, organized.  Suicidality: No SI  Homicidality: No HI  Perception: No AH/VH    Procedures Performed         Consults:   Consults       No orders found for last 30 day(s).            Pertinent Test Results:   Admission on 2024   Component Date Value Ref Range Status    Hepatitis B Surface Ag 2024 Non-Reactive  Non-Reactive Final    Hep A IgM 2024 Non-Reactive  Non-Reactive Final    Hep B C IgM 2024 Non-Reactive  Non-Reactive Final    Hepatitis C Ab 2024 Non-Reactive  Non-Reactive Final   Admission on 2024, " Discharged on 02/23/2024   Component Date Value Ref Range Status    Glucose 02/22/2024 101 (H)  65 - 99 mg/dL Final    BUN 02/22/2024 11  6 - 20 mg/dL Final    Creatinine 02/22/2024 0.77  0.76 - 1.27 mg/dL Final    Sodium 02/22/2024 141  136 - 145 mmol/L Final    Potassium 02/22/2024 3.4 (L)  3.5 - 5.2 mmol/L Final    Slight hemolysis detected by analyzer. Result may be falsely elevated.    Chloride 02/22/2024 101  98 - 107 mmol/L Final    CO2 02/22/2024 21.3 (L)  22.0 - 29.0 mmol/L Final    Calcium 02/22/2024 8.7  8.6 - 10.5 mg/dL Final    Total Protein 02/22/2024 8.3  6.0 - 8.5 g/dL Final    Albumin 02/22/2024 4.1  3.5 - 5.2 g/dL Final    ALT (SGPT) 02/22/2024 53 (H)  1 - 41 U/L Final    AST (SGOT) 02/22/2024 245 (H)  1 - 40 U/L Final    Alkaline Phosphatase 02/22/2024 168 (H)  39 - 117 U/L Final    Total Bilirubin 02/22/2024 0.9  0.0 - 1.2 mg/dL Final    Globulin 02/22/2024 4.2  gm/dL Final    A/G Ratio 02/22/2024 1.0  g/dL Final    BUN/Creatinine Ratio 02/22/2024 14.3  7.0 - 25.0 Final    Anion Gap 02/22/2024 18.7 (H)  5.0 - 15.0 mmol/L Final    eGFR 02/22/2024 110.4  >60.0 mL/min/1.73 Final    Color, UA 02/22/2024 Yellow  Yellow, Straw Final    Appearance, UA 02/22/2024 Clear  Clear Final    pH, UA 02/22/2024 6.5  5.0 - 8.0 Final    Specific Gravity, UA 02/22/2024 1.013  1.005 - 1.030 Final    Glucose, UA 02/22/2024 Negative  Negative Final    Ketones, UA 02/22/2024 40 mg/dL (2+) (A)  Negative Final    Bilirubin, UA 02/22/2024 Negative  Negative Final    Blood, UA 02/22/2024 Negative  Negative Final    Protein, UA 02/22/2024 30 mg/dL (1+) (A)  Negative Final    Leuk Esterase, UA 02/22/2024 Negative  Negative Final    Nitrite, UA 02/22/2024 Negative  Negative Final    Urobilinogen, UA 02/22/2024 1.0 E.U./dL  0.2 - 1.0 E.U./dL Final    Ethanol 02/22/2024 374 (H)  0 - 10 mg/dL Final    Ethanol % 02/22/2024 0.374  % Final    THC, Screen, Urine 02/22/2024 Negative  Negative Final    Phencyclidine (PCP), Urine  02/22/2024 Negative  Negative Final    Cocaine Screen, Urine 02/22/2024 Negative  Negative Final    Methamphetamine, Ur 02/22/2024 Negative  Negative Final    Opiate Screen 02/22/2024 Negative  Negative Final    Amphetamine Screen, Urine 02/22/2024 Negative  Negative Final    Benzodiazepine Screen, Urine 02/22/2024 Negative  Negative Final    Tricyclic Antidepressants Screen 02/22/2024 Negative  Negative Final    Methadone Screen, Urine 02/22/2024 Negative  Negative Final    Barbiturates Screen, Urine 02/22/2024 Negative  Negative Final    Oxycodone Screen, Urine 02/22/2024 Positive (A)  Negative Final    Buprenorphine, Screen, Urine 02/22/2024 Negative  Negative Final    Magnesium 02/22/2024 2.3  1.6 - 2.6 mg/dL Final    QT Interval 02/22/2024 370  ms Preliminary    QTC Interval 02/22/2024 464  ms Preliminary    WBC 02/22/2024 7.13  3.40 - 10.80 10*3/mm3 Final    RBC 02/22/2024 4.14  4.14 - 5.80 10*6/mm3 Final    Hemoglobin 02/22/2024 13.6  13.0 - 17.7 g/dL Final    Hematocrit 02/22/2024 40.3  37.5 - 51.0 % Final    MCV 02/22/2024 97.3 (H)  79.0 - 97.0 fL Final    MCH 02/22/2024 32.9  26.6 - 33.0 pg Final    MCHC 02/22/2024 33.7  31.5 - 35.7 g/dL Final    RDW 02/22/2024 15.2  12.3 - 15.4 % Final    RDW-SD 02/22/2024 55.0 (H)  37.0 - 54.0 fl Final    MPV 02/22/2024 9.1  6.0 - 12.0 fL Final    Platelets 02/22/2024 223  140 - 450 10*3/mm3 Final    Neutrophil % 02/22/2024 51.1  42.7 - 76.0 % Final    Lymphocyte % 02/22/2024 39.6  19.6 - 45.3 % Final    Monocyte % 02/22/2024 7.2  5.0 - 12.0 % Final    Eosinophil % 02/22/2024 0.7  0.3 - 6.2 % Final    Basophil % 02/22/2024 1.3  0.0 - 1.5 % Final    Immature Grans % 02/22/2024 0.1  0.0 - 0.5 % Final    Neutrophils, Absolute 02/22/2024 3.65  1.70 - 7.00 10*3/mm3 Final    Lymphocytes, Absolute 02/22/2024 2.82  0.70 - 3.10 10*3/mm3 Final    Monocytes, Absolute 02/22/2024 0.51  0.10 - 0.90 10*3/mm3 Final    Eosinophils, Absolute 02/22/2024 0.05  0.00 - 0.40 10*3/mm3 Final     Basophils, Absolute 02/22/2024 0.09  0.00 - 0.20 10*3/mm3 Final    Immature Grans, Absolute 02/22/2024 0.01  0.00 - 0.05 10*3/mm3 Final    nRBC 02/22/2024 0.0  0.0 - 0.2 /100 WBC Final    Extra Tube 02/22/2024 Hold for add-ons.   Final    Auto resulted.    Extra Tube 02/22/2024 hold for add-on   Final    Auto resulted    Extra Tube 02/22/2024 Hold for add-ons.   Final    Auto resulted.    Extra Tube 02/22/2024 Hold for add-ons.   Final    Auto resulted    Fentanyl, Urine 02/22/2024 Negative  Negative Final    RBC, UA 02/22/2024 0-2  None Seen, 0-2 /HPF Final    WBC, UA 02/22/2024 0-2  None Seen, 0-2 /HPF Final    Bacteria, UA 02/22/2024 None Seen  None Seen /HPF Final    Squamous Epithelial Cells, UA 02/22/2024 0-2  None Seen, 0-2 /HPF Final    Hyaline Casts, UA 02/22/2024 None Seen  None Seen /LPF Final    Methodology 02/22/2024 Automated Microscopy   Final    Ethanol 02/23/2024 141 (H)  0 - 10 mg/dL Final    Ethanol % 02/23/2024 0.141  % Final    Ethanol 02/23/2024 117 (H)  0 - 10 mg/dL Final    Ethanol % 02/23/2024 0.117  % Final        Condition on Discharge:  guarded    Vital Signs  Temp:  [97.5 °F (36.4 °C)-98.4 °F (36.9 °C)] 98.4 °F (36.9 °C)  Heart Rate:  [] 78  Resp:  [16-18] 18  BP: (123-151)/() 140/97      Discharge Disposition:  Left Against Medical Advice    Discharge Medications:     Discharge Medications        Continue These Medications        Instructions Start Date   amLODIPine 10 MG tablet  Commonly known as: NORVASC   10 mg, Oral, Daily      DULoxetine 30 MG capsule  Commonly known as: CYMBALTA   30 mg, Oral, Daily      gabapentin 800 MG tablet  Commonly known as: NEURONTIN   800 mg, Oral, 3 Times Daily      hydroCHLOROthiazide 25 MG tablet   25 mg, Oral, Daily      lansoprazole 30 MG capsule  Commonly known as: PREVACID   30 mg, Oral, Daily      metaxalone 800 MG tablet  Commonly known as: SKELAXIN   800 mg, Oral, 3 Times Daily PRN      oxyCODONE 10 MG tablet  Commonly known as:  ROXICODONE   10 mg, Oral, 5 Times Daily      potassium chloride 20 MEQ CR tablet  Commonly known as: K-DUR,KLOR-CON   20 mEq, Oral, 2 Times Daily      pravastatin 20 MG tablet  Commonly known as: PRAVACHOL   20 mg, Oral, Daily      valsartan 320 MG tablet  Commonly known as: DIOVAN   320 mg, Oral, Daily               Discharge Diet: Regular     Activity at Discharge: As tolerated     Follow-up Appointments  Future Appointments   Date Time Provider Department Center   5/14/2024 11:00 AM Julio C Bryson MD [facility]         Test Results Pending at Discharge      Time: I spent  > 30  minutes on this discharge activity which included: face-to-face encounter with the patient, reviewing the data in the system, coordination of the care with the nursing staff as well as consultants, documentation, and entering orders.        Clinician:   Deon Parson MD  02/24/24  11:41 EST

## 2024-02-24 NOTE — PROGRESS NOTES
REVIEW OF SYSTEMS:  Review of Systems   Constitutional: Negative.    HENT: Negative.     Eyes: Negative.    Respiratory: Negative.     Cardiovascular: Negative.    Gastrointestinal: Negative.    Endocrine: Negative.    Genitourinary: Negative.    Musculoskeletal: Negative.    Skin: Negative.    Allergic/Immunologic: Negative.    Neurological: Negative.    Hematological: Negative.    Psychiatric/Behavioral: Negative.           OBJECTIVE    PHYSICAL EXAM:  Physical Exam  Vitals and nursing note reviewed.   Constitutional:       Appearance: He is well-developed.   HENT:      Head: Normocephalic and atraumatic.      Right Ear: External ear normal.      Left Ear: External ear normal.      Nose: Nose normal.   Eyes:      Pupils: Pupils are equal, round, and reactive to light.   Pulmonary:      Effort: Pulmonary effort is normal. No respiratory distress.      Breath sounds: Normal breath sounds.   Abdominal:      General: There is no distension.      Palpations: Abdomen is soft.   Musculoskeletal:         General: No deformity. Normal range of motion.      Cervical back: Normal range of motion and neck supple.   Skin:     General: Skin is warm.      Findings: No rash.   Neurological:      Mental Status: He is alert and oriented to person, place, and time.      Coordination: Coordination normal.           ASSESSMENT & PLAN:        Alcohol abuse  -Patient denies he has a serious alcohol problems and reports no active withdrawals and states he wants to leave. He is encouraged to stay and complete the detox but he is not interested in it. He is made aware of the risks of alcohol withdrawals including but not limited to seizure, delirium and even death. The patient expressed understanding and states he doesn't feel he needs help at this time. He will be discharged AMA per his insistence.

## 2024-02-24 NOTE — PAYOR COMM NOTE
"Taiwo Sena (48 y.o. Male)       Date of Birth   1975    Social Security Number       Address   [address] [address]    Home Phone   [phone]    MRN   [id number]       [employer]   [facility]    Marital Status                               Admission Date   24    Admission Type   Emergency    Admitting Provider   Deon Parson MD    Attending Provider       Department, Room/Bed   [facility] ADULT CD, 1045/1S       Discharge Date   2024    Discharge Disposition   Left Against Medical Advice    Discharge Destination                                 Attending Provider: (none)   Allergies: Augmentin [Amoxicillin-pot Clavulanate], Metoprolol    Isolation: None   Infection: None   Code Status: CPR    Ht: 180.3 cm (71\")   Wt: 81.9 kg (180 lb 9.6 oz)    Admission Cmt: None   Principal Problem: Alcohol abuse [F10.10]                   Active Insurance as of 2024       Primary Coverage       Payor Plan Insurance Group Employer/Plan Group    ANTHEM MEDICARE REPLACEMENT ANTHEM MEDICARE ADVANTAGE [id number]       Payor Plan Address Payor Plan Phone Number Payor Plan Fax Number Effective Dates     [address] [phone]  2024 - None Entered    [facility] [id number]         Subscriber Name Subscriber Birth Date Member ID       TAIWO SENA 1975 [id number]                     Emergency Contacts        (Rel.) Home Phone Work Phone Mobile Phone    KRISTA SENA (Spouse) [phone] -- [phone]    StanJacobRosario [phone] -- --    Mario Francois (Relative) -- -- [phone]          PLEASE ATTACH THE DISCHARGE INFORMATION INCLUDED TO AUTHORIZATION NUMBER [id number]    RETURN FAX IF NEEDED [phone]    PATIENT NAME:  TAIWO SENA  :  1975    ADMISSION DATE TO INPATIENT DETOX:  2024  DISCHARGE DATE:  2024 (AMA)    FACILITY:  [facility]  NPI:  [id number]  TAX ID:  [id number]  ADDRESS:  [address] [place]  [address]    ATTENDING MD:  DR. DEON DUEÑAS  NPI:  [id number]  ADDRESS:  SAME AS FACILITY      DISCHARGE DIAGNOSIS:  F10.10 - ALCOHOL ABUSE      PATIENT DISCHARGED AMA.  BELOW IS COPIED THE AFTER CARE AND MEDICATIONS COPIED FROM THE AFTER VISIT SUMMARY WHICH IS DISCUSSED WITH THE PATIENT AT TIME OF DISCHARGE:      MAY    14 Follow Up with Julio C Bryson  jaimeday May 14, 2024 11:00 AM  Arrive 15 minutes prior to appointment. [facility] NEUROSURGERY  [address]  [address] [address] [phone]         MEDICATIONS:    DULoxetine 30 MG capsule  Commonly known as: CYMBALTA  Take 1 capsule by mouth Daily.  For: Musculoskeletal Pain    gabapentin 800 MG tablet  Commonly known as: NEURONTIN  Take 1 tablet by mouth 3 (Three) Times a Day.  For: Neuropathic Pain    oxyCODONE 10 MG tablet  Commonly known as: ROXICODONE  Take 1 tablet by mouth 5 (Five) Times a Day.  For: Chronic Pain               Discharge Summary        Deon Dueñas MD at 24 1141          :  1975  MRN:  [id number]  Visit Number:  [id number]      Date of Admission:2024   Date of Discharge:  2024    Discharge Diagnosis:  Principal Problem:    Alcohol abuse        Admission Diagnosis:  Alcohol abuse [F10.10]     JUAN Pierce is a 48 y.o. male who was admitted on 2024 with complaints of alcohol use and withdrawals.   For details please see H&P dated 24.    Hospital Course  Patient is a 48 y.o. male presented with alcohol use and withdrawals. The patient was intoxicated when he first arrived at the ED and was given IV fluids and pain medications and at that time he reported that he wanted to continue his pain medications and was interested in treatment for alcohol abuse. He was admitted to the detox recovery unit and started on Ativan detox and continued on his home medications on 24. On 24 he reported feeling better and  "denied any alcohol withdrawals and also reported he didn't think he needed to be in the detox treatment as he was only drinking heavily in the recent past. The patient was informed about the risks of untreated alcohol withdrawals but he was adamant on leaving and as he didn't meet criteria for a hold, he was discharged AMA.      Mental Status Exam upon discharge:   Mood \"good\"   Affect-congruent, appropriate, stable  Thought Content-goal directed, no delusional material present  Thought process-linear, organized.  Suicidality: No SI  Homicidality: No HI  Perception: No AH/VH    Procedures Performed         Consults:   Consults       No orders found for last 30 day(s).            Pertinent Test Results:   Admission on 02/23/2024   Component Date Value Ref Range Status    Hepatitis B Surface Ag 02/23/2024 Non-Reactive  Non-Reactive Final    Hep A IgM 02/23/2024 Non-Reactive  Non-Reactive Final    Hep B C IgM 02/23/2024 Non-Reactive  Non-Reactive Final    Hepatitis C Ab 02/23/2024 Non-Reactive  Non-Reactive Final   Admission on 02/22/2024, Discharged on 02/23/2024   Component Date Value Ref Range Status    Glucose 02/22/2024 101 (H)  65 - 99 mg/dL Final    BUN 02/22/2024 11  6 - 20 mg/dL Final    Creatinine 02/22/2024 0.77  0.76 - 1.27 mg/dL Final    Sodium 02/22/2024 141  136 - 145 mmol/L Final    Potassium 02/22/2024 3.4 (L)  3.5 - 5.2 mmol/L Final    Slight hemolysis detected by analyzer. Result may be falsely elevated.    Chloride 02/22/2024 101  98 - 107 mmol/L Final    CO2 02/22/2024 21.3 (L)  22.0 - 29.0 mmol/L Final    Calcium 02/22/2024 8.7  8.6 - 10.5 mg/dL Final    Total Protein 02/22/2024 8.3  6.0 - 8.5 g/dL Final    Albumin 02/22/2024 4.1  3.5 - 5.2 g/dL Final    ALT (SGPT) 02/22/2024 53 (H)  1 - 41 U/L Final    AST (SGOT) 02/22/2024 245 (H)  1 - 40 U/L Final    Alkaline Phosphatase 02/22/2024 168 (H)  39 - 117 U/L Final    Total Bilirubin 02/22/2024 0.9  0.0 - 1.2 mg/dL Final    Globulin 02/22/2024 4.2  " gm/dL Final    A/G Ratio 02/22/2024 1.0  g/dL Final    BUN/Creatinine Ratio 02/22/2024 14.3  7.0 - 25.0 Final    Anion Gap 02/22/2024 18.7 (H)  5.0 - 15.0 mmol/L Final    eGFR 02/22/2024 110.4  >60.0 mL/min/1.73 Final    Color, UA 02/22/2024 Yellow  Yellow, Straw Final    Appearance, UA 02/22/2024 Clear  Clear Final    pH, UA 02/22/2024 6.5  5.0 - 8.0 Final    Specific Gravity, UA 02/22/2024 1.013  1.005 - 1.030 Final    Glucose, UA 02/22/2024 Negative  Negative Final    Ketones, UA 02/22/2024 40 mg/dL (2+) (A)  Negative Final    Bilirubin, UA 02/22/2024 Negative  Negative Final    Blood, UA 02/22/2024 Negative  Negative Final    Protein, UA 02/22/2024 30 mg/dL (1+) (A)  Negative Final    Leuk Esterase, UA 02/22/2024 Negative  Negative Final    Nitrite, UA 02/22/2024 Negative  Negative Final    Urobilinogen, UA 02/22/2024 1.0 E.U./dL  0.2 - 1.0 E.U./dL Final    Ethanol 02/22/2024 374 (H)  0 - 10 mg/dL Final    Ethanol % 02/22/2024 0.374  % Final    THC, Screen, Urine 02/22/2024 Negative  Negative Final    Phencyclidine (PCP), Urine 02/22/2024 Negative  Negative Final    Cocaine Screen, Urine 02/22/2024 Negative  Negative Final    Methamphetamine, Ur 02/22/2024 Negative  Negative Final    Opiate Screen 02/22/2024 Negative  Negative Final    Amphetamine Screen, Urine 02/22/2024 Negative  Negative Final    Benzodiazepine Screen, Urine 02/22/2024 Negative  Negative Final    Tricyclic Antidepressants Screen 02/22/2024 Negative  Negative Final    Methadone Screen, Urine 02/22/2024 Negative  Negative Final    Barbiturates Screen, Urine 02/22/2024 Negative  Negative Final    Oxycodone Screen, Urine 02/22/2024 Positive (A)  Negative Final    Buprenorphine, Screen, Urine 02/22/2024 Negative  Negative Final    Magnesium 02/22/2024 2.3  1.6 - 2.6 mg/dL Final    QT Interval 02/22/2024 370  ms Preliminary    QTC Interval 02/22/2024 464  ms Preliminary    WBC 02/22/2024 7.13  3.40 - 10.80 10*3/mm3 Final    RBC 02/22/2024 4.14   4.14 - 5.80 10*6/mm3 Final    Hemoglobin 02/22/2024 13.6  13.0 - 17.7 g/dL Final    Hematocrit 02/22/2024 40.3  37.5 - 51.0 % Final    MCV 02/22/2024 97.3 (H)  79.0 - 97.0 fL Final    MCH 02/22/2024 32.9  26.6 - 33.0 pg Final    MCHC 02/22/2024 33.7  31.5 - 35.7 g/dL Final    RDW 02/22/2024 15.2  12.3 - 15.4 % Final    RDW-SD 02/22/2024 55.0 (H)  37.0 - 54.0 fl Final    MPV 02/22/2024 9.1  6.0 - 12.0 fL Final    Platelets 02/22/2024 223  140 - 450 10*3/mm3 Final    Neutrophil % 02/22/2024 51.1  42.7 - 76.0 % Final    Lymphocyte % 02/22/2024 39.6  19.6 - 45.3 % Final    Monocyte % 02/22/2024 7.2  5.0 - 12.0 % Final    Eosinophil % 02/22/2024 0.7  0.3 - 6.2 % Final    Basophil % 02/22/2024 1.3  0.0 - 1.5 % Final    Immature Grans % 02/22/2024 0.1  0.0 - 0.5 % Final    Neutrophils, Absolute 02/22/2024 3.65  1.70 - 7.00 10*3/mm3 Final    Lymphocytes, Absolute 02/22/2024 2.82  0.70 - 3.10 10*3/mm3 Final    Monocytes, Absolute 02/22/2024 0.51  0.10 - 0.90 10*3/mm3 Final    Eosinophils, Absolute 02/22/2024 0.05  0.00 - 0.40 10*3/mm3 Final    Basophils, Absolute 02/22/2024 0.09  0.00 - 0.20 10*3/mm3 Final    Immature Grans, Absolute 02/22/2024 0.01  0.00 - 0.05 10*3/mm3 Final    nRBC 02/22/2024 0.0  0.0 - 0.2 /100 WBC Final    Extra Tube 02/22/2024 Hold for add-ons.   Final    Auto resulted.    Extra Tube 02/22/2024 hold for add-on   Final    Auto resulted    Extra Tube 02/22/2024 Hold for add-ons.   Final    Auto resulted.    Extra Tube 02/22/2024 Hold for add-ons.   Final    Auto resulted    Fentanyl, Urine 02/22/2024 Negative  Negative Final    RBC, UA 02/22/2024 0-2  None Seen, 0-2 /HPF Final    WBC, UA 02/22/2024 0-2  None Seen, 0-2 /HPF Final    Bacteria, UA 02/22/2024 None Seen  None Seen /HPF Final    Squamous Epithelial Cells, UA 02/22/2024 0-2  None Seen, 0-2 /HPF Final    Hyaline Casts, UA 02/22/2024 None Seen  None Seen /LPF Final    Methodology 02/22/2024 Automated Microscopy   Final    Ethanol 02/23/2024 141  (H)  0 - 10 mg/dL Final    Ethanol % 02/23/2024 0.141  % Final    Ethanol 02/23/2024 117 (H)  0 - 10 mg/dL Final    Ethanol % 02/23/2024 0.117  % Final        Condition on Discharge:  guarded    Vital Signs  Temp:  [97.5 °F (36.4 °C)-98.4 °F (36.9 °C)] 98.4 °F (36.9 °C)  Heart Rate:  [] 78  Resp:  [16-18] 18  BP: (123-151)/() 140/97      Discharge Disposition:  Left Against Medical Advice    Discharge Medications:     Discharge Medications        Continue These Medications        Instructions Start Date   amLODIPine 10 MG tablet  Commonly known as: NORVASC   10 mg, Oral, Daily      DULoxetine 30 MG capsule  Commonly known as: CYMBALTA   30 mg, Oral, Daily      gabapentin 800 MG tablet  Commonly known as: NEURONTIN   800 mg, Oral, 3 Times Daily      hydroCHLOROthiazide 25 MG tablet   25 mg, Oral, Daily      lansoprazole 30 MG capsule  Commonly known as: PREVACID   30 mg, Oral, Daily      metaxalone 800 MG tablet  Commonly known as: SKELAXIN   800 mg, Oral, 3 Times Daily PRN      oxyCODONE 10 MG tablet  Commonly known as: ROXICODONE   10 mg, Oral, 5 Times Daily      potassium chloride 20 MEQ CR tablet  Commonly known as: K-DUR,KLOR-CON   20 mEq, Oral, 2 Times Daily      pravastatin 20 MG tablet  Commonly known as: PRAVACHOL   20 mg, Oral, Daily      valsartan 320 MG tablet  Commonly known as: DIOVAN   320 mg, Oral, Daily               Discharge Diet: Regular     Activity at Discharge: As tolerated     Follow-up Appointments  Future Appointments   Date Time Provider Department Center   5/14/2024 11:00 AM Julio C Bryson MD [facility]         Test Results Pending at Discharge      Time: I spent  > 30  minutes on this discharge activity which included: face-to-face encounter with the patient, reviewing the data in the system, coordination of the care with the nursing staff as well as consultants, documentation, and entering orders.        Clinician:   Deon Parson MD  02/24/24  11:41  EST    Electronically signed by Deon Parson MD at 02/24/24 [id number]

## 2024-02-24 NOTE — PLAN OF CARE
Goal Outcome Evaluation:  Plan of Care Reviewed With: patient  Patient Agreement with Plan of Care: agrees

## 2024-02-24 NOTE — PLAN OF CARE
Goal Outcome Evaluation:  Plan of Care Reviewed With: patient  Patient Agreement with Plan of Care: agrees     Progress: improving     Pt rates anxiety 10/10, depression 10/10, and denies cravings. Appetite is poor for shift and participated in group. Pt slept well and greater than 8 hours.

## 2024-02-25 LAB
QT INTERVAL: 370 MS
QTC INTERVAL: 464 MS

## 2024-02-27 RX ORDER — AMLODIPINE BESYLATE 10 MG/1
10 TABLET ORAL DAILY
Qty: 90 TABLET | OUTPATIENT
Start: 2024-02-27

## 2024-02-27 RX ORDER — HYDROCHLOROTHIAZIDE 25 MG/1
25 TABLET ORAL EVERY MORNING
Qty: 90 TABLET | OUTPATIENT
Start: 2024-02-27

## 2024-02-27 RX ORDER — POTASSIUM CHLORIDE 1500 MG/1
20 TABLET, EXTENDED RELEASE ORAL 2 TIMES DAILY
Qty: 180 TABLET | OUTPATIENT
Start: 2024-02-27

## 2024-02-27 RX ORDER — VALSARTAN 320 MG/1
320 TABLET ORAL DAILY
Qty: 90 TABLET | OUTPATIENT
Start: 2024-02-27

## 2024-02-27 NOTE — TELEPHONE ENCOUNTER
Group Topic: BH Process Group     Date: 9/9/2019  Start Time: 11:00 AM  End Time: 11:50 AM    Focus: Recovery   Number in attendance: 9    Patients were given the opportunity to share individually about goals, current stressors and therapeutic assignments. Group and therapist feedback is welcomed and maladaptive skills are challenged with coping options.        Attendance: Present  Response Communication: Appropriate topic  MoodAffect: Sad/Depressed  Behavior/Socialization: Appropriate to group  Thought Process: Circumstanstial  Patient Response: Appropriate feedback and Interactive    Pt. Shared how she will miss her son, as he is moving away to [place] for a job opportunity. Stated that she helped him move over the weekend and enjoyed that time with him. Reported that she is close to her son and he stated that he wants to 'get settled' in [place] for a year before Mom can visit with him. Became tearful when discussing how much she will miss being able to see him often. Therapist provided feedback and offered suggestion of video chatting with son. Pt. Will look into that capability on her phone.      Willa Silva, LPC     Needs appt

## 2024-04-10 RX ORDER — DULOXETIN HYDROCHLORIDE 30 MG/1
30 CAPSULE, DELAYED RELEASE ORAL DAILY
Qty: 90 CAPSULE | OUTPATIENT
Start: 2024-04-10

## 2024-04-13 ENCOUNTER — APPOINTMENT (OUTPATIENT)
Dept: CT IMAGING | Facility: HOSPITAL | Age: 49
End: 2024-04-13
Payer: MEDICARE

## 2024-04-13 ENCOUNTER — HOSPITAL ENCOUNTER (EMERGENCY)
Facility: HOSPITAL | Age: 49
Discharge: LEFT BEFORE DISCHARGE | End: 2024-04-13
Attending: STUDENT IN AN ORGANIZED HEALTH CARE EDUCATION/TRAINING PROGRAM
Payer: MEDICARE

## 2024-04-13 VITALS
DIASTOLIC BLOOD PRESSURE: 80 MMHG | HEIGHT: 71 IN | SYSTOLIC BLOOD PRESSURE: 109 MMHG | OXYGEN SATURATION: 87 % | WEIGHT: 184 LBS | BODY MASS INDEX: 25.76 KG/M2 | HEART RATE: 83 BPM | RESPIRATION RATE: 16 BRPM | TEMPERATURE: 98.8 F

## 2024-04-13 DIAGNOSIS — G89.29 CHRONIC LOW BACK PAIN, UNSPECIFIED BACK PAIN LATERALITY, UNSPECIFIED WHETHER SCIATICA PRESENT: ICD-10-CM

## 2024-04-13 DIAGNOSIS — M54.2 CHRONIC NECK PAIN: Primary | ICD-10-CM

## 2024-04-13 DIAGNOSIS — G89.29 CHRONIC NECK PAIN: Primary | ICD-10-CM

## 2024-04-13 DIAGNOSIS — M54.50 CHRONIC LOW BACK PAIN, UNSPECIFIED BACK PAIN LATERALITY, UNSPECIFIED WHETHER SCIATICA PRESENT: ICD-10-CM

## 2024-04-13 DIAGNOSIS — F10.220 ALCOHOL INTOXICATION IN ACTIVE ALCOHOLIC WITHOUT COMPLICATION: ICD-10-CM

## 2024-04-13 DIAGNOSIS — F10.20 ALCOHOLISM: ICD-10-CM

## 2024-04-13 LAB
ALBUMIN SERPL-MCNC: 3.4 G/DL (ref 3.5–5.2)
ALBUMIN/GLOB SERPL: 0.7 G/DL
ALP SERPL-CCNC: 207 U/L (ref 39–117)
ALT SERPL W P-5'-P-CCNC: 54 U/L (ref 1–41)
ANION GAP SERPL CALCULATED.3IONS-SCNC: 15.5 MMOL/L (ref 5–15)
AST SERPL-CCNC: 290 U/L (ref 1–40)
BASOPHILS # BLD AUTO: 0.09 10*3/MM3 (ref 0–0.2)
BASOPHILS NFR BLD AUTO: 1.2 % (ref 0–1.5)
BILIRUB SERPL-MCNC: 0.6 MG/DL (ref 0–1.2)
BUN SERPL-MCNC: 8 MG/DL (ref 6–20)
BUN/CREAT SERPL: 9.6 (ref 7–25)
CALCIUM SPEC-SCNC: 8.8 MG/DL (ref 8.6–10.5)
CHLORIDE SERPL-SCNC: 101 MMOL/L (ref 98–107)
CO2 SERPL-SCNC: 23.5 MMOL/L (ref 22–29)
CREAT SERPL-MCNC: 0.83 MG/DL (ref 0.76–1.27)
DEPRECATED RDW RBC AUTO: 53.2 FL (ref 37–54)
EGFRCR SERPLBLD CKD-EPI 2021: 108 ML/MIN/1.73
EOSINOPHIL # BLD AUTO: 0.03 10*3/MM3 (ref 0–0.4)
EOSINOPHIL NFR BLD AUTO: 0.4 % (ref 0.3–6.2)
ERYTHROCYTE [DISTWIDTH] IN BLOOD BY AUTOMATED COUNT: 14.5 % (ref 12.3–15.4)
ETHANOL BLD-MCNC: 355 MG/DL (ref 0–10)
ETHANOL UR QL: 0.35 %
GLOBULIN UR ELPH-MCNC: 4.6 GM/DL
GLUCOSE SERPL-MCNC: 133 MG/DL (ref 65–99)
HCT VFR BLD AUTO: 37.2 % (ref 37.5–51)
HGB BLD-MCNC: 12.5 G/DL (ref 13–17.7)
IMM GRANULOCYTES # BLD AUTO: 0.01 10*3/MM3 (ref 0–0.05)
IMM GRANULOCYTES NFR BLD AUTO: 0.1 % (ref 0–0.5)
LYMPHOCYTES # BLD AUTO: 2.24 10*3/MM3 (ref 0.7–3.1)
LYMPHOCYTES NFR BLD AUTO: 29.9 % (ref 19.6–45.3)
MAGNESIUM SERPL-MCNC: 2.1 MG/DL (ref 1.6–2.6)
MCH RBC QN AUTO: 33.3 PG (ref 26.6–33)
MCHC RBC AUTO-ENTMCNC: 33.6 G/DL (ref 31.5–35.7)
MCV RBC AUTO: 99.2 FL (ref 79–97)
MONOCYTES # BLD AUTO: 0.71 10*3/MM3 (ref 0.1–0.9)
MONOCYTES NFR BLD AUTO: 9.5 % (ref 5–12)
NEUTROPHILS NFR BLD AUTO: 4.41 10*3/MM3 (ref 1.7–7)
NEUTROPHILS NFR BLD AUTO: 58.9 % (ref 42.7–76)
NRBC BLD AUTO-RTO: 0 /100 WBC (ref 0–0.2)
PLATELET # BLD AUTO: 180 10*3/MM3 (ref 140–450)
PMV BLD AUTO: 9.8 FL (ref 6–12)
POTASSIUM SERPL-SCNC: 3.7 MMOL/L (ref 3.5–5.2)
PROT SERPL-MCNC: 8 G/DL (ref 6–8.5)
RBC # BLD AUTO: 3.75 10*6/MM3 (ref 4.14–5.8)
SODIUM SERPL-SCNC: 140 MMOL/L (ref 136–145)
WBC NRBC COR # BLD AUTO: 7.49 10*3/MM3 (ref 3.4–10.8)

## 2024-04-13 PROCEDURE — 82077 ASSAY SPEC XCP UR&BREATH IA: CPT | Performed by: STUDENT IN AN ORGANIZED HEALTH CARE EDUCATION/TRAINING PROGRAM

## 2024-04-13 PROCEDURE — 83735 ASSAY OF MAGNESIUM: CPT | Performed by: STUDENT IN AN ORGANIZED HEALTH CARE EDUCATION/TRAINING PROGRAM

## 2024-04-13 PROCEDURE — 72128 CT CHEST SPINE W/O DYE: CPT

## 2024-04-13 PROCEDURE — 99284 EMERGENCY DEPT VISIT MOD MDM: CPT

## 2024-04-13 PROCEDURE — 72131 CT LUMBAR SPINE W/O DYE: CPT | Performed by: RADIOLOGY

## 2024-04-13 PROCEDURE — 72131 CT LUMBAR SPINE W/O DYE: CPT

## 2024-04-13 PROCEDURE — 85025 COMPLETE CBC W/AUTO DIFF WBC: CPT | Performed by: STUDENT IN AN ORGANIZED HEALTH CARE EDUCATION/TRAINING PROGRAM

## 2024-04-13 PROCEDURE — 72125 CT NECK SPINE W/O DYE: CPT | Performed by: RADIOLOGY

## 2024-04-13 PROCEDURE — 96374 THER/PROPH/DIAG INJ IV PUSH: CPT

## 2024-04-13 PROCEDURE — 72125 CT NECK SPINE W/O DYE: CPT

## 2024-04-13 PROCEDURE — 80053 COMPREHEN METABOLIC PANEL: CPT | Performed by: STUDENT IN AN ORGANIZED HEALTH CARE EDUCATION/TRAINING PROGRAM

## 2024-04-13 PROCEDURE — 72128 CT CHEST SPINE W/O DYE: CPT | Performed by: RADIOLOGY

## 2024-04-13 PROCEDURE — 36415 COLL VENOUS BLD VENIPUNCTURE: CPT

## 2024-04-13 PROCEDURE — 25010000002 KETOROLAC TROMETHAMINE PER 15 MG: Performed by: STUDENT IN AN ORGANIZED HEALTH CARE EDUCATION/TRAINING PROGRAM

## 2024-04-13 RX ORDER — OXYCODONE HYDROCHLORIDE 10 MG/1
10 TABLET ORAL EVERY 4 HOURS PRN
Status: DISCONTINUED | OUTPATIENT
Start: 2024-04-13 | End: 2024-04-13 | Stop reason: HOSPADM

## 2024-04-13 RX ORDER — KETOROLAC TROMETHAMINE 30 MG/ML
15 INJECTION, SOLUTION INTRAMUSCULAR; INTRAVENOUS ONCE
Status: COMPLETED | OUTPATIENT
Start: 2024-04-13 | End: 2024-04-13

## 2024-04-13 RX ADMIN — KETOROLAC TROMETHAMINE 15 MG: 30 INJECTION, SOLUTION INTRAMUSCULAR at 18:48

## 2024-04-13 RX ADMIN — OXYCODONE HYDROCHLORIDE 10 MG: 10 TABLET ORAL at 21:00

## 2024-04-13 NOTE — ED PROVIDER NOTES
"Subjective   History of Present Illness  48-year-old male past medical history of alcohol abuse, arthritis in back and neck, hypertension presents to the ED for alcohol detox.  Patient states his back and neck been hurting him so much that he has been turning to alcohol more and more.  He states he does not want to lose his family and wants to get clean off of alcohol.  He does state that he had a fall a couple days ago and thinks that this reaggravated his neck and back pain.  Patient states he has been drinking vodka today and when asked how much he states \"a lot\".    History provided by:  Patient      Review of Systems    Past Medical History:   Diagnosis Date    Alcohol abuse     AMS (altered mental status)     Arthritis October 2007    Arthritis in back and neck    GERD (gastroesophageal reflux disease)     Headache 2007    Headaches due to disc in neck.    Hyperlipidemia     Hypertension     Low back pain 10/05/09    Hurt back and had back surgery 04/16/2014. Hit a nerve    Pancreatitis        Allergies   Allergen Reactions    Augmentin [Amoxicillin-Pot Clavulanate] Hives and Swelling     Throat swelling    Metoprolol Unknown - Low Severity       Past Surgical History:   Procedure Laterality Date    ANTERIOR CERVICAL DISCECTOMY W/ FUSION N/A 8/21/2023    Procedure: CERVICAL DISCECTOMY ANTERIOR WITH FUSION C5-7;  Surgeon: Julio C Bryson MD;  Location: [facility];  Service: Neurosurgery;  Laterality: N/A;    FRACTURE SURGERY Left     Foot    KNEE ARTHROSCOPY Left     LUMBAR DISCECTOMY  04/16/2014    LT L5-S1 Discectomy, L4-5 Lami with Disc - Dr. Esequiel Joshi       Family History   Problem Relation Age of Onset    Hyperlipidemia Father     Hypertension Father     Breast cancer Mother     Cancer Mother         Breast cancer    Depression Mother         Depression nerve problems    Prostate cancer Paternal Grandfather     Cancer Paternal Grandfather         Prostate cancer    Hyperlipidemia Paternal Grandfather     " Prostate cancer Maternal Grandfather     Arthritis Maternal Grandfather     Cancer Maternal Grandfather         Prostate cancer    Heart disease Maternal Grandfather         Heart attack@90    Hyperlipidemia Maternal Grandfather     Cancer Maternal Grandmother         Lung cancer    Depression Maternal Grandmother         Depression nerve problems    Stroke Maternal Grandmother         Brain anurism    Arthritis Paternal Uncle     Early death Sister         Brain didnt develop completely       Social History     Socioeconomic History    Marital status:    Tobacco Use    Smoking status: Never    Smokeless tobacco: Current     Types: Snuff   Vaping Use    Vaping status: Never Used   Substance and Sexual Activity    Alcohol use: Yes     Comment: vodka . a 5th over three days.    Drug use: Yes     Comment: etoh    Sexual activity: Yes     Partners: Female     Birth control/protection: None           Objective   Physical Exam  Constitutional:       General: He is not in acute distress.     Appearance: He is not ill-appearing.   HENT:      Head: Normocephalic and atraumatic.   Eyes:      Conjunctiva/sclera: Conjunctivae normal.   Cardiovascular:      Rate and Rhythm: Normal rate and regular rhythm.   Pulmonary:      Effort: Pulmonary effort is normal.   Abdominal:      General: Abdomen is flat.      Palpations: Abdomen is soft.      Tenderness: There is no abdominal tenderness.   Musculoskeletal:         General: Tenderness present.      Cervical back: Tenderness present.      Right lower leg: No edema.      Left lower leg: No edema.      Comments: Tenderness to neck and back along spine   Neurological:      General: No focal deficit present.      Mental Status: He is alert and oriented to person, place, and time. Mental status is at baseline.   Psychiatric:      Comments: Crying, intoxicated         Procedures  CT Lumbar Spine Without Contrast   Final Result       1.  No acute fracture or dislocation.   2.   Dextroconvex gliosis affecting the lumbar spine.   3.  Degenerative disc disease throughout the lumbar spine with disc   height reduction at the L4-5 and L5-S1 levels.   4.  Posterior lateral disc bulge components contribute to mild to   moderate neuroforaminal stenoses at the L4-5 and L5-S1 levels.   5.  No features of discitis.   6.  No foreign body.       This report was finalized on 4/13/2024 8:45 PM by Ugo Ogden MD.          CT Thoracic Spine Without Contrast   Final Result       1.  No acute fracture or dislocation.   2.  Mild degenerative disc disease throughout the thoracic spine.   3.  No lytic or blastic lesion.   4.  No foreign body.       This report was finalized on 4/13/2024 8:47 PM by Ugo Ogden MD.          CT Cervical Spine Without Contrast   Final Result       1.  No acute fracture or dislocation.   2.  Intact anterior cervical fusion with plate and screws noted to   extend from the C5 to the C7 level.   3.  Mild chronic grade 1 anterolisthesis of C3 on C4 and C4 on C5.   4.  No apical pneumothorax.       This report was finalized on 4/13/2024 8:41 PM by Ugo Ogden MD.            Results for orders placed or performed during the hospital encounter of 04/13/24   Comprehensive Metabolic Panel    Specimen: Blood   Result Value Ref Range    Glucose 133 (H) 65 - 99 mg/dL    BUN 8 6 - 20 mg/dL    Creatinine 0.83 0.76 - 1.27 mg/dL    Sodium 140 136 - 145 mmol/L    Potassium 3.7 3.5 - 5.2 mmol/L    Chloride 101 98 - 107 mmol/L    CO2 23.5 22.0 - 29.0 mmol/L    Calcium 8.8 8.6 - 10.5 mg/dL    Total Protein 8.0 6.0 - 8.5 g/dL    Albumin 3.4 (L) 3.5 - 5.2 g/dL    ALT (SGPT) 54 (H) 1 - 41 U/L    AST (SGOT) 290 (H) 1 - 40 U/L    Alkaline Phosphatase 207 (H) 39 - 117 U/L    Total Bilirubin 0.6 0.0 - 1.2 mg/dL    Globulin 4.6 gm/dL    A/G Ratio 0.7 g/dL    BUN/Creatinine Ratio 9.6 7.0 - 25.0    Anion Gap 15.5 (H) 5.0 - 15.0 mmol/L    eGFR 108.0 >60.0 mL/min/1.73   Ethanol    Specimen: Blood   Result  Value Ref Range    Ethanol 355 (H) 0 - 10 mg/dL    Ethanol % 0.355 %   Magnesium    Specimen: Blood   Result Value Ref Range    Magnesium 2.1 1.6 - 2.6 mg/dL   CBC Auto Differential    Specimen: Blood   Result Value Ref Range    WBC 7.49 3.40 - 10.80 10*3/mm3    RBC 3.75 (L) 4.14 - 5.80 10*6/mm3    Hemoglobin 12.5 (L) 13.0 - 17.7 g/dL    Hematocrit 37.2 (L) 37.5 - 51.0 %    MCV 99.2 (H) 79.0 - 97.0 fL    MCH 33.3 (H) 26.6 - 33.0 pg    MCHC 33.6 31.5 - 35.7 g/dL    RDW 14.5 12.3 - 15.4 %    RDW-SD 53.2 37.0 - 54.0 fl    MPV 9.8 6.0 - 12.0 fL    Platelets 180 140 - 450 10*3/mm3    Neutrophil % 58.9 42.7 - 76.0 %    Lymphocyte % 29.9 19.6 - 45.3 %    Monocyte % 9.5 5.0 - 12.0 %    Eosinophil % 0.4 0.3 - 6.2 %    Basophil % 1.2 0.0 - 1.5 %    Immature Grans % 0.1 0.0 - 0.5 %    Neutrophils, Absolute 4.41 1.70 - 7.00 10*3/mm3    Lymphocytes, Absolute 2.24 0.70 - 3.10 10*3/mm3    Monocytes, Absolute 0.71 0.10 - 0.90 10*3/mm3    Eosinophils, Absolute 0.03 0.00 - 0.40 10*3/mm3    Basophils, Absolute 0.09 0.00 - 0.20 10*3/mm3    Immature Grans, Absolute 0.01 0.00 - 0.05 10*3/mm3    nRBC 0.0 0.0 - 0.2 /100 WBC                ED Course  ED Course as of 04/13/24 2138   Sat Apr 13, 2024 1836 Patient checked out to oncoming provider pending workup and evaluation for alcohol detox  Electronically signed by Yoav Gutierrez MD, 04/13/24, 6:36 PM EDT.   [AS]   2035 I assumed patient's care from Dr. Gutierrez this evening at shift change.  Please see his documentation above.  Patient has now become quite angry, is insistent upon leaving immediately.  His wife is here to sign him out.  Patient is leaving AGAINST MEDICAL ADVICE.  Patient CTs have been performed but not yet read by the radiologist. [CM]   2054 I had a long talk with the patient and his wife.  Patient complains of his chronic pain pattern.  He wants something for pain.  I spoke with the psychiatrist Dr. Parson; he advises that the patient will continue to receiveHis pain  medications and other prescription medications while in the detox unit.  Patient is on oxycodone 10 mg 5 times daily, most recently filled this at [facility] in [place] on March 26.  I have ordered an oxycodone 10 for the patient.  He advises that he absolutely will not be admitted to the detox unit, states that he has been there before and he does not being in detention, but they will not even let him have his cell phone.  He advises that he will go to [facility].  The psychiatry intake nurse is in the room speaking with him and providing him with the information.  Patient insist that he is absolutely done with alcohol, that he will never drink again or he is about to lose his wife.  His wife acknowledges this.  I have tried to impress upon him that he does need inpatient detox.  He voices understanding of this but he adamantly refuses.  His CT studies have now been read by the radiologist, with no acute findings. [CM]   2124 Patient left AMA, his wife signed him out and took him home.  Patient advises that he will follow-up closely with [facility] and follow their program.  He is advised to return to the emergency department immediately if symptoms worsen or any problems. [CM]      ED Course User Index  [AS] Yoav Gutierrez MD  [CM] Poli Quezada MD                                     [facility] reviewed by Yoav Gutierrez MD, Poli Quezada MD       Medical Decision Making  48-year-old male presents ED for alcohol detox and neck and back pain.  Vital stable on arrival.  Patient is obviously intoxicated.  Toradol given for pain.  Will obtain labs and urine and get CTs of the spine given his intoxicated state and history of recent trauma.    Problems Addressed:  Alcohol intoxication in active alcoholic without complication: complicated acute illness or injury  Alcoholism: complicated acute illness or injury  Chronic low back pain, unspecified back pain laterality, unspecified whether sciatica present: complicated acute  illness or injury  Chronic neck pain: complicated acute illness or injury    Amount and/or Complexity of Data Reviewed  Labs: ordered.  Radiology: ordered.    Risk  Prescription drug management.        Final diagnoses:   Chronic neck pain   Chronic low back pain, unspecified back pain laterality, unspecified whether sciatica present   Alcohol intoxication in active alcoholic without complication   Alcoholism       ED Disposition  ED Disposition       ED Disposition   AMA    Condition   --    Comment   --               HARVINDER MCMAHON [facility]  [address] [address]  [phone]  Go to   Call Monday morning for first available appointment    Your primary care doctor    Go in 2 days      [facility] EMERGENCY DEPARTMENT  [address] [address]  [phone]  Go to   If symptoms worsen         Medication List      No changes were made to your prescriptions during this visit.       Please note that portions of this note were completed with a voice recognition program.        Poli Quezada MD  04/13/24 9284

## 2024-04-14 NOTE — ED NOTES
"Pt states he wants IV taken out so he can leave. Informed patient that he would have to leave AMA and his wife (at bedside) would need to drive him because he is intoxicated. Wife states she would like patient to stay for treatment. Pt began to cry stating \"I am hurting so bad\". States he takes oxycodone 5x/ day and would be willing to stay if he can get medication for pain. States \"that's the reason I drink, to manage the pain\". Pt cooperative and agrees to stay for detox at this time. Wife remains at bedside. Dr. Quezada notified that patient is requesting pain medication.   "

## 2024-04-14 NOTE — ED NOTES
Pt now agreeable to stay. He states since Dr. Quezada will give him his normal pain medication he will think about doing inpatient detox now.

## 2024-04-14 NOTE — NURSING NOTE
Spoke with Dr. Quezada at this time. Dr. Quezada requesting information about continuing Home Meds on the Detox Unit. Pt states he does not want to be admitted. IOP, PHP, Outpatient resources gathered and presented to pt and wife.

## 2024-04-15 ENCOUNTER — HOSPITAL ENCOUNTER (EMERGENCY)
Facility: HOSPITAL | Age: 49
Discharge: LEFT BEFORE DISCHARGE | End: 2024-04-16
Attending: STUDENT IN AN ORGANIZED HEALTH CARE EDUCATION/TRAINING PROGRAM
Payer: MEDICARE

## 2024-04-15 VITALS
RESPIRATION RATE: 20 BRPM | WEIGHT: 184.08 LBS | TEMPERATURE: 98.1 F | HEART RATE: 93 BPM | SYSTOLIC BLOOD PRESSURE: 129 MMHG | BODY MASS INDEX: 25.77 KG/M2 | DIASTOLIC BLOOD PRESSURE: 90 MMHG | HEIGHT: 71 IN | OXYGEN SATURATION: 99 %

## 2024-04-15 DIAGNOSIS — F10.20 ALCOHOLISM: Primary | ICD-10-CM

## 2024-04-15 LAB
ALBUMIN SERPL-MCNC: 3.5 G/DL (ref 3.5–5.2)
ALBUMIN/GLOB SERPL: 0.9 G/DL
ALP SERPL-CCNC: 201 U/L (ref 39–117)
ALT SERPL W P-5'-P-CCNC: 53 U/L (ref 1–41)
AMPHET+METHAMPHET UR QL: NEGATIVE
AMPHETAMINES UR QL: NEGATIVE
ANION GAP SERPL CALCULATED.3IONS-SCNC: 18.5 MMOL/L (ref 5–15)
AST SERPL-CCNC: 265 U/L (ref 1–40)
BARBITURATES UR QL SCN: NEGATIVE
BASOPHILS # BLD AUTO: 0.1 10*3/MM3 (ref 0–0.2)
BASOPHILS NFR BLD AUTO: 2 % (ref 0–1.5)
BENZODIAZ UR QL SCN: NEGATIVE
BILIRUB SERPL-MCNC: 0.8 MG/DL (ref 0–1.2)
BILIRUB UR QL STRIP: NEGATIVE
BUN SERPL-MCNC: 9 MG/DL (ref 6–20)
BUN/CREAT SERPL: 12.3 (ref 7–25)
BUPRENORPHINE SERPL-MCNC: NEGATIVE NG/ML
CALCIUM SPEC-SCNC: 8.4 MG/DL (ref 8.6–10.5)
CANNABINOIDS SERPL QL: NEGATIVE
CHLORIDE SERPL-SCNC: 101 MMOL/L (ref 98–107)
CLARITY UR: CLEAR
CO2 SERPL-SCNC: 21.5 MMOL/L (ref 22–29)
COCAINE UR QL: NEGATIVE
COLOR UR: YELLOW
CREAT SERPL-MCNC: 0.73 MG/DL (ref 0.76–1.27)
DEPRECATED RDW RBC AUTO: 53.6 FL (ref 37–54)
EGFRCR SERPLBLD CKD-EPI 2021: 112.2 ML/MIN/1.73
EOSINOPHIL # BLD AUTO: 0.04 10*3/MM3 (ref 0–0.4)
EOSINOPHIL NFR BLD AUTO: 0.8 % (ref 0.3–6.2)
ERYTHROCYTE [DISTWIDTH] IN BLOOD BY AUTOMATED COUNT: 14.6 % (ref 12.3–15.4)
ETHANOL BLD-MCNC: 326 MG/DL (ref 0–10)
ETHANOL UR QL: 0.33 %
FENTANYL UR-MCNC: NEGATIVE NG/ML
GLOBULIN UR ELPH-MCNC: 4.1 GM/DL
GLUCOSE SERPL-MCNC: 102 MG/DL (ref 65–99)
GLUCOSE UR STRIP-MCNC: NEGATIVE MG/DL
HCT VFR BLD AUTO: 37.2 % (ref 37.5–51)
HGB BLD-MCNC: 12.6 G/DL (ref 13–17.7)
HGB UR QL STRIP.AUTO: NEGATIVE
IMM GRANULOCYTES # BLD AUTO: 0.01 10*3/MM3 (ref 0–0.05)
IMM GRANULOCYTES NFR BLD AUTO: 0.2 % (ref 0–0.5)
KETONES UR QL STRIP: ABNORMAL
LEUKOCYTE ESTERASE UR QL STRIP.AUTO: NEGATIVE
LYMPHOCYTES # BLD AUTO: 1.27 10*3/MM3 (ref 0.7–3.1)
LYMPHOCYTES NFR BLD AUTO: 25.9 % (ref 19.6–45.3)
MAGNESIUM SERPL-MCNC: 2.3 MG/DL (ref 1.6–2.6)
MCH RBC QN AUTO: 33.8 PG (ref 26.6–33)
MCHC RBC AUTO-ENTMCNC: 33.9 G/DL (ref 31.5–35.7)
MCV RBC AUTO: 99.7 FL (ref 79–97)
METHADONE UR QL SCN: NEGATIVE
MONOCYTES # BLD AUTO: 0.41 10*3/MM3 (ref 0.1–0.9)
MONOCYTES NFR BLD AUTO: 8.4 % (ref 5–12)
NEUTROPHILS NFR BLD AUTO: 3.07 10*3/MM3 (ref 1.7–7)
NEUTROPHILS NFR BLD AUTO: 62.7 % (ref 42.7–76)
NITRITE UR QL STRIP: NEGATIVE
NRBC BLD AUTO-RTO: 0 /100 WBC (ref 0–0.2)
OPIATES UR QL: NEGATIVE
OXYCODONE UR QL SCN: POSITIVE
PCP UR QL SCN: NEGATIVE
PH UR STRIP.AUTO: 7 [PH] (ref 5–8)
PLATELET # BLD AUTO: 185 10*3/MM3 (ref 140–450)
PMV BLD AUTO: 9.5 FL (ref 6–12)
POTASSIUM SERPL-SCNC: 3.3 MMOL/L (ref 3.5–5.2)
PROT SERPL-MCNC: 7.6 G/DL (ref 6–8.5)
PROT UR QL STRIP: ABNORMAL
RBC # BLD AUTO: 3.73 10*6/MM3 (ref 4.14–5.8)
SODIUM SERPL-SCNC: 141 MMOL/L (ref 136–145)
SP GR UR STRIP: 1.01 (ref 1–1.03)
TRICYCLICS UR QL SCN: NEGATIVE
UROBILINOGEN UR QL STRIP: ABNORMAL
WBC NRBC COR # BLD AUTO: 4.9 10*3/MM3 (ref 3.4–10.8)

## 2024-04-15 PROCEDURE — 83735 ASSAY OF MAGNESIUM: CPT | Performed by: PHYSICIAN ASSISTANT

## 2024-04-15 PROCEDURE — 63710000001 ONDANSETRON ODT 4 MG TABLET DISPERSIBLE: Performed by: STUDENT IN AN ORGANIZED HEALTH CARE EDUCATION/TRAINING PROGRAM

## 2024-04-15 PROCEDURE — 63710000001 ONDANSETRON ODT 4 MG TABLET DISPERSIBLE: Performed by: PHYSICIAN ASSISTANT

## 2024-04-15 PROCEDURE — 80307 DRUG TEST PRSMV CHEM ANLYZR: CPT | Performed by: PHYSICIAN ASSISTANT

## 2024-04-15 PROCEDURE — 25010000002 THIAMINE PER 100 MG: Performed by: EMERGENCY MEDICINE

## 2024-04-15 PROCEDURE — 99283 EMERGENCY DEPT VISIT LOW MDM: CPT

## 2024-04-15 PROCEDURE — 80053 COMPREHEN METABOLIC PANEL: CPT | Performed by: PHYSICIAN ASSISTANT

## 2024-04-15 PROCEDURE — 96375 TX/PRO/DX INJ NEW DRUG ADDON: CPT

## 2024-04-15 PROCEDURE — 36415 COLL VENOUS BLD VENIPUNCTURE: CPT

## 2024-04-15 PROCEDURE — 25810000003 SODIUM CHLORIDE 0.9 % SOLUTION: Performed by: EMERGENCY MEDICINE

## 2024-04-15 PROCEDURE — 85025 COMPLETE CBC W/AUTO DIFF WBC: CPT | Performed by: PHYSICIAN ASSISTANT

## 2024-04-15 PROCEDURE — 96365 THER/PROPH/DIAG IV INF INIT: CPT

## 2024-04-15 PROCEDURE — 81003 URINALYSIS AUTO W/O SCOPE: CPT | Performed by: PHYSICIAN ASSISTANT

## 2024-04-15 PROCEDURE — 25010000002 LORAZEPAM PER 2 MG: Performed by: EMERGENCY MEDICINE

## 2024-04-15 PROCEDURE — 82077 ASSAY SPEC XCP UR&BREATH IA: CPT | Performed by: PHYSICIAN ASSISTANT

## 2024-04-15 RX ORDER — OXYCODONE HCL 10 MG/1
10 TABLET, FILM COATED, EXTENDED RELEASE ORAL ONCE
Status: COMPLETED | OUTPATIENT
Start: 2024-04-15 | End: 2024-04-15

## 2024-04-15 RX ORDER — THIAMINE HYDROCHLORIDE 100 MG/ML
200 INJECTION, SOLUTION INTRAMUSCULAR; INTRAVENOUS ONCE
Status: COMPLETED | OUTPATIENT
Start: 2024-04-15 | End: 2024-04-15

## 2024-04-15 RX ORDER — ONDANSETRON 4 MG/1
4 TABLET, ORALLY DISINTEGRATING ORAL ONCE
Status: COMPLETED | OUTPATIENT
Start: 2024-04-15 | End: 2024-04-15

## 2024-04-15 RX ORDER — SODIUM CHLORIDE 9 MG/ML
1000 INJECTION, SOLUTION INTRAVENOUS ONCE
Status: COMPLETED | OUTPATIENT
Start: 2024-04-15 | End: 2024-04-15

## 2024-04-15 RX ORDER — TRAZODONE HYDROCHLORIDE 50 MG/1
50 TABLET ORAL NIGHTLY
COMMUNITY
End: 2024-04-24

## 2024-04-15 RX ORDER — LORAZEPAM 2 MG/ML
1 INJECTION INTRAMUSCULAR ONCE
Status: COMPLETED | OUTPATIENT
Start: 2024-04-15 | End: 2024-04-15

## 2024-04-15 RX ADMIN — THIAMINE HYDROCHLORIDE 200 MG: 100 INJECTION, SOLUTION INTRAMUSCULAR; INTRAVENOUS at 21:36

## 2024-04-15 RX ADMIN — FOLIC ACID 1 MG: 5 INJECTION, SOLUTION INTRAMUSCULAR; INTRAVENOUS; SUBCUTANEOUS at 21:36

## 2024-04-15 RX ADMIN — LORAZEPAM 1 MG: 2 INJECTION, SOLUTION INTRAMUSCULAR; INTRAVENOUS at 21:17

## 2024-04-15 RX ADMIN — ONDANSETRON 4 MG: 4 TABLET, ORALLY DISINTEGRATING ORAL at 18:15

## 2024-04-15 RX ADMIN — SODIUM CHLORIDE 1000 ML: 9 INJECTION, SOLUTION INTRAVENOUS at 21:16

## 2024-04-15 RX ADMIN — ONDANSETRON 4 MG: 4 TABLET, ORALLY DISINTEGRATING ORAL at 16:24

## 2024-04-15 RX ADMIN — OXYCODONE HYDROCHLORIDE 10 MG: 10 TABLET, FILM COATED, EXTENDED RELEASE ORAL at 21:17

## 2024-04-15 NOTE — ED PROVIDER NOTES
Subjective   History of Present Illness  48-year-old male presents secondary to need for alcohol detox.  Patient states he drinks excessively because he hurts.  He is chronically in pain.  He is followed by pain clinic.  He is on oxycodone.  He states he took this earlier.  He has chronic neck and low back pain.  He also has a past medical history of hypertension, hyperlipidemia, arthritis, pancreatitis.  He presents by private vehicle with family.      Review of Systems   Constitutional: Negative.  Negative for fever.   HENT: Negative.     Respiratory: Negative.     Cardiovascular: Negative.  Negative for chest pain.   Gastrointestinal: Negative.  Negative for abdominal pain.   Endocrine: Negative.    Genitourinary: Negative.  Negative for dysuria.   Skin: Negative.    Neurological: Negative.    Psychiatric/Behavioral: Negative.  Negative for suicidal ideas.    All other systems reviewed and are negative.      Past Medical History:   Diagnosis Date   • Alcohol abuse    • AMS (altered mental status)    • Arthritis October 2007    Arthritis in back and neck   • GERD (gastroesophageal reflux disease)    • Headache 2007    Headaches due to disc in neck.   • Hyperlipidemia    • Hypertension    • Low back pain 10/05/09    Hurt back and had back surgery 04/16/2014. Hit a nerve   • Pancreatitis        Allergies   Allergen Reactions   • Augmentin [Amoxicillin-Pot Clavulanate] Hives and Swelling     Throat swelling   • Metoprolol Unknown - Low Severity       Past Surgical History:   Procedure Laterality Date   • ANTERIOR CERVICAL DISCECTOMY W/ FUSION N/A 8/21/2023    Procedure: CERVICAL DISCECTOMY ANTERIOR WITH FUSION C5-7;  Surgeon: Julio C Byrson MD;  Location: [facility];  Service: Neurosurgery;  Laterality: N/A;   • FRACTURE SURGERY Left     Foot   • KNEE ARTHROSCOPY Left    • LUMBAR DISCECTOMY  04/16/2014    LT L5-S1 Discectomy, L4-5 Lami with Disc - Dr. Esequiel Joshi       Family History   Problem Relation Age of Onset    • Hyperlipidemia Father    • Hypertension Father    • Breast cancer Mother    • Cancer Mother         Breast cancer   • Depression Mother         Depression nerve problems   • Prostate cancer Paternal Grandfather    • Cancer Paternal Grandfather         Prostate cancer   • Hyperlipidemia Paternal Grandfather    • Prostate cancer Maternal Grandfather    • Arthritis Maternal Grandfather    • Cancer Maternal Grandfather         Prostate cancer   • Heart disease Maternal Grandfather         Heart attack@90   • Hyperlipidemia Maternal Grandfather    • Cancer Maternal Grandmother         Lung cancer   • Depression Maternal Grandmother         Depression nerve problems   • Stroke Maternal Grandmother         Brain anurism   • Arthritis Paternal Uncle    • Early death Sister         Brain didnt develop completely       Social History     Socioeconomic History   • Marital status:    Tobacco Use   • Smoking status: Never   • Smokeless tobacco: Current     Types: Snuff   Vaping Use   • Vaping status: Never Used   Substance and Sexual Activity   • Alcohol use: Yes     Comment: vodka . a 5th over three days.   • Drug use: Yes     Comment: etoh   • Sexual activity: Yes     Partners: Female     Birth control/protection: None           Objective   Physical Exam  Vitals and nursing note reviewed.   Constitutional:       General: He is not in acute distress.     Appearance: He is well-developed. He is not diaphoretic.   HENT:      Head: Normocephalic and atraumatic.      Right Ear: External ear normal.      Left Ear: External ear normal.      Nose: Nose normal.   Eyes:      Conjunctiva/sclera: Conjunctivae normal.      Pupils: Pupils are equal, round, and reactive to light.   Neck:      Vascular: No JVD.      Trachea: No tracheal deviation.   Cardiovascular:      Rate and Rhythm: Normal rate and regular rhythm.      Heart sounds: Normal heart sounds. No murmur heard.  Pulmonary:      Effort: Pulmonary effort is normal. No  respiratory distress.      Breath sounds: Normal breath sounds. No wheezing.   Abdominal:      General: Bowel sounds are normal.      Palpations: Abdomen is soft.      Tenderness: There is no abdominal tenderness.   Musculoskeletal:         General: No deformity. Normal range of motion.      Cervical back: Normal range of motion and neck supple.   Skin:     General: Skin is warm and dry.      Coloration: Skin is not pale.      Findings: No erythema or rash.   Neurological:      Mental Status: He is alert and oriented to person, place, and time.      Cranial Nerves: No cranial nerve deficit.   Psychiatric:         Behavior: Behavior normal.         Thought Content: Thought content normal. Thought content does not include homicidal or suicidal ideation.         Procedures  Results for orders placed or performed during the hospital encounter of 04/15/24   Comprehensive Metabolic Panel    Specimen: Arm, Right; Blood   Result Value Ref Range    Glucose 102 (H) 65 - 99 mg/dL    BUN 9 6 - 20 mg/dL    Creatinine 0.73 (L) 0.76 - 1.27 mg/dL    Sodium 141 136 - 145 mmol/L    Potassium 3.3 (L) 3.5 - 5.2 mmol/L    Chloride 101 98 - 107 mmol/L    CO2 21.5 (L) 22.0 - 29.0 mmol/L    Calcium 8.4 (L) 8.6 - 10.5 mg/dL    Total Protein 7.6 6.0 - 8.5 g/dL    Albumin 3.5 3.5 - 5.2 g/dL    ALT (SGPT) 53 (H) 1 - 41 U/L    AST (SGOT) 265 (H) 1 - 40 U/L    Alkaline Phosphatase 201 (H) 39 - 117 U/L    Total Bilirubin 0.8 0.0 - 1.2 mg/dL    Globulin 4.1 gm/dL    A/G Ratio 0.9 g/dL    BUN/Creatinine Ratio 12.3 7.0 - 25.0    Anion Gap 18.5 (H) 5.0 - 15.0 mmol/L    eGFR 112.2 >60.0 mL/min/1.73   Urinalysis With Microscopic If Indicated (No Culture) - Urine, Clean Catch    Specimen: Urine, Clean Catch   Result Value Ref Range    Color, UA Yellow Yellow, Straw    Appearance, UA Clear Clear    pH, UA 7.0 5.0 - 8.0    Specific Gravity, UA 1.009 1.005 - 1.030    Glucose, UA Negative Negative    Ketones, UA Trace (A) Negative    Bilirubin, UA Negative  Negative    Blood, UA Negative Negative    Protein, UA Trace (A) Negative    Leuk Esterase, UA Negative Negative    Nitrite, UA Negative Negative    Urobilinogen, UA 1.0 E.U./dL 0.2 - 1.0 E.U./dL   Ethanol    Specimen: Arm, Right; Blood   Result Value Ref Range    Ethanol 326 (H) 0 - 10 mg/dL    Ethanol % 0.326 %   Urine Drug Screen - Urine, Clean Catch    Specimen: Urine, Clean Catch   Result Value Ref Range    THC, Screen, Urine Negative Negative    Phencyclidine (PCP), Urine Negative Negative    Cocaine Screen, Urine Negative Negative    Methamphetamine, Ur Negative Negative    Opiate Screen Negative Negative    Amphetamine Screen, Urine Negative Negative    Benzodiazepine Screen, Urine Negative Negative    Tricyclic Antidepressants Screen Negative Negative    Methadone Screen, Urine Negative Negative    Barbiturates Screen, Urine Negative Negative    Oxycodone Screen, Urine Positive (A) Negative    Buprenorphine, Screen, Urine Negative Negative   Magnesium    Specimen: Arm, Right; Blood   Result Value Ref Range    Magnesium 2.3 1.6 - 2.6 mg/dL   CBC Auto Differential    Specimen: Arm, Right; Blood   Result Value Ref Range    WBC 4.90 3.40 - 10.80 10*3/mm3    RBC 3.73 (L) 4.14 - 5.80 10*6/mm3    Hemoglobin 12.6 (L) 13.0 - 17.7 g/dL    Hematocrit 37.2 (L) 37.5 - 51.0 %    MCV 99.7 (H) 79.0 - 97.0 fL    MCH 33.8 (H) 26.6 - 33.0 pg    MCHC 33.9 31.5 - 35.7 g/dL    RDW 14.6 12.3 - 15.4 %    RDW-SD 53.6 37.0 - 54.0 fl    MPV 9.5 6.0 - 12.0 fL    Platelets 185 140 - 450 10*3/mm3    Neutrophil % 62.7 42.7 - 76.0 %    Lymphocyte % 25.9 19.6 - 45.3 %    Monocyte % 8.4 5.0 - 12.0 %    Eosinophil % 0.8 0.3 - 6.2 %    Basophil % 2.0 (H) 0.0 - 1.5 %    Immature Grans % 0.2 0.0 - 0.5 %    Neutrophils, Absolute 3.07 1.70 - 7.00 10*3/mm3    Lymphocytes, Absolute 1.27 0.70 - 3.10 10*3/mm3    Monocytes, Absolute 0.41 0.10 - 0.90 10*3/mm3    Eosinophils, Absolute 0.04 0.00 - 0.40 10*3/mm3    Basophils, Absolute 0.10 0.00 - 0.20  "10*3/mm3    Immature Grans, Absolute 0.01 0.00 - 0.05 10*3/mm3    nRBC 0.0 0.0 - 0.2 /100 WBC   Fentanyl, Urine - Urine, Clean Catch    Specimen: Urine, Clean Catch   Result Value Ref Range    Fentanyl, Urine Negative Negative   Ethanol    Specimen: Arm, Right; Blood   Result Value Ref Range    Ethanol 115 (H) 0 - 10 mg/dL    Ethanol % 0.115 %   Ethanol    Specimen: Arm, Right; Blood   Result Value Ref Range    Ethanol 67 (H) 0 - 10 mg/dL    Ethanol % 0.067 %               ED Course  ED Course as of 04/16/24 0427   Tue Apr 16, 2024   1306 I assumed patient's care from Mayur this evening at the end of his shift.  Please see his documentation above.  I saw this patient on Saturday, April 13, he refused admission to the detox unit, he swore that he would never drink again and I referred him to intensive outpatient therapy.  He went straight back to his usual drinking habits, nearly 1/5 of vodka daily that he admits to.  Psychiatry intake has evaluated the patient.  He refuses to be admitted to the detox unit.  Just as on Saturday, he states he has been there before, \"that places like being in group home\", \"they will not even let me keep my cell phone\".  He states that he has made arrangements to be admitted to an inpatient rehab facility in [place].  I have explained to him that that rehab facility will require him to be medically detoxed first.  He does not feel that that is the case.  When I first spoke with him he told me that he was not going to go to the detox unit, then later he told the nursing staff that he would, but he is now once again saying that he will not go to the [facility], is refusing detox admission.  He wants to go home.  He is leaving AGAINST MEDICAL ADVICE.  He is in no apparent distress.  He has done well with his Ativan during his ED stay.  At his time of departure he is not showing signs of acute alcohol withdrawal. [CM]      ED Course User Index  [CM] Poli Quezada MD                     "             Electronically signed by KEYUR Ibrahim, 04/15/24, 8:02 PM EDT.              Medical Decision Making  Amount and/or Complexity of Data Reviewed  Labs: ordered. Decision-making details documented in ED Course.    Risk  Prescription drug management.  Parenteral controlled substances.        Final diagnoses:   Alcohol abuse       ED Disposition  ED Disposition       ED Disposition   DC/Transfer to Behavioral Health Condition   Stable    Comment   --               Please note that portions of this note were completed with a voice recognition program.                Poli Quezada MD  04/16/24 0427

## 2024-04-15 NOTE — ED NOTES
"Had a long discussion with pt and pt state \" I am not suicidal I don't want to hurt myself or family\"   "

## 2024-04-16 ENCOUNTER — OFFICE VISIT (OUTPATIENT)
Dept: PSYCHIATRY | Facility: CLINIC | Age: 49
End: 2024-04-16
Payer: MEDICARE

## 2024-04-16 DIAGNOSIS — F41.9 ANXIETY DISORDER, UNSPECIFIED TYPE: ICD-10-CM

## 2024-04-16 DIAGNOSIS — Z79.899 MEDICATION MANAGEMENT: ICD-10-CM

## 2024-04-16 DIAGNOSIS — F10.20 ALCOHOL USE DISORDER, SEVERE, DEPENDENCE: Primary | ICD-10-CM

## 2024-04-16 DIAGNOSIS — G89.4 CHRONIC PAIN DISORDER: ICD-10-CM

## 2024-04-16 LAB
ETHANOL BLD-MCNC: 115 MG/DL (ref 0–10)
ETHANOL BLD-MCNC: 67 MG/DL (ref 0–10)
ETHANOL UR QL: 0.07 %
ETHANOL UR QL: 0.12 %

## 2024-04-16 PROCEDURE — 82077 ASSAY SPEC XCP UR&BREATH IA: CPT | Performed by: EMERGENCY MEDICINE

## 2024-04-16 PROCEDURE — 90791 PSYCH DIAGNOSTIC EVALUATION: CPT | Performed by: SOCIAL WORKER

## 2024-04-16 PROCEDURE — 96376 TX/PRO/DX INJ SAME DRUG ADON: CPT

## 2024-04-16 PROCEDURE — 82077 ASSAY SPEC XCP UR&BREATH IA: CPT | Performed by: PHYSICIAN ASSISTANT

## 2024-04-16 PROCEDURE — 25010000002 LORAZEPAM PER 2 MG: Performed by: EMERGENCY MEDICINE

## 2024-04-16 RX ORDER — LORAZEPAM 2 MG/ML
1 INJECTION INTRAMUSCULAR ONCE
Status: COMPLETED | OUTPATIENT
Start: 2024-04-16 | End: 2024-04-16

## 2024-04-16 RX ORDER — POTASSIUM CHLORIDE 20 MEQ/1
40 TABLET, EXTENDED RELEASE ORAL ONCE
Status: COMPLETED | OUTPATIENT
Start: 2024-04-16 | End: 2024-04-16

## 2024-04-16 RX ADMIN — LORAZEPAM 1 MG: 2 INJECTION INTRAMUSCULAR; INTRAVENOUS at 01:26

## 2024-04-16 RX ADMIN — POTASSIUM CHLORIDE 40 MEQ: 1500 TABLET, EXTENDED RELEASE ORAL at 03:47
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
## 2024-04-16 NOTE — NURSING NOTE
Spoke to patient regarding interest in detox. Patient declined offer for medical detox, and reports he still has outpatient resources if he changes his mind. Denied SI/HI/AVH.

## 2024-04-17 ENCOUNTER — OFFICE VISIT (OUTPATIENT)
Dept: PSYCHIATRY | Facility: HOSPITAL | Age: 49
End: 2024-04-17
Payer: MEDICARE

## 2024-04-17 DIAGNOSIS — F10.20 ALCOHOL USE DISORDER, SEVERE, DEPENDENCE: Primary | ICD-10-CM

## 2024-04-17 DIAGNOSIS — F41.9 ANXIETY DISORDER, UNSPECIFIED TYPE: ICD-10-CM

## 2024-04-17 DIAGNOSIS — G89.4 CHRONIC PAIN DISORDER: ICD-10-CM

## 2024-04-17 LAB
EXTERNAL AMPHETAMINE SCREEN URINE: NEGATIVE
EXTERNAL BENZODIAZEPINE SCREEN URINE: NEGATIVE
EXTERNAL BUPRENORPHINE SCREEN URINE: NEGATIVE
EXTERNAL COCAINE SCREEN URINE: NEGATIVE
EXTERNAL MDMA: NEGATIVE
EXTERNAL METHADONE SCREEN URINE: NEGATIVE
EXTERNAL METHAMPHETAMINE SCREEN URINE: NEGATIVE
EXTERNAL OPIATES SCREEN URINE: NEGATIVE
EXTERNAL OXYCODONE SCREEN URINE: POSITIVE
EXTERNAL THC SCREEN URINE: NEGATIVE

## 2024-04-17 PROCEDURE — H0015 ALCOHOL AND/OR DRUG SERVICES: HCPCS | Performed by: SOCIAL WORKER

## 2024-04-17 NOTE — PROGRESS NOTES
"   NAME: Taiwo Pierce  DATE: 04/17/2024    [facility] Phase I  [id number]    Services Provided    Group Psychotherapy x 2  Education/Training x 1  Activity Therapy  x 0  Individual Therapy x 0 0 minutes  Family Therapy x 0  MD Initial Visit  x 0  MD Follow-Up   x 0    Number of participants: 5    DATA:  Patient reported wishing things were better today. He had not been able to sleep. He was tearful when sharing this. We processed this together with the group. The patient had been attributing symptoms of higher anxiety and insomnia to withdrawal symptoms. It had been over a week since his last use of alcohol. Therapist suggested these were post-acute withdrawal symptoms, explaining his body was still adjusting to life without alcohol. Patient was receptive to this suggestion. He reported having achieved 3 hours of sleep total over the last 3 days.     Individual: No    Homework: Assigned Three Good People assignment    Group 1 (Psychotherapy: Check-ins): Therapist continued facilitation of rapport building strategies between group members. Therapist asked that each patient check in with home life and recovery efforts and identify triggers, cravings, and high risk situations that arise between group sessions.  Group members discussed barriers and benefits of attending recovery meetings.  Therapist provided empathy and support during group session. Daily Reading:  First and Second Things, by JESSICA Parsons.    Group 2  (Values) Viewed and discussed the video, Fight Depression and Anxiety with Your Core Values (2022) by [employer] In [employer] with Renee Morfin on YouTube.     Group 3 (Values) Completed individually and processed together a values clarification exercise entitled, \"Three Good People,\" from Therapist Mashed Pixel.      ASSESSMENT:    Personal Assessment 0-10 Scale (10 worst)    Anxiety:  8 (no comment)  Depression:  8 (no comment)  Cravings: 8 (no comment)     MSE within normal limits? No Affect: tearful and " agitated  Mood: anxious  Pt Response:  open/receptive  Engaged in activity/Process and self-disclosed: suboptimal  Applies today's group topics to self: suboptimal  Able to give and receive feedback: moderate  Demonstrated insightful thinking: inconsistent and suboptimal  Other pt response comments:  Patient was a positive participant. They demonstrated a relatively optimistic attitude, a moderate degree of motivation, and moderate insight, as evidenced by occasional interruptions when he peers were speaking combined with difficult comprehending concepts being discussed. They seemed interested and distracted. They appeared to experience difficulty comprehending the concepts being discussed. The patient seemed receptive of, and somewhat willing to implement, the ideas being discussed. Their thought process appeared circumstantial , and their speech was pressured.       Community Group Engagement:  No: not at this time    Reported relapse since last meeting? No: no lapse    .  Office Visit on 04/16/2024   Component Date Value Ref Range Status    External Amphetamine Screen Urine 04/17/2024 Negative   Final    External Benzodiazepine Screen Uri* 04/17/2024 Negative   Final    External Cocaine Screen Urine 04/17/2024 Negative   Final    External THC Screen Urine 04/17/2024 Negative   Final    External Methadone Screen Urine 04/17/2024 Negative   Final    External Methamphetamine Screen Ur* 04/17/2024 Negative   Final    External Oxycodone Screen Urine 04/17/2024 Positive (A)   Final    External Buprenorphine Screen Urine 04/17/2024 Negative   Final    External MDMA 04/17/2024 Negative   Final    External Opiates Screen Urine 04/17/2024 Negative   Final   Admission on 04/15/2024, Discharged on 04/16/2024   Component Date Value Ref Range Status    Glucose 04/15/2024 102 (H)  65 - 99 mg/dL Final    BUN 04/15/2024 9  6 - 20 mg/dL Final    Creatinine 04/15/2024 0.73 (L)  0.76 - 1.27 mg/dL Final    Sodium 04/15/2024 141  136  - 145 mmol/L Final    Potassium 04/15/2024 3.3 (L)  3.5 - 5.2 mmol/L Final    Chloride 04/15/2024 101  98 - 107 mmol/L Final    CO2 04/15/2024 21.5 (L)  22.0 - 29.0 mmol/L Final    Calcium 04/15/2024 8.4 (L)  8.6 - 10.5 mg/dL Final    Total Protein 04/15/2024 7.6  6.0 - 8.5 g/dL Final    Albumin 04/15/2024 3.5  3.5 - 5.2 g/dL Final    ALT (SGPT) 04/15/2024 53 (H)  1 - 41 U/L Final    AST (SGOT) 04/15/2024 265 (H)  1 - 40 U/L Final    Alkaline Phosphatase 04/15/2024 201 (H)  39 - 117 U/L Final    Total Bilirubin 04/15/2024 0.8  0.0 - 1.2 mg/dL Final    Globulin 04/15/2024 4.1  gm/dL Final    A/G Ratio 04/15/2024 0.9  g/dL Final    BUN/Creatinine Ratio 04/15/2024 12.3  7.0 - 25.0 Final    Anion Gap 04/15/2024 18.5 (H)  5.0 - 15.0 mmol/L Final    eGFR 04/15/2024 112.2  >60.0 mL/min/1.73 Final    Color, UA 04/15/2024 Yellow  Yellow, Straw Final    Appearance, UA 04/15/2024 Clear  Clear Final    pH, UA 04/15/2024 7.0  5.0 - 8.0 Final    Specific Gravity, UA 04/15/2024 1.009  1.005 - 1.030 Final    Glucose, UA 04/15/2024 Negative  Negative Final    Ketones, UA 04/15/2024 Trace (A)  Negative Final    Bilirubin, UA 04/15/2024 Negative  Negative Final    Blood, UA 04/15/2024 Negative  Negative Final    Protein, UA 04/15/2024 Trace (A)  Negative Final    Leuk Esterase, UA 04/15/2024 Negative  Negative Final    Nitrite, UA 04/15/2024 Negative  Negative Final    Urobilinogen, UA 04/15/2024 1.0 E.U./dL  0.2 - 1.0 E.U./dL Final    Ethanol 04/15/2024 326 (H)  0 - 10 mg/dL Final    Ethanol % 04/15/2024 0.326  % Final    THC, Screen, Urine 04/15/2024 Negative  Negative Final    Phencyclidine (PCP), Urine 04/15/2024 Negative  Negative Final    Cocaine Screen, Urine 04/15/2024 Negative  Negative Final    Methamphetamine, Ur 04/15/2024 Negative  Negative Final    Opiate Screen 04/15/2024 Negative  Negative Final    Amphetamine Screen, Urine 04/15/2024 Negative  Negative Final    Benzodiazepine Screen, Urine 04/15/2024 Negative   Negative Final    Tricyclic Antidepressants Screen 04/15/2024 Negative  Negative Final    Methadone Screen, Urine 04/15/2024 Negative  Negative Final    Barbiturates Screen, Urine 04/15/2024 Negative  Negative Final    Oxycodone Screen, Urine 04/15/2024 Positive (A)  Negative Final    Buprenorphine, Screen, Urine 04/15/2024 Negative  Negative Final    Magnesium 04/15/2024 2.3  1.6 - 2.6 mg/dL Final    WBC 04/15/2024 4.90  3.40 - 10.80 10*3/mm3 Final    RBC 04/15/2024 3.73 (L)  4.14 - 5.80 10*6/mm3 Final    Hemoglobin 04/15/2024 12.6 (L)  13.0 - 17.7 g/dL Final    Hematocrit 04/15/2024 37.2 (L)  37.5 - 51.0 % Final    MCV 04/15/2024 99.7 (H)  79.0 - 97.0 fL Final    MCH 04/15/2024 33.8 (H)  26.6 - 33.0 pg Final    MCHC 04/15/2024 33.9  31.5 - 35.7 g/dL Final    RDW 04/15/2024 14.6  12.3 - 15.4 % Final    RDW-SD 04/15/2024 53.6  37.0 - 54.0 fl Final    MPV 04/15/2024 9.5  6.0 - 12.0 fL Final    Platelets 04/15/2024 185  140 - 450 10*3/mm3 Final    Neutrophil % 04/15/2024 62.7  42.7 - 76.0 % Final    Lymphocyte % 04/15/2024 25.9  19.6 - 45.3 % Final    Monocyte % 04/15/2024 8.4  5.0 - 12.0 % Final    Eosinophil % 04/15/2024 0.8  0.3 - 6.2 % Final    Basophil % 04/15/2024 2.0 (H)  0.0 - 1.5 % Final    Immature Grans % 04/15/2024 0.2  0.0 - 0.5 % Final    Neutrophils, Absolute 04/15/2024 3.07  1.70 - 7.00 10*3/mm3 Final    Lymphocytes, Absolute 04/15/2024 1.27  0.70 - 3.10 10*3/mm3 Final    Monocytes, Absolute 04/15/2024 0.41  0.10 - 0.90 10*3/mm3 Final    Eosinophils, Absolute 04/15/2024 0.04  0.00 - 0.40 10*3/mm3 Final    Basophils, Absolute 04/15/2024 0.10  0.00 - 0.20 10*3/mm3 Final    Immature Grans, Absolute 04/15/2024 0.01  0.00 - 0.05 10*3/mm3 Final    nRBC 04/15/2024 0.0  0.0 - 0.2 /100 WBC Final    Fentanyl, Urine 04/15/2024 Negative  Negative Final    Ethanol 04/16/2024 115 (H)  0 - 10 mg/dL Final    Ethanol % 04/16/2024 0.115  % Final    Ethanol 04/16/2024 67 (H)  0 - 10 mg/dL Final    Ethanol % 04/16/2024  0.067  % Final   Admission on 04/13/2024, Discharged on 04/13/2024   Component Date Value Ref Range Status    Glucose 04/13/2024 133 (H)  65 - 99 mg/dL Final    BUN 04/13/2024 8  6 - 20 mg/dL Final    Creatinine 04/13/2024 0.83  0.76 - 1.27 mg/dL Final    Sodium 04/13/2024 140  136 - 145 mmol/L Final    Potassium 04/13/2024 3.7  3.5 - 5.2 mmol/L Final    Specimen hemolyzed.  Result may be falsely elevated.    Chloride 04/13/2024 101  98 - 107 mmol/L Final    CO2 04/13/2024 23.5  22.0 - 29.0 mmol/L Final    Calcium 04/13/2024 8.8  8.6 - 10.5 mg/dL Final    Total Protein 04/13/2024 8.0  6.0 - 8.5 g/dL Final    Albumin 04/13/2024 3.4 (L)  3.5 - 5.2 g/dL Final    ALT (SGPT) 04/13/2024 54 (H)  1 - 41 U/L Final    Specimen hemolyzed.  Result may  be falsely elevated.    AST (SGOT) 04/13/2024 290 (H)  1 - 40 U/L Final    Alkaline Phosphatase 04/13/2024 207 (H)  39 - 117 U/L Final    Total Bilirubin 04/13/2024 0.6  0.0 - 1.2 mg/dL Final    Globulin 04/13/2024 4.6  gm/dL Final    A/G Ratio 04/13/2024 0.7  g/dL Final    BUN/Creatinine Ratio 04/13/2024 9.6  7.0 - 25.0 Final    Anion Gap 04/13/2024 15.5 (H)  5.0 - 15.0 mmol/L Final    eGFR 04/13/2024 108.0  >60.0 mL/min/1.73 Final    Ethanol 04/13/2024 355 (H)  0 - 10 mg/dL Final    Ethanol % 04/13/2024 0.355  % Final    Magnesium 04/13/2024 2.1  1.6 - 2.6 mg/dL Final    WBC 04/13/2024 7.49  3.40 - 10.80 10*3/mm3 Final    RBC 04/13/2024 3.75 (L)  4.14 - 5.80 10*6/mm3 Final    Hemoglobin 04/13/2024 12.5 (L)  13.0 - 17.7 g/dL Final    Hematocrit 04/13/2024 37.2 (L)  37.5 - 51.0 % Final    MCV 04/13/2024 99.2 (H)  79.0 - 97.0 fL Final    MCH 04/13/2024 33.3 (H)  26.6 - 33.0 pg Final    MCHC 04/13/2024 33.6  31.5 - 35.7 g/dL Final    RDW 04/13/2024 14.5  12.3 - 15.4 % Final    RDW-SD 04/13/2024 53.2  37.0 - 54.0 fl Final    MPV 04/13/2024 9.8  6.0 - 12.0 fL Final    Platelets 04/13/2024 180  140 - 450 10*3/mm3 Final    Neutrophil % 04/13/2024 58.9  42.7 - 76.0 % Final    Lymphocyte  % 04/13/2024 29.9  19.6 - 45.3 % Final    Monocyte % 04/13/2024 9.5  5.0 - 12.0 % Final    Eosinophil % 04/13/2024 0.4  0.3 - 6.2 % Final    Basophil % 04/13/2024 1.2  0.0 - 1.5 % Final    Immature Grans % 04/13/2024 0.1  0.0 - 0.5 % Final    Neutrophils, Absolute 04/13/2024 4.41  1.70 - 7.00 10*3/mm3 Final    Lymphocytes, Absolute 04/13/2024 2.24  0.70 - 3.10 10*3/mm3 Final    Monocytes, Absolute 04/13/2024 0.71  0.10 - 0.90 10*3/mm3 Final    Eosinophils, Absolute 04/13/2024 0.03  0.00 - 0.40 10*3/mm3 Final    Basophils, Absolute 04/13/2024 0.09  0.00 - 0.20 10*3/mm3 Final    Immature Grans, Absolute 04/13/2024 0.01  0.00 - 0.05 10*3/mm3 Final    nRBC 04/13/2024 0.0  0.0 - 0.2 /100 WBC Final       Impression/Formulation:    ICD-10-CM ICD-9-CM   1. Alcohol use disorder, severe, dependence  F10.20 303.90   2. Chronic pain disorder  G89.4 338.4   3. Anxiety disorder, unspecified type  F41.9 300.00        CLINICAL MANEUVERING/INTERVENTIONS: Therapist utilized a person-centered approach to build and maintain rapport with group member.   Therapist promoted safe nonjudgmental environment by providing group members with unconditional positive regard.  Assisted member in processing above session content; acknowledged and normalized patient's thoughts, feelings and concerns.  Allowed patient to freely discuss issues without interruption or judgment. Provided safe, confidential environment to facilitate the development of positive therapeutic relationship and encourage open, honest communication. Therapist assisted group member with identifying and implementing healthier coping strategies and maintaining healthier boundaries. Assisted patient in identifying risk factors which would indicate the need for higher level of care including thoughts to harm self or others and/or self-harming behavior and encouraged patient to contact this office, call 911, or present to the nearest emergency room should any of these events occur. Pt  agreeable to adhere to medication regimen as prescribed and report any side effects.  Discussed crisis intervention services and means to access.  Patient adamantly and convincingly denies current suicidal or homicidal ideation or perceptual disturbance.      Therapist implemented motivational interviewing techniques to assist client with exploring and resolving ambivalence associated with commitment to change behaviors.  Yes  Therapist utilized dialectical behavior techniques to teach and model emotional regulation and relaxation.  No   Therapist applied cognitive behavioral strategies to facilitate identification of maladaptive patterns of thinking and behavior.  Yes     PLAN:    Continue [facility] IOP Phase I  Aftercare:  [facility] Outpatient Phase 2      Please note that portions of this note were completed with a voice recognition program. Efforts were made to edit dictation, but occasionally words are mistranscribed.     This document signed by Ben Ortega LCSW, April 17, 2024, 15:31 EDT

## 2024-04-18 ENCOUNTER — OFFICE VISIT (OUTPATIENT)
Dept: PSYCHIATRY | Facility: HOSPITAL | Age: 49
End: 2024-04-18
Payer: MEDICARE

## 2024-04-18 DIAGNOSIS — F10.20 ALCOHOL USE DISORDER, SEVERE, DEPENDENCE: Primary | ICD-10-CM

## 2024-04-18 DIAGNOSIS — F41.9 ANXIETY DISORDER, UNSPECIFIED TYPE: ICD-10-CM

## 2024-04-18 DIAGNOSIS — G89.4 CHRONIC PAIN DISORDER: ICD-10-CM

## 2024-04-18 PROCEDURE — H0015 ALCOHOL AND/OR DRUG SERVICES: HCPCS | Performed by: SOCIAL WORKER

## 2024-04-18 NOTE — PROGRESS NOTES
NAME: Taiwo Pierce  DATE: 04/18/2024    [facility] Phase I  [id number]    Services Provided    Group Psychotherapy x 1  Education/Training x 2  Activity Therapy  x 0  Individual Therapy x 1 30 minutes  Family Therapy x 0  MD Initial Visit  x 0  MD Follow-Up   x 0    Number of participants: 2    DATA:  Patient reported he was tired. He estimated having slept four hours last night, which was an improvement. However, he was still feeling depressed and anxious. He was hoping the appointment with his primary care physician at noon would go well today. He asserted that he did not want to be dependent on any medications, but pain medication he believed would probably be an exception.     Individual: Yes, describe: Initiated treatment plan    Homework: None assigned    Group 1 (Psychotherapy: Check-ins): Therapist continued facilitation of rapport building strategies between group members. Therapist asked that each patient check in with home life and recovery efforts and identify triggers, cravings, and high risk situations that arise between group sessions.  Group members discussed barriers and benefits of attending recovery meetings.  Therapist provided empathy and support during group session. Daily Reading: None    Group 2  (CBT) Reviewed the cognitive model.    Group 3 (Peer Support) This hour of group was facilitated by Sandhya .     ASSESSMENT:    Personal Assessment 0-10 Scale (10 worst)    Anxiety:  6 (no comment)  Depression:  6 (no comment)  Cravings: 0 (surprising to him that he had none today)     MSE within normal limits? Yes Affect: agitated  Mood: anxious  Pt Response:  open/receptive  Engaged in activity/Process and self-disclosed: adequate  Applies today's group topics to self: suboptimal  Able to give and receive feedback: suboptimal  Demonstrated insightful thinking: occasional and adequate  Other pt response comments:  Patient was a positive participant. They demonstrated a  relatively positive attitude, a strong degree of motivation, and adequate insight, as evidenced by his level of engagement combined with his open-mindedness to feedback. They seemed interested and attentive. They appeared to comprehend well the concepts being discussed. The patient seemed receptive of, and willing to implement, the ideas being discussed. Their thought process appeared linear and goal directed, and their speech was regular rate and rhythm.       Community Group Engagement:  Yes: [facility] weekly    Reported relapse since last meeting? No: no lapse    .  Office Visit on 04/16/2024   Component Date Value Ref Range Status    External Amphetamine Screen Urine 04/17/2024 Negative   Final    External Benzodiazepine Screen Uri* 04/17/2024 Negative   Final    External Cocaine Screen Urine 04/17/2024 Negative   Final    External THC Screen Urine 04/17/2024 Negative   Final    External Methadone Screen Urine 04/17/2024 Negative   Final    External Methamphetamine Screen Ur* 04/17/2024 Negative   Final    External Oxycodone Screen Urine 04/17/2024 Positive (A)   Final    External Buprenorphine Screen Urine 04/17/2024 Negative   Final    External MDMA 04/17/2024 Negative   Final    External Opiates Screen Urine 04/17/2024 Negative   Final   Admission on 04/15/2024, Discharged on 04/16/2024   Component Date Value Ref Range Status    Glucose 04/15/2024 102 (H)  65 - 99 mg/dL Final    BUN 04/15/2024 9  6 - 20 mg/dL Final    Creatinine 04/15/2024 0.73 (L)  0.76 - 1.27 mg/dL Final    Sodium 04/15/2024 141  136 - 145 mmol/L Final    Potassium 04/15/2024 3.3 (L)  3.5 - 5.2 mmol/L Final    Chloride 04/15/2024 101  98 - 107 mmol/L Final    CO2 04/15/2024 21.5 (L)  22.0 - 29.0 mmol/L Final    Calcium 04/15/2024 8.4 (L)  8.6 - 10.5 mg/dL Final    Total Protein 04/15/2024 7.6  6.0 - 8.5 g/dL Final    Albumin 04/15/2024 3.5  3.5 - 5.2 g/dL Final    ALT (SGPT) 04/15/2024 53 (H)  1 - 41 U/L Final    AST (SGOT) 04/15/2024 265 (H)   1 - 40 U/L Final    Alkaline Phosphatase 04/15/2024 201 (H)  39 - 117 U/L Final    Total Bilirubin 04/15/2024 0.8  0.0 - 1.2 mg/dL Final    Globulin 04/15/2024 4.1  gm/dL Final    A/G Ratio 04/15/2024 0.9  g/dL Final    BUN/Creatinine Ratio 04/15/2024 12.3  7.0 - 25.0 Final    Anion Gap 04/15/2024 18.5 (H)  5.0 - 15.0 mmol/L Final    eGFR 04/15/2024 112.2  >60.0 mL/min/1.73 Final    Color, UA 04/15/2024 Yellow  Yellow, Straw Final    Appearance, UA 04/15/2024 Clear  Clear Final    pH, UA 04/15/2024 7.0  5.0 - 8.0 Final    Specific Gravity, UA 04/15/2024 1.009  1.005 - 1.030 Final    Glucose, UA 04/15/2024 Negative  Negative Final    Ketones, UA 04/15/2024 Trace (A)  Negative Final    Bilirubin, UA 04/15/2024 Negative  Negative Final    Blood, UA 04/15/2024 Negative  Negative Final    Protein, UA 04/15/2024 Trace (A)  Negative Final    Leuk Esterase, UA 04/15/2024 Negative  Negative Final    Nitrite, UA 04/15/2024 Negative  Negative Final    Urobilinogen, UA 04/15/2024 1.0 E.U./dL  0.2 - 1.0 E.U./dL Final    Ethanol 04/15/2024 326 (H)  0 - 10 mg/dL Final    Ethanol % 04/15/2024 0.326  % Final    THC, Screen, Urine 04/15/2024 Negative  Negative Final    Phencyclidine (PCP), Urine 04/15/2024 Negative  Negative Final    Cocaine Screen, Urine 04/15/2024 Negative  Negative Final    Methamphetamine, Ur 04/15/2024 Negative  Negative Final    Opiate Screen 04/15/2024 Negative  Negative Final    Amphetamine Screen, Urine 04/15/2024 Negative  Negative Final    Benzodiazepine Screen, Urine 04/15/2024 Negative  Negative Final    Tricyclic Antidepressants Screen 04/15/2024 Negative  Negative Final    Methadone Screen, Urine 04/15/2024 Negative  Negative Final    Barbiturates Screen, Urine 04/15/2024 Negative  Negative Final    Oxycodone Screen, Urine 04/15/2024 Positive (A)  Negative Final    Buprenorphine, Screen, Urine 04/15/2024 Negative  Negative Final    Magnesium 04/15/2024 2.3  1.6 - 2.6 mg/dL Final    WBC 04/15/2024 4.90   3.40 - 10.80 10*3/mm3 Final    RBC 04/15/2024 3.73 (L)  4.14 - 5.80 10*6/mm3 Final    Hemoglobin 04/15/2024 12.6 (L)  13.0 - 17.7 g/dL Final    Hematocrit 04/15/2024 37.2 (L)  37.5 - 51.0 % Final    MCV 04/15/2024 99.7 (H)  79.0 - 97.0 fL Final    MCH 04/15/2024 33.8 (H)  26.6 - 33.0 pg Final    MCHC 04/15/2024 33.9  31.5 - 35.7 g/dL Final    RDW 04/15/2024 14.6  12.3 - 15.4 % Final    RDW-SD 04/15/2024 53.6  37.0 - 54.0 fl Final    MPV 04/15/2024 9.5  6.0 - 12.0 fL Final    Platelets 04/15/2024 185  140 - 450 10*3/mm3 Final    Neutrophil % 04/15/2024 62.7  42.7 - 76.0 % Final    Lymphocyte % 04/15/2024 25.9  19.6 - 45.3 % Final    Monocyte % 04/15/2024 8.4  5.0 - 12.0 % Final    Eosinophil % 04/15/2024 0.8  0.3 - 6.2 % Final    Basophil % 04/15/2024 2.0 (H)  0.0 - 1.5 % Final    Immature Grans % 04/15/2024 0.2  0.0 - 0.5 % Final    Neutrophils, Absolute 04/15/2024 3.07  1.70 - 7.00 10*3/mm3 Final    Lymphocytes, Absolute 04/15/2024 1.27  0.70 - 3.10 10*3/mm3 Final    Monocytes, Absolute 04/15/2024 0.41  0.10 - 0.90 10*3/mm3 Final    Eosinophils, Absolute 04/15/2024 0.04  0.00 - 0.40 10*3/mm3 Final    Basophils, Absolute 04/15/2024 0.10  0.00 - 0.20 10*3/mm3 Final    Immature Grans, Absolute 04/15/2024 0.01  0.00 - 0.05 10*3/mm3 Final    nRBC 04/15/2024 0.0  0.0 - 0.2 /100 WBC Final    Fentanyl, Urine 04/15/2024 Negative  Negative Final    Ethanol 04/16/2024 115 (H)  0 - 10 mg/dL Final    Ethanol % 04/16/2024 0.115  % Final    Ethanol 04/16/2024 67 (H)  0 - 10 mg/dL Final    Ethanol % 04/16/2024 0.067  % Final   Admission on 04/13/2024, Discharged on 04/13/2024   Component Date Value Ref Range Status    Glucose 04/13/2024 133 (H)  65 - 99 mg/dL Final    BUN 04/13/2024 8  6 - 20 mg/dL Final    Creatinine 04/13/2024 0.83  0.76 - 1.27 mg/dL Final    Sodium 04/13/2024 140  136 - 145 mmol/L Final    Potassium 04/13/2024 3.7  3.5 - 5.2 mmol/L Final    Specimen hemolyzed.  Result may be falsely elevated.    Chloride  04/13/2024 101  98 - 107 mmol/L Final    CO2 04/13/2024 23.5  22.0 - 29.0 mmol/L Final    Calcium 04/13/2024 8.8  8.6 - 10.5 mg/dL Final    Total Protein 04/13/2024 8.0  6.0 - 8.5 g/dL Final    Albumin 04/13/2024 3.4 (L)  3.5 - 5.2 g/dL Final    ALT (SGPT) 04/13/2024 54 (H)  1 - 41 U/L Final    Specimen hemolyzed.  Result may  be falsely elevated.    AST (SGOT) 04/13/2024 290 (H)  1 - 40 U/L Final    Alkaline Phosphatase 04/13/2024 207 (H)  39 - 117 U/L Final    Total Bilirubin 04/13/2024 0.6  0.0 - 1.2 mg/dL Final    Globulin 04/13/2024 4.6  gm/dL Final    A/G Ratio 04/13/2024 0.7  g/dL Final    BUN/Creatinine Ratio 04/13/2024 9.6  7.0 - 25.0 Final    Anion Gap 04/13/2024 15.5 (H)  5.0 - 15.0 mmol/L Final    eGFR 04/13/2024 108.0  >60.0 mL/min/1.73 Final    Ethanol 04/13/2024 355 (H)  0 - 10 mg/dL Final    Ethanol % 04/13/2024 0.355  % Final    Magnesium 04/13/2024 2.1  1.6 - 2.6 mg/dL Final    WBC 04/13/2024 7.49  3.40 - 10.80 10*3/mm3 Final    RBC 04/13/2024 3.75 (L)  4.14 - 5.80 10*6/mm3 Final    Hemoglobin 04/13/2024 12.5 (L)  13.0 - 17.7 g/dL Final    Hematocrit 04/13/2024 37.2 (L)  37.5 - 51.0 % Final    MCV 04/13/2024 99.2 (H)  79.0 - 97.0 fL Final    MCH 04/13/2024 33.3 (H)  26.6 - 33.0 pg Final    MCHC 04/13/2024 33.6  31.5 - 35.7 g/dL Final    RDW 04/13/2024 14.5  12.3 - 15.4 % Final    RDW-SD 04/13/2024 53.2  37.0 - 54.0 fl Final    MPV 04/13/2024 9.8  6.0 - 12.0 fL Final    Platelets 04/13/2024 180  140 - 450 10*3/mm3 Final    Neutrophil % 04/13/2024 58.9  42.7 - 76.0 % Final    Lymphocyte % 04/13/2024 29.9  19.6 - 45.3 % Final    Monocyte % 04/13/2024 9.5  5.0 - 12.0 % Final    Eosinophil % 04/13/2024 0.4  0.3 - 6.2 % Final    Basophil % 04/13/2024 1.2  0.0 - 1.5 % Final    Immature Grans % 04/13/2024 0.1  0.0 - 0.5 % Final    Neutrophils, Absolute 04/13/2024 4.41  1.70 - 7.00 10*3/mm3 Final    Lymphocytes, Absolute 04/13/2024 2.24  0.70 - 3.10 10*3/mm3 Final    Monocytes, Absolute 04/13/2024 0.71   0.10 - 0.90 10*3/mm3 Final    Eosinophils, Absolute 04/13/2024 0.03  0.00 - 0.40 10*3/mm3 Final    Basophils, Absolute 04/13/2024 0.09  0.00 - 0.20 10*3/mm3 Final    Immature Grans, Absolute 04/13/2024 0.01  0.00 - 0.05 10*3/mm3 Final    nRBC 04/13/2024 0.0  0.0 - 0.2 /100 WBC Final       Impression/Formulation:    ICD-10-CM ICD-9-CM   1. Alcohol use disorder, severe, dependence  F10.20 303.90   2. Chronic pain disorder  G89.4 338.4   3. Anxiety disorder, unspecified type  F41.9 300.00        CLINICAL MANEUVERING/INTERVENTIONS: Therapist utilized a person-centered approach to build and maintain rapport with group member.   Therapist promoted safe nonjudgmental environment by providing group members with unconditional positive regard.  Assisted member in processing above session content; acknowledged and normalized patient's thoughts, feelings and concerns.  Allowed patient to freely discuss issues without interruption or judgment. Provided safe, confidential environment to facilitate the development of positive therapeutic relationship and encourage open, honest communication. Therapist assisted group member with identifying and implementing healthier coping strategies and maintaining healthier boundaries. Assisted patient in identifying risk factors which would indicate the need for higher level of care including thoughts to harm self or others and/or self-harming behavior and encouraged patient to contact this office, call 911, or present to the nearest emergency room should any of these events occur. Pt agreeable to adhere to medication regimen as prescribed and report any side effects.  Discussed crisis intervention services and means to access.  Patient adamantly and convincingly denies current suicidal or homicidal ideation or perceptual disturbance.      Therapist implemented motivational interviewing techniques to assist client with exploring and resolving ambivalence associated with commitment to change  behaviors.  Yes  Therapist utilized dialectical behavior techniques to teach and model emotional regulation and relaxation.  No   Therapist applied cognitive behavioral strategies to facilitate identification of maladaptive patterns of thinking and behavior.  Yes     PLAN:    [facility]  Aftercare:  [facility] Phase 2      Please note that portions of this note were completed with a voice recognition program. Efforts were made to edit dictation, but occasionally words are mistranscribed.     This document signed by Ben Ortega LCSW, April 18, 2024, 15:32 EDT

## 2024-04-18 NOTE — PROGRESS NOTES
[address] [address]  [phone]    Intensive Outpatient Program for Chemical Dependence ([facility] IOP)      Individualized Treatment Plan    DATE: 04/18/2024  TIME: 1100         Long Term Goal:  Taiwo will establish a sustained recovery and will maintain total abstinence from mind-altering substances other than what is prescribed and/or approved by the attending physician. Pt will learn, practice, and utilize behavioral and cognitive coping skills to help maintain sobriety.    Patient Care Needs:  Taiwo presents with alcohol use disorder and is at high risk for relapse without continued treatment.  Pt has a history of using drugs/alcohol until intoxicated or passed out and has been unable to stop or cut down use of alcohol/drugs once starting, despite verbalized desire to do so, and the negative consequences from continued use.  Pt's use has negatively impacted social, occupational, and/or physical functioning.     Objectives:      1.  Patient will participate in a medical evaluation to assess the effects of chemical dependence and will cooperate with an evaluation by the attending psychiatrist or psychiatric nurse practitioner for psychotropic medication if appropriate.    2.  Patient will adhere to the IOP group rules and guidelines.    3.  Patient will attend group sessions as scheduled.  Patient will notify therapist and support staff of any absences or tardies.  Patient will provide a valid and verifiable excuse for absences to be considered excused.    4.  Patient will participate in random drug and alcohol screenings and will exhibit negative results on said screenings.    5.  Patient will identify high risk situations, people, and places to avoid or manage in order to maintain sobriety. He could not think of any at this time.     6.  Patient will participate in group discussions by giving and receiving feedback appropriately.    7.  Patient will develop a  positive network of support by attending recovery groups or other spiritual fellowships each week outside of IOP group and by exploring/obtaining/maintaining sponsorship. He attends [employer] [facility] and will be meeting with his  regularly.     8.  Patient will identify, practice, and implement at least 5 healthy coping strategies to manage difficult emotions while remaining abstinent. He reported trying to stay busy. He would do thinks like mowing his yard, fishing, hunting, finding mushrooms, etc.     9.  Patient will develop relapse prevention skills and be able to identify and share them with the group in the form of a relapse prevention plan.    10.  Patient will develop a personal recovery plan and share it with the group prior to discharge.    11.  Patient will offer family participation in family education groups, if appropriate.    12.  Patient will exhibit increased motivation to change as assessed by observable behaviors in conjunction with the ASAM assessment tool.    13.  Patient will utilize healthy coping skills to manage depressive and anxious symptoms without using alcohol or other mind-altering substances and will exhibit decreased score on the PHQ-9 and FEI-7.    Interventions:  Patient will attend biweekly appointments with the attending psychiatrist or psychiatric nurse practitioner for medication evaluation and management unless scheduled otherwise.  Patient will be referred to a medical provider for a physical examination if he/she is not already established with a medical provider.  Patient will comply with recommendations and appointments with his/her medical treatment team, including medication management.  Therapist will introduce and review IOP group rules, and patient will be provided a hard copy of the group rules upon entering the program.  IOP group sessions will be offered 3-4 times per week, Mondays, Tuesdays, Wednesdays and Thursdays during Phase I of treatment, with the  exception of some federal holidays. Patient will be provided with random and regular drug and alcohol screenings. Therapist will utilize motivational interviewing techniques to assist patient with exploring and resolving ambivalence associated with commitment to change behaviors related to substance use and addiction.  Therapist will utilize behavioral and cognitive techniques to assist patient with identifying and recognizing patterns of irrational beliefs that contribute to patient's risk for continued substance abuse and relapse.  Therapist will provide psychoeducation to assist patient in increasing knowledge on the disease concept and the effects of chemical dependence.  Therapist will provide and utilize evidenced-based recovery literature to assist patient in identifying healthier coping strategies.  Therapist will assist patient in developing a relapse prevention plan and a personal recovery plan.  Therapist will assist patient with exploring self-help strategies and beginning work on the 12 Steps.  Therapist will encourage patient to explore, obtain, or maintain sponsorship and/or interpersonal connection with [facility] fellowships.  Therapist will offer family education groups, if appropriate, and will encourage patient to invite family member(s) to participate. Treatment team members will provide patient with progress reports as needed.    Team Members:  Patient - Taiwo Pierce  Medical Provider - Eb Bolton MD  [facility] Licensed Therapist - Ben Ortega LCSW  [facility] Director - Trace Darnell LCSW, [facility]  Support Staff

## 2024-04-19 ENCOUNTER — TRANSCRIBE ORDERS (OUTPATIENT)
Dept: ADMINISTRATIVE | Facility: HOSPITAL | Age: 49
End: 2024-04-19
Payer: MEDICARE

## 2024-04-19 DIAGNOSIS — R74.8 ELEVATED LIVER ENZYMES: Primary | ICD-10-CM

## 2024-04-22 ENCOUNTER — OFFICE VISIT (OUTPATIENT)
Dept: PSYCHIATRY | Facility: HOSPITAL | Age: 49
End: 2024-04-22
Payer: MEDICARE

## 2024-04-22 DIAGNOSIS — F10.20 ALCOHOL USE DISORDER, SEVERE, DEPENDENCE: Primary | ICD-10-CM

## 2024-04-22 DIAGNOSIS — F41.9 ANXIETY DISORDER, UNSPECIFIED TYPE: ICD-10-CM

## 2024-04-22 DIAGNOSIS — G89.4 CHRONIC PAIN DISORDER: ICD-10-CM

## 2024-04-22 PROCEDURE — H0015 ALCOHOL AND/OR DRUG SERVICES: HCPCS | Performed by: SOCIAL WORKER

## 2024-04-22 NOTE — PROGRESS NOTES
NAME: Taiwo Pierce  DATE: 04/22/2024    [facility] Phase I  [id number]    Services Provided    Group Psychotherapy x 1  Education/Training x 2  Activity Therapy  x 0  Individual Therapy x 0 0 minutes  Family Therapy x 0  MD Initial Visit  x 0  MD Follow-Up   x 0    Number of participants: 4    DATA:  Patient reported he was doing well today. He felt the program was helping.     Individual: No    Homework: None assigned    Group 1 (Psychotherapy: Check-ins): Therapist continued facilitation of rapport building strategies between group members. Therapist asked that each patient check in with home life and recovery efforts and identify triggers, cravings, and high risk situations that arise between group sessions.  Group members discussed barriers and benefits of attending recovery meetings.  Therapist provided empathy and support during group session. Daily Reading: None    Group 2  (Relapse Prevention) Discussed coping skills and sources of support.     Group 3 (Relapse Prevention) Discussed sources of motivation.      ASSESSMENT:    Personal Assessment 0-10 Scale (10 worst)    Anxiety:  2 (come way down)  Depression:  2 (no comment)  Cravings: 0 (no comment)     MSE within normal limits? Yes Affect: euthymic Mood: calm  Pt Response:  open/receptive  Engaged in activity/Process and self-disclosed: suboptimal  Applies today's group topics to self: suboptimal  Able to give and receive feedback: adequate  Demonstrated insightful thinking: occasional and adequate  Other pt response comments:  Patient was a positive participant. They demonstrated a relatively positive attitude, a strong degree of motivation, and adequate insight, as evidenced by his level of engagement combined with his positive attitude. They seemed interested and attentive. They appeared to have a fair degree of comprehension regarding the concepts being discussed . The patient seemed receptive of, and willing to implement, the ideas being  discussed. Their thought process appeared linear and goal directed, and their speech was regular rate and rhythm.       Community Group Engagement:  Yes: Worship     Reported relapse since last meeting? No: last drink was over a week ago    .  Office Visit on 04/16/2024   Component Date Value Ref Range Status    External Amphetamine Screen Urine 04/17/2024 Negative   Final    External Benzodiazepine Screen Uri* 04/17/2024 Negative   Final    External Cocaine Screen Urine 04/17/2024 Negative   Final    External THC Screen Urine 04/17/2024 Negative   Final    External Methadone Screen Urine 04/17/2024 Negative   Final    External Methamphetamine Screen Ur* 04/17/2024 Negative   Final    External Oxycodone Screen Urine 04/17/2024 Positive (A)   Final    External Buprenorphine Screen Urine 04/17/2024 Negative   Final    External MDMA 04/17/2024 Negative   Final    External Opiates Screen Urine 04/17/2024 Negative   Final   Admission on 04/15/2024, Discharged on 04/16/2024   Component Date Value Ref Range Status    Glucose 04/15/2024 102 (H)  65 - 99 mg/dL Final    BUN 04/15/2024 9  6 - 20 mg/dL Final    Creatinine 04/15/2024 0.73 (L)  0.76 - 1.27 mg/dL Final    Sodium 04/15/2024 141  136 - 145 mmol/L Final    Potassium 04/15/2024 3.3 (L)  3.5 - 5.2 mmol/L Final    Chloride 04/15/2024 101  98 - 107 mmol/L Final    CO2 04/15/2024 21.5 (L)  22.0 - 29.0 mmol/L Final    Calcium 04/15/2024 8.4 (L)  8.6 - 10.5 mg/dL Final    Total Protein 04/15/2024 7.6  6.0 - 8.5 g/dL Final    Albumin 04/15/2024 3.5  3.5 - 5.2 g/dL Final    ALT (SGPT) 04/15/2024 53 (H)  1 - 41 U/L Final    AST (SGOT) 04/15/2024 265 (H)  1 - 40 U/L Final    Alkaline Phosphatase 04/15/2024 201 (H)  39 - 117 U/L Final    Total Bilirubin 04/15/2024 0.8  0.0 - 1.2 mg/dL Final    Globulin 04/15/2024 4.1  gm/dL Final    A/G Ratio 04/15/2024 0.9  g/dL Final    BUN/Creatinine Ratio 04/15/2024 12.3  7.0 - 25.0 Final    Anion Gap 04/15/2024 18.5 (H)  5.0 - 15.0 mmol/L  Final    eGFR 04/15/2024 112.2  >60.0 mL/min/1.73 Final    Color, UA 04/15/2024 Yellow  Yellow, Straw Final    Appearance, UA 04/15/2024 Clear  Clear Final    pH, UA 04/15/2024 7.0  5.0 - 8.0 Final    Specific Gravity, UA 04/15/2024 1.009  1.005 - 1.030 Final    Glucose, UA 04/15/2024 Negative  Negative Final    Ketones, UA 04/15/2024 Trace (A)  Negative Final    Bilirubin, UA 04/15/2024 Negative  Negative Final    Blood, UA 04/15/2024 Negative  Negative Final    Protein, UA 04/15/2024 Trace (A)  Negative Final    Leuk Esterase, UA 04/15/2024 Negative  Negative Final    Nitrite, UA 04/15/2024 Negative  Negative Final    Urobilinogen, UA 04/15/2024 1.0 E.U./dL  0.2 - 1.0 E.U./dL Final    Ethanol 04/15/2024 326 (H)  0 - 10 mg/dL Final    Ethanol % 04/15/2024 0.326  % Final    THC, Screen, Urine 04/15/2024 Negative  Negative Final    Phencyclidine (PCP), Urine 04/15/2024 Negative  Negative Final    Cocaine Screen, Urine 04/15/2024 Negative  Negative Final    Methamphetamine, Ur 04/15/2024 Negative  Negative Final    Opiate Screen 04/15/2024 Negative  Negative Final    Amphetamine Screen, Urine 04/15/2024 Negative  Negative Final    Benzodiazepine Screen, Urine 04/15/2024 Negative  Negative Final    Tricyclic Antidepressants Screen 04/15/2024 Negative  Negative Final    Methadone Screen, Urine 04/15/2024 Negative  Negative Final    Barbiturates Screen, Urine 04/15/2024 Negative  Negative Final    Oxycodone Screen, Urine 04/15/2024 Positive (A)  Negative Final    Buprenorphine, Screen, Urine 04/15/2024 Negative  Negative Final    Magnesium 04/15/2024 2.3  1.6 - 2.6 mg/dL Final    WBC 04/15/2024 4.90  3.40 - 10.80 10*3/mm3 Final    RBC 04/15/2024 3.73 (L)  4.14 - 5.80 10*6/mm3 Final    Hemoglobin 04/15/2024 12.6 (L)  13.0 - 17.7 g/dL Final    Hematocrit 04/15/2024 37.2 (L)  37.5 - 51.0 % Final    MCV 04/15/2024 99.7 (H)  79.0 - 97.0 fL Final    MCH 04/15/2024 33.8 (H)  26.6 - 33.0 pg Final    MCHC 04/15/2024 33.9  31.5 -  35.7 g/dL Final    RDW 04/15/2024 14.6  12.3 - 15.4 % Final    RDW-SD 04/15/2024 53.6  37.0 - 54.0 fl Final    MPV 04/15/2024 9.5  6.0 - 12.0 fL Final    Platelets 04/15/2024 185  140 - 450 10*3/mm3 Final    Neutrophil % 04/15/2024 62.7  42.7 - 76.0 % Final    Lymphocyte % 04/15/2024 25.9  19.6 - 45.3 % Final    Monocyte % 04/15/2024 8.4  5.0 - 12.0 % Final    Eosinophil % 04/15/2024 0.8  0.3 - 6.2 % Final    Basophil % 04/15/2024 2.0 (H)  0.0 - 1.5 % Final    Immature Grans % 04/15/2024 0.2  0.0 - 0.5 % Final    Neutrophils, Absolute 04/15/2024 3.07  1.70 - 7.00 10*3/mm3 Final    Lymphocytes, Absolute 04/15/2024 1.27  0.70 - 3.10 10*3/mm3 Final    Monocytes, Absolute 04/15/2024 0.41  0.10 - 0.90 10*3/mm3 Final    Eosinophils, Absolute 04/15/2024 0.04  0.00 - 0.40 10*3/mm3 Final    Basophils, Absolute 04/15/2024 0.10  0.00 - 0.20 10*3/mm3 Final    Immature Grans, Absolute 04/15/2024 0.01  0.00 - 0.05 10*3/mm3 Final    nRBC 04/15/2024 0.0  0.0 - 0.2 /100 WBC Final    Fentanyl, Urine 04/15/2024 Negative  Negative Final    Ethanol 04/16/2024 115 (H)  0 - 10 mg/dL Final    Ethanol % 04/16/2024 0.115  % Final    Ethanol 04/16/2024 67 (H)  0 - 10 mg/dL Final    Ethanol % 04/16/2024 0.067  % Final   Admission on 04/13/2024, Discharged on 04/13/2024   Component Date Value Ref Range Status    Glucose 04/13/2024 133 (H)  65 - 99 mg/dL Final    BUN 04/13/2024 8  6 - 20 mg/dL Final    Creatinine 04/13/2024 0.83  0.76 - 1.27 mg/dL Final    Sodium 04/13/2024 140  136 - 145 mmol/L Final    Potassium 04/13/2024 3.7  3.5 - 5.2 mmol/L Final    Specimen hemolyzed.  Result may be falsely elevated.    Chloride 04/13/2024 101  98 - 107 mmol/L Final    CO2 04/13/2024 23.5  22.0 - 29.0 mmol/L Final    Calcium 04/13/2024 8.8  8.6 - 10.5 mg/dL Final    Total Protein 04/13/2024 8.0  6.0 - 8.5 g/dL Final    Albumin 04/13/2024 3.4 (L)  3.5 - 5.2 g/dL Final    ALT (SGPT) 04/13/2024 54 (H)  1 - 41 U/L Final    Specimen hemolyzed.  Result may  be  falsely elevated.    AST (SGOT) 04/13/2024 290 (H)  1 - 40 U/L Final    Alkaline Phosphatase 04/13/2024 207 (H)  39 - 117 U/L Final    Total Bilirubin 04/13/2024 0.6  0.0 - 1.2 mg/dL Final    Globulin 04/13/2024 4.6  gm/dL Final    A/G Ratio 04/13/2024 0.7  g/dL Final    BUN/Creatinine Ratio 04/13/2024 9.6  7.0 - 25.0 Final    Anion Gap 04/13/2024 15.5 (H)  5.0 - 15.0 mmol/L Final    eGFR 04/13/2024 108.0  >60.0 mL/min/1.73 Final    Ethanol 04/13/2024 355 (H)  0 - 10 mg/dL Final    Ethanol % 04/13/2024 0.355  % Final    Magnesium 04/13/2024 2.1  1.6 - 2.6 mg/dL Final    WBC 04/13/2024 7.49  3.40 - 10.80 10*3/mm3 Final    RBC 04/13/2024 3.75 (L)  4.14 - 5.80 10*6/mm3 Final    Hemoglobin 04/13/2024 12.5 (L)  13.0 - 17.7 g/dL Final    Hematocrit 04/13/2024 37.2 (L)  37.5 - 51.0 % Final    MCV 04/13/2024 99.2 (H)  79.0 - 97.0 fL Final    MCH 04/13/2024 33.3 (H)  26.6 - 33.0 pg Final    MCHC 04/13/2024 33.6  31.5 - 35.7 g/dL Final    RDW 04/13/2024 14.5  12.3 - 15.4 % Final    RDW-SD 04/13/2024 53.2  37.0 - 54.0 fl Final    MPV 04/13/2024 9.8  6.0 - 12.0 fL Final    Platelets 04/13/2024 180  140 - 450 10*3/mm3 Final    Neutrophil % 04/13/2024 58.9  42.7 - 76.0 % Final    Lymphocyte % 04/13/2024 29.9  19.6 - 45.3 % Final    Monocyte % 04/13/2024 9.5  5.0 - 12.0 % Final    Eosinophil % 04/13/2024 0.4  0.3 - 6.2 % Final    Basophil % 04/13/2024 1.2  0.0 - 1.5 % Final    Immature Grans % 04/13/2024 0.1  0.0 - 0.5 % Final    Neutrophils, Absolute 04/13/2024 4.41  1.70 - 7.00 10*3/mm3 Final    Lymphocytes, Absolute 04/13/2024 2.24  0.70 - 3.10 10*3/mm3 Final    Monocytes, Absolute 04/13/2024 0.71  0.10 - 0.90 10*3/mm3 Final    Eosinophils, Absolute 04/13/2024 0.03  0.00 - 0.40 10*3/mm3 Final    Basophils, Absolute 04/13/2024 0.09  0.00 - 0.20 10*3/mm3 Final    Immature Grans, Absolute 04/13/2024 0.01  0.00 - 0.05 10*3/mm3 Final    nRBC 04/13/2024 0.0  0.0 - 0.2 /100 WBC Final       Impression/Formulation:    ICD-10-CM ICD-9-CM    1. Alcohol use disorder, severe, dependence  F10.20 303.90   2. Chronic pain disorder  G89.4 338.4   3. Anxiety disorder, unspecified type  F41.9 300.00        CLINICAL MANEUVERING/INTERVENTIONS: Therapist utilized a person-centered approach to build and maintain rapport with group member.   Therapist promoted safe nonjudgmental environment by providing group members with unconditional positive regard.  Assisted member in processing above session content; acknowledged and normalized patient's thoughts, feelings and concerns.  Allowed patient to freely discuss issues without interruption or judgment. Provided safe, confidential environment to facilitate the development of positive therapeutic relationship and encourage open, honest communication. Therapist assisted group member with identifying and implementing healthier coping strategies and maintaining healthier boundaries. Assisted patient in identifying risk factors which would indicate the need for higher level of care including thoughts to harm self or others and/or self-harming behavior and encouraged patient to contact this office, call 911, or present to the nearest emergency room should any of these events occur. Pt agreeable to adhere to medication regimen as prescribed and report any side effects.  Discussed crisis intervention services and means to access.  Patient adamantly and convincingly denies current suicidal or homicidal ideation or perceptual disturbance.      Therapist implemented motivational interviewing techniques to assist client with exploring and resolving ambivalence associated with commitment to change behaviors.  Yes  Therapist utilized dialectical behavior techniques to teach and model emotional regulation and relaxation.  No   Therapist applied cognitive behavioral strategies to facilitate identification of maladaptive patterns of thinking and behavior.  Yes     [facility]  Aftercare:   [facility] Phase 2      Please note that portions of this note were completed with a voice recognition program. Efforts were made to edit dictation, but occasionally words are mistranscribed.     This document signed by Ben Ortega LCSW, April 22, 2024, 17:07 EDT

## 2024-04-23 ENCOUNTER — OFFICE VISIT (OUTPATIENT)
Dept: PSYCHIATRY | Facility: HOSPITAL | Age: 49
End: 2024-04-23
Payer: MEDICARE

## 2024-04-23 DIAGNOSIS — F10.20 ALCOHOL USE DISORDER, SEVERE, DEPENDENCE: Primary | ICD-10-CM

## 2024-04-23 DIAGNOSIS — F41.9 ANXIETY DISORDER, UNSPECIFIED TYPE: ICD-10-CM

## 2024-04-23 DIAGNOSIS — G89.4 CHRONIC PAIN DISORDER: ICD-10-CM

## 2024-04-23 PROCEDURE — H0015 ALCOHOL AND/OR DRUG SERVICES: HCPCS | Performed by: SOCIAL WORKER

## 2024-04-23 NOTE — PROGRESS NOTES
NAME: Taiwo Pierce  DATE: 04/23/2024    [facility] Phase I  [id number]    Services Provided    Group Psychotherapy x 1  Education/Training x 1  Activity Therapy  x 1  Individual Therapy x 0 0 minutes  Family Therapy x 0  MD Initial Visit  x 0  MD Follow-Up   x 0    Number of participants: 3    DATA:  Patient reported he was doing well today. He reported having been put on metformin yesterday. His physician had said he was only one point away from being diabetic. He hoped receiving treatment would help him to feel better and have more energy during the day.     Individual: No    Homework: None assigned    Group 1 (Psychotherapy: Check-ins): Therapist continued facilitation of rapport building strategies between group members. Therapist asked that each patient check in with home life and recovery efforts and identify triggers, cravings, and high risk situations that arise between group sessions.  Group members discussed barriers and benefits of attending recovery meetings.  Therapist provided empathy and support during group session. Daily Reading: None    Group 2  ( Recreation Therapy ) [employer]    Group 3 ( Recovery Testimony ) Viewed and discussed the first 40 minutes of the video, Adam Maya Got A Second Chance (2020) with Rufus Rosales on [employer].      ASSESSMENT:    Personal Assessment 0-10 Scale (10 worst)    Anxiety:  2 (no comment)  Depression:  2 (no comment)  Cravings: 0 (no comment)     MSE within normal limits? Yes Affect: euthymic Mood: calm  Pt Response:  open/receptive  Engaged in activity/Process and self-disclosed: adequate  Applies today's group topics to self: adequate  Able to give and receive feedback: suboptimal  Demonstrated insightful thinking: occasional and adequate  Other pt response comments:  Patient was a positive participant. They demonstrated a relatively positive attitude, a strong degree of motivation, and adequate insight, as evidenced by his level of engagement combined with his  positive attitude. They seemed interested and attentive. They appeared to comprehend well the concepts being discussed. The patient seemed receptive of, and willing to implement, the ideas being discussed. Their thought process appeared linear and goal directed, and their speech was regular rate and rhythm.       Community Group Engagement:  Yes: Anglican     Reported relapse since last meeting? No: no lapse reported    .  Office Visit on 04/16/2024   Component Date Value Ref Range Status    External Amphetamine Screen Urine 04/17/2024 Negative   Final    External Benzodiazepine Screen Uri* 04/17/2024 Negative   Final    External Cocaine Screen Urine 04/17/2024 Negative   Final    External THC Screen Urine 04/17/2024 Negative   Final    External Methadone Screen Urine 04/17/2024 Negative   Final    External Methamphetamine Screen Ur* 04/17/2024 Negative   Final    External Oxycodone Screen Urine 04/17/2024 Positive (A)   Final    External Buprenorphine Screen Urine 04/17/2024 Negative   Final    External MDMA 04/17/2024 Negative   Final    External Opiates Screen Urine 04/17/2024 Negative   Final   Admission on 04/15/2024, Discharged on 04/16/2024   Component Date Value Ref Range Status    Glucose 04/15/2024 102 (H)  65 - 99 mg/dL Final    BUN 04/15/2024 9  6 - 20 mg/dL Final    Creatinine 04/15/2024 0.73 (L)  0.76 - 1.27 mg/dL Final    Sodium 04/15/2024 141  136 - 145 mmol/L Final    Potassium 04/15/2024 3.3 (L)  3.5 - 5.2 mmol/L Final    Chloride 04/15/2024 101  98 - 107 mmol/L Final    CO2 04/15/2024 21.5 (L)  22.0 - 29.0 mmol/L Final    Calcium 04/15/2024 8.4 (L)  8.6 - 10.5 mg/dL Final    Total Protein 04/15/2024 7.6  6.0 - 8.5 g/dL Final    Albumin 04/15/2024 3.5  3.5 - 5.2 g/dL Final    ALT (SGPT) 04/15/2024 53 (H)  1 - 41 U/L Final    AST (SGOT) 04/15/2024 265 (H)  1 - 40 U/L Final    Alkaline Phosphatase 04/15/2024 201 (H)  39 - 117 U/L Final    Total Bilirubin 04/15/2024 0.8  0.0 - 1.2 mg/dL Final     Globulin 04/15/2024 4.1  gm/dL Final    A/G Ratio 04/15/2024 0.9  g/dL Final    BUN/Creatinine Ratio 04/15/2024 12.3  7.0 - 25.0 Final    Anion Gap 04/15/2024 18.5 (H)  5.0 - 15.0 mmol/L Final    eGFR 04/15/2024 112.2  >60.0 mL/min/1.73 Final    Color, UA 04/15/2024 Yellow  Yellow, Straw Final    Appearance, UA 04/15/2024 Clear  Clear Final    pH, UA 04/15/2024 7.0  5.0 - 8.0 Final    Specific Gravity, UA 04/15/2024 1.009  1.005 - 1.030 Final    Glucose, UA 04/15/2024 Negative  Negative Final    Ketones, UA 04/15/2024 Trace (A)  Negative Final    Bilirubin, UA 04/15/2024 Negative  Negative Final    Blood, UA 04/15/2024 Negative  Negative Final    Protein, UA 04/15/2024 Trace (A)  Negative Final    Leuk Esterase, UA 04/15/2024 Negative  Negative Final    Nitrite, UA 04/15/2024 Negative  Negative Final    Urobilinogen, UA 04/15/2024 1.0 E.U./dL  0.2 - 1.0 E.U./dL Final    Ethanol 04/15/2024 326 (H)  0 - 10 mg/dL Final    Ethanol % 04/15/2024 0.326  % Final    THC, Screen, Urine 04/15/2024 Negative  Negative Final    Phencyclidine (PCP), Urine 04/15/2024 Negative  Negative Final    Cocaine Screen, Urine 04/15/2024 Negative  Negative Final    Methamphetamine, Ur 04/15/2024 Negative  Negative Final    Opiate Screen 04/15/2024 Negative  Negative Final    Amphetamine Screen, Urine 04/15/2024 Negative  Negative Final    Benzodiazepine Screen, Urine 04/15/2024 Negative  Negative Final    Tricyclic Antidepressants Screen 04/15/2024 Negative  Negative Final    Methadone Screen, Urine 04/15/2024 Negative  Negative Final    Barbiturates Screen, Urine 04/15/2024 Negative  Negative Final    Oxycodone Screen, Urine 04/15/2024 Positive (A)  Negative Final    Buprenorphine, Screen, Urine 04/15/2024 Negative  Negative Final    Magnesium 04/15/2024 2.3  1.6 - 2.6 mg/dL Final    WBC 04/15/2024 4.90  3.40 - 10.80 10*3/mm3 Final    RBC 04/15/2024 3.73 (L)  4.14 - 5.80 10*6/mm3 Final    Hemoglobin 04/15/2024 12.6 (L)  13.0 - 17.7 g/dL  Final    Hematocrit 04/15/2024 37.2 (L)  37.5 - 51.0 % Final    MCV 04/15/2024 99.7 (H)  79.0 - 97.0 fL Final    MCH 04/15/2024 33.8 (H)  26.6 - 33.0 pg Final    MCHC 04/15/2024 33.9  31.5 - 35.7 g/dL Final    RDW 04/15/2024 14.6  12.3 - 15.4 % Final    RDW-SD 04/15/2024 53.6  37.0 - 54.0 fl Final    MPV 04/15/2024 9.5  6.0 - 12.0 fL Final    Platelets 04/15/2024 185  140 - 450 10*3/mm3 Final    Neutrophil % 04/15/2024 62.7  42.7 - 76.0 % Final    Lymphocyte % 04/15/2024 25.9  19.6 - 45.3 % Final    Monocyte % 04/15/2024 8.4  5.0 - 12.0 % Final    Eosinophil % 04/15/2024 0.8  0.3 - 6.2 % Final    Basophil % 04/15/2024 2.0 (H)  0.0 - 1.5 % Final    Immature Grans % 04/15/2024 0.2  0.0 - 0.5 % Final    Neutrophils, Absolute 04/15/2024 3.07  1.70 - 7.00 10*3/mm3 Final    Lymphocytes, Absolute 04/15/2024 1.27  0.70 - 3.10 10*3/mm3 Final    Monocytes, Absolute 04/15/2024 0.41  0.10 - 0.90 10*3/mm3 Final    Eosinophils, Absolute 04/15/2024 0.04  0.00 - 0.40 10*3/mm3 Final    Basophils, Absolute 04/15/2024 0.10  0.00 - 0.20 10*3/mm3 Final    Immature Grans, Absolute 04/15/2024 0.01  0.00 - 0.05 10*3/mm3 Final    nRBC 04/15/2024 0.0  0.0 - 0.2 /100 WBC Final    Fentanyl, Urine 04/15/2024 Negative  Negative Final    Ethanol 04/16/2024 115 (H)  0 - 10 mg/dL Final    Ethanol % 04/16/2024 0.115  % Final    Ethanol 04/16/2024 67 (H)  0 - 10 mg/dL Final    Ethanol % 04/16/2024 0.067  % Final   Admission on 04/13/2024, Discharged on 04/13/2024   Component Date Value Ref Range Status    Glucose 04/13/2024 133 (H)  65 - 99 mg/dL Final    BUN 04/13/2024 8  6 - 20 mg/dL Final    Creatinine 04/13/2024 0.83  0.76 - 1.27 mg/dL Final    Sodium 04/13/2024 140  136 - 145 mmol/L Final    Potassium 04/13/2024 3.7  3.5 - 5.2 mmol/L Final    Specimen hemolyzed.  Result may be falsely elevated.    Chloride 04/13/2024 101  98 - 107 mmol/L Final    CO2 04/13/2024 23.5  22.0 - 29.0 mmol/L Final    Calcium 04/13/2024 8.8  8.6 - 10.5 mg/dL Final     Total Protein 04/13/2024 8.0  6.0 - 8.5 g/dL Final    Albumin 04/13/2024 3.4 (L)  3.5 - 5.2 g/dL Final    ALT (SGPT) 04/13/2024 54 (H)  1 - 41 U/L Final    Specimen hemolyzed.  Result may  be falsely elevated.    AST (SGOT) 04/13/2024 290 (H)  1 - 40 U/L Final    Alkaline Phosphatase 04/13/2024 207 (H)  39 - 117 U/L Final    Total Bilirubin 04/13/2024 0.6  0.0 - 1.2 mg/dL Final    Globulin 04/13/2024 4.6  gm/dL Final    A/G Ratio 04/13/2024 0.7  g/dL Final    BUN/Creatinine Ratio 04/13/2024 9.6  7.0 - 25.0 Final    Anion Gap 04/13/2024 15.5 (H)  5.0 - 15.0 mmol/L Final    eGFR 04/13/2024 108.0  >60.0 mL/min/1.73 Final    Ethanol 04/13/2024 355 (H)  0 - 10 mg/dL Final    Ethanol % 04/13/2024 0.355  % Final    Magnesium 04/13/2024 2.1  1.6 - 2.6 mg/dL Final    WBC 04/13/2024 7.49  3.40 - 10.80 10*3/mm3 Final    RBC 04/13/2024 3.75 (L)  4.14 - 5.80 10*6/mm3 Final    Hemoglobin 04/13/2024 12.5 (L)  13.0 - 17.7 g/dL Final    Hematocrit 04/13/2024 37.2 (L)  37.5 - 51.0 % Final    MCV 04/13/2024 99.2 (H)  79.0 - 97.0 fL Final    MCH 04/13/2024 33.3 (H)  26.6 - 33.0 pg Final    MCHC 04/13/2024 33.6  31.5 - 35.7 g/dL Final    RDW 04/13/2024 14.5  12.3 - 15.4 % Final    RDW-SD 04/13/2024 53.2  37.0 - 54.0 fl Final    MPV 04/13/2024 9.8  6.0 - 12.0 fL Final    Platelets 04/13/2024 180  140 - 450 10*3/mm3 Final    Neutrophil % 04/13/2024 58.9  42.7 - 76.0 % Final    Lymphocyte % 04/13/2024 29.9  19.6 - 45.3 % Final    Monocyte % 04/13/2024 9.5  5.0 - 12.0 % Final    Eosinophil % 04/13/2024 0.4  0.3 - 6.2 % Final    Basophil % 04/13/2024 1.2  0.0 - 1.5 % Final    Immature Grans % 04/13/2024 0.1  0.0 - 0.5 % Final    Neutrophils, Absolute 04/13/2024 4.41  1.70 - 7.00 10*3/mm3 Final    Lymphocytes, Absolute 04/13/2024 2.24  0.70 - 3.10 10*3/mm3 Final    Monocytes, Absolute 04/13/2024 0.71  0.10 - 0.90 10*3/mm3 Final    Eosinophils, Absolute 04/13/2024 0.03  0.00 - 0.40 10*3/mm3 Final    Basophils, Absolute 04/13/2024 0.09  0.00 -  0.20 10*3/mm3 Final    Immature Grans, Absolute 04/13/2024 0.01  0.00 - 0.05 10*3/mm3 Final    nRBC 04/13/2024 0.0  0.0 - 0.2 /100 WBC Final       Impression/Formulation:    ICD-10-CM ICD-9-CM   1. Alcohol use disorder, severe, dependence  F10.20 303.90   2. Chronic pain disorder  G89.4 338.4   3. Anxiety disorder, unspecified type  F41.9 300.00        CLINICAL MANEUVERING/INTERVENTIONS: Therapist utilized a person-centered approach to build and maintain rapport with group member.   Therapist promoted safe nonjudgmental environment by providing group members with unconditional positive regard.  Assisted member in processing above session content; acknowledged and normalized patient's thoughts, feelings and concerns.  Allowed patient to freely discuss issues without interruption or judgment. Provided safe, confidential environment to facilitate the development of positive therapeutic relationship and encourage open, honest communication. Therapist assisted group member with identifying and implementing healthier coping strategies and maintaining healthier boundaries. Assisted patient in identifying risk factors which would indicate the need for higher level of care including thoughts to harm self or others and/or self-harming behavior and encouraged patient to contact this office, call 911, or present to the nearest emergency room should any of these events occur. Pt agreeable to adhere to medication regimen as prescribed and report any side effects.  Discussed crisis intervention services and means to access.  Patient adamantly and convincingly denies current suicidal or homicidal ideation or perceptual disturbance.      Therapist implemented motivational interviewing techniques to assist client with exploring and resolving ambivalence associated with commitment to change behaviors.  Yes  Therapist utilized dialectical behavior techniques to teach and model emotional regulation and relaxation.  No   Therapist  applied cognitive behavioral strategies to facilitate identification of maladaptive patterns of thinking and behavior.  Yes     PLAN:    [facility] Phase I  Aftercare:  [facility] Outpatient Phase 2      Please note that portions of this note were completed with a voice recognition program. Efforts were made to edit dictation, but occasionally words are mistranscribed.     This document signed by Ben Ortega LCSW, April 23, 2024, 14:22 EDT

## 2024-04-24 ENCOUNTER — OFFICE VISIT (OUTPATIENT)
Dept: PSYCHIATRY | Facility: HOSPITAL | Age: 49
End: 2024-04-24
Payer: MEDICARE

## 2024-04-24 DIAGNOSIS — G89.4 CHRONIC PAIN DISORDER: ICD-10-CM

## 2024-04-24 DIAGNOSIS — F10.20 ALCOHOL USE DISORDER, SEVERE, DEPENDENCE: Primary | ICD-10-CM

## 2024-04-24 DIAGNOSIS — F41.9 ANXIETY DISORDER, UNSPECIFIED TYPE: ICD-10-CM

## 2024-04-24 PROCEDURE — H0015 ALCOHOL AND/OR DRUG SERVICES: HCPCS | Performed by: SOCIAL WORKER

## 2024-04-24 RX ORDER — DULOXETIN HYDROCHLORIDE 60 MG/1
60 CAPSULE, DELAYED RELEASE ORAL DAILY
Qty: 30 CAPSULE | Refills: 0 | Status: SHIPPED | OUTPATIENT
Start: 2024-04-24

## 2024-04-24 NOTE — PROGRESS NOTES
Subjective   Patient ID: Taiwo Pierce is a 48 y.o. male is here today as a self referral.     Chief Complaint: Alcohol use    There were no vitals taken for this visit.    History of Present Illness  Taiwo Pierce is a 48 y.o. male today seen in the IOP program.  The patient reports a history of alcohol use. First use was about two years ago when he started drinking alcohol because he was feeling down due to his health problems, has had back surgeries and is on disability. Over time the use increased and the patient  continued to use despite negative consequences including relationship problems, social and financial problems. The patient endorses symptoms of tolerance and withdrawals and ongoing cravings to use. Has tried to cut down and stop but has not been successful. Spends too much time and resources in pursuit of substance use. Longest period of sobriety is reported to be about 3 weeks.  He states he was drinking up to half a gallon of vodka every three days.   Last use about a week and half ago.   Withdrawal symptoms are no reported at this time.   The patient reports he tends to worry about everything all the time and it is making him feel on the edge all the time and has difficulty sleeping because of all the worries running through his head.     The following portions of the patient's history were reviewed and updated as appropriate: allergies, current medications, past family history, past medical history, past social history, past surgical history, and problem list.    Past Psych History: The patient reports a history of depression and anxiety and is taking medication for it. He reports his depression today is 2/10 and anxiety is 1/10.     Substance Use History: See HPI.     Medical History:    Past Medical History:   Diagnosis Date    Alcohol abuse     AMS (altered mental status)     Arthritis October 2007    Arthritis in back and neck    GERD (gastroesophageal reflux disease)     Headache  2007    Headaches due to disc in neck.    Hyperlipidemia     Hypertension     Low back pain 10/05/09    Hurt back and had back surgery 04/16/2014. Hit a nerve    Pancreatitis        Medications:   Current Outpatient Medications:     amLODIPine (NORVASC) 10 MG tablet, Take 1 tablet by mouth Daily. Indications: High Blood Pressure Disorder, Disp: , Rfl:     DULoxetine (CYMBALTA) 60 MG capsule, Take 1 capsule by mouth Daily. Indications: Musculoskeletal Pain, Disp: 30 capsule, Rfl: 0    gabapentin (NEURONTIN) 800 MG tablet, Take 1 tablet by mouth 3 (Three) Times a Day. Indications: Neuropathic Pain, Disp: , Rfl:     hydroCHLOROthiazide 25 MG tablet, Take 1 tablet by mouth Daily. Indications: High Blood Pressure Disorder, Disp: , Rfl:     lansoprazole (PREVACID) 30 MG capsule, Take 1 capsule by mouth Daily. Indications: Heartburn, Disp: , Rfl:     metaxalone (SKELAXIN) 800 MG tablet, Take 1 tablet by mouth 3 (Three) Times a Day As Needed for Muscle Spasms. Indications: Musculoskeletal Pain, Disp: , Rfl:     oxyCODONE (ROXICODONE) 10 MG tablet, Take 1 tablet by mouth 5 (Five) Times a Day. Indications: Chronic Pain, Disp: , Rfl:     pravastatin (PRAVACHOL) 20 MG tablet, Take 1 tablet by mouth Daily., Disp: 90 tablet, Rfl: 0    valsartan (DIOVAN) 320 MG tablet, Take 1 tablet by mouth Daily. Indications: High Blood Pressure Disorder, Disp: , Rfl:     Review of Systems  Gen: No fever, chills  Resp: No soa  GI: No nvd    Family History    Family History   Problem Relation Age of Onset    Hyperlipidemia Father     Hypertension Father     Breast cancer Mother     Cancer Mother         Breast cancer    Depression Mother         Depression nerve problems    Prostate cancer Paternal Grandfather     Cancer Paternal Grandfather         Prostate cancer    Hyperlipidemia Paternal Grandfather     Prostate cancer Maternal Grandfather     Arthritis Maternal Grandfather     Cancer Maternal Grandfather         Prostate cancer    Heart  disease Maternal Grandfather         Heart attack@90    Hyperlipidemia Maternal Grandfather     Cancer Maternal Grandmother         Lung cancer    Depression Maternal Grandmother         Depression nerve problems    Stroke Maternal Grandmother         Brain anurism    Arthritis Paternal Uncle     Early death Sister         Brain didnt develop completely       Objective   Mental Status Exam  Appearance:  clean and casually dressed, appropriate  Attitude toward clinician:  cooperative and agreeable   Speech:    Rate:  regular rate and rhythm   Volume:  normal  Motor:  no abnormal movements present  Mood:  Good  Affect:  euthymic  Thought Processes:  linear, logical, and goal directed  Thought Content:  normal  Suicidal Thoughts:  absent  Homicidal Thoughts:  absent  Perceptual Disturbance: no perceptual disturbance  Attention and Concentration:  good  Insight and Judgement:  good  Memory:  memory appears to be intact    Strengths: Motivated for treatment    Weaknesses:Substance use and Poor coping skills    Problem List:   Patient Active Problem List   Diagnosis    GERD (gastroesophageal reflux disease)    Hyperlipidemia    Hypertension    Erectile dysfunction    Family history of diabetes mellitus in mother    Chronic pain syndrome    Disorder of prostate, unspecified    Vitamin D deficiency     Reactive depression    Restless leg    Right renal mass    Cervical spondylosis with radiculopathy    Pancreatitis    Alcoholic pancreatitis    Delirium, drug-induced    Alcohol abuse         Short term goals: Develop rapport and encourage compliance with treatment plan and followups. Initiate appropriate treatment and monitor for efficacy and side effects and make adjustments as indicated.    Long term goals: The patient to have complete resolution of alcohol use disorder and patient to maintain sobriety on a consistent basis.      Lab Review:   Office Visit on 04/16/2024   Component Date Value Ref Range Status    External  Amphetamine Screen Urine 04/17/2024 Negative   Final    External Benzodiazepine Screen Uri* 04/17/2024 Negative   Final    External Cocaine Screen Urine 04/17/2024 Negative   Final    External THC Screen Urine 04/17/2024 Negative   Final    External Methadone Screen Urine 04/17/2024 Negative   Final    External Methamphetamine Screen Ur* 04/17/2024 Negative   Final    External Oxycodone Screen Urine 04/17/2024 Positive (A)   Final    External Buprenorphine Screen Urine 04/17/2024 Negative   Final    External MDMA 04/17/2024 Negative   Final    External Opiates Screen Urine 04/17/2024 Negative   Final   Admission on 04/15/2024, Discharged on 04/16/2024   Component Date Value Ref Range Status    Glucose 04/15/2024 102 (H)  65 - 99 mg/dL Final    BUN 04/15/2024 9  6 - 20 mg/dL Final    Creatinine 04/15/2024 0.73 (L)  0.76 - 1.27 mg/dL Final    Sodium 04/15/2024 141  136 - 145 mmol/L Final    Potassium 04/15/2024 3.3 (L)  3.5 - 5.2 mmol/L Final    Chloride 04/15/2024 101  98 - 107 mmol/L Final    CO2 04/15/2024 21.5 (L)  22.0 - 29.0 mmol/L Final    Calcium 04/15/2024 8.4 (L)  8.6 - 10.5 mg/dL Final    Total Protein 04/15/2024 7.6  6.0 - 8.5 g/dL Final    Albumin 04/15/2024 3.5  3.5 - 5.2 g/dL Final    ALT (SGPT) 04/15/2024 53 (H)  1 - 41 U/L Final    AST (SGOT) 04/15/2024 265 (H)  1 - 40 U/L Final    Alkaline Phosphatase 04/15/2024 201 (H)  39 - 117 U/L Final    Total Bilirubin 04/15/2024 0.8  0.0 - 1.2 mg/dL Final    Globulin 04/15/2024 4.1  gm/dL Final    A/G Ratio 04/15/2024 0.9  g/dL Final    BUN/Creatinine Ratio 04/15/2024 12.3  7.0 - 25.0 Final    Anion Gap 04/15/2024 18.5 (H)  5.0 - 15.0 mmol/L Final    eGFR 04/15/2024 112.2  >60.0 mL/min/1.73 Final    Color, UA 04/15/2024 Yellow  Yellow, Straw Final    Appearance, UA 04/15/2024 Clear  Clear Final    pH, UA 04/15/2024 7.0  5.0 - 8.0 Final    Specific Gravity, UA 04/15/2024 1.009  1.005 - 1.030 Final    Glucose, UA 04/15/2024 Negative  Negative Final    Ketones,  UA 04/15/2024 Trace (A)  Negative Final    Bilirubin, UA 04/15/2024 Negative  Negative Final    Blood, UA 04/15/2024 Negative  Negative Final    Protein, UA 04/15/2024 Trace (A)  Negative Final    Leuk Esterase, UA 04/15/2024 Negative  Negative Final    Nitrite, UA 04/15/2024 Negative  Negative Final    Urobilinogen, UA 04/15/2024 1.0 E.U./dL  0.2 - 1.0 E.U./dL Final    Ethanol 04/15/2024 326 (H)  0 - 10 mg/dL Final    Ethanol % 04/15/2024 0.326  % Final    THC, Screen, Urine 04/15/2024 Negative  Negative Final    Phencyclidine (PCP), Urine 04/15/2024 Negative  Negative Final    Cocaine Screen, Urine 04/15/2024 Negative  Negative Final    Methamphetamine, Ur 04/15/2024 Negative  Negative Final    Opiate Screen 04/15/2024 Negative  Negative Final    Amphetamine Screen, Urine 04/15/2024 Negative  Negative Final    Benzodiazepine Screen, Urine 04/15/2024 Negative  Negative Final    Tricyclic Antidepressants Screen 04/15/2024 Negative  Negative Final    Methadone Screen, Urine 04/15/2024 Negative  Negative Final    Barbiturates Screen, Urine 04/15/2024 Negative  Negative Final    Oxycodone Screen, Urine 04/15/2024 Positive (A)  Negative Final    Buprenorphine, Screen, Urine 04/15/2024 Negative  Negative Final    Magnesium 04/15/2024 2.3  1.6 - 2.6 mg/dL Final    WBC 04/15/2024 4.90  3.40 - 10.80 10*3/mm3 Final    RBC 04/15/2024 3.73 (L)  4.14 - 5.80 10*6/mm3 Final    Hemoglobin 04/15/2024 12.6 (L)  13.0 - 17.7 g/dL Final    Hematocrit 04/15/2024 37.2 (L)  37.5 - 51.0 % Final    MCV 04/15/2024 99.7 (H)  79.0 - 97.0 fL Final    MCH 04/15/2024 33.8 (H)  26.6 - 33.0 pg Final    MCHC 04/15/2024 33.9  31.5 - 35.7 g/dL Final    RDW 04/15/2024 14.6  12.3 - 15.4 % Final    RDW-SD 04/15/2024 53.6  37.0 - 54.0 fl Final    MPV 04/15/2024 9.5  6.0 - 12.0 fL Final    Platelets 04/15/2024 185  140 - 450 10*3/mm3 Final    Neutrophil % 04/15/2024 62.7  42.7 - 76.0 % Final    Lymphocyte % 04/15/2024 25.9  19.6 - 45.3 % Final    Monocyte  % 04/15/2024 8.4  5.0 - 12.0 % Final    Eosinophil % 04/15/2024 0.8  0.3 - 6.2 % Final    Basophil % 04/15/2024 2.0 (H)  0.0 - 1.5 % Final    Immature Grans % 04/15/2024 0.2  0.0 - 0.5 % Final    Neutrophils, Absolute 04/15/2024 3.07  1.70 - 7.00 10*3/mm3 Final    Lymphocytes, Absolute 04/15/2024 1.27  0.70 - 3.10 10*3/mm3 Final    Monocytes, Absolute 04/15/2024 0.41  0.10 - 0.90 10*3/mm3 Final    Eosinophils, Absolute 04/15/2024 0.04  0.00 - 0.40 10*3/mm3 Final    Basophils, Absolute 04/15/2024 0.10  0.00 - 0.20 10*3/mm3 Final    Immature Grans, Absolute 04/15/2024 0.01  0.00 - 0.05 10*3/mm3 Final    nRBC 04/15/2024 0.0  0.0 - 0.2 /100 WBC Final    Fentanyl, Urine 04/15/2024 Negative  Negative Final    Ethanol 04/16/2024 115 (H)  0 - 10 mg/dL Final    Ethanol % 04/16/2024 0.115  % Final    Ethanol 04/16/2024 67 (H)  0 - 10 mg/dL Final    Ethanol % 04/16/2024 0.067  % Final   Admission on 04/13/2024, Discharged on 04/13/2024   Component Date Value Ref Range Status    Glucose 04/13/2024 133 (H)  65 - 99 mg/dL Final    BUN 04/13/2024 8  6 - 20 mg/dL Final    Creatinine 04/13/2024 0.83  0.76 - 1.27 mg/dL Final    Sodium 04/13/2024 140  136 - 145 mmol/L Final    Potassium 04/13/2024 3.7  3.5 - 5.2 mmol/L Final    Specimen hemolyzed.  Result may be falsely elevated.    Chloride 04/13/2024 101  98 - 107 mmol/L Final    CO2 04/13/2024 23.5  22.0 - 29.0 mmol/L Final    Calcium 04/13/2024 8.8  8.6 - 10.5 mg/dL Final    Total Protein 04/13/2024 8.0  6.0 - 8.5 g/dL Final    Albumin 04/13/2024 3.4 (L)  3.5 - 5.2 g/dL Final    ALT (SGPT) 04/13/2024 54 (H)  1 - 41 U/L Final    Specimen hemolyzed.  Result may  be falsely elevated.    AST (SGOT) 04/13/2024 290 (H)  1 - 40 U/L Final    Alkaline Phosphatase 04/13/2024 207 (H)  39 - 117 U/L Final    Total Bilirubin 04/13/2024 0.6  0.0 - 1.2 mg/dL Final    Globulin 04/13/2024 4.6  gm/dL Final    A/G Ratio 04/13/2024 0.7  g/dL Final    BUN/Creatinine Ratio 04/13/2024 9.6  7.0 - 25.0  Final    Anion Gap 04/13/2024 15.5 (H)  5.0 - 15.0 mmol/L Final    eGFR 04/13/2024 108.0  >60.0 mL/min/1.73 Final    Ethanol 04/13/2024 355 (H)  0 - 10 mg/dL Final    Ethanol % 04/13/2024 0.355  % Final    Magnesium 04/13/2024 2.1  1.6 - 2.6 mg/dL Final    WBC 04/13/2024 7.49  3.40 - 10.80 10*3/mm3 Final    RBC 04/13/2024 3.75 (L)  4.14 - 5.80 10*6/mm3 Final    Hemoglobin 04/13/2024 12.5 (L)  13.0 - 17.7 g/dL Final    Hematocrit 04/13/2024 37.2 (L)  37.5 - 51.0 % Final    MCV 04/13/2024 99.2 (H)  79.0 - 97.0 fL Final    MCH 04/13/2024 33.3 (H)  26.6 - 33.0 pg Final    MCHC 04/13/2024 33.6  31.5 - 35.7 g/dL Final    RDW 04/13/2024 14.5  12.3 - 15.4 % Final    RDW-SD 04/13/2024 53.2  37.0 - 54.0 fl Final    MPV 04/13/2024 9.8  6.0 - 12.0 fL Final    Platelets 04/13/2024 180  140 - 450 10*3/mm3 Final    Neutrophil % 04/13/2024 58.9  42.7 - 76.0 % Final    Lymphocyte % 04/13/2024 29.9  19.6 - 45.3 % Final    Monocyte % 04/13/2024 9.5  5.0 - 12.0 % Final    Eosinophil % 04/13/2024 0.4  0.3 - 6.2 % Final    Basophil % 04/13/2024 1.2  0.0 - 1.5 % Final    Immature Grans % 04/13/2024 0.1  0.0 - 0.5 % Final    Neutrophils, Absolute 04/13/2024 4.41  1.70 - 7.00 10*3/mm3 Final    Lymphocytes, Absolute 04/13/2024 2.24  0.70 - 3.10 10*3/mm3 Final    Monocytes, Absolute 04/13/2024 0.71  0.10 - 0.90 10*3/mm3 Final    Eosinophils, Absolute 04/13/2024 0.03  0.00 - 0.40 10*3/mm3 Final    Basophils, Absolute 04/13/2024 0.09  0.00 - 0.20 10*3/mm3 Final    Immature Grans, Absolute 04/13/2024 0.01  0.00 - 0.05 10*3/mm3 Final    nRBC 04/13/2024 0.0  0.0 - 0.2 /100 WBC Final       Assessment & Plan   Diagnoses and all orders for this visit:    1. Alcohol use disorder, severe, dependence (Primary)    2. Anxiety disorder, unspecified type    3. Chronic pain disorder    Other orders  -     DULoxetine (CYMBALTA) 60 MG capsule; Take 1 capsule by mouth Daily. Indications: Musculoskeletal Pain  Dispense: 30 capsule; Refill: 0    -Increase  Duloxetine 60 mg daily for anxiety symptoms and also for chronic pain  -Stop trazodone as it is not helping  -Start mirtazapine 7.5 mg at bedtime to help with sleep and depression and anxiety  -Patient will been seen regularly in the IOP program and work on his sobriety and coping skills.    Return in about 2 weeks (around 5/8/2024).

## 2024-04-24 NOTE — PROGRESS NOTES
NAME: Taiwo Pierce  DATE: 04/24/2024    [facility] Phase I  [id number]    Services Provided    Group Psychotherapy x 1  Education/Training x 2  Activity Therapy  x 0  Individual Therapy x 0 0 minutes  Family Therapy x 0  MD Initial Visit  x 1  MD Follow-Up   x 0    Number of participants: 3    DATA:  Patient reported he was doing well today. He was concerned for one of the other group members. However, he hesitated to give her feedback about this because he didn't want to hurt her feelings. We processed this together as a group, and the patient understood the importance of genuine feedback and concern for his peers.     Individual: No    Homework: None assigned    Group 1 (Psychotherapy: Check-ins): Therapist continued facilitation of rapport building strategies between group members. Therapist asked that each patient check in with home life and recovery efforts and identify triggers, cravings, and high risk situations that arise between group sessions.  Group members discussed barriers and benefits of attending recovery meetings.  Therapist provided empathy and support during group session. Daily Reading: None    Group 2  ( Values ) Discussed the concept of sunk costs.     Group 3 (Psychoeducation) Viewed education videos on YouTube on the topics of sleep deprivation and dreams. Engaged group in conversation about healthy responses to using dreams.      ASSESSMENT:    Personal Assessment 0-10 Scale (10 worst)    Anxiety:  1 (no comment)  Depression:  0 (no comment)  Cravings: 0 (no comment)     MSE within normal limits? Yes Affect: euthymic Mood: calm  Pt Response:  open/receptive  Engaged in activity/Process and self-disclosed: adequate  Applies today's group topics to self: adequate  Able to give and receive feedback: suboptimal  Demonstrated insightful thinking: consistent and adequate  Other pt response comments:  Patient was a positive participant. They demonstrated a relatively positive attitude, a  strong degree of motivation, and adequate insight, as evidenced by his level of engagement combined with his efforts to share concern for his peers. They seemed interested and attentive. They appeared to comprehend well the concepts being discussed. The patient seemed receptive of, and willing to implement, the ideas being discussed. Their thought process appeared linear and goal directed, and their speech was regular rate and rhythm.       Community Group Engagement:  Yes: [employer]    Reported relapse since last meeting? No: no lapse    .  Office Visit on 04/16/2024   Component Date Value Ref Range Status    External Amphetamine Screen Urine 04/17/2024 Negative   Final    External Benzodiazepine Screen Uri* 04/17/2024 Negative   Final    External Cocaine Screen Urine 04/17/2024 Negative   Final    External THC Screen Urine 04/17/2024 Negative   Final    External Methadone Screen Urine 04/17/2024 Negative   Final    External Methamphetamine Screen Ur* 04/17/2024 Negative   Final    External Oxycodone Screen Urine 04/17/2024 Positive (A)   Final    External Buprenorphine Screen Urine 04/17/2024 Negative   Final    External MDMA 04/17/2024 Negative   Final    External Opiates Screen Urine 04/17/2024 Negative   Final   Admission on 04/15/2024, Discharged on 04/16/2024   Component Date Value Ref Range Status    Glucose 04/15/2024 102 (H)  65 - 99 mg/dL Final    BUN 04/15/2024 9  6 - 20 mg/dL Final    Creatinine 04/15/2024 0.73 (L)  0.76 - 1.27 mg/dL Final    Sodium 04/15/2024 141  136 - 145 mmol/L Final    Potassium 04/15/2024 3.3 (L)  3.5 - 5.2 mmol/L Final    Chloride 04/15/2024 101  98 - 107 mmol/L Final    CO2 04/15/2024 21.5 (L)  22.0 - 29.0 mmol/L Final    Calcium 04/15/2024 8.4 (L)  8.6 - 10.5 mg/dL Final    Total Protein 04/15/2024 7.6  6.0 - 8.5 g/dL Final    Albumin 04/15/2024 3.5  3.5 - 5.2 g/dL Final    ALT (SGPT) 04/15/2024 53 (H)  1 - 41 U/L Final    AST (SGOT) 04/15/2024 265 (H)  1 - 40 U/L Final     Alkaline Phosphatase 04/15/2024 201 (H)  39 - 117 U/L Final    Total Bilirubin 04/15/2024 0.8  0.0 - 1.2 mg/dL Final    Globulin 04/15/2024 4.1  gm/dL Final    A/G Ratio 04/15/2024 0.9  g/dL Final    BUN/Creatinine Ratio 04/15/2024 12.3  7.0 - 25.0 Final    Anion Gap 04/15/2024 18.5 (H)  5.0 - 15.0 mmol/L Final    eGFR 04/15/2024 112.2  >60.0 mL/min/1.73 Final    Color, UA 04/15/2024 Yellow  Yellow, Straw Final    Appearance, UA 04/15/2024 Clear  Clear Final    pH, UA 04/15/2024 7.0  5.0 - 8.0 Final    Specific Gravity, UA 04/15/2024 1.009  1.005 - 1.030 Final    Glucose, UA 04/15/2024 Negative  Negative Final    Ketones, UA 04/15/2024 Trace (A)  Negative Final    Bilirubin, UA 04/15/2024 Negative  Negative Final    Blood, UA 04/15/2024 Negative  Negative Final    Protein, UA 04/15/2024 Trace (A)  Negative Final    Leuk Esterase, UA 04/15/2024 Negative  Negative Final    Nitrite, UA 04/15/2024 Negative  Negative Final    Urobilinogen, UA 04/15/2024 1.0 E.U./dL  0.2 - 1.0 E.U./dL Final    Ethanol 04/15/2024 326 (H)  0 - 10 mg/dL Final    Ethanol % 04/15/2024 0.326  % Final    THC, Screen, Urine 04/15/2024 Negative  Negative Final    Phencyclidine (PCP), Urine 04/15/2024 Negative  Negative Final    Cocaine Screen, Urine 04/15/2024 Negative  Negative Final    Methamphetamine, Ur 04/15/2024 Negative  Negative Final    Opiate Screen 04/15/2024 Negative  Negative Final    Amphetamine Screen, Urine 04/15/2024 Negative  Negative Final    Benzodiazepine Screen, Urine 04/15/2024 Negative  Negative Final    Tricyclic Antidepressants Screen 04/15/2024 Negative  Negative Final    Methadone Screen, Urine 04/15/2024 Negative  Negative Final    Barbiturates Screen, Urine 04/15/2024 Negative  Negative Final    Oxycodone Screen, Urine 04/15/2024 Positive (A)  Negative Final    Buprenorphine, Screen, Urine 04/15/2024 Negative  Negative Final    Magnesium 04/15/2024 2.3  1.6 - 2.6 mg/dL Final    WBC 04/15/2024 4.90  3.40 - 10.80  10*3/mm3 Final    RBC 04/15/2024 3.73 (L)  4.14 - 5.80 10*6/mm3 Final    Hemoglobin 04/15/2024 12.6 (L)  13.0 - 17.7 g/dL Final    Hematocrit 04/15/2024 37.2 (L)  37.5 - 51.0 % Final    MCV 04/15/2024 99.7 (H)  79.0 - 97.0 fL Final    MCH 04/15/2024 33.8 (H)  26.6 - 33.0 pg Final    MCHC 04/15/2024 33.9  31.5 - 35.7 g/dL Final    RDW 04/15/2024 14.6  12.3 - 15.4 % Final    RDW-SD 04/15/2024 53.6  37.0 - 54.0 fl Final    MPV 04/15/2024 9.5  6.0 - 12.0 fL Final    Platelets 04/15/2024 185  140 - 450 10*3/mm3 Final    Neutrophil % 04/15/2024 62.7  42.7 - 76.0 % Final    Lymphocyte % 04/15/2024 25.9  19.6 - 45.3 % Final    Monocyte % 04/15/2024 8.4  5.0 - 12.0 % Final    Eosinophil % 04/15/2024 0.8  0.3 - 6.2 % Final    Basophil % 04/15/2024 2.0 (H)  0.0 - 1.5 % Final    Immature Grans % 04/15/2024 0.2  0.0 - 0.5 % Final    Neutrophils, Absolute 04/15/2024 3.07  1.70 - 7.00 10*3/mm3 Final    Lymphocytes, Absolute 04/15/2024 1.27  0.70 - 3.10 10*3/mm3 Final    Monocytes, Absolute 04/15/2024 0.41  0.10 - 0.90 10*3/mm3 Final    Eosinophils, Absolute 04/15/2024 0.04  0.00 - 0.40 10*3/mm3 Final    Basophils, Absolute 04/15/2024 0.10  0.00 - 0.20 10*3/mm3 Final    Immature Grans, Absolute 04/15/2024 0.01  0.00 - 0.05 10*3/mm3 Final    nRBC 04/15/2024 0.0  0.0 - 0.2 /100 WBC Final    Fentanyl, Urine 04/15/2024 Negative  Negative Final    Ethanol 04/16/2024 115 (H)  0 - 10 mg/dL Final    Ethanol % 04/16/2024 0.115  % Final    Ethanol 04/16/2024 67 (H)  0 - 10 mg/dL Final    Ethanol % 04/16/2024 0.067  % Final   Admission on 04/13/2024, Discharged on 04/13/2024   Component Date Value Ref Range Status    Glucose 04/13/2024 133 (H)  65 - 99 mg/dL Final    BUN 04/13/2024 8  6 - 20 mg/dL Final    Creatinine 04/13/2024 0.83  0.76 - 1.27 mg/dL Final    Sodium 04/13/2024 140  136 - 145 mmol/L Final    Potassium 04/13/2024 3.7  3.5 - 5.2 mmol/L Final    Specimen hemolyzed.  Result may be falsely elevated.    Chloride 04/13/2024 101  98  - 107 mmol/L Final    CO2 04/13/2024 23.5  22.0 - 29.0 mmol/L Final    Calcium 04/13/2024 8.8  8.6 - 10.5 mg/dL Final    Total Protein 04/13/2024 8.0  6.0 - 8.5 g/dL Final    Albumin 04/13/2024 3.4 (L)  3.5 - 5.2 g/dL Final    ALT (SGPT) 04/13/2024 54 (H)  1 - 41 U/L Final    Specimen hemolyzed.  Result may  be falsely elevated.    AST (SGOT) 04/13/2024 290 (H)  1 - 40 U/L Final    Alkaline Phosphatase 04/13/2024 207 (H)  39 - 117 U/L Final    Total Bilirubin 04/13/2024 0.6  0.0 - 1.2 mg/dL Final    Globulin 04/13/2024 4.6  gm/dL Final    A/G Ratio 04/13/2024 0.7  g/dL Final    BUN/Creatinine Ratio 04/13/2024 9.6  7.0 - 25.0 Final    Anion Gap 04/13/2024 15.5 (H)  5.0 - 15.0 mmol/L Final    eGFR 04/13/2024 108.0  >60.0 mL/min/1.73 Final    Ethanol 04/13/2024 355 (H)  0 - 10 mg/dL Final    Ethanol % 04/13/2024 0.355  % Final    Magnesium 04/13/2024 2.1  1.6 - 2.6 mg/dL Final    WBC 04/13/2024 7.49  3.40 - 10.80 10*3/mm3 Final    RBC 04/13/2024 3.75 (L)  4.14 - 5.80 10*6/mm3 Final    Hemoglobin 04/13/2024 12.5 (L)  13.0 - 17.7 g/dL Final    Hematocrit 04/13/2024 37.2 (L)  37.5 - 51.0 % Final    MCV 04/13/2024 99.2 (H)  79.0 - 97.0 fL Final    MCH 04/13/2024 33.3 (H)  26.6 - 33.0 pg Final    MCHC 04/13/2024 33.6  31.5 - 35.7 g/dL Final    RDW 04/13/2024 14.5  12.3 - 15.4 % Final    RDW-SD 04/13/2024 53.2  37.0 - 54.0 fl Final    MPV 04/13/2024 9.8  6.0 - 12.0 fL Final    Platelets 04/13/2024 180  140 - 450 10*3/mm3 Final    Neutrophil % 04/13/2024 58.9  42.7 - 76.0 % Final    Lymphocyte % 04/13/2024 29.9  19.6 - 45.3 % Final    Monocyte % 04/13/2024 9.5  5.0 - 12.0 % Final    Eosinophil % 04/13/2024 0.4  0.3 - 6.2 % Final    Basophil % 04/13/2024 1.2  0.0 - 1.5 % Final    Immature Grans % 04/13/2024 0.1  0.0 - 0.5 % Final    Neutrophils, Absolute 04/13/2024 4.41  1.70 - 7.00 10*3/mm3 Final    Lymphocytes, Absolute 04/13/2024 2.24  0.70 - 3.10 10*3/mm3 Final    Monocytes, Absolute 04/13/2024 0.71  0.10 - 0.90 10*3/mm3  Final    Eosinophils, Absolute 04/13/2024 0.03  0.00 - 0.40 10*3/mm3 Final    Basophils, Absolute 04/13/2024 0.09  0.00 - 0.20 10*3/mm3 Final    Immature Grans, Absolute 04/13/2024 0.01  0.00 - 0.05 10*3/mm3 Final    nRBC 04/13/2024 0.0  0.0 - 0.2 /100 WBC Final       Impression/Formulation:    ICD-10-CM ICD-9-CM   1. Alcohol use disorder, severe, dependence  F10.20 303.90   2. Anxiety disorder, unspecified type  F41.9 300.00   3. Chronic pain disorder  G89.4 338.4        CLINICAL MANEUVERING/INTERVENTIONS: Therapist utilized a person-centered approach to build and maintain rapport with group member.   Therapist promoted safe nonjudgmental environment by providing group members with unconditional positive regard.  Assisted member in processing above session content; acknowledged and normalized patient's thoughts, feelings and concerns.  Allowed patient to freely discuss issues without interruption or judgment. Provided safe, confidential environment to facilitate the development of positive therapeutic relationship and encourage open, honest communication. Therapist assisted group member with identifying and implementing healthier coping strategies and maintaining healthier boundaries. Assisted patient in identifying risk factors which would indicate the need for higher level of care including thoughts to harm self or others and/or self-harming behavior and encouraged patient to contact this office, call 911, or present to the nearest emergency room should any of these events occur. Pt agreeable to adhere to medication regimen as prescribed and report any side effects.  Discussed crisis intervention services and means to access.  Patient adamantly and convincingly denies current suicidal or homicidal ideation or perceptual disturbance.      Therapist implemented motivational interviewing techniques to assist client with exploring and resolving ambivalence associated with commitment to change behaviors.   Yes  Therapist utilized dialectical behavior techniques to teach and model emotional regulation and relaxation.  No   Therapist applied cognitive behavioral strategies to facilitate identification of maladaptive patterns of thinking and behavior.  Yes     PLAN:    Continue [facility] Phase I  Aftercare:  [facility]  Outpatient Phase 2      Please note that portions of this note were completed with a voice recognition program. Efforts were made to edit dictation, but occasionally words are mistranscribed.     This document signed by Ben Ortega LCSW, April 24, 2024, 14:25 EDT

## 2024-04-25 ENCOUNTER — OFFICE VISIT (OUTPATIENT)
Dept: PSYCHIATRY | Facility: HOSPITAL | Age: 49
End: 2024-04-25
Payer: MEDICARE

## 2024-04-25 DIAGNOSIS — F41.9 ANXIETY DISORDER, UNSPECIFIED TYPE: ICD-10-CM

## 2024-04-25 DIAGNOSIS — F10.20 ALCOHOL USE DISORDER, SEVERE, DEPENDENCE: Primary | ICD-10-CM

## 2024-04-25 DIAGNOSIS — G89.4 CHRONIC PAIN DISORDER: ICD-10-CM

## 2024-04-25 PROCEDURE — H0015 ALCOHOL AND/OR DRUG SERVICES: HCPCS | Performed by: SOCIAL WORKER

## 2024-04-25 RX ORDER — MIRTAZAPINE 7.5 MG/1
7.5 TABLET, FILM COATED ORAL NIGHTLY
Qty: 30 TABLET | Refills: 0 | Status: SHIPPED | OUTPATIENT
Start: 2024-04-25 | End: 2024-05-25

## 2024-04-25 NOTE — PROGRESS NOTES
NAME: Taiwo Pierce  DATE: 04/25/2024    [facility] Phase I  [id number]    Services Provided    Group Psychotherapy x 1  Education/Training x 2  Activity Therapy  x 0  Individual Therapy x 0 0 minutes  Family Therapy x 0  MD Initial Visit  x 0  MD Follow-Up   x 0    Number of participants: 3    DATA:  Patient reported he was doing well.     Individual: No    Homework: None assigned    Group 1 (Psychotherapy: Check-ins): Therapist continued facilitation of rapport building strategies between group members. Therapist asked that each patient check in with home life and recovery efforts and identify triggers, cravings, and high risk situations that arise between group sessions.  Group members discussed barriers and benefits of attending recovery meetings.  Therapist provided empathy and support during group session. Daily Reading: The Great Divorce (1945) by DEBBIE Parsons    Group 2  (Psychoeducation) This hour of group was facilitated by [facility].     Group 3 ( Peer Support ) This hour of group was facilitated by Sandhya .      ASSESSMENT:    Personal Assessment 0-10 Scale (10 worst)    Anxiety:  1 (no comment)  Depression:  0 (no comment)  Cravings: 0 (no comment)     MSE within normal limits? Yes Affect: dysthymic Mood: calm  Pt Response:  open/receptive  Engaged in activity/Process and self-disclosed: adequate  Applies today's group topics to self: suboptimal  Able to give and receive feedback: adequate  Demonstrated insightful thinking: infrequent and moderate  Other pt response comments:  Patient was a positive participant. They demonstrated a relatively positive attitude, a strong degree of motivation, and mild insight, as evidenced by his difficulty comprehending abstract concepts combined with his level of engagement. They seemed interested and attentive. They appeared to experience difficulty comprehending the concepts being discussed. The patient seemed receptive of, and  willing to implement, the ideas being discussed. Their thought process appeared linear and goal directed, and their speech was regular rate and rhythm.       Community Group Engagement:  Yes: Judaism     Reported relapse since last meeting? No: Last Sunday (almost two weeks now)    .  Office Visit on 04/16/2024   Component Date Value Ref Range Status    External Amphetamine Screen Urine 04/17/2024 Negative   Final    External Benzodiazepine Screen Uri* 04/17/2024 Negative   Final    External Cocaine Screen Urine 04/17/2024 Negative   Final    External THC Screen Urine 04/17/2024 Negative   Final    External Methadone Screen Urine 04/17/2024 Negative   Final    External Methamphetamine Screen Ur* 04/17/2024 Negative   Final    External Oxycodone Screen Urine 04/17/2024 Positive (A)   Final    External Buprenorphine Screen Urine 04/17/2024 Negative   Final    External MDMA 04/17/2024 Negative   Final    External Opiates Screen Urine 04/17/2024 Negative   Final   Admission on 04/15/2024, Discharged on 04/16/2024   Component Date Value Ref Range Status    Glucose 04/15/2024 102 (H)  65 - 99 mg/dL Final    BUN 04/15/2024 9  6 - 20 mg/dL Final    Creatinine 04/15/2024 0.73 (L)  0.76 - 1.27 mg/dL Final    Sodium 04/15/2024 141  136 - 145 mmol/L Final    Potassium 04/15/2024 3.3 (L)  3.5 - 5.2 mmol/L Final    Chloride 04/15/2024 101  98 - 107 mmol/L Final    CO2 04/15/2024 21.5 (L)  22.0 - 29.0 mmol/L Final    Calcium 04/15/2024 8.4 (L)  8.6 - 10.5 mg/dL Final    Total Protein 04/15/2024 7.6  6.0 - 8.5 g/dL Final    Albumin 04/15/2024 3.5  3.5 - 5.2 g/dL Final    ALT (SGPT) 04/15/2024 53 (H)  1 - 41 U/L Final    AST (SGOT) 04/15/2024 265 (H)  1 - 40 U/L Final    Alkaline Phosphatase 04/15/2024 201 (H)  39 - 117 U/L Final    Total Bilirubin 04/15/2024 0.8  0.0 - 1.2 mg/dL Final    Globulin 04/15/2024 4.1  gm/dL Final    A/G Ratio 04/15/2024 0.9  g/dL Final    BUN/Creatinine Ratio 04/15/2024 12.3  7.0 - 25.0 Final    Anion Gap  04/15/2024 18.5 (H)  5.0 - 15.0 mmol/L Final    eGFR 04/15/2024 112.2  >60.0 mL/min/1.73 Final    Color, UA 04/15/2024 Yellow  Yellow, Straw Final    Appearance, UA 04/15/2024 Clear  Clear Final    pH, UA 04/15/2024 7.0  5.0 - 8.0 Final    Specific Gravity, UA 04/15/2024 1.009  1.005 - 1.030 Final    Glucose, UA 04/15/2024 Negative  Negative Final    Ketones, UA 04/15/2024 Trace (A)  Negative Final    Bilirubin, UA 04/15/2024 Negative  Negative Final    Blood, UA 04/15/2024 Negative  Negative Final    Protein, UA 04/15/2024 Trace (A)  Negative Final    Leuk Esterase, UA 04/15/2024 Negative  Negative Final    Nitrite, UA 04/15/2024 Negative  Negative Final    Urobilinogen, UA 04/15/2024 1.0 E.U./dL  0.2 - 1.0 E.U./dL Final    Ethanol 04/15/2024 326 (H)  0 - 10 mg/dL Final    Ethanol % 04/15/2024 0.326  % Final    THC, Screen, Urine 04/15/2024 Negative  Negative Final    Phencyclidine (PCP), Urine 04/15/2024 Negative  Negative Final    Cocaine Screen, Urine 04/15/2024 Negative  Negative Final    Methamphetamine, Ur 04/15/2024 Negative  Negative Final    Opiate Screen 04/15/2024 Negative  Negative Final    Amphetamine Screen, Urine 04/15/2024 Negative  Negative Final    Benzodiazepine Screen, Urine 04/15/2024 Negative  Negative Final    Tricyclic Antidepressants Screen 04/15/2024 Negative  Negative Final    Methadone Screen, Urine 04/15/2024 Negative  Negative Final    Barbiturates Screen, Urine 04/15/2024 Negative  Negative Final    Oxycodone Screen, Urine 04/15/2024 Positive (A)  Negative Final    Buprenorphine, Screen, Urine 04/15/2024 Negative  Negative Final    Magnesium 04/15/2024 2.3  1.6 - 2.6 mg/dL Final    WBC 04/15/2024 4.90  3.40 - 10.80 10*3/mm3 Final    RBC 04/15/2024 3.73 (L)  4.14 - 5.80 10*6/mm3 Final    Hemoglobin 04/15/2024 12.6 (L)  13.0 - 17.7 g/dL Final    Hematocrit 04/15/2024 37.2 (L)  37.5 - 51.0 % Final    MCV 04/15/2024 99.7 (H)  79.0 - 97.0 fL Final    MCH 04/15/2024 33.8 (H)  26.6 - 33.0 pg  Final    MCHC 04/15/2024 33.9  31.5 - 35.7 g/dL Final    RDW 04/15/2024 14.6  12.3 - 15.4 % Final    RDW-SD 04/15/2024 53.6  37.0 - 54.0 fl Final    MPV 04/15/2024 9.5  6.0 - 12.0 fL Final    Platelets 04/15/2024 185  140 - 450 10*3/mm3 Final    Neutrophil % 04/15/2024 62.7  42.7 - 76.0 % Final    Lymphocyte % 04/15/2024 25.9  19.6 - 45.3 % Final    Monocyte % 04/15/2024 8.4  5.0 - 12.0 % Final    Eosinophil % 04/15/2024 0.8  0.3 - 6.2 % Final    Basophil % 04/15/2024 2.0 (H)  0.0 - 1.5 % Final    Immature Grans % 04/15/2024 0.2  0.0 - 0.5 % Final    Neutrophils, Absolute 04/15/2024 3.07  1.70 - 7.00 10*3/mm3 Final    Lymphocytes, Absolute 04/15/2024 1.27  0.70 - 3.10 10*3/mm3 Final    Monocytes, Absolute 04/15/2024 0.41  0.10 - 0.90 10*3/mm3 Final    Eosinophils, Absolute 04/15/2024 0.04  0.00 - 0.40 10*3/mm3 Final    Basophils, Absolute 04/15/2024 0.10  0.00 - 0.20 10*3/mm3 Final    Immature Grans, Absolute 04/15/2024 0.01  0.00 - 0.05 10*3/mm3 Final    nRBC 04/15/2024 0.0  0.0 - 0.2 /100 WBC Final    Fentanyl, Urine 04/15/2024 Negative  Negative Final    Ethanol 04/16/2024 115 (H)  0 - 10 mg/dL Final    Ethanol % 04/16/2024 0.115  % Final    Ethanol 04/16/2024 67 (H)  0 - 10 mg/dL Final    Ethanol % 04/16/2024 0.067  % Final   Admission on 04/13/2024, Discharged on 04/13/2024   Component Date Value Ref Range Status    Glucose 04/13/2024 133 (H)  65 - 99 mg/dL Final    BUN 04/13/2024 8  6 - 20 mg/dL Final    Creatinine 04/13/2024 0.83  0.76 - 1.27 mg/dL Final    Sodium 04/13/2024 140  136 - 145 mmol/L Final    Potassium 04/13/2024 3.7  3.5 - 5.2 mmol/L Final    Specimen hemolyzed.  Result may be falsely elevated.    Chloride 04/13/2024 101  98 - 107 mmol/L Final    CO2 04/13/2024 23.5  22.0 - 29.0 mmol/L Final    Calcium 04/13/2024 8.8  8.6 - 10.5 mg/dL Final    Total Protein 04/13/2024 8.0  6.0 - 8.5 g/dL Final    Albumin 04/13/2024 3.4 (L)  3.5 - 5.2 g/dL Final    ALT (SGPT) 04/13/2024 54 (H)  1 - 41 U/L Final     Specimen hemolyzed.  Result may  be falsely elevated.    AST (SGOT) 04/13/2024 290 (H)  1 - 40 U/L Final    Alkaline Phosphatase 04/13/2024 207 (H)  39 - 117 U/L Final    Total Bilirubin 04/13/2024 0.6  0.0 - 1.2 mg/dL Final    Globulin 04/13/2024 4.6  gm/dL Final    A/G Ratio 04/13/2024 0.7  g/dL Final    BUN/Creatinine Ratio 04/13/2024 9.6  7.0 - 25.0 Final    Anion Gap 04/13/2024 15.5 (H)  5.0 - 15.0 mmol/L Final    eGFR 04/13/2024 108.0  >60.0 mL/min/1.73 Final    Ethanol 04/13/2024 355 (H)  0 - 10 mg/dL Final    Ethanol % 04/13/2024 0.355  % Final    Magnesium 04/13/2024 2.1  1.6 - 2.6 mg/dL Final    WBC 04/13/2024 7.49  3.40 - 10.80 10*3/mm3 Final    RBC 04/13/2024 3.75 (L)  4.14 - 5.80 10*6/mm3 Final    Hemoglobin 04/13/2024 12.5 (L)  13.0 - 17.7 g/dL Final    Hematocrit 04/13/2024 37.2 (L)  37.5 - 51.0 % Final    MCV 04/13/2024 99.2 (H)  79.0 - 97.0 fL Final    MCH 04/13/2024 33.3 (H)  26.6 - 33.0 pg Final    MCHC 04/13/2024 33.6  31.5 - 35.7 g/dL Final    RDW 04/13/2024 14.5  12.3 - 15.4 % Final    RDW-SD 04/13/2024 53.2  37.0 - 54.0 fl Final    MPV 04/13/2024 9.8  6.0 - 12.0 fL Final    Platelets 04/13/2024 180  140 - 450 10*3/mm3 Final    Neutrophil % 04/13/2024 58.9  42.7 - 76.0 % Final    Lymphocyte % 04/13/2024 29.9  19.6 - 45.3 % Final    Monocyte % 04/13/2024 9.5  5.0 - 12.0 % Final    Eosinophil % 04/13/2024 0.4  0.3 - 6.2 % Final    Basophil % 04/13/2024 1.2  0.0 - 1.5 % Final    Immature Grans % 04/13/2024 0.1  0.0 - 0.5 % Final    Neutrophils, Absolute 04/13/2024 4.41  1.70 - 7.00 10*3/mm3 Final    Lymphocytes, Absolute 04/13/2024 2.24  0.70 - 3.10 10*3/mm3 Final    Monocytes, Absolute 04/13/2024 0.71  0.10 - 0.90 10*3/mm3 Final    Eosinophils, Absolute 04/13/2024 0.03  0.00 - 0.40 10*3/mm3 Final    Basophils, Absolute 04/13/2024 0.09  0.00 - 0.20 10*3/mm3 Final    Immature Grans, Absolute 04/13/2024 0.01  0.00 - 0.05 10*3/mm3 Final    nRBC 04/13/2024 0.0  0.0 - 0.2 /100 WBC Final        Impression/Formulation:    ICD-10-CM ICD-9-CM   1. Alcohol use disorder, severe, dependence  F10.20 303.90   2. Anxiety disorder, unspecified type  F41.9 300.00   3. Chronic pain disorder  G89.4 338.4        CLINICAL MANEUVERING/INTERVENTIONS: Therapist utilized a person-centered approach to build and maintain rapport with group member.   Therapist promoted safe nonjudgmental environment by providing group members with unconditional positive regard.  Assisted member in processing above session content; acknowledged and normalized patient's thoughts, feelings and concerns.  Allowed patient to freely discuss issues without interruption or judgment. Provided safe, confidential environment to facilitate the development of positive therapeutic relationship and encourage open, honest communication. Therapist assisted group member with identifying and implementing healthier coping strategies and maintaining healthier boundaries. Assisted patient in identifying risk factors which would indicate the need for higher level of care including thoughts to harm self or others and/or self-harming behavior and encouraged patient to contact this office, call 911, or present to the nearest emergency room should any of these events occur. Pt agreeable to adhere to medication regimen as prescribed and report any side effects.  Discussed crisis intervention services and means to access.  Patient adamantly and convincingly denies current suicidal or homicidal ideation or perceptual disturbance.      Therapist implemented motivational interviewing techniques to assist client with exploring and resolving ambivalence associated with commitment to change behaviors.  Yes  Therapist utilized dialectical behavior techniques to teach and model emotional regulation and relaxation.  No   Therapist applied cognitive behavioral strategies to facilitate identification of maladaptive patterns of thinking and behavior.  Yes     PLAN:    Continue  [facility] Phase I  Aftercare:  [facility]  Outpatient Phase 2      Please note that portions of this note were completed with a voice recognition program. Efforts were made to edit dictation, but occasionally words are mistranscribed.     This document signed by Ben Ortega LCSW, April 25, 2024, 10:20 EDT

## 2024-04-26 NOTE — PROGRESS NOTES
Adult [facility] IOP Group      Date: April 25, 2023    Time: 0930 - 1030    Type of Group:  Psychoeducation    Group  Content: FEI and PHQ, Strengths and Qualities     Patient Response: Patient completed FEI with score of 2 (minimal anxiety) and PHQ-9 with score of 6 (mild). Patient was engaged and participated in group discussion on strengths and qualities.  Patient completed worksheet in which he listed three responses to various questions on personal strengths and qualities e.g. things I'm good at, what I like about my appearance and what I value the most.         Zacarias Neil  04/26/24  10:47 EDT

## 2024-04-29 ENCOUNTER — OFFICE VISIT (OUTPATIENT)
Dept: PSYCHIATRY | Facility: HOSPITAL | Age: 49
End: 2024-04-29
Payer: MEDICARE

## 2024-04-29 DIAGNOSIS — F41.9 ANXIETY DISORDER, UNSPECIFIED TYPE: ICD-10-CM

## 2024-04-29 DIAGNOSIS — Z79.899 MEDICATION MANAGEMENT: ICD-10-CM

## 2024-04-29 DIAGNOSIS — F10.20 ALCOHOL USE DISORDER, SEVERE, DEPENDENCE: Primary | ICD-10-CM

## 2024-04-29 LAB
EXTERNAL AMPHETAMINE SCREEN URINE: NEGATIVE
EXTERNAL BENZODIAZEPINE SCREEN URINE: NEGATIVE
EXTERNAL BUPRENORPHINE SCREEN URINE: NEGATIVE
EXTERNAL COCAINE SCREEN URINE: NEGATIVE
EXTERNAL MDMA: NEGATIVE
EXTERNAL METHADONE SCREEN URINE: NEGATIVE
EXTERNAL METHAMPHETAMINE SCREEN URINE: NEGATIVE
EXTERNAL OPIATES SCREEN URINE: NEGATIVE
EXTERNAL OXYCODONE SCREEN URINE: POSITIVE
EXTERNAL THC SCREEN URINE: NEGATIVE
MORPHINE SERPLBLD-MCNC: POSITIVE NG/ML

## 2024-04-29 PROCEDURE — H0015 ALCOHOL AND/OR DRUG SERVICES: HCPCS | Performed by: SOCIAL WORKER

## 2024-04-29 PROCEDURE — G0410 GRP PSYCH PARTIAL HOSP 45-50: HCPCS | Performed by: SOCIAL WORKER

## 2024-04-29 PROCEDURE — G0177 OPPS/PHP; TRAIN & EDUC SERV: HCPCS | Performed by: SOCIAL WORKER

## 2024-04-29 NOTE — PROGRESS NOTES
NAME: Tawio Pierce  DATE: 04/29/2024    [facility] Phase I  [id number]    Services Provided    Group Psychotherapy x 2  Education/Training x 1  Activity Therapy  x 0  Individual Therapy x 0 0 minutes  Family Therapy x 0  MD Initial Visit  x 0  MD Follow-Up   x 0    Number of participants: 5    DATA:  Patient reported his weekend was good. He competed in the [employer] this weekend, went to [employer] on Sunday, and visited his in-law's.     Individual: No    Homework: Assigned Patient committed to reading a chapter of his Bible each day to keep his mindset in the right place.     Group 1 (Psychotherapy: Check-ins): Therapist continued facilitation of rapport building strategies between group members. Therapist asked that each patient check in with home life and recovery efforts and identify triggers, cravings, and high risk situations that arise between group sessions.  Group members discussed barriers and benefits of attending recovery meetings.  Therapist provided empathy and support during group session. Daily Reading: None    Group 2  (Serenity Prayer) Explored things that can be changed compared to things that cannot be changed.     Group 3 (Serenity Prayer) Emphasized the importance of remaining active in recovery in order to maintain right ways of thinking.      ASSESSMENT:    Personal Assessment 0-10 Scale (10 worst)    Anxiety:  0 (no comment)  Depression:  0 (no comment)  Cravings: 0 (no comment)     MSE within normal limits? Yes Affect: somber Mood: calm  Pt Response:  open/receptive  Engaged in activity/Process and self-disclosed: adequate  Applies today's group topics to self: adequate  Able to give and receive feedback: adequate  Demonstrated insightful thinking: consistent and adequate  Other pt response comments:  Patient was a positive participant. They demonstrated a relatively positive attitude, a strong degree of motivation, and adequate insight, as evidenced by his level of engagement  combined with his efforts to maintain sobriety. They seemed interested and attentive. They appeared to comprehend well the concepts being discussed. The patient seemed receptive of, and willing to implement, the ideas being discussed. Their thought process appeared linear and goal directed, and their speech was regular rate and rhythm.       Community Group Engagement:  Yes: Patient reported Christian was good.     Reported relapse since last meeting? No: no lapse    .  Appointment on 04/29/2024   Component Date Value Ref Range Status    External Amphetamine Screen Urine 04/29/2024 Negative   Final    External Benzodiazepine Screen Uri* 04/29/2024 Negative   Final    External Cocaine Screen Urine 04/29/2024 Negative   Final    External THC Screen Urine 04/29/2024 Negative   Final    External Methadone Screen Urine 04/29/2024 Negative   Final    External Methamphetamine Screen Ur* 04/29/2024 Negative   Final    External Oxycodone Screen Urine 04/29/2024 Positive (A)   Final    External Buprenorphine Screen Urine 04/29/2024 Negative   Final    External MDMA 04/29/2024 Negative   Final    External Opiates Screen Urine 04/29/2024 Negative   Final    Morphine 04/29/2024 positive (A)   Final   Office Visit on 04/16/2024   Component Date Value Ref Range Status    External Amphetamine Screen Urine 04/17/2024 Negative   Final    External Benzodiazepine Screen Uri* 04/17/2024 Negative   Final    External Cocaine Screen Urine 04/17/2024 Negative   Final    External THC Screen Urine 04/17/2024 Negative   Final    External Methadone Screen Urine 04/17/2024 Negative   Final    External Methamphetamine Screen Ur* 04/17/2024 Negative   Final    External Oxycodone Screen Urine 04/17/2024 Positive (A)   Final    External Buprenorphine Screen Urine 04/17/2024 Negative   Final    External MDMA 04/17/2024 Negative   Final    External Opiates Screen Urine 04/17/2024 Negative   Final   Admission on 04/15/2024, Discharged on 04/16/2024    Component Date Value Ref Range Status    Glucose 04/15/2024 102 (H)  65 - 99 mg/dL Final    BUN 04/15/2024 9  6 - 20 mg/dL Final    Creatinine 04/15/2024 0.73 (L)  0.76 - 1.27 mg/dL Final    Sodium 04/15/2024 141  136 - 145 mmol/L Final    Potassium 04/15/2024 3.3 (L)  3.5 - 5.2 mmol/L Final    Chloride 04/15/2024 101  98 - 107 mmol/L Final    CO2 04/15/2024 21.5 (L)  22.0 - 29.0 mmol/L Final    Calcium 04/15/2024 8.4 (L)  8.6 - 10.5 mg/dL Final    Total Protein 04/15/2024 7.6  6.0 - 8.5 g/dL Final    Albumin 04/15/2024 3.5  3.5 - 5.2 g/dL Final    ALT (SGPT) 04/15/2024 53 (H)  1 - 41 U/L Final    AST (SGOT) 04/15/2024 265 (H)  1 - 40 U/L Final    Alkaline Phosphatase 04/15/2024 201 (H)  39 - 117 U/L Final    Total Bilirubin 04/15/2024 0.8  0.0 - 1.2 mg/dL Final    Globulin 04/15/2024 4.1  gm/dL Final    A/G Ratio 04/15/2024 0.9  g/dL Final    BUN/Creatinine Ratio 04/15/2024 12.3  7.0 - 25.0 Final    Anion Gap 04/15/2024 18.5 (H)  5.0 - 15.0 mmol/L Final    eGFR 04/15/2024 112.2  >60.0 mL/min/1.73 Final    Color, UA 04/15/2024 Yellow  Yellow, Straw Final    Appearance, UA 04/15/2024 Clear  Clear Final    pH, UA 04/15/2024 7.0  5.0 - 8.0 Final    Specific Gravity, UA 04/15/2024 1.009  1.005 - 1.030 Final    Glucose, UA 04/15/2024 Negative  Negative Final    Ketones, UA 04/15/2024 Trace (A)  Negative Final    Bilirubin, UA 04/15/2024 Negative  Negative Final    Blood, UA 04/15/2024 Negative  Negative Final    Protein, UA 04/15/2024 Trace (A)  Negative Final    Leuk Esterase, UA 04/15/2024 Negative  Negative Final    Nitrite, UA 04/15/2024 Negative  Negative Final    Urobilinogen, UA 04/15/2024 1.0 E.U./dL  0.2 - 1.0 E.U./dL Final    Ethanol 04/15/2024 326 (H)  0 - 10 mg/dL Final    Ethanol % 04/15/2024 0.326  % Final    THC, Screen, Urine 04/15/2024 Negative  Negative Final    Phencyclidine (PCP), Urine 04/15/2024 Negative  Negative Final    Cocaine Screen, Urine 04/15/2024 Negative  Negative Final     Methamphetamine, Ur 04/15/2024 Negative  Negative Final    Opiate Screen 04/15/2024 Negative  Negative Final    Amphetamine Screen, Urine 04/15/2024 Negative  Negative Final    Benzodiazepine Screen, Urine 04/15/2024 Negative  Negative Final    Tricyclic Antidepressants Screen 04/15/2024 Negative  Negative Final    Methadone Screen, Urine 04/15/2024 Negative  Negative Final    Barbiturates Screen, Urine 04/15/2024 Negative  Negative Final    Oxycodone Screen, Urine 04/15/2024 Positive (A)  Negative Final    Buprenorphine, Screen, Urine 04/15/2024 Negative  Negative Final    Magnesium 04/15/2024 2.3  1.6 - 2.6 mg/dL Final    WBC 04/15/2024 4.90  3.40 - 10.80 10*3/mm3 Final    RBC 04/15/2024 3.73 (L)  4.14 - 5.80 10*6/mm3 Final    Hemoglobin 04/15/2024 12.6 (L)  13.0 - 17.7 g/dL Final    Hematocrit 04/15/2024 37.2 (L)  37.5 - 51.0 % Final    MCV 04/15/2024 99.7 (H)  79.0 - 97.0 fL Final    MCH 04/15/2024 33.8 (H)  26.6 - 33.0 pg Final    MCHC 04/15/2024 33.9  31.5 - 35.7 g/dL Final    RDW 04/15/2024 14.6  12.3 - 15.4 % Final    RDW-SD 04/15/2024 53.6  37.0 - 54.0 fl Final    MPV 04/15/2024 9.5  6.0 - 12.0 fL Final    Platelets 04/15/2024 185  140 - 450 10*3/mm3 Final    Neutrophil % 04/15/2024 62.7  42.7 - 76.0 % Final    Lymphocyte % 04/15/2024 25.9  19.6 - 45.3 % Final    Monocyte % 04/15/2024 8.4  5.0 - 12.0 % Final    Eosinophil % 04/15/2024 0.8  0.3 - 6.2 % Final    Basophil % 04/15/2024 2.0 (H)  0.0 - 1.5 % Final    Immature Grans % 04/15/2024 0.2  0.0 - 0.5 % Final    Neutrophils, Absolute 04/15/2024 3.07  1.70 - 7.00 10*3/mm3 Final    Lymphocytes, Absolute 04/15/2024 1.27  0.70 - 3.10 10*3/mm3 Final    Monocytes, Absolute 04/15/2024 0.41  0.10 - 0.90 10*3/mm3 Final    Eosinophils, Absolute 04/15/2024 0.04  0.00 - 0.40 10*3/mm3 Final    Basophils, Absolute 04/15/2024 0.10  0.00 - 0.20 10*3/mm3 Final    Immature Grans, Absolute 04/15/2024 0.01  0.00 - 0.05 10*3/mm3 Final    nRBC 04/15/2024 0.0  0.0 - 0.2 /100  WBC Final    Fentanyl, Urine 04/15/2024 Negative  Negative Final    Ethanol 04/16/2024 115 (H)  0 - 10 mg/dL Final    Ethanol % 04/16/2024 0.115  % Final    Ethanol 04/16/2024 67 (H)  0 - 10 mg/dL Final    Ethanol % 04/16/2024 0.067  % Final   Admission on 04/13/2024, Discharged on 04/13/2024   Component Date Value Ref Range Status    Glucose 04/13/2024 133 (H)  65 - 99 mg/dL Final    BUN 04/13/2024 8  6 - 20 mg/dL Final    Creatinine 04/13/2024 0.83  0.76 - 1.27 mg/dL Final    Sodium 04/13/2024 140  136 - 145 mmol/L Final    Potassium 04/13/2024 3.7  3.5 - 5.2 mmol/L Final    Specimen hemolyzed.  Result may be falsely elevated.    Chloride 04/13/2024 101  98 - 107 mmol/L Final    CO2 04/13/2024 23.5  22.0 - 29.0 mmol/L Final    Calcium 04/13/2024 8.8  8.6 - 10.5 mg/dL Final    Total Protein 04/13/2024 8.0  6.0 - 8.5 g/dL Final    Albumin 04/13/2024 3.4 (L)  3.5 - 5.2 g/dL Final    ALT (SGPT) 04/13/2024 54 (H)  1 - 41 U/L Final    Specimen hemolyzed.  Result may  be falsely elevated.    AST (SGOT) 04/13/2024 290 (H)  1 - 40 U/L Final    Alkaline Phosphatase 04/13/2024 207 (H)  39 - 117 U/L Final    Total Bilirubin 04/13/2024 0.6  0.0 - 1.2 mg/dL Final    Globulin 04/13/2024 4.6  gm/dL Final    A/G Ratio 04/13/2024 0.7  g/dL Final    BUN/Creatinine Ratio 04/13/2024 9.6  7.0 - 25.0 Final    Anion Gap 04/13/2024 15.5 (H)  5.0 - 15.0 mmol/L Final    eGFR 04/13/2024 108.0  >60.0 mL/min/1.73 Final    Ethanol 04/13/2024 355 (H)  0 - 10 mg/dL Final    Ethanol % 04/13/2024 0.355  % Final    Magnesium 04/13/2024 2.1  1.6 - 2.6 mg/dL Final    WBC 04/13/2024 7.49  3.40 - 10.80 10*3/mm3 Final    RBC 04/13/2024 3.75 (L)  4.14 - 5.80 10*6/mm3 Final    Hemoglobin 04/13/2024 12.5 (L)  13.0 - 17.7 g/dL Final    Hematocrit 04/13/2024 37.2 (L)  37.5 - 51.0 % Final    MCV 04/13/2024 99.2 (H)  79.0 - 97.0 fL Final    MCH 04/13/2024 33.3 (H)  26.6 - 33.0 pg Final    MCHC 04/13/2024 33.6  31.5 - 35.7 g/dL Final    RDW 04/13/2024 14.5  12.3  - 15.4 % Final    RDW-SD 04/13/2024 53.2  37.0 - 54.0 fl Final    MPV 04/13/2024 9.8  6.0 - 12.0 fL Final    Platelets 04/13/2024 180  140 - 450 10*3/mm3 Final    Neutrophil % 04/13/2024 58.9  42.7 - 76.0 % Final    Lymphocyte % 04/13/2024 29.9  19.6 - 45.3 % Final    Monocyte % 04/13/2024 9.5  5.0 - 12.0 % Final    Eosinophil % 04/13/2024 0.4  0.3 - 6.2 % Final    Basophil % 04/13/2024 1.2  0.0 - 1.5 % Final    Immature Grans % 04/13/2024 0.1  0.0 - 0.5 % Final    Neutrophils, Absolute 04/13/2024 4.41  1.70 - 7.00 10*3/mm3 Final    Lymphocytes, Absolute 04/13/2024 2.24  0.70 - 3.10 10*3/mm3 Final    Monocytes, Absolute 04/13/2024 0.71  0.10 - 0.90 10*3/mm3 Final    Eosinophils, Absolute 04/13/2024 0.03  0.00 - 0.40 10*3/mm3 Final    Basophils, Absolute 04/13/2024 0.09  0.00 - 0.20 10*3/mm3 Final    Immature Grans, Absolute 04/13/2024 0.01  0.00 - 0.05 10*3/mm3 Final    nRBC 04/13/2024 0.0  0.0 - 0.2 /100 WBC Final       Impression/Formulation:    ICD-10-CM ICD-9-CM   1. Alcohol use disorder, severe, dependence  F10.20 303.90   2. Anxiety disorder, unspecified type  F41.9 300.00   3. Medication management  Z79.899 V58.69        CLINICAL MANEUVERING/INTERVENTIONS: Therapist utilized a person-centered approach to build and maintain rapport with group member.   Therapist promoted safe nonjudgmental environment by providing group members with unconditional positive regard.  Assisted member in processing above session content; acknowledged and normalized patient's thoughts, feelings and concerns.  Allowed patient to freely discuss issues without interruption or judgment. Provided safe, confidential environment to facilitate the development of positive therapeutic relationship and encourage open, honest communication. Therapist assisted group member with identifying and implementing healthier coping strategies and maintaining healthier boundaries. Assisted patient in identifying risk factors which would indicate the need  for higher level of care including thoughts to harm self or others and/or self-harming behavior and encouraged patient to contact this office, call 911, or present to the nearest emergency room should any of these events occur. Pt agreeable to adhere to medication regimen as prescribed and report any side effects.  Discussed crisis intervention services and means to access.  Patient adamantly and convincingly denies current suicidal or homicidal ideation or perceptual disturbance.      Therapist implemented motivational interviewing techniques to assist client with exploring and resolving ambivalence associated with commitment to change behaviors.  Yes  Therapist utilized dialectical behavior techniques to teach and model emotional regulation and relaxation.  No   Therapist applied cognitive behavioral strategies to facilitate identification of maladaptive patterns of thinking and behavior.  Yes     PLAN:    Continue [facility] Phase I  [facility]  [facility]      Please note that portions of this note were completed with a voice recognition program. Efforts were made to edit dictation, but occasionally words are mistranscribed.     This document signed by Ben Ortega LCSW, April 29, 2024, 14:30 EDT

## 2024-04-30 ENCOUNTER — OFFICE VISIT (OUTPATIENT)
Dept: PSYCHIATRY | Facility: HOSPITAL | Age: 49
End: 2024-04-30
Payer: MEDICARE

## 2024-04-30 DIAGNOSIS — F41.9 ANXIETY DISORDER, UNSPECIFIED TYPE: ICD-10-CM

## 2024-04-30 DIAGNOSIS — F10.20 ALCOHOL USE DISORDER, SEVERE, DEPENDENCE: Primary | ICD-10-CM

## 2024-04-30 PROCEDURE — G0410 GRP PSYCH PARTIAL HOSP 45-50: HCPCS | Performed by: SOCIAL WORKER

## 2024-04-30 PROCEDURE — H0015 ALCOHOL AND/OR DRUG SERVICES: HCPCS | Performed by: SOCIAL WORKER

## 2024-04-30 PROCEDURE — G0176 OPPS/PHP;ACTIVITY THERAPY: HCPCS | Performed by: SOCIAL WORKER

## 2024-04-30 PROCEDURE — G0177 OPPS/PHP; TRAIN & EDUC SERV: HCPCS | Performed by: SOCIAL WORKER

## 2024-04-30 NOTE — PROGRESS NOTES
NAME: Taiwo Pierce  DATE: 04/30/2024    [facility] Phase I  [id number]    Services Provided    Group Psychotherapy x 1  Education/Training x 1  Activity Therapy  x 1  Individual Therapy x 0 0 minutes  Family Therapy x 0  MD Initial Visit  x 0  MD Follow-Up   x 0    Number of participants: 6    DATA:  Patient reported he was good today. He had helped his neighbor plant two trees yesterday, had planted some flower bushes, and had worked in his garden. However, while working in the garden he passed out for a minute or two. His neighbor saw what had happened and came to assist. He discovered it had been his sugar dropping that had caused the incident. He decided to stop taking his metformin in the morning and only take it at night. He had just recently been prescribed this medication because he was one point away from being diagnosed diabetic. He had an appointment coming up with his primary care provider at [facility] on May 17.     Individual: No    Homework: None assigned    Group 1 (Psychotherapy: Check-ins): Therapist continued facilitation of rapport building strategies between group members. Therapist asked that each patient check in with home life and recovery efforts and identify triggers, cravings, and high risk situations that arise between group sessions.  Group members discussed barriers and benefits of attending recovery meetings.  Therapist provided empathy and support during group session. Daily Reading: None    Group 2  (Recreation Therapy) Party board game    Group 3 (Mindfulness) Viewed and discussed the video, What you practice grows stronger (2017) by SUNG Chavez.     ASSESSMENT:    Personal Assessment 0-10 Scale (10 worst)    Anxiety:  0 (no comment)  Depression:  0 (no comment)  Cravings: 0 (no comment)     MSE within normal limits? Yes Affect: euthymic Mood:  elevated  Pt Response:  open/receptive and ambivalent  Engaged in activity/Process and self-disclosed:  suboptimal  Applies today's group topics to self: suboptimal  Able to give and receive feedback: adequate  Demonstrated insightful thinking: occasional and suboptimal  Other pt response comments:  Patient was a positive participant. They demonstrated a relatively positive attitude, a moderate degree of motivation, and moderate insight, as evidenced by his level of engagement combined with his open-mindedness. They seemed interested and attentive. They appeared to comprehend well the concepts being discussed. The patient seemed receptive of, and willing to implement, the ideas being discussed. Their thought process appeared linear and goal directed, and their speech was regular rate and rhythm.       Community Group Engagement:  Yes: [employer] attendance    Reported relapse since last meeting? No: no lapse    .  Office Visit on 04/29/2024   Component Date Value Ref Range Status    External Amphetamine Screen Urine 04/29/2024 Negative   Final    External Benzodiazepine Screen Uri* 04/29/2024 Negative   Final    External Cocaine Screen Urine 04/29/2024 Negative   Final    External THC Screen Urine 04/29/2024 Negative   Final    External Methadone Screen Urine 04/29/2024 Negative   Final    External Methamphetamine Screen Ur* 04/29/2024 Negative   Final    External Oxycodone Screen Urine 04/29/2024 Positive (A)   Final    External Buprenorphine Screen Urine 04/29/2024 Negative   Final    External MDMA 04/29/2024 Negative   Final    External Opiates Screen Urine 04/29/2024 Negative   Final    Morphine 04/29/2024 positive (A)   Final   Office Visit on 04/16/2024   Component Date Value Ref Range Status    External Amphetamine Screen Urine 04/17/2024 Negative   Final    External Benzodiazepine Screen Uri* 04/17/2024 Negative   Final    External Cocaine Screen Urine 04/17/2024 Negative   Final    External THC Screen Urine 04/17/2024 Negative   Final    External Methadone Screen Urine 04/17/2024 Negative   Final    External  Methamphetamine Screen Ur* 04/17/2024 Negative   Final    External Oxycodone Screen Urine 04/17/2024 Positive (A)   Final    External Buprenorphine Screen Urine 04/17/2024 Negative   Final    External MDMA 04/17/2024 Negative   Final    External Opiates Screen Urine 04/17/2024 Negative   Final   Admission on 04/15/2024, Discharged on 04/16/2024   Component Date Value Ref Range Status    Glucose 04/15/2024 102 (H)  65 - 99 mg/dL Final    BUN 04/15/2024 9  6 - 20 mg/dL Final    Creatinine 04/15/2024 0.73 (L)  0.76 - 1.27 mg/dL Final    Sodium 04/15/2024 141  136 - 145 mmol/L Final    Potassium 04/15/2024 3.3 (L)  3.5 - 5.2 mmol/L Final    Chloride 04/15/2024 101  98 - 107 mmol/L Final    CO2 04/15/2024 21.5 (L)  22.0 - 29.0 mmol/L Final    Calcium 04/15/2024 8.4 (L)  8.6 - 10.5 mg/dL Final    Total Protein 04/15/2024 7.6  6.0 - 8.5 g/dL Final    Albumin 04/15/2024 3.5  3.5 - 5.2 g/dL Final    ALT (SGPT) 04/15/2024 53 (H)  1 - 41 U/L Final    AST (SGOT) 04/15/2024 265 (H)  1 - 40 U/L Final    Alkaline Phosphatase 04/15/2024 201 (H)  39 - 117 U/L Final    Total Bilirubin 04/15/2024 0.8  0.0 - 1.2 mg/dL Final    Globulin 04/15/2024 4.1  gm/dL Final    A/G Ratio 04/15/2024 0.9  g/dL Final    BUN/Creatinine Ratio 04/15/2024 12.3  7.0 - 25.0 Final    Anion Gap 04/15/2024 18.5 (H)  5.0 - 15.0 mmol/L Final    eGFR 04/15/2024 112.2  >60.0 mL/min/1.73 Final    Color, UA 04/15/2024 Yellow  Yellow, Straw Final    Appearance, UA 04/15/2024 Clear  Clear Final    pH, UA 04/15/2024 7.0  5.0 - 8.0 Final    Specific Gravity, UA 04/15/2024 1.009  1.005 - 1.030 Final    Glucose, UA 04/15/2024 Negative  Negative Final    Ketones, UA 04/15/2024 Trace (A)  Negative Final    Bilirubin, UA 04/15/2024 Negative  Negative Final    Blood, UA 04/15/2024 Negative  Negative Final    Protein, UA 04/15/2024 Trace (A)  Negative Final    Leuk Esterase, UA 04/15/2024 Negative  Negative Final    Nitrite, UA 04/15/2024 Negative  Negative Final     Urobilinogen, UA 04/15/2024 1.0 E.U./dL  0.2 - 1.0 E.U./dL Final    Ethanol 04/15/2024 326 (H)  0 - 10 mg/dL Final    Ethanol % 04/15/2024 0.326  % Final    THC, Screen, Urine 04/15/2024 Negative  Negative Final    Phencyclidine (PCP), Urine 04/15/2024 Negative  Negative Final    Cocaine Screen, Urine 04/15/2024 Negative  Negative Final    Methamphetamine, Ur 04/15/2024 Negative  Negative Final    Opiate Screen 04/15/2024 Negative  Negative Final    Amphetamine Screen, Urine 04/15/2024 Negative  Negative Final    Benzodiazepine Screen, Urine 04/15/2024 Negative  Negative Final    Tricyclic Antidepressants Screen 04/15/2024 Negative  Negative Final    Methadone Screen, Urine 04/15/2024 Negative  Negative Final    Barbiturates Screen, Urine 04/15/2024 Negative  Negative Final    Oxycodone Screen, Urine 04/15/2024 Positive (A)  Negative Final    Buprenorphine, Screen, Urine 04/15/2024 Negative  Negative Final    Magnesium 04/15/2024 2.3  1.6 - 2.6 mg/dL Final    WBC 04/15/2024 4.90  3.40 - 10.80 10*3/mm3 Final    RBC 04/15/2024 3.73 (L)  4.14 - 5.80 10*6/mm3 Final    Hemoglobin 04/15/2024 12.6 (L)  13.0 - 17.7 g/dL Final    Hematocrit 04/15/2024 37.2 (L)  37.5 - 51.0 % Final    MCV 04/15/2024 99.7 (H)  79.0 - 97.0 fL Final    MCH 04/15/2024 33.8 (H)  26.6 - 33.0 pg Final    MCHC 04/15/2024 33.9  31.5 - 35.7 g/dL Final    RDW 04/15/2024 14.6  12.3 - 15.4 % Final    RDW-SD 04/15/2024 53.6  37.0 - 54.0 fl Final    MPV 04/15/2024 9.5  6.0 - 12.0 fL Final    Platelets 04/15/2024 185  140 - 450 10*3/mm3 Final    Neutrophil % 04/15/2024 62.7  42.7 - 76.0 % Final    Lymphocyte % 04/15/2024 25.9  19.6 - 45.3 % Final    Monocyte % 04/15/2024 8.4  5.0 - 12.0 % Final    Eosinophil % 04/15/2024 0.8  0.3 - 6.2 % Final    Basophil % 04/15/2024 2.0 (H)  0.0 - 1.5 % Final    Immature Grans % 04/15/2024 0.2  0.0 - 0.5 % Final    Neutrophils, Absolute 04/15/2024 3.07  1.70 - 7.00 10*3/mm3 Final    Lymphocytes, Absolute 04/15/2024 1.27   0.70 - 3.10 10*3/mm3 Final    Monocytes, Absolute 04/15/2024 0.41  0.10 - 0.90 10*3/mm3 Final    Eosinophils, Absolute 04/15/2024 0.04  0.00 - 0.40 10*3/mm3 Final    Basophils, Absolute 04/15/2024 0.10  0.00 - 0.20 10*3/mm3 Final    Immature Grans, Absolute 04/15/2024 0.01  0.00 - 0.05 10*3/mm3 Final    nRBC 04/15/2024 0.0  0.0 - 0.2 /100 WBC Final    Fentanyl, Urine 04/15/2024 Negative  Negative Final    Ethanol 04/16/2024 115 (H)  0 - 10 mg/dL Final    Ethanol % 04/16/2024 0.115  % Final    Ethanol 04/16/2024 67 (H)  0 - 10 mg/dL Final    Ethanol % 04/16/2024 0.067  % Final   Admission on 04/13/2024, Discharged on 04/13/2024   Component Date Value Ref Range Status    Glucose 04/13/2024 133 (H)  65 - 99 mg/dL Final    BUN 04/13/2024 8  6 - 20 mg/dL Final    Creatinine 04/13/2024 0.83  0.76 - 1.27 mg/dL Final    Sodium 04/13/2024 140  136 - 145 mmol/L Final    Potassium 04/13/2024 3.7  3.5 - 5.2 mmol/L Final    Specimen hemolyzed.  Result may be falsely elevated.    Chloride 04/13/2024 101  98 - 107 mmol/L Final    CO2 04/13/2024 23.5  22.0 - 29.0 mmol/L Final    Calcium 04/13/2024 8.8  8.6 - 10.5 mg/dL Final    Total Protein 04/13/2024 8.0  6.0 - 8.5 g/dL Final    Albumin 04/13/2024 3.4 (L)  3.5 - 5.2 g/dL Final    ALT (SGPT) 04/13/2024 54 (H)  1 - 41 U/L Final    Specimen hemolyzed.  Result may  be falsely elevated.    AST (SGOT) 04/13/2024 290 (H)  1 - 40 U/L Final    Alkaline Phosphatase 04/13/2024 207 (H)  39 - 117 U/L Final    Total Bilirubin 04/13/2024 0.6  0.0 - 1.2 mg/dL Final    Globulin 04/13/2024 4.6  gm/dL Final    A/G Ratio 04/13/2024 0.7  g/dL Final    BUN/Creatinine Ratio 04/13/2024 9.6  7.0 - 25.0 Final    Anion Gap 04/13/2024 15.5 (H)  5.0 - 15.0 mmol/L Final    eGFR 04/13/2024 108.0  >60.0 mL/min/1.73 Final    Ethanol 04/13/2024 355 (H)  0 - 10 mg/dL Final    Ethanol % 04/13/2024 0.355  % Final    Magnesium 04/13/2024 2.1  1.6 - 2.6 mg/dL Final    WBC 04/13/2024 7.49  3.40 - 10.80 10*3/mm3 Final     RBC 04/13/2024 3.75 (L)  4.14 - 5.80 10*6/mm3 Final    Hemoglobin 04/13/2024 12.5 (L)  13.0 - 17.7 g/dL Final    Hematocrit 04/13/2024 37.2 (L)  37.5 - 51.0 % Final    MCV 04/13/2024 99.2 (H)  79.0 - 97.0 fL Final    MCH 04/13/2024 33.3 (H)  26.6 - 33.0 pg Final    MCHC 04/13/2024 33.6  31.5 - 35.7 g/dL Final    RDW 04/13/2024 14.5  12.3 - 15.4 % Final    RDW-SD 04/13/2024 53.2  37.0 - 54.0 fl Final    MPV 04/13/2024 9.8  6.0 - 12.0 fL Final    Platelets 04/13/2024 180  140 - 450 10*3/mm3 Final    Neutrophil % 04/13/2024 58.9  42.7 - 76.0 % Final    Lymphocyte % 04/13/2024 29.9  19.6 - 45.3 % Final    Monocyte % 04/13/2024 9.5  5.0 - 12.0 % Final    Eosinophil % 04/13/2024 0.4  0.3 - 6.2 % Final    Basophil % 04/13/2024 1.2  0.0 - 1.5 % Final    Immature Grans % 04/13/2024 0.1  0.0 - 0.5 % Final    Neutrophils, Absolute 04/13/2024 4.41  1.70 - 7.00 10*3/mm3 Final    Lymphocytes, Absolute 04/13/2024 2.24  0.70 - 3.10 10*3/mm3 Final    Monocytes, Absolute 04/13/2024 0.71  0.10 - 0.90 10*3/mm3 Final    Eosinophils, Absolute 04/13/2024 0.03  0.00 - 0.40 10*3/mm3 Final    Basophils, Absolute 04/13/2024 0.09  0.00 - 0.20 10*3/mm3 Final    Immature Grans, Absolute 04/13/2024 0.01  0.00 - 0.05 10*3/mm3 Final    nRBC 04/13/2024 0.0  0.0 - 0.2 /100 WBC Final       Impression/Formulation:    ICD-10-CM ICD-9-CM   1. Alcohol use disorder, severe, dependence  F10.20 303.90   2. Anxiety disorder, unspecified type  F41.9 300.00        CLINICAL MANEUVERING/INTERVENTIONS: Therapist utilized a person-centered approach to build and maintain rapport with group member.   Therapist promoted safe nonjudgmental environment by providing group members with unconditional positive regard.  Assisted member in processing above session content; acknowledged and normalized patient's thoughts, feelings and concerns.  Allowed patient to freely discuss issues without interruption or judgment. Provided safe, confidential environment to facilitate  the development of positive therapeutic relationship and encourage open, honest communication. Therapist assisted group member with identifying and implementing healthier coping strategies and maintaining healthier boundaries. Assisted patient in identifying risk factors which would indicate the need for higher level of care including thoughts to harm self or others and/or self-harming behavior and encouraged patient to contact this office, call 911, or present to the nearest emergency room should any of these events occur. Pt agreeable to adhere to medication regimen as prescribed and report any side effects.  Discussed crisis intervention services and means to access.  Patient adamantly and convincingly denies current suicidal or homicidal ideation or perceptual disturbance.      Therapist implemented motivational interviewing techniques to assist client with exploring and resolving ambivalence associated with commitment to change behaviors.  Yes  Therapist utilized dialectical behavior techniques to teach and model emotional regulation and relaxation.  Yes   Therapist applied cognitive behavioral strategies to facilitate identification of maladaptive patterns of thinking and behavior.  Yes     PLAN:    [facility] Phase I  Aftercare:  [facility] Phase 2      Please note that portions of this note were completed with a voice recognition program. Efforts were made to edit dictation, but occasionally words are mistranscribed.     This document signed by Ben Ortega LCSW, April 30, 2024, 09:18 EDT

## 2024-05-01 ENCOUNTER — OFFICE VISIT (OUTPATIENT)
Dept: PSYCHIATRY | Facility: HOSPITAL | Age: 49
End: 2024-05-01
Payer: MEDICARE

## 2024-05-01 DIAGNOSIS — F41.9 ANXIETY DISORDER, UNSPECIFIED TYPE: ICD-10-CM

## 2024-05-01 DIAGNOSIS — F10.20 ALCOHOL USE DISORDER, SEVERE, DEPENDENCE: Primary | ICD-10-CM

## 2024-05-01 DIAGNOSIS — G89.4 CHRONIC PAIN DISORDER: ICD-10-CM

## 2024-05-01 PROCEDURE — H0015 ALCOHOL AND/OR DRUG SERVICES: HCPCS | Performed by: SOCIAL WORKER

## 2024-05-01 NOTE — PROGRESS NOTES
NAME: Taiwo Pierce  DATE: 05/01/2024    [facility] Phase I  [id number]    Services Provided    Group Psychotherapy x 2  Education/Training x 1  Activity Therapy  x 0  Individual Therapy x 0 0 minutes  Family Therapy x 0  MD Initial Visit  x 0  MD Follow-Up   x 0    Number of participants: 5    DATA:  Patient reported that he has stopped taking his metformin. He believed it was causing problems for him. His blood sugar was staying around 147, per his report. He reported that he had felt dizzy yesterday just looking down at the ground. He speculated it was the metformin or the screws in his neck backing out and hitting a nerve. He had called his doctor and asked for an earlier appointment.     Individual: No    Homework: returned Thought Log    Group 1 (Psychotherapy: Check-ins): Therapist continued facilitation of rapport building strategies between group members. Therapist asked that each patient check in with home life and recovery efforts and identify triggers, cravings, and high risk situations that arise between group sessions.  Group members discussed barriers and benefits of attending recovery meetings.  Therapist provided empathy and support during group session. Daily Reading: The Last Lecture (2008) by Geo Grossman    Group 2  (Bibliotherapy) Discussed the disease concept and emphasized the importance of treating the disease rather than exclusively focusing on the symptoms.     Group 3 (Psychoeducation) This hour of group was facilitated by [facility].      ASSESSMENT:    Personal Assessment 0-10 Scale (10 worst)    Anxiety:  0 (worrisome thoughts about the symptoms he was experiencing)  Depression:  0 (no comment)  Cravings: 0 (no comment)     MSE within normal limits? Yes Affect: euthymic Mood: calm  Pt Response:  ambivalent  Engaged in activity/Process and self-disclosed: suboptimal  Applies today's group topics to self: moderate  Able to give and receive feedback: moderate  Demonstrated  insightful thinking: inconsistent and suboptimal  Other pt response comments:  Patient was a positive participant. They demonstrated a relatively positive attitude, ambivalence, and moderate insight, as evidenced by his level of engagement combined with his apparent difficulty with attentiveness today. They seemed interested and distracted. They appeared to experience difficulty comprehending the concepts being discussed. The patient seemed doubtful of, and somewhat willing to implement, the ideas being discussed. Their thought process appeared circumstantial , and their speech was pressured.       Community Group Engagement:  Yes: [employer]    Reported relapse since last meeting? No: no lapse    .  Office Visit on 04/29/2024   Component Date Value Ref Range Status    External Amphetamine Screen Urine 04/29/2024 Negative   Final    External Benzodiazepine Screen Uri* 04/29/2024 Negative   Final    External Cocaine Screen Urine 04/29/2024 Negative   Final    External THC Screen Urine 04/29/2024 Negative   Final    External Methadone Screen Urine 04/29/2024 Negative   Final    External Methamphetamine Screen Ur* 04/29/2024 Negative   Final    External Oxycodone Screen Urine 04/29/2024 Positive (A)   Final    External Buprenorphine Screen Urine 04/29/2024 Negative   Final    External MDMA 04/29/2024 Negative   Final    External Opiates Screen Urine 04/29/2024 Negative   Final    Morphine 04/29/2024 positive (A)   Final   Office Visit on 04/16/2024   Component Date Value Ref Range Status    External Amphetamine Screen Urine 04/17/2024 Negative   Final    External Benzodiazepine Screen Uri* 04/17/2024 Negative   Final    External Cocaine Screen Urine 04/17/2024 Negative   Final    External THC Screen Urine 04/17/2024 Negative   Final    External Methadone Screen Urine 04/17/2024 Negative   Final    External Methamphetamine Screen Ur* 04/17/2024 Negative   Final    External Oxycodone Screen Urine 04/17/2024 Positive (A)    Final    External Buprenorphine Screen Urine 04/17/2024 Negative   Final    External MDMA 04/17/2024 Negative   Final    External Opiates Screen Urine 04/17/2024 Negative   Final   Admission on 04/15/2024, Discharged on 04/16/2024   Component Date Value Ref Range Status    Glucose 04/15/2024 102 (H)  65 - 99 mg/dL Final    BUN 04/15/2024 9  6 - 20 mg/dL Final    Creatinine 04/15/2024 0.73 (L)  0.76 - 1.27 mg/dL Final    Sodium 04/15/2024 141  136 - 145 mmol/L Final    Potassium 04/15/2024 3.3 (L)  3.5 - 5.2 mmol/L Final    Chloride 04/15/2024 101  98 - 107 mmol/L Final    CO2 04/15/2024 21.5 (L)  22.0 - 29.0 mmol/L Final    Calcium 04/15/2024 8.4 (L)  8.6 - 10.5 mg/dL Final    Total Protein 04/15/2024 7.6  6.0 - 8.5 g/dL Final    Albumin 04/15/2024 3.5  3.5 - 5.2 g/dL Final    ALT (SGPT) 04/15/2024 53 (H)  1 - 41 U/L Final    AST (SGOT) 04/15/2024 265 (H)  1 - 40 U/L Final    Alkaline Phosphatase 04/15/2024 201 (H)  39 - 117 U/L Final    Total Bilirubin 04/15/2024 0.8  0.0 - 1.2 mg/dL Final    Globulin 04/15/2024 4.1  gm/dL Final    A/G Ratio 04/15/2024 0.9  g/dL Final    BUN/Creatinine Ratio 04/15/2024 12.3  7.0 - 25.0 Final    Anion Gap 04/15/2024 18.5 (H)  5.0 - 15.0 mmol/L Final    eGFR 04/15/2024 112.2  >60.0 mL/min/1.73 Final    Color, UA 04/15/2024 Yellow  Yellow, Straw Final    Appearance, UA 04/15/2024 Clear  Clear Final    pH, UA 04/15/2024 7.0  5.0 - 8.0 Final    Specific Gravity, UA 04/15/2024 1.009  1.005 - 1.030 Final    Glucose, UA 04/15/2024 Negative  Negative Final    Ketones, UA 04/15/2024 Trace (A)  Negative Final    Bilirubin, UA 04/15/2024 Negative  Negative Final    Blood, UA 04/15/2024 Negative  Negative Final    Protein, UA 04/15/2024 Trace (A)  Negative Final    Leuk Esterase, UA 04/15/2024 Negative  Negative Final    Nitrite, UA 04/15/2024 Negative  Negative Final    Urobilinogen, UA 04/15/2024 1.0 E.U./dL  0.2 - 1.0 E.U./dL Final    Ethanol 04/15/2024 326 (H)  0 - 10 mg/dL Final    Ethanol  % 04/15/2024 0.326  % Final    THC, Screen, Urine 04/15/2024 Negative  Negative Final    Phencyclidine (PCP), Urine 04/15/2024 Negative  Negative Final    Cocaine Screen, Urine 04/15/2024 Negative  Negative Final    Methamphetamine, Ur 04/15/2024 Negative  Negative Final    Opiate Screen 04/15/2024 Negative  Negative Final    Amphetamine Screen, Urine 04/15/2024 Negative  Negative Final    Benzodiazepine Screen, Urine 04/15/2024 Negative  Negative Final    Tricyclic Antidepressants Screen 04/15/2024 Negative  Negative Final    Methadone Screen, Urine 04/15/2024 Negative  Negative Final    Barbiturates Screen, Urine 04/15/2024 Negative  Negative Final    Oxycodone Screen, Urine 04/15/2024 Positive (A)  Negative Final    Buprenorphine, Screen, Urine 04/15/2024 Negative  Negative Final    Magnesium 04/15/2024 2.3  1.6 - 2.6 mg/dL Final    WBC 04/15/2024 4.90  3.40 - 10.80 10*3/mm3 Final    RBC 04/15/2024 3.73 (L)  4.14 - 5.80 10*6/mm3 Final    Hemoglobin 04/15/2024 12.6 (L)  13.0 - 17.7 g/dL Final    Hematocrit 04/15/2024 37.2 (L)  37.5 - 51.0 % Final    MCV 04/15/2024 99.7 (H)  79.0 - 97.0 fL Final    MCH 04/15/2024 33.8 (H)  26.6 - 33.0 pg Final    MCHC 04/15/2024 33.9  31.5 - 35.7 g/dL Final    RDW 04/15/2024 14.6  12.3 - 15.4 % Final    RDW-SD 04/15/2024 53.6  37.0 - 54.0 fl Final    MPV 04/15/2024 9.5  6.0 - 12.0 fL Final    Platelets 04/15/2024 185  140 - 450 10*3/mm3 Final    Neutrophil % 04/15/2024 62.7  42.7 - 76.0 % Final    Lymphocyte % 04/15/2024 25.9  19.6 - 45.3 % Final    Monocyte % 04/15/2024 8.4  5.0 - 12.0 % Final    Eosinophil % 04/15/2024 0.8  0.3 - 6.2 % Final    Basophil % 04/15/2024 2.0 (H)  0.0 - 1.5 % Final    Immature Grans % 04/15/2024 0.2  0.0 - 0.5 % Final    Neutrophils, Absolute 04/15/2024 3.07  1.70 - 7.00 10*3/mm3 Final    Lymphocytes, Absolute 04/15/2024 1.27  0.70 - 3.10 10*3/mm3 Final    Monocytes, Absolute 04/15/2024 0.41  0.10 - 0.90 10*3/mm3 Final    Eosinophils, Absolute  04/15/2024 0.04  0.00 - 0.40 10*3/mm3 Final    Basophils, Absolute 04/15/2024 0.10  0.00 - 0.20 10*3/mm3 Final    Immature Grans, Absolute 04/15/2024 0.01  0.00 - 0.05 10*3/mm3 Final    nRBC 04/15/2024 0.0  0.0 - 0.2 /100 WBC Final    Fentanyl, Urine 04/15/2024 Negative  Negative Final    Ethanol 04/16/2024 115 (H)  0 - 10 mg/dL Final    Ethanol % 04/16/2024 0.115  % Final    Ethanol 04/16/2024 67 (H)  0 - 10 mg/dL Final    Ethanol % 04/16/2024 0.067  % Final   Admission on 04/13/2024, Discharged on 04/13/2024   Component Date Value Ref Range Status    Glucose 04/13/2024 133 (H)  65 - 99 mg/dL Final    BUN 04/13/2024 8  6 - 20 mg/dL Final    Creatinine 04/13/2024 0.83  0.76 - 1.27 mg/dL Final    Sodium 04/13/2024 140  136 - 145 mmol/L Final    Potassium 04/13/2024 3.7  3.5 - 5.2 mmol/L Final    Specimen hemolyzed.  Result may be falsely elevated.    Chloride 04/13/2024 101  98 - 107 mmol/L Final    CO2 04/13/2024 23.5  22.0 - 29.0 mmol/L Final    Calcium 04/13/2024 8.8  8.6 - 10.5 mg/dL Final    Total Protein 04/13/2024 8.0  6.0 - 8.5 g/dL Final    Albumin 04/13/2024 3.4 (L)  3.5 - 5.2 g/dL Final    ALT (SGPT) 04/13/2024 54 (H)  1 - 41 U/L Final    Specimen hemolyzed.  Result may  be falsely elevated.    AST (SGOT) 04/13/2024 290 (H)  1 - 40 U/L Final    Alkaline Phosphatase 04/13/2024 207 (H)  39 - 117 U/L Final    Total Bilirubin 04/13/2024 0.6  0.0 - 1.2 mg/dL Final    Globulin 04/13/2024 4.6  gm/dL Final    A/G Ratio 04/13/2024 0.7  g/dL Final    BUN/Creatinine Ratio 04/13/2024 9.6  7.0 - 25.0 Final    Anion Gap 04/13/2024 15.5 (H)  5.0 - 15.0 mmol/L Final    eGFR 04/13/2024 108.0  >60.0 mL/min/1.73 Final    Ethanol 04/13/2024 355 (H)  0 - 10 mg/dL Final    Ethanol % 04/13/2024 0.355  % Final    Magnesium 04/13/2024 2.1  1.6 - 2.6 mg/dL Final    WBC 04/13/2024 7.49  3.40 - 10.80 10*3/mm3 Final    RBC 04/13/2024 3.75 (L)  4.14 - 5.80 10*6/mm3 Final    Hemoglobin 04/13/2024 12.5 (L)  13.0 - 17.7 g/dL Final     Hematocrit 04/13/2024 37.2 (L)  37.5 - 51.0 % Final    MCV 04/13/2024 99.2 (H)  79.0 - 97.0 fL Final    MCH 04/13/2024 33.3 (H)  26.6 - 33.0 pg Final    MCHC 04/13/2024 33.6  31.5 - 35.7 g/dL Final    RDW 04/13/2024 14.5  12.3 - 15.4 % Final    RDW-SD 04/13/2024 53.2  37.0 - 54.0 fl Final    MPV 04/13/2024 9.8  6.0 - 12.0 fL Final    Platelets 04/13/2024 180  140 - 450 10*3/mm3 Final    Neutrophil % 04/13/2024 58.9  42.7 - 76.0 % Final    Lymphocyte % 04/13/2024 29.9  19.6 - 45.3 % Final    Monocyte % 04/13/2024 9.5  5.0 - 12.0 % Final    Eosinophil % 04/13/2024 0.4  0.3 - 6.2 % Final    Basophil % 04/13/2024 1.2  0.0 - 1.5 % Final    Immature Grans % 04/13/2024 0.1  0.0 - 0.5 % Final    Neutrophils, Absolute 04/13/2024 4.41  1.70 - 7.00 10*3/mm3 Final    Lymphocytes, Absolute 04/13/2024 2.24  0.70 - 3.10 10*3/mm3 Final    Monocytes, Absolute 04/13/2024 0.71  0.10 - 0.90 10*3/mm3 Final    Eosinophils, Absolute 04/13/2024 0.03  0.00 - 0.40 10*3/mm3 Final    Basophils, Absolute 04/13/2024 0.09  0.00 - 0.20 10*3/mm3 Final    Immature Grans, Absolute 04/13/2024 0.01  0.00 - 0.05 10*3/mm3 Final    nRBC 04/13/2024 0.0  0.0 - 0.2 /100 WBC Final       Impression/Formulation:    ICD-10-CM ICD-9-CM   1. Alcohol use disorder, severe, dependence  F10.20 303.90   2. Anxiety disorder, unspecified type  F41.9 300.00   3. Chronic pain disorder  G89.4 338.4        CLINICAL MANEUVERING/INTERVENTIONS: Therapist utilized a person-centered approach to build and maintain rapport with group member.   Therapist promoted safe nonjudgmental environment by providing group members with unconditional positive regard.  Assisted member in processing above session content; acknowledged and normalized patient's thoughts, feelings and concerns.  Allowed patient to freely discuss issues without interruption or judgment. Provided safe, confidential environment to facilitate the development of positive therapeutic relationship and encourage open,  honest communication. Therapist assisted group member with identifying and implementing healthier coping strategies and maintaining healthier boundaries. Assisted patient in identifying risk factors which would indicate the need for higher level of care including thoughts to harm self or others and/or self-harming behavior and encouraged patient to contact this office, call 911, or present to the nearest emergency room should any of these events occur. Pt agreeable to adhere to medication regimen as prescribed and report any side effects.  Discussed crisis intervention services and means to access.  Patient adamantly and convincingly denies current suicidal or homicidal ideation or perceptual disturbance.      Therapist implemented motivational interviewing techniques to assist client with exploring and resolving ambivalence associated with commitment to change behaviors.  Yes  Therapist utilized dialectical behavior techniques to teach and model emotional regulation and relaxation.  No   Therapist applied cognitive behavioral strategies to facilitate identification of maladaptive patterns of thinking and behavior.  Yes     PLAN:    Continue [facility] Phase I  Aftercare:  [facility] Phase 2      Please note that portions of this note were completed with a voice recognition program. Efforts were made to edit dictation, but occasionally words are mistranscribed.     This document signed by Ben Ortega LCSW, May 1, 2024, 14:05 EDT

## 2024-05-02 ENCOUNTER — OFFICE VISIT (OUTPATIENT)
Dept: PSYCHIATRY | Facility: HOSPITAL | Age: 49
End: 2024-05-02
Payer: MEDICARE

## 2024-05-02 DIAGNOSIS — F41.9 ANXIETY DISORDER, UNSPECIFIED TYPE: ICD-10-CM

## 2024-05-02 DIAGNOSIS — G89.4 CHRONIC PAIN DISORDER: ICD-10-CM

## 2024-05-02 DIAGNOSIS — Z79.899 MEDICATION MANAGEMENT: ICD-10-CM

## 2024-05-02 DIAGNOSIS — F10.20 ALCOHOL USE DISORDER, SEVERE, DEPENDENCE: Primary | ICD-10-CM

## 2024-05-02 PROCEDURE — H0015 ALCOHOL AND/OR DRUG SERVICES: HCPCS | Performed by: SOCIAL WORKER

## 2024-05-02 NOTE — PROGRESS NOTES
NAME: Taiwo Pierce  DATE: 05/02/2024    [facility] Phase I  [id number]    Services Provided    Group Psychotherapy x 1  Education/Training x 2  Activity Therapy  x 0  Individual Therapy x 0 0 minutes  Family Therapy x 0  MD Initial Visit  x 0  MD Follow-Up   x 0    Number of participants: 5    DATA:  Patient reported he was good today. He had tried to get his teeth fixed yesterday, but the dentist wouldn't do anything with his teeth. He reported they had forged his signature on a document saying he had been satisfied with his care. He was also excited about the [employer] tournament this weekend.     Individual: No    Homework: None assigned    Group 1 (Psychotherapy: Check-ins): Therapist continued facilitation of rapport building strategies between group members. Therapist asked that each patient check in with home life and recovery efforts and identify triggers, cravings, and high risk situations that arise between group sessions.  Group members discussed barriers and benefits of attending recovery meetings.  Therapist provided empathy and support during group session. Daily Reading: None    Group 2  ( Communication ) Engaged group in conversation about effective communication skills.     Group 3 ( Peer Support ) This group was facilitated by Sandhya .      ASSESSMENT:    Personal Assessment 0-10 Scale (10 worst)    Anxiety:  0 (no comment)  Depression:  0 (no comment)  Cravings: 0 (no comment)     MSE within normal limits? Yes Affect: somber Mood: calm  Pt Response:  open/receptive  Engaged in activity/Process and self-disclosed: adequate  Applies today's group topics to self: adequate  Able to give and receive feedback: suboptimal  Demonstrated insightful thinking: occasional and suboptimal  Other pt response comments:  Patient was a positive participant. They demonstrated a relatively positive attitude, a strong degree of motivation, and moderate insight, as evidenced by his level of  engagement combined with his effort to encourage his peers. They seemed interested and attentive. They appeared to comprehend well the concepts being discussed. The patient seemed receptive of, and somewhat willing to implement, the ideas being discussed. Their thought process appeared linear and goal directed, and their speech was regular rate and rhythm.       Community Group Engagement:  Yes: [facility]    Reported relapse since last meeting? No: no lapse    .  Office Visit on 05/02/2024   Component Date Value Ref Range Status    External Amphetamine Screen Urine 05/02/2024 Negative   Final    External Benzodiazepine Screen Uri* 05/02/2024 Negative   Final    External Cocaine Screen Urine 05/02/2024 Negative   Final    External THC Screen Urine 05/02/2024 Negative   Final    External Methadone Screen Urine 05/02/2024 Negative   Final    External Methamphetamine Screen Ur* 05/02/2024 Negative   Final    External Oxycodone Screen Urine 05/02/2024 Positive (A)   Final    External Buprenorphine Screen Urine 05/02/2024 Negative   Final    External MDMA 05/02/2024 Negative   Final    External Opiates Screen Urine 05/02/2024 Negative   Final   Office Visit on 04/29/2024   Component Date Value Ref Range Status    External Amphetamine Screen Urine 04/29/2024 Negative   Final    External Benzodiazepine Screen Uri* 04/29/2024 Negative   Final    External Cocaine Screen Urine 04/29/2024 Negative   Final    External THC Screen Urine 04/29/2024 Negative   Final    External Methadone Screen Urine 04/29/2024 Negative   Final    External Methamphetamine Screen Ur* 04/29/2024 Negative   Final    External Oxycodone Screen Urine 04/29/2024 Positive (A)   Final    External Buprenorphine Screen Urine 04/29/2024 Negative   Final    External MDMA 04/29/2024 Negative   Final    External Opiates Screen Urine 04/29/2024 Negative   Final    Morphine 04/29/2024 positive (A)   Final   Office Visit on 04/16/2024   Component Date Value Ref Range  Status    External Amphetamine Screen Urine 04/17/2024 Negative   Final    External Benzodiazepine Screen Uri* 04/17/2024 Negative   Final    External Cocaine Screen Urine 04/17/2024 Negative   Final    External THC Screen Urine 04/17/2024 Negative   Final    External Methadone Screen Urine 04/17/2024 Negative   Final    External Methamphetamine Screen Ur* 04/17/2024 Negative   Final    External Oxycodone Screen Urine 04/17/2024 Positive (A)   Final    External Buprenorphine Screen Urine 04/17/2024 Negative   Final    External MDMA 04/17/2024 Negative   Final    External Opiates Screen Urine 04/17/2024 Negative   Final   Admission on 04/15/2024, Discharged on 04/16/2024   Component Date Value Ref Range Status    Glucose 04/15/2024 102 (H)  65 - 99 mg/dL Final    BUN 04/15/2024 9  6 - 20 mg/dL Final    Creatinine 04/15/2024 0.73 (L)  0.76 - 1.27 mg/dL Final    Sodium 04/15/2024 141  136 - 145 mmol/L Final    Potassium 04/15/2024 3.3 (L)  3.5 - 5.2 mmol/L Final    Chloride 04/15/2024 101  98 - 107 mmol/L Final    CO2 04/15/2024 21.5 (L)  22.0 - 29.0 mmol/L Final    Calcium 04/15/2024 8.4 (L)  8.6 - 10.5 mg/dL Final    Total Protein 04/15/2024 7.6  6.0 - 8.5 g/dL Final    Albumin 04/15/2024 3.5  3.5 - 5.2 g/dL Final    ALT (SGPT) 04/15/2024 53 (H)  1 - 41 U/L Final    AST (SGOT) 04/15/2024 265 (H)  1 - 40 U/L Final    Alkaline Phosphatase 04/15/2024 201 (H)  39 - 117 U/L Final    Total Bilirubin 04/15/2024 0.8  0.0 - 1.2 mg/dL Final    Globulin 04/15/2024 4.1  gm/dL Final    A/G Ratio 04/15/2024 0.9  g/dL Final    BUN/Creatinine Ratio 04/15/2024 12.3  7.0 - 25.0 Final    Anion Gap 04/15/2024 18.5 (H)  5.0 - 15.0 mmol/L Final    eGFR 04/15/2024 112.2  >60.0 mL/min/1.73 Final    Color, UA 04/15/2024 Yellow  Yellow, Straw Final    Appearance, UA 04/15/2024 Clear  Clear Final    pH, UA 04/15/2024 7.0  5.0 - 8.0 Final    Specific Gravity, UA 04/15/2024 1.009  1.005 - 1.030 Final    Glucose, UA 04/15/2024 Negative  Negative  Final    Ketones, UA 04/15/2024 Trace (A)  Negative Final    Bilirubin, UA 04/15/2024 Negative  Negative Final    Blood, UA 04/15/2024 Negative  Negative Final    Protein, UA 04/15/2024 Trace (A)  Negative Final    Leuk Esterase, UA 04/15/2024 Negative  Negative Final    Nitrite, UA 04/15/2024 Negative  Negative Final    Urobilinogen, UA 04/15/2024 1.0 E.U./dL  0.2 - 1.0 E.U./dL Final    Ethanol 04/15/2024 326 (H)  0 - 10 mg/dL Final    Ethanol % 04/15/2024 0.326  % Final    THC, Screen, Urine 04/15/2024 Negative  Negative Final    Phencyclidine (PCP), Urine 04/15/2024 Negative  Negative Final    Cocaine Screen, Urine 04/15/2024 Negative  Negative Final    Methamphetamine, Ur 04/15/2024 Negative  Negative Final    Opiate Screen 04/15/2024 Negative  Negative Final    Amphetamine Screen, Urine 04/15/2024 Negative  Negative Final    Benzodiazepine Screen, Urine 04/15/2024 Negative  Negative Final    Tricyclic Antidepressants Screen 04/15/2024 Negative  Negative Final    Methadone Screen, Urine 04/15/2024 Negative  Negative Final    Barbiturates Screen, Urine 04/15/2024 Negative  Negative Final    Oxycodone Screen, Urine 04/15/2024 Positive (A)  Negative Final    Buprenorphine, Screen, Urine 04/15/2024 Negative  Negative Final    Magnesium 04/15/2024 2.3  1.6 - 2.6 mg/dL Final    WBC 04/15/2024 4.90  3.40 - 10.80 10*3/mm3 Final    RBC 04/15/2024 3.73 (L)  4.14 - 5.80 10*6/mm3 Final    Hemoglobin 04/15/2024 12.6 (L)  13.0 - 17.7 g/dL Final    Hematocrit 04/15/2024 37.2 (L)  37.5 - 51.0 % Final    MCV 04/15/2024 99.7 (H)  79.0 - 97.0 fL Final    MCH 04/15/2024 33.8 (H)  26.6 - 33.0 pg Final    MCHC 04/15/2024 33.9  31.5 - 35.7 g/dL Final    RDW 04/15/2024 14.6  12.3 - 15.4 % Final    RDW-SD 04/15/2024 53.6  37.0 - 54.0 fl Final    MPV 04/15/2024 9.5  6.0 - 12.0 fL Final    Platelets 04/15/2024 185  140 - 450 10*3/mm3 Final    Neutrophil % 04/15/2024 62.7  42.7 - 76.0 % Final    Lymphocyte % 04/15/2024 25.9  19.6 - 45.3 %  Final    Monocyte % 04/15/2024 8.4  5.0 - 12.0 % Final    Eosinophil % 04/15/2024 0.8  0.3 - 6.2 % Final    Basophil % 04/15/2024 2.0 (H)  0.0 - 1.5 % Final    Immature Grans % 04/15/2024 0.2  0.0 - 0.5 % Final    Neutrophils, Absolute 04/15/2024 3.07  1.70 - 7.00 10*3/mm3 Final    Lymphocytes, Absolute 04/15/2024 1.27  0.70 - 3.10 10*3/mm3 Final    Monocytes, Absolute 04/15/2024 0.41  0.10 - 0.90 10*3/mm3 Final    Eosinophils, Absolute 04/15/2024 0.04  0.00 - 0.40 10*3/mm3 Final    Basophils, Absolute 04/15/2024 0.10  0.00 - 0.20 10*3/mm3 Final    Immature Grans, Absolute 04/15/2024 0.01  0.00 - 0.05 10*3/mm3 Final    nRBC 04/15/2024 0.0  0.0 - 0.2 /100 WBC Final    Fentanyl, Urine 04/15/2024 Negative  Negative Final    Ethanol 04/16/2024 115 (H)  0 - 10 mg/dL Final    Ethanol % 04/16/2024 0.115  % Final    Ethanol 04/16/2024 67 (H)  0 - 10 mg/dL Final    Ethanol % 04/16/2024 0.067  % Final   Admission on 04/13/2024, Discharged on 04/13/2024   Component Date Value Ref Range Status    Glucose 04/13/2024 133 (H)  65 - 99 mg/dL Final    BUN 04/13/2024 8  6 - 20 mg/dL Final    Creatinine 04/13/2024 0.83  0.76 - 1.27 mg/dL Final    Sodium 04/13/2024 140  136 - 145 mmol/L Final    Potassium 04/13/2024 3.7  3.5 - 5.2 mmol/L Final    Specimen hemolyzed.  Result may be falsely elevated.    Chloride 04/13/2024 101  98 - 107 mmol/L Final    CO2 04/13/2024 23.5  22.0 - 29.0 mmol/L Final    Calcium 04/13/2024 8.8  8.6 - 10.5 mg/dL Final    Total Protein 04/13/2024 8.0  6.0 - 8.5 g/dL Final    Albumin 04/13/2024 3.4 (L)  3.5 - 5.2 g/dL Final    ALT (SGPT) 04/13/2024 54 (H)  1 - 41 U/L Final    Specimen hemolyzed.  Result may  be falsely elevated.    AST (SGOT) 04/13/2024 290 (H)  1 - 40 U/L Final    Alkaline Phosphatase 04/13/2024 207 (H)  39 - 117 U/L Final    Total Bilirubin 04/13/2024 0.6  0.0 - 1.2 mg/dL Final    Globulin 04/13/2024 4.6  gm/dL Final    A/G Ratio 04/13/2024 0.7  g/dL Final    BUN/Creatinine Ratio 04/13/2024  9.6  7.0 - 25.0 Final    Anion Gap 04/13/2024 15.5 (H)  5.0 - 15.0 mmol/L Final    eGFR 04/13/2024 108.0  >60.0 mL/min/1.73 Final    Ethanol 04/13/2024 355 (H)  0 - 10 mg/dL Final    Ethanol % 04/13/2024 0.355  % Final    Magnesium 04/13/2024 2.1  1.6 - 2.6 mg/dL Final    WBC 04/13/2024 7.49  3.40 - 10.80 10*3/mm3 Final    RBC 04/13/2024 3.75 (L)  4.14 - 5.80 10*6/mm3 Final    Hemoglobin 04/13/2024 12.5 (L)  13.0 - 17.7 g/dL Final    Hematocrit 04/13/2024 37.2 (L)  37.5 - 51.0 % Final    MCV 04/13/2024 99.2 (H)  79.0 - 97.0 fL Final    MCH 04/13/2024 33.3 (H)  26.6 - 33.0 pg Final    MCHC 04/13/2024 33.6  31.5 - 35.7 g/dL Final    RDW 04/13/2024 14.5  12.3 - 15.4 % Final    RDW-SD 04/13/2024 53.2  37.0 - 54.0 fl Final    MPV 04/13/2024 9.8  6.0 - 12.0 fL Final    Platelets 04/13/2024 180  140 - 450 10*3/mm3 Final    Neutrophil % 04/13/2024 58.9  42.7 - 76.0 % Final    Lymphocyte % 04/13/2024 29.9  19.6 - 45.3 % Final    Monocyte % 04/13/2024 9.5  5.0 - 12.0 % Final    Eosinophil % 04/13/2024 0.4  0.3 - 6.2 % Final    Basophil % 04/13/2024 1.2  0.0 - 1.5 % Final    Immature Grans % 04/13/2024 0.1  0.0 - 0.5 % Final    Neutrophils, Absolute 04/13/2024 4.41  1.70 - 7.00 10*3/mm3 Final    Lymphocytes, Absolute 04/13/2024 2.24  0.70 - 3.10 10*3/mm3 Final    Monocytes, Absolute 04/13/2024 0.71  0.10 - 0.90 10*3/mm3 Final    Eosinophils, Absolute 04/13/2024 0.03  0.00 - 0.40 10*3/mm3 Final    Basophils, Absolute 04/13/2024 0.09  0.00 - 0.20 10*3/mm3 Final    Immature Grans, Absolute 04/13/2024 0.01  0.00 - 0.05 10*3/mm3 Final    nRBC 04/13/2024 0.0  0.0 - 0.2 /100 WBC Final       Impression/Formulation:    ICD-10-CM ICD-9-CM   1. Alcohol use disorder, severe, dependence  F10.20 303.90   2. Anxiety disorder, unspecified type  F41.9 300.00   3. Chronic pain disorder  G89.4 338.4   4. Medication management  Z79.899 V58.69        CLINICAL MANEUVERING/INTERVENTIONS: Therapist utilized a person-centered approach to build and  maintain rapport with group member.   Therapist promoted safe nonjudgmental environment by providing group members with unconditional positive regard.  Assisted member in processing above session content; acknowledged and normalized patient's thoughts, feelings and concerns.  Allowed patient to freely discuss issues without interruption or judgment. Provided safe, confidential environment to facilitate the development of positive therapeutic relationship and encourage open, honest communication. Therapist assisted group member with identifying and implementing healthier coping strategies and maintaining healthier boundaries. Assisted patient in identifying risk factors which would indicate the need for higher level of care including thoughts to harm self or others and/or self-harming behavior and encouraged patient to contact this office, call 911, or present to the nearest emergency room should any of these events occur. Pt agreeable to adhere to medication regimen as prescribed and report any side effects.  Discussed crisis intervention services and means to access.  Patient adamantly and convincingly denies current suicidal or homicidal ideation or perceptual disturbance.      Therapist implemented motivational interviewing techniques to assist client with exploring and resolving ambivalence associated with commitment to change behaviors.  Yes  Therapist utilized dialectical behavior techniques to teach and model emotional regulation and relaxation.  No   Therapist applied cognitive behavioral strategies to facilitate identification of maladaptive patterns of thinking and behavior.  Yes     PLAN:    [facility]  Aftercare:  [facility] CD Outpatient Phase 2      Please note that portions of this note were completed with a voice recognition program. Efforts were made to edit dictation, but occasionally words are mistranscribed.     This document signed by Ben COONEY  RICHIE Ortega, May 2, 2024, 15:35 EDT

## 2024-05-02 NOTE — PROGRESS NOTES
Adult CD IOP Group      Date: May 2, 2024    Time: 8520 - 2275    Type of Group:  Peer Support    Group  Content: Exercise, Nutrition, and Sleep Hygiene    Patient Response: Patient was positive participant , engaged in group discussion. Patient stated he planned to implement these positive changes in his daily routine.        Sandhya Coles MA  05/02/24  16:02 EDT

## 2024-05-06 ENCOUNTER — OFFICE VISIT (OUTPATIENT)
Dept: PSYCHIATRY | Facility: HOSPITAL | Age: 49
End: 2024-05-06
Payer: MEDICARE

## 2024-05-06 DIAGNOSIS — Z79.899 MEDICATION MANAGEMENT: ICD-10-CM

## 2024-05-06 DIAGNOSIS — F10.20 ALCOHOL USE DISORDER, SEVERE, DEPENDENCE: Primary | ICD-10-CM

## 2024-05-06 DIAGNOSIS — F41.9 ANXIETY DISORDER, UNSPECIFIED TYPE: ICD-10-CM

## 2024-05-06 DIAGNOSIS — G89.4 CHRONIC PAIN DISORDER: ICD-10-CM

## 2024-05-06 PROCEDURE — H0015 ALCOHOL AND/OR DRUG SERVICES: HCPCS | Performed by: SOCIAL WORKER

## 2024-05-06 NOTE — PROGRESS NOTES
Adult CD IOP Group      Date: May 1st, 2024    Time: 1030 - 5480    Type of Group:  Psychoeducation    Group  Content: Group members received education on gratitude and exercises they can utilize to practice gratitude. Exercises included gratitude journal, give thanks, mindfulness walk, gratitude letter, grateful contemplation, and gratitude conversation.    Patient Response: Patient was a positive participant and engaged in group.  Patient completed a gratitude letter that he wrote to his wife.  Patient shared his letter with group.        Zacarias Neil  05/06/24  09:35 EDT

## 2024-05-07 ENCOUNTER — OFFICE VISIT (OUTPATIENT)
Dept: PSYCHIATRY | Facility: HOSPITAL | Age: 49
End: 2024-05-07
Payer: MEDICARE

## 2024-05-07 DIAGNOSIS — F41.9 ANXIETY DISORDER, UNSPECIFIED TYPE: ICD-10-CM

## 2024-05-07 DIAGNOSIS — G89.4 CHRONIC PAIN DISORDER: ICD-10-CM

## 2024-05-07 DIAGNOSIS — F10.20 ALCOHOL USE DISORDER, SEVERE, DEPENDENCE: Primary | ICD-10-CM

## 2024-05-07 PROCEDURE — H0015 ALCOHOL AND/OR DRUG SERVICES: HCPCS | Performed by: SOCIAL WORKER

## 2024-05-08 ENCOUNTER — OFFICE VISIT (OUTPATIENT)
Dept: PSYCHIATRY | Facility: HOSPITAL | Age: 49
End: 2024-05-08
Payer: MEDICARE

## 2024-05-08 DIAGNOSIS — G89.4 CHRONIC PAIN DISORDER: ICD-10-CM

## 2024-05-08 DIAGNOSIS — F41.9 ANXIETY DISORDER, UNSPECIFIED TYPE: ICD-10-CM

## 2024-05-08 DIAGNOSIS — F10.20 ALCOHOL USE DISORDER, SEVERE, DEPENDENCE: Primary | ICD-10-CM

## 2024-05-08 PROCEDURE — H0015 ALCOHOL AND/OR DRUG SERVICES: HCPCS | Performed by: SOCIAL WORKER

## 2024-05-09 ENCOUNTER — OFFICE VISIT (OUTPATIENT)
Dept: PSYCHIATRY | Facility: HOSPITAL | Age: 49
End: 2024-05-09
Payer: MEDICARE

## 2024-05-09 DIAGNOSIS — F41.9 ANXIETY DISORDER, UNSPECIFIED TYPE: ICD-10-CM

## 2024-05-09 DIAGNOSIS — G89.4 CHRONIC PAIN DISORDER: ICD-10-CM

## 2024-05-09 DIAGNOSIS — F10.20 ALCOHOL USE DISORDER, SEVERE, DEPENDENCE: Primary | ICD-10-CM

## 2024-05-09 PROCEDURE — H0015 ALCOHOL AND/OR DRUG SERVICES: HCPCS | Performed by: SOCIAL WORKER

## 2024-05-13 ENCOUNTER — OFFICE VISIT (OUTPATIENT)
Dept: PSYCHIATRY | Facility: HOSPITAL | Age: 49
End: 2024-05-13
Payer: MEDICARE

## 2024-05-13 DIAGNOSIS — Z79.899 MEDICATION MANAGEMENT: ICD-10-CM

## 2024-05-13 DIAGNOSIS — F41.9 ANXIETY DISORDER, UNSPECIFIED TYPE: ICD-10-CM

## 2024-05-13 DIAGNOSIS — G89.4 CHRONIC PAIN DISORDER: ICD-10-CM

## 2024-05-13 DIAGNOSIS — F10.20 ALCOHOL USE DISORDER, SEVERE, DEPENDENCE: Primary | ICD-10-CM

## 2024-05-13 LAB
EXTERNAL AMPHETAMINE SCREEN URINE: NEGATIVE
EXTERNAL BENZODIAZEPINE SCREEN URINE: NEGATIVE
EXTERNAL BUPRENORPHINE SCREEN URINE: NEGATIVE
EXTERNAL COCAINE SCREEN URINE: NEGATIVE
EXTERNAL MDMA: NEGATIVE
EXTERNAL METHADONE SCREEN URINE: NEGATIVE
EXTERNAL METHAMPHETAMINE SCREEN URINE: NEGATIVE
EXTERNAL OPIATES SCREEN URINE: NEGATIVE
EXTERNAL OXYCODONE SCREEN URINE: NEGATIVE
EXTERNAL THC SCREEN URINE: NEGATIVE

## 2024-05-13 PROCEDURE — H0015 ALCOHOL AND/OR DRUG SERVICES: HCPCS | Performed by: SOCIAL WORKER

## 2024-05-13 NOTE — PROGRESS NOTES
NAME: Taiwo Pierce  DATE: 05/13/2024    [facility] Phase I  [id number]    Services Provided    Group Psychotherapy x 3  Education/Training x 1  Activity Therapy  x 0  Individual Therapy x 0 0 minutes  Family Therapy x 0  MD Initial Visit  x 0  MD Follow-Up   x 0    Number of participants: 7    DATA:  Patient reported he was doing good today. The weekend had been spent catching up no things. Otherwise, he had experienced a good Sunday, and he had been able to see his mother. He attempted to encourage one of his peers today who had been feeling particularly hopeless today. He reminded him that God loved him. The peer was not receptive.    Individual: No    Homework: None assigned    Group 1 (Psychotherapy: Check-ins): Therapist continued facilitation of rapport building strategies between group members. Therapist asked that each patient check in with home life and recovery efforts and identify triggers, cravings, and high risk situations that arise between group sessions.  Group members discussed barriers and benefits of attending recovery meetings.  Therapist provided empathy and support during group session. Daily Reading: None    Group 2  (12 Steps) Continued conversation of spiritual principles by discussing humility and acceptance.     Group 3 ( Recovery Testimony ) Patient's viewed and discussed the first 12 minutes of the video, Francis Tovar Speaks Candidly about his addictions and recovery (2018) by Francis Tovar on YouTube.      ASSESSMENT:    Personal Assessment 0-10 Scale (10 worst)    Anxiety:  0 (no comment)  Depression:  0 (no comment)  Cravings: 0 (no comment)     MSE within normal limits? Yes Affect: somber Mood: calm  Pt Response:  open/receptive  Engaged in activity/Process and self-disclosed: adequate  Applies today's group topics to self: adequate  Able to give and receive feedback: adequate  Demonstrated insightful thinking: consistent and adequate  Other pt response comments:  Patient was a  positive participant. They demonstrated a relatively positive attitude, a strong degree of motivation, and adequate insight, as evidenced by his level of engagement combined with his efforts to encourage his peers. They seemed interested and attentive. They appeared to comprehend well the concepts being discussed. The patient seemed receptive of, and willing to implement, the ideas being discussed. Their thought process appeared linear and goal directed, and their speech was regular rate and rhythm.       Community Group Engagement:  Yes: [facility]    Reported relapse since last meeting? No: no lapse    .  Appointment on 05/13/2024   Component Date Value Ref Range Status    External Amphetamine Screen Urine 05/13/2024 Negative   Final    External Benzodiazepine Screen Uri* 05/13/2024 Negative   Final    External Cocaine Screen Urine 05/13/2024 Negative   Final    External THC Screen Urine 05/13/2024 Negative   Final    External Methadone Screen Urine 05/13/2024 Negative   Final    External Methamphetamine Screen Ur* 05/13/2024 Negative   Final    External Oxycodone Screen Urine 05/13/2024 Negative   Final    External Buprenorphine Screen Urine 05/13/2024 Negative   Final    External MDMA 05/13/2024 Negative   Final    External Opiates Screen Urine 05/13/2024 Negative   Final   Office Visit on 05/06/2024   Component Date Value Ref Range Status    External Amphetamine Screen Urine 05/06/2024 Negative   Final    External Benzodiazepine Screen Uri* 05/06/2024 Negative   Final    External Cocaine Screen Urine 05/06/2024 Negative   Final    External THC Screen Urine 05/06/2024 Negative   Final    External Methadone Screen Urine 05/06/2024 Negative   Final    External Methamphetamine Screen Ur* 05/06/2024 Negative   Final    External Oxycodone Screen Urine 05/06/2024 Positive (A)   Final    External Buprenorphine Screen Urine 05/06/2024 Negative   Final    External MDMA 05/06/2024 Negative   Final    External Opiates Screen  Urine 05/06/2024 Negative   Final   Office Visit on 05/02/2024   Component Date Value Ref Range Status    External Amphetamine Screen Urine 05/02/2024 Negative   Final    External Benzodiazepine Screen Uri* 05/02/2024 Negative   Final    External Cocaine Screen Urine 05/02/2024 Negative   Final    External THC Screen Urine 05/02/2024 Negative   Final    External Methadone Screen Urine 05/02/2024 Negative   Final    External Methamphetamine Screen Ur* 05/02/2024 Negative   Final    External Oxycodone Screen Urine 05/02/2024 Positive (A)   Final    External Buprenorphine Screen Urine 05/02/2024 Negative   Final    External MDMA 05/02/2024 Negative   Final    External Opiates Screen Urine 05/02/2024 Negative   Final   Office Visit on 04/29/2024   Component Date Value Ref Range Status    External Amphetamine Screen Urine 04/29/2024 Negative   Final    External Benzodiazepine Screen Uri* 04/29/2024 Negative   Final    External Cocaine Screen Urine 04/29/2024 Negative   Final    External THC Screen Urine 04/29/2024 Negative   Final    External Methadone Screen Urine 04/29/2024 Negative   Final    External Methamphetamine Screen Ur* 04/29/2024 Negative   Final    External Oxycodone Screen Urine 04/29/2024 Positive (A)   Final    External Buprenorphine Screen Urine 04/29/2024 Negative   Final    External MDMA 04/29/2024 Negative   Final    External Opiates Screen Urine 04/29/2024 Negative   Final    Morphine 04/29/2024 positive (A)   Final       Impression/Formulation:    ICD-10-CM ICD-9-CM   1. Alcohol use disorder, severe, dependence  F10.20 303.90   2. Anxiety disorder, unspecified type  F41.9 300.00   3. Chronic pain disorder  G89.4 338.4   4. Medication management  Z79.899 V58.69        CLINICAL MANEUVERING/INTERVENTIONS: Therapist utilized a person-centered approach to build and maintain rapport with group member.   Therapist promoted safe nonjudgmental environment by providing group members with unconditional  positive regard.  Assisted member in processing above session content; acknowledged and normalized patient's thoughts, feelings and concerns.  Allowed patient to freely discuss issues without interruption or judgment. Provided safe, confidential environment to facilitate the development of positive therapeutic relationship and encourage open, honest communication. Therapist assisted group member with identifying and implementing healthier coping strategies and maintaining healthier boundaries. Assisted patient in identifying risk factors which would indicate the need for higher level of care including thoughts to harm self or others and/or self-harming behavior and encouraged patient to contact this office, call 911, or present to the nearest emergency room should any of these events occur. Pt agreeable to adhere to medication regimen as prescribed and report any side effects.  Discussed crisis intervention services and means to access.  Patient adamantly and convincingly denies current suicidal or homicidal ideation or perceptual disturbance.      Therapist implemented motivational interviewing techniques to assist client with exploring and resolving ambivalence associated with commitment to change behaviors.  Yes  Therapist utilized dialectical behavior techniques to teach and model emotional regulation and relaxation.  No   Therapist applied cognitive behavioral strategies to facilitate identification of maladaptive patterns of thinking and behavior.  Yes     PLAN:    [facility]  I[facility] Phase I  Aftercare:  [facility]  Outpatient Phase 2      Please note that portions of this note were completed with a voice recognition program. Efforts were made to edit dictation, but occasionally words are mistranscribed.     This document signed by Ben Ortega LCSW, May 13, 2024, 16:02 EDT

## 2024-05-14 ENCOUNTER — OFFICE VISIT (OUTPATIENT)
Dept: NEUROSURGERY | Facility: CLINIC | Age: 49
End: 2024-05-14
Payer: MEDICARE

## 2024-05-14 VITALS — RESPIRATION RATE: 18 BRPM | TEMPERATURE: 97.7 F | WEIGHT: 182 LBS | HEIGHT: 71 IN | BODY MASS INDEX: 25.48 KG/M2

## 2024-05-14 DIAGNOSIS — M54.16 LUMBAR RADICULOPATHY: Primary | ICD-10-CM

## 2024-05-14 DIAGNOSIS — M47.22 CERVICAL SPONDYLOSIS WITH RADICULOPATHY: ICD-10-CM

## 2024-05-14 PROCEDURE — 1159F MED LIST DOCD IN RCRD: CPT | Performed by: NEUROLOGICAL SURGERY

## 2024-05-14 PROCEDURE — 99214 OFFICE O/P EST MOD 30 MIN: CPT | Performed by: NEUROLOGICAL SURGERY

## 2024-05-14 PROCEDURE — 1160F RVW MEDS BY RX/DR IN RCRD: CPT | Performed by: NEUROLOGICAL SURGERY

## 2024-05-14 NOTE — PROGRESS NOTES
Patient: Taiwo Pierce  : 1975    Primary Care Provider: Provider, No Known    Requesting Provider: As above        History    Chief Complaint:   1.  Neck and left upper extremity pain with sensory alteration.  2.  Low back and left leg pain.    History of Present Illness: Mr. Pierce is a 48-year-old gentleman with a history of chronic neck and back difficulties.  He had surgery in the lumbar spine with Dr. Joshi, remotely.  More recently, he presented with ongoing and progressive neck and left upper extremity pain.  Preoperative studies demonstrated broad-based spurring as well as disc protrusion with associated nerve root compromise.  As such, on 2023 he underwent ACDF C5-7.  Surgery was without overt intraoperative complication.  His arm symptoms have resolved.  He still has some dull aching in his neck.  His main complaint is low back pain that extends into an approximately S1 distribution of his left lower extremity.  He has been seen at the hospital on several occasions recently for alcohol detoxification.    Review of Systems   Constitutional:  Negative for activity change, appetite change, chills, diaphoresis, fatigue, fever and unexpected weight change.   HENT:  Negative for congestion, dental problem, drooling, ear discharge, ear pain, facial swelling, hearing loss, mouth sores, nosebleeds, postnasal drip, rhinorrhea, sinus pressure, sneezing, sore throat, tinnitus, trouble swallowing and voice change.    Eyes:  Negative for photophobia, pain, discharge, redness, itching and visual disturbance.   Respiratory:  Negative for apnea, cough, choking, chest tightness, shortness of breath, wheezing and stridor.    Cardiovascular:  Negative for chest pain, palpitations and leg swelling.   Gastrointestinal:  Negative for abdominal distention, abdominal pain, anal bleeding, blood in stool, constipation, diarrhea, nausea, rectal pain and vomiting.   Endocrine: Negative for cold intolerance, heat  "intolerance, polydipsia, polyphagia and polyuria.   Genitourinary:  Negative for decreased urine volume, difficulty urinating, dysuria, enuresis, flank pain, frequency, genital sores, hematuria and urgency.   Musculoskeletal:  Positive for back pain and neck pain. Negative for arthralgias, gait problem, joint swelling, myalgias and neck stiffness.   Skin:  Negative for color change, pallor, rash and wound.   Allergic/Immunologic: Negative for environmental allergies, food allergies and immunocompromised state.   Neurological:  Negative for dizziness, tremors, seizures, syncope, facial asymmetry, speech difficulty, weakness, light-headedness, numbness and headaches.   Hematological:  Negative for adenopathy. Does not bruise/bleed easily.   Psychiatric/Behavioral:  Negative for agitation, behavioral problems, confusion, decreased concentration, dysphoric mood, hallucinations, self-injury, sleep disturbance and suicidal ideas. The patient is not nervous/anxious and is not hyperactive.    All other systems reviewed and are negative.      The patient's past medical history, past surgical history, family history, and social history have been reviewed at length in the electronic medical record.      Physical Exam:   Temp 97.7 °F (36.5 °C) (Infrared)   Resp 18   Ht 180.3 cm (71\")   Wt 82.6 kg (182 lb)   BMI 25.38 kg/m²   Straight leg raising is negative.  Steve signs are negative.    Medical Decision Making    Data Review:   (All imaging studies were personally reviewed unless stated otherwise)  CT through the cervical spine demonstrates his known construct.  I do not think fusion is solid at this point.    CT of the lumbar spine demonstrates some spondylosis.  There is probably a laminotomy defect on the right at L5-S1.    Diagnosis:   1.  Cervical spondylosis with radiculopathy status post ACDF C5-7.  2.  Lumbar radiculopathy.  3.  Chronic mechanical low back pain.    Treatment Options:   The patient is doing well " in terms of his neck difficulties.  I am going to check an MRI of his lumbar spine with and without gadolinium given his ongoing and progressive back and left leg pain.  I will also check flexion-extension upright lateral lumbar spine x-rays to assess for instability.      Scribed for Julio C Bryson MD by Elsi Olivera CMA on 5/14/2024 10:41 EDT       I, Dr. Bryson, personally performed the services described in the documentation, as scribed in my presence, and it is both accurate and complete.

## 2024-05-15 ENCOUNTER — OFFICE VISIT (OUTPATIENT)
Dept: PSYCHIATRY | Facility: HOSPITAL | Age: 49
End: 2024-05-15
Payer: MEDICARE

## 2024-05-15 DIAGNOSIS — G89.4 CHRONIC PAIN DISORDER: ICD-10-CM

## 2024-05-15 DIAGNOSIS — F10.20 ALCOHOL USE DISORDER, SEVERE, DEPENDENCE: Primary | ICD-10-CM

## 2024-05-15 DIAGNOSIS — F41.9 ANXIETY DISORDER, UNSPECIFIED TYPE: ICD-10-CM

## 2024-05-15 PROCEDURE — H0015 ALCOHOL AND/OR DRUG SERVICES: HCPCS | Performed by: SOCIAL WORKER

## 2024-05-15 NOTE — PROGRESS NOTES
NAME: Taiwo Pierce  DATE: 05/15/2024    [facility] Phase I  [id number]    Services Provided    Group Psychotherapy x 1  Education/Training x 2  Activity Therapy  x 0  Individual Therapy x 0 0 minutes  Family Therapy x 0  MD Initial Visit  x 0  MD Follow-Up   x 0    Number of participants: 7    DATA:  Patient reported that he was doing well today. He had gone to the doctor yesterday for his neck. He discovered that arthritis had come up in his neck since the surgery. He was concerned that they wanted to do another surgery on his back.     Individual: No    Homework: None assigned    Group 1 (Psychotherapy: Check-ins): Therapist continued facilitation of rapport building strategies between group members. Therapist asked that each patient check in with home life and recovery efforts and identify triggers, cravings, and high risk situations that arise between group sessions.  Group members discussed barriers and benefits of attending recovery meetings.  Therapist provided empathy and support during group session. Daily Reading: None    Group 2  (Psychoeducation) Viewed the first 15 minutes of the video, How to get better sleep (Without Medication) (2020) by Dr. Reed on [employer].     Group 3 (Psychoeducation) Continued discussion about sleep hygiene. Discussed worry time and classical conditioning, among other things.     ASSESSMENT:    Personal Assessment 0-10 Scale (10 worst)    Anxiety:  0 (no comment)  Depression:  0 (no comment)  Cravings: 0 (no comment)     MSE within normal limits? Yes Affect: somber and euthymic Mood: calm  Pt Response:  open/receptive  Engaged in activity/Process and self-disclosed: adequate  Applies today's group topics to self: adequate  Able to give and receive feedback: adequate  Demonstrated insightful thinking: consistent and adequate  Other pt response comments:  Patient was a positive participant. They demonstrated a relatively positive attitude, a strong degree of motivation, and  adequate insight, as evidenced by his level of engagement combined with his efforts to encourage his peers. They seemed interested and attentive. They appeared to comprehend well the concepts being discussed. The patient seemed receptive of, and willing to implement, the ideas being discussed. Their thought process appeared linear and goal directed, and their speech was regular rate and rhythm.       Community Group Engagement:  Yes: Religious    Reported relapse since last meeting? No: no lapse    .  Office Visit on 05/13/2024   Component Date Value Ref Range Status    External Amphetamine Screen Urine 05/13/2024 Negative   Final    External Benzodiazepine Screen Uri* 05/13/2024 Negative   Final    External Cocaine Screen Urine 05/13/2024 Negative   Final    External THC Screen Urine 05/13/2024 Negative   Final    External Methadone Screen Urine 05/13/2024 Negative   Final    External Methamphetamine Screen Ur* 05/13/2024 Negative   Final    External Oxycodone Screen Urine 05/13/2024 Negative   Final    External Buprenorphine Screen Urine 05/13/2024 Negative   Final    External MDMA 05/13/2024 Negative   Final    External Opiates Screen Urine 05/13/2024 Negative   Final   Office Visit on 05/06/2024   Component Date Value Ref Range Status    External Amphetamine Screen Urine 05/06/2024 Negative   Final    External Benzodiazepine Screen Uri* 05/06/2024 Negative   Final    External Cocaine Screen Urine 05/06/2024 Negative   Final    External THC Screen Urine 05/06/2024 Negative   Final    External Methadone Screen Urine 05/06/2024 Negative   Final    External Methamphetamine Screen Ur* 05/06/2024 Negative   Final    External Oxycodone Screen Urine 05/06/2024 Positive (A)   Final    External Buprenorphine Screen Urine 05/06/2024 Negative   Final    External MDMA 05/06/2024 Negative   Final    External Opiates Screen Urine 05/06/2024 Negative   Final   Office Visit on 05/02/2024   Component Date Value Ref Range Status     External Amphetamine Screen Urine 05/02/2024 Negative   Final    External Benzodiazepine Screen Uri* 05/02/2024 Negative   Final    External Cocaine Screen Urine 05/02/2024 Negative   Final    External THC Screen Urine 05/02/2024 Negative   Final    External Methadone Screen Urine 05/02/2024 Negative   Final    External Methamphetamine Screen Ur* 05/02/2024 Negative   Final    External Oxycodone Screen Urine 05/02/2024 Positive (A)   Final    External Buprenorphine Screen Urine 05/02/2024 Negative   Final    External MDMA 05/02/2024 Negative   Final    External Opiates Screen Urine 05/02/2024 Negative   Final   Office Visit on 04/29/2024   Component Date Value Ref Range Status    External Amphetamine Screen Urine 04/29/2024 Negative   Final    External Benzodiazepine Screen Uri* 04/29/2024 Negative   Final    External Cocaine Screen Urine 04/29/2024 Negative   Final    External THC Screen Urine 04/29/2024 Negative   Final    External Methadone Screen Urine 04/29/2024 Negative   Final    External Methamphetamine Screen Ur* 04/29/2024 Negative   Final    External Oxycodone Screen Urine 04/29/2024 Positive (A)   Final    External Buprenorphine Screen Urine 04/29/2024 Negative   Final    External MDMA 04/29/2024 Negative   Final    External Opiates Screen Urine 04/29/2024 Negative   Final    Morphine 04/29/2024 positive (A)   Final       Impression/Formulation:    ICD-10-CM ICD-9-CM   1. Alcohol use disorder, severe, dependence  F10.20 303.90   2. Anxiety disorder, unspecified type  F41.9 300.00   3. Chronic pain disorder  G89.4 338.4        CLINICAL MANEUVERING/INTERVENTIONS: Therapist utilized a person-centered approach to build and maintain rapport with group member.   Therapist promoted safe nonjudgmental environment by providing group members with unconditional positive regard.  Assisted member in processing above session content; acknowledged and normalized patient's thoughts, feelings and concerns.  Allowed  patient to freely discuss issues without interruption or judgment. Provided safe, confidential environment to facilitate the development of positive therapeutic relationship and encourage open, honest communication. Therapist assisted group member with identifying and implementing healthier coping strategies and maintaining healthier boundaries. Assisted patient in identifying risk factors which would indicate the need for higher level of care including thoughts to harm self or others and/or self-harming behavior and encouraged patient to contact this office, call 911, or present to the nearest emergency room should any of these events occur. Pt agreeable to adhere to medication regimen as prescribed and report any side effects.  Discussed crisis intervention services and means to access.  Patient adamantly and convincingly denies current suicidal or homicidal ideation or perceptual disturbance.      Therapist implemented motivational interviewing techniques to assist client with exploring and resolving ambivalence associated with commitment to change behaviors.  Yes  Therapist utilized dialectical behavior techniques to teach and model emotional regulation and relaxation.  Yes   Therapist applied cognitive behavioral strategies to facilitate identification of maladaptive patterns of thinking and behavior.  Yes     PLAN:    Continue [facility] Phase I  Aftercare:  [facility]patient Phase 2      Please note that portions of this note were completed with a voice recognition program. Efforts were made to edit dictation, but occasionally words are mistranscribed.     This document signed by Ben Ortega LCSW, May 15, 2024, 12:38 EDT

## 2024-05-16 ENCOUNTER — OFFICE VISIT (OUTPATIENT)
Dept: PSYCHIATRY | Facility: HOSPITAL | Age: 49
End: 2024-05-16
Payer: MEDICARE

## 2024-05-16 DIAGNOSIS — F10.20 ALCOHOL USE DISORDER, SEVERE, DEPENDENCE: Primary | ICD-10-CM

## 2024-05-16 DIAGNOSIS — F41.9 ANXIETY DISORDER, UNSPECIFIED TYPE: ICD-10-CM

## 2024-05-16 DIAGNOSIS — G89.4 CHRONIC PAIN DISORDER: ICD-10-CM

## 2024-05-16 PROCEDURE — H0015 ALCOHOL AND/OR DRUG SERVICES: HCPCS | Performed by: SOCIAL WORKER

## 2024-05-16 NOTE — PROGRESS NOTES
NAME: Taiwo Pierce  DATE: 05/16/2024    [facility] Phase I  [id number]    Services Provided    Group Psychotherapy x 1  Education/Training x 2  Activity Therapy  x 0  Individual Therapy x 0 0 minutes  Family Therapy x 0  MD Initial Visit  x 0  MD Follow-Up   x 0    Number of participants: 6    DATA:  Patient reported he was doing alright. He was tired but didn't know why.     Individual: No    Homework: Assigned Personal Recovery Plan. Relapse Prevention Worksheet.     Group 1 (Psychotherapy: Check-ins): Therapist continued facilitation of rapport building strategies between group members. Therapist asked that each patient check in with home life and recovery efforts and identify triggers, cravings, and high risk situations that arise between group sessions.  Group members discussed barriers and benefits of attending recovery meetings.  Therapist provided empathy and support during group session. Daily Reading: Man's Search for Meaning (1946) by Francisco Javier Alvarez    Group 2  (Psychoeducation) Explored the Procrastination Equation.     Group 3 (Peer Support) This hour of group was facilitated by Sandhya .     ASSESSMENT:    Personal Assessment 0-10 Scale (10 worst)    Anxiety:  0 (no comment)  Depression:  0 (no comment)  Cravings: 0 (no comment)     MSE within normal limits? Yes Affect: somber Mood: calm  Pt Response:  open/receptive  Engaged in activity/Process and self-disclosed: adequate  Applies today's group topics to self: adequate  Able to give and receive feedback: adequate  Demonstrated insightful thinking: consistent and adequate  Other pt response comments:  Patient was a positive participant. They demonstrated a relatively positive attitude, a strong degree of motivation, and adequate insight, as evidenced by his level of engagement combined with his efforts to maintain consistent group attendance. They seemed interested and attentive. They appeared to comprehend well the concepts  being discussed. The patient seemed receptive of, and willing to implement, the ideas being discussed. Their thought process appeared linear and goal directed, and their speech was regular rate and rhythm.       Community Group Engagement:  Yes: [employer]    Reported relapse since last meeting? No: no lapse    .  Office Visit on 05/13/2024   Component Date Value Ref Range Status    External Amphetamine Screen Urine 05/13/2024 Negative   Final    External Benzodiazepine Screen Uri* 05/13/2024 Negative   Final    External Cocaine Screen Urine 05/13/2024 Negative   Final    External THC Screen Urine 05/13/2024 Negative   Final    External Methadone Screen Urine 05/13/2024 Negative   Final    External Methamphetamine Screen Ur* 05/13/2024 Negative   Final    External Oxycodone Screen Urine 05/13/2024 Negative   Final    External Buprenorphine Screen Urine 05/13/2024 Negative   Final    External MDMA 05/13/2024 Negative   Final    External Opiates Screen Urine 05/13/2024 Negative   Final   Office Visit on 05/06/2024   Component Date Value Ref Range Status    External Amphetamine Screen Urine 05/06/2024 Negative   Final    External Benzodiazepine Screen Uri* 05/06/2024 Negative   Final    External Cocaine Screen Urine 05/06/2024 Negative   Final    External THC Screen Urine 05/06/2024 Negative   Final    External Methadone Screen Urine 05/06/2024 Negative   Final    External Methamphetamine Screen Ur* 05/06/2024 Negative   Final    External Oxycodone Screen Urine 05/06/2024 Positive (A)   Final    External Buprenorphine Screen Urine 05/06/2024 Negative   Final    External MDMA 05/06/2024 Negative   Final    External Opiates Screen Urine 05/06/2024 Negative   Final   Office Visit on 05/02/2024   Component Date Value Ref Range Status    External Amphetamine Screen Urine 05/02/2024 Negative   Final    External Benzodiazepine Screen Uri* 05/02/2024 Negative   Final    External Cocaine Screen Urine 05/02/2024 Negative   Final     External THC Screen Urine 05/02/2024 Negative   Final    External Methadone Screen Urine 05/02/2024 Negative   Final    External Methamphetamine Screen Ur* 05/02/2024 Negative   Final    External Oxycodone Screen Urine 05/02/2024 Positive (A)   Final    External Buprenorphine Screen Urine 05/02/2024 Negative   Final    External MDMA 05/02/2024 Negative   Final    External Opiates Screen Urine 05/02/2024 Negative   Final   Office Visit on 04/29/2024   Component Date Value Ref Range Status    External Amphetamine Screen Urine 04/29/2024 Negative   Final    External Benzodiazepine Screen Uri* 04/29/2024 Negative   Final    External Cocaine Screen Urine 04/29/2024 Negative   Final    External THC Screen Urine 04/29/2024 Negative   Final    External Methadone Screen Urine 04/29/2024 Negative   Final    External Methamphetamine Screen Ur* 04/29/2024 Negative   Final    External Oxycodone Screen Urine 04/29/2024 Positive (A)   Final    External Buprenorphine Screen Urine 04/29/2024 Negative   Final    External MDMA 04/29/2024 Negative   Final    External Opiates Screen Urine 04/29/2024 Negative   Final    Morphine 04/29/2024 positive (A)   Final       Impression/Formulation:    ICD-10-CM ICD-9-CM   1. Alcohol use disorder, severe, dependence  F10.20 303.90   2. Anxiety disorder, unspecified type  F41.9 300.00   3. Chronic pain disorder  G89.4 338.4        CLINICAL MANEUVERING/INTERVENTIONS: Therapist utilized a person-centered approach to build and maintain rapport with group member.   Therapist promoted safe nonjudgmental environment by providing group members with unconditional positive regard.  Assisted member in processing above session content; acknowledged and normalized patient's thoughts, feelings and concerns.  Allowed patient to freely discuss issues without interruption or judgment. Provided safe, confidential environment to facilitate the development of positive therapeutic relationship and encourage open,  honest communication. Therapist assisted group member with identifying and implementing healthier coping strategies and maintaining healthier boundaries. Assisted patient in identifying risk factors which would indicate the need for higher level of care including thoughts to harm self or others and/or self-harming behavior and encouraged patient to contact this office, call 911, or present to the nearest emergency room should any of these events occur. Pt agreeable to adhere to medication regimen as prescribed and report any side effects.  Discussed crisis intervention services and means to access.  Patient adamantly and convincingly denies current suicidal or homicidal ideation or perceptual disturbance.      Therapist implemented motivational interviewing techniques to assist client with exploring and resolving ambivalence associated with commitment to change behaviors.  Yes  Therapist utilized dialectical behavior techniques to teach and model emotional regulation and relaxation.  No   Therapist applied cognitive behavioral strategies to facilitate identification of maladaptive patterns of thinking and behavior.  Yes     PLAN:    Continue [facility] Phase I  Aftercare:  [facility] Outpatient Phase 2      Please note that portions of this note were completed with a voice recognition program. Efforts were made to edit dictation, but occasionally words are mistranscribed.     This document signed by Ben Ortega LCSW, May 16, 2024, 11:23 EDT

## 2024-05-16 NOTE — PROGRESS NOTES
[facility]  Group      Date: May 16, 2024  Time: 2673-7840    Type of Group:  Psychoeducation    Group  Content:  Social Support and Stress Management    Patient Response: Patient was positive in group today and engaged. We talked about building social support system. We also went over different stresses and how to mange them and different coping skills for them. We also did highs and lowe's for the week and he participated in that.        Sandhya Coles MA  05/16/24  12:46 EDT

## 2024-05-20 ENCOUNTER — OFFICE VISIT (OUTPATIENT)
Dept: PSYCHIATRY | Facility: HOSPITAL | Age: 49
End: 2024-05-20
Payer: MEDICARE

## 2024-05-20 DIAGNOSIS — F41.9 ANXIETY DISORDER, UNSPECIFIED TYPE: ICD-10-CM

## 2024-05-20 DIAGNOSIS — G89.4 CHRONIC PAIN DISORDER: ICD-10-CM

## 2024-05-20 DIAGNOSIS — F10.20 ALCOHOL USE DISORDER, SEVERE, DEPENDENCE: Primary | ICD-10-CM

## 2024-05-20 DIAGNOSIS — Z79.899 MEDICATION MANAGEMENT: ICD-10-CM

## 2024-05-20 LAB
EXTERNAL AMPHETAMINE SCREEN URINE: NEGATIVE
EXTERNAL BENZODIAZEPINE SCREEN URINE: NEGATIVE
EXTERNAL BUPRENORPHINE SCREEN URINE: NEGATIVE
EXTERNAL COCAINE SCREEN URINE: NEGATIVE
EXTERNAL MDMA: NEGATIVE
EXTERNAL METHADONE SCREEN URINE: NEGATIVE
EXTERNAL METHAMPHETAMINE SCREEN URINE: NEGATIVE
EXTERNAL OPIATES SCREEN URINE: POSITIVE
EXTERNAL OXYCODONE SCREEN URINE: NEGATIVE
EXTERNAL THC SCREEN URINE: NEGATIVE

## 2024-05-20 PROCEDURE — H0015 ALCOHOL AND/OR DRUG SERVICES: HCPCS | Performed by: SOCIAL WORKER

## 2024-05-20 NOTE — PROGRESS NOTES
NAME: Taiwo Pierce  DATE: 05/20/2024    [facility] Phase I  [id number]    Services Provided    Group Psychotherapy x 1  Education/Training x 2  Activity Therapy  x 0  Individual Therapy x 0 0 minutes  Family Therapy x 0  MD Initial Visit  x 0  MD Follow-Up   x 0    Number of participants: 6    DATA:  Patient reported he had been moving all weekend. His daughter had been moving, and he had been helping her.     Individual: No    Homework: unreturned Relapse Prevention Plan    Group 1 (Psychotherapy: Check-ins): Therapist continued facilitation of rapport building strategies between group members. Therapist asked that each patient check in with home life and recovery efforts and identify triggers, cravings, and high risk situations that arise between group sessions.  Group members discussed barriers and benefits of attending recovery meetings.  Therapist provided empathy and support during group session. Daily Reading:  Meditation In A Toolshed, by JESSICA Parsons.    Group 2  (Bibliotherapy) Explored the idea that various perspectives of any given experience were valid and did not necessarily invalidate other perspectives.     Group 3 (Emotional Intelligence) Viewed and discussed the video, You aren't at the mercy of your emotions (2017) by SUNG with Oralia Del Rosario.      ASSESSMENT:    Personal Assessment 0-10 Scale (10 worst)    Anxiety:  0 (no comment)  Depression:  0 (no comment)  Cravings: 0 (no comment)     MSE within normal limits? Yes Affect: somber Mood: calm  Pt Response:  open/receptive  Engaged in activity/Process and self-disclosed: suboptimal  Applies today's group topics to self: suboptimal  Able to give and receive feedback: suboptimal  Demonstrated insightful thinking: occasional and adequate  Other pt response comments:  Patient was a positive participant. They demonstrated a relatively positive attitude, a strong degree of motivation, and adequate insight, as evidenced by his level of engagement  combined with his efforts to maintain sobriety. They seemed distracted and inattentive. They appeared to have a fair degree of comprehension regarding the concepts being discussed . The patient seemed doubtful of, and somewhat willing to implement, the ideas being discussed. Their thought process appeared linear and goal directed, and their speech was minimal.       Community Group Engagement:  Yes: attended [employer] yesterday     Reported relapse since last meeting? No: no lapse    .  Office Visit on 05/20/2024   Component Date Value Ref Range Status    External Amphetamine Screen Urine 05/20/2024 Negative   Final    External Benzodiazepine Screen Uri* 05/20/2024 Negative   Final    External Cocaine Screen Urine 05/20/2024 Negative   Final    External THC Screen Urine 05/20/2024 Negative   Final    External Methadone Screen Urine 05/20/2024 Negative   Final    External Methamphetamine Screen Ur* 05/20/2024 Negative   Final    External Oxycodone Screen Urine 05/20/2024 Negative   Final    External Buprenorphine Screen Urine 05/20/2024 Negative   Final    External MDMA 05/20/2024 Negative   Final    External Opiates Screen Urine 05/20/2024 Positive (A)   Final   Office Visit on 05/13/2024   Component Date Value Ref Range Status    External Amphetamine Screen Urine 05/13/2024 Negative   Final    External Benzodiazepine Screen Uri* 05/13/2024 Negative   Final    External Cocaine Screen Urine 05/13/2024 Negative   Final    External THC Screen Urine 05/13/2024 Negative   Final    External Methadone Screen Urine 05/13/2024 Negative   Final    External Methamphetamine Screen Ur* 05/13/2024 Negative   Final    External Oxycodone Screen Urine 05/13/2024 Negative   Final    External Buprenorphine Screen Urine 05/13/2024 Negative   Final    External MDMA 05/13/2024 Negative   Final    External Opiates Screen Urine 05/13/2024 Negative   Final   Office Visit on 05/06/2024   Component Date Value Ref Range Status    External  Amphetamine Screen Urine 05/06/2024 Negative   Final    External Benzodiazepine Screen Uri* 05/06/2024 Negative   Final    External Cocaine Screen Urine 05/06/2024 Negative   Final    External THC Screen Urine 05/06/2024 Negative   Final    External Methadone Screen Urine 05/06/2024 Negative   Final    External Methamphetamine Screen Ur* 05/06/2024 Negative   Final    External Oxycodone Screen Urine 05/06/2024 Positive (A)   Final    External Buprenorphine Screen Urine 05/06/2024 Negative   Final    External MDMA 05/06/2024 Negative   Final    External Opiates Screen Urine 05/06/2024 Negative   Final   Office Visit on 05/02/2024   Component Date Value Ref Range Status    External Amphetamine Screen Urine 05/02/2024 Negative   Final    External Benzodiazepine Screen Uri* 05/02/2024 Negative   Final    External Cocaine Screen Urine 05/02/2024 Negative   Final    External THC Screen Urine 05/02/2024 Negative   Final    External Methadone Screen Urine 05/02/2024 Negative   Final    External Methamphetamine Screen Ur* 05/02/2024 Negative   Final    External Oxycodone Screen Urine 05/02/2024 Positive (A)   Final    External Buprenorphine Screen Urine 05/02/2024 Negative   Final    External MDMA 05/02/2024 Negative   Final    External Opiates Screen Urine 05/02/2024 Negative   Final       Impression/Formulation:    ICD-10-CM ICD-9-CM   1. Alcohol use disorder, severe, dependence  F10.20 303.90   2. Anxiety disorder, unspecified type  F41.9 300.00   3. Chronic pain disorder  G89.4 338.4   4. Medication management  Z79.899 V58.69        CLINICAL MANEUVERING/INTERVENTIONS: Therapist utilized a person-centered approach to build and maintain rapport with group member.   Therapist promoted safe nonjudgmental environment by providing group members with unconditional positive regard.  Assisted member in processing above session content; acknowledged and normalized patient's thoughts, feelings and concerns.  Allowed patient to  freely discuss issues without interruption or judgment. Provided safe, confidential environment to facilitate the development of positive therapeutic relationship and encourage open, honest communication. Therapist assisted group member with identifying and implementing healthier coping strategies and maintaining healthier boundaries. Assisted patient in identifying risk factors which would indicate the need for higher level of care including thoughts to harm self or others and/or self-harming behavior and encouraged patient to contact this office, call 911, or present to the nearest emergency room should any of these events occur. Pt agreeable to adhere to medication regimen as prescribed and report any side effects.  Discussed crisis intervention services and means to access.  Patient adamantly and convincingly denies current suicidal or homicidal ideation or perceptual disturbance.      Therapist implemented motivational interviewing techniques to assist client with exploring and resolving ambivalence associated with commitment to change behaviors.  Yes  Therapist utilized dialectical behavior techniques to teach and model emotional regulation and relaxation.  No   Therapist applied cognitive behavioral strategies to facilitate identification of maladaptive patterns of thinking and behavior.  Yes     PLAN:    Continue [facility] Phase I  Aftercare:  [facility] Phase 2      Please note that portions of this note were completed with a voice recognition program. Efforts were made to edit dictation, but occasionally words are mistranscribed.     This document signed by Ben Ortega LCSW, May 20, 2024, 14:18 EDT

## 2024-05-21 ENCOUNTER — OFFICE VISIT (OUTPATIENT)
Dept: PSYCHIATRY | Facility: HOSPITAL | Age: 49
End: 2024-05-21
Payer: MEDICARE

## 2024-05-21 DIAGNOSIS — G89.4 CHRONIC PAIN DISORDER: ICD-10-CM

## 2024-05-21 DIAGNOSIS — F10.20 ALCOHOL USE DISORDER, SEVERE, DEPENDENCE: Primary | ICD-10-CM

## 2024-05-21 DIAGNOSIS — F41.9 ANXIETY DISORDER, UNSPECIFIED TYPE: ICD-10-CM

## 2024-05-21 PROCEDURE — H0015 ALCOHOL AND/OR DRUG SERVICES: HCPCS | Performed by: SOCIAL WORKER

## 2024-05-21 NOTE — PROGRESS NOTES
"Adult CD IOP Group      Date: May 21, 2024  Time: 1085-9038    Type of Group:  Peer Support    Group  Content: Open Discussion     Patient Response: We talked about the weather warming up and how it can trigger your thoughts and emotions patient stated he does not get triggered by warm weather that its the cold months that seem to take a toll on him, he has stated in previous groups he does not have triggers. We also talked about \"rock bottoms\" and where we are today. Patient was pulled from group to do an individual with Nader.        Sandhya Coles MA  05/21/24  12:37 EDT   "

## 2024-05-21 NOTE — PROGRESS NOTES
[facility]  [address] [place], [place] [id number]    Partial Hospitalization Program for Chemical Dependency (CD PHP)      Initial Individualized Treatment Plan       Long Term Goal:  Taiwo will establish a sustained recovery and will maintain total abstinence from mind-altering substances other than what is prescribed and/or approved by the attending physician. Pt will learn, practice, and utilize behavioral and cognitive coping skills to help maintain sobriety.    Patient Care Needs:  Taiwo presents with alcohol use disorder and is at high risk for relapse without continued treatment.  Pt has a history of using drugs/alcohol until intoxicated or passed out and has been unable to stop or cut down use of alcohol/drugs once starting, despite verbalized desire to do so, and the negative consequences from continued use.  Pt's use has negatively impacted social, occupational, and/or physical functioning. He also wanted to work on ways of reducing the risk of relapse during the winter months.     Program Goals:    1.  Patient will participate in a medical evaluation to assess mental health and will cooperate with an evaluation by the attending psychiatrist or psychiatric nurse practitioner for psychotropic medication if appropriate.        2.  Patient will adhere to the PHP group rules.      3.  Patient will attend group sessions as scheduled. Patient will notify therapist and support staff of any absences or tardies. Patient will provide a valid and verifiable excuse for absences to be considered excused.  When present patient will participate by giving and receiving feedback appropriately.    4. Patient will participate in random drug and alcohol screenings and will exhibit negative results on said screenings.    5.  Patient will identify high risk situations, people and places to avoid to maintain sobriety.    6. Patient will develop a positive network of support by attending a minimum  of two recovery/spiritual support groups each week (two outside of IOP group) and by exploring, obtaining or maintaining a sponsor or sober support person.      7. Patient will identify, practice, and implement at least 5 healthy coping strategies to manage difficult emotions while remaining abstinent.    8.  Patient will encourage family participation in education sessions, if appropriate.    9.  Patient will exhibit increased motivation to change as assessed by observable behaviors in conjunction with the ASAM assessment tool.  Patient will utilize healthy coping skills to manage depressive and anxious symptoms and will exhibit decreased score on the PHQ-9 and FEI-7.     Individualized Goals:    1. Goal Progress:    1. Patient participated in an evaluation for medication management with Dr. Nash. Patient is meeting with medical provider as scheduled for medication management and follow-up.            2.  Patient read and signed PHP Group Rules. Patient is adhering to group rules.    3.   Patient has 0 unexcused absences. Pt is attentive, engaged and participating in group. He has only missed one day of group, and this was excused.                    4.  Patient has displayed 0 positive results on drug and alcohol screenings thus far.        5.  Patient has encountered the following high-risk situations since beginning treatment: none.     6.  Patient is attending recovery/spiritual support group meetings at least 1 times per week. Patient does not have a sponsor/sober support person and is not working the 12 steps. He attends [employer] for his support group.     7.  Patient has identified and implemented the following coping strategies during high risk situations: hunting, fishing, landscaping, and considering carpentry for the off-season.         8.  Interested in family participation to date but has not availed himself of the opportunity.         9.   ASAM, FEI and PHQ updates:     May 8, 2024  FEI: 4  PHQ:  9    ASAM Dimensions:  I.    Intoxication/Withdrawal:  0  (no risk)  II.   Medical Conditions/Complications:  2 (chronic pain)  III.  Behavioral/Emotional/Cognitive: 0 (none)  IV.  Readiness to Change: 0 (motivated)  V.   Relapse Risk: 0 (sustained sobriety since beginning program)  VI:  Recovery Environment: 0 (supportive)  Total ASAM Score = 2                   Goal Progress:    1. Ongoing Goal:    1.  Partially completed.  Patient participated initial evaluation for medication management with Dr. Nash.  Patient will continue participating in as scheduled medication management and follow-up appointments with psychiatric medical provider.    2.  Patient will continue working toward objective.       3.  Patient will continue working toward objective.                         4.  Patient will continue working toward objective.          5.  Patient will continue working toward objective.        6.  Patient and therapist will continue to encourage family participation as appropriate.                7. Patient will continue working toward objective.              8. Patient will continue working toward objective, as appropriate.      9.  Patient will continue working toward objective.                        Ongoing Goal:    1.        Next Target Date: NA - Graduation anticipated on May 29, 2024    Team Members:  Patient - Taiwo Pierce  Medical Provider - Che Parson MD  [facility] Therapist - Ben Ortega LCSW  [facility] Director - Trace Darnell LCSW, [facility]  Support Staff

## 2024-05-21 NOTE — PROGRESS NOTES
NAME: Taiwo Pierce  DATE: 05/21/2024    [facility] Phase I  [id number]    Services Provided    Group Psychotherapy x 1  Education/Training x 1  Activity Therapy  x 1  Individual Therapy x 0 0 minutes  Family Therapy x 0  MD Initial Visit  x 0  MD Follow-Up   x 0    Number of participants: 6    DATA:  Patient reported he was doing good today. He reported feeling sore today from working outside in the [place].     Individual: Yes, updated treatment plan. Discussed the winter months. He was confident in his ability to stay busy and find coping skills to prevent any lapse in his recovery during the summer months; however, he was doubtful about the winter. His usual coping skills were inaccessible during the winter. We discussed this at length. We also discussed the idea of finding meaning in suffering. The patient believed it would make him closer to his higher power.     Homework: None assigned    Group 1 (Psychotherapy: Check-ins): Therapist continued facilitation of rapport building strategies between group members. Therapist asked that each patient check in with home life and recovery efforts and identify triggers, cravings, and high risk situations that arise between group sessions.  Group members discussed barriers and benefits of attending recovery meetings.  Therapist provided empathy and support during group session. Daily Reading: None    Group 2  (Recreation Therapy) Party board game    Group 3 (Peer Support) This hour of group was facilitated by Sandhya .     ASSESSMENT:    Personal Assessment 0-10 Scale (10 worst)    Anxiety:  0 (no comment)  Depression:  0 (no comment)  Cravings: 0 (no comment)     MSE within normal limits? Yes Affect: somber Mood: calm  Pt Response:  open/receptive  Engaged in activity/Process and self-disclosed: adequate  Applies today's group topics to self: adequate  Able to give and receive feedback: adequate  Demonstrated insightful thinking: consistent  and adequate  Other pt response comments:  Patient was a positive participant. They demonstrated a relatively positive attitude, a strong degree of motivation, and adequate insight, as evidenced by his level of engagement combined with his positive attitude. They seemed interested and attentive. They appeared to comprehend well the concepts being discussed. The patient seemed receptive of, and willing to implement, the ideas being discussed. Their thought process appeared linear and goal directed, and their speech was regular rate and rhythm.       Community Group Engagement:  Yes: Den[employer]    Reported relapse since last meeting? No: no lapse    .  Office Visit on 05/20/2024   Component Date Value Ref Range Status    External Amphetamine Screen Urine 05/20/2024 Negative   Final    External Benzodiazepine Screen Uri* 05/20/2024 Negative   Final    External Cocaine Screen Urine 05/20/2024 Negative   Final    External THC Screen Urine 05/20/2024 Negative   Final    External Methadone Screen Urine 05/20/2024 Negative   Final    External Methamphetamine Screen Ur* 05/20/2024 Negative   Final    External Oxycodone Screen Urine 05/20/2024 Negative   Final    External Buprenorphine Screen Urine 05/20/2024 Negative   Final    External MDMA 05/20/2024 Negative   Final    External Opiates Screen Urine 05/20/2024 Positive (A)   Final   Office Visit on 05/13/2024   Component Date Value Ref Range Status    External Amphetamine Screen Urine 05/13/2024 Negative   Final    External Benzodiazepine Screen Uri* 05/13/2024 Negative   Final    External Cocaine Screen Urine 05/13/2024 Negative   Final    External THC Screen Urine 05/13/2024 Negative   Final    External Methadone Screen Urine 05/13/2024 Negative   Final    External Methamphetamine Screen Ur* 05/13/2024 Negative   Final    External Oxycodone Screen Urine 05/13/2024 Negative   Final    External Buprenorphine Screen Urine 05/13/2024 Negative   Final    External MDMA 05/13/2024  Negative   Final    External Opiates Screen Urine 05/13/2024 Negative   Final   Office Visit on 05/06/2024   Component Date Value Ref Range Status    External Amphetamine Screen Urine 05/06/2024 Negative   Final    External Benzodiazepine Screen Uri* 05/06/2024 Negative   Final    External Cocaine Screen Urine 05/06/2024 Negative   Final    External THC Screen Urine 05/06/2024 Negative   Final    External Methadone Screen Urine 05/06/2024 Negative   Final    External Methamphetamine Screen Ur* 05/06/2024 Negative   Final    External Oxycodone Screen Urine 05/06/2024 Positive (A)   Final    External Buprenorphine Screen Urine 05/06/2024 Negative   Final    External MDMA 05/06/2024 Negative   Final    External Opiates Screen Urine 05/06/2024 Negative   Final   Office Visit on 05/02/2024   Component Date Value Ref Range Status    External Amphetamine Screen Urine 05/02/2024 Negative   Final    External Benzodiazepine Screen Uri* 05/02/2024 Negative   Final    External Cocaine Screen Urine 05/02/2024 Negative   Final    External THC Screen Urine 05/02/2024 Negative   Final    External Methadone Screen Urine 05/02/2024 Negative   Final    External Methamphetamine Screen Ur* 05/02/2024 Negative   Final    External Oxycodone Screen Urine 05/02/2024 Positive (A)   Final    External Buprenorphine Screen Urine 05/02/2024 Negative   Final    External MDMA 05/02/2024 Negative   Final    External Opiates Screen Urine 05/02/2024 Negative   Final       Impression/Formulation:    ICD-10-CM ICD-9-CM   1. Alcohol use disorder, severe, dependence  F10.20 303.90   2. Anxiety disorder, unspecified type  F41.9 300.00   3. Chronic pain disorder  G89.4 338.4        CLINICAL MANEUVERING/INTERVENTIONS: Therapist utilized a person-centered approach to build and maintain rapport with group member.   Therapist promoted safe nonjudgmental environment by providing group members with unconditional positive regard.  Assisted member in processing  above session content; acknowledged and normalized patient's thoughts, feelings and concerns.  Allowed patient to freely discuss issues without interruption or judgment. Provided safe, confidential environment to facilitate the development of positive therapeutic relationship and encourage open, honest communication. Therapist assisted group member with identifying and implementing healthier coping strategies and maintaining healthier boundaries. Assisted patient in identifying risk factors which would indicate the need for higher level of care including thoughts to harm self or others and/or self-harming behavior and encouraged patient to contact this office, call 911, or present to the nearest emergency room should any of these events occur. Pt agreeable to adhere to medication regimen as prescribed and report any side effects.  Discussed crisis intervention services and means to access.  Patient adamantly and convincingly denies current suicidal or homicidal ideation or perceptual disturbance.      Therapist implemented motivational interviewing techniques to assist client with exploring and resolving ambivalence associated with commitment to change behaviors.  Yes  Therapist utilized dialectical behavior techniques to teach and model emotional regulation and relaxation.  No   Therapist applied cognitive behavioral strategies to facilitate identification of maladaptive patterns of thinking and behavior.  Yes     PLAN:    Continue [facility] Phase I  Aftercare:  [facility] Phase 2      Please note that portions of this note were completed with a voice recognition program. Efforts were made to edit dictation, but occasionally words are mistranscribed.     This document signed by Ben Ortega LCSW, May 21, 2024, 14:40 EDT

## 2024-05-22 ENCOUNTER — OFFICE VISIT (OUTPATIENT)
Dept: PSYCHIATRY | Facility: HOSPITAL | Age: 49
End: 2024-05-22
Payer: MEDICARE

## 2024-05-22 DIAGNOSIS — G89.4 CHRONIC PAIN DISORDER: ICD-10-CM

## 2024-05-22 DIAGNOSIS — F10.20 ALCOHOL USE DISORDER, SEVERE, DEPENDENCE: Primary | ICD-10-CM

## 2024-05-22 DIAGNOSIS — F41.9 ANXIETY DISORDER, UNSPECIFIED TYPE: ICD-10-CM

## 2024-05-22 PROCEDURE — H0015 ALCOHOL AND/OR DRUG SERVICES: HCPCS | Performed by: SOCIAL WORKER

## 2024-05-22 NOTE — PROGRESS NOTES
NAME: Taiwo Pierce  DATE: 05/22/2024    [facility] Phase I  [id number]    Services Provided    Group Psychotherapy x 1  Education/Training x 2  Activity Therapy  x 0  Individual Therapy x 0 0 minutes  Family Therapy x 0  MD Initial Visit  x 0  MD Follow-Up   x 0    Number of participants: 4    DATA:  Patient reported having slept well last night. However, he was worn out from landscaping.     Individual: No    Homework: None assigned    Group 1 (Psychotherapy: Check-ins): Therapist continued facilitation of rapport building strategies between group members. Therapist asked that each patient check in with home life and recovery efforts and identify triggers, cravings, and high risk situations that arise between group sessions.  Group members discussed barriers and benefits of attending recovery meetings.  Therapist provided empathy and support during group session. Daily Reading:  Do Androids Dream of Electric Sheep? by Colton Hoang.  Read the passage about silence and the empathy box. Emphasized the importance of finding healthy ways to deal with boredom.    Group 2  (CBT) Viewed and discussed the video, Why do we get bored? (2014) by Yunior on YouTube. Invited participants to think about boredom, as well as other unpleasant emotions, in a different light.     Group 3 (Psychoeducation) This hour of group was facilitated by [facility].      ASSESSMENT:    Personal Assessment 0-10 Scale (10 worst)    Anxiety:  0 (no comment)  Depression:  0 (no comment)  Cravings: 0 (no comment)     MSE within normal limits? Yes Affect: somber Mood: calm  Pt Response:  open/receptive  Engaged in activity/Process and self-disclosed: adequate  Applies today's group topics to self: adequate  Able to give and receive feedback: adequate  Demonstrated insightful thinking: consistent and adequate  Other pt response comments:  Patient was a positive participant. They demonstrated a relatively positive attitude, a strong degree  of motivation, and adequate insight, as evidenced by his level of insight combined with his level of engagement. They seemed interested and attentive. They appeared to comprehend well the concepts being discussed. The patient seemed receptive of, and willing to implement, the ideas being discussed. Their thought process appeared linear and goal directed, and their speech was regular rate and rhythm.       Community Group Engagement:  Yes: Religion    Reported relapse since last meeting? No: no lapse    .  Office Visit on 05/20/2024   Component Date Value Ref Range Status    External Amphetamine Screen Urine 05/20/2024 Negative   Final    External Benzodiazepine Screen Uri* 05/20/2024 Negative   Final    External Cocaine Screen Urine 05/20/2024 Negative   Final    External THC Screen Urine 05/20/2024 Negative   Final    External Methadone Screen Urine 05/20/2024 Negative   Final    External Methamphetamine Screen Ur* 05/20/2024 Negative   Final    External Oxycodone Screen Urine 05/20/2024 Negative   Final    External Buprenorphine Screen Urine 05/20/2024 Negative   Final    External MDMA 05/20/2024 Negative   Final    External Opiates Screen Urine 05/20/2024 Positive (A)   Final   Office Visit on 05/13/2024   Component Date Value Ref Range Status    External Amphetamine Screen Urine 05/13/2024 Negative   Final    External Benzodiazepine Screen Uri* 05/13/2024 Negative   Final    External Cocaine Screen Urine 05/13/2024 Negative   Final    External THC Screen Urine 05/13/2024 Negative   Final    External Methadone Screen Urine 05/13/2024 Negative   Final    External Methamphetamine Screen Ur* 05/13/2024 Negative   Final    External Oxycodone Screen Urine 05/13/2024 Negative   Final    External Buprenorphine Screen Urine 05/13/2024 Negative   Final    External MDMA 05/13/2024 Negative   Final    External Opiates Screen Urine 05/13/2024 Negative   Final   Office Visit on 05/06/2024   Component Date Value Ref Range Status     External Amphetamine Screen Urine 05/06/2024 Negative   Final    External Benzodiazepine Screen Uri* 05/06/2024 Negative   Final    External Cocaine Screen Urine 05/06/2024 Negative   Final    External THC Screen Urine 05/06/2024 Negative   Final    External Methadone Screen Urine 05/06/2024 Negative   Final    External Methamphetamine Screen Ur* 05/06/2024 Negative   Final    External Oxycodone Screen Urine 05/06/2024 Positive (A)   Final    External Buprenorphine Screen Urine 05/06/2024 Negative   Final    External MDMA 05/06/2024 Negative   Final    External Opiates Screen Urine 05/06/2024 Negative   Final   Office Visit on 05/02/2024   Component Date Value Ref Range Status    External Amphetamine Screen Urine 05/02/2024 Negative   Final    External Benzodiazepine Screen Uri* 05/02/2024 Negative   Final    External Cocaine Screen Urine 05/02/2024 Negative   Final    External THC Screen Urine 05/02/2024 Negative   Final    External Methadone Screen Urine 05/02/2024 Negative   Final    External Methamphetamine Screen Ur* 05/02/2024 Negative   Final    External Oxycodone Screen Urine 05/02/2024 Positive (A)   Final    External Buprenorphine Screen Urine 05/02/2024 Negative   Final    External MDMA 05/02/2024 Negative   Final    External Opiates Screen Urine 05/02/2024 Negative   Final       Impression/Formulation:    ICD-10-CM ICD-9-CM   1. Alcohol use disorder, severe, dependence  F10.20 303.90   2. Anxiety disorder, unspecified type  F41.9 300.00   3. Chronic pain disorder  G89.4 338.4        CLINICAL MANEUVERING/INTERVENTIONS: Therapist utilized a person-centered approach to build and maintain rapport with group member.   Therapist promoted safe nonjudgmental environment by providing group members with unconditional positive regard.  Assisted member in processing above session content; acknowledged and normalized patient's thoughts, feelings and concerns.  Allowed patient to freely discuss issues without  interruption or judgment. Provided safe, confidential environment to facilitate the development of positive therapeutic relationship and encourage open, honest communication. Therapist assisted group member with identifying and implementing healthier coping strategies and maintaining healthier boundaries. Assisted patient in identifying risk factors which would indicate the need for higher level of care including thoughts to harm self or others and/or self-harming behavior and encouraged patient to contact this office, call 911, or present to the nearest emergency room should any of these events occur. Pt agreeable to adhere to medication regimen as prescribed and report any side effects.  Discussed crisis intervention services and means to access.  Patient adamantly and convincingly denies current suicidal or homicidal ideation or perceptual disturbance.      Therapist implemented motivational interviewing techniques to assist client with exploring and resolving ambivalence associated with commitment to change behaviors.  Yes  Therapist utilized dialectical behavior techniques to teach and model emotional regulation and relaxation.  No   Therapist applied cognitive behavioral strategies to facilitate identification of maladaptive patterns of thinking and behavior.  Yes     PLAN:    Continue [facility] IOP Phase I  Aftercare:  [facility] Outpatient Phase 2      Please note that portions of this note were completed with a voice recognition program. Efforts were made to edit dictation, but occasionally words are mistranscribed.     This document signed by eBn Ortega LCSW, May 22, 2024, 11:28 EDT

## 2024-05-23 ENCOUNTER — OFFICE VISIT (OUTPATIENT)
Dept: PSYCHIATRY | Facility: HOSPITAL | Age: 49
End: 2024-05-23
Payer: MEDICARE

## 2024-05-23 DIAGNOSIS — F10.20 ALCOHOL USE DISORDER, SEVERE, DEPENDENCE: Primary | ICD-10-CM

## 2024-05-23 DIAGNOSIS — Z79.899 MEDICATION MANAGEMENT: ICD-10-CM

## 2024-05-23 DIAGNOSIS — F41.9 ANXIETY DISORDER, UNSPECIFIED TYPE: ICD-10-CM

## 2024-05-23 DIAGNOSIS — G89.4 CHRONIC PAIN DISORDER: ICD-10-CM

## 2024-05-23 PROCEDURE — H0015 ALCOHOL AND/OR DRUG SERVICES: HCPCS | Performed by: SOCIAL WORKER

## 2024-05-23 PROCEDURE — 1159F MED LIST DOCD IN RCRD: CPT | Performed by: PSYCHIATRY & NEUROLOGY

## 2024-05-23 PROCEDURE — 1160F RVW MEDS BY RX/DR IN RCRD: CPT | Performed by: PSYCHIATRY & NEUROLOGY

## 2024-05-23 RX ORDER — MIRTAZAPINE 7.5 MG/1
7.5 TABLET, FILM COATED ORAL NIGHTLY
Qty: 30 TABLET | Refills: 0 | Status: SHIPPED | OUTPATIENT
Start: 2024-05-23

## 2024-05-23 NOTE — PROGRESS NOTES
Subjective   Patient ID: Taiwo Pierce is a 48 y.o. male is here today for follow-up..     CC: Alcohol use, anxiety, insomnia    There were no vitals taken for this visit.    Augmentin [amoxicillin-pot clavulanate] and Metoprolol    History of Present Illness  The patient is seen for a follow-up visit in the IOP program. He is near th end of the program and states he has been able to stay sober. He states mirtazapine is helping with his sleep and would need a script.     The following portions of the patient's history were reviewed and updated as appropriate: allergies, current medications, past family history, past medical history, past social history, past surgical history, and problem list.    PFSH:    Review of Systems    Objective   Mental Status Exam  Appearance:  clean and casually dressed, appropriate  Attitude toward clinician:  cooperative and agreeable   Speech:    Rate:  regular rate and rhythm   Volume:  normal  Motor:  no abnormal movements present  Mood:  Good  Affect:  euthymic  Thought Processes:  linear, logical, and goal directed  Thought Content:  normal  Suicidal Thoughts:  absent  Homicidal Thoughts:  absent  Perceptual Disturbance: no perceptual disturbance  Attention and Concentration:  good  Insight and Judgement:  good  Memory:  memory appears to be intact    MEDICATION ISSUES:  Current Outpatient Medications on File Prior to Visit   Medication Sig Dispense Refill    amLODIPine (NORVASC) 10 MG tablet Take 1 tablet by mouth Daily. Indications: High Blood Pressure Disorder      DULoxetine (CYMBALTA) 60 MG capsule Take 1 capsule by mouth Daily. Indications: Musculoskeletal Pain 30 capsule 0    gabapentin (NEURONTIN) 800 MG tablet Take 1 tablet by mouth 3 (Three) Times a Day. Indications: Neuropathic Pain      hydroCHLOROthiazide 25 MG tablet Take 1 tablet by mouth Daily. Indications: High Blood Pressure Disorder      lansoprazole (PREVACID) 30 MG capsule Take 1 capsule by mouth Daily.  Indications: Heartburn      metaxalone (SKELAXIN) 800 MG tablet Take 1 tablet by mouth 3 (Three) Times a Day As Needed for Muscle Spasms. Indications: Musculoskeletal Pain      mirtazapine (REMERON) 7.5 MG tablet Take 1 tablet by mouth Every Night for 30 days. Indications: Major Depressive Disorder 30 tablet 0    oxyCODONE (ROXICODONE) 10 MG tablet Take 1 tablet by mouth 5 (Five) Times a Day. Indications: Chronic Pain      pravastatin (PRAVACHOL) 20 MG tablet Take 1 tablet by mouth Daily. 90 tablet 0    valsartan (DIOVAN) 320 MG tablet Take 1 tablet by mouth Daily. Indications: High Blood Pressure Disorder       No current facility-administered medications on file prior to visit.       Lab Review:   Office Visit on 05/20/2024   Component Date Value Ref Range Status    External Amphetamine Screen Urine 05/20/2024 Negative   Final    External Benzodiazepine Screen Uri* 05/20/2024 Negative   Final    External Cocaine Screen Urine 05/20/2024 Negative   Final    External THC Screen Urine 05/20/2024 Negative   Final    External Methadone Screen Urine 05/20/2024 Negative   Final    External Methamphetamine Screen Ur* 05/20/2024 Negative   Final    External Oxycodone Screen Urine 05/20/2024 Negative   Final    External Buprenorphine Screen Urine 05/20/2024 Negative   Final    External MDMA 05/20/2024 Negative   Final    External Opiates Screen Urine 05/20/2024 Positive (A)   Final   Office Visit on 05/13/2024   Component Date Value Ref Range Status    External Amphetamine Screen Urine 05/13/2024 Negative   Final    External Benzodiazepine Screen Uri* 05/13/2024 Negative   Final    External Cocaine Screen Urine 05/13/2024 Negative   Final    External THC Screen Urine 05/13/2024 Negative   Final    External Methadone Screen Urine 05/13/2024 Negative   Final    External Methamphetamine Screen Ur* 05/13/2024 Negative   Final    External Oxycodone Screen Urine 05/13/2024 Negative   Final    External Buprenorphine Screen Urine  05/13/2024 Negative   Final    External MDMA 05/13/2024 Negative   Final    External Opiates Screen Urine 05/13/2024 Negative   Final       Assessment & Plan   Diagnoses and all orders for this visit:    1. Alcohol use disorder, severe, dependence (Primary)  -Tressa reports he has been able to maintain sobriety.    2. Anxiety disorder, unspecified type  -Continue duloxetine  -Continue mirtazapine    3. Chronic pain disorder  -Continue oxycodone and gabapentin    4. Medication management

## 2024-05-23 NOTE — PROGRESS NOTES
NAME: Taiwo Pierce  DATE: 2024    [facility] Phase I  [id number]    Services Provided    Group Psychotherapy x 1  Education/Training x 2  Activity Therapy  x 0  Individual Therapy x 0 0 minutes  Family Therapy x 0  MD Initial Visit  x 0  MD Follow-Up   x 1    Number of participants: 5    DATA:  Patient reported he was doing pretty good today. The weather was exacerbating his arthritis, but otherwise he was okay.     Individual: No    Homework: Assigned Identifying Triggers    Group 1 (Psychotherapy: Check-ins): Therapist continued facilitation of rapport building strategies between group members. Therapist asked that each patient check in with home life and recovery efforts and identify triggers, cravings, and high risk situations that arise between group sessions.  Group members discussed barriers and benefits of attending recovery meetings.  Therapist provided empathy and support during group session. Daily Readin Rules for Life () by Jorge Lopez    Group 2  (Emotional Intelligence) Viewed and discussed the video, Primary Emotions vs. Secondary Emotions () by Therapy in A Nutshell with Renee Cody on YouTube.     Group 3 (Peer Support) This hour of group was facilitated by Sandhya .     ASSESSMENT:    Personal Assessment 0-10 Scale (10 worst)    Anxiety:  0 (no comment)  Depression:  0 (no comment)  Cravings: 0 (no comment)     MSE within normal limits? Yes Affect: somber Mood: calm  Pt Response:  open/receptive  Engaged in activity/Process and self-disclosed: adequate  Applies today's group topics to self: adequate  Able to give and receive feedback: adequate  Demonstrated insightful thinking: consistent and adequate  Other pt response comments:  Patient was a positive participant. They demonstrated a relatively positive attitude, a strong degree of motivation, and adequate insight, as evidenced by his level of engagement combined with his commitment to recovery.  They seemed interested and attentive. They appeared to comprehend well the concepts being discussed. The patient seemed receptive of, and willing to implement, the ideas being discussed. Their thought process appeared linear and goal directed, and their speech was regular rate and rhythm.       Community Group Engagement:  Yes: Scientology    Reported relapse since last meeting? No: no lapse    .  Office Visit on 05/20/2024   Component Date Value Ref Range Status    External Amphetamine Screen Urine 05/20/2024 Negative   Final    External Benzodiazepine Screen Uri* 05/20/2024 Negative   Final    External Cocaine Screen Urine 05/20/2024 Negative   Final    External THC Screen Urine 05/20/2024 Negative   Final    External Methadone Screen Urine 05/20/2024 Negative   Final    External Methamphetamine Screen Ur* 05/20/2024 Negative   Final    External Oxycodone Screen Urine 05/20/2024 Negative   Final    External Buprenorphine Screen Urine 05/20/2024 Negative   Final    External MDMA 05/20/2024 Negative   Final    External Opiates Screen Urine 05/20/2024 Positive (A)   Final   Office Visit on 05/13/2024   Component Date Value Ref Range Status    External Amphetamine Screen Urine 05/13/2024 Negative   Final    External Benzodiazepine Screen Uri* 05/13/2024 Negative   Final    External Cocaine Screen Urine 05/13/2024 Negative   Final    External THC Screen Urine 05/13/2024 Negative   Final    External Methadone Screen Urine 05/13/2024 Negative   Final    External Methamphetamine Screen Ur* 05/13/2024 Negative   Final    External Oxycodone Screen Urine 05/13/2024 Negative   Final    External Buprenorphine Screen Urine 05/13/2024 Negative   Final    External MDMA 05/13/2024 Negative   Final    External Opiates Screen Urine 05/13/2024 Negative   Final   Office Visit on 05/06/2024   Component Date Value Ref Range Status    External Amphetamine Screen Urine 05/06/2024 Negative   Final    External Benzodiazepine Screen Uri*  05/06/2024 Negative   Final    External Cocaine Screen Urine 05/06/2024 Negative   Final    External THC Screen Urine 05/06/2024 Negative   Final    External Methadone Screen Urine 05/06/2024 Negative   Final    External Methamphetamine Screen Ur* 05/06/2024 Negative   Final    External Oxycodone Screen Urine 05/06/2024 Positive (A)   Final    External Buprenorphine Screen Urine 05/06/2024 Negative   Final    External MDMA 05/06/2024 Negative   Final    External Opiates Screen Urine 05/06/2024 Negative   Final       Impression/Formulation:    ICD-10-CM ICD-9-CM   1. Alcohol use disorder, severe, dependence  F10.20 303.90   2. Anxiety disorder, unspecified type  F41.9 300.00   3. Chronic pain disorder  G89.4 338.4   4. Medication management  Z79.899 V58.69        CLINICAL MANEUVERING/INTERVENTIONS: Therapist utilized a person-centered approach to build and maintain rapport with group member.   Therapist promoted safe nonjudgmental environment by providing group members with unconditional positive regard.  Assisted member in processing above session content; acknowledged and normalized patient's thoughts, feelings and concerns.  Allowed patient to freely discuss issues without interruption or judgment. Provided safe, confidential environment to facilitate the development of positive therapeutic relationship and encourage open, honest communication. Therapist assisted group member with identifying and implementing healthier coping strategies and maintaining healthier boundaries. Assisted patient in identifying risk factors which would indicate the need for higher level of care including thoughts to harm self or others and/or self-harming behavior and encouraged patient to contact this office, call 911, or present to the nearest emergency room should any of these events occur. Pt agreeable to adhere to medication regimen as prescribed and report any side effects.  Discussed crisis intervention services and means to  access.  Patient adamantly and convincingly denies current suicidal or homicidal ideation or perceptual disturbance.      Therapist implemented motivational interviewing techniques to assist client with exploring and resolving ambivalence associated with commitment to change behaviors.  Yes  Therapist utilized dialectical behavior techniques to teach and model emotional regulation and relaxation.  No   Therapist applied cognitive behavioral strategies to facilitate identification of maladaptive patterns of thinking and behavior.  Yes     PLAN:    Continue [facility] IOP Phase I  Aftercare:  [facility] Outpatient Phase 2      Please note that portions of this note were completed with a voice recognition program. Efforts were made to edit dictation, but occasionally words are mistranscribed.     This document signed by Ben Ortega LCSW, May 23, 2024, 11:21 EDT

## 2024-05-23 NOTE — PROGRESS NOTES
[facility] IOP  Group      Date: May 23, 2024  Time: [id number]    Type of Group:  Peer Support    Group  Content: Relapse Prevention    Patient Response: Patient was a positive participant today.  we discussed 10 different ways and examples of situations and thoughts leading to a relapse and way to combat them. We also discussed purpose and hope , benefits of coming to all IOP classes and phases.         Sandhya Coles MA  05/23/24  14:32 EDT

## 2024-05-27 ENCOUNTER — OFFICE VISIT (OUTPATIENT)
Dept: PSYCHIATRY | Facility: HOSPITAL | Age: 49
End: 2024-05-27
Payer: MEDICARE

## 2024-05-27 DIAGNOSIS — F10.20 ALCOHOL USE DISORDER, SEVERE, DEPENDENCE: Primary | ICD-10-CM

## 2024-05-27 DIAGNOSIS — G89.4 CHRONIC PAIN DISORDER: ICD-10-CM

## 2024-05-27 DIAGNOSIS — F41.9 ANXIETY DISORDER, UNSPECIFIED TYPE: ICD-10-CM

## 2024-05-27 PROCEDURE — H0015 ALCOHOL AND/OR DRUG SERVICES: HCPCS | Performed by: SOCIAL WORKER

## 2024-05-27 PROCEDURE — G0410 GRP PSYCH PARTIAL HOSP 45-50: HCPCS | Performed by: SOCIAL WORKER

## 2024-05-27 PROCEDURE — G0176 OPPS/PHP;ACTIVITY THERAPY: HCPCS | Performed by: SOCIAL WORKER

## 2024-05-27 NOTE — PROGRESS NOTES
NAME: Taiwo Pierce  DATE: 05/27/2024    [facility] Phase I  [id number]    Services Provided    Group Psychotherapy x 1  Education/Training x 0  Activity Therapy  x 2  Individual Therapy x 0 0 minutes  Family Therapy x 0  MD Initial Visit  x 0  MD Follow-Up   x 0    Number of participants: 3    DATA:  Patient reported he was doing well. His throat was sore, but he was okay. He also reported having nearly skipped group today because he had wanted to go fishing. He had been working in his [employer], and the water had washed out the new mulch.     Individual: No    Homework: completed Triggers Worksheet and Personal Recovery Plan    Group 1 (Psychotherapy: Check-ins): Therapist continued facilitation of rapport building strategies between group members. Therapist asked that each patient check in with home life and recovery efforts and identify triggers, cravings, and high risk situations that arise between group sessions.  Group members discussed barriers and benefits of attending recovery meetings.  Therapist provided empathy and support during group session. Daily Reading: None    Group 2  ( Recreation Therapy ) Cooperative Board Game. Introduced participants to stress reduction hobbies and coping skills.     Group 3 ( Recreation Therapy ) Competitive Card Game     ASSESSMENT:    Personal Assessment 0-10 Scale (10 worst)    Anxiety:  0 (no comment)  Depression:  0 (no comment)  Cravings: 0 (no comment)     MSE within normal limits? Yes Affect: somber Mood: calm  Pt Response:  open/receptive  Engaged in activity/Process and self-disclosed: adequate  Applies today's group topics to self: adequate  Able to give and receive feedback: adequate  Demonstrated insightful thinking: consistent and adequate  Other pt response comments:  Patient was a positive participant. They demonstrated a relatively positive attitude, a strong degree of motivation, and adequate insight, as evidenced by his level of engagement combined  with his efforts to engage in group activities. They seemed interested and attentive. They appeared to comprehend well the concepts being discussed. The patient seemed receptive of, and willing to implement, the ideas being discussed. Their thought process appeared linear and goal directed, and their speech was regular rate and rhythm.       Community Group Engagement:  Yes: Faith     Reported relapse since last meeting? No: no lapse    .  Office Visit on 05/20/2024   Component Date Value Ref Range Status    External Amphetamine Screen Urine 05/20/2024 Negative   Final    External Benzodiazepine Screen Uri* 05/20/2024 Negative   Final    External Cocaine Screen Urine 05/20/2024 Negative   Final    External THC Screen Urine 05/20/2024 Negative   Final    External Methadone Screen Urine 05/20/2024 Negative   Final    External Methamphetamine Screen Ur* 05/20/2024 Negative   Final    External Oxycodone Screen Urine 05/20/2024 Negative   Final    External Buprenorphine Screen Urine 05/20/2024 Negative   Final    External MDMA 05/20/2024 Negative   Final    External Opiates Screen Urine 05/20/2024 Positive (A)   Final   Office Visit on 05/13/2024   Component Date Value Ref Range Status    External Amphetamine Screen Urine 05/13/2024 Negative   Final    External Benzodiazepine Screen Uri* 05/13/2024 Negative   Final    External Cocaine Screen Urine 05/13/2024 Negative   Final    External THC Screen Urine 05/13/2024 Negative   Final    External Methadone Screen Urine 05/13/2024 Negative   Final    External Methamphetamine Screen Ur* 05/13/2024 Negative   Final    External Oxycodone Screen Urine 05/13/2024 Negative   Final    External Buprenorphine Screen Urine 05/13/2024 Negative   Final    External MDMA 05/13/2024 Negative   Final    External Opiates Screen Urine 05/13/2024 Negative   Final       Impression/Formulation:    ICD-10-CM ICD-9-CM   1. Alcohol use disorder, severe, dependence  F10.20 303.90   2. Anxiety  disorder, unspecified type  F41.9 300.00   3. Chronic pain disorder  G89.4 338.4        CLINICAL MANEUVERING/INTERVENTIONS: Therapist utilized a person-centered approach to build and maintain rapport with group member.   Therapist promoted safe nonjudgmental environment by providing group members with unconditional positive regard.  Assisted member in processing above session content; acknowledged and normalized patient's thoughts, feelings and concerns.  Allowed patient to freely discuss issues without interruption or judgment. Provided safe, confidential environment to facilitate the development of positive therapeutic relationship and encourage open, honest communication. Therapist assisted group member with identifying and implementing healthier coping strategies and maintaining healthier boundaries. Assisted patient in identifying risk factors which would indicate the need for higher level of care including thoughts to harm self or others and/or self-harming behavior and encouraged patient to contact this office, call 911, or present to the nearest emergency room should any of these events occur. Pt agreeable to adhere to medication regimen as prescribed and report any side effects.  Discussed crisis intervention services and means to access.  Patient adamantly and convincingly denies current suicidal or homicidal ideation or perceptual disturbance.      Therapist implemented motivational interviewing techniques to assist client with exploring and resolving ambivalence associated with commitment to change behaviors.  Yes  Therapist utilized dialectical behavior techniques to teach and model emotional regulation and relaxation.  No   Therapist applied cognitive behavioral strategies to facilitate identification of maladaptive patterns of thinking and behavior.  Yes     PLAN:    Continue [facility] Phase I  Aftercare:  [facility] Phase 2      Please note that portions of  this note were completed with a voice recognition program. Efforts were made to edit dictation, but occasionally words are mistranscribed.     This document signed by Ben Ortega LCSW, May 27, 2024, 11:54 EDT

## 2024-05-28 ENCOUNTER — OFFICE VISIT (OUTPATIENT)
Dept: PSYCHIATRY | Facility: HOSPITAL | Age: 49
End: 2024-05-28
Payer: MEDICARE

## 2024-05-28 DIAGNOSIS — Z79.899 MEDICATION MANAGEMENT: Primary | ICD-10-CM

## 2024-05-28 DIAGNOSIS — G89.4 CHRONIC PAIN DISORDER: ICD-10-CM

## 2024-05-28 DIAGNOSIS — F41.9 ANXIETY DISORDER, UNSPECIFIED TYPE: ICD-10-CM

## 2024-05-28 DIAGNOSIS — F10.20 ALCOHOL USE DISORDER, SEVERE, DEPENDENCE: ICD-10-CM

## 2024-05-28 LAB
EXTERNAL AMPHETAMINE SCREEN URINE: NEGATIVE
EXTERNAL BENZODIAZEPINE SCREEN URINE: NEGATIVE
EXTERNAL BUPRENORPHINE SCREEN URINE: NEGATIVE
EXTERNAL COCAINE SCREEN URINE: NEGATIVE
EXTERNAL MDMA: NEGATIVE
EXTERNAL METHADONE SCREEN URINE: NEGATIVE
EXTERNAL METHAMPHETAMINE SCREEN URINE: NEGATIVE
EXTERNAL OPIATES SCREEN URINE: POSITIVE
EXTERNAL OXYCODONE SCREEN URINE: POSITIVE
EXTERNAL THC SCREEN URINE: NEGATIVE

## 2024-05-28 PROCEDURE — G0177 OPPS/PHP; TRAIN & EDUC SERV: HCPCS | Performed by: SOCIAL WORKER

## 2024-05-28 PROCEDURE — H0015 ALCOHOL AND/OR DRUG SERVICES: HCPCS | Performed by: SOCIAL WORKER

## 2024-05-28 PROCEDURE — G0410 GRP PSYCH PARTIAL HOSP 45-50: HCPCS | Performed by: SOCIAL WORKER

## 2024-05-28 NOTE — PROGRESS NOTES
NAME: Taiwo Pierce  DATE: 05/28/2024    [facility] Phase I  [id number]    Services Provided    Group Psychotherapy x 1  Education/Training x 2  Activity Therapy  x 0  Individual Therapy x 0 0 minutes  Family Therapy x 0  MD Initial Visit  x 0  MD Follow-Up   x 0    Number of participants: 5    DATA:  Patient reported he was doing well. He reported having gone home yesterday, and while he was preparing to go fishing he heard someone yelling for help. He discovered his 92 year old neighbor had fallen and injured herself. He called an ambulance. She was flown out. Her daughter called him with an update. Apparently, she had broken a hip, an arm, and had some bleeding on her brain. The prognosis was not good. The patient was shaken somewhat by this, commenting that he never wanted to reach a place where he was so vulnerable. Therapist invited a new perspective, suggesting such moments also inspired gratitude for what health we do have. He agreed.     Individual: No    Homework: None assigned    Group 1 (Psychotherapy: Check-ins): Therapist continued facilitation of rapport building strategies between group members. Therapist asked that each patient check in with home life and recovery efforts and identify triggers, cravings, and high risk situations that arise between group sessions.  Group members discussed barriers and benefits of attending recovery meetings.  Therapist provided empathy and support during group session. Daily Reading:  The Weight of Glory: Transposition, by JESSICA Parsons.    Group 2  (Emotional Intelligence) Explored and discussed the Wheel of Emotions (Guzman, 1980).     Group 3 (Emotional Intelligence) This hour of group was facilitated by Moises, counseling student with [employer]. Discussed the function of various emotions.      ASSESSMENT:    Personal Assessment 0-10 Scale (10 worst)    Anxiety:  2 (no comment)  Depression:  0 (no comment)  Cravings: 0 (no comment)     MSE  within normal limits? No Affect: dysthymic Mood: depressed  Pt Response:  guarded  Engaged in activity/Process and self-disclosed: adequate  Applies today's group topics to self: suboptimal  Able to give and receive feedback: adequate  Demonstrated insightful thinking: occasional and adequate  Other pt response comments:  Patient was a positive participant. They demonstrated a relatively positive attitude, a strong degree of motivation, and moderate insight, as evidenced by his lower level of engagement today combined with his efforts to disclose feelings of anxiety. They seemed interested and distracted. They appeared to have a fair degree of comprehension regarding the concepts being discussed . The patient seemed receptive of, and willing to implement, the ideas being discussed. Their thought process appeared linear and goal directed, and their speech was regular rate and rhythm.       Community Group Engagement:  Yes: Restorationist    Reported relapse since last meeting? No: no lapse    .  Office Visit on 05/20/2024   Component Date Value Ref Range Status    External Amphetamine Screen Urine 05/20/2024 Negative   Final    External Benzodiazepine Screen Uri* 05/20/2024 Negative   Final    External Cocaine Screen Urine 05/20/2024 Negative   Final    External THC Screen Urine 05/20/2024 Negative   Final    External Methadone Screen Urine 05/20/2024 Negative   Final    External Methamphetamine Screen Ur* 05/20/2024 Negative   Final    External Oxycodone Screen Urine 05/20/2024 Negative   Final    External Buprenorphine Screen Urine 05/20/2024 Negative   Final    External MDMA 05/20/2024 Negative   Final    External Opiates Screen Urine 05/20/2024 Positive (A)   Final   Office Visit on 05/13/2024   Component Date Value Ref Range Status    External Amphetamine Screen Urine 05/13/2024 Negative   Final    External Benzodiazepine Screen Uri* 05/13/2024 Negative   Final    External Cocaine Screen Urine 05/13/2024 Negative    Final    External THC Screen Urine 05/13/2024 Negative   Final    External Methadone Screen Urine 05/13/2024 Negative   Final    External Methamphetamine Screen Ur* 05/13/2024 Negative   Final    External Oxycodone Screen Urine 05/13/2024 Negative   Final    External Buprenorphine Screen Urine 05/13/2024 Negative   Final    External MDMA 05/13/2024 Negative   Final    External Opiates Screen Urine 05/13/2024 Negative   Final       Impression/Formulation:    ICD-10-CM ICD-9-CM   1. Alcohol use disorder, severe, dependence  F10.20 303.90   2. Anxiety disorder, unspecified type  F41.9 300.00   3. Chronic pain disorder  G89.4 338.4        CLINICAL MANEUVERING/INTERVENTIONS: Therapist utilized a person-centered approach to build and maintain rapport with group member.   Therapist promoted safe nonjudgmental environment by providing group members with unconditional positive regard.  Assisted member in processing above session content; acknowledged and normalized patient's thoughts, feelings and concerns.  Allowed patient to freely discuss issues without interruption or judgment. Provided safe, confidential environment to facilitate the development of positive therapeutic relationship and encourage open, honest communication. Therapist assisted group member with identifying and implementing healthier coping strategies and maintaining healthier boundaries. Assisted patient in identifying risk factors which would indicate the need for higher level of care including thoughts to harm self or others and/or self-harming behavior and encouraged patient to contact this office, call 911, or present to the nearest emergency room should any of these events occur. Pt agreeable to adhere to medication regimen as prescribed and report any side effects.  Discussed crisis intervention services and means to access.  Patient adamantly and convincingly denies current suicidal or homicidal ideation or perceptual disturbance.      Therapist  implemented motivational interviewing techniques to assist client with exploring and resolving ambivalence associated with commitment to change behaviors.  Yes  Therapist utilized dialectical behavior techniques to teach and model emotional regulation and relaxation.  No   Therapist applied cognitive behavioral strategies to facilitate identification of maladaptive patterns of thinking and behavior.  Yes     PLAN:    Continue [facility] Phase I  Aftercare:  [facility] Phase 2      Please note that portions of this note were completed with a voice recognition program. Efforts were made to edit dictation, but occasionally words are mistranscribed.     This document signed by Ben Ortega LCSW, May 28, 2024, 10:51 EDT

## 2024-05-29 ENCOUNTER — OFFICE VISIT (OUTPATIENT)
Dept: PSYCHIATRY | Facility: HOSPITAL | Age: 49
End: 2024-05-29
Payer: MEDICARE

## 2024-05-29 DIAGNOSIS — F41.9 ANXIETY DISORDER, UNSPECIFIED TYPE: ICD-10-CM

## 2024-05-29 DIAGNOSIS — G89.4 CHRONIC PAIN DISORDER: ICD-10-CM

## 2024-05-29 DIAGNOSIS — F10.20 ALCOHOL USE DISORDER, SEVERE, DEPENDENCE: Primary | ICD-10-CM

## 2024-05-29 PROCEDURE — G0177 OPPS/PHP; TRAIN & EDUC SERV: HCPCS | Performed by: SOCIAL WORKER

## 2024-05-29 PROCEDURE — H0015 ALCOHOL AND/OR DRUG SERVICES: HCPCS | Performed by: SOCIAL WORKER

## 2024-05-29 PROCEDURE — G0410 GRP PSYCH PARTIAL HOSP 45-50: HCPCS | Performed by: SOCIAL WORKER

## 2024-05-29 NOTE — PROGRESS NOTES
NAME: Taiwo Pierce  DATE: 05/29/2024    [facility] Phase I  [id number]    Services Provided    Group Psychotherapy x 2  Education/Training x 1  Activity Therapy  x 0  Individual Therapy x 0 0 minutes  Family Therapy x 0  MD Initial Visit  x 0  MD Follow-Up   x 0    Number of participants: 6    DATA:  Patient reported he was doing okay today. He was just tired. He had checked on his neighbor yesterday, and she had had to have a hip replacement. During the last hour of group, he shared the responses on his personal recovery plan. Patient reported that his primary trigger was that of loneliness and resultant boredom. To ameliorate this he planned to stay busy. Therapist suggested the patient might only be treating the symptoms instead of the disease by staying busy. Peers encouraged him to reach out and make connections with people, possibly using the telephone to interact if necessary. He was hesitant but agreeable.     Individual: No    Homework: None assigned    Group 1 (Psychotherapy: Check-ins): Therapist continued facilitation of rapport building strategies between group members. Therapist asked that each patient check in with home life and recovery efforts and identify triggers, cravings, and high risk situations that arise between group sessions.  Group members discussed barriers and benefits of attending recovery meetings.  Therapist provided empathy and support during group session. Daily Reading: None    Group 2  (CBT) Examined the implications of perfectionism. Compared the relative costs and benefits of striving for progress and demanding perfection. Participants completed a worksheet related to this topic, and discussion followed.     Group 3 ( Personal Recovery Plan ) Participants listened, provided feedback, and offered encouragement as a peer shared their personal recovery plan on the day of their graduation.      ASSESSMENT:    Personal Assessment 0-10 Scale (10 worst)    Anxiety:  0 (no  comment)  Depression:  0 (no comment)  Cravings: 0 (no comment)     MSE within normal limits? Yes Affect: somber Mood: calm  Pt Response:  open/receptive  Engaged in activity/Process and self-disclosed: adequate  Applies today's group topics to self: adequate  Able to give and receive feedback: adequate  Demonstrated insightful thinking: consistent and adequate  Other pt response comments:  Patient was a positive participant. They demonstrated a relatively positive attitude, a strong degree of motivation, and adequate insight, as evidenced by his level of engagement combined with his efforts on his personal recovery plan. They seemed interested and attentive. They appeared to comprehend well the concepts being discussed. The patient seemed receptive of, and willing to implement, the ideas being discussed. Their thought process appeared linear and goal directed, and their speech was regular rate and rhythm.       Community Group Engagement:  Yes: Cheondoism    Reported relapse since last meeting? Yes: positive screen for alcohol on the 20th. Undisclosed by patient. Will address this tomorrow during Phase II group.     .  Office Visit on 05/28/2024   Component Date Value Ref Range Status    External Amphetamine Screen Urine 05/28/2024 Negative   Final    External Benzodiazepine Screen Uri* 05/28/2024 Negative   Final    External Cocaine Screen Urine 05/28/2024 Negative   Final    External THC Screen Urine 05/28/2024 Negative   Final    External Methadone Screen Urine 05/28/2024 Negative   Final    External Methamphetamine Screen Ur* 05/28/2024 Negative   Final    External Oxycodone Screen Urine 05/28/2024 Positive (A)   Final    External Buprenorphine Screen Urine 05/28/2024 Negative   Final    External MDMA 05/28/2024 Negative   Final    External Opiates Screen Urine 05/28/2024 Positive (A)   Final   Office Visit on 05/20/2024   Component Date Value Ref Range Status    External Amphetamine Screen Urine 05/20/2024  Negative   Final    External Benzodiazepine Screen Uri* 05/20/2024 Negative   Final    External Cocaine Screen Urine 05/20/2024 Negative   Final    External THC Screen Urine 05/20/2024 Negative   Final    External Methadone Screen Urine 05/20/2024 Negative   Final    External Methamphetamine Screen Ur* 05/20/2024 Negative   Final    External Oxycodone Screen Urine 05/20/2024 Negative   Final    External Buprenorphine Screen Urine 05/20/2024 Negative   Final    External MDMA 05/20/2024 Negative   Final    External Opiates Screen Urine 05/20/2024 Positive (A)   Final   Office Visit on 05/13/2024   Component Date Value Ref Range Status    External Amphetamine Screen Urine 05/13/2024 Negative   Final    External Benzodiazepine Screen Uri* 05/13/2024 Negative   Final    External Cocaine Screen Urine 05/13/2024 Negative   Final    External THC Screen Urine 05/13/2024 Negative   Final    External Methadone Screen Urine 05/13/2024 Negative   Final    External Methamphetamine Screen Ur* 05/13/2024 Negative   Final    External Oxycodone Screen Urine 05/13/2024 Negative   Final    External Buprenorphine Screen Urine 05/13/2024 Negative   Final    External MDMA 05/13/2024 Negative   Final    External Opiates Screen Urine 05/13/2024 Negative   Final       Impression/Formulation:    ICD-10-CM ICD-9-CM   1. Alcohol use disorder, severe, dependence  F10.20 303.90   2. Anxiety disorder, unspecified type  F41.9 300.00   3. Chronic pain disorder  G89.4 338.4        CLINICAL MANEUVERING/INTERVENTIONS: Therapist utilized a person-centered approach to build and maintain rapport with group member.   Therapist promoted safe nonjudgmental environment by providing group members with unconditional positive regard.  Assisted member in processing above session content; acknowledged and normalized patient's thoughts, feelings and concerns.  Allowed patient to freely discuss issues without interruption or judgment. Provided safe, confidential  environment to facilitate the development of positive therapeutic relationship and encourage open, honest communication. Therapist assisted group member with identifying and implementing healthier coping strategies and maintaining healthier boundaries. Assisted patient in identifying risk factors which would indicate the need for higher level of care including thoughts to harm self or others and/or self-harming behavior and encouraged patient to contact this office, call 911, or present to the nearest emergency room should any of these events occur. Pt agreeable to adhere to medication regimen as prescribed and report any side effects.  Discussed crisis intervention services and means to access.  Patient adamantly and convincingly denies current suicidal or homicidal ideation or perceptual disturbance.      Therapist implemented motivational interviewing techniques to assist client with exploring and resolving ambivalence associated with commitment to change behaviors.  Yes  Therapist utilized dialectical behavior techniques to teach and model emotional regulation and relaxation.  No   Therapist applied cognitive behavioral strategies to facilitate identification of maladaptive patterns of thinking and behavior.  Yes     PLAN:    Begin [facility] IOP Phase II  Aftercare:  [facility] Outpatient Phase 3     Please note that portions of this note were completed with a voice recognition program. Efforts were made to edit dictation, but occasionally words are mistranscribed.     This document signed by Ben Ortega LCSW, May 29, 2024, 13:36 EDT

## 2024-05-30 ENCOUNTER — APPOINTMENT (OUTPATIENT)
Dept: PSYCHIATRY | Facility: HOSPITAL | Age: 49
End: 2024-05-30
Payer: MEDICARE

## 2024-06-13 ENCOUNTER — TRANSCRIBE ORDERS (OUTPATIENT)
Dept: ADMINISTRATIVE | Facility: HOSPITAL | Age: 49
End: 2024-06-13
Payer: MEDICARE

## 2024-06-13 DIAGNOSIS — M54.10 RADICULOPATHY, UNSPECIFIED SPINAL REGION: Primary | ICD-10-CM

## 2024-06-29 ENCOUNTER — APPOINTMENT (OUTPATIENT)
Dept: GENERAL RADIOLOGY | Facility: HOSPITAL | Age: 49
DRG: 897 | End: 2024-06-29
Payer: MEDICARE

## 2024-06-29 ENCOUNTER — HOSPITAL ENCOUNTER (EMERGENCY)
Facility: HOSPITAL | Age: 49
Discharge: HOME | DRG: 897 | End: 2024-06-30
Attending: EMERGENCY MEDICINE | Admitting: EMERGENCY MEDICINE
Payer: MEDICARE

## 2024-06-29 DIAGNOSIS — F10.920 ACUTE ALCOHOLIC INTOXICATION WITHOUT COMPLICATION: Primary | ICD-10-CM

## 2024-06-29 DIAGNOSIS — F10.20 ALCOHOLISM: ICD-10-CM

## 2024-06-29 LAB
APTT PPP: 29.5 SECONDS (ref 26.5–34.5)
BASOPHILS # BLD AUTO: 0.09 10*3/MM3 (ref 0–0.2)
BASOPHILS NFR BLD AUTO: 1.1 % (ref 0–1.5)
DEPRECATED RDW RBC AUTO: 45.1 FL (ref 37–54)
EOSINOPHIL # BLD AUTO: 0.01 10*3/MM3 (ref 0–0.4)
EOSINOPHIL NFR BLD AUTO: 0.1 % (ref 0.3–6.2)
ERYTHROCYTE [DISTWIDTH] IN BLOOD BY AUTOMATED COUNT: 13 % (ref 12.3–15.4)
ERYTHROCYTE [SEDIMENTATION RATE] IN BLOOD: 100 MM/HR (ref 0–15)
HCT VFR BLD AUTO: 44.6 % (ref 37.5–51)
HGB BLD-MCNC: 15.4 G/DL (ref 13–17.7)
IMM GRANULOCYTES # BLD AUTO: 0.02 10*3/MM3 (ref 0–0.05)
IMM GRANULOCYTES NFR BLD AUTO: 0.2 % (ref 0–0.5)
INR PPP: 1.08 (ref 0.9–1.1)
LYMPHOCYTES # BLD AUTO: 1.88 10*3/MM3 (ref 0.7–3.1)
LYMPHOCYTES NFR BLD AUTO: 22.3 % (ref 19.6–45.3)
MCH RBC QN AUTO: 32.6 PG (ref 26.6–33)
MCHC RBC AUTO-ENTMCNC: 34.5 G/DL (ref 31.5–35.7)
MCV RBC AUTO: 94.3 FL (ref 79–97)
MONOCYTES # BLD AUTO: 0.17 10*3/MM3 (ref 0.1–0.9)
MONOCYTES NFR BLD AUTO: 2 % (ref 5–12)
NEUTROPHILS NFR BLD AUTO: 6.26 10*3/MM3 (ref 1.7–7)
NEUTROPHILS NFR BLD AUTO: 74.3 % (ref 42.7–76)
NRBC BLD AUTO-RTO: 0 /100 WBC (ref 0–0.2)
PLATELET # BLD AUTO: 308 10*3/MM3 (ref 140–450)
PMV BLD AUTO: 9.5 FL (ref 6–12)
PROTHROMBIN TIME: 14.1 SECONDS (ref 12.1–14.7)
RBC # BLD AUTO: 4.73 10*6/MM3 (ref 4.14–5.8)
WBC NRBC COR # BLD AUTO: 8.43 10*3/MM3 (ref 3.4–10.8)

## 2024-06-29 PROCEDURE — 86140 C-REACTIVE PROTEIN: CPT | Performed by: EMERGENCY MEDICINE

## 2024-06-29 PROCEDURE — 82077 ASSAY SPEC XCP UR&BREATH IA: CPT | Performed by: EMERGENCY MEDICINE

## 2024-06-29 PROCEDURE — 82550 ASSAY OF CK (CPK): CPT | Performed by: EMERGENCY MEDICINE

## 2024-06-29 PROCEDURE — 83880 ASSAY OF NATRIURETIC PEPTIDE: CPT | Performed by: EMERGENCY MEDICINE

## 2024-06-29 PROCEDURE — 80053 COMPREHEN METABOLIC PANEL: CPT | Performed by: EMERGENCY MEDICINE

## 2024-06-29 PROCEDURE — 83690 ASSAY OF LIPASE: CPT | Performed by: EMERGENCY MEDICINE

## 2024-06-29 PROCEDURE — 82140 ASSAY OF AMMONIA: CPT | Performed by: EMERGENCY MEDICINE

## 2024-06-29 PROCEDURE — 84484 ASSAY OF TROPONIN QUANT: CPT | Performed by: EMERGENCY MEDICINE

## 2024-06-29 PROCEDURE — 85025 COMPLETE CBC W/AUTO DIFF WBC: CPT | Performed by: EMERGENCY MEDICINE

## 2024-06-29 PROCEDURE — 80143 DRUG ASSAY ACETAMINOPHEN: CPT | Performed by: EMERGENCY MEDICINE

## 2024-06-29 PROCEDURE — 85610 PROTHROMBIN TIME: CPT | Performed by: EMERGENCY MEDICINE

## 2024-06-29 PROCEDURE — 84439 ASSAY OF FREE THYROXINE: CPT | Performed by: EMERGENCY MEDICINE

## 2024-06-29 PROCEDURE — 71045 X-RAY EXAM CHEST 1 VIEW: CPT

## 2024-06-29 PROCEDURE — 84443 ASSAY THYROID STIM HORMONE: CPT | Performed by: EMERGENCY MEDICINE

## 2024-06-29 PROCEDURE — 93010 ELECTROCARDIOGRAM REPORT: CPT | Performed by: INTERNAL MEDICINE

## 2024-06-29 PROCEDURE — 93005 ELECTROCARDIOGRAM TRACING: CPT | Performed by: EMERGENCY MEDICINE

## 2024-06-29 PROCEDURE — 83735 ASSAY OF MAGNESIUM: CPT | Performed by: EMERGENCY MEDICINE

## 2024-06-29 PROCEDURE — 85652 RBC SED RATE AUTOMATED: CPT | Performed by: EMERGENCY MEDICINE

## 2024-06-29 PROCEDURE — 85730 THROMBOPLASTIN TIME PARTIAL: CPT | Performed by: EMERGENCY MEDICINE

## 2024-06-29 PROCEDURE — 36415 COLL VENOUS BLD VENIPUNCTURE: CPT

## 2024-06-29 PROCEDURE — 80179 DRUG ASSAY SALICYLATE: CPT | Performed by: EMERGENCY MEDICINE

## 2024-06-29 PROCEDURE — 25810000003 SODIUM CHLORIDE 0.9 % SOLUTION: Performed by: EMERGENCY MEDICINE

## 2024-06-29 PROCEDURE — 99284 EMERGENCY DEPT VISIT MOD MDM: CPT

## 2024-06-29 RX ORDER — SODIUM CHLORIDE 0.9 % (FLUSH) 0.9 %
10 SYRINGE (ML) INJECTION AS NEEDED
Status: DISCONTINUED | OUTPATIENT
Start: 2024-06-29 | End: 2024-06-30 | Stop reason: HOSPADM

## 2024-06-29 RX ADMIN — SODIUM CHLORIDE 1000 ML: 9 INJECTION, SOLUTION INTRAVENOUS at 23:49

## 2024-06-30 ENCOUNTER — APPOINTMENT (OUTPATIENT)
Dept: CT IMAGING | Facility: HOSPITAL | Age: 49
DRG: 897 | End: 2024-06-30
Payer: MEDICARE

## 2024-06-30 ENCOUNTER — HOSPITAL ENCOUNTER (INPATIENT)
Facility: HOSPITAL | Age: 49
LOS: 2 days | Discharge: HOME | DRG: 897 | End: 2024-07-02
Attending: EMERGENCY MEDICINE | Admitting: STUDENT IN AN ORGANIZED HEALTH CARE EDUCATION/TRAINING PROGRAM
Payer: MEDICARE

## 2024-06-30 VITALS
HEART RATE: 98 BPM | SYSTOLIC BLOOD PRESSURE: 143 MMHG | RESPIRATION RATE: 18 BRPM | WEIGHT: 133 LBS | OXYGEN SATURATION: 99 % | DIASTOLIC BLOOD PRESSURE: 99 MMHG | BODY MASS INDEX: 18.62 KG/M2 | TEMPERATURE: 98.6 F | HEIGHT: 71 IN

## 2024-06-30 DIAGNOSIS — F10.10 ALCOHOL ABUSE: Primary | ICD-10-CM

## 2024-06-30 PROBLEM — F10.931 DELIRIUM TREMENS: Status: ACTIVE | Noted: 2024-06-30

## 2024-06-30 LAB
ALBUMIN SERPL-MCNC: 4 G/DL (ref 3.5–5.2)
ALBUMIN SERPL-MCNC: 4 G/DL (ref 3.5–5.2)
ALBUMIN/GLOB SERPL: 0.8 G/DL
ALBUMIN/GLOB SERPL: 0.9 G/DL
ALP SERPL-CCNC: 222 U/L (ref 39–117)
ALP SERPL-CCNC: 231 U/L (ref 39–117)
ALT SERPL W P-5'-P-CCNC: 39 U/L (ref 1–41)
ALT SERPL W P-5'-P-CCNC: 40 U/L (ref 1–41)
AMMONIA BLD-SCNC: 35 UMOL/L (ref 16–60)
AMPHET+METHAMPHET UR QL: NEGATIVE
AMPHET+METHAMPHET UR QL: NEGATIVE
AMPHETAMINES UR QL: NEGATIVE
AMPHETAMINES UR QL: NEGATIVE
ANION GAP SERPL CALCULATED.3IONS-SCNC: 22 MMOL/L (ref 5–15)
ANION GAP SERPL CALCULATED.3IONS-SCNC: 23.4 MMOL/L (ref 5–15)
APAP SERPL-MCNC: <5 MCG/ML (ref 0–30)
AST SERPL-CCNC: 190 U/L (ref 1–40)
AST SERPL-CCNC: 212 U/L (ref 1–40)
BACTERIA UR QL AUTO: ABNORMAL /HPF
BACTERIA UR QL AUTO: NORMAL /HPF
BARBITURATES UR QL SCN: NEGATIVE
BARBITURATES UR QL SCN: NEGATIVE
BASOPHILS # BLD AUTO: 0.08 10*3/MM3 (ref 0–0.2)
BASOPHILS NFR BLD AUTO: 0.7 % (ref 0–1.5)
BENZODIAZ UR QL SCN: NEGATIVE
BENZODIAZ UR QL SCN: NEGATIVE
BILIRUB SERPL-MCNC: 0.5 MG/DL (ref 0–1.2)
BILIRUB SERPL-MCNC: 0.8 MG/DL (ref 0–1.2)
BILIRUB UR QL STRIP: ABNORMAL
BILIRUB UR QL STRIP: NEGATIVE
BUN SERPL-MCNC: 6 MG/DL (ref 6–20)
BUN SERPL-MCNC: 7 MG/DL (ref 6–20)
BUN/CREAT SERPL: 9.3 (ref 7–25)
BUN/CREAT SERPL: 9.7 (ref 7–25)
BUPRENORPHINE SERPL-MCNC: NEGATIVE NG/ML
BUPRENORPHINE SERPL-MCNC: NEGATIVE NG/ML
CALCIUM SPEC-SCNC: 8.7 MG/DL (ref 8.6–10.5)
CALCIUM SPEC-SCNC: 9.1 MG/DL (ref 8.6–10.5)
CANNABINOIDS SERPL QL: NEGATIVE
CANNABINOIDS SERPL QL: NEGATIVE
CHLORIDE SERPL-SCNC: 92 MMOL/L (ref 98–107)
CHLORIDE SERPL-SCNC: 97 MMOL/L (ref 98–107)
CK SERPL-CCNC: 113 U/L (ref 20–200)
CLARITY UR: ABNORMAL
CLARITY UR: CLEAR
CO2 SERPL-SCNC: 18.6 MMOL/L (ref 22–29)
CO2 SERPL-SCNC: 21 MMOL/L (ref 22–29)
COCAINE UR QL: NEGATIVE
COCAINE UR QL: NEGATIVE
COLOR UR: ABNORMAL
COLOR UR: YELLOW
CREAT SERPL-MCNC: 0.62 MG/DL (ref 0.76–1.27)
CREAT SERPL-MCNC: 0.75 MG/DL (ref 0.76–1.27)
CRP SERPL-MCNC: 0.65 MG/DL (ref 0–0.5)
DEPRECATED RDW RBC AUTO: 46.2 FL (ref 37–54)
EGFRCR SERPLBLD CKD-EPI 2021: 110.6 ML/MIN/1.73
EGFRCR SERPLBLD CKD-EPI 2021: 117.2 ML/MIN/1.73
EOSINOPHIL # BLD AUTO: 0.01 10*3/MM3 (ref 0–0.4)
EOSINOPHIL NFR BLD AUTO: 0.1 % (ref 0.3–6.2)
ERYTHROCYTE [DISTWIDTH] IN BLOOD BY AUTOMATED COUNT: 13.2 % (ref 12.3–15.4)
ETHANOL BLD-MCNC: 427 MG/DL (ref 0–10)
ETHANOL BLD-MCNC: <10 MG/DL (ref 0–10)
ETHANOL UR QL: 0.43 %
ETHANOL UR QL: <0.01 %
FENTANYL UR-MCNC: NEGATIVE NG/ML
FENTANYL UR-MCNC: NEGATIVE NG/ML
GLOBULIN UR ELPH-MCNC: 4.6 GM/DL
GLOBULIN UR ELPH-MCNC: 5.3 GM/DL
GLUCOSE BLDC GLUCOMTR-MCNC: 180 MG/DL (ref 70–130)
GLUCOSE SERPL-MCNC: 142 MG/DL (ref 65–99)
GLUCOSE SERPL-MCNC: 205 MG/DL (ref 65–99)
GLUCOSE UR STRIP-MCNC: NEGATIVE MG/DL
GLUCOSE UR STRIP-MCNC: NEGATIVE MG/DL
GRAN CASTS URNS QL MICRO: ABNORMAL /LPF
HCT VFR BLD AUTO: 37.3 % (ref 37.5–51)
HGB BLD-MCNC: 12.6 G/DL (ref 13–17.7)
HGB UR QL STRIP.AUTO: ABNORMAL
HGB UR QL STRIP.AUTO: NEGATIVE
HYALINE CASTS UR QL AUTO: ABNORMAL /LPF
HYALINE CASTS UR QL AUTO: NORMAL /LPF
IMM GRANULOCYTES # BLD AUTO: 0.04 10*3/MM3 (ref 0–0.05)
IMM GRANULOCYTES NFR BLD AUTO: 0.3 % (ref 0–0.5)
KETONES UR QL STRIP: ABNORMAL
KETONES UR QL STRIP: ABNORMAL
LEUKOCYTE ESTERASE UR QL STRIP.AUTO: NEGATIVE
LEUKOCYTE ESTERASE UR QL STRIP.AUTO: NEGATIVE
LIPASE SERPL-CCNC: 54 U/L (ref 13–60)
LYMPHOCYTES # BLD AUTO: 1.13 10*3/MM3 (ref 0.7–3.1)
LYMPHOCYTES NFR BLD AUTO: 9.3 % (ref 19.6–45.3)
MAGNESIUM SERPL-MCNC: 1.9 MG/DL (ref 1.6–2.6)
MAGNESIUM SERPL-MCNC: 2.6 MG/DL (ref 1.6–2.6)
MCH RBC QN AUTO: 32.2 PG (ref 26.6–33)
MCHC RBC AUTO-ENTMCNC: 33.8 G/DL (ref 31.5–35.7)
MCV RBC AUTO: 95.4 FL (ref 79–97)
METHADONE UR QL SCN: NEGATIVE
METHADONE UR QL SCN: NEGATIVE
MONOCYTES # BLD AUTO: 0.49 10*3/MM3 (ref 0.1–0.9)
MONOCYTES NFR BLD AUTO: 4 % (ref 5–12)
MUCOUS THREADS URNS QL MICRO: ABNORMAL /HPF
NEUTROPHILS NFR BLD AUTO: 10.41 10*3/MM3 (ref 1.7–7)
NEUTROPHILS NFR BLD AUTO: 85.6 % (ref 42.7–76)
NITRITE UR QL STRIP: NEGATIVE
NITRITE UR QL STRIP: NEGATIVE
NRBC BLD AUTO-RTO: 0 /100 WBC (ref 0–0.2)
NT-PROBNP SERPL-MCNC: <36 PG/ML (ref 0–450)
OPIATES UR QL: NEGATIVE
OPIATES UR QL: NEGATIVE
OXYCODONE UR QL SCN: POSITIVE
OXYCODONE UR QL SCN: POSITIVE
PCP UR QL SCN: NEGATIVE
PCP UR QL SCN: NEGATIVE
PH UR STRIP.AUTO: 6.5 [PH] (ref 5–8)
PH UR STRIP.AUTO: 6.5 [PH] (ref 5–8)
PLATELET # BLD AUTO: 247 10*3/MM3 (ref 140–450)
PMV BLD AUTO: 9.5 FL (ref 6–12)
POTASSIUM SERPL-SCNC: 3 MMOL/L (ref 3.5–5.2)
POTASSIUM SERPL-SCNC: 3.4 MMOL/L (ref 3.5–5.2)
PROT SERPL-MCNC: 8.6 G/DL (ref 6–8.5)
PROT SERPL-MCNC: 9.3 G/DL (ref 6–8.5)
PROT UR QL STRIP: ABNORMAL
PROT UR QL STRIP: ABNORMAL
QT INTERVAL: 440 MS
QTC INTERVAL: 526 MS
RBC # BLD AUTO: 3.91 10*6/MM3 (ref 4.14–5.8)
RBC # UR STRIP: ABNORMAL /HPF
RBC # UR STRIP: NORMAL /HPF
REF LAB TEST METHOD: ABNORMAL
REF LAB TEST METHOD: NORMAL
SALICYLATES SERPL-MCNC: <0.3 MG/DL
SODIUM SERPL-SCNC: 134 MMOL/L (ref 136–145)
SODIUM SERPL-SCNC: 140 MMOL/L (ref 136–145)
SP GR UR STRIP: 1.01 (ref 1–1.03)
SP GR UR STRIP: 1.02 (ref 1–1.03)
SQUAMOUS #/AREA URNS HPF: ABNORMAL /HPF
SQUAMOUS #/AREA URNS HPF: NORMAL /HPF
T4 FREE SERPL-MCNC: 1.16 NG/DL (ref 0.93–1.7)
TRICYCLICS UR QL SCN: NEGATIVE
TRICYCLICS UR QL SCN: NEGATIVE
TROPONIN T SERPL HS-MCNC: 7 NG/L
TSH SERPL DL<=0.05 MIU/L-ACNC: 0.74 UIU/ML (ref 0.27–4.2)
UROBILINOGEN UR QL STRIP: ABNORMAL
UROBILINOGEN UR QL STRIP: ABNORMAL
WBC # UR STRIP: ABNORMAL /HPF
WBC # UR STRIP: NORMAL /HPF
WBC NRBC COR # BLD AUTO: 12.16 10*3/MM3 (ref 3.4–10.8)

## 2024-06-30 PROCEDURE — 82077 ASSAY SPEC XCP UR&BREATH IA: CPT | Performed by: PHYSICIAN ASSISTANT

## 2024-06-30 PROCEDURE — 25810000003 SODIUM CHLORIDE 0.9 % SOLUTION: Performed by: PHYSICIAN ASSISTANT

## 2024-06-30 PROCEDURE — 70450 CT HEAD/BRAIN W/O DYE: CPT

## 2024-06-30 PROCEDURE — 81001 URINALYSIS AUTO W/SCOPE: CPT | Performed by: EMERGENCY MEDICINE

## 2024-06-30 PROCEDURE — 71045 X-RAY EXAM CHEST 1 VIEW: CPT | Performed by: RADIOLOGY

## 2024-06-30 PROCEDURE — 83735 ASSAY OF MAGNESIUM: CPT | Performed by: PHYSICIAN ASSISTANT

## 2024-06-30 PROCEDURE — 72125 CT NECK SPINE W/O DYE: CPT

## 2024-06-30 PROCEDURE — 36415 COLL VENOUS BLD VENIPUNCTURE: CPT

## 2024-06-30 PROCEDURE — 70450 CT HEAD/BRAIN W/O DYE: CPT | Performed by: RADIOLOGY

## 2024-06-30 PROCEDURE — 25010000002 LORAZEPAM PER 2 MG

## 2024-06-30 PROCEDURE — 99223 1ST HOSP IP/OBS HIGH 75: CPT | Performed by: STUDENT IN AN ORGANIZED HEALTH CARE EDUCATION/TRAINING PROGRAM

## 2024-06-30 PROCEDURE — 82948 REAGENT STRIP/BLOOD GLUCOSE: CPT

## 2024-06-30 PROCEDURE — 93005 ELECTROCARDIOGRAM TRACING: CPT | Performed by: EMERGENCY MEDICINE

## 2024-06-30 PROCEDURE — 81001 URINALYSIS AUTO W/SCOPE: CPT | Performed by: PHYSICIAN ASSISTANT

## 2024-06-30 PROCEDURE — 25810000003 LACTATED RINGERS PER 1000 ML: Performed by: STUDENT IN AN ORGANIZED HEALTH CARE EDUCATION/TRAINING PROGRAM

## 2024-06-30 PROCEDURE — 25010000002 LORAZEPAM PER 2 MG: Performed by: STUDENT IN AN ORGANIZED HEALTH CARE EDUCATION/TRAINING PROGRAM

## 2024-06-30 PROCEDURE — 25010000002 THIAMINE PER 100 MG: Performed by: STUDENT IN AN ORGANIZED HEALTH CARE EDUCATION/TRAINING PROGRAM

## 2024-06-30 PROCEDURE — 80053 COMPREHEN METABOLIC PANEL: CPT | Performed by: PHYSICIAN ASSISTANT

## 2024-06-30 PROCEDURE — 25010000002 ENOXAPARIN PER 10 MG: Performed by: STUDENT IN AN ORGANIZED HEALTH CARE EDUCATION/TRAINING PROGRAM

## 2024-06-30 PROCEDURE — 80307 DRUG TEST PRSMV CHEM ANLYZR: CPT | Performed by: EMERGENCY MEDICINE

## 2024-06-30 PROCEDURE — 94761 N-INVAS EAR/PLS OXIMETRY MLT: CPT

## 2024-06-30 PROCEDURE — 85025 COMPLETE CBC W/AUTO DIFF WBC: CPT | Performed by: PHYSICIAN ASSISTANT

## 2024-06-30 PROCEDURE — 99285 EMERGENCY DEPT VISIT HI MDM: CPT

## 2024-06-30 PROCEDURE — 93010 ELECTROCARDIOGRAM REPORT: CPT | Performed by: INTERNAL MEDICINE

## 2024-06-30 RX ORDER — OXYCODONE HYDROCHLORIDE 10 MG/1
10 TABLET ORAL ONCE
Status: COMPLETED | OUTPATIENT
Start: 2024-06-30 | End: 2024-06-30

## 2024-06-30 RX ORDER — LORAZEPAM 2 MG/ML
2 INJECTION INTRAMUSCULAR ONCE
Status: COMPLETED | OUTPATIENT
Start: 2024-06-30 | End: 2024-06-30

## 2024-06-30 RX ORDER — UREA 10 %
5 LOTION (ML) TOPICAL NIGHTLY PRN
Status: DISCONTINUED | OUTPATIENT
Start: 2024-06-30 | End: 2024-07-02 | Stop reason: HOSPADM

## 2024-06-30 RX ORDER — AMOXICILLIN 250 MG
2 CAPSULE ORAL NIGHTLY
Status: DISCONTINUED | OUTPATIENT
Start: 2024-06-30 | End: 2024-07-02 | Stop reason: HOSPADM

## 2024-06-30 RX ORDER — LORAZEPAM 2 MG/ML
2 INJECTION INTRAMUSCULAR
Status: DISCONTINUED | OUTPATIENT
Start: 2024-06-30 | End: 2024-07-02 | Stop reason: HOSPADM

## 2024-06-30 RX ORDER — LORAZEPAM 2 MG/ML
2 INJECTION INTRAMUSCULAR EVERY 6 HOURS
Qty: 4 ML | Refills: 0 | Status: COMPLETED | OUTPATIENT
Start: 2024-06-30 | End: 2024-07-01

## 2024-06-30 RX ORDER — LORAZEPAM 2 MG/ML
INJECTION INTRAMUSCULAR
Status: COMPLETED
Start: 2024-06-30 | End: 2024-06-30

## 2024-06-30 RX ORDER — ACETAMINOPHEN 500 MG
500 TABLET ORAL EVERY 6 HOURS PRN
Status: DISCONTINUED | OUTPATIENT
Start: 2024-06-30 | End: 2024-07-02 | Stop reason: HOSPADM

## 2024-06-30 RX ORDER — POTASSIUM CHLORIDE 20 MEQ/1
40 TABLET, EXTENDED RELEASE ORAL ONCE
Status: DISCONTINUED | OUTPATIENT
Start: 2024-06-30 | End: 2024-06-30

## 2024-06-30 RX ORDER — SODIUM CHLORIDE 0.9 % (FLUSH) 0.9 %
10 SYRINGE (ML) INJECTION AS NEEDED
Status: DISCONTINUED | OUTPATIENT
Start: 2024-06-30 | End: 2024-07-02 | Stop reason: HOSPADM

## 2024-06-30 RX ORDER — LORAZEPAM 1 MG/1
1 TABLET ORAL
Status: DISCONTINUED | OUTPATIENT
Start: 2024-06-30 | End: 2024-07-02 | Stop reason: HOSPADM

## 2024-06-30 RX ORDER — LORAZEPAM 2 MG/ML
1 INJECTION INTRAMUSCULAR EVERY 6 HOURS
Qty: 8 ML | Refills: 0 | Status: DISCONTINUED | OUTPATIENT
Start: 2024-07-01 | End: 2024-07-02

## 2024-06-30 RX ORDER — SODIUM CHLORIDE 0.9 % (FLUSH) 0.9 %
10 SYRINGE (ML) INJECTION EVERY 12 HOURS SCHEDULED
Status: DISCONTINUED | OUTPATIENT
Start: 2024-06-30 | End: 2024-07-02 | Stop reason: HOSPADM

## 2024-06-30 RX ORDER — FOLIC ACID 1 MG/1
1 TABLET ORAL DAILY
Status: DISCONTINUED | OUTPATIENT
Start: 2024-06-30 | End: 2024-07-02 | Stop reason: HOSPADM

## 2024-06-30 RX ORDER — LORAZEPAM 2 MG/1
4 TABLET ORAL
Status: DISCONTINUED | OUTPATIENT
Start: 2024-06-30 | End: 2024-07-02 | Stop reason: HOSPADM

## 2024-06-30 RX ORDER — OXYCODONE HYDROCHLORIDE 10 MG/1
10 TABLET ORAL EVERY 4 HOURS PRN
Status: DISCONTINUED | OUTPATIENT
Start: 2024-06-30 | End: 2024-07-02 | Stop reason: HOSPADM

## 2024-06-30 RX ORDER — LORAZEPAM 2 MG/ML
INJECTION INTRAMUSCULAR
Status: COMPLETED
Start: 2024-06-30 | End: 2024-07-01

## 2024-06-30 RX ORDER — ENOXAPARIN SODIUM 100 MG/ML
40 INJECTION SUBCUTANEOUS NIGHTLY
Status: DISCONTINUED | OUTPATIENT
Start: 2024-06-30 | End: 2024-07-02 | Stop reason: HOSPADM

## 2024-06-30 RX ORDER — THIAMINE HYDROCHLORIDE 100 MG/ML
200 INJECTION, SOLUTION INTRAMUSCULAR; INTRAVENOUS EVERY 8 HOURS SCHEDULED
Qty: 24 ML | Refills: 0 | Status: DISCONTINUED | OUTPATIENT
Start: 2024-07-04 | End: 2024-07-02 | Stop reason: HOSPADM

## 2024-06-30 RX ORDER — POTASSIUM CHLORIDE 20 MEQ/1
40 TABLET, EXTENDED RELEASE ORAL EVERY 4 HOURS
Status: DISCONTINUED | OUTPATIENT
Start: 2024-06-30 | End: 2024-07-01

## 2024-06-30 RX ORDER — SODIUM CHLORIDE 9 MG/ML
40 INJECTION, SOLUTION INTRAVENOUS AS NEEDED
Status: DISCONTINUED | OUTPATIENT
Start: 2024-06-30 | End: 2024-07-02 | Stop reason: HOSPADM

## 2024-06-30 RX ORDER — LORAZEPAM 2 MG/ML
1 INJECTION INTRAMUSCULAR
Status: DISCONTINUED | OUTPATIENT
Start: 2024-06-30 | End: 2024-07-02 | Stop reason: HOSPADM

## 2024-06-30 RX ORDER — SODIUM CHLORIDE, SODIUM LACTATE, POTASSIUM CHLORIDE, CALCIUM CHLORIDE 600; 310; 30; 20 MG/100ML; MG/100ML; MG/100ML; MG/100ML
125 INJECTION, SOLUTION INTRAVENOUS CONTINUOUS
Status: ACTIVE | OUTPATIENT
Start: 2024-06-30 | End: 2024-07-01

## 2024-06-30 RX ORDER — POLYETHYLENE GLYCOL 3350 17 G/17G
17 POWDER, FOR SOLUTION ORAL DAILY PRN
Status: DISCONTINUED | OUTPATIENT
Start: 2024-06-30 | End: 2024-07-02 | Stop reason: HOSPADM

## 2024-06-30 RX ORDER — LORAZEPAM 2 MG/ML
4 INJECTION INTRAMUSCULAR
Status: DISCONTINUED | OUTPATIENT
Start: 2024-06-30 | End: 2024-07-02 | Stop reason: HOSPADM

## 2024-06-30 RX ORDER — ONDANSETRON 4 MG/1
4 TABLET, ORALLY DISINTEGRATING ORAL EVERY 12 HOURS PRN
Status: CANCELLED | OUTPATIENT
Start: 2024-06-30

## 2024-06-30 RX ORDER — GABAPENTIN 400 MG/1
800 CAPSULE ORAL ONCE
Status: COMPLETED | OUTPATIENT
Start: 2024-06-30 | End: 2024-06-30

## 2024-06-30 RX ORDER — ONDANSETRON 4 MG/1
4 TABLET, ORALLY DISINTEGRATING ORAL ONCE
Status: DISCONTINUED | OUTPATIENT
Start: 2024-06-30 | End: 2024-07-02 | Stop reason: HOSPADM

## 2024-06-30 RX ORDER — NICOTINE 21 MG/24HR
1 PATCH, TRANSDERMAL 24 HOURS TRANSDERMAL DAILY PRN
Status: DISCONTINUED | OUTPATIENT
Start: 2024-06-30 | End: 2024-07-02 | Stop reason: HOSPADM

## 2024-06-30 RX ORDER — LORAZEPAM 2 MG/1
2 TABLET ORAL
Status: DISCONTINUED | OUTPATIENT
Start: 2024-06-30 | End: 2024-07-02 | Stop reason: HOSPADM

## 2024-06-30 RX ADMIN — LORAZEPAM 2 MG: 2 INJECTION INTRAMUSCULAR at 16:04

## 2024-06-30 RX ADMIN — THIAMINE HYDROCHLORIDE 500 MG: 100 INJECTION, SOLUTION INTRAMUSCULAR; INTRAVENOUS at 23:30

## 2024-06-30 RX ADMIN — ENOXAPARIN SODIUM 40 MG: 40 INJECTION SUBCUTANEOUS at 20:43

## 2024-06-30 RX ADMIN — LORAZEPAM 2 MG: 2 INJECTION INTRAMUSCULAR; INTRAVENOUS at 20:43

## 2024-06-30 RX ADMIN — POTASSIUM CHLORIDE 40 MEQ: 1500 TABLET, EXTENDED RELEASE ORAL at 20:43

## 2024-06-30 RX ADMIN — LORAZEPAM 2 MG: 2 INJECTION, SOLUTION INTRAMUSCULAR; INTRAVENOUS at 16:04

## 2024-06-30 RX ADMIN — OXYCODONE HYDROCHLORIDE 10 MG: 10 TABLET ORAL at 18:56

## 2024-06-30 RX ADMIN — Medication 1 PATCH: at 18:56

## 2024-06-30 RX ADMIN — Medication 10 ML: at 20:44

## 2024-06-30 RX ADMIN — Medication 1 MG: at 20:43

## 2024-06-30 RX ADMIN — LORAZEPAM 2 MG: 2 INJECTION INTRAMUSCULAR; INTRAVENOUS at 15:23

## 2024-06-30 RX ADMIN — Medication 10 ML: at 18:56

## 2024-06-30 RX ADMIN — OXYCODONE HYDROCHLORIDE 10 MG: 10 TABLET ORAL at 23:25

## 2024-06-30 RX ADMIN — GABAPENTIN 800 MG: 400 CAPSULE ORAL at 20:42

## 2024-06-30 RX ADMIN — OXYCODONE HYDROCHLORIDE 10 MG: 10 TABLET ORAL at 01:24

## 2024-06-30 RX ADMIN — POTASSIUM CHLORIDE 40 MEQ: 1500 TABLET, EXTENDED RELEASE ORAL at 23:36

## 2024-06-30 RX ADMIN — SODIUM CHLORIDE 1000 ML: 9 INJECTION, SOLUTION INTRAVENOUS at 17:12

## 2024-06-30 RX ADMIN — SODIUM CHLORIDE, POTASSIUM CHLORIDE, SODIUM LACTATE AND CALCIUM CHLORIDE 125 ML/HR: 600; 310; 30; 20 INJECTION, SOLUTION INTRAVENOUS at 18:56

## 2024-06-30 RX ADMIN — OXYCODONE HYDROCHLORIDE 10 MG: 10 TABLET ORAL at 06:32

## 2024-06-30 NOTE — ED NOTES
Pt ripped all moitoring devices off and ripped IV out. Pt walking down ER hallway. Pt escorted back to pts room. Pt yelling rudely to ER staff. Pt noted to be hugging mother without mothers permission. Pt would not leave mother alone while in room. Provider at bedside talking with pt.

## 2024-06-30 NOTE — NURSING NOTE
Noticed pt fell out of chair and was having seizure like activity assisted Mayur BAER in seizure protocols. He gave me a verbal order for Ativan 2mg IV. Pulled medicine out of [facility] and medication was administered. Pt moved to Ed room 1

## 2024-06-30 NOTE — ED PROVIDER NOTES
Subjective   History of Present Illness  49-year-old male presents secondary to need for alcohol detox.  Patient was seen earlier and decided that he did not wish to stay however he is returned and wishes to now stay.  Patient denies any suicidal homicidal ideation.  He has a past medical history of acid reflux, hypertension, pancreatitis.  He presents by private vehicle.      Review of Systems   Constitutional: Negative.  Negative for fever.   HENT: Negative.     Respiratory: Negative.     Cardiovascular: Negative.  Negative for chest pain.   Gastrointestinal: Negative.  Negative for abdominal pain.   Endocrine: Negative.    Genitourinary: Negative.  Negative for dysuria.   Skin: Negative.    Neurological: Negative.    Psychiatric/Behavioral: Negative.  Negative for suicidal ideas.    All other systems reviewed and are negative.      Past Medical History:   Diagnosis Date    Alcohol abuse     AMS (altered mental status)     Arthritis October 2007    Arthritis in back and neck    GERD (gastroesophageal reflux disease)     Headache 2007    Headaches due to disc in neck.    Hyperlipidemia     Hypertension     Low back pain 10/05/09    Hurt back and had back surgery 04/16/2014. Hit a nerve    Pancreatitis        Allergies   Allergen Reactions    Augmentin [Amoxicillin-Pot Clavulanate] Hives and Swelling     Throat swelling    Metoprolol Unknown - Low Severity       Past Surgical History:   Procedure Laterality Date    ANTERIOR CERVICAL DISCECTOMY W/ FUSION N/A 8/21/2023    Procedure: CERVICAL DISCECTOMY ANTERIOR WITH FUSION C5-7;  Surgeon: Julio C Bryson MD;  Location: [facility];  Service: Neurosurgery;  Laterality: N/A;    FRACTURE SURGERY Left     Foot    KNEE ARTHROSCOPY Left     LUMBAR DISCECTOMY  04/16/2014    LT L5-S1 Discectomy, L4-5 Lami with Disc - Dr. Esequiel Joshi       Family History   Problem Relation Age of Onset    Hyperlipidemia Father     Hypertension Father     Breast cancer Mother     Cancer Mother          Breast cancer    Depression Mother         Depression nerve problems    Prostate cancer Paternal Grandfather     Cancer Paternal Grandfather         Prostate cancer    Hyperlipidemia Paternal Grandfather     Prostate cancer Maternal Grandfather     Arthritis Maternal Grandfather     Cancer Maternal Grandfather         Prostate cancer    Heart disease Maternal Grandfather         Heart attack@90    Hyperlipidemia Maternal Grandfather     Cancer Maternal Grandmother         Lung cancer    Depression Maternal Grandmother         Depression nerve problems    Stroke Maternal Grandmother         Brain anurism    Arthritis Paternal Uncle     Early death Sister         Brain didnt develop completely       Social History     Socioeconomic History    Marital status:    Tobacco Use    Smoking status: Never    Smokeless tobacco: Current     Types: Snuff   Vaping Use    Vaping status: Never Used   Substance and Sexual Activity    Alcohol use: Yes     Comment: vodka . a 5th over three days.    Drug use: Yes     Comment: etoh    Sexual activity: Yes     Partners: Female     Birth control/protection: None           Objective   Physical Exam  Vitals and nursing note reviewed.   Constitutional:       General: He is not in acute distress.     Appearance: He is well-developed. He is not diaphoretic.   HENT:      Head: Normocephalic and atraumatic.      Right Ear: External ear normal.      Left Ear: External ear normal.      Nose: Nose normal.   Eyes:      Conjunctiva/sclera: Conjunctivae normal.      Pupils: Pupils are equal, round, and reactive to light.   Neck:      Vascular: No JVD.      Trachea: No tracheal deviation.   Cardiovascular:      Rate and Rhythm: Normal rate and regular rhythm.      Heart sounds: Normal heart sounds. No murmur heard.  Pulmonary:      Effort: Pulmonary effort is normal. No respiratory distress.      Breath sounds: Normal breath sounds. No wheezing.   Abdominal:      General: Bowel sounds are  normal.      Palpations: Abdomen is soft.      Tenderness: There is no abdominal tenderness.   Musculoskeletal:         General: No deformity. Normal range of motion.      Cervical back: Normal range of motion and neck supple.   Skin:     General: Skin is warm and dry.      Coloration: Skin is not pale.      Findings: No erythema or rash.   Neurological:      Mental Status: He is alert and oriented to person, place, and time.      Cranial Nerves: No cranial nerve deficit.   Psychiatric:         Behavior: Behavior normal.         Thought Content: Thought content normal. Thought content does not include homicidal or suicidal ideation.         Procedures           ED Course  ED Course as of 06/30/24 1803   Sun Jun 30, 2024   1540 I was and the psychiatric intake area when I heard patient got and saw him stand and subsequently fall and strike his head.  Patient started seizing afterwards.  Patient's seizure lasted approximately 3 to 4 minutes.  Ativan was given.  He had sonorous respirations afterwards.  No continued seizure.  He was extremely diaphoretic and had dilated pupils.  Patient was immediately taken to CT [JI]   1361 Patient now awake and alert.  He reports never having history of seizures.  He is tremulous.  Will repeat his dose of Ativan.  He currently has a ceribel on.  [JI]   1624 Seizure burden 0 [JI]      ED Course User Index  [JI] Franck Garcia PA                                   Results for orders placed or performed during the hospital encounter of 06/30/24   Comprehensive Metabolic Panel    Specimen: Blood   Result Value Ref Range    Glucose 205 (H) 65 - 99 mg/dL    BUN 7 6 - 20 mg/dL    Creatinine 0.75 (L) 0.76 - 1.27 mg/dL    Sodium 134 (L) 136 - 145 mmol/L    Potassium 3.0 (L) 3.5 - 5.2 mmol/L    Chloride 92 (L) 98 - 107 mmol/L    CO2 18.6 (L) 22.0 - 29.0 mmol/L    Calcium 9.1 8.6 - 10.5 mg/dL    Total Protein 8.6 (H) 6.0 - 8.5 g/dL    Albumin 4.0 3.5 - 5.2 g/dL    ALT (SGPT) 39 1 - 41 U/L     AST (SGOT) 190 (H) 1 - 40 U/L    Alkaline Phosphatase 222 (H) 39 - 117 U/L    Total Bilirubin 0.8 0.0 - 1.2 mg/dL    Globulin 4.6 gm/dL    A/G Ratio 0.9 g/dL    BUN/Creatinine Ratio 9.3 7.0 - 25.0    Anion Gap 23.4 (H) 5.0 - 15.0 mmol/L    eGFR 110.6 >60.0 mL/min/1.73   Urinalysis With Microscopic If Indicated (No Culture) - Urine, Clean Catch    Specimen: Urine, Clean Catch   Result Value Ref Range    Color, UA Dark Yellow (A) Yellow, Straw    Appearance, UA Clear Clear    pH, UA 6.5 5.0 - 8.0    Specific Gravity, UA 1.021 1.005 - 1.030    Glucose, UA Negative Negative    Ketones, UA 40 mg/dL (2+) (A) Negative    Bilirubin, UA Small (1+) (A) Negative    Blood, UA Negative Negative    Protein,  mg/dL (2+) (A) Negative    Leuk Esterase, UA Negative Negative    Nitrite, UA Negative Negative    Urobilinogen, UA 1.0 E.U./dL 0.2 - 1.0 E.U./dL   Ethanol    Specimen: Blood   Result Value Ref Range    Ethanol <10 0 - 10 mg/dL    Ethanol % <0.010 %   Urine Drug Screen - Urine, Clean Catch    Specimen: Urine, Clean Catch   Result Value Ref Range    THC, Screen, Urine Negative Negative    Phencyclidine (PCP), Urine Negative Negative    Cocaine Screen, Urine Negative Negative    Methamphetamine, Ur Negative Negative    Opiate Screen Negative Negative    Amphetamine Screen, Urine Negative Negative    Benzodiazepine Screen, Urine Negative Negative    Tricyclic Antidepressants Screen Negative Negative    Methadone Screen, Urine Negative Negative    Barbiturates Screen, Urine Negative Negative    Oxycodone Screen, Urine Positive (A) Negative    Buprenorphine, Screen, Urine Negative Negative   Magnesium    Specimen: Blood   Result Value Ref Range    Magnesium 1.9 1.6 - 2.6 mg/dL   CBC Auto Differential    Specimen: Blood   Result Value Ref Range    WBC 12.16 (H) 3.40 - 10.80 10*3/mm3    RBC 3.91 (L) 4.14 - 5.80 10*6/mm3    Hemoglobin 12.6 (L) 13.0 - 17.7 g/dL    Hematocrit 37.3 (L) 37.5 - 51.0 %    MCV 95.4 79.0 - 97.0 fL     MCH 32.2 26.6 - 33.0 pg    MCHC 33.8 31.5 - 35.7 g/dL    RDW 13.2 12.3 - 15.4 %    RDW-SD 46.2 37.0 - 54.0 fl    MPV 9.5 6.0 - 12.0 fL    Platelets 247 140 - 450 10*3/mm3    Neutrophil % 85.6 (H) 42.7 - 76.0 %    Lymphocyte % 9.3 (L) 19.6 - 45.3 %    Monocyte % 4.0 (L) 5.0 - 12.0 %    Eosinophil % 0.1 (L) 0.3 - 6.2 %    Basophil % 0.7 0.0 - 1.5 %    Immature Grans % 0.3 0.0 - 0.5 %    Neutrophils, Absolute 10.41 (H) 1.70 - 7.00 10*3/mm3    Lymphocytes, Absolute 1.13 0.70 - 3.10 10*3/mm3    Monocytes, Absolute 0.49 0.10 - 0.90 10*3/mm3    Eosinophils, Absolute 0.01 0.00 - 0.40 10*3/mm3    Basophils, Absolute 0.08 0.00 - 0.20 10*3/mm3    Immature Grans, Absolute 0.04 0.00 - 0.05 10*3/mm3    nRBC 0.0 0.0 - 0.2 /100 WBC   Fentanyl, Urine - Urine, Clean Catch    Specimen: Urine, Clean Catch   Result Value Ref Range    Fentanyl, Urine Negative Negative   Urinalysis, Microscopic Only - Urine, Clean Catch    Specimen: Urine, Clean Catch   Result Value Ref Range    RBC, UA None Seen None Seen, 0-2 /HPF    WBC, UA 0-2 None Seen, 0-2 /HPF    Bacteria, UA Trace (A) None Seen /HPF    Squamous Epithelial Cells, UA 3-6 (A) None Seen, 0-2 /HPF    Hyaline Casts, UA 3-6 None Seen /LPF    Granular Casts, UA 0-2 None Seen /LPF    Mucus, UA Large/3+ (A) None Seen, Trace /HPF    Methodology Manual Light Microscopy    POC Glucose Once    Specimen: Blood   Result Value Ref Range    Glucose 180 (H) 70 - 130 mg/dL     CT Cervical Spine Without Contrast    Result Date: 6/30/2024  No acute fracture or malalignment.   This report was finalized on 6/30/2024 3:53 PM by Klarissa Lewis MD.      CT Head Without Contrast    Result Date: 6/30/2024  No CT evidence of an acute intracranial abnormality.   This report was finalized on 6/30/2024 3:51 PM by Klarissa Lewis MD.      CT Head Without Contrast    Result Date: 6/30/2024   1.  Mild generalized brain volume loss 2.  No acute hemorrhage, mass, infarct, or edema. 3.  No fracture or foreign body.     This report was finalized on 6/30/2024 4:03 AM by Ugo Ogden MD.      XR Chest 1 View    Result Date: 6/30/2024   1.  No acute process seen in the chest. 2.  No lobar consolidation or edema. 3.  No pleural effusion or pneumothorax.  This report was finalized on 6/30/2024 2:39 AM by Ugo Ogden MD.               Medical Decision Making  49-year-old male presents secondary to need for alcohol detox.  Patient was seen earlier and decided that he did not wish to stay however he is returned and wishes to now stay.  Patient denies any suicidal homicidal ideation.  He has a past medical history of acid reflux, hypertension, pancreatitis.  He presents by private vehicle.    Problems Addressed:  Alcohol abuse: complicated acute illness or injury    Amount and/or Complexity of Data Reviewed  Labs: ordered. Decision-making details documented in ED Course.  Radiology: ordered. Decision-making details documented in ED Course.  Discussion of management or test interpretation with external provider(s): Dr. Nash who felt the patient should be on the medical floor.  Reviewed with Dr. Loomis who accepted to the medicine service.    Risk  OTC drugs.  Prescription drug management.  Decision regarding hospitalization.        Final diagnoses:   Alcohol abuse       ED Disposition  ED Disposition       ED Disposition   Decision to Admit    Condition   --    Comment   Level of Care: Progressive Care [20]   Diagnosis: Delirium tremens [742525]   Admitting Physician: KYAW LOOMIS [[id number]]   Attending Physician: KYAW LOOMIS [[id number]]   Certification: I Certify That Inpatient Hospital Services Are Medically Necessary For Greater Than 2 Midnights                 No follow-up provider specified.       Medication List      No changes were made to your prescriptions during this visit.            Franck Garcia PA  06/30/24 180

## 2024-06-30 NOTE — NURSING NOTE
"Patient presents requesting detox. He reports drinking 1/5 of vodka a day for the past several months. He reports his last drink was yesterday 6/29/24. He reports taking oxycodone as prescribed. He denies any other substance use. He denies si and hi. He reports hearing \"a telephone ringing.\" He denies depression. He rates his anxiety 8/10 and craving 7/10. CIWA 10.  "

## 2024-06-30 NOTE — ED NOTES
Hina RN intake nurse advised ED staff that pt was seizing, ED provider KEYUR Merchant in INTAKE waiting area during seizure. Pt was noted to be laying on his side, noticeably diaphoretic, with agonal respirations with cyanotic lips.Pt bagged at this time and transferred to medical bed.  Pt placed on ZOLL monitor, BP cuff, and pulse ox. Pt tachycardiac, O2 sat is 86%, Nasal airway placed at this time.Pt suctioned due to excessive drooling and postictal.

## 2024-06-30 NOTE — NURSING NOTE
Went to give pt medication and pt began clenching cup. Pt fell out of chair face first and began having seizure like activity. Mayur BAER was in intake and assisted Hina ENNIS in seizure protocols. ED staff was notified and pt was moved to ED room 1.

## 2024-06-30 NOTE — NURSING NOTE
Dr. Nash feels not appropriate for [facility] admission at this time. He recommends pt be admitted to medical. ED provider aware.

## 2024-06-30 NOTE — H&P
[facility]    Patient Identification:  Name:  Taiwo Pierce  Age:  49 y.o.  Sex:  male  :  1975  MRN:  [id number]   Visit Number:  [id number]  Admit Date: 2024   Room number:  101/01  Primary Care Physician:  Provider, No Known    Date of Admission: 2024     Subjective     Chief complaint:    Chief Complaint   Patient presents with    Alcohol Problem     History of presenting illness:  49 y.o. male who was admitted on 2024 for alcohol detox    Taiwo Pierce has relevant PMH of daily alcohol use with prior Dts, alcoholic pancreatitis presenting initially for alcohol detox now s/p witnessed brief seizure episode.    Patient drinks daily fifth of distilled alcohol with periods of sobriety previously but now desiring inpatient alcohol detox and cessation. Was recently hospitalized with similar presentation c/b alcoholic pancreatitis as well as reported hx of prior Dts.    Patient presented initially yesterday afternoon still acutely intoxicated and left exam room to return home to visit his dog and collect belongings, returned sober later this am after vomiting clear emesis at home and not tolerating PO intake. He was in exam room and being triaged for detox unit when he had witnessed seizure that aborted without intervention. He was given 2mg IV ativan and 1L NS bolus after and at time of admission was post-ictal with majority of history obtained from father at bedside. Sinus tach on monitor with normotension, spO2 >93% on RA. Patient protecting airway and currently awake with ceribell monitor I place reading no active seizure activity.     Prior to admission I discussed patient's presentation and management with attending ER physician and verbal handoff received.  ---------------------------------------------------------------------------------------------------------------------   A thorough systems based relevant ROS was asked and  was negative except as noted above.  ---------------------------------------------------------------------------------------------------------------------   Past Medical History:   Diagnosis Date    Alcohol abuse     AMS (altered mental status)     Arthritis October 2007    Arthritis in back and neck    GERD (gastroesophageal reflux disease)     Headache 2007    Headaches due to disc in neck.    Hyperlipidemia     Hypertension     Low back pain 10/05/09    Hurt back and had back surgery 04/16/2014. Hit a nerve    Pancreatitis      Past Surgical History:   Procedure Laterality Date    ANTERIOR CERVICAL DISCECTOMY W/ FUSION N/A 8/21/2023    Procedure: CERVICAL DISCECTOMY ANTERIOR WITH FUSION C5-7;  Surgeon: Julio C Bryson MD;  Location: [facility];  Service: Neurosurgery;  Laterality: N/A;    FRACTURE SURGERY Left     Foot    KNEE ARTHROSCOPY Left     LUMBAR DISCECTOMY  04/16/2014    LT L5-S1 Discectomy, L4-5 Lami with Disc - Dr. Esequiel Joshi     Family History   Problem Relation Age of Onset    Hyperlipidemia Father     Hypertension Father     Breast cancer Mother     Cancer Mother         Breast cancer    Depression Mother         Depression nerve problems    Prostate cancer Paternal Grandfather     Cancer Paternal Grandfather         Prostate cancer    Hyperlipidemia Paternal Grandfather     Prostate cancer Maternal Grandfather     Arthritis Maternal Grandfather     Cancer Maternal Grandfather         Prostate cancer    Heart disease Maternal Grandfather         Heart attack@90    Hyperlipidemia Maternal Grandfather     Cancer Maternal Grandmother         Lung cancer    Depression Maternal Grandmother         Depression nerve problems    Stroke Maternal Grandmother         Brain anurism    Arthritis Paternal Uncle     Early death Sister         Brain didnt develop completely     Social History     Socioeconomic History    Marital status:    Tobacco Use    Smoking status: Never    Smokeless tobacco: Current      Types: Snuff   Vaping Use    Vaping status: Never Used   Substance and Sexual Activity    Alcohol use: Yes     Comment: vodka . a 5th over three days.    Drug use: Yes     Comment: etoh    Sexual activity: Yes     Partners: Female     Birth control/protection: None         Objective     Vital Signs:  Temp:  [98.3 °F (36.8 °C)-98.6 °F (37 °C)] 98.3 °F (36.8 °C)  Heart Rate:  [] 120  Resp:  [18] 18  BP: (126-153)/(71-99) 128/92    Mean Arterial Pressure (Non-Invasive) for the past 24 hrs (Last 3 readings):   Noninvasive MAP (mmHg)   06/30/24 1715 102   06/30/24 1700 111   06/30/24 1645 95     SpO2:  [95 %-100 %] 95 %  on   ;   Device (Oxygen Therapy): room air  Body mass index is 18.54 kg/m².     ----------------------------------------------------------------------------------------------------------------------  Physical Exam  Vitals and nursing note reviewed.   Constitutional:       General: He is not in acute distress.     Appearance: He is not toxic-appearing.   HENT:      Head: Normocephalic and atraumatic.      Mouth/Throat:      Comments: Edentulous w/o tongue lacerations noted  Eyes:      General: No scleral icterus.     Extraocular Movements: Extraocular movements intact.   Cardiovascular:      Rate and Rhythm: Regular rhythm. Tachycardia present.      Pulses: Normal pulses.   Pulmonary:      Effort: Pulmonary effort is normal. No respiratory distress.   Skin:     General: Skin is warm and dry.   Neurological:      General: No focal deficit present.      Mental Status: He is alert.      Cranial Nerves: No cranial nerve deficit.   Psychiatric:      Comments: Flat affect but conversational in short phrases w/delayed response       --------------------------------------------------------------------------------------------------------------------    I have personally looked at the radiology images, my personal interpretation is as follows:    ECG with qtc prolongation    Assessment & Plan       #Acute  alcohol withdrawal c/b delirium tremens  #Alcoholic encephalopathy with sequela of recent seizure/post-ictal period  -Admit to PCU with gayla ativan taper and prn ativan w/CIWA protocol and seizure precautions given risk of recurrence  -High dose IV thiamine ordered  -Liquid diet overnight, advance in am  -DC to [facility] vs inpatient detox or 30 day program per patient and family preference  -Repeat ceribell for any ongoing seizure concern, none currently and ok to remove on arrival to floor    #Hypokalemia  #AGMA Metabolic acidosis  -Secondary to GI losses through emesis  -PRN protocol in place, additional 1L LR ordered over 8hrs    #Clinically insignificant hyponatremia  -Repeat renal panel in am    #Mild normocytic anemia  -Secondary to chronic alcohol intake, added vitamin B12 and folate levels    #Prolonged qtc  -Secondary to hypokalemia  -Avoid zofran for now, prn benzo for nausea    VTE Prophylaxis: pLOV  The patient is high risk due to the following diagnoses/reasons:  Alcohol detox c/b seizure    Admission Status:  I certify that this patient requires inpatient hospitalization for greater than 2 midnights in INPATIENT status.  I anticipate there to be the need for care which can only be reasonably provided in a hospital setting such as possible need for aggressive/expedited ancillary services and/or consultation services, IV medications, close physician monitoring, and/or procedures. In such, I feel patient’s risk for adverse outcomes and need for care warrant INPATIENT evaluation and predict the patient’s care encounter to likely last beyond 2 midnights.    Code Status and Medical Interventions:   Ordered at: 06/30/24 1716     Code Status (Patient has no pulse and is not breathing):    CPR (Attempt to Resuscitate)     Medical Interventions (Patient has pulse or is breathing):    Full Support        Disposition: admit pcu    Mario Archuleta MD  [facility] Center[facility]  06/30/24  17:46 EDT

## 2024-06-30 NOTE — ED NOTES
Pt is sitting on bed talking to father. Pt still requesting to leave. Explained to pt that he cannot leave without someone taking responsibility for pt. Pt father states he will not be taking responsibility for pt. Pt aware of risks of not being monitored. Pt states he understands and will let RN hook him up after he is done talking to father. Provider aware.

## 2024-06-30 NOTE — ED NOTES
Patient leaving at this time in the care of his Dad. Patient's dad states he will take responsibility for patient and that they are coming back once they get some rest.

## 2024-06-30 NOTE — ED PROVIDER NOTES
Subjective     History provided by:  Patient   used: No    Alcohol Intoxication  Location:  Patient is intoxicated on alcohol.  Quality:  Has chronic pain and rates his pain as a 5/10 on the pain severity scale at all times that is constant in nature from the devious neck and back injury.  Severity:  Severe  Onset quality:  Gradual  Duration: Several.  Timing:  Constant  Progression:  Worsening  Chronicity:  Chronic  Context:  Patient is a daily drinker of heavy amounts of alcohol and takes pain medication that is prescribed routinely.  Associated symptoms: no abdominal pain, no chest pain, no congestion, no cough, no diarrhea, no ear pain, no fatigue, no fever, no headaches, no loss of consciousness, no myalgias, no nausea, no rash, no rhinorrhea, no shortness of breath, no sore throat, no vomiting and no wheezing        Review of Systems   Constitutional:  Negative for activity change, appetite change, chills, diaphoresis, fatigue and fever.   HENT:  Negative for congestion, ear pain, rhinorrhea and sore throat.    Eyes:  Negative for redness.   Respiratory:  Negative for cough, chest tightness, shortness of breath and wheezing.    Cardiovascular:  Negative for chest pain, palpitations and leg swelling.   Gastrointestinal:  Negative for abdominal pain, diarrhea, nausea and vomiting.   Genitourinary:  Negative for dysuria and urgency.   Musculoskeletal:  Negative for arthralgias, back pain, myalgias and neck pain.   Skin:  Negative for pallor, rash and wound.   Neurological:  Negative for dizziness, loss of consciousness, speech difficulty, weakness and headaches.   Psychiatric/Behavioral:  Negative for agitation, behavioral problems, confusion and decreased concentration.    All other systems reviewed and are negative.      Past Medical History:   Diagnosis Date    Alcohol abuse     AMS (altered mental status)     Arthritis October 2007    Arthritis in back and neck    GERD (gastroesophageal  reflux disease)     Headache 2007    Headaches due to disc in neck.    Hyperlipidemia     Hypertension     Low back pain 10/05/09    Hurt back and had back surgery 04/16/2014. Hit a nerve    Pancreatitis        Allergies   Allergen Reactions    Augmentin [Amoxicillin-Pot Clavulanate] Hives and Swelling     Throat swelling    Metoprolol Unknown - Low Severity       Past Surgical History:   Procedure Laterality Date    ANTERIOR CERVICAL DISCECTOMY W/ FUSION N/A 8/21/2023    Procedure: CERVICAL DISCECTOMY ANTERIOR WITH FUSION C5-7;  Surgeon: Julio C Bryson MD;  Location: [facility];  Service: Neurosurgery;  Laterality: N/A;    FRACTURE SURGERY Left     Foot    KNEE ARTHROSCOPY Left     LUMBAR DISCECTOMY  04/16/2014    LT L5-S1 Discectomy, L4-5 Lami with Disc - Dr. Esequiel Joshi       Family History   Problem Relation Age of Onset    Hyperlipidemia Father     Hypertension Father     Breast cancer Mother     Cancer Mother         Breast cancer    Depression Mother         Depression nerve problems    Prostate cancer Paternal Grandfather     Cancer Paternal Grandfather         Prostate cancer    Hyperlipidemia Paternal Grandfather     Prostate cancer Maternal Grandfather     Arthritis Maternal Grandfather     Cancer Maternal Grandfather         Prostate cancer    Heart disease Maternal Grandfather         Heart attack@90    Hyperlipidemia Maternal Grandfather     Cancer Maternal Grandmother         Lung cancer    Depression Maternal Grandmother         Depression nerve problems    Stroke Maternal Grandmother         Brain anurism    Arthritis Paternal Uncle     Early death Sister         Brain didnt develop completely       Social History     Socioeconomic History    Marital status:    Tobacco Use    Smoking status: Never    Smokeless tobacco: Current     Types: Snuff   Vaping Use    Vaping status: Never Used   Substance and Sexual Activity    Alcohol use: Yes     Comment: vodka . a 5th over three days.    Drug use:  Yes     Comment: etoh    Sexual activity: Yes     Partners: Female     Birth control/protection: None           Objective   Physical Exam  Vitals and nursing note reviewed.   Constitutional:       General: He is not in acute distress.     Appearance: Normal appearance. He is well-developed. He is not toxic-appearing or diaphoretic.   HENT:      Head: Normocephalic and atraumatic.      Right Ear: External ear normal.      Left Ear: External ear normal.      Nose: Nose normal.      Mouth/Throat:      Pharynx: No oropharyngeal exudate.      Tonsils: No tonsillar exudate.   Eyes:      General: Lids are normal.      Conjunctiva/sclera: Conjunctivae normal.      Pupils: Pupils are equal, round, and reactive to light.   Neck:      Thyroid: No thyromegaly.   Cardiovascular:      Rate and Rhythm: Normal rate and regular rhythm.      Pulses: Normal pulses.      Heart sounds: Normal heart sounds, S1 normal and S2 normal.   Pulmonary:      Effort: Pulmonary effort is normal. No tachypnea or respiratory distress.      Breath sounds: Normal breath sounds. No decreased breath sounds, wheezing or rales.   Chest:      Chest wall: No tenderness.   Abdominal:      General: Bowel sounds are normal. There is no distension.      Palpations: Abdomen is soft.      Tenderness: There is no abdominal tenderness. There is no guarding or rebound.   Musculoskeletal:         General: No tenderness or deformity. Normal range of motion.      Cervical back: Full passive range of motion without pain, normal range of motion and neck supple.   Lymphadenopathy:      Cervical: No cervical adenopathy.   Skin:     General: Skin is warm and dry.      Coloration: Skin is not pale.      Findings: No erythema or rash.   Neurological:      Mental Status: He is alert and oriented to person, place, and time.      GCS: GCS eye subscore is 4. GCS verbal subscore is 5. GCS motor subscore is 6.      Cranial Nerves: No cranial nerve deficit.      Sensory: No sensory  deficit.   Psychiatric:         Speech: Speech normal.         Behavior: Behavior normal.         Thought Content: Thought content normal.         Judgment: Judgment normal.         Procedures  Results for orders placed or performed during the hospital encounter of 06/29/24   Comprehensive Metabolic Panel    Specimen: Blood   Result Value Ref Range    Glucose 142 (H) 65 - 99 mg/dL    BUN 6 6 - 20 mg/dL    Creatinine 0.62 (L) 0.76 - 1.27 mg/dL    Sodium 140 136 - 145 mmol/L    Potassium 3.4 (L) 3.5 - 5.2 mmol/L    Chloride 97 (L) 98 - 107 mmol/L    CO2 21.0 (L) 22.0 - 29.0 mmol/L    Calcium 8.7 8.6 - 10.5 mg/dL    Total Protein 9.3 (H) 6.0 - 8.5 g/dL    Albumin 4.0 3.5 - 5.2 g/dL    ALT (SGPT) 40 1 - 41 U/L    AST (SGOT) 212 (H) 1 - 40 U/L    Alkaline Phosphatase 231 (H) 39 - 117 U/L    Total Bilirubin 0.5 0.0 - 1.2 mg/dL    Globulin 5.3 gm/dL    A/G Ratio 0.8 g/dL    BUN/Creatinine Ratio 9.7 7.0 - 25.0    Anion Gap 22.0 (H) 5.0 - 15.0 mmol/L    eGFR 117.2 >60.0 mL/min/1.73   Urinalysis With Culture If Indicated - Urine, Clean Catch    Specimen: Urine, Clean Catch   Result Value Ref Range    Color, UA Yellow Yellow, Straw    Appearance, UA Turbid (A) Clear    pH, UA 6.5 5.0 - 8.0    Specific Gravity, UA 1.015 1.005 - 1.030    Glucose, UA Negative Negative    Ketones, UA 40 mg/dL (2+) (A) Negative    Bilirubin, UA Negative Negative    Blood, UA Small (1+) (A) Negative    Protein, UA 30 mg/dL (1+) (A) Negative    Leuk Esterase, UA Negative Negative    Nitrite, UA Negative Negative    Urobilinogen, UA 1.0 E.U./dL 0.2 - 1.0 E.U./dL   Protime-INR    Specimen: Blood   Result Value Ref Range    Protime 14.1 12.1 - 14.7 Seconds    INR 1.08 0.90 - 1.10   aPTT    Specimen: Blood   Result Value Ref Range    PTT 29.5 26.5 - 34.5 seconds   Lipase    Specimen: Blood   Result Value Ref Range    Lipase 54 13 - 60 U/L   High Sensitivity Troponin T    Specimen: Blood   Result Value Ref Range    HS Troponin T 7 <22 ng/L   BNP     Specimen: Blood   Result Value Ref Range    proBNP <36.0 0.0 - 450.0 pg/mL   C-reactive Protein    Specimen: Blood   Result Value Ref Range    C-Reactive Protein 0.65 (H) 0.00 - 0.50 mg/dL   Sedimentation Rate    Specimen: Blood   Result Value Ref Range    Sed Rate 100 (H) 0 - 15 mm/hr   Acetaminophen Level    Specimen: Blood   Result Value Ref Range    Acetaminophen <5.0 0.0 - 30.0 mcg/mL   Ethanol    Specimen: Blood   Result Value Ref Range    Ethanol 427 (H) 0 - 10 mg/dL    Ethanol % 0.427 %   Urine Drug Screen - Urine, Clean Catch    Specimen: Urine, Clean Catch   Result Value Ref Range    THC, Screen, Urine Negative Negative    Phencyclidine (PCP), Urine Negative Negative    Cocaine Screen, Urine Negative Negative    Methamphetamine, Ur Negative Negative    Opiate Screen Negative Negative    Amphetamine Screen, Urine Negative Negative    Benzodiazepine Screen, Urine Negative Negative    Tricyclic Antidepressants Screen Negative Negative    Methadone Screen, Urine Negative Negative    Barbiturates Screen, Urine Negative Negative    Oxycodone Screen, Urine Positive (A) Negative    Buprenorphine, Screen, Urine Negative Negative   Salicylate Level    Specimen: Blood   Result Value Ref Range    Salicylate <0.3 <=30.0 mg/dL   T4, Free    Specimen: Blood   Result Value Ref Range    Free T4 1.16 0.93 - 1.70 ng/dL   TSH    Specimen: Blood   Result Value Ref Range    TSH 0.739 0.270 - 4.200 uIU/mL   Magnesium    Specimen: Blood   Result Value Ref Range    Magnesium 2.6 1.6 - 2.6 mg/dL   CK    Specimen: Blood   Result Value Ref Range    Creatine Kinase 113 20 - 200 U/L   Ammonia    Specimen: Blood   Result Value Ref Range    Ammonia 35 16 - 60 umol/L   CBC Auto Differential    Specimen: Blood   Result Value Ref Range    WBC 8.43 3.40 - 10.80 10*3/mm3    RBC 4.73 4.14 - 5.80 10*6/mm3    Hemoglobin 15.4 13.0 - 17.7 g/dL    Hematocrit 44.6 37.5 - 51.0 %    MCV 94.3 79.0 - 97.0 fL    MCH 32.6 26.6 - 33.0 pg    MCHC 34.5 31.5  - 35.7 g/dL    RDW 13.0 12.3 - 15.4 %    RDW-SD 45.1 37.0 - 54.0 fl    MPV 9.5 6.0 - 12.0 fL    Platelets 308 140 - 450 10*3/mm3    Neutrophil % 74.3 42.7 - 76.0 %    Lymphocyte % 22.3 19.6 - 45.3 %    Monocyte % 2.0 (L) 5.0 - 12.0 %    Eosinophil % 0.1 (L) 0.3 - 6.2 %    Basophil % 1.1 0.0 - 1.5 %    Immature Grans % 0.2 0.0 - 0.5 %    Neutrophils, Absolute 6.26 1.70 - 7.00 10*3/mm3    Lymphocytes, Absolute 1.88 0.70 - 3.10 10*3/mm3    Monocytes, Absolute 0.17 0.10 - 0.90 10*3/mm3    Eosinophils, Absolute 0.01 0.00 - 0.40 10*3/mm3    Basophils, Absolute 0.09 0.00 - 0.20 10*3/mm3    Immature Grans, Absolute 0.02 0.00 - 0.05 10*3/mm3    nRBC 0.0 0.0 - 0.2 /100 WBC   Fentanyl, Urine - Urine, Clean Catch    Specimen: Urine, Clean Catch   Result Value Ref Range    Fentanyl, Urine Negative Negative   Urinalysis, Microscopic Only - Urine, Clean Catch    Specimen: Urine, Clean Catch   Result Value Ref Range    RBC, UA 0-2 None Seen, 0-2 /HPF    WBC, UA 0-2 None Seen, 0-2 /HPF    Bacteria, UA None Seen None Seen /HPF    Squamous Epithelial Cells, UA 0-2 None Seen, 0-2 /HPF    Hyaline Casts, UA None Seen None Seen /LPF    Methodology Automated Microscopy    ECG 12 Lead Dyspnea   Result Value Ref Range    QT Interval 440 ms    QTC Interval 526 ms     CT Head Without Contrast    Result Date: 6/30/2024  Narrative: PROCEDURE: CT of the brain performed without IV contrast on June 30, 2024. The examination was performed with 3 mm axial imaging and 3 mm sagittal and coronal reconstruction images. The examination was performed according to as low as reasonably achievable dose protocol. Total DLP = 574.  HISTORY: Fall. Head injury.  COMPARISON: None.  FINDINGS:  Mild generalized brain volume loss consistent with age. No acute intracranial hemorrhage, mass, infarct, or edema. No fracture or dislocation. Minimal mucosal thickening in the ethmoid air cells. No fracture or foreign body.      Impression:  1.  Mild generalized brain volume  loss 2.  No acute hemorrhage, mass, infarct, or edema. 3.  No fracture or foreign body.    This report was finalized on 6/30/2024 4:03 AM by Ugo Ogden MD.      XR Chest 1 View    Result Date: 6/30/2024  Narrative: PROCEDURE: Portable chest x-ray examination performed on June 29, 2024. Single view. Supine position. Portable technique.  HISTORY: Shortness of breath.  COMPARISON: None.  FINDINGS:  Normal heart size. No lobar consolidation or edema. No pleural effusion or pneumothorax. No bronchial inflammation. Slightly hypoinflated lungs Fixation plate and screws in the lower cervical spine.      Impression:  1.  No acute process seen in the chest. 2.  No lobar consolidation or edema. 3.  No pleural effusion or pneumothorax.  This report was finalized on 6/30/2024 2:39 AM by Ugo Ogden MD.      MR thoracic spine without IV contrast    Result Date: 6/19/2024  Narrative: MRI CERVICAL SPINE WITHOUT CONTRAST HISTORY: Neck pain for 2 months, bilateral shoulder pain, history of cervical surgery, patient fell. TECHNIQUE: Multiplanar and multisequence imaging of the cervical spine was obtained without contrast.   COMPARISON: None. FINDINGS: There is straightening of the normal cervical lordosis with slight reversal centered at C5.  The patient is status post anterior fusion C5-C7.  Vertebral body height is preserved.  Bone marrow signal intensity is normal.  There is no edema or pathologic marrow replacement.  The signal intensity within the substance of the spinal cord is normal.  The visualized soft tissues of the neck are within normal limits. There is mild disc space narrowing at some levels with moderately decreased disc signal at all levels. C2/3: There is no focal disc herniation, central stenosis, or neural foraminal narrowing. C3/4: There is no focal disc herniation, central stenosis, or neural foraminal narrowing. C4/5: There is mild annular bulge causing mild spinal stenosis with mild, bilateral  neuroforaminal narrowing. C5/6: There is moderate annular bulge causing moderate spinal stenosis with mild, bilateral neuroforaminal narrowing. C6/7: There is mild annular bulge causing mild spinal stenosis with mild, bilateral neural foraminal narrowing. C7/T1:  There is no focal disc herniation, central stenosis, or neural foraminal narrowing.    Impression: Postsurgical change and multilevel cervical degenerative change. MRI OF THE THORACIC SPINE HISTORY: Fell 2 weeks ago and injured back, possible T4 fracture, continued back pain.. PROCEDURE: MR images of the thoracic spine were performed in multiple sequences. FINDINGS: Cord signal is normal on all sequences. There is mild diffuse degenerative disc disease.  There is no acute fracture. There is no subluxation. Axial imaging demonstrates no significant central canal stenosis. T2-3 there is minimal annular bulge with minimal spinal stenosis but no significant neuroforaminal narrowing. T6-7: There is a moderate central focal protrusion effacing the anterior aspect of the thecal sac and causing severe spinal stenosis but no significant neuroforaminal narrowing. T7-8: There is a moderate central focal protrusion causing moderate spinal stenosis with minimal bilateral neuroforaminal narrowing. T8-9: There is a small left paracentral focal protrusion causing mild spinal stenosis with mild left but no significant right neuroforaminal narrowing. T11/12: There is a small central focal protrusion causing mild spinal stenosis but no significant neuroforaminal narrowing. IMPRESSION: Multilevel degenerative change most prominent at T6-7.. : 1975 Images reviewed, interpreted, and dictated by Chely Nielson MD    MRI Cervical Spine Without Contrast    Result Date: 2024  Narrative: MRI CERVICAL SPINE WITHOUT CONTRAST HISTORY: Neck pain for 2 months, bilateral shoulder pain, history of cervical surgery, patient fell. TECHNIQUE: Multiplanar and multisequence imaging of  the cervical spine was obtained without contrast.   COMPARISON: None. FINDINGS: There is straightening of the normal cervical lordosis with slight reversal centered at C5.  The patient is status post anterior fusion C5-C7.  Vertebral body height is preserved.  Bone marrow signal intensity is normal.  There is no edema or pathologic marrow replacement.  The signal intensity within the substance of the spinal cord is normal.  The visualized soft tissues of the neck are within normal limits. There is mild disc space narrowing at some levels with moderately decreased disc signal at all levels. C2/3: There is no focal disc herniation, central stenosis, or neural foraminal narrowing. C3/4: There is no focal disc herniation, central stenosis, or neural foraminal narrowing. C4/5: There is mild annular bulge causing mild spinal stenosis with mild, bilateral neuroforaminal narrowing. C5/6: There is moderate annular bulge causing moderate spinal stenosis with mild, bilateral neuroforaminal narrowing. C6/7: There is mild annular bulge causing mild spinal stenosis with mild, bilateral neural foraminal narrowing. C7/T1:  There is no focal disc herniation, central stenosis, or neural foraminal narrowing.    Impression: Postsurgical change and multilevel cervical degenerative change. MRI OF THE THORACIC SPINE HISTORY: Fell 2 weeks ago and injured back, possible T4 fracture, continued back pain.. PROCEDURE: MR images of the thoracic spine were performed in multiple sequences. FINDINGS: Cord signal is normal on all sequences. There is mild diffuse degenerative disc disease.  There is no acute fracture. There is no subluxation. Axial imaging demonstrates no significant central canal stenosis. T2-3 there is minimal annular bulge with minimal spinal stenosis but no significant neuroforaminal narrowing. T6-7: There is a moderate central focal protrusion effacing the anterior aspect of the thecal sac and causing severe spinal stenosis but  no significant neuroforaminal narrowing. T7-8: There is a moderate central focal protrusion causing moderate spinal stenosis with minimal bilateral neuroforaminal narrowing. T8-9: There is a small left paracentral focal protrusion causing mild spinal stenosis with mild left but no significant right neuroforaminal narrowing. T11/12: There is a small central focal protrusion causing mild spinal stenosis but no significant neuroforaminal narrowing. IMPRESSION: Multilevel degenerative change most prominent at T6-7.. : 1975 Images reviewed, interpreted, and dictated by Chely Nielson MD    CT Lumbar Spine Without Contrast    Result Date: 6/3/2024  Narrative: CT LUMBAR SPINE     6/3/2024 1:06 AM HISTORY: Pain. Back trauma, no prior imaging. PROCEDURE: Axial CT images were obtained of the lumbar spine. Coronal and sagittal reformatted images were generated from the axial dataset. This study was performed with techniques to keep radiation doses as low as reasonably achievable, (ALARA). Individualized dose reduction techniques using automated exposure control or adjustment of mA and/or kV according to the patient size were employed. COMPARISON: CT abdomen and pelvis 2022 FINDINGS: Lumbar spine: No acute fracture or malalignment of the lumbar spine. The lumbar lordosis is preserved. The vertebral body heights are maintained. The facets are appropriately aligned. Multilevel degenerative changes are present. Moderate to severe degenerative disc disease at L4-L5 and L5-S1 with disc space loss, vacuum phenomenon, and small marginal osteophytes, and associated moderate to severe facet arthropathy resulting in mild to moderate varying degree of spinal canal or neural foraminal stenosis. No acute paraspinal abnormalities. Hepatic steatosis. Right renal extrarenal pelvis/parapelvic cyst/mild chronic UPJ narrowing, partially seen. There is a questionable intramedullary fat stranding in the retroperitoneum, probably  surrounding the pancreas, partially seen, correlate for possible pancreatitis.    Impression: No acute fracture or malalignment of the lumbar spine. Degenerative changes of the spine. Hepatic steatosis. Right renal extrarenal pelvis/parapelvic cyst/mild chronic UPJ narrowing, partially seen, appears to be unchanged from prior exam. Questionable retroperitoneal inflammation appears to be surrounding the pancreas, partially seen, correlate for possible pancreatitis. Images personally reviewed, interpreted and dictated by CR Hamlin.    CT spine thoracic without IV contrast    Result Date: 6/3/2024  Narrative: CT THORACIC SPINE;    6/3/2024 1:06 AM HISTORY: Altered mental status. Confusion. Upper and lower back pain for several hours. Back trauma, no prior imaging (Age >= 16y). PROCEDURE: Axial CT images were obtained of the thoracic spine. Coronal and sagittal reformatted images were generated from the axial dataset. This study was performed with techniques to keep radiation doses as low as reasonably achievable, (ALARA). Individualized dose reduction techniques using automated exposure control or adjustment of mA and/or kV according to the patient size were employed. COMPARISON: None. FINDINGS: Thoracic spine: No acute fracture or malalignment of the thoracic spine. The thoracic kyphosis is preserved. Minimal T4 superior endplate compression deformity, age-indeterminate, appears chronic. Otherwise, the vertebral body heights are maintained. The facets are appropriately aligned. Multilevel degenerative changes are present. No acute paraspinal abnormalities. Lower cervical spine ACDF hardware involving C5, C6 and C7.    Impression: No definite acute fracture or malalignment of the thoracic spine. Age-indeterminate minimal T4 superior endplate compression fracture deformity, appears chronic. Images personally reviewed, interpreted and dictated by CR Hamlin.    CT cervical spine without  contrast    Result Date: 6/3/2024  Narrative: CT SCAN OF THE HEAD WITHOUT CONTRAST INDICATION: Altered mental status. Confusion. TECHNIQUE: Multiple axial CT images were performed from the foramen magnum to the vertex without contrast.   Coronal reconstruction images were obtained from the axial data. This study was performed with techniques to keep radiation doses as low as reasonably achievable (ALARA). Individualized dose reduction techniques using automated exposure control or adjustment of mA and/or KV according to the patient size were employed. COMPARISON: None. FINDINGS: There is no mass effect or midline shift.  There is no hydrocephalus or intracranial hemorrhage. Gray white differentiation is preserved.  The posterior fossa is without acute abnormality. Basilar cisterns are preserved. The soft tissues are within normal limits.  No acute osseous abnormalities are present.    Impression: No acute intracranial abnormality. CT CERVICAL SPINE WITHOUT CONTRAST INDICATION: Confusion. Neck pain and soreness. TECHNIQUE: Thin section axial images were obtained through the cervical spine without contrast.  Coronal and sagittal reconstruction images were obtained from the axial data. This study was performed with techniques to keep radiation doses as low as reasonably achievable (ALARA). Individualized dose reduction techniques using automated exposure control or adjustment of mA and/or KV according to the patient size were employed. COMPARISON: None. FINDINGS:  There are postoperative changes from anterior cervical fusion of C5-C7. There is no acute fracture. There is no evidence of facet lock. The craniocervical junction is intact.   Degenerative disc disease is present and most pronounced at C5-C6 and C6-C7.   No acute paraspinal abnormality IMPRESSION: No acute fracture of the cervical spine. Postoperative and degenerative changes. Images reviewed, interpreted, and dictated by Kortney Garcia MD    CT Head Without  Contrast    Result Date: 6/3/2024  Narrative: CT SCAN OF THE HEAD WITHOUT CONTRAST INDICATION: Altered mental status. Confusion. TECHNIQUE: Multiple axial CT images were performed from the foramen magnum to the vertex without contrast.   Coronal reconstruction images were obtained from the axial data. This study was performed with techniques to keep radiation doses as low as reasonably achievable (ALARA). Individualized dose reduction techniques using automated exposure control or adjustment of mA and/or KV according to the patient size were employed. COMPARISON: None. FINDINGS: There is no mass effect or midline shift.  There is no hydrocephalus or intracranial hemorrhage. Gray white differentiation is preserved.  The posterior fossa is without acute abnormality. Basilar cisterns are preserved. The soft tissues are within normal limits.  No acute osseous abnormalities are present.    Impression: No acute intracranial abnormality. CT CERVICAL SPINE WITHOUT CONTRAST INDICATION: Confusion. Neck pain and soreness. TECHNIQUE: Thin section axial images were obtained through the cervical spine without contrast.  Coronal and sagittal reconstruction images were obtained from the axial data. This study was performed with techniques to keep radiation doses as low as reasonably achievable (ALARA). Individualized dose reduction techniques using automated exposure control or adjustment of mA and/or KV according to the patient size were employed. COMPARISON: None. FINDINGS:  There are postoperative changes from anterior cervical fusion of C5-C7. There is no acute fracture. There is no evidence of facet lock. The craniocervical junction is intact.   Degenerative disc disease is present and most pronounced at C5-C6 and C6-C7.   No acute paraspinal abnormality IMPRESSION: No acute fracture of the cervical spine. Postoperative and degenerative changes. Images reviewed, interpreted, and dictated by Kortney Garcia MD            ED  Course  ED Course as of 06/30/24 1007   Sun Jun 30, 2024   0010 ECG 12 Lead Dyspnea  Vent. Rate :  86 BPM     Atrial Rate :  86 BPM     P-R Int : 156 ms          QRS Dur :  86 ms      QT Int : 440 ms       P-R-T Axes :  56   3  51 degrees     QTc Int : 526 ms     Normal sinus rhythm  Prolonged QT  Abnormal ECG  When compared with ECG of 22-FEB-2024 22:35,  QT has lengthened   [ES]   0155 It was reported the patient was found between his bed and nightstand so we will obtain head CT to rule out any head injury. [ES]   0200 Took over care of patient pending CT head and sober reevaluation  Electronically signed by Yoav Gutierrez MD, 06/30/24, 2:00 AM EDT.   [AS]   0435 IMPRESSION:     1.  Mild generalized brain volume loss  2.  No acute hemorrhage, mass, infarct, or edema.   3.  No fracture or foreign body. [AS]   0611 Patient checked out to oncoming provider pending sober reevaluation  Electronically signed by Yoav Gutierrez MD, 06/30/24, 6:11 AM EDT.   [AS]   0807 Care assumed to Dr. Gutierrez at shift change.  Patient awake and alert.  No complaints at this time.  Patient states that he wants to go home.  His father is here with him today. [BC]   0944 Patient refuses detox at this time.  Father will sign for him and take him home.  Treatment option information has been given to the patient ,both verbal and written. [BC]      ED Course User Index  [AS] Yoav Gutierrez MD  [BC] Meño Vidal MD  [ES] Ravin Paz MD                                             Medical Decision Making  Problems Addressed:  Acute alcoholic intoxication without complication: complicated acute illness or injury  Alcoholism: complicated acute illness or injury    Amount and/or Complexity of Data Reviewed  Labs: ordered.  Radiology: ordered.  ECG/medicine tests: ordered. Decision-making details documented in ED Course.    Risk  Prescription drug management.        Final diagnoses:   Acute alcoholic intoxication without  complication   Alcoholism       ED Disposition  ED Disposition       ED Disposition   Discharge    Condition   Stable    Comment   --               Your doctor    In 1 week           Medication List      No changes were made to your prescriptions during this visit.            Meño Vidal MD  06/30/24 1007

## 2024-06-30 NOTE — ED NOTES
Pt sitting in chair next to father. Pt has unhooked himself again. Pt aware of risks and states he is just talking to his dad. Provider aware.

## 2024-07-01 LAB
ALBUMIN SERPL-MCNC: 3.5 G/DL (ref 3.5–5.2)
ALP SERPL-CCNC: 180 U/L (ref 39–117)
ALT SERPL W P-5'-P-CCNC: 32 U/L (ref 1–41)
ANION GAP SERPL CALCULATED.3IONS-SCNC: 14.1 MMOL/L (ref 5–15)
AST SERPL-CCNC: 132 U/L (ref 1–40)
BILIRUB CONJ SERPL-MCNC: 0.3 MG/DL (ref 0–0.3)
BILIRUB INDIRECT SERPL-MCNC: 0.3 MG/DL
BILIRUB SERPL-MCNC: 0.6 MG/DL (ref 0–1.2)
BUN SERPL-MCNC: 5 MG/DL (ref 6–20)
BUN/CREAT SERPL: 7.5 (ref 7–25)
CALCIUM SPEC-SCNC: 8.9 MG/DL (ref 8.6–10.5)
CHLORIDE SERPL-SCNC: 98 MMOL/L (ref 98–107)
CO2 SERPL-SCNC: 21.9 MMOL/L (ref 22–29)
CREAT SERPL-MCNC: 0.67 MG/DL (ref 0.76–1.27)
DEPRECATED RDW RBC AUTO: 45.3 FL (ref 37–54)
EGFRCR SERPLBLD CKD-EPI 2021: 114.5 ML/MIN/1.73
ERYTHROCYTE [DISTWIDTH] IN BLOOD BY AUTOMATED COUNT: 13.2 % (ref 12.3–15.4)
FOLATE SERPL-MCNC: 18.5 NG/ML (ref 4.78–24.2)
GLUCOSE SERPL-MCNC: 118 MG/DL (ref 65–99)
HCT VFR BLD AUTO: 32.6 % (ref 37.5–51)
HGB BLD-MCNC: 11.5 G/DL (ref 13–17.7)
MCH RBC QN AUTO: 33.4 PG (ref 26.6–33)
MCHC RBC AUTO-ENTMCNC: 35.3 G/DL (ref 31.5–35.7)
MCV RBC AUTO: 94.8 FL (ref 79–97)
PLATELET # BLD AUTO: 164 10*3/MM3 (ref 140–450)
PMV BLD AUTO: 9.7 FL (ref 6–12)
POTASSIUM SERPL-SCNC: 3.2 MMOL/L (ref 3.5–5.2)
POTASSIUM SERPL-SCNC: 4 MMOL/L (ref 3.5–5.2)
PROT SERPL-MCNC: 7.6 G/DL (ref 6–8.5)
RBC # BLD AUTO: 3.44 10*6/MM3 (ref 4.14–5.8)
SODIUM SERPL-SCNC: 134 MMOL/L (ref 136–145)
VIT B12 BLD-MCNC: 456 PG/ML (ref 211–946)
WBC NRBC COR # BLD AUTO: 8.88 10*3/MM3 (ref 3.4–10.8)

## 2024-07-01 PROCEDURE — 85027 COMPLETE CBC AUTOMATED: CPT | Performed by: STUDENT IN AN ORGANIZED HEALTH CARE EDUCATION/TRAINING PROGRAM

## 2024-07-01 PROCEDURE — 25010000002 LORAZEPAM PER 2 MG: Performed by: STUDENT IN AN ORGANIZED HEALTH CARE EDUCATION/TRAINING PROGRAM

## 2024-07-01 PROCEDURE — 80076 HEPATIC FUNCTION PANEL: CPT | Performed by: STUDENT IN AN ORGANIZED HEALTH CARE EDUCATION/TRAINING PROGRAM

## 2024-07-01 PROCEDURE — 25010000002 ENOXAPARIN PER 10 MG: Performed by: STUDENT IN AN ORGANIZED HEALTH CARE EDUCATION/TRAINING PROGRAM

## 2024-07-01 PROCEDURE — 99232 SBSQ HOSP IP/OBS MODERATE 35: CPT | Performed by: STUDENT IN AN ORGANIZED HEALTH CARE EDUCATION/TRAINING PROGRAM

## 2024-07-01 PROCEDURE — 25010000002 LORAZEPAM PER 2 MG

## 2024-07-01 PROCEDURE — 36415 COLL VENOUS BLD VENIPUNCTURE: CPT | Performed by: STUDENT IN AN ORGANIZED HEALTH CARE EDUCATION/TRAINING PROGRAM

## 2024-07-01 PROCEDURE — 80048 BASIC METABOLIC PNL TOTAL CA: CPT | Performed by: STUDENT IN AN ORGANIZED HEALTH CARE EDUCATION/TRAINING PROGRAM

## 2024-07-01 PROCEDURE — 25810000003 LACTATED RINGERS PER 1000 ML: Performed by: STUDENT IN AN ORGANIZED HEALTH CARE EDUCATION/TRAINING PROGRAM

## 2024-07-01 PROCEDURE — 82607 VITAMIN B-12: CPT | Performed by: STUDENT IN AN ORGANIZED HEALTH CARE EDUCATION/TRAINING PROGRAM

## 2024-07-01 PROCEDURE — 84132 ASSAY OF SERUM POTASSIUM: CPT | Performed by: STUDENT IN AN ORGANIZED HEALTH CARE EDUCATION/TRAINING PROGRAM

## 2024-07-01 PROCEDURE — 25010000002 THIAMINE PER 100 MG: Performed by: STUDENT IN AN ORGANIZED HEALTH CARE EDUCATION/TRAINING PROGRAM

## 2024-07-01 PROCEDURE — 82746 ASSAY OF FOLIC ACID SERUM: CPT | Performed by: STUDENT IN AN ORGANIZED HEALTH CARE EDUCATION/TRAINING PROGRAM

## 2024-07-01 RX ORDER — GABAPENTIN 400 MG/1
800 CAPSULE ORAL EVERY 8 HOURS SCHEDULED
Status: CANCELLED | OUTPATIENT
Start: 2024-07-01

## 2024-07-01 RX ORDER — POTASSIUM CHLORIDE 1.5 G/1.58G
40 POWDER, FOR SOLUTION ORAL ONCE
Status: COMPLETED | OUTPATIENT
Start: 2024-07-01 | End: 2024-07-01

## 2024-07-01 RX ORDER — MIRTAZAPINE 15 MG/1
7.5 TABLET, FILM COATED ORAL NIGHTLY
Status: CANCELLED | OUTPATIENT
Start: 2024-07-01

## 2024-07-01 RX ORDER — HYDROXYZINE HYDROCHLORIDE 10 MG/1
10 TABLET, FILM COATED ORAL 2 TIMES DAILY PRN
COMMUNITY

## 2024-07-01 RX ORDER — GABAPENTIN 400 MG/1
800 CAPSULE ORAL EVERY 8 HOURS SCHEDULED
Status: DISCONTINUED | OUTPATIENT
Start: 2024-07-01 | End: 2024-07-02 | Stop reason: HOSPADM

## 2024-07-01 RX ORDER — HYDROCHLOROTHIAZIDE 25 MG/1
25 TABLET ORAL DAILY PRN
Status: CANCELLED | OUTPATIENT
Start: 2024-07-01

## 2024-07-01 RX ORDER — IBUPROFEN 200 MG
800 TABLET ORAL EVERY 6 HOURS PRN
COMMUNITY

## 2024-07-01 RX ORDER — OXYCODONE HYDROCHLORIDE 10 MG/1
10 TABLET ORAL
Status: CANCELLED | OUTPATIENT
Start: 2024-07-01

## 2024-07-01 RX ORDER — TRAZODONE HYDROCHLORIDE 50 MG/1
50 TABLET ORAL NIGHTLY PRN
COMMUNITY

## 2024-07-01 RX ORDER — VALSARTAN 160 MG/1
160 TABLET ORAL DAILY
COMMUNITY

## 2024-07-01 RX ORDER — DULOXETIN HYDROCHLORIDE 30 MG/1
30 CAPSULE, DELAYED RELEASE ORAL DAILY
COMMUNITY

## 2024-07-01 RX ADMIN — GABAPENTIN 800 MG: 400 CAPSULE ORAL at 13:14

## 2024-07-01 RX ADMIN — LORAZEPAM 2 MG: 2 INJECTION INTRAMUSCULAR; INTRAVENOUS at 08:35

## 2024-07-01 RX ADMIN — POTASSIUM CHLORIDE 40 MEQ: 1.5 POWDER, FOR SOLUTION ORAL at 05:07

## 2024-07-01 RX ADMIN — LORAZEPAM 1 MG: 2 INJECTION INTRAMUSCULAR; INTRAVENOUS at 20:13

## 2024-07-01 RX ADMIN — ACETAMINOPHEN 500 MG: 500 TABLET ORAL at 16:22

## 2024-07-01 RX ADMIN — Medication 10 ML: at 08:36

## 2024-07-01 RX ADMIN — GABAPENTIN 800 MG: 400 CAPSULE ORAL at 22:48

## 2024-07-01 RX ADMIN — LORAZEPAM 2 MG: 2 INJECTION INTRAMUSCULAR; INTRAVENOUS at 02:02

## 2024-07-01 RX ADMIN — ENOXAPARIN SODIUM 40 MG: 40 INJECTION SUBCUTANEOUS at 20:14

## 2024-07-01 RX ADMIN — OXYCODONE HYDROCHLORIDE 10 MG: 10 TABLET ORAL at 20:11

## 2024-07-01 RX ADMIN — OXYCODONE HYDROCHLORIDE 10 MG: 10 TABLET ORAL at 09:38

## 2024-07-01 RX ADMIN — OXYCODONE HYDROCHLORIDE 10 MG: 10 TABLET ORAL at 05:07

## 2024-07-01 RX ADMIN — Medication 1 MG: at 08:33

## 2024-07-01 RX ADMIN — THIAMINE HYDROCHLORIDE 500 MG: 100 INJECTION, SOLUTION INTRAMUSCULAR; INTRAVENOUS at 20:46

## 2024-07-01 RX ADMIN — OXYCODONE HYDROCHLORIDE 10 MG: 10 TABLET ORAL at 13:42

## 2024-07-01 RX ADMIN — SODIUM CHLORIDE, POTASSIUM CHLORIDE, SODIUM LACTATE AND CALCIUM CHLORIDE 125 ML/HR: 600; 310; 30; 20 INJECTION, SOLUTION INTRAVENOUS at 02:10

## 2024-07-01 RX ADMIN — GABAPENTIN 800 MG: 400 CAPSULE ORAL at 08:33

## 2024-07-01 RX ADMIN — LORAZEPAM 2 MG: 2 INJECTION INTRAMUSCULAR; INTRAVENOUS at 13:15

## 2024-07-01 RX ADMIN — THIAMINE HYDROCHLORIDE 500 MG: 100 INJECTION, SOLUTION INTRAMUSCULAR; INTRAVENOUS at 13:15

## 2024-07-01 RX ADMIN — Medication 10 ML: at 20:13

## 2024-07-01 NOTE — PROGRESS NOTES
[facility]     Patient Identification:  Name:  Taiwo Pierce  Age:  49 y.o.  Sex:  male  :  1975  MRN:  [id number]  Visit Number:  [id number]  ROOM: [address]     Primary Care Provider:  Provider, No Known     Date of Admission: 2024    Length of stay in inpatient status:  1    Subjective     Chief Compliant:    Chief Complaint   Patient presents with    Alcohol Problem       CIWA 4 overnight with minimal prn ativan needed. Patient conversational this am and much more alert, reports feeling better. Tolerating liquid diet w/o nausea or vomiting. No seizure like activity overnight.        Objective       Vital Signs:  Temp:  [98.1 °F (36.7 °C)-98.6 °F (37 °C)] 98.1 °F (36.7 °C)  Heart Rate:  [] 102  Resp:  [10-22] 20  BP: (111-149)/() 133/107  SpO2:  [84 %-100 %] 96 %  on   ;   Device (Oxygen Therapy): room air  Body mass index is 23 kg/m².      ----------------------------------------------------------------------------------------------------------------------  Physical Exam  Vitals and nursing note reviewed.   Constitutional:       General: He is not in acute distress.  HENT:      Head: Normocephalic and atraumatic.      Mouth/Throat:      Comments: Edentulous  Eyes:      General: No scleral icterus.     Extraocular Movements: Extraocular movements intact.   Cardiovascular:      Rate and Rhythm: Normal rate and regular rhythm.   Pulmonary:      Effort: Pulmonary effort is normal. No respiratory distress.   Abdominal:      Palpations: Abdomen is soft.      Tenderness: There is no abdominal tenderness.   Musculoskeletal:      Right lower leg: No edema.      Left lower leg: No edema.   Skin:     Comments: Small superficial bruising RUE (posterior forearm)   Neurological:      General: No focal deficit present.      Mental Status: He is alert. Mental status is at baseline.      Cranial Nerves: No cranial nerve deficit.   Psychiatric:         Mood  and Affect: Mood normal.         Behavior: Behavior normal.       ----------------------------------------------------------------------------------------------------------------------          Assessment & Plan      #Acute alcohol withdrawal c/b delirium tremens  #Alcoholic encephalopathy with sequela of recent seizure/post-ictal period  -Seizure free overnight with minimal ativan requirement, continuing gayla taper currently and diet advanced this am given good tolerance  -CM to assist with dc to accepting rehab program     #Hypokalemia  #AGMA Metabolic acidosis  -Secondary to GI losses through emesis  -improving with prn replacement, cont per protocol     #Clinically insignificant hyponatremia  -stable on repeat labs     #Mild normocytic anemia  -Secondary to chronic alcohol intake, B12 and folate wnl     #Prolonged qtc  -Secondary to hypokalemia  -improved on tele      Code Status and Medical Interventions:   Ordered at: 06/30/24 1716     Code Status (Patient has no pulse and is not breathing):    CPR (Attempt to Resuscitate)     Medical Interventions (Patient has pulse or is breathing):    Full Support         Disposition: cont pcu >24hrs since sz, downgrade med surg in am    I have reviewed any copied/forwarded text or data, verified its accuracy, and updated as necessary above.    Mario Archuleta MD  [facility]  07/01/24  15:27 EDT

## 2024-07-01 NOTE — CASE MANAGEMENT/SOCIAL WORK
Discharge Planning Assessment  [facility]     Patient Name: Taiwo Pierce  MRN: [id number]  Today's Date: 7/1/2024    Admit Date: 6/30/2024    Plan: SS received CM consult for Alcohol abuse, Discharge planning. Pt states he lives with spouse. Pt does not utilize HH services at this time. Pt PCP Aby Quinonez. Pt states to having (s) cane and walker when needed via unknown provider. Pt has no POA or living will. Pt states he plans to return home at discharge, but is interested in [facility] in [facility]. SS provided pt with alcohol abuse resources and pt voiced understanding. Pt father states pt and spouse is currently living apart due to pt drinking. Pt father states family will provide transportation at discharge. SS to follow and assist with discharge planning.   Discharge Needs Assessment       Row Name 07/01/24 1531       Living Environment    People in Home spouse    Name(s) of People in Home Spouse Melba    Current Living Arrangements home    Primary Care Provided by self    Provides Primary Care For no one    Family Caregiver if Needed spouse;parent(s)    Family Caregiver Names Melba spouse and parents    Quality of Family Relationships helpful;involved;supportive    Able to Return to Prior Arrangements yes       Transition Planning    Patient/Family Anticipates Transition to home with family       Discharge Needs Assessment    Equipment Currently Used at Home cane, straight;walker, standard    Concerns to be Addressed discharge planning  Alcohol abuse                   Discharge Plan       Row Name 07/01/24 1532       Plan    Plan SS received CM consult for Alcohol abuse, Discharge planning. Pt states he lives with spouse. Pt does not utilize HH services at this time. Pt PCP Aby Quinonez. Pt states to having (s) cane and walker when needed via unknown provider. Pt has no POA or living will. Pt states he plans to return home at discharge, but is interested in [facility] in [facility].  SS provided pt with alcohol abuse resources and pt voiced understanding. Pt father states pt and spouse is currently living apart due to pt drinking. Pt father states family will provide transportation at discharge. SS to follow and assist with discharge planning.               Expected Discharge Date and Time       Expected Discharge Date Expected Discharge Time    Jul 2, 2024            Demographic Summary       Row Name 07/01/24 1531       General Information    Admission Type inpatient    Arrived From home    Referral Source nursing  CM    Reason for Consult discharge planning  Alcohol abuse    Preferred Language English               AUNG HolbrookW

## 2024-07-01 NOTE — PAYOR COMM NOTE
"[facility]  NPI:[id number]    Utilization Review  Contact: Anh Hess RN  Phone: [phone]  Fax:[phone]    INITIATE INPATIENT AUTHORIZATION  Taiwo Pierce (49 y.o. Male)       Date of Birth   1975    Social Security Number       Address   [address] [address]    Home Phone   [phone]    MRN   [id number]       Adventism   Hoahaoism    Marital Status                               Admission Date   6/30/24    Admission Type   Emergency    Admitting Provider   Mario Archuleta MD    Attending Provider   Mario Archuleta MD    Department, Room/Bed   [facility] PROGRESS CARE, P207/1P       Discharge Date       Discharge Disposition       Discharge Destination                                 Attending Provider: Mario Archuleta MD    Allergies: Augmentin [Amoxicillin-pot Clavulanate], Metoprolol    Isolation: None   Infection: None   Code Status: CPR    Ht: 180.3 cm (71\")   Wt: 74.8 kg (164 lb 14.5 oz)    Admission Cmt: None   Principal Problem: Delirium tremens [F10.931]                   Active Insurance as of 6/30/2024       Primary Coverage       Payor Plan Insurance Group Employer/Plan Group    ANTHEM MEDICARE REPLACEMENT ANTHEM MEDICARE ADVANTAGE [id number]       Payor Plan Address Payor Plan Phone Number Payor Plan Fax Number Effective Dates    [address] [phone]  1/1/2024 - None Entered    [facility] [id number]         Subscriber Name Subscriber Birth Date Member ID       TAIWO PIERCE 1975 [id number]                     Emergency Contacts        (Rel.) Home Phone Work Phone Mobile Phone    NATHENKRISTA (Spouse) [phone] -- [phone]    CORA CINTRON (Daughter) [phone] -- --    Rosario Pierce [phone] -- --    Mario Francois (Relative) -- -- [phone]                 History & Physical        Mario Archuleta MD at 06/30/24 1746              [facility] HOSPITALIST " HISTORY AND PHYSICAL    Patient Identification:  Name:  Taiwo Pierce  Age:  49 y.o.  Sex:  male  :  1975  MRN:  [id number]   Visit Number:  [id number]  Admit Date: 2024   Room number:  101/01  Primary Care Physician:  Provider, No Known    Date of Admission: 2024     Subjective     Chief complaint:    Chief Complaint   Patient presents with    Alcohol Problem     History of presenting illness:  49 y.o. male who was admitted on 2024 for alcohol detox    Taiwo Pierce has relevant PMH of daily alcohol use with prior Dts, alcoholic pancreatitis presenting initially for alcohol detox now s/p witnessed brief seizure episode.    Patient drinks daily fifth of distilled alcohol with periods of sobriety previously but now desiring inpatient alcohol detox and cessation. Was recently hospitalized with similar presentation c/b alcoholic pancreatitis as well as reported hx of prior Dts.    Patient presented initially yesterday afternoon still acutely intoxicated and left exam room to return home to visit his dog and collect belongings, returned sober later this am after vomiting clear emesis at home and not tolerating PO intake. He was in exam room and being triaged for detox unit when he had witnessed seizure that aborted without intervention. He was given 2mg IV ativan and 1L NS bolus after and at time of admission was post-ictal with majority of history obtained from father at bedside. Sinus tach on monitor with normotension, spO2 >93% on RA. Patient protecting airway and currently awake with ceribell monitor I place reading no active seizure activity.     Prior to admission I discussed patient's presentation and management with attending ER physician and verbal handoff received.  ---------------------------------------------------------------------------------------------------------------------   A thorough systems based relevant ROS was asked and was negative except as noted  above.  ---------------------------------------------------------------------------------------------------------------------   Past Medical History:   Diagnosis Date    Alcohol abuse     AMS (altered mental status)     Arthritis October 2007    Arthritis in back and neck    GERD (gastroesophageal reflux disease)     Headache 2007    Headaches due to disc in neck.    Hyperlipidemia     Hypertension     Low back pain 10/05/09    Hurt back and had back surgery 04/16/2014. Hit a nerve    Pancreatitis      Past Surgical History:   Procedure Laterality Date    ANTERIOR CERVICAL DISCECTOMY W/ FUSION N/A 8/21/2023    Procedure: CERVICAL DISCECTOMY ANTERIOR WITH FUSION C5-7;  Surgeon: Julio C Bryson MD;  Location: [facility];  Service: Neurosurgery;  Laterality: N/A;    FRACTURE SURGERY Left     Foot    KNEE ARTHROSCOPY Left     LUMBAR DISCECTOMY  04/16/2014    LT L5-S1 Discectomy, L4-5 Lami with Disc - Dr. Esequiel Joshi     Family History   Problem Relation Age of Onset    Hyperlipidemia Father     Hypertension Father     Breast cancer Mother     Cancer Mother         Breast cancer    Depression Mother         Depression nerve problems    Prostate cancer Paternal Grandfather     Cancer Paternal Grandfather         Prostate cancer    Hyperlipidemia Paternal Grandfather     Prostate cancer Maternal Grandfather     Arthritis Maternal Grandfather     Cancer Maternal Grandfather         Prostate cancer    Heart disease Maternal Grandfather         Heart attack@90    Hyperlipidemia Maternal Grandfather     Cancer Maternal Grandmother         Lung cancer    Depression Maternal Grandmother         Depression nerve problems    Stroke Maternal Grandmother         Brain anurism    Arthritis Paternal Uncle     Early death Sister         Brain didnt develop completely     Social History     Socioeconomic History    Marital status:    Tobacco Use    Smoking status: Never    Smokeless tobacco: Current     Types: Snuff   Vaping Use     Vaping status: Never Used   Substance and Sexual Activity    Alcohol use: Yes     Comment: vodka . a 5th over three days.    Drug use: Yes     Comment: etoh    Sexual activity: Yes     Partners: Female     Birth control/protection: None         Objective     Vital Signs:  Temp:  [98.3 °F (36.8 °C)-98.6 °F (37 °C)] 98.3 °F (36.8 °C)  Heart Rate:  [] 120  Resp:  [18] 18  BP: (126-153)/(71-99) 128/92    Mean Arterial Pressure (Non-Invasive) for the past 24 hrs (Last 3 readings):   Noninvasive MAP (mmHg)   06/30/24 1715 102   06/30/24 1700 111   06/30/24 1645 95     SpO2:  [95 %-100 %] 95 %  on   ;   Device (Oxygen Therapy): room air  Body mass index is 18.54 kg/m².     ----------------------------------------------------------------------------------------------------------------------  Physical Exam  Vitals and nursing note reviewed.   Constitutional:       General: He is not in acute distress.     Appearance: He is not toxic-appearing.   HENT:      Head: Normocephalic and atraumatic.      Mouth/Throat:      Comments: Edentulous w/o tongue lacerations noted  Eyes:      General: No scleral icterus.     Extraocular Movements: Extraocular movements intact.   Cardiovascular:      Rate and Rhythm: Regular rhythm. Tachycardia present.      Pulses: Normal pulses.   Pulmonary:      Effort: Pulmonary effort is normal. No respiratory distress.   Skin:     General: Skin is warm and dry.   Neurological:      General: No focal deficit present.      Mental Status: He is alert.      Cranial Nerves: No cranial nerve deficit.   Psychiatric:      Comments: Flat affect but conversational in short phrases w/delayed response       --------------------------------------------------------------------------------------------------------------------    I have personally looked at the radiology images, my personal interpretation is as follows:    ECG with qtc prolongation    Assessment & Plan       #Acute alcohol withdrawal c/b  delirium tremens  #Alcoholic encephalopathy with sequela of recent seizure/post-ictal period  -Admit to PCU with gayla ativan taper and prn ativan w/CIWA protocol and seizure precautions given risk of recurrence  -High dose IV thiamine ordered  -Liquid diet overnight, advance in am  -DC to [facility] vs inpatient detox or 30 day program per patient and family preference  -Repeat ceribell for any ongoing seizure concern, none currently and ok to remove on arrival to floor    #Hypokalemia  #AGMA Metabolic acidosis  -Secondary to GI losses through emesis  -PRN protocol in place, additional 1L LR ordered over 8hrs    #Clinically insignificant hyponatremia  -Repeat renal panel in am    #Mild normocytic anemia  -Secondary to chronic alcohol intake, added vitamin B12 and folate levels    #Prolonged qtc  -Secondary to hypokalemia  -Avoid zofran for now, prn benzo for nausea    VTE Prophylaxis: pLOV  The patient is high risk due to the following diagnoses/reasons:  Alcohol detox c/b seizure    Admission Status:  I certify that this patient requires inpatient hospitalization for greater than 2 midnights in INPATIENT status.  I anticipate there to be the need for care which can only be reasonably provided in a hospital setting such as possible need for aggressive/expedited ancillary services and/or consultation services, IV medications, close physician monitoring, and/or procedures. In such, I feel patient’s risk for adverse outcomes and need for care warrant INPATIENT evaluation and predict the patient’s care encounter to likely last beyond 2 midnights.    Code Status and Medical Interventions:   Ordered at: 06/30/24 1716     Code Status (Patient has no pulse and is not breathing):    CPR (Attempt to Resuscitate)     Medical Interventions (Patient has pulse or is breathing):    Full Support        Disposition: admit pcu    Mario Archuleta MD  [facility]  06/30/24  17:46 EDT        Electronically signed by  Mario Archuleta MD at 06/30/24 1810          Emergency Department Notes        Yumiko Garcia RN at 06/30/24 1515          RAYMON Santamaria intake nurse advised ED staff that pt was seizing, ED provider KEYUR Merchant in INTAKE waiting area during seizure. Pt was noted to be laying on his side, noticeably diaphoretic, with agonal respirations with cyanotic lips.Pt bagged at this time and transferred to medical bed.  Pt placed on ZOLL monitor, BP cuff, and pulse ox. Pt tachycardiac, O2 sat is 86%, Nasal airway placed at this time.Pt suctioned due to excessive drooling and postictal.     Electronically signed by Yumiko Garcia RN at 06/30/24 1606       Franck Garcia PA at 06/30/24 1440          Subjective   History of Present Illness  49-year-old male presents secondary to need for alcohol detox.  Patient was seen earlier and decided that he did not wish to stay however he is returned and wishes to now stay.  Patient denies any suicidal homicidal ideation.  He has a past medical history of acid reflux, hypertension, pancreatitis.  He presents by private vehicle.      Review of Systems   Constitutional: Negative.  Negative for fever.   HENT: Negative.     Respiratory: Negative.     Cardiovascular: Negative.  Negative for chest pain.   Gastrointestinal: Negative.  Negative for abdominal pain.   Endocrine: Negative.    Genitourinary: Negative.  Negative for dysuria.   Skin: Negative.    Neurological: Negative.    Psychiatric/Behavioral: Negative.  Negative for suicidal ideas.    All other systems reviewed and are negative.      Past Medical History:   Diagnosis Date    Alcohol abuse     AMS (altered mental status)     Arthritis October 2007    Arthritis in back and neck    GERD (gastroesophageal reflux disease)     Headache 2007    Headaches due to disc in neck.    Hyperlipidemia     Hypertension     Low back pain 10/05/09    Hurt back and had back surgery 04/16/2014. Hit a nerve    Pancreatitis        Allergies   Allergen  Reactions    Augmentin [Amoxicillin-Pot Clavulanate] Hives and Swelling     Throat swelling    Metoprolol Unknown - Low Severity       Past Surgical History:   Procedure Laterality Date    ANTERIOR CERVICAL DISCECTOMY W/ FUSION N/A 8/21/2023    Procedure: CERVICAL DISCECTOMY ANTERIOR WITH FUSION C5-7;  Surgeon: Julio C Bryson MD;  Location: [facility];  Service: Neurosurgery;  Laterality: N/A;    FRACTURE SURGERY Left     Foot    KNEE ARTHROSCOPY Left     LUMBAR DISCECTOMY  04/16/2014    LT L5-S1 Discectomy, L4-5 Lami with Disc - Dr. Esequiel Joshi       Family History   Problem Relation Age of Onset    Hyperlipidemia Father     Hypertension Father     Breast cancer Mother     Cancer Mother         Breast cancer    Depression Mother         Depression nerve problems    Prostate cancer Paternal Grandfather     Cancer Paternal Grandfather         Prostate cancer    Hyperlipidemia Paternal Grandfather     Prostate cancer Maternal Grandfather     Arthritis Maternal Grandfather     Cancer Maternal Grandfather         Prostate cancer    Heart disease Maternal Grandfather         Heart attack@90    Hyperlipidemia Maternal Grandfather     Cancer Maternal Grandmother         Lung cancer    Depression Maternal Grandmother         Depression nerve problems    Stroke Maternal Grandmother         Brain anurism    Arthritis Paternal Uncle     Early death Sister         Brain didnt develop completely       Social History     Socioeconomic History    Marital status:    Tobacco Use    Smoking status: Never    Smokeless tobacco: Current     Types: Snuff   Vaping Use    Vaping status: Never Used   Substance and Sexual Activity    Alcohol use: Yes     Comment: vodka . a 5th over three days.    Drug use: Yes     Comment: etoh    Sexual activity: Yes     Partners: Female     Birth control/protection: None           Objective   Physical Exam  Vitals and nursing note reviewed.   Constitutional:       General: He is not in acute  distress.     Appearance: He is well-developed. He is not diaphoretic.   HENT:      Head: Normocephalic and atraumatic.      Right Ear: External ear normal.      Left Ear: External ear normal.      Nose: Nose normal.   Eyes:      Conjunctiva/sclera: Conjunctivae normal.      Pupils: Pupils are equal, round, and reactive to light.   Neck:      Vascular: No JVD.      Trachea: No tracheal deviation.   Cardiovascular:      Rate and Rhythm: Normal rate and regular rhythm.      Heart sounds: Normal heart sounds. No murmur heard.  Pulmonary:      Effort: Pulmonary effort is normal. No respiratory distress.      Breath sounds: Normal breath sounds. No wheezing.   Abdominal:      General: Bowel sounds are normal.      Palpations: Abdomen is soft.      Tenderness: There is no abdominal tenderness.   Musculoskeletal:         General: No deformity. Normal range of motion.      Cervical back: Normal range of motion and neck supple.   Skin:     General: Skin is warm and dry.      Coloration: Skin is not pale.      Findings: No erythema or rash.   Neurological:      Mental Status: He is alert and oriented to person, place, and time.      Cranial Nerves: No cranial nerve deficit.   Psychiatric:         Behavior: Behavior normal.         Thought Content: Thought content normal. Thought content does not include homicidal or suicidal ideation.         Procedures          ED Course  ED Course as of 06/30/24 1803   Sun Jun 30, 2024   1540 I was and the psychiatric intake area when I heard patient got and saw him stand and subsequently fall and strike his head.  Patient started seizing afterwards.  Patient's seizure lasted approximately 3 to 4 minutes.  Ativan was given.  He had sonorous respirations afterwards.  No continued seizure.  He was extremely diaphoretic and had dilated pupils.  Patient was immediately taken to CT [JI]   0388 Patient now awake and alert.  He reports never having history of seizures.  He is tremulous.  Will  repeat his dose of Ativan.  He currently has a ceribel on.  [JI]   1624 Seizure burden 0 [JI]      ED Course User Index  [JI] Franck Garcia PA                                   Results for orders placed or performed during the hospital encounter of 06/30/24   Comprehensive Metabolic Panel    Specimen: Blood   Result Value Ref Range    Glucose 205 (H) 65 - 99 mg/dL    BUN 7 6 - 20 mg/dL    Creatinine 0.75 (L) 0.76 - 1.27 mg/dL    Sodium 134 (L) 136 - 145 mmol/L    Potassium 3.0 (L) 3.5 - 5.2 mmol/L    Chloride 92 (L) 98 - 107 mmol/L    CO2 18.6 (L) 22.0 - 29.0 mmol/L    Calcium 9.1 8.6 - 10.5 mg/dL    Total Protein 8.6 (H) 6.0 - 8.5 g/dL    Albumin 4.0 3.5 - 5.2 g/dL    ALT (SGPT) 39 1 - 41 U/L    AST (SGOT) 190 (H) 1 - 40 U/L    Alkaline Phosphatase 222 (H) 39 - 117 U/L    Total Bilirubin 0.8 0.0 - 1.2 mg/dL    Globulin 4.6 gm/dL    A/G Ratio 0.9 g/dL    BUN/Creatinine Ratio 9.3 7.0 - 25.0    Anion Gap 23.4 (H) 5.0 - 15.0 mmol/L    eGFR 110.6 >60.0 mL/min/1.73   Urinalysis With Microscopic If Indicated (No Culture) - Urine, Clean Catch    Specimen: Urine, Clean Catch   Result Value Ref Range    Color, UA Dark Yellow (A) Yellow, Straw    Appearance, UA Clear Clear    pH, UA 6.5 5.0 - 8.0    Specific Gravity, UA 1.021 1.005 - 1.030    Glucose, UA Negative Negative    Ketones, UA 40 mg/dL (2+) (A) Negative    Bilirubin, UA Small (1+) (A) Negative    Blood, UA Negative Negative    Protein,  mg/dL (2+) (A) Negative    Leuk Esterase, UA Negative Negative    Nitrite, UA Negative Negative    Urobilinogen, UA 1.0 E.U./dL 0.2 - 1.0 E.U./dL   Ethanol    Specimen: Blood   Result Value Ref Range    Ethanol <10 0 - 10 mg/dL    Ethanol % <0.010 %   Urine Drug Screen - Urine, Clean Catch    Specimen: Urine, Clean Catch   Result Value Ref Range    THC, Screen, Urine Negative Negative    Phencyclidine (PCP), Urine Negative Negative    Cocaine Screen, Urine Negative Negative    Methamphetamine, Ur Negative Negative    Opiate  Screen Negative Negative    Amphetamine Screen, Urine Negative Negative    Benzodiazepine Screen, Urine Negative Negative    Tricyclic Antidepressants Screen Negative Negative    Methadone Screen, Urine Negative Negative    Barbiturates Screen, Urine Negative Negative    Oxycodone Screen, Urine Positive (A) Negative    Buprenorphine, Screen, Urine Negative Negative   Magnesium    Specimen: Blood   Result Value Ref Range    Magnesium 1.9 1.6 - 2.6 mg/dL   CBC Auto Differential    Specimen: Blood   Result Value Ref Range    WBC 12.16 (H) 3.40 - 10.80 10*3/mm3    RBC 3.91 (L) 4.14 - 5.80 10*6/mm3    Hemoglobin 12.6 (L) 13.0 - 17.7 g/dL    Hematocrit 37.3 (L) 37.5 - 51.0 %    MCV 95.4 79.0 - 97.0 fL    MCH 32.2 26.6 - 33.0 pg    MCHC 33.8 31.5 - 35.7 g/dL    RDW 13.2 12.3 - 15.4 %    RDW-SD 46.2 37.0 - 54.0 fl    MPV 9.5 6.0 - 12.0 fL    Platelets 247 140 - 450 10*3/mm3    Neutrophil % 85.6 (H) 42.7 - 76.0 %    Lymphocyte % 9.3 (L) 19.6 - 45.3 %    Monocyte % 4.0 (L) 5.0 - 12.0 %    Eosinophil % 0.1 (L) 0.3 - 6.2 %    Basophil % 0.7 0.0 - 1.5 %    Immature Grans % 0.3 0.0 - 0.5 %    Neutrophils, Absolute 10.41 (H) 1.70 - 7.00 10*3/mm3    Lymphocytes, Absolute 1.13 0.70 - 3.10 10*3/mm3    Monocytes, Absolute 0.49 0.10 - 0.90 10*3/mm3    Eosinophils, Absolute 0.01 0.00 - 0.40 10*3/mm3    Basophils, Absolute 0.08 0.00 - 0.20 10*3/mm3    Immature Grans, Absolute 0.04 0.00 - 0.05 10*3/mm3    nRBC 0.0 0.0 - 0.2 /100 WBC   Fentanyl, Urine - Urine, Clean Catch    Specimen: Urine, Clean Catch   Result Value Ref Range    Fentanyl, Urine Negative Negative   Urinalysis, Microscopic Only - Urine, Clean Catch    Specimen: Urine, Clean Catch   Result Value Ref Range    RBC, UA None Seen None Seen, 0-2 /HPF    WBC, UA 0-2 None Seen, 0-2 /HPF    Bacteria, UA Trace (A) None Seen /HPF    Squamous Epithelial Cells, UA 3-6 (A) None Seen, 0-2 /HPF    Hyaline Casts, UA 3-6 None Seen /LPF    Granular Casts, UA 0-2 None Seen /LPF    Mucus, UA  Large/3+ (A) None Seen, Trace /HPF    Methodology Manual Light Microscopy    POC Glucose Once    Specimen: Blood   Result Value Ref Range    Glucose 180 (H) 70 - 130 mg/dL     CT Cervical Spine Without Contrast    Result Date: 6/30/2024  No acute fracture or malalignment.   This report was finalized on 6/30/2024 3:53 PM by Klarissa Lewis MD.      CT Head Without Contrast    Result Date: 6/30/2024  No CT evidence of an acute intracranial abnormality.   This report was finalized on 6/30/2024 3:51 PM by Klarissa Lewis MD.      CT Head Without Contrast    Result Date: 6/30/2024   1.  Mild generalized brain volume loss 2.  No acute hemorrhage, mass, infarct, or edema. 3.  No fracture or foreign body.    This report was finalized on 6/30/2024 4:03 AM by Ugo Ogden MD.      XR Chest 1 View    Result Date: 6/30/2024   1.  No acute process seen in the chest. 2.  No lobar consolidation or edema. 3.  No pleural effusion or pneumothorax.  This report was finalized on 6/30/2024 2:39 AM by Ugo Ogden MD.               Medical Decision Making  49-year-old male presents secondary to need for alcohol detox.  Patient was seen earlier and decided that he did not wish to stay however he is returned and wishes to now stay.  Patient denies any suicidal homicidal ideation.  He has a past medical history of acid reflux, hypertension, pancreatitis.  He presents by private vehicle.    Problems Addressed:  Alcohol abuse: complicated acute illness or injury    Amount and/or Complexity of Data Reviewed  Labs: ordered. Decision-making details documented in ED Course.  Radiology: ordered. Decision-making details documented in ED Course.  Discussion of management or test interpretation with external provider(s): Dr. Nash who felt the patient should be on the medical floor.  Reviewed with Dr. Archuleta who accepted to the medicine service.    Risk  OTC drugs.  Prescription drug management.  Decision regarding  hospitalization.        Final diagnoses:   Alcohol abuse       ED Disposition  ED Disposition       ED Disposition   Decision to Admit    Condition   --    Comment   Level of Care: [facility] [20]   Diagnosis: Delirium tremens [148589]   Admitting Physician: MARIO ARCHULETA [[id number]]   Attending Physician: MARIO ARCHULETA [[id number]]   Certification: I Certify That Inpatient Hospital Services Are Medically Necessary For Greater Than 2 Midnights                 No follow-up provider specified.       Medication List      No changes were made to your prescriptions during this visit.            Franck Garcia PA  24 1803      Electronically signed by Franck Garcia PA at 24 1803       Facility-Administered Medications as of 2024   Medication Dose Route Frequency Provider Last Rate Last Admin    acetaminophen (TYLENOL) tablet 500 mg  500 mg Oral Q6H PRN Mario Archuleta MD        Calcium Replacement - Follow Nurse / BPA Driven Protocol   Does not apply PRN Mario Archuleta MD        Enoxaparin Sodium (LOVENOX) syringe 40 mg  40 mg Subcutaneous Nightly Mario Archuleta MD   40 mg at 24    folic acid (FOLVITE) tablet 1 mg  1 mg Oral Daily Mario Archuleta MD   1 mg at 24    [COMPLETED] gabapentin (NEURONTIN) capsule 800 mg  800 mg Oral Once Matt Weaver MD   800 mg at 24    [] lactated ringers infusion  125 mL/hr Intravenous Continuous Mario Archuleta  mL/hr at 24 0210 125 mL/hr at 24 021    [COMPLETED] LORazepam (ATIVAN) 2 MG/ML injection  - ADS Override Pull        2 mg at 24 1523    LORazepam (ATIVAN) injection 2 mg  2 mg Intravenous Q6H Mario Archuleta MD   2 mg at 24 0202    Followed by    LORazepam (ATIVAN) injection 1 mg  1 mg Intravenous Q6H Mario Archuleta MD        LORazepam (ATIVAN) tablet 1 mg  1 mg Oral Q1H PRN Mario Archuleta MD        Or    LORazepam (ATIVAN) injection 1 mg  1 mg Intravenous Q1H  PRN Mario Archuleta MD        Or    LORazepam (ATIVAN) tablet 2 mg  2 mg Oral Q1H ROJELION Mario Archuleta MD        Or    LORazepam (ATIVAN) injection 2 mg  2 mg Intravenous Q1H ROJELION Mario Archuleta MD        Or    LORazepam (ATIVAN) injection 2 mg  2 mg Intravenous Q15 Min PRN Mario Archuleta MD        Or    LORazepam (ATIVAN) injection 2 mg  2 mg Intramuscular Q15 Min ROJELION Mario Archuleta MD        Or    LORazepam (ATIVAN) tablet 4 mg  4 mg Oral Q1H PRN Mario Archuleta MD        Or    LORazepam (ATIVAN) injection 4 mg  4 mg Intravenous Q1H ROJELION Mario Archuleta MD        [COMPLETED] LORazepam (ATIVAN) injection 2 mg  2 mg Intravenous Once Meño Vidal MD   2 mg at 06/30/24 1604    Magnesium Standard Dose Replacement - Follow Nurse / BPA Driven Protocol   Does not apply PRN Mario Archuleta MD        melatonin tablet 5 mg  5 mg Oral Nightly PRN Mario Archuleta MD        nicotine (NICODERM CQ) 21 MG/24HR patch 1 patch  1 patch Transdermal Daily PRN Mario Archuleta MD   1 patch at 06/30/24 1856    ondansetron ODT (ZOFRAN-ODT) disintegrating tablet 4 mg  4 mg Oral Once Franck Garcia PA        [COMPLETED] oxyCODONE (ROXICODONE) immediate release tablet 10 mg  10 mg Oral Once Ravin Paz MD   10 mg at 06/30/24 0124    [COMPLETED] oxyCODONE (ROXICODONE) immediate release tablet 10 mg  10 mg Oral Once Yoav Gutierrez MD   10 mg at 06/30/24 0632    oxyCODONE (ROXICODONE) immediate release tablet 10 mg  10 mg Oral Q4H PRN Mario Archuleta MD   10 mg at 07/01/24 0507    Phosphorus Replacement - Follow Nurse / BPA Driven Protocol   Does not apply PRN Mario Archuleta MD        polyethylene glycol (MIRALAX) packet 17 g  17 g Oral Daily PRN Mario Archuleta MD        [COMPLETED] potassium chloride (KLOR-CON) packet 40 mEq  40 mEq Oral Once Mario Archuleta MD   40 mEq at 07/01/24 0507    Potassium Replacement - Follow Nurse / BPA Driven Protocol   Does not apply PRN Franck Garcia, PA         "Potassium Replacement - Follow Nurse / BPA Driven Protocol   Does not apply PRN Mario Archuleta MD        sennosides-docusate (PERICOLACE) 8.6-50 MG per tablet 2 tablet  2 tablet Oral Nightly Mario Archuleta MD        [COMPLETED] sodium chloride 0.9 % bolus 1,000 mL  1,000 mL Intravenous Once Ravin Paz MD   Stopped at 06/30/24 0019    [COMPLETED] sodium chloride 0.9 % bolus 1,000 mL  1,000 mL Intravenous Once Franck Garcia PA   Stopped at 06/30/24 1742    sodium chloride 0.9 % flush 10 mL  10 mL Intravenous Q12H Mario Archuleta MD   10 mL at 06/30/24 2044    sodium chloride 0.9 % flush 10 mL  10 mL Intravenous PRN Mario Archuleta MD   10 mL at 06/30/24 1856    sodium chloride 0.9 % infusion 40 mL  40 mL Intravenous PRN Mario Archuleta MD        thiamine (B-1) 500 mg in sodium chloride 0.9 % 100 mL IVPB  500 mg Intravenous Q8H Mario Archuleta  mL/hr at 06/30/24 2330 500 mg at 06/30/24 2330    Followed by    [START ON 7/4/2024] thiamine (B-1) injection 200 mg  200 mg Intravenous Q8H Mario Archuleta MD        Followed by    [START ON 7/8/2024] thiamine (VITAMIN B-1) tablet 100 mg  100 mg Oral Daily Mario Archuleta MD         Orders (all)        Start     Ordered    07/08/24 0900  thiamine (VITAMIN B-1) tablet 100 mg  Daily        Placed in \"Followed by\" Linked Group    06/30/24 1831 07/04/24 0100  thiamine (B-1) injection 200 mg  Every 8 Hours Scheduled        Placed in \"Followed by\" Linked Group    06/30/24 1831 07/01/24 2000  LORazepam (ATIVAN) injection 1 mg  Every 6 Hours        Placed in \"Followed by\" Linked Group    06/30/24 1831 07/01/24 0844  Potassium  Timed         06/30/24 1944    07/01/24 0800  Oral Care  2 Times Daily       06/30/24 1831 07/01/24 0600  Basic Metabolic Panel  Morning Draw         06/30/24 1831 07/01/24 0600  CBC (No Diff)  Morning Draw         06/30/24 1831 07/01/24 0600  Hepatic Function Panel  Morning Draw         06/30/24 1806 " "   07/01/24 0600  Vitamin B12  Morning Draw         06/30/24 1809 07/01/24 0600  Folate  Morning Draw         06/30/24 1809 07/01/24 0430  potassium chloride (KLOR-CON) packet 40 mEq  Once         07/01/24 0148    06/30/24 2200  thiamine (B-1) 500 mg in sodium chloride 0.9 % 100 mL IVPB  Every 8 Hours Scheduled        Placed in \"Followed by\" Linked Group    06/30/24 1831 06/30/24 2115  gabapentin (NEURONTIN) capsule 800 mg  Once         06/30/24 2017 06/30/24 2100  sodium chloride 0.9 % flush 10 mL  Every 12 Hours Scheduled         06/30/24 1831 06/30/24 2100  Enoxaparin Sodium (LOVENOX) syringe 40 mg  Nightly         06/30/24 1831 06/30/24 2100  sennosides-docusate (PERICOLACE) 8.6-50 MG per tablet 2 tablet  Nightly         06/30/24 1831 06/30/24 2030  potassium chloride (KLOR-CON M20) CR tablet 40 mEq  Every 4 Hours,   Status:  Discontinued         06/30/24 1944    06/30/24 2000  Vital Signs  Every 4 Hours       06/30/24 1831 06/30/24 2000  folic acid (FOLVITE) tablet 1 mg  Daily         06/30/24 1831 06/30/24 2000  LORazepam (ATIVAN) injection 2 mg  Every 6 Hours        Placed in \"Followed by\" Linked Group    06/30/24 1831 06/30/24 1956  Connectors / Hubs Must Be Scrubbed 15 Seconds Using 70% Alcohol Before Access - Allow to Dry Before Accessing Line  Continuous         06/30/24 1955 06/30/24 1850  seizure pads  Once         06/30/24 1850 06/30/24 1847  lactated ringers infusion  Continuous         06/30/24 1831 06/30/24 1846  PT Consult: Eval & Treat Functional Mobility Below Baseline  Once         06/30/24 1845 06/30/24 1844  oxyCODONE (ROXICODONE) immediate release tablet 10 mg  Every 4 Hours PRN         06/30/24 1844 06/30/24 1832  Intake & Output  Every Shift       06/30/24 1831 06/30/24 1832  Weigh Patient  Once         06/30/24 1831 06/30/24 1832  Insert Peripheral IV  Once         06/30/24 1831    06/30/24 1832  Saline Lock & Maintain IV Access  " Continuous         06/30/24 1831    06/30/24 1832  Seizure Precautions  Continuous         06/30/24 1831    06/30/24 1832  Continuous Pulse Oximetry  Continuous         06/30/24 1831    06/30/24 1832  Bowel Regimen Not Indicated  Once         06/30/24 1831    06/30/24 1832  Initiate Alcohol Withdrawal Protocol  Once         06/30/24 1831    06/30/24 1832  Obtain Baseline Clinical [place] Withdrawal Assessment - Ar (CIWA-Ar), Sedation Scale & Vital Signs  Once        Comments: Follow CIWA Scoring Reference Guide    06/30/24 1831    06/30/24 1832  Clinical [place] Withdrawal Assessment (CIWA-Ar)  Per Hospital Policy         06/30/24 1831    06/30/24 1832  If CIWA-Ar Score Less Than 8 For 3 Consecutive Assessments, Monitor Every 4 Hours & Discontinue Assessment When CIWA-Ar Less Than 8 for 24 Hours  Per Hospital Policy        Comments: Contact Provider to Restart Protocol if Any Symptoms Reappear    06/30/24 1831    06/30/24 1832  Seizure Precautions  Continuous         06/30/24 1831    06/30/24 1832  Notify Provider - Withdrawal  Continuous        Comments: Open Order Report to View Parameters Requiring Provider Notification    06/30/24 1831    06/30/24 1832  Notify Provider of Abnormal Lab Results  Continuous        Comments: Open Order Report to View Parameters Requiring Provider Notification    06/30/24 1831    06/30/24 1832  Notify Provider - Vitals  Continuous        Comments: Open Order Report to View Parameters Requiring Provider Notification    06/30/24 1831    06/30/24 1832  Safety Precautions  Continuous         06/30/24 1831    06/30/24 1832  Diet: Liquid; Full Liquid; Fluid Consistency: Thin (IDDSI 0)  Diet Effective Now         06/30/24 1831    06/30/24 1831  sodium chloride 0.9 % flush 10 mL  As Needed         06/30/24 1831    06/30/24 1831  sodium chloride 0.9 % infusion 40 mL  As Needed         06/30/24 1831    06/30/24 1831  Potassium Replacement - Follow Nurse / BPA Driven Protocol  As Needed      "    06/30/24 1831    06/30/24 1831  Magnesium Standard Dose Replacement - Follow Nurse / BPA Driven Protocol  As Needed         06/30/24 1831    06/30/24 1831  Phosphorus Replacement - Follow Nurse / BPA Driven Protocol  As Needed         06/30/24 1831    06/30/24 1831  Calcium Replacement - Follow Nurse / BPA Driven Protocol  As Needed         06/30/24 1831    06/30/24 1831  acetaminophen (TYLENOL) tablet 500 mg  Every 6 Hours PRN         06/30/24 1831    06/30/24 1831  melatonin tablet 5 mg  Nightly PRN         06/30/24 1831    06/30/24 1831  polyethylene glycol (MIRALAX) packet 17 g  Daily PRN         06/30/24 1831    06/30/24 1831  nicotine (NICODERM CQ) 21 MG/24HR patch 1 patch  Daily PRN         06/30/24 1831    06/30/24 1831  LORazepam (ATIVAN) tablet 1 mg  Every 1 Hour PRN        Placed in \"Or\" Linked Group    06/30/24 1831    06/30/24 1831  LORazepam (ATIVAN) injection 1 mg  Every 1 Hour PRN        Placed in \"Or\" Linked Group    06/30/24 1831    06/30/24 1831  LORazepam (ATIVAN) tablet 2 mg  Every 1 Hour PRN        Placed in \"Or\" Linked Group    06/30/24 1831    06/30/24 1831  LORazepam (ATIVAN) injection 2 mg  Every 1 Hour PRN        Placed in \"Or\" Linked Group    06/30/24 1831    06/30/24 1831  LORazepam (ATIVAN) injection 2 mg  Every 15 Minutes PRN        Placed in \"Or\" Linked Group    06/30/24 1831    06/30/24 1831  LORazepam (ATIVAN) injection 2 mg  Every 15 Minutes PRN        Placed in \"Or\" Linked Group    06/30/24 1831    06/30/24 1831  LORazepam (ATIVAN) tablet 4 mg  Every 1 Hour PRN        Placed in \"Or\" Linked Group    06/30/24 1831    06/30/24 1831  LORazepam (ATIVAN) injection 4 mg  Every 1 Hour PRN        Placed in \"Or\" Linked Group    06/30/24 1831    06/30/24 1724  sodium chloride 0.9 % bolus 1,000 mL  Once         06/30/24 1708    06/30/24 1713  Code Status and Medical Interventions:  Continuous         06/30/24 1716    06/30/24 1711  Inpatient Admission  Once         06/30/24 1710    " 06/30/24 1708  Potassium Replacement - Follow Nurse / BPA Driven Protocol  As Needed         06/30/24 1708    06/30/24 1613  LORazepam (ATIVAN) injection 2 mg  Once         06/30/24 1557    06/30/24 1550  Rapid EEG  Continuous        Comments: ceribel    06/30/24 1552    06/30/24 1537  POC Glucose Once  PROCEDURE ONCE        Comments: Complete no more than 45 minutes prior to patient eating      06/30/24 1522    06/30/24 1527  CT Head Without Contrast  1 Time Imaging         06/30/24 1527    06/30/24 1527  CT Cervical Spine Without Contrast  1 Time Imaging         06/30/24 1527    06/30/24 1517  LORazepam (ATIVAN) 2 MG/ML injection  - ADS Override Pull        Note to Pharmacy: Created by cabinet override    06/30/24 1517    06/30/24 1503  ondansetron ODT (ZOFRAN-ODT) disintegrating tablet 4 mg  Once         06/30/24 1447    06/30/24 1503  potassium chloride (KLOR-CON M20) CR tablet 40 mEq  Once,   Status:  Discontinued         06/30/24 1447    06/30/24 1456  Urinalysis, Microscopic Only - Urine, Clean Catch  Once         06/30/24 1455    06/30/24 1448  Fentanyl, Urine - Urine, Clean Catch  Once         06/30/24 1447    06/30/24 1349  CBC & Differential  Once         06/30/24 1348    06/30/24 1349  Comprehensive Metabolic Panel  Once         06/30/24 1348    06/30/24 1349  Urinalysis With Microscopic If Indicated (No Culture) - Urine, Clean Catch  Once         06/30/24 1348    06/30/24 1349  Ethanol  Once         06/30/24 1348    06/30/24 1349  Urine Drug Screen - Urine, Clean Catch  Once         06/30/24 1348    06/30/24 1349  Magnesium  Once         06/30/24 1348    06/30/24 1349  CBC Auto Differential  PROCEDURE ONCE         06/30/24 1348    Unscheduled  Up With Assistance  As Needed       06/30/24 1831    Unscheduled  Obtain Pre & Post Sedation Scores With Every Sedative Dose - Hold For POSS Greater Than 2 or RASS of -3 or Less  As Needed       06/30/24 1831    Unscheduled  Change Dressing to IV Site As Needed  When Damp, Loose or Soiled  As Needed       06/30/24 1955    Unscheduled  Change Needleless Connectors  As Needed      Comments: Change Needleless Connectors When:  - Administration Set Changed  - Dressing Changed  - Removed For Any Reason  - Residual Blood or Debris Within Connector  - Prior to Drawing Blood Cultures  - Contamination of Connector  - After Administration of Blood or Blood Components    06/30/24 1955    Unscheduled  Insert New Peripheral IV  As Needed      Comments: Frequency Per Facility Policy    06/30/24 1955    Signed and Held  NPO Diet NPO Type: Strict NPO  Diet Effective Now,   Status:  Canceled         Signed and Held    Signed and Held  ondansetron ODT (ZOFRAN-ODT) disintegrating tablet 4 mg  Every 12 Hours PRN,   Status:  Canceled         Signed and Held                  Physician Progress Notes (all)    No notes of this type exist for this encounter.       Consult Notes (all)    No notes of this type exist for this encounter.

## 2024-07-01 NOTE — PLAN OF CARE
Problem: Adult Inpatient Plan of Care  Goal: Plan of Care Review  Outcome: Ongoing, Progressing    Pt resting comfortably on RA. Mild tremors present, VSS. Family at bedside. No questions or concerns at this time.

## 2024-07-01 NOTE — PLAN OF CARE
Goal Outcome Evaluation:  Plan of Care Reviewed With: patient        Progress: no change       Problem: Pain Chronic (Persistent) (Comorbidity Management)  Goal: Acceptable Pain Control and Functional Ability  Outcome: Ongoing, Progressing  Intervention: Manage Persistent Pain  Recent Flowsheet Documentation  Taken 7/1/2024 0200 by Carly Norris RN  Medication Review/Management: medications reviewed  Taken 7/1/2024 0100 by Carly Norris RN  Medication Review/Management: medications reviewed  Taken 6/30/2024 2300 by Carly Norris RN  Medication Review/Management: medications reviewed  Taken 6/30/2024 2100 by Carly Norris RN  Medication Review/Management: medications reviewed  Intervention: Optimize Psychosocial Wellbeing  Recent Flowsheet Documentation  Taken 7/1/2024 0200 by Carly Norris RN  Diversional Activities: television  Family/Support System Care: support provided  Taken 6/30/2024 2000 by Carly Norris RN  Diversional Activities: television  Family/Support System Care: support provided     Problem: Adult Inpatient Plan of Care  Goal: Plan of Care Review  Outcome: Ongoing, Progressing  Flowsheets (Taken 7/1/2024 0249)  Progress: no change  Plan of Care Reviewed With: patient  Goal: Patient-Specific Goal (Individualized)  Outcome: Ongoing, Progressing  Goal: Absence of Hospital-Acquired Illness or Injury  Outcome: Ongoing, Progressing  Intervention: Identify and Manage Fall Risk  Recent Flowsheet Documentation  Taken 7/1/2024 0200 by Carly Norris RN  Safety Promotion/Fall Prevention:   safety round/check completed   fall prevention program maintained  Taken 7/1/2024 0100 by Carly Norris RN  Safety Promotion/Fall Prevention:   safety round/check completed   fall prevention program maintained  Taken 6/30/2024 2300 by Carly Norris RN  Safety Promotion/Fall Prevention:   safety round/check completed   fall prevention program maintained  Taken 6/30/2024 2100 by Carly Norris, RN  Safety  Promotion/Fall Prevention:   safety round/check completed   fall prevention program maintained  Taken 6/30/2024 1900 by Carly Norris RN  Safety Promotion/Fall Prevention: safety round/check completed  Intervention: Prevent and Manage VTE (Venous Thromboembolism) Risk  Recent Flowsheet Documentation  Taken 6/30/2024 2000 by Carly Norris RN  VTE Prevention/Management: (lovenox) other (see comments)  Range of Motion: active ROM (range of motion) encouraged  Goal: Optimal Comfort and Wellbeing  Outcome: Ongoing, Progressing  Intervention: Provide Person-Centered Care  Recent Flowsheet Documentation  Taken 7/1/2024 0200 by Carly Norris RN  Trust Relationship/Rapport:   care explained   choices provided   reassurance provided   thoughts/feelings acknowledged  Taken 6/30/2024 2000 by Carly Norris RN  Trust Relationship/Rapport:   care explained   choices provided   reassurance provided   thoughts/feelings acknowledged  Goal: Readiness for Transition of Care  Outcome: Ongoing, Progressing     Problem: Fall Injury Risk  Goal: Absence of Fall and Fall-Related Injury  Outcome: Ongoing, Progressing  Intervention: Identify and Manage Contributors  Recent Flowsheet Documentation  Taken 7/1/2024 0200 by Carly Norris RN  Medication Review/Management: medications reviewed  Taken 7/1/2024 0100 by Carly Norris RN  Medication Review/Management: medications reviewed  Taken 6/30/2024 2300 by Carly Norris RN  Medication Review/Management: medications reviewed  Taken 6/30/2024 2100 by Carly Norris RN  Medication Review/Management: medications reviewed  Intervention: Promote Injury-Free Environment  Recent Flowsheet Documentation  Taken 7/1/2024 0200 by Carly Norris RN  Safety Promotion/Fall Prevention:   safety round/check completed   fall prevention program maintained  Taken 7/1/2024 0100 by Carly Norris RN  Safety Promotion/Fall Prevention:   safety round/check completed   fall prevention program  maintained  Taken 6/30/2024 2300 by Carly Norris, RN  Safety Promotion/Fall Prevention:   safety round/check completed   fall prevention program maintained  Taken 6/30/2024 2100 by Carly Norris, RN  Safety Promotion/Fall Prevention:   safety round/check completed   fall prevention program maintained  Taken 6/30/2024 1900 by Carly Norris, RN  Safety Promotion/Fall Prevention: safety round/check completed

## 2024-07-02 ENCOUNTER — READMISSION MANAGEMENT (OUTPATIENT)
Dept: CALL CENTER | Facility: HOSPITAL | Age: 49
End: 2024-07-02
Payer: MEDICARE

## 2024-07-02 VITALS
HEART RATE: 96 BPM | OXYGEN SATURATION: 94 % | DIASTOLIC BLOOD PRESSURE: 92 MMHG | BODY MASS INDEX: 23.58 KG/M2 | WEIGHT: 168.43 LBS | SYSTOLIC BLOOD PRESSURE: 127 MMHG | TEMPERATURE: 98.3 F | RESPIRATION RATE: 13 BRPM | HEIGHT: 71 IN

## 2024-07-02 LAB — POTASSIUM SERPL-SCNC: 3.8 MMOL/L (ref 3.5–5.2)

## 2024-07-02 PROCEDURE — 25010000002 LORAZEPAM PER 2 MG: Performed by: STUDENT IN AN ORGANIZED HEALTH CARE EDUCATION/TRAINING PROGRAM

## 2024-07-02 PROCEDURE — 25010000002 THIAMINE PER 100 MG: Performed by: STUDENT IN AN ORGANIZED HEALTH CARE EDUCATION/TRAINING PROGRAM

## 2024-07-02 PROCEDURE — 99239 HOSP IP/OBS DSCHRG MGMT >30: CPT | Performed by: STUDENT IN AN ORGANIZED HEALTH CARE EDUCATION/TRAINING PROGRAM

## 2024-07-02 PROCEDURE — 84132 ASSAY OF SERUM POTASSIUM: CPT | Performed by: STUDENT IN AN ORGANIZED HEALTH CARE EDUCATION/TRAINING PROGRAM

## 2024-07-02 RX ORDER — CHLORDIAZEPOXIDE HYDROCHLORIDE 5 MG/1
5 CAPSULE, GELATIN COATED ORAL EVERY 8 HOURS SCHEDULED
Qty: 2 CAPSULE | Refills: 0 | Status: SHIPPED | OUTPATIENT
Start: 2024-07-02 | End: 2024-07-03

## 2024-07-02 RX ORDER — HYDROXYZINE HYDROCHLORIDE 10 MG/1
10 TABLET, FILM COATED ORAL 2 TIMES DAILY PRN
Status: CANCELLED | OUTPATIENT
Start: 2024-07-02

## 2024-07-02 RX ORDER — PANTOPRAZOLE SODIUM 40 MG/1
40 TABLET, DELAYED RELEASE ORAL
Status: CANCELLED | OUTPATIENT
Start: 2024-07-02

## 2024-07-02 RX ORDER — AMLODIPINE BESYLATE 10 MG/1
10 TABLET ORAL DAILY
Status: CANCELLED | OUTPATIENT
Start: 2024-07-02

## 2024-07-02 RX ORDER — IBUPROFEN 800 MG/1
800 TABLET ORAL EVERY 6 HOURS PRN
Status: CANCELLED | OUTPATIENT
Start: 2024-07-02

## 2024-07-02 RX ORDER — CHLORDIAZEPOXIDE HYDROCHLORIDE 5 MG/1
5 CAPSULE, GELATIN COATED ORAL EVERY 8 HOURS SCHEDULED
Status: DISCONTINUED | OUTPATIENT
Start: 2024-07-02 | End: 2024-07-02 | Stop reason: HOSPADM

## 2024-07-02 RX ORDER — PRAVASTATIN SODIUM 40 MG
20 TABLET ORAL DAILY
Status: CANCELLED | OUTPATIENT
Start: 2024-07-02

## 2024-07-02 RX ORDER — DULOXETIN HYDROCHLORIDE 30 MG/1
30 CAPSULE, DELAYED RELEASE ORAL DAILY
Status: CANCELLED | OUTPATIENT
Start: 2024-07-02

## 2024-07-02 RX ORDER — GAUZE BANDAGE 2" X 2"
250 BANDAGE TOPICAL DAILY
Qty: 75 TABLET | Refills: 0 | Status: SHIPPED | OUTPATIENT
Start: 2024-07-02

## 2024-07-02 RX ORDER — METAXALONE 800 MG/1
800 TABLET ORAL 3 TIMES DAILY PRN
Status: CANCELLED | OUTPATIENT
Start: 2024-07-02

## 2024-07-02 RX ORDER — TRAZODONE HYDROCHLORIDE 50 MG/1
50 TABLET ORAL NIGHTLY PRN
Status: CANCELLED | OUTPATIENT
Start: 2024-07-02

## 2024-07-02 RX ORDER — VALSARTAN 80 MG/1
160 TABLET ORAL DAILY
Status: CANCELLED | OUTPATIENT
Start: 2024-07-02

## 2024-07-02 RX ADMIN — GABAPENTIN 800 MG: 400 CAPSULE ORAL at 05:43

## 2024-07-02 RX ADMIN — ACETAMINOPHEN 500 MG: 500 TABLET ORAL at 08:10

## 2024-07-02 RX ADMIN — Medication 10 ML: at 08:10

## 2024-07-02 RX ADMIN — OXYCODONE HYDROCHLORIDE 10 MG: 10 TABLET ORAL at 05:43

## 2024-07-02 RX ADMIN — LORAZEPAM 1 MG: 2 INJECTION INTRAMUSCULAR; INTRAVENOUS at 01:11

## 2024-07-02 RX ADMIN — THIAMINE HYDROCHLORIDE 500 MG: 100 INJECTION, SOLUTION INTRAMUSCULAR; INTRAVENOUS at 01:30

## 2024-07-02 RX ADMIN — LORAZEPAM 1 MG: 2 INJECTION INTRAMUSCULAR; INTRAVENOUS at 08:12

## 2024-07-02 RX ADMIN — GABAPENTIN 800 MG: 400 CAPSULE ORAL at 14:03

## 2024-07-02 RX ADMIN — OXYCODONE HYDROCHLORIDE 10 MG: 10 TABLET ORAL at 09:40

## 2024-07-02 RX ADMIN — Medication 1 MG: at 08:10

## 2024-07-02 RX ADMIN — THIAMINE HYDROCHLORIDE 500 MG: 100 INJECTION, SOLUTION INTRAMUSCULAR; INTRAVENOUS at 08:12

## 2024-07-02 RX ADMIN — OXYCODONE HYDROCHLORIDE 10 MG: 10 TABLET ORAL at 01:11

## 2024-07-02 RX ADMIN — CHLORDIAZEPOXIDE HYDROCHLORIDE 5 MG: 5 CAPSULE ORAL at 14:03

## 2024-07-02 NOTE — DISCHARGE INSTR - APPOINTMENTS
Follow up with PCP on July 9, @ 10:15 AM   *Patient will be seeing Yolanda York due to Aby Quinonez being away on vacation next week.

## 2024-07-02 NOTE — DISCHARGE SUMMARY
[facility] DISCHARGE SUMMARY    Patient Identification:  Name:  Taiwo Pierce  Age:  49 y.o.  Sex:  male  :  1975  MRN:  [id number]  Visit Number:  [id number]    Date of Admission: 2024  Date of Discharge: 24     PCP: Aby Quinonez APRN    DISCHARGE DIAGNOSES  #Acute alcohol withdrawal c/b delirium tremens  #Alcoholic encephalopathy with sequela of recent seizure/post-ictal period  #Hypokalemia  #AGMA Metabolic acidosis  #Clinically insignificant hyponatremia   #Mild normocytic anemia   #Prolonged qtc       CONSULTS   Consults       No orders found from 2024 to 2024.            PROCEDURES PERFORMED  -    HOSPITAL COURSE  In brief, Taiwo Pierce is a 49 y.o. male with above noted PMH that presented initially with alcohol withdrawal c/b witnessed seizure like activity while in ER triage. Patient was admitted to PCU and monitored with ativan taper protocol as well as high dose thiamine and prn potassium replacement for hypokalemia of 3.0 on admission.     Patient had no recurrent seizure activity and while on ativan taper had minimal CIWA scores <5 consistently thus once potassium normalized and patient was at end of ativan taper and seizure free >48hrs, he was determined to be safe to discharge home with low dose librium to finish taper (2 tabs total only) and has plans to enroll in live-in rehab program locally after return home. He is being discharged under care and supervision of his father with detailed treatment and f/u planned discussed with both family and patient prior to discharge. Of note, HCTZ was held at discharge given initial hypovolemia as well as hypokalemia and mild hyponatremia. Patient can repeat bmp as outpatient to monitor along with HTN management f/u w/pcp.        VITAL SIGNS:  Temp:  [97.5 °F (36.4 °C)-98.3 °F (36.8 °C)] 98.3 °F (36.8 °C)  Heart Rate:  [] 96  Resp:  [8-20] 13  BP: (121-144)/() 127/92  SpO2:  [90  %-100 %] 94 %  on   ;   Device (Oxygen Therapy): room air    Body mass index is 23.49 kg/m².  Wt Readings from Last 3 Encounters:   07/02/24 76.4 kg (168 lb 6.9 oz)   06/29/24 60.3 kg (133 lb)   05/14/24 82.6 kg (182 lb)       PHYSICAL EXAM:  Physical Exam  Vitals and nursing note reviewed.   Constitutional:       General: He is not in acute distress.  HENT:      Head: Normocephalic and atraumatic.      Mouth/Throat:      Comments: Edentulous  Eyes:      General: No scleral icterus.     Extraocular Movements: Extraocular movements intact.   Cardiovascular:      Rate and Rhythm: Normal rate and regular rhythm.   Pulmonary:      Effort: Pulmonary effort is normal. No respiratory distress.   Abdominal:      Palpations: Abdomen is soft.      Tenderness: There is no abdominal tenderness.   Musculoskeletal:      Right lower leg: No edema.      Left lower leg: No edema.   Skin:     Comments: Small superficial bruising RUE (posterior forearm)   Neurological:      General: No focal deficit present.      Mental Status: He is alert. Mental status is at baseline.      Cranial Nerves: No cranial nerve deficit.   Psychiatric:         Mood and Affect: Mood normal.         Behavior: Behavior normal.          DISCHARGE DISPOSITION   Stable       Discharge Medications        New Medications        Instructions Start Date   chlordiazePOXIDE 5 MG capsule  Commonly known as: LIBRIUM   5 mg, Oral, Every 8 Hours Scheduled      Thiamine Mononitrate 100 MG tablet   Take 2 & 1/2 tablets by mouth Daily.             Continue These Medications        Instructions Start Date   amLODIPine 10 MG tablet  Commonly known as: NORVASC   10 mg, Oral, Daily      DULoxetine 30 MG capsule  Commonly known as: CYMBALTA   30 mg, Oral, Daily      gabapentin 800 MG tablet  Commonly known as: NEURONTIN   800 mg, Oral, 3 Times Daily      hydrOXYzine 10 MG tablet  Commonly known as: ATARAX   10 mg, Oral, 2 Times Daily PRN      ibuprofen 200 MG tablet  Commonly  known as: ADVIL,MOTRIN   800 mg, Oral, Every 6 Hours PRN      lansoprazole 30 MG capsule  Commonly known as: PREVACID   30 mg, Oral, Daily      metaxalone 800 MG tablet  Commonly known as: SKELAXIN   800 mg, Oral, 3 Times Daily PRN      mirtazapine 7.5 MG tablet  Commonly known as: REMERON   7.5 mg, Oral, Nightly      oxyCODONE 10 MG tablet  Commonly known as: ROXICODONE   10 mg, Oral, 5 Times Daily      pravastatin 20 MG tablet  Commonly known as: PRAVACHOL   20 mg, Oral, Daily      traZODone 50 MG tablet  Commonly known as: DESYREL   50 mg, Oral, Nightly PRN      valsartan 160 MG tablet  Commonly known as: DIOVAN   160 mg, Oral, Daily             Stop These Medications      hydroCHLOROthiazide 25 MG tablet                Additional Instructions for the Follow-ups that You Need to Schedule       Discharge Follow-up with PCP   As directed       Currently Documented PCP:    Aby Quinonez APRN    PCP Phone Number:    [phone]     Follow Up Details: Within 1 week of dc               Follow-up Information       Aby Quinonez APRN .    Specialty: Nurse Practitioner  Why: Within 1 week of dc  Contact information:  [address] [place] [id number]  [phone]                              TEST  RESULTS PENDING AT DISCHARGE      I will notify patient via phone-call should above lab work result with any significant abnormal findings     CODE STATUS  Code Status and Medical Interventions:   Ordered at: 06/30/24 1716     Code Status (Patient has no pulse and is not breathing):    CPR (Attempt to Resuscitate)     Medical Interventions (Patient has pulse or is breathing):    Full Support             Mario Archuleta MD  [facility] Hospitalist  07/02/24  16:10 EDT    Please note that this discharge summary required more than 30 minutes to complete.

## 2024-07-02 NOTE — NURSING NOTE
Nurse called to the patient's room. Patient was crying and requested more pain medication. Patient was given Oxycodone 10 mg @ 0111 it was not time for another dose. He ask me to call the MD and ask for something more for pain.  I tried to help relieve some of his pain by repositioning patient and moving him to a recliner since he said the bed was uncomfortable.  I then called the MD and notified him.

## 2024-07-02 NOTE — PLAN OF CARE
Problem: Pain Chronic (Persistent) (Comorbidity Management)  Goal: Acceptable Pain Control and Functional Ability  Outcome: Ongoing, Progressing  Intervention: Optimize Psychosocial Wellbeing  Recent Flowsheet Documentation  Taken 7/2/2024 0245 by Ramona Dorman RN  Diversional Activities:   television   smartphone  Family/Support System Care: support provided  Taken 7/1/2024 2045 by Ramona Dorman RN  Diversional Activities:   television   smartphone  Family/Support System Care: support provided     Problem: Adult Inpatient Plan of Care  Goal: Plan of Care Review  Outcome: Ongoing, Progressing  Flowsheets (Taken 7/2/2024 0508)  Progress: no change  Plan of Care Reviewed With: patient  Outcome Evaluation: Patient alert and oriented x 4. Respiration even and unlabored on room air.  No siezures during this shift.  Call light in reach.  Goal: Patient-Specific Goal (Individualized)  Outcome: Ongoing, Progressing  Goal: Absence of Hospital-Acquired Illness or Injury  Outcome: Ongoing, Progressing  Intervention: Identify and Manage Fall Risk  Recent Flowsheet Documentation  Taken 7/2/2024 0500 by Ramona Dorman RN  Safety Promotion/Fall Prevention: safety round/check completed  Taken 7/2/2024 0300 by Ramona Dorman RN  Safety Promotion/Fall Prevention: safety round/check completed  Taken 7/2/2024 0245 by Ramona Dorman RN  Safety Promotion/Fall Prevention:   safety round/check completed   activity supervised   assistive device/personal items within reach   clutter free environment maintained   fall prevention program maintained   nonskid shoes/slippers when out of bed   room organization consistent  Taken 7/2/2024 0100 by Ramona Dorman RN  Safety Promotion/Fall Prevention: safety round/check completed  Taken 7/1/2024 2300 by Ramona Dorman RN  Safety Promotion/Fall Prevention: safety round/check completed  Taken 7/1/2024 2100 by Ramona Dorman RN  Safety Promotion/Fall Prevention: safety round/check completed  Taken  7/1/2024 2045 by Ramona Dorman RN  Safety Promotion/Fall Prevention:   activity supervised   assistive device/personal items within reach   clutter free environment maintained   fall prevention program maintained   nonskid shoes/slippers when out of bed   room organization consistent   safety round/check completed  Taken 7/1/2024 1900 by Ramona Dorman RN  Safety Promotion/Fall Prevention: safety round/check completed  Intervention: Prevent Skin Injury  Recent Flowsheet Documentation  Taken 7/2/2024 0245 by Ramona Dorman RN  Body Position: position changed independently  Taken 7/1/2024 2045 by Ramona Dorman RN  Body Position: position changed independently  Intervention: Prevent and Manage VTE (Venous Thromboembolism) Risk  Recent Flowsheet Documentation  Taken 7/2/2024 0245 by Ramona Dorman RN  Activity Management: activity encouraged  VTE Prevention/Management: (See MAR) other (see comments)  Range of Motion: active ROM (range of motion) encouraged  Taken 7/1/2024 2045 by Ramona Dorman RN  Activity Management: activity encouraged  VTE Prevention/Management: (See MAR) other (see comments)  Range of Motion: active ROM (range of motion) encouraged  Goal: Optimal Comfort and Wellbeing  Outcome: Ongoing, Progressing  Intervention: Provide Person-Centered Care  Recent Flowsheet Documentation  Taken 7/2/2024 0245 by Ramona Dorman RN  Trust Relationship/Rapport:   care explained   reassurance provided  Taken 7/1/2024 2045 by Ramona Dorman RN  Trust Relationship/Rapport:   care explained   reassurance provided  Goal: Readiness for Transition of Care  Outcome: Ongoing, Progressing     Problem: Fall Injury Risk  Goal: Absence of Fall and Fall-Related Injury  Outcome: Ongoing, Progressing  Intervention: Identify and Manage Contributors  Recent Flowsheet Documentation  Taken 7/2/2024 0245 by Ramona Doramn RN  Self-Care Promotion: independence encouraged  Taken 7/1/2024 2045 by Ramona Dorman RN  Self-Care Promotion:  independence encouraged  Intervention: Promote Injury-Free Environment  Recent Flowsheet Documentation  Taken 7/2/2024 0500 by Ramona Dorman RN  Safety Promotion/Fall Prevention: safety round/check completed  Taken 7/2/2024 0300 by Ramona Dorman RN  Safety Promotion/Fall Prevention: safety round/check completed  Taken 7/2/2024 0245 by Ramona Dorman RN  Safety Promotion/Fall Prevention:   safety round/check completed   activity supervised   assistive device/personal items within reach   clutter free environment maintained   fall prevention program maintained   nonskid shoes/slippers when out of bed   room organization consistent  Taken 7/2/2024 0100 by Ramona Dorman RN  Safety Promotion/Fall Prevention: safety round/check completed  Taken 7/1/2024 2300 by Ramona Dorman RN  Safety Promotion/Fall Prevention: safety round/check completed  Taken 7/1/2024 2100 by Ramona Dorman RN  Safety Promotion/Fall Prevention: safety round/check completed  Taken 7/1/2024 2045 by Ramona Dorman RN  Safety Promotion/Fall Prevention:   activity supervised   assistive device/personal items within reach   clutter free environment maintained   fall prevention program maintained   nonskid shoes/slippers when out of bed   room organization consistent   safety round/check completed  Taken 7/1/2024 1900 by Ramona Dorman RN  Safety Promotion/Fall Prevention: safety round/check completed     Problem: Alcohol Withdrawal  Goal: Alcohol Withdrawal Symptom Control  Outcome: Ongoing, Progressing     Problem: Acute Neurologic Deterioration (Alcohol Withdrawal)  Goal: Optimal Neurologic Function  Outcome: Ongoing, Progressing     Problem: Substance Misuse (Alcohol Withdrawal)  Goal: Readiness for Change Identified  Outcome: Ongoing, Progressing  Intervention: Promote Psychosocial Wellbeing  Recent Flowsheet Documentation  Taken 7/2/2024 0245 by Ramona Dorman RN  Family/Support System Care: support provided  Taken 7/1/2024 2045 by Ramona Dorman  RN  Family/Support System Care: support provided     Problem: Electrolyte Imbalance  Goal: Electrolyte Balance  Outcome: Ongoing, Progressing  Intervention: Monitor and Manage Electrolyte Imbalance  Recent Flowsheet Documentation  Taken 7/2/2024 0245 by Ramona Dorman RN  Fluid/Electrolyte Management: fluids provided  Taken 7/1/2024 2045 by Ramona Dorman, RAYMON  Fluid/Electrolyte Management: fluids provided   Goal Outcome Evaluation:  Plan of Care Reviewed With: patient        Progress: no change  Outcome Evaluation: Patient alert and oriented x 4. Respiration even and unlabored on room air.  No siezures during this shift.  Call light in reach.

## 2024-07-02 NOTE — NURSING NOTE
Pt was walking in the hallway despite being educated to relax in the chair in his room. Pt walked where housekeeping had just mopped (the wet floor sign was up). Pt lost his footing and slipped on the wet floor. Pt grabbed the handrail and controlled his fall. Pt fell onto left knee but still was able to bear his weight. Pt picked himself back up and went and sat in the chair. Chair alarm added despite pt wanting to refuse it. Educated why he has to have the alarm on now. Pt states he is totally fine and ready to go home.

## 2024-07-02 NOTE — OUTREACH NOTE
Prep Survey      Flowsheet Row Responses   Orthodox facility patient discharged from? [facility]   Is LACE score < 7 ? No   Eligibility Not Eligible   What are the reasons patient is not eligible? Behavioral Health  [DT's]   Does the patient have one of the following disease processes/diagnoses(primary or secondary)? Other   Prep survey completed? Yes            CHOCO NEWTON - Registered Nurse

## 2024-07-02 NOTE — CASE MANAGEMENT/SOCIAL WORK
Continued Stay Note  JOHN Esquivel     Patient Name: Taiwo Pierce  MRN: [id number]  Today's Date: 7/2/2024    Admit Date: 6/30/2024     Discharge Plan       Row Name 07/02/24 1036       Plan    Final Discharge Disposition Code 01 - home or self-care    Final Note Pt is being discharged home on this date. No other needs identified.               Expected Discharge Date and Time       Expected Discharge Date Expected Discharge Time    Jul 2, 2024  2:00 PM          RADHA Holbrook

## 2024-07-03 LAB
QT INTERVAL: 412 MS
QTC INTERVAL: 589 MS

## 2024-07-03 NOTE — PAYOR COMM NOTE
"[facility]  NPI:[id number]    Utilization Review  Contact: Anh Hess RN  Phone: [phone]  Fax:[phone]    DISCHARGE NOTIFICATION    Taiwo Pierce (49 y.o. Male)       Date of Birth   1975    Social Security Number       Address   [address] [address]    Home Phone   [phone]    MRN   [id number]       Hindu   Jehovah's witness    Marital Status                               Admission Date   6/30/24    Admission Type   Emergency    Admitting Provider   Mario Archuleta MD    Attending Provider       Department, Room/Bed   [facility] PROGRESS CARE, P207/1P       Discharge Date   7/2/2024    Discharge Disposition   Home or Self Care    Discharge Destination                                 Attending Provider: (none)   Allergies: Augmentin [Amoxicillin-pot Clavulanate], Metoprolol    Isolation: None   Infection: None   Code Status: Prior    Ht: 180.3 cm (71\")   Wt: 76.4 kg (168 lb 6.9 oz)    Admission Cmt: None   Principal Problem: Delirium tremens [F10.931]                   Active Insurance as of 6/30/2024       Primary Coverage       Payor Plan Insurance Group Employer/Plan Group    ANTHEM MEDICARE REPLACEMENT ANTHEM MEDICARE ADVANTAGE [id number]       Payor Plan Address Payor Plan Phone Number Payor Plan Fax Number Effective Dates    [address] [phone]  1/1/2024 - None Entered    [facility] [address]         Subscriber Name Subscriber Birth Date Member ID       TAIWO PIERCE 1975 [id number]                     Emergency Contacts        (Rel.) Home Phone Work Phone Mobile Phone    NATHENKRISTA (Spouse) [phone] -- [phone]    CORA CINTRON (Daughter) [phone] -- --    Rosario Pierce [phone] -- --    Mario Francois (Relative) -- -- [phone]                 Discharge Summary        Mario Archuleta MD at 07/02/24 1419              [facility] HOSPITALISTS DISCHARGE " SUMMARY    Patient Identification:  Name:  Taiwo Pierce  Age:  49 y.o.  Sex:  male  :  1975  MRN:  [id number]  Visit Number:  [id number]    Date of Admission: 2024  Date of Discharge: 24     PCP: Aby Quinonez APRN    DISCHARGE DIAGNOSES  #Acute alcohol withdrawal c/b delirium tremens  #Alcoholic encephalopathy with sequela of recent seizure/post-ictal period  #Hypokalemia  #AGMA Metabolic acidosis  #Clinically insignificant hyponatremia   #Mild normocytic anemia   #Prolonged qtc       CONSULTS   Consults       No orders found from 2024 to 2024.            PROCEDURES PERFORMED  -    HOSPITAL COURSE  In brief, Taiwo Pierce is a 49 y.o. male with above noted PMH that presented initially with alcohol withdrawal c/b witnessed seizure like activity while in ER triage. Patient was admitted to PCU and monitored with ativan taper protocol as well as high dose thiamine and prn potassium replacement for hypokalemia of 3.0 on admission.     Patient had no recurrent seizure activity and while on ativan taper had minimal CIWA scores <5 consistently thus once potassium normalized and patient was at end of ativan taper and seizure free >48hrs, he was determined to be safe to discharge home with low dose librium to finish taper (2 tabs total only) and has plans to enroll in live-in rehab program locally after return home. He is being discharged under care and supervision of his father with detailed treatment and f/u planned discussed with both family and patient prior to discharge. Of note, HCTZ was held at discharge given initial hypovolemia as well as hypokalemia and mild hyponatremia. Patient can repeat bmp as outpatient to monitor along with HTN management f/u w/pcp.        VITAL SIGNS:  Temp:  [97.5 °F (36.4 °C)-98.3 °F (36.8 °C)] 98.3 °F (36.8 °C)  Heart Rate:  [] 96  Resp:  [8-20] 13  BP: (121-144)/() 127/92  SpO2:  [90 %-100 %] 94 %  on   ;   Device (Oxygen Therapy):  room air    Body mass index is 23.49 kg/m².  Wt Readings from Last 3 Encounters:   07/02/24 76.4 kg (168 lb 6.9 oz)   06/29/24 60.3 kg (133 lb)   05/14/24 82.6 kg (182 lb)       PHYSICAL EXAM:  Physical Exam  Vitals and nursing note reviewed.   Constitutional:       General: He is not in acute distress.  HENT:      Head: Normocephalic and atraumatic.      Mouth/Throat:      Comments: Edentulous  Eyes:      General: No scleral icterus.     Extraocular Movements: Extraocular movements intact.   Cardiovascular:      Rate and Rhythm: Normal rate and regular rhythm.   Pulmonary:      Effort: Pulmonary effort is normal. No respiratory distress.   Abdominal:      Palpations: Abdomen is soft.      Tenderness: There is no abdominal tenderness.   Musculoskeletal:      Right lower leg: No edema.      Left lower leg: No edema.   Skin:     Comments: Small superficial bruising RUE (posterior forearm)   Neurological:      General: No focal deficit present.      Mental Status: He is alert. Mental status is at baseline.      Cranial Nerves: No cranial nerve deficit.   Psychiatric:         Mood and Affect: Mood normal.         Behavior: Behavior normal.          DISCHARGE DISPOSITION   Stable       Discharge Medications        New Medications        Instructions Start Date   chlordiazePOXIDE 5 MG capsule  Commonly known as: LIBRIUM   5 mg, Oral, Every 8 Hours Scheduled      Thiamine Mononitrate 100 MG tablet   Take 2 & 1/2 tablets by mouth Daily.             Continue These Medications        Instructions Start Date   amLODIPine 10 MG tablet  Commonly known as: NORVASC   10 mg, Oral, Daily      DULoxetine 30 MG capsule  Commonly known as: CYMBALTA   30 mg, Oral, Daily      gabapentin 800 MG tablet  Commonly known as: NEURONTIN   800 mg, Oral, 3 Times Daily      hydrOXYzine 10 MG tablet  Commonly known as: ATARAX   10 mg, Oral, 2 Times Daily PRN      ibuprofen 200 MG tablet  Commonly known as: ADVIL,MOTRIN   800 mg, Oral, Every 6 Hours  PRN      lansoprazole 30 MG capsule  Commonly known as: PREVACID   30 mg, Oral, Daily      metaxalone 800 MG tablet  Commonly known as: SKELAXIN   800 mg, Oral, 3 Times Daily PRN      mirtazapine 7.5 MG tablet  Commonly known as: REMERON   7.5 mg, Oral, Nightly      oxyCODONE 10 MG tablet  Commonly known as: ROXICODONE   10 mg, Oral, 5 Times Daily      pravastatin 20 MG tablet  Commonly known as: PRAVACHOL   20 mg, Oral, Daily      traZODone 50 MG tablet  Commonly known as: DESYREL   50 mg, Oral, Nightly PRN      valsartan 160 MG tablet  Commonly known as: DIOVAN   160 mg, Oral, Daily             Stop These Medications      hydroCHLOROthiazide 25 MG tablet                Additional Instructions for the Follow-ups that You Need to Schedule       Discharge Follow-up with PCP   As directed       Currently Documented PCP:    Aby Quinonez APRN    PCP Phone Number:    [phone]     Follow Up Details: Within 1 week of dc               Follow-up Information       Aby Quinonez APRN .    Specialty: Nurse Practitioner  Why: Within 1 week of dc  Contact information:  [address] [place] [id number]  [phone]                              TEST  RESULTS PENDING AT DISCHARGE      I will notify patient via phone-call should above lab work result with any significant abnormal findings     CODE STATUS  Code Status and Medical Interventions:   Ordered at: 06/30/24 5753     Code Status (Patient has no pulse and is not breathing):    CPR (Attempt to Resuscitate)     Medical Interventions (Patient has pulse or is breathing):    Full Support             Mario Archuleta MD  [facility] Hospitalist  07/02/24  16:10 EDT    Please note that this discharge summary required more than 30 minutes to complete.        Electronically signed by Mario Archuleta MD at 07/02/24 0960

## 2024-07-17 ENCOUNTER — OFFICE VISIT (OUTPATIENT)
Dept: PSYCHIATRY | Facility: CLINIC | Age: 49
End: 2024-07-17
Payer: MEDICARE

## 2024-07-17 DIAGNOSIS — Z79.899 MEDICATION MANAGEMENT: ICD-10-CM

## 2024-07-17 DIAGNOSIS — G89.4 CHRONIC PAIN DISORDER: ICD-10-CM

## 2024-07-17 DIAGNOSIS — F10.20 ALCOHOL USE DISORDER, SEVERE, DEPENDENCE: Primary | ICD-10-CM

## 2024-07-17 PROCEDURE — 90791 PSYCH DIAGNOSTIC EVALUATION: CPT | Performed by: SOCIAL WORKER

## 2024-07-18 NOTE — PROGRESS NOTES
INITIAL EVALUATION/ASSESSMENT    DATE: 07/17/2024  TIME:  3730-1119    IDENTIFYING INFORMATION:   Taiwo Pierce, is a 49 y.o. male presents today for an initial PHP/IOP assessment and initial with Ben Ortega LCSW, at [facility]. Pt currently resides in [place], [place] and lives with his spouse of 9 years and his stepson, age 10.  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 He is not disabled due to heavy lifting and back problems. He has had back surgery twice and neck surgery once. He has two years of college education.  Patient is court-ordered for PHP/IOP tx.  [employer] by  Maurice Grant of [facility].  Pt identifies sober support as his parents,  his adult daughter, his spouse, a good friend, etc. and describes home environment as healthy. Marital Status: .  .      Name of PCP: Jesusita Martinez  Referral source: Self (formerly in IOP)    HPI, ONSET, DURATION, COURSE:  Add narrative history and progression of disease, any pertinent details:    After patient left the IOP a few months ago, he started back to drinking. His daughter petitioned him with [employer]. His family had told him that he was killing himself with alcohol. His wife said she would leave if he continued drinking alcohol. Patient explained that he was in the ER recently and coded. He was sent to the medical floor for detoxification because they had deemed him too high a risk. After three days, the patient went home. Having had this experience, the patient gained some clarity and could see how much of a problem alcohol had become in his life. This is something about which he had been overconfident while in [facility] earlier this year.     Pt's reports his drug addiction began at age 42.  Pt was introduced to substances in the form of alcohol.  Pt's drug use over time has been escalating to daily use.  Patient has used substances to self-medicate for insomnia. Longest length of sobriety: 2 months. Pt's relapse hx:  physical pain from having his teeth pulled. Pain medication broke his teeth. Prescribed pain medication, Oxycodone 10mg 5x daily for the past 15 years.     Prior substance abuse tx hx includes: IOP    Previous Psychiatric History    History of prior treatment or hospitalization: No     History of use of psychotropics: No     History of suicide attempts:  No     History of violence: No     Family Psychiatric History:     Family history is significant for psychiatric issues: No     Family history is significant for substance abuse issues:  No     Life Events/Trauma History  (Verbal/physical/sexual abuse, rape, assault, domestic violence, death/loss, major accident/tragedy)     Pt's trauma hx includes: none     Any Additional Personal History: Spouse was ready to leave him    Medical History    I have reviewed this patient's past medical history.    Are there any significant health issues (current or past): back issues, hypertension, acid reflux     Family History   Problem Relation Age of Onset    Hyperlipidemia Father     Hypertension Father     Breast cancer Mother     Cancer Mother         Breast cancer    Depression Mother         Depression nerve problems    Prostate cancer Paternal Grandfather     Cancer Paternal Grandfather         Prostate cancer    Hyperlipidemia Paternal Grandfather     Prostate cancer Maternal Grandfather     Arthritis Maternal Grandfather     Cancer Maternal Grandfather         Prostate cancer    Heart disease Maternal Grandfather         Heart attack@90    Hyperlipidemia Maternal Grandfather     Cancer Maternal Grandmother         Lung cancer    Depression Maternal Grandmother         Depression nerve problems    Stroke Maternal Grandmother         Brain anurism    Arthritis Paternal Uncle     Early death Sister         Brain didnt develop completely       Current Medications:   Current Outpatient Medications   Medication Sig Dispense Refill    amLODIPine (NORVASC) 10 MG tablet Take 1  tablet by mouth Daily. Indications: High Blood Pressure Disorder      DULoxetine (CYMBALTA) 30 MG capsule Take 1 capsule by mouth Daily.      gabapentin (NEURONTIN) 800 MG tablet Take 1 tablet by mouth 3 (Three) Times a Day. Indications: Neuropathic Pain      hydrOXYzine (ATARAX) 10 MG tablet Take 1 tablet by mouth 2 (Two) Times a Day As Needed for Anxiety.      ibuprofen (ADVIL,MOTRIN) 200 MG tablet Take 4 tablets by mouth Every 6 (Six) Hours As Needed for Mild Pain.      lansoprazole (PREVACID) 30 MG capsule Take 1 capsule by mouth Daily. Indications: Heartburn      metaxalone (SKELAXIN) 800 MG tablet Take 1 tablet by mouth 3 (Three) Times a Day As Needed for Muscle Spasms. Indications: Musculoskeletal Pain      mirtazapine (REMERON) 7.5 MG tablet TAKE 1 TABLET BY MOUTH DAILY AT NIGHT 30 tablet 0    oxyCODONE (ROXICODONE) 10 MG tablet Take 1 tablet by mouth 5 (Five) Times a Day. Indications: Chronic Pain      pravastatin (PRAVACHOL) 20 MG tablet Take 1 tablet by mouth Daily. 90 tablet 0    Thiamine Mononitrate 100 MG tablet Take 2 & 1/2 tablets by mouth Daily. 75 tablet 0    traZODone (DESYREL) 50 MG tablet Take 1 tablet by mouth At Night As Needed for Sleep.      valsartan (DIOVAN) 160 MG tablet Take 1 tablet by mouth Daily.       No current facility-administered medications for this visit.       Relational History    Difficulty getting along with peers: no  Difficulty making new friendships: no  Difficulty maintaining friendships: no  Close with family members: no    Legal History    Currently petitioned by [employer].    SUBSTANCE ABUSE HISTORY:     Substance Present Use Age 1st use Route Amount   (How Much) Frequency  (How Often) How Long at this Rate Last use   Nicotine yes 8 Chewing  One can daily 30 years today   Alcohol no 18 drinking One fifth daily Nearly two months 3 weeks ago   Marijuana no 42 smoking One joint daily 3-4 years 4 years ago   Benzos:  (type) no               Neurontin yes 33 Orally   800mg 3x daily One month (at this dose) today   Pain Pills:  (type)  Oxycodone yes 33 Orally  10mg 5x daily 15 years today   Cocaine no               Meth no               Heroin no               Methadone no               Suboxone no               Synthetics or other:    no                  History of DT's:No                    History of Seizures:Yes, describe: recently in ER    WITHDRAWAL SYMPTOMS: jitters, stomach cramps      Cravings:  Yes  Rate: 5        Personal Assessment 0-10 Scale (10 worst)    Anxiety:  7    Depression:  6      Patient adamantly and convincingly denies current suicidal or homicidal ideation or perceptual disturbance.     Urine drug screen today was presumptively positive for:  unable to complete  (Patient had been waiting upstairs for his appointment, and he used the restroom before having a chance to lab).     MSE:     Mental Status Exam  Hygiene:  good  Dress: casual  Attitude: evasive   Motor Activity: appropriate  Eye Contact:  good  Speech: regular rate and rhythm   Mood:  calm  Affect:   euthymic  Thought Processes:  Goal directed and Linear  Thought Content:  Normal  Suicidal Thoughts:  denies  Homicidal Thoughts:  denies  Crisis Safety Plan: yes, to come to the emergency room.  Hallucinations:  denies  Reliability: poor  Insight: poor  Judgement: poor  Impulse Control: poor    Patient resources/assets:  Supportive Family, Stable Living Situation, and Ability to read/write    ASAM Dimensions:  I.    Intoxication/Withdrawal:  0  (no current risk)  II.   Medical Conditions/Complications:  2 (back pain)  III.  Behavioral/Emotional/Cognitive: 2 (overconfidence)  IV.  Readiness to Change: 2 (extrinsic motivation)  V.   Relapse Risk: 1 (demonstrated ability to maintain, but not forthcoming with relapses)  VI:  Recovery Environment: 0 (supportive family)  Total ASAM Score = 7     BASELINE SCORES 07/17/2024       FEI-7   (not yet obtained)                  PHQ-9   (not yet obtained)        Impression/Formulation:    VISIT DIAGNOSIS:     ICD-10-CM ICD-9-CM   1. Alcohol use disorder, severe, dependence  F10.20 303.90   2. Chronic pain disorder  G89.4 338.4   3. Medication management  Z79.899 V58.69        Patient appeared alert and oriented.  Patient is voluntarily requesting to be admitted to PHP/IOP Phase I at [facility].  Patient is receptive to PHP/IOP for assistance with maintaining sobriety.  Patient presents with history of drug misuse and substance dependence.  Patient is agreeable to 12 step support groups and plans to attend meetings and obtain a sponsor.  Patient expressed strong desire to maintain sobriety and participate in group therapy.  Patient verbalized desire to live a lifestyle free of drugs and/or alcohol.  Patient identifies long-term goal as maintaining sobriety and appeasing the court.     Plan:    Patient appears to need assistance with maintaining sobriety due to a history of drug and alcohol abuse.  Patient has been unable to maintain sobriety after unsuccessful attempts to stop in the past.  Patient's strengths are motivation for treatment and desire to change.  Patient weaknesses include a history of substance misuse, lack of coping skills, and relapse when trying to quit without professional guidance.  Patient appears motivated by extrinsic factors.  Patient will be admitted to the  IOP program to begin on the next scheduled group date.

## 2024-07-24 ENCOUNTER — OFFICE VISIT (OUTPATIENT)
Dept: PSYCHIATRY | Facility: HOSPITAL | Age: 49
End: 2024-07-24
Payer: MEDICARE

## 2024-07-24 DIAGNOSIS — F10.20 ALCOHOL USE DISORDER, SEVERE, DEPENDENCE: Primary | ICD-10-CM

## 2024-07-24 DIAGNOSIS — G89.4 CHRONIC PAIN DISORDER: ICD-10-CM

## 2024-07-24 PROCEDURE — H0015 ALCOHOL AND/OR DRUG SERVICES: HCPCS | Performed by: SOCIAL WORKER

## 2024-07-24 NOTE — PROGRESS NOTES
"   NAME: Taiwo Pierce  DATE: 07/24/2024     IOP Phase I  [id number]    Services Provided    Group Psychotherapy x 2  Education/Training x 1  Activity Therapy  x 0  Individual Therapy x 0 0 minutes  Family Therapy x 0  MD Initial Visit  x 0  MD Follow-Up   x 0    Number of participants: 4    DATA:  Patient arrived at 0930. He had had to drop off his son in [facility] at his mother's house. No one else was around to take care of him. Everyone who would have normally cared for his son was sick. This was moderately frustrating for him this morning. He spent the remainder of his check-in sharing about the circumstances that led him to the IOP.     Individual: No    Homework: Assigned Values Discussion Questions    Group 1 (Psychotherapy: Check-ins): Therapist continued facilitation of rapport building strategies between group members. Therapist asked that each patient check in with home life and recovery efforts and identify triggers, cravings, and high risk situations that arise between group sessions.  Group members discussed barriers and benefits of attending recovery meetings.  Therapist provided empathy and support during group session. Daily Reading:  [employer] and The Fish    Group 2  (Bibliotherapy) Read the story, \"Agassiz and The Fish,\" and facilitated discussion, encouraging participants to relate the content back to their experience of recovery.     Group 3 (Values) Facilitated discussion on the topic of personal values.      ASSESSMENT:    Personal Assessment 0-10 Scale (10 worst)    Anxiety:  6 (no comment)  Depression:  5 (no comment)  Cravings: 5 (no comment)     MSE within normal limits? Yes Affect: euthymic Mood: anxious  Pt Response:  open/receptive  Engaged in activity/Process and self-disclosed: adequate  Applies today's group topics to self: suboptimal  Able to give and receive feedback: adequate  Demonstrated insightful thinking: occasional and suboptimal  Other pt response comments:  " Patient was a positive participant. They demonstrated a relatively optimistic attitude, a moderate degree of motivation, and moderate insight, as evidenced by his level of engagement combined with his difficulty comprehending certain concepts. They seemed interested and attentive. They appeared to have a fair degree of comprehension regarding the concepts being discussed . The patient seemed receptive of, and willing to implement, the ideas being discussed. Their thought process appeared circumstantial , and their speech was regular rate and rhythm.       Community Group Engagement:  No: not engaged    Reported relapse since last meeting? No: no lapse reported    .  Office Visit on 07/17/2024   Component Date Value Ref Range Status    External Amphetamine Screen Urine 07/19/2024 Negative   Final    External Benzodiazepine Screen Uri* 07/19/2024 Negative   Final    External Cocaine Screen Urine 07/19/2024 Negative   Final    External THC Screen Urine 07/19/2024 Negative   Final    External Methadone Screen Urine 07/19/2024 Negative   Final    External Methamphetamine Screen Ur* 07/19/2024 Negative   Final    External Oxycodone Screen Urine 07/19/2024 Negative   Final    External Buprenorphine Screen Urine 07/19/2024 Negative   Final    External MDMA 07/19/2024 Negative   Final    External Opiates Screen Urine 07/19/2024 Negative   Final       Impression/Formulation:    ICD-10-CM ICD-9-CM   1. Alcohol use disorder, severe, dependence  F10.20 303.90   2. Chronic pain disorder  G89.4 338.4        CLINICAL MANEUVERING/INTERVENTIONS: Therapist utilized a person-centered approach to build and maintain rapport with group member.   Therapist promoted safe nonjudgmental environment by providing group members with unconditional positive regard.  Assisted member in processing above session content; acknowledged and normalized patient's thoughts, feelings and concerns.  Allowed patient to freely discuss issues without  interruption or judgment. Provided safe, confidential environment to facilitate the development of positive therapeutic relationship and encourage open, honest communication. Therapist assisted group member with identifying and implementing healthier coping strategies and maintaining healthier boundaries. Assisted patient in identifying risk factors which would indicate the need for higher level of care including thoughts to harm self or others and/or self-harming behavior and encouraged patient to contact this office, call 911, or present to the nearest emergency room should any of these events occur. Pt agreeable to adhere to medication regimen as prescribed and report any side effects.  Discussed crisis intervention services and means to access.  Patient adamantly and convincingly denies current suicidal or homicidal ideation or perceptual disturbance.      Therapist implemented motivational interviewing techniques to assist client with exploring and resolving ambivalence associated with commitment to change behaviors.  Yes  Therapist utilized dialectical behavior techniques to teach and model emotional regulation and relaxation.  No   Therapist applied cognitive behavioral strategies to facilitate identification of maladaptive patterns of thinking and behavior.  Yes     PLAN:    Continue [facility] IOP Phase I  Aftercare:  [facility] Outpatient Phase 2      Please note that portions of this note were completed with a voice recognition program. Efforts were made to edit dictation, but occasionally words are mistranscribed.     This document signed by Ben Ortega LCSW, July 24, 2024, 11:52 EDT

## 2024-07-25 ENCOUNTER — OFFICE VISIT (OUTPATIENT)
Dept: PSYCHIATRY | Facility: HOSPITAL | Age: 49
End: 2024-07-25
Payer: MEDICARE

## 2024-07-25 DIAGNOSIS — G89.4 CHRONIC PAIN DISORDER: ICD-10-CM

## 2024-07-25 DIAGNOSIS — Z79.899 MEDICATION MANAGEMENT: Primary | ICD-10-CM

## 2024-07-25 DIAGNOSIS — F10.20 ALCOHOL USE DISORDER, SEVERE, DEPENDENCE: Primary | ICD-10-CM

## 2024-07-25 PROCEDURE — H0015 ALCOHOL AND/OR DRUG SERVICES: HCPCS | Performed by: SOCIAL WORKER

## 2024-07-25 NOTE — PROGRESS NOTES
Adult PHP Group      Date: July 25, 2024  Time: 8309-8010    Type of Group:  Peer Support    Group  Content: Old keys dont unlock new doors    Patient Response: Today we did a drug addiction writing exercise , patient was a positive participant. We went over the importance of having to stay away from old things such as people places and things. Encouraged to engage in group and given resources for meetings.        Sandhya Coles MA  07/25/24  11:31 EDT

## 2024-07-25 NOTE — PROGRESS NOTES
NAME: Taiwo Pierce  DATE: 07/25/2024    [facility] Phase I  [id number]    Services Provided    Group Psychotherapy x 1  Education/Training x 2  Activity Therapy  x 0  Individual Therapy x 0 0 minutes  Family Therapy x 0  MD Initial Visit  x 0  MD Follow-Up   x 0    Number of participants: 5    DATA:  Patient reported he was doing pretty well. He was feeling blessed. Patient reported he was late because he had turned around to obtain the worksheet that he had been given for homework. He had gone to bed at 11:00 and didn't fall asleep until 3:30. Then he awoke at 6:30. He was feeling tired, but otherwise reported his attitude was positive.     Individual: No    Homework: Assigned Values Self-exploration    Group 1 (Psychotherapy: Check-ins): Therapist continued facilitation of rapport building strategies between group members. Therapist asked that each patient check in with home life and recovery efforts and identify triggers, cravings, and high risk situations that arise between group sessions.  Group members discussed barriers and benefits of attending recovery meetings.  Therapist provided empathy and support during group session. Daily Reading: None    Group 2  (Values) Values discussion questions continued.     Group 3 ( Peer Support ) This hour of group was facilitated by Sandhya .     ASSESSMENT:    Personal Assessment 0-10 Scale (10 worst)    Anxiety:  6 (no comment)  Depression:  3 (no comment)  Cravings: 3 (no comment)     MSE within normal limits? Yes Affect: euthymic Mood: calm  Pt Response:  guarded  Engaged in activity/Process and self-disclosed: adequate  Applies today's group topics to self: suboptimal  Able to give and receive feedback: suboptimal  Demonstrated insightful thinking: occasional and suboptimal  Other pt response comments:  Patient was a positive participant. They demonstrated a relatively positive attitude, a moderate degree of motivation, and moderate insight,  as evidenced by his level of engagement combined with the degree to which he was vulnerable and self-disclosed. They seemed interested and attentive. They appeared to have a fair degree of comprehension regarding the concepts being discussed . The patient seemed doubtful of, and somewhat willing to implement, the ideas being discussed. Their thought process appeared circumstantial , and their speech was regular rate and rhythm.       Community Group Engagement:  Yes: [employer]    Reported relapse since last meeting? No: no lapse    .  Orders Only on 07/25/2024   Component Date Value Ref Range Status    External Amphetamine Screen Urine 07/25/2024 Negative   Final    External Benzodiazepine Screen Uri* 07/25/2024 Negative   Final    External Cocaine Screen Urine 07/25/2024 Negative   Final    External THC Screen Urine 07/25/2024 Negative   Final    External Methadone Screen Urine 07/25/2024 Negative   Final    External Methamphetamine Screen Ur* 07/25/2024 Negative   Final    External Oxycodone Screen Urine 07/25/2024 Positive (A)   Final    External Buprenorphine Screen Urine 07/25/2024 Negative   Final    External MDMA 07/25/2024 Negative   Final    External Opiates Screen Urine 07/25/2024 Positive (A)   Final   Office Visit on 07/17/2024   Component Date Value Ref Range Status    External Amphetamine Screen Urine 07/19/2024 Negative   Final    External Benzodiazepine Screen Uri* 07/19/2024 Negative   Final    External Cocaine Screen Urine 07/19/2024 Negative   Final    External THC Screen Urine 07/19/2024 Negative   Final    External Methadone Screen Urine 07/19/2024 Negative   Final    External Methamphetamine Screen Ur* 07/19/2024 Negative   Final    External Oxycodone Screen Urine 07/19/2024 Negative   Final    External Buprenorphine Screen Urine 07/19/2024 Negative   Final    External MDMA 07/19/2024 Negative   Final    External Opiates Screen Urine 07/19/2024 Negative   Final       Impression/Formulation:     ICD-10-CM ICD-9-CM   1. Alcohol use disorder, severe, dependence  F10.20 303.90   2. Chronic pain disorder  G89.4 338.4        CLINICAL MANEUVERING/INTERVENTIONS: Therapist utilized a person-centered approach to build and maintain rapport with group member.   Therapist promoted safe nonjudgmental environment by providing group members with unconditional positive regard.  Assisted member in processing above session content; acknowledged and normalized patient's thoughts, feelings and concerns.  Allowed patient to freely discuss issues without interruption or judgment. Provided safe, confidential environment to facilitate the development of positive therapeutic relationship and encourage open, honest communication. Therapist assisted group member with identifying and implementing healthier coping strategies and maintaining healthier boundaries. Assisted patient in identifying risk factors which would indicate the need for higher level of care including thoughts to harm self or others and/or self-harming behavior and encouraged patient to contact this office, call 911, or present to the nearest emergency room should any of these events occur. Pt agreeable to adhere to medication regimen as prescribed and report any side effects.  Discussed crisis intervention services and means to access.  Patient adamantly and convincingly denies current suicidal or homicidal ideation or perceptual disturbance.      Therapist implemented motivational interviewing techniques to assist client with exploring and resolving ambivalence associated with commitment to change behaviors.  Yes  Therapist utilized dialectical behavior techniques to teach and model emotional regulation and relaxation.  No   Therapist applied cognitive behavioral strategies to facilitate identification of maladaptive patterns of thinking and behavior.  Yes     PLAN:    Continue [facility] IOP Phase I  Aftercare:  [facility]  Outpatient  Phase 2      Please note that portions of this note were completed with a voice recognition program. Efforts were made to edit dictation, but occasionally words are mistranscribed.     This document signed by Ben Ortega LCSW, July 25, 2024, 15:04 EDT

## 2024-07-29 ENCOUNTER — OFFICE VISIT (OUTPATIENT)
Dept: PSYCHIATRY | Facility: HOSPITAL | Age: 49
End: 2024-07-29
Payer: MEDICARE

## 2024-07-29 DIAGNOSIS — G89.4 CHRONIC PAIN DISORDER: ICD-10-CM

## 2024-07-29 DIAGNOSIS — F10.20 ALCOHOL USE DISORDER, SEVERE, DEPENDENCE: Primary | ICD-10-CM

## 2024-07-29 DIAGNOSIS — F41.9 ANXIETY DISORDER, UNSPECIFIED TYPE: ICD-10-CM

## 2024-07-29 DIAGNOSIS — Z79.899 MEDICATION MANAGEMENT: ICD-10-CM

## 2024-07-29 PROCEDURE — H0015 ALCOHOL AND/OR DRUG SERVICES: HCPCS | Performed by: SOCIAL WORKER

## 2024-07-29 PROCEDURE — 1160F RVW MEDS BY RX/DR IN RCRD: CPT | Performed by: PSYCHIATRY & NEUROLOGY

## 2024-07-29 PROCEDURE — 90792 PSYCH DIAG EVAL W/MED SRVCS: CPT | Performed by: PSYCHIATRY & NEUROLOGY

## 2024-07-29 PROCEDURE — 1159F MED LIST DOCD IN RCRD: CPT | Performed by: PSYCHIATRY & NEUROLOGY

## 2024-07-29 RX ORDER — HYDROXYZINE HYDROCHLORIDE 25 MG/1
25 TABLET, FILM COATED ORAL EVERY 8 HOURS PRN
Status: SHIPPED | COMMUNITY
Start: 2024-07-29

## 2024-07-29 RX ORDER — DULOXETIN HYDROCHLORIDE 60 MG/1
60 CAPSULE, DELAYED RELEASE ORAL DAILY
Qty: 30 CAPSULE | Refills: 0 | Status: SHIPPED | OUTPATIENT
Start: 2024-07-29

## 2024-07-29 NOTE — PROGRESS NOTES
Subjective   Patient ID: Taiwo Pierce is a 49 y.o. male who is seen in the [facility] for an initial evaluation..     Chief Complaint: Alcohol use disorder    There were no vitals taken for this visit.    History of Present Illness  Taiwo Pierce is a 49 y.o. male today seen in the [facility] program.  The patient reports a history of alcohol use. First use was about two years ago when he started drinking alcohol because he was feeling down due to his health problems, has had back surgeries and is on disability. Over time the use increased and the patient  continued to use despite negative consequences including relationship problems, social and financial problems. The patient endorses symptoms of tolerance and withdrawals and ongoing cravings to use. Has tried to cut down and stop but has not been successful. Spends too much time and resources in pursuit of substance use. Longest period of sobriety is reported to be about 3 weeks.  He states he was drinking up to half a gallon of vodka every three days. Last use was four weeks ago.   The patient was in this [facility] program for alcohol use starting in April 16 and was in the program for six weeks but then decided to quit treatment and started drinking again.    His daughter petitioned him with [employer]. His family had told him that he was killing himself with alcohol. His wife said she would leave if he continued drinking alcohol. Patient explained that he was in the ER recently and coded. He was sent to the medical floor for detoxification because they had deemed him too high a risk. After three days, the patient went home. Having had this experience, the patient gained some clarity and could see how much of a problem alcohol had become in his life. This is something about which he had been overconfident while in [facility] earlier this year. The patient reports he is experiencing a lot of anxiety and part of it is due to his chronic back pain and it hurts all the time and  he is not able to sleep well. He is going to a pain clinic and gets Oxycodone 10 mg to be taken five times a day, and they help but are not as effective as they were.     The following portions of the patient's history were reviewed and updated as appropriate: allergies, current medications, past family history, past medical history, past social history, past surgical history, and problem list.    Past Psych History:  The patient reports a history of depression and anxiety and is taking medication for it.      Substance Use History: See HPI.     Medical History:    Past Medical History:   Diagnosis Date    Alcohol abuse     AMS (altered mental status)     Arthritis October 2007    Arthritis in back and neck    GERD (gastroesophageal reflux disease)     Headache 2007    Headaches due to disc in neck.    Hyperlipidemia     Hypertension     Low back pain 10/05/09    Hurt back and had back surgery 04/16/2014. Hit a nerve    Pancreatitis        Medications:   Current Outpatient Medications:     DULoxetine (CYMBALTA) 60 MG capsule, Take 1 capsule by mouth Daily., Disp: 30 capsule, Rfl: 0    hydrOXYzine (ATARAX) 25 MG tablet, Take 1 tablet by mouth Every 8 (Eight) Hours As Needed for Anxiety., Disp: , Rfl:     amLODIPine (NORVASC) 10 MG tablet, Take 1 tablet by mouth Daily. Indications: High Blood Pressure Disorder, Disp: , Rfl:     gabapentin (NEURONTIN) 800 MG tablet, Take 1 tablet by mouth 3 (Three) Times a Day. Indications: Neuropathic Pain, Disp: , Rfl:     ibuprofen (ADVIL,MOTRIN) 200 MG tablet, Take 4 tablets by mouth Every 6 (Six) Hours As Needed for Mild Pain., Disp: , Rfl:     lansoprazole (PREVACID) 30 MG capsule, Take 1 capsule by mouth Daily. Indications: Heartburn, Disp: , Rfl:     metaxalone (SKELAXIN) 800 MG tablet, Take 1 tablet by mouth 3 (Three) Times a Day As Needed for Muscle Spasms. Indications: Musculoskeletal Pain, Disp: , Rfl:     mirtazapine (REMERON) 7.5 MG tablet, TAKE 1 TABLET BY MOUTH DAILY AT  NIGHT, Disp: 30 tablet, Rfl: 0    oxyCODONE (ROXICODONE) 10 MG tablet, Take 1 tablet by mouth 5 (Five) Times a Day. Indications: Chronic Pain, Disp: , Rfl:     pravastatin (PRAVACHOL) 20 MG tablet, Take 1 tablet by mouth Daily., Disp: 90 tablet, Rfl: 0    Thiamine Mononitrate 100 MG tablet, Take 2 & 1/2 tablets by mouth Daily., Disp: 75 tablet, Rfl: 0    traZODone (DESYREL) 50 MG tablet, Take 1 tablet by mouth At Night As Needed for Sleep., Disp: , Rfl:     valsartan (DIOVAN) 160 MG tablet, Take 1 tablet by mouth Daily., Disp: , Rfl:     Review of Systems  Gen: No fever, chills  Resp: No soa, no cough  GI: No nvd  Musculoskeletal: Back pain    Family History    Family History   Problem Relation Age of Onset    Hyperlipidemia Father     Hypertension Father     Breast cancer Mother     Cancer Mother         Breast cancer    Depression Mother         Depression nerve problems    Prostate cancer Paternal Grandfather     Cancer Paternal Grandfather         Prostate cancer    Hyperlipidemia Paternal Grandfather     Prostate cancer Maternal Grandfather     Arthritis Maternal Grandfather     Cancer Maternal Grandfather         Prostate cancer    Heart disease Maternal Grandfather         Heart attack@90    Hyperlipidemia Maternal Grandfather     Cancer Maternal Grandmother         Lung cancer    Depression Maternal Grandmother         Depression nerve problems    Stroke Maternal Grandmother         Brain anurism    Arthritis Paternal Uncle     Early death Sister         Brain didnt develop completely       Objective   Mental Status Exam  Appearance:  clean and casually dressed, appropriate  Attitude toward clinician:  cooperative and agreeable   Speech:    Rate:  regular rate and rhythm   Volume:  normal  Motor:  no abnormal movements present  Mood:  Good  Affect:  euthymic  Thought Processes:  linear, logical, and goal directed  Thought Content:  normal  Suicidal Thoughts:  absent  Homicidal Thoughts:  absent  Perceptual  Disturbance: no perceptual disturbance  Attention and Concentration:  good  Insight and Judgement:  good  Memory:  memory appears to be intact    Strengths: Motivated for treatment    Weaknesses:Substance use and Poor coping skills    Problem List:   Patient Active Problem List   Diagnosis    GERD (gastroesophageal reflux disease)    Hyperlipidemia    Hypertension    Erectile dysfunction    Family history of diabetes mellitus in mother    Chronic pain syndrome    Disorder of prostate, unspecified    Vitamin D deficiency     Reactive depression    Restless leg    Right renal mass    Cervical spondylosis with radiculopathy    Pancreatitis    Alcoholic pancreatitis    Delirium, drug-induced    Alcohol abuse    Delirium tremens         Short term goals: Develop rapport and encourage compliance with treatment plan and followups. Initiate appropriate treatment and monitor for efficacy and side effects and make adjustments as indicated.    Long term goals: The patient to have complete resolution of symptoms of alcohol use disorder.      Lab Review:   Office Visit on 07/29/2024   Component Date Value Ref Range Status    External Amphetamine Screen Urine 07/29/2024 Negative   Final    External Benzodiazepine Screen Uri* 07/29/2024 Negative   Final    External Cocaine Screen Urine 07/29/2024 Negative   Final    External THC Screen Urine 07/29/2024 Negative   Final    External Methadone Screen Urine 07/29/2024 Negative   Final    External Methamphetamine Screen Ur* 07/29/2024 Negative   Final    External Oxycodone Screen Urine 07/29/2024 Positive (A)   Final    External Buprenorphine Screen Urine 07/29/2024 Negative   Final    External MDMA 07/29/2024 Negative   Final    External Opiates Screen Urine 07/29/2024 Positive (A)   Final   Orders Only on 07/25/2024   Component Date Value Ref Range Status    External Amphetamine Screen Urine 07/25/2024 Negative   Final    External Benzodiazepine Screen Uri* 07/25/2024 Negative    Final    External Cocaine Screen Urine 07/25/2024 Negative   Final    External THC Screen Urine 07/25/2024 Negative   Final    External Methadone Screen Urine 07/25/2024 Negative   Final    External Methamphetamine Screen Ur* 07/25/2024 Negative   Final    External Oxycodone Screen Urine 07/25/2024 Positive (A)   Final    External Buprenorphine Screen Urine 07/25/2024 Negative   Final    External MDMA 07/25/2024 Negative   Final    External Opiates Screen Urine 07/25/2024 Positive (A)   Final   Office Visit on 07/17/2024   Component Date Value Ref Range Status    External Amphetamine Screen Urine 07/19/2024 Negative   Final    External Benzodiazepine Screen Uri* 07/19/2024 Negative   Final    External Cocaine Screen Urine 07/19/2024 Negative   Final    External THC Screen Urine 07/19/2024 Negative   Final    External Methadone Screen Urine 07/19/2024 Negative   Final    External Methamphetamine Screen Ur* 07/19/2024 Negative   Final    External Oxycodone Screen Urine 07/19/2024 Negative   Final    External Buprenorphine Screen Urine 07/19/2024 Negative   Final    External MDMA 07/19/2024 Negative   Final    External Opiates Screen Urine 07/19/2024 Negative   Final       Assessment & Plan   Diagnoses and all orders for this visit:    1. Medication management (Primary)  -     [facility] Screen    2. Alcohol use disorder, severe, dependence    3. Anxiety disorder, unspecified type    4. Chronic pain disorder    Other orders  -     DULoxetine (CYMBALTA) 60 MG capsule; Take 1 capsule by mouth Daily.  Dispense: 30 capsule; Refill: 0      Return in about 2 weeks (around 8/12/2024).

## 2024-07-29 NOTE — PROGRESS NOTES
NAME: Taiwo Pierce  DATE: 07/29/2024    [facility] Phase I  [id number]    Services Provided    Group Psychotherapy x 2  Education/Training x 1  Activity Therapy  x 0  Individual Therapy x 0 0 minutes  Family Therapy x 0  MD Initial Visit  x 1  MD Follow-Up   x 0    Number of participants: 3    DATA:  Patient reported he still had room for improvement. He reported his anxiety seemed like it was up this morning. He supposed this was related to poor sleep. He estimated only achieving 3-4 hours of sleep each night. He reported he had been praying about cravings, that they would go away, and about the anxiety as well. He did not shared specifically what had been keeping awake at night and what had been making him anxious. He maintained that it was a generalized thing.     Individual: No    Homework: completed Values Self-exploration     Group 1 (Psychotherapy: Check-ins): Therapist continued facilitation of rapport building strategies between group members. Therapist asked that each patient check in with home life and recovery efforts and identify triggers, cravings, and high risk situations that arise between group sessions.  Group members discussed barriers and benefits of attending recovery meetings.  Therapist provided empathy and support during group session. Daily Reading: None    Group 2  (Values) Continued conversation about values by viewing the video, Fight depression and anxiety with your core values (2022) by Therapy in A [employer] with Renee Field on YouTube.     Group 3 (Values) Continued conversation about values.      ASSESSMENT:    Personal Assessment 0-10 Scale (10 worst)    Anxiety:  7 (no comment)  Depression:  4 (no comment)  Cravings: 3 (not as bad as they were)     MSE within normal limits? Yes Affect: somber Mood: anxious  Pt Response:  open/receptive  Engaged in activity/Process and self-disclosed: adequate  Applies today's group topics to self: adequate  Able to give and receive feedback:  suboptimal  Demonstrated insightful thinking: occasional and adequate  Other pt response comments:  Patient was a positive participant. They demonstrated a relatively positive attitude, a moderate degree of motivation, and moderate insight, as evidenced by his level of engagement combined with his efforts to maintain sobriety. They seemed interested and attentive. They appeared to comprehend well the concepts being discussed. The patient seemed receptive of, and willing to implement, the ideas being discussed. Their thought process appeared linear and goal directed, and their speech was regular rate and rhythm.       Community Group Engagement:  Yes: Baptism    Reported relapse since last meeting? No: none reported    .  Office Visit on 07/29/2024   Component Date Value Ref Range Status    External Amphetamine Screen Urine 07/29/2024 Negative   Final    External Benzodiazepine Screen Uri* 07/29/2024 Negative   Final    External Cocaine Screen Urine 07/29/2024 Negative   Final    External THC Screen Urine 07/29/2024 Negative   Final    External Methadone Screen Urine 07/29/2024 Negative   Final    External Methamphetamine Screen Ur* 07/29/2024 Negative   Final    External Oxycodone Screen Urine 07/29/2024 Positive (A)   Final    External Buprenorphine Screen Urine 07/29/2024 Negative   Final    External MDMA 07/29/2024 Negative   Final    External Opiates Screen Urine 07/29/2024 Positive (A)   Final   Orders Only on 07/25/2024   Component Date Value Ref Range Status    External Amphetamine Screen Urine 07/25/2024 Negative   Final    External Benzodiazepine Screen Uri* 07/25/2024 Negative   Final    External Cocaine Screen Urine 07/25/2024 Negative   Final    External THC Screen Urine 07/25/2024 Negative   Final    External Methadone Screen Urine 07/25/2024 Negative   Final    External Methamphetamine Screen Ur* 07/25/2024 Negative   Final    External Oxycodone Screen Urine 07/25/2024 Positive (A)   Final    External  Buprenorphine Screen Urine 07/25/2024 Negative   Final    External MDMA 07/25/2024 Negative   Final    External Opiates Screen Urine 07/25/2024 Positive (A)   Final   Office Visit on 07/17/2024   Component Date Value Ref Range Status    External Amphetamine Screen Urine 07/19/2024 Negative   Final    External Benzodiazepine Screen Uri* 07/19/2024 Negative   Final    External Cocaine Screen Urine 07/19/2024 Negative   Final    External THC Screen Urine 07/19/2024 Negative   Final    External Methadone Screen Urine 07/19/2024 Negative   Final    External Methamphetamine Screen Ur* 07/19/2024 Negative   Final    External Oxycodone Screen Urine 07/19/2024 Negative   Final    External Buprenorphine Screen Urine 07/19/2024 Negative   Final    External MDMA 07/19/2024 Negative   Final    External Opiates Screen Urine 07/19/2024 Negative   Final       Impression/Formulation:    ICD-10-CM ICD-9-CM   1. Alcohol use disorder, severe, dependence  F10.20 303.90   2. Anxiety disorder, unspecified type  F41.9 300.00   3. Chronic pain disorder  G89.4 338.4   4. Medication management  Z79.899 V58.69        CLINICAL MANEUVERING/INTERVENTIONS: Therapist utilized a person-centered approach to build and maintain rapport with group member.   Therapist promoted safe nonjudgmental environment by providing group members with unconditional positive regard.  Assisted member in processing above session content; acknowledged and normalized patient's thoughts, feelings and concerns.  Allowed patient to freely discuss issues without interruption or judgment. Provided safe, confidential environment to facilitate the development of positive therapeutic relationship and encourage open, honest communication. Therapist assisted group member with identifying and implementing healthier coping strategies and maintaining healthier boundaries. Assisted patient in identifying risk factors which would indicate the need for higher level of care including  thoughts to harm self or others and/or self-harming behavior and encouraged patient to contact this office, call 911, or present to the nearest emergency room should any of these events occur. Pt agreeable to adhere to medication regimen as prescribed and report any side effects.  Discussed crisis intervention services and means to access.  Patient adamantly and convincingly denies current suicidal or homicidal ideation or perceptual disturbance.      Therapist implemented motivational interviewing techniques to assist client with exploring and resolving ambivalence associated with commitment to change behaviors.  Yes  Therapist utilized dialectical behavior techniques to teach and model emotional regulation and relaxation.  No   Therapist applied cognitive behavioral strategies to facilitate identification of maladaptive patterns of thinking and behavior.  Yes     [facility]  Aftercare:  [facility] Outpatient Phase 2      Please note that portions of this note were completed with a voice recognition program. Efforts were made to edit dictation, but occasionally words are mistranscribed.     This document signed by Ben Ortega LCSW, July 29, 2024, 13:40 EDT

## 2024-07-30 ENCOUNTER — TELEPHONE (OUTPATIENT)
Dept: NEUROSURGERY | Facility: CLINIC | Age: 49
End: 2024-07-30
Payer: MEDICARE

## 2024-07-30 ENCOUNTER — OFFICE VISIT (OUTPATIENT)
Dept: PSYCHIATRY | Facility: HOSPITAL | Age: 49
End: 2024-07-30
Payer: MEDICARE

## 2024-07-30 DIAGNOSIS — F10.20 ALCOHOL USE DISORDER, SEVERE, DEPENDENCE: Primary | ICD-10-CM

## 2024-07-30 DIAGNOSIS — F41.9 ANXIETY DISORDER, UNSPECIFIED TYPE: ICD-10-CM

## 2024-07-30 DIAGNOSIS — G89.4 CHRONIC PAIN DISORDER: ICD-10-CM

## 2024-07-30 PROCEDURE — H0015 ALCOHOL AND/OR DRUG SERVICES: HCPCS | Performed by: SOCIAL WORKER

## 2024-07-30 NOTE — TELEPHONE ENCOUNTER
Caller: PATIENT    Relationship to patient: SELF    Best call back number: [phone]    Chief complaint: NEEDS TO RESCHEDULE     Type of visit: FOLLOW UP    Requested date: ASAP     If rescheduling, when is the original appointment: NA     Additional notes:PATIENT CALLED TO RESCHEDULE HIS APPT. IN [place] WITH - PATIENT STATES HIS APPT. WAS CANCELLED DUE TO NO IMAGING-PATIENT HAS MRI OF CERVICAL SPINE AND THORACIC FROM 06/19/24 AT [facility]-PATIENT ADVISED HE HAD BEEN TO 2 OTHER MD'S BUT MENTIONED [facility]-I TRIED TO ASK PATIENT WHERE HE WAS SEEN AN WITH WHOM BUT HE ADVISED HE DID NOT HAVE THE INFO. ON HIM CURRENTLY-SENDING TO OFFICE TO [facility] OF CALL FROM [PATIENT ASKING TO RESCHEDULE IN [place]-PT ADVISES THAT HE HAS HIS IMAGING ON A DISC AND REPORT-HE IS ASKING FOR A CALL BACK TO [facility] THANK YOU

## 2024-07-30 NOTE — PROGRESS NOTES
NAME: Taiwo Pierce  DATE: 07/30/2024    [facility] Phase I  [id number]    Services Provided    Group Psychotherapy x 1  Education/Training x 2  Activity Therapy  x 0  Individual Therapy x 0 0 minutes  Family Therapy x 0  MD Initial Visit  x 0  MD Follow-Up   x 0    Number of participants: 5    DATA:  Patient reported he was doing okay today. He was glad to be alive. He reported nothing much was new today. He had a follow up doctor's appointment today for medication management.     Individual: No    Homework: None assigned    Group 1 (Psychotherapy: Check-ins): Therapist continued facilitation of rapport building strategies between group members. Therapist asked that each patient check in with home life and recovery efforts and identify triggers, cravings, and high risk situations that arise between group sessions.  Group members discussed barriers and benefits of attending recovery meetings.  Therapist provided empathy and support during group session. Daily Reading: None    Group 2  ( Recreation Therapy ) Bracelet making    Group 3 (Psychoeducation) Reviewed the trans-theoretical model and the stages of change.      ASSESSMENT:    Personal Assessment 0-10 Scale (10 worst)    Anxiety:  6 (no comment)  Depression:  1 (no comment)  Cravings: 2 (no comment)     MSE within normal limits? Yes Affect: euthymic Mood: calm  Pt Response:  open/receptive  Engaged in activity/Process and self-disclosed: suboptimal  Applies today's group topics to self: suboptimal  Able to give and receive feedback: suboptimal  Demonstrated insightful thinking: occasional and suboptimal  Other pt response comments:  Patient was a positive participant. They demonstrated a relatively positive attitude, a strong degree of motivation, and moderate insight, as evidenced by his level of engagement combined with his difficulty staying on topic some of the time. They seemed interested and distracted. They appeared to have a fair degree of  comprehension regarding the concepts being discussed . The patient seemed receptive of, and willing to implement, the ideas being discussed. Their thought process appeared circumstantial , and their speech was regular rate and rhythm.       Community Group Engagement:  Yes: Taoism    Reported relapse since last meeting? No: no lapse reported    .  Office Visit on 07/29/2024   Component Date Value Ref Range Status    External Amphetamine Screen Urine 07/29/2024 Negative   Final    External Benzodiazepine Screen Uri* 07/29/2024 Negative   Final    External Cocaine Screen Urine 07/29/2024 Negative   Final    External THC Screen Urine 07/29/2024 Negative   Final    External Methadone Screen Urine 07/29/2024 Negative   Final    External Methamphetamine Screen Ur* 07/29/2024 Negative   Final    External Oxycodone Screen Urine 07/29/2024 Positive (A)   Final    External Buprenorphine Screen Urine 07/29/2024 Negative   Final    External MDMA 07/29/2024 Negative   Final    External Opiates Screen Urine 07/29/2024 Positive (A)   Final   Orders Only on 07/25/2024   Component Date Value Ref Range Status    External Amphetamine Screen Urine 07/25/2024 Negative   Final    External Benzodiazepine Screen Uri* 07/25/2024 Negative   Final    External Cocaine Screen Urine 07/25/2024 Negative   Final    External THC Screen Urine 07/25/2024 Negative   Final    External Methadone Screen Urine 07/25/2024 Negative   Final    External Methamphetamine Screen Ur* 07/25/2024 Negative   Final    External Oxycodone Screen Urine 07/25/2024 Positive (A)   Final    External Buprenorphine Screen Urine 07/25/2024 Negative   Final    External MDMA 07/25/2024 Negative   Final    External Opiates Screen Urine 07/25/2024 Positive (A)   Final   Office Visit on 07/17/2024   Component Date Value Ref Range Status    External Amphetamine Screen Urine 07/19/2024 Negative   Final    External Benzodiazepine Screen Uri* 07/19/2024 Negative   Final    External  Cocaine Screen Urine 07/19/2024 Negative   Final    External THC Screen Urine 07/19/2024 Negative   Final    External Methadone Screen Urine 07/19/2024 Negative   Final    External Methamphetamine Screen Ur* 07/19/2024 Negative   Final    External Oxycodone Screen Urine 07/19/2024 Negative   Final    External Buprenorphine Screen Urine 07/19/2024 Negative   Final    External MDMA 07/19/2024 Negative   Final    External Opiates Screen Urine 07/19/2024 Negative   Final       Impression/Formulation:    ICD-10-CM ICD-9-CM   1. Alcohol use disorder, severe, dependence  F10.20 303.90   2. Anxiety disorder, unspecified type  F41.9 300.00   3. Chronic pain disorder  G89.4 338.4        CLINICAL MANEUVERING/INTERVENTIONS: Therapist utilized a person-centered approach to build and maintain rapport with group member.   Therapist promoted safe nonjudgmental environment by providing group members with unconditional positive regard.  Assisted member in processing above session content; acknowledged and normalized patient's thoughts, feelings and concerns.  Allowed patient to freely discuss issues without interruption or judgment. Provided safe, confidential environment to facilitate the development of positive therapeutic relationship and encourage open, honest communication. Therapist assisted group member with identifying and implementing healthier coping strategies and maintaining healthier boundaries. Assisted patient in identifying risk factors which would indicate the need for higher level of care including thoughts to harm self or others and/or self-harming behavior and encouraged patient to contact this office, call 911, or present to the nearest emergency room should any of these events occur. Pt agreeable to adhere to medication regimen as prescribed and report any side effects.  Discussed crisis intervention services and means to access.  Patient adamantly and convincingly denies current suicidal or homicidal ideation  or perceptual disturbance.      Therapist implemented motivational interviewing techniques to assist client with exploring and resolving ambivalence associated with commitment to change behaviors.  Yes  Therapist utilized dialectical behavior techniques to teach and model emotional regulation and relaxation.  No   Therapist applied cognitive behavioral strategies to facilitate identification of maladaptive patterns of thinking and behavior.  Yes     PLAN:    Continue [facility] IOP Phase I  Aftercare:  [facility] Outpatient Phase 2      Please note that portions of this note were completed with a voice recognition program. Efforts were made to edit dictation, but occasionally words are mistranscribed.     This document signed by Ben Ortega LCSW, July 30, 2024, 16:42 EDT

## 2024-07-31 ENCOUNTER — OFFICE VISIT (OUTPATIENT)
Dept: PSYCHIATRY | Facility: HOSPITAL | Age: 49
End: 2024-07-31
Payer: MEDICARE

## 2024-07-31 DIAGNOSIS — F41.9 ANXIETY DISORDER, UNSPECIFIED TYPE: ICD-10-CM

## 2024-07-31 DIAGNOSIS — G89.4 CHRONIC PAIN DISORDER: ICD-10-CM

## 2024-07-31 DIAGNOSIS — F10.20 ALCOHOL USE DISORDER, SEVERE, DEPENDENCE: Primary | ICD-10-CM

## 2024-07-31 PROCEDURE — H0015 ALCOHOL AND/OR DRUG SERVICES: HCPCS | Performed by: SOCIAL WORKER
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
## 2024-08-01 ENCOUNTER — OFFICE VISIT (OUTPATIENT)
Dept: PSYCHIATRY | Facility: HOSPITAL | Age: 49
End: 2024-08-01
Payer: MEDICARE

## 2024-08-01 DIAGNOSIS — F41.9 ANXIETY DISORDER, UNSPECIFIED TYPE: ICD-10-CM

## 2024-08-01 DIAGNOSIS — G89.4 CHRONIC PAIN DISORDER: ICD-10-CM

## 2024-08-01 DIAGNOSIS — F10.20 ALCOHOL USE DISORDER, SEVERE, DEPENDENCE: Primary | ICD-10-CM

## 2024-08-01 PROCEDURE — H0015 ALCOHOL AND/OR DRUG SERVICES: HCPCS | Performed by: SOCIAL WORKER

## 2024-08-01 NOTE — PROGRESS NOTES
[address] [address]  [phone]    Intensive Outpatient Program for Chemical Dependence (CD IOP)      Individualized Treatment Plan    DATE: 08/01/2024  TIME: 7140-1418         Long Term Goal:  Taiwo will establish a sustained recovery and will maintain total abstinence from mind-altering substances other than what is prescribed and/or approved by the attending physician. Patient will learn, practice, and utilize behavioral and cognitive coping skills to help maintain sobriety.    Patient Care Needs:  Taiwo presents with alcohol use disorder and is at high risk for relapse without continued treatment.  Patient has a history of using drugs/alcohol until intoxicated or passed out and has been unable to stop or cut down use of alcohol/drugs once starting, despite verbalized desire to do so, and the negative consequences from continued use.  Patient's use has negatively impacted social, occupational, and/or physical functioning. He wanted to work on finding ways to deal with his anxiety.     Objectives:      1.  Patient will participate in a medical evaluation to assess the effects of chemical dependence and will cooperate with an evaluation by the attending psychiatrist or psychiatric nurse practitioner for psychotropic medication if appropriate.    2.  Patient will adhere to the IOP group rules and guidelines.    3.  Patient will attend group sessions as scheduled.  Patient will notify therapist and support staff of any absences or tardies.  Patient will provide a valid and verifiable excuse for absences to be considered excused.    4.  Patient will participate in random drug and alcohol screenings and will exhibit negative results on said screenings.    5.  Patient will identify high risk situations, people, and places to avoid or manage in order to maintain sobriety. Isolation.     6.  Patient will participate in group discussions by giving and receiving  feedback appropriately.    7.  Patient will develop a positive network of support by attending recovery groups or other spiritual fellowships each week outside of IOP group and by exploring/obtaining/maintaining sponsorship. He was attending [employer], and considering the noon [facility] meeting.     8.  Patient will identify, practice, and implement at least 5 healthy coping strategies to manage difficult emotions while remaining abstinent. Fishing, hunting, building, making baits, prayer.     9.  Patient will develop relapse prevention skills and be able to identify and share them with the group in the form of a relapse prevention plan.    10.  Patient will develop a personal recovery plan and share it with the group prior to discharge.    11.  Patient will offer family participation in family education groups, if appropriate. He believed his father would be able to participate. His wife would be at work during these hours.     12.  Patient will exhibit increased motivation to change as assessed by observable behaviors in conjunction with the ASAM assessment tool.    13.  Patient will utilize healthy coping skills to manage depressive and anxious symptoms without using alcohol or other mind-altering substances and will exhibit decreased score on the PHQ-9 and FEI-7.    Interventions:  Patient will attend biweekly appointments with the attending psychiatrist or psychiatric nurse practitioner for medication evaluation and management unless scheduled otherwise.  Patient will be referred to a medical provider for a physical examination if he/she is not already established with a medical provider.  Patient will comply with recommendations and appointments with his/her medical treatment team, including medication management.  Therapist will introduce and review IOP group rules, and patient will be provided a hard copy of the group rules upon entering the program.  IOP group sessions will be offered 3-4 times per week, Mondays,  Tuesdays, Wednesdays and Thursdays during Phase I of treatment, with the exception of some federal holidays. Patient will be provided with random and regular drug and alcohol screenings. Therapist will utilize motivational interviewing techniques to assist patient with exploring and resolving ambivalence associated with commitment to change behaviors related to substance use and addiction.  Therapist will utilize behavioral and cognitive techniques to assist patient with identifying and recognizing patterns of irrational beliefs that contribute to patient's risk for continued substance abuse and relapse.  Therapist will provide psychoeducation to assist patient in increasing knowledge on the disease concept and the effects of chemical dependence.  Therapist will provide and utilize evidenced-based recovery literature to assist patient in identifying healthier coping strategies.  Therapist will assist patient in developing a relapse prevention plan and a personal recovery plan.  Therapist will assist patient with exploring self-help strategies and beginning work on the 12 Steps.  Therapist will encourage patient to explore, obtain, or maintain sponsorship and/or interpersonal connection with [facility] fellowships.  Therapist will offer family education groups, if appropriate, and will encourage patient to invite family member(s) to participate. Treatment team members will provide patient with progress reports as needed.    Team Members:  Patient - Taiwo Pierce  Medical Provider - Deon Parson MD  [facility] Licensed Therapist - Ben Ortega LCSW  IOP Director - Trace Darnell LCSW, [facility]  Support Staff
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
## 2024-08-01 NOTE — PROGRESS NOTES
Adult PHP Group      Date: August 1, 2024  Time: [id number]    Type of Group:  Peer Support    Group  Content: Open Discussion    Patient Response:  Said he got his relationship back and everything was going good. Pulled out to see itzel today         Sandhya Coles MA  08/01/24  15:10 EDT

## 2024-08-02 DIAGNOSIS — E78.2 MIXED HYPERLIPIDEMIA: ICD-10-CM

## 2024-08-05 ENCOUNTER — OFFICE VISIT (OUTPATIENT)
Dept: PSYCHIATRY | Facility: HOSPITAL | Age: 49
End: 2024-08-05
Payer: MEDICARE

## 2024-08-05 DIAGNOSIS — Z79.899 MEDICATION MANAGEMENT: ICD-10-CM

## 2024-08-05 DIAGNOSIS — F10.20 ALCOHOL USE DISORDER, SEVERE, DEPENDENCE: Primary | ICD-10-CM

## 2024-08-05 DIAGNOSIS — G89.4 CHRONIC PAIN DISORDER: ICD-10-CM

## 2024-08-05 DIAGNOSIS — F41.9 ANXIETY DISORDER, UNSPECIFIED TYPE: ICD-10-CM

## 2024-08-05 PROCEDURE — H0015 ALCOHOL AND/OR DRUG SERVICES: HCPCS | Performed by: SOCIAL WORKER

## 2024-08-05 RX ORDER — PRAVASTATIN SODIUM 20 MG
20 TABLET ORAL DAILY
Qty: 90 TABLET | Refills: 0 | OUTPATIENT
Start: 2024-08-05

## 2024-08-05 NOTE — PROGRESS NOTES
NAME: Taiwo Pierce  DATE: 08/05/2024    [facility] Phase I  [id number]    Services Provided    Group Psychotherapy x 2  Education/Training x 1  Activity Therapy  x 0  Individual Therapy x 0 0 minutes  Family Therapy x 0  MD Initial Visit  x 0  MD Follow-Up   x 0    Number of participants: 5    DATA:  Patient reported he was very tired today. He estimated having achieved one hour of sleep last night. His medication had been increased, but he was not noticing any of the desired effects. He felt it was having the opposite affect. He was noticing himself feeling restless all night long. It was noticeable to his spouse as well. He said no one would believe the things that ran through his head at night.     Individual: No    Homework: completed Step One that had been given last Thursday     Group 1 (Psychotherapy: Check-ins): Therapist continued facilitation of rapport building strategies between group members. Therapist asked that each patient check in with home life and recovery efforts and identify triggers, cravings, and high risk situations that arise between group sessions.  Group members discussed barriers and benefits of attending recovery meetings.  Therapist provided empathy and support during group session. Daily Reading: None    Group 2  (12 Steps) Discussed the concept of addiction as a disease.     Group 3 (12 Steps) Continued conversation about the 12 Steps, this hour discussing the various ways in which addiction can manifest itself, even in recovery.      ASSESSMENT:    Personal Assessment 0-10 Scale (10 worst)    Anxiety:  8 (no comment)  Depression:  1 (no comment)  Cravings: 0 (no comment)     MSE within normal limits? Yes Affect: somber Mood: anxious  Pt Response:  guarded  Engaged in activity/Process and self-disclosed: suboptimal  Applies today's group topics to self: suboptimal  Able to give and receive feedback: suboptimal  Demonstrated insightful thinking: occasional and suboptimal  Other pt  response comments:  Patient was a positive participant. They demonstrated a relatively positive attitude, a moderate degree of motivation, and moderate insight, as evidenced by his having completed his homework combined with his continual insistence that he had no insight into his insomnia or anxiety. They seemed interested and attentive. They appeared to have a fair degree of comprehension regarding the concepts being discussed . The patient seemed doubtful of, and somewhat willing to implement, the ideas being discussed. Their thought process appeared linear and goal directed, and their speech was regular rate and rhythm.       Community Group Engagement:  Yes: Yarsanism this weekend     Reported relapse since last meeting? No: no lapse reported    .  Office Visit on 08/05/2024   Component Date Value Ref Range Status    External Amphetamine Screen Urine 08/05/2024 Negative   Final    External Benzodiazepine Screen Uri* 08/05/2024 Negative   Final    External Cocaine Screen Urine 08/05/2024 Negative   Final    External THC Screen Urine 08/05/2024 Negative   Final    External Methadone Screen Urine 08/05/2024 Negative   Final    External Methamphetamine Screen Ur* 08/05/2024 Negative   Final    External Oxycodone Screen Urine 08/05/2024 Positive (A)   Final    External Buprenorphine Screen Urine 08/05/2024 Negative   Final    External MDMA 08/05/2024 Negative   Final    External Opiates Screen Urine 08/05/2024 Positive (A)   Final   Office Visit on 07/29/2024   Component Date Value Ref Range Status    External Amphetamine Screen Urine 07/29/2024 Negative   Final    External Benzodiazepine Screen Uri* 07/29/2024 Negative   Final    External Cocaine Screen Urine 07/29/2024 Negative   Final    External THC Screen Urine 07/29/2024 Negative   Final    External Methadone Screen Urine 07/29/2024 Negative   Final    External Methamphetamine Screen Ur* 07/29/2024 Negative   Final    External Oxycodone Screen Urine 07/29/2024  Positive (A)   Final    External Buprenorphine Screen Urine 07/29/2024 Negative   Final    External MDMA 07/29/2024 Negative   Final    External Opiates Screen Urine 07/29/2024 Positive (A)   Final   Orders Only on 07/25/2024   Component Date Value Ref Range Status    External Amphetamine Screen Urine 07/25/2024 Negative   Final    External Benzodiazepine Screen Uri* 07/25/2024 Negative   Final    External Cocaine Screen Urine 07/25/2024 Negative   Final    External THC Screen Urine 07/25/2024 Negative   Final    External Methadone Screen Urine 07/25/2024 Negative   Final    External Methamphetamine Screen Ur* 07/25/2024 Negative   Final    External Oxycodone Screen Urine 07/25/2024 Positive (A)   Final    External Buprenorphine Screen Urine 07/25/2024 Negative   Final    External MDMA 07/25/2024 Negative   Final    External Opiates Screen Urine 07/25/2024 Positive (A)   Final   Office Visit on 07/17/2024   Component Date Value Ref Range Status    External Amphetamine Screen Urine 07/19/2024 Negative   Final    External Benzodiazepine Screen Uri* 07/19/2024 Negative   Final    External Cocaine Screen Urine 07/19/2024 Negative   Final    External THC Screen Urine 07/19/2024 Negative   Final    External Methadone Screen Urine 07/19/2024 Negative   Final    External Methamphetamine Screen Ur* 07/19/2024 Negative   Final    External Oxycodone Screen Urine 07/19/2024 Negative   Final    External Buprenorphine Screen Urine 07/19/2024 Negative   Final    External MDMA 07/19/2024 Negative   Final    External Opiates Screen Urine 07/19/2024 Negative   Final       Impression/Formulation:    ICD-10-CM ICD-9-CM   1. Alcohol use disorder, severe, dependence  F10.20 303.90   2. Anxiety disorder, unspecified type  F41.9 300.00   3. Chronic pain disorder  G89.4 338.4   4. Medication management  Z79.899 V58.69        CLINICAL MANEUVERING/INTERVENTIONS: Therapist utilized a person-centered approach to build and maintain rapport with  group member.   Therapist promoted safe nonjudgmental environment by providing group members with unconditional positive regard.  Assisted member in processing above session content; acknowledged and normalized patient's thoughts, feelings and concerns.  Allowed patient to freely discuss issues without interruption or judgment. Provided safe, confidential environment to facilitate the development of positive therapeutic relationship and encourage open, honest communication. Therapist assisted group member with identifying and implementing healthier coping strategies and maintaining healthier boundaries. Assisted patient in identifying risk factors which would indicate the need for higher level of care including thoughts to harm self or others and/or self-harming behavior and encouraged patient to contact this office, call 911, or present to the nearest emergency room should any of these events occur. Pt agreeable to adhere to medication regimen as prescribed and report any side effects.  Discussed crisis intervention services and means to access.  Patient adamantly and convincingly denies current suicidal or homicidal ideation or perceptual disturbance.      Therapist implemented motivational interviewing techniques to assist client with exploring and resolving ambivalence associated with commitment to change behaviors.  Yes  Therapist utilized dialectical behavior techniques to teach and model emotional regulation and relaxation.  No   Therapist applied cognitive behavioral strategies to facilitate identification of maladaptive patterns of thinking and behavior.  Yes     [facility]:    [facility]  Aftercare:  [facility] Phase 2      Please note that portions of this note were completed with a voice recognition program. Efforts were made to edit dictation, but occasionally words are mistranscribed.     This document signed by Ben Ortega LCSW, August 5,  2024, 14:11 EDT

## 2024-08-06 ENCOUNTER — OFFICE VISIT (OUTPATIENT)
Dept: PSYCHIATRY | Facility: HOSPITAL | Age: 49
End: 2024-08-06
Payer: MEDICARE

## 2024-08-06 DIAGNOSIS — F41.9 ANXIETY DISORDER, UNSPECIFIED TYPE: ICD-10-CM

## 2024-08-06 DIAGNOSIS — G89.4 CHRONIC PAIN DISORDER: ICD-10-CM

## 2024-08-06 DIAGNOSIS — F10.20 ALCOHOL USE DISORDER, SEVERE, DEPENDENCE: Primary | ICD-10-CM

## 2024-08-06 PROCEDURE — H0015 ALCOHOL AND/OR DRUG SERVICES: HCPCS | Performed by: SOCIAL WORKER

## 2024-08-06 NOTE — PROGRESS NOTES
NAME: Taiwo Pierce  DATE: 08/06/2024    [facility] Phase I  [id number]    Services Provided    Group Psychotherapy x 2  Education/Training x 0  Activity Therapy  x 1  Individual Therapy x 0 0 minutes  Family Therapy x 0  MD Initial Visit  x 0  MD Follow-Up   x 0    Number of participants: 6    DATA:  Patient reported that he had achieved about 5 hours of sleep last night. However, he was dreading tonight. His appetite was also poor at this time, too. He could barely finish half of a fast food meal.     Individual: No    Homework: None assigned    Group 1 (Psychotherapy: Check-ins): Therapist continued facilitation of rapport building strategies between group members. Therapist asked that each patient check in with home life and recovery efforts and identify triggers, cravings, and high risk situations that arise between group sessions.  Group members discussed barriers and benefits of attending recovery meetings.  Therapist provided empathy and support during group session. Daily Reading: [employer] Gospel (1990) by Roni Lagunas    Group 2  (Recreation Therapy) Drawing game    Group 3 (12 Steps) Continued discussion of Step One by discussing the concept of denial.     ASSESSMENT:    Personal Assessment 0-10 Scale (10 worst)    Anxiety:  6 (no comment)  Depression:  1 (no comment)  Cravings: 0 (no comment)     MSE within normal limits? Yes Affect: somber Mood: anxious  Pt Response:  open/receptive  Engaged in activity/Process and self-disclosed: adequate  Applies today's group topics to self: adequate  Able to give and receive feedback: adequate  Demonstrated insightful thinking: occasional and adequate  Other pt response comments:  Patient was a positive participant. They demonstrated a relatively optimistic attitude, a moderate degree of motivation, and adequate insight, as evidenced by his level of engagement combined with his efforts to maintain sobriety. They seemed interested and attentive. They  appeared to comprehend well the concepts being discussed. The patient seemed receptive of, and willing to implement, the ideas being discussed. Their thought process appeared linear and goal directed, and their speech was regular rate and rhythm.       Community Group Engagement:  Yes: [employer]    Reported relapse since last meeting? No: no lapse    .  Office Visit on 08/05/2024   Component Date Value Ref Range Status    External Amphetamine Screen Urine 08/05/2024 Negative   Final    External Benzodiazepine Screen Uri* 08/05/2024 Negative   Final    External Cocaine Screen Urine 08/05/2024 Negative   Final    External THC Screen Urine 08/05/2024 Negative   Final    External Methadone Screen Urine 08/05/2024 Negative   Final    External Methamphetamine Screen Ur* 08/05/2024 Negative   Final    External Oxycodone Screen Urine 08/05/2024 Positive (A)   Final    External Buprenorphine Screen Urine 08/05/2024 Negative   Final    External MDMA 08/05/2024 Negative   Final    External Opiates Screen Urine 08/05/2024 Positive (A)   Final   Office Visit on 07/29/2024   Component Date Value Ref Range Status    External Amphetamine Screen Urine 07/29/2024 Negative   Final    External Benzodiazepine Screen Uri* 07/29/2024 Negative   Final    External Cocaine Screen Urine 07/29/2024 Negative   Final    External THC Screen Urine 07/29/2024 Negative   Final    External Methadone Screen Urine 07/29/2024 Negative   Final    External Methamphetamine Screen Ur* 07/29/2024 Negative   Final    External Oxycodone Screen Urine 07/29/2024 Positive (A)   Final    External Buprenorphine Screen Urine 07/29/2024 Negative   Final    External MDMA 07/29/2024 Negative   Final    External Opiates Screen Urine 07/29/2024 Positive (A)   Final   Orders Only on 07/25/2024   Component Date Value Ref Range Status    External Amphetamine Screen Urine 07/25/2024 Negative   Final    External Benzodiazepine Screen Uri* 07/25/2024 Negative   Final    External  Cocaine Screen Urine 07/25/2024 Negative   Final    External THC Screen Urine 07/25/2024 Negative   Final    External Methadone Screen Urine 07/25/2024 Negative   Final    External Methamphetamine Screen Ur* 07/25/2024 Negative   Final    External Oxycodone Screen Urine 07/25/2024 Positive (A)   Final    External Buprenorphine Screen Urine 07/25/2024 Negative   Final    External MDMA 07/25/2024 Negative   Final    External Opiates Screen Urine 07/25/2024 Positive (A)   Final   Office Visit on 07/17/2024   Component Date Value Ref Range Status    External Amphetamine Screen Urine 07/19/2024 Negative   Final    External Benzodiazepine Screen Uri* 07/19/2024 Negative   Final    External Cocaine Screen Urine 07/19/2024 Negative   Final    External THC Screen Urine 07/19/2024 Negative   Final    External Methadone Screen Urine 07/19/2024 Negative   Final    External Methamphetamine Screen Ur* 07/19/2024 Negative   Final    External Oxycodone Screen Urine 07/19/2024 Negative   Final    External Buprenorphine Screen Urine 07/19/2024 Negative   Final    External MDMA 07/19/2024 Negative   Final    External Opiates Screen Urine 07/19/2024 Negative   Final       Impression/Formulation:    ICD-10-CM ICD-9-CM   1. Alcohol use disorder, severe, dependence  F10.20 303.90   2. Anxiety disorder, unspecified type  F41.9 300.00   3. Chronic pain disorder  G89.4 338.4        CLINICAL MANEUVERING/INTERVENTIONS: Therapist utilized a person-centered approach to build and maintain rapport with group member.   Therapist promoted safe nonjudgmental environment by providing group members with unconditional positive regard.  Assisted member in processing above session content; acknowledged and normalized patient's thoughts, feelings and concerns.  Allowed patient to freely discuss issues without interruption or judgment. Provided safe, confidential environment to facilitate the development of positive therapeutic relationship and encourage  open, honest communication. Therapist assisted group member with identifying and implementing healthier coping strategies and maintaining healthier boundaries. Assisted patient in identifying risk factors which would indicate the need for higher level of care including thoughts to harm self or others and/or self-harming behavior and encouraged patient to contact this office, call 911, or present to the nearest emergency room should any of these events occur. Pt agreeable to adhere to medication regimen as prescribed and report any side effects.  Discussed crisis intervention services and means to access.  Patient adamantly and convincingly denies current suicidal or homicidal ideation or perceptual disturbance.      Therapist implemented motivational interviewing techniques to assist client with exploring and resolving ambivalence associated with commitment to change behaviors.  Yes  Therapist utilized dialectical behavior techniques to teach and model emotional regulation and relaxation.  No   Therapist applied cognitive behavioral strategies to facilitate identification of maladaptive patterns of thinking and behavior.  Yes     PLAN:    Continue [facility] IOP Phase I  Aftercare:  [facility] Outpatient Phase 2      Please note that portions of this note were completed with a voice recognition program. Efforts were made to edit dictation, but occasionally words are mistranscribed.     This document signed by Ben Ortega LCSW, August 6, 2024, 16:49 EDT

## 2024-08-07 ENCOUNTER — OFFICE VISIT (OUTPATIENT)
Dept: PSYCHIATRY | Facility: CLINIC | Age: 49
End: 2024-08-07
Payer: MEDICARE

## 2024-08-07 ENCOUNTER — OFFICE VISIT (OUTPATIENT)
Dept: PSYCHIATRY | Facility: HOSPITAL | Age: 49
End: 2024-08-07
Payer: MEDICARE

## 2024-08-07 DIAGNOSIS — F10.20 ALCOHOL USE DISORDER, SEVERE, DEPENDENCE: Primary | ICD-10-CM

## 2024-08-07 DIAGNOSIS — G89.4 CHRONIC PAIN DISORDER: ICD-10-CM

## 2024-08-07 DIAGNOSIS — F41.9 ANXIETY DISORDER, UNSPECIFIED TYPE: ICD-10-CM

## 2024-08-07 PROCEDURE — H0015 ALCOHOL AND/OR DRUG SERVICES: HCPCS | Performed by: SOCIAL WORKER

## 2024-08-07 PROCEDURE — 90846 FAMILY PSYTX W/O PT 50 MIN: CPT | Performed by: SOCIAL WORKER

## 2024-08-07 NOTE — PROGRESS NOTES
NAME: Taiwo Pierce  DATE: 2024    [facility] Phase I  [id number]    Services Provided    Group Psychotherapy x 2  Education/Training x 1  Activity Therapy  x 0  Individual Therapy x 1 30 minutes  Family Therapy x 0  MD Initial Visit  x 0  MD Follow-Up   x 0    Number of participants: 6    DATA:  Patient reported he was doing okay, he guessed. He was just tired today. He supposed he slept about 3-4 hours.    Individual: Yes, describe: Created an agenda for today's family session. 1) Address spouses's concerns 2) Relapse is a Normal Part of Recovery 3) Sleep Hygiene 4) Coping skills, specifically worry time.    Homework: None assigned    Group 1 (Psychotherapy: Check-ins): Therapist continued facilitation of rapport building strategies between group members. Therapist asked that each patient check in with home life and recovery efforts and identify triggers, cravings, and high risk situations that arise between group sessions.  Group members discussed barriers and benefits of attending recovery meetings.  Therapist provided empathy and support during group session. Daily Readin Rules for Life (2018) by Jorge Lopez    Group 2  (Relapse Prevention) Explored the concepts of coping skills, support system, and sources of motivation.     Group 3 (Psychoeducation) This hour of group was facilitated by [facility].     ASSESSMENT:    Personal Assessment 0-10 Scale (10 worst)    Anxiety:  6 (no comment)  Depression:  1 (no comment)  Cravings: 0 (no comment)     MSE within normal limits? Yes Affect: euthymic Mood: calm  Pt Response:  open/receptive  Engaged in activity/Process and self-disclosed: adequate  Applies today's group topics to self: adequate  Able to give and receive feedback: adequate  Demonstrated insightful thinking: occasional and adequate  Other pt response comments:  Patient was a positive participant. They demonstrated a relatively positive attitude, a strong degree of motivation, and  adequate insight, as evidenced by his level of engagement combined with his efforts to have a family session with his spouse. They seemed interested and attentive. They appeared to comprehend well the concepts being discussed. The patient seemed receptive of, and willing to implement, the ideas being discussed. Their thought process appeared linear and goal directed, and their speech was regular rate and rhythm.       Community Group Engagement:  Yes: [employer]    Reported relapse since last meeting? No: no lapse (7 weeks sober per report)    .  Office Visit on 08/05/2024   Component Date Value Ref Range Status    External Amphetamine Screen Urine 08/05/2024 Negative   Final    External Benzodiazepine Screen Uri* 08/05/2024 Negative   Final    External Cocaine Screen Urine 08/05/2024 Negative   Final    External THC Screen Urine 08/05/2024 Negative   Final    External Methadone Screen Urine 08/05/2024 Negative   Final    External Methamphetamine Screen Ur* 08/05/2024 Negative   Final    External Oxycodone Screen Urine 08/05/2024 Positive (A)   Final    External Buprenorphine Screen Urine 08/05/2024 Negative   Final    External MDMA 08/05/2024 Negative   Final    External Opiates Screen Urine 08/05/2024 Positive (A)   Final   Office Visit on 07/29/2024   Component Date Value Ref Range Status    External Amphetamine Screen Urine 07/29/2024 Negative   Final    External Benzodiazepine Screen Uri* 07/29/2024 Negative   Final    External Cocaine Screen Urine 07/29/2024 Negative   Final    External THC Screen Urine 07/29/2024 Negative   Final    External Methadone Screen Urine 07/29/2024 Negative   Final    External Methamphetamine Screen Ur* 07/29/2024 Negative   Final    External Oxycodone Screen Urine 07/29/2024 Positive (A)   Final    External Buprenorphine Screen Urine 07/29/2024 Negative   Final    External MDMA 07/29/2024 Negative   Final    External Opiates Screen Urine 07/29/2024 Positive (A)   Final   Orders Only on  07/25/2024   Component Date Value Ref Range Status    External Amphetamine Screen Urine 07/25/2024 Negative   Final    External Benzodiazepine Screen Uri* 07/25/2024 Negative   Final    External Cocaine Screen Urine 07/25/2024 Negative   Final    External THC Screen Urine 07/25/2024 Negative   Final    External Methadone Screen Urine 07/25/2024 Negative   Final    External Methamphetamine Screen Ur* 07/25/2024 Negative   Final    External Oxycodone Screen Urine 07/25/2024 Positive (A)   Final    External Buprenorphine Screen Urine 07/25/2024 Negative   Final    External MDMA 07/25/2024 Negative   Final    External Opiates Screen Urine 07/25/2024 Positive (A)   Final       Impression/Formulation:    ICD-10-CM ICD-9-CM   1. Alcohol use disorder, severe, dependence  F10.20 303.90   2. Anxiety disorder, unspecified type  F41.9 300.00   3. Chronic pain disorder  G89.4 338.4        CLINICAL MANEUVERING/INTERVENTIONS: Therapist utilized a person-centered approach to build and maintain rapport with group member.   Therapist promoted safe nonjudgmental environment by providing group members with unconditional positive regard.  Assisted member in processing above session content; acknowledged and normalized patient's thoughts, feelings and concerns.  Allowed patient to freely discuss issues without interruption or judgment. Provided safe, confidential environment to facilitate the development of positive therapeutic relationship and encourage open, honest communication. Therapist assisted group member with identifying and implementing healthier coping strategies and maintaining healthier boundaries. Assisted patient in identifying risk factors which would indicate the need for higher level of care including thoughts to harm self or others and/or self-harming behavior and encouraged patient to contact this office, call 911, or present to the nearest emergency room should any of these events occur. Pt agreeable to adhere to  medication regimen as prescribed and report any side effects.  Discussed crisis intervention services and means to access.  Patient adamantly and convincingly denies current suicidal or homicidal ideation or perceptual disturbance.      Therapist implemented motivational interviewing techniques to assist client with exploring and resolving ambivalence associated with commitment to change behaviors.  Yes  Therapist utilized dialectical behavior techniques to teach and model emotional regulation and relaxation.  No   Therapist applied cognitive behavioral strategies to facilitate identification of maladaptive patterns of thinking and behavior.  Yes     PLAN:    Continue [facility] IOP Phase I  Aftercare:  [facility] Outpatient Phase 2      Please note that portions of this note were completed with a voice recognition program. Efforts were made to edit dictation, but occasionally words are mistranscribed.     This document signed by Ben Ortega LCSW, August 7, 2024, 13:43 EDT

## 2024-08-08 ENCOUNTER — OFFICE VISIT (OUTPATIENT)
Dept: PSYCHIATRY | Facility: HOSPITAL | Age: 49
End: 2024-08-08
Payer: MEDICARE

## 2024-08-08 DIAGNOSIS — G89.4 CHRONIC PAIN DISORDER: ICD-10-CM

## 2024-08-08 DIAGNOSIS — F41.9 ANXIETY DISORDER, UNSPECIFIED TYPE: ICD-10-CM

## 2024-08-08 DIAGNOSIS — F10.20 ALCOHOL USE DISORDER, SEVERE, DEPENDENCE: Primary | ICD-10-CM

## 2024-08-08 PROCEDURE — H0015 ALCOHOL AND/OR DRUG SERVICES: HCPCS | Performed by: SOCIAL WORKER

## 2024-08-08 NOTE — PROGRESS NOTES
"   NAME: Taiwo Pierce  DATE: 08/07/2024    Time: 3134-0174    Individual Session Note    DATA:       Taiwo Pierce, is a 49 y.o. male presents today for a family session with Ben Ortega LCSW, at [facility]. Pt currently resides in [place], [place] and lives with his spouse and son.        ASSESSMENT:  Patient was joined by his spouse, Melba, and his father, Vibha. The therapist opened the session by inviting the patient's family members to bring their concerns to the table. The patient's spouse reported that insomnia was a primary issue for the patient. She went on to share her observation that the patient was always fidgeting. She speculated that he met criteria for ADHD. His spouse also wondered if his other issues were connected to his \"nerves.\" When asked for clarification, she explained that the patient's tendency was to \"snap\" sometimes. She meant that the patient had seemed more irritable lately. Another concern mentioned that the patient's level of impatience. This had been observed in him from an early age, per his father's report. Lastly, the patient added that memory was a problem that seemed to be increasing in severity.     The therapist normalized the patient's experience of these things, suggesting they might be attributable, perhaps to a large degree, to the early stage of recovery in which the patient found himself. The patient and his family were receptive to this. The therapist continued the session with a brief explanation of the stage of change (I.e., the Transtheoretical Model). The therapist emphasized that relapse was a normal part of recovery. The next item of discussion was sleep hygiene. It was determined collaboratively that the patient would cease use of caffeine after 5:00 PM each day to see if it had any noticeable impact on his ability to sleep at night. He was not yet ready to set limits to his nicotine usage despite the likely impact this was having on him as he " tried to sleep each night. The coping skill of Worry Time was discussed next. The patient and his family were receptive. He agreed to complete 10 minutes of worry time each evening around 6:00 PM. Another item that was discussed over the course of this session was relapse risk factors. These were discussed specifically in relation to the patient's most recent relapse. Worksheets and educational material was provided in association with the above-mentioned items.       ASSESSMENT:    Impression/Formulation:    ICD-10-CM ICD-9-CM   1. Alcohol use disorder, severe, dependence  F10.20 303.90   2. Anxiety disorder, unspecified type  F41.9 300.00   3. Chronic pain disorder  G89.4 338.4       Patient's Support Network Includes: The patient receives support from his wife and father.    Mental Status Exam  Hygiene:  good  Dress: casual  Attitude: cooperative and agreeable   Motor Activity: appropriate  Eye Contact:  good  Speech: regular rate and rhythm   Mood:  calm and cooperative  Affect:   somber  Thought Processes:  Goal directed and Linear  Thought Content:  Mood congruent  Suicidal Thoughts:  denies  Homicidal Thoughts:  denies  Crisis Safety Plan: yes, to come to the emergency room.  Hallucinations:  denies  Reliability: good  Insight: fair  Judgement: good  Impulse Control: fair    Patient appeared alert and oriented.  Patient is voluntarily requesting to begin outpatient therapy at [facility].  Patient is receptive to assistance with maintaining a stable lifestyle.  Patient presents with history of alcohol use disorder and anxiety.  Patient is agreeable to attend routine therapy sessions.  Patient expressed desire to maintain stability and participate in the therapeutic process.        CLINICAL MANUVERING/INTERVENTIONS:  Therapist utilized a person-centered approach to build rapport with patient.  Therapist implemented motivational interviewing techniques to assist patient with exploring personal  growth and change.   Therapist applied cognitive behavioral strategies to facilitate identification of maladaptive patterns of thinking and behavior.  Therapist promoted safe nonjudgmental environment by providing patient with unconditional positive regard.  Therapist utilized dialectical behavior techniques to teach and model emotional regulation and relaxation.  Therapist assisted group member with identifying and implementing healthier coping strategies.  The patient was encouraged to make positive daily choices, and maintain healthy boundaries.    PLAN:  Continue Phase I CD IOP  Aftercare:  Phase II CD IOP     This document signed by Ben Ortega LCSW, August 8, 2024, 16:16 EDT     Please note that portions of this note were completed with a voice recognition program. Efforts were made to edit dictation, but occasionally words are mistranscribed.

## 2024-08-08 NOTE — PROGRESS NOTES
Adult PHP Group      Date: August 8, 2024  Time: 8847-6383 AM    Type of Group:  Peer Support    Group  Content: Coping Skills and New Habits    Patient Response: Patient was active in group today engaged, We went over coping skills and building new habits. Encouraged to have a sponsor or a person to call for the hard days. States his dad is his person also encouraged to join a community or celebrate recovery and to go to recovery events.        Sandhya Coles MA  08/08/24  15:12 EDT

## 2024-08-08 NOTE — PROGRESS NOTES
Adult CD IOP Group      Date: August 7, 2024    Time: 9720 -8199    Type of Group:  Psychoeducation    Group  Content: Group members completed FEI-7 and PHQ-9. Group members completed patient satisfaction outcome. Group members completed a worksheet to help identify their strengths and qualities.     Patient Response: Pt was a positive participant. Pt completed FEI-7 with a score of 15 and PHQ-9 with a score of 21. Pt completed strengths and qualities worksheet. Pt participated in group discussion on strengths.        Zacarias Neil  08/08/24  09:33 EDT

## 2024-08-08 NOTE — PROGRESS NOTES
NAME: Taiwo Pierce  DATE: 08/08/2024    [facility] Phase I  [id number]    Services Provided    Group Psychotherapy x 2  Education/Training x 1  Activity Therapy  x 0  Individual Therapy x 0 0 minutes  Family Therapy x 0  MD Initial Visit  x 0  MD Follow-Up   x 0    Number of participants: 5    DATA:  Patient reported he was doing pretty good. He estimated having achieved 6 hours of sleep last night. He had cut off caffeine after our family session last night. He was encouraged by his apparently immediate benefit.    Individual: No    Homework: None assigned    Group 1 (Psychotherapy: Check-ins): Therapist continued facilitation of rapport building strategies between group members. Therapist asked that each patient check in with home life and recovery efforts and identify triggers, cravings, and high risk situations that arise between group sessions.  Group members discussed barriers and benefits of attending recovery meetings.  Therapist provided empathy and support during group session. Daily Reading: None    Group 2  (Recovery Story) Viewed and discussed the first 30 minutes of Adam Maya's testimony on the [employer] channel on YouTube.     Group 3 (Psychoeducation) This hour of group was facilitated by Sandhya .     ASSESSMENT:    Personal Assessment 0-10 Scale (10 worst)    Anxiety:  4 (no comment)  Depression:  0 (no comment)  Cravings: 0 (no comment)     MSE within normal limits? Yes Affect: somber Mood: calm  Pt Response:  open/receptive  Engaged in activity/Process and self-disclosed: adequate  Applies today's group topics to self: adequate  Able to give and receive feedback: adequate  Demonstrated insightful thinking: consistent and adequate  Other pt response comments:  Patient was a positive participant. They demonstrated a relatively positive attitude, a strong degree of motivation, and adequate insight, as evidenced by his level of engagement combined with his efforts to  implement coping strategies. They seemed interested and attentive. They appeared to comprehend well the concepts being discussed. The patient seemed receptive of, and willing to implement, the ideas being discussed. Their thought process appeared linear and goal directed, and their speech was regular rate and rhythm.       Community Group Engagement:  Yes: [employer]ism    Reported relapse since last meeting? No: no lapse    .  Office Visit on 08/05/2024   Component Date Value Ref Range Status    External Amphetamine Screen Urine 08/05/2024 Negative   Final    External Benzodiazepine Screen Uri* 08/05/2024 Negative   Final    External Cocaine Screen Urine 08/05/2024 Negative   Final    External THC Screen Urine 08/05/2024 Negative   Final    External Methadone Screen Urine 08/05/2024 Negative   Final    External Methamphetamine Screen Ur* 08/05/2024 Negative   Final    External Oxycodone Screen Urine 08/05/2024 Positive (A)   Final    External Buprenorphine Screen Urine 08/05/2024 Negative   Final    External MDMA 08/05/2024 Negative   Final    External Opiates Screen Urine 08/05/2024 Positive (A)   Final   Office Visit on 07/29/2024   Component Date Value Ref Range Status    External Amphetamine Screen Urine 07/29/2024 Negative   Final    External Benzodiazepine Screen Uri* 07/29/2024 Negative   Final    External Cocaine Screen Urine 07/29/2024 Negative   Final    External THC Screen Urine 07/29/2024 Negative   Final    External Methadone Screen Urine 07/29/2024 Negative   Final    External Methamphetamine Screen Ur* 07/29/2024 Negative   Final    External Oxycodone Screen Urine 07/29/2024 Positive (A)   Final    External Buprenorphine Screen Urine 07/29/2024 Negative   Final    External MDMA 07/29/2024 Negative   Final    External Opiates Screen Urine 07/29/2024 Positive (A)   Final   Orders Only on 07/25/2024   Component Date Value Ref Range Status    External Amphetamine Screen Urine 07/25/2024 Negative   Final     External Benzodiazepine Screen Uri* 07/25/2024 Negative   Final    External Cocaine Screen Urine 07/25/2024 Negative   Final    External THC Screen Urine 07/25/2024 Negative   Final    External Methadone Screen Urine 07/25/2024 Negative   Final    External Methamphetamine Screen Ur* 07/25/2024 Negative   Final    External Oxycodone Screen Urine 07/25/2024 Positive (A)   Final    External Buprenorphine Screen Urine 07/25/2024 Negative   Final    External MDMA 07/25/2024 Negative   Final    External Opiates Screen Urine 07/25/2024 Positive (A)   Final       Impression/Formulation:    ICD-10-CM ICD-9-CM   1. Alcohol use disorder, severe, dependence  F10.20 303.90   2. Anxiety disorder, unspecified type  F41.9 300.00   3. Chronic pain disorder  G89.4 338.4        CLINICAL MANEUVERING/INTERVENTIONS: Therapist utilized a person-centered approach to build and maintain rapport with group member.   Therapist promoted safe nonjudgmental environment by providing group members with unconditional positive regard.  Assisted member in processing above session content; acknowledged and normalized patient's thoughts, feelings and concerns.  Allowed patient to freely discuss issues without interruption or judgment. Provided safe, confidential environment to facilitate the development of positive therapeutic relationship and encourage open, honest communication. Therapist assisted group member with identifying and implementing healthier coping strategies and maintaining healthier boundaries. Assisted patient in identifying risk factors which would indicate the need for higher level of care including thoughts to harm self or others and/or self-harming behavior and encouraged patient to contact this office, call 911, or present to the nearest emergency room should any of these events occur. Pt agreeable to adhere to medication regimen as prescribed and report any side effects.  Discussed crisis intervention services and means to access.   Patient adamantly and convincingly denies current suicidal or homicidal ideation or perceptual disturbance.      Therapist implemented motivational interviewing techniques to assist client with exploring and resolving ambivalence associated with commitment to change behaviors.  Yes  Therapist utilized dialectical behavior techniques to teach and model emotional regulation and relaxation.  No   Therapist applied cognitive behavioral strategies to facilitate identification of maladaptive patterns of thinking and behavior.  Yes     PLAN:    Continue [facility] Phase I  Aftercare:  [facility] Outpatient Phase 2      Please note that portions of this note were completed with a voice recognition program. Efforts were made to edit dictation, but occasionally words are mistranscribed.     This document signed by Ben Ortega LCSW, August 8, 2024, 14:32 EDT

## 2024-08-12 ENCOUNTER — OFFICE VISIT (OUTPATIENT)
Dept: PSYCHIATRY | Facility: HOSPITAL | Age: 49
End: 2024-08-12
Payer: MEDICARE

## 2024-08-12 DIAGNOSIS — F41.9 ANXIETY DISORDER, UNSPECIFIED TYPE: ICD-10-CM

## 2024-08-12 DIAGNOSIS — Z79.899 MEDICATION MANAGEMENT: ICD-10-CM

## 2024-08-12 DIAGNOSIS — F10.20 ALCOHOL USE DISORDER, SEVERE, DEPENDENCE: Primary | ICD-10-CM

## 2024-08-12 DIAGNOSIS — G89.4 CHRONIC PAIN DISORDER: ICD-10-CM

## 2024-08-12 PROCEDURE — H0015 ALCOHOL AND/OR DRUG SERVICES: HCPCS | Performed by: SOCIAL WORKER
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
## 2024-08-13 ENCOUNTER — OFFICE VISIT (OUTPATIENT)
Dept: PSYCHIATRY | Facility: HOSPITAL | Age: 49
End: 2024-08-13
Payer: MEDICARE

## 2024-08-13 DIAGNOSIS — F10.20 ALCOHOL USE DISORDER, SEVERE, DEPENDENCE: Primary | ICD-10-CM

## 2024-08-13 DIAGNOSIS — F41.9 ANXIETY DISORDER, UNSPECIFIED TYPE: ICD-10-CM

## 2024-08-13 DIAGNOSIS — G89.4 CHRONIC PAIN DISORDER: ICD-10-CM

## 2024-08-13 PROCEDURE — G0410 GRP PSYCH PARTIAL HOSP 45-50: HCPCS | Performed by: SOCIAL WORKER

## 2024-08-13 PROCEDURE — H0015 ALCOHOL AND/OR DRUG SERVICES: HCPCS | Performed by: SOCIAL WORKER

## 2024-08-13 PROCEDURE — G0177 OPPS/PHP; TRAIN & EDUC SERV: HCPCS | Performed by: SOCIAL WORKER

## 2024-08-13 PROCEDURE — G0176 OPPS/PHP;ACTIVITY THERAPY: HCPCS | Performed by: SOCIAL WORKER

## 2024-08-13 NOTE — PROGRESS NOTES
NAME: Taiwo Pierce  DATE: 08/13/2024    [facility] Phase I  [id number]    Services Provided    Group Psychotherapy x 1  Education/Training x 1  Activity Therapy  x 1  Individual Therapy x 0 0 minutes  Family Therapy x 0  MD Initial Visit  x 0  MD Follow-Up   x 0    Number of participants: 3    DATA:  Patient reported he was doing well today. He was feeling confident about his recovery. His back was hurting, but otherwise he was feeling well.     Individual: No    Homework: Assigned Finding Meaning    Group 1 (Psychotherapy: Check-ins): Therapist continued facilitation of rapport building strategies between group members. Therapist asked that each patient check in with home life and recovery efforts and identify triggers, cravings, and high risk situations that arise between group sessions.  Group members discussed barriers and benefits of attending recovery meetings.  Therapist provided empathy and support during group session. Daily Reading: None    Group 2  (Recreation Therapy) Participants enjoyed music trivia.     Group 3 (Existential Therapy) Discussed the concept of finding meaning in suffering. Provided a worksheet for homework.      ASSESSMENT:    Personal Assessment 0-10 Scale (10 worst)    Anxiety:  6 (no comment)  Depression:  0 (no comment)  Cravings: 0 (no comment)     MSE within normal limits? Yes Affect: somber Mood: calm  Pt Response:  open/receptive  Engaged in activity/Process and self-disclosed: adequate  Applies today's group topics to self: adequate  Able to give and receive feedback: adequate  Demonstrated insightful thinking: consistent and adequate  Other pt response comments:  Patient was a positive participant. They demonstrated a relatively positive attitude, a strong degree of motivation, and adequate insight, as evidenced by his level of engagement combined with his efforts to maintain sobriety. They seemed interested and attentive. They appeared to comprehend well the concepts  being discussed. The patient seemed receptive of, and willing to implement, the ideas being discussed. Their thought process appeared linear and goal directed, and their speech was regular rate and rhythm.       Community Group Engagement:  Yes: Sabianist    Reported relapse since last meeting? No: no lapse    .  Office Visit on 08/12/2024   Component Date Value Ref Range Status    External Amphetamine Screen Urine 08/12/2024 Negative   Final    External Benzodiazepine Screen Uri* 08/12/2024 Negative   Final    External Cocaine Screen Urine 08/12/2024 Negative   Final    External THC Screen Urine 08/12/2024 Negative   Final    External Methadone Screen Urine 08/12/2024 Negative   Final    External Methamphetamine Screen Ur* 08/12/2024 Negative   Final    External Oxycodone Screen Urine 08/12/2024 Positive (A)   Final    External Buprenorphine Screen Urine 08/12/2024 Negative   Final    External MDMA 08/12/2024 Negative   Final    External Opiates Screen Urine 08/12/2024 Positive (A)   Final   Office Visit on 08/05/2024   Component Date Value Ref Range Status    External Amphetamine Screen Urine 08/05/2024 Negative   Final    External Benzodiazepine Screen Uri* 08/05/2024 Negative   Final    External Cocaine Screen Urine 08/05/2024 Negative   Final    External THC Screen Urine 08/05/2024 Negative   Final    External Methadone Screen Urine 08/05/2024 Negative   Final    External Methamphetamine Screen Ur* 08/05/2024 Negative   Final    External Oxycodone Screen Urine 08/05/2024 Positive (A)   Final    External Buprenorphine Screen Urine 08/05/2024 Negative   Final    External MDMA 08/05/2024 Negative   Final    External Opiates Screen Urine 08/05/2024 Positive (A)   Final   Office Visit on 07/29/2024   Component Date Value Ref Range Status    External Amphetamine Screen Urine 07/29/2024 Negative   Final    External Benzodiazepine Screen Uri* 07/29/2024 Negative   Final    External Cocaine Screen Urine 07/29/2024 Negative    Final    External THC Screen Urine 07/29/2024 Negative   Final    External Methadone Screen Urine 07/29/2024 Negative   Final    External Methamphetamine Screen Ur* 07/29/2024 Negative   Final    External Oxycodone Screen Urine 07/29/2024 Positive (A)   Final    External Buprenorphine Screen Urine 07/29/2024 Negative   Final    External MDMA 07/29/2024 Negative   Final    External Opiates Screen Urine 07/29/2024 Positive (A)   Final   Orders Only on 07/25/2024   Component Date Value Ref Range Status    External Amphetamine Screen Urine 07/25/2024 Negative   Final    External Benzodiazepine Screen Uri* 07/25/2024 Negative   Final    External Cocaine Screen Urine 07/25/2024 Negative   Final    External THC Screen Urine 07/25/2024 Negative   Final    External Methadone Screen Urine 07/25/2024 Negative   Final    External Methamphetamine Screen Ur* 07/25/2024 Negative   Final    External Oxycodone Screen Urine 07/25/2024 Positive (A)   Final    External Buprenorphine Screen Urine 07/25/2024 Negative   Final    External MDMA 07/25/2024 Negative   Final    External Opiates Screen Urine 07/25/2024 Positive (A)   Final       Impression/Formulation:    ICD-10-CM ICD-9-CM   1. Alcohol use disorder, severe, dependence  F10.20 303.90   2. Anxiety disorder, unspecified type  F41.9 300.00   3. Chronic pain disorder  G89.4 338.4        CLINICAL MANEUVERING/INTERVENTIONS: Therapist utilized a person-centered approach to build and maintain rapport with group member.   Therapist promoted safe nonjudgmental environment by providing group members with unconditional positive regard.  Assisted member in processing above session content; acknowledged and normalized patient's thoughts, feelings and concerns.  Allowed patient to freely discuss issues without interruption or judgment. Provided safe, confidential environment to facilitate the development of positive therapeutic relationship and encourage open, honest communication.  Therapist assisted group member with identifying and implementing healthier coping strategies and maintaining healthier boundaries. Assisted patient in identifying risk factors which would indicate the need for higher level of care including thoughts to harm self or others and/or self-harming behavior and encouraged patient to contact this office, call 911, or present to the nearest emergency room should any of these events occur. Pt agreeable to adhere to medication regimen as prescribed and report any side effects.  Discussed crisis intervention services and means to access.  Patient adamantly and convincingly denies current suicidal or homicidal ideation or perceptual disturbance.      Therapist implemented motivational interviewing techniques to assist client with exploring and resolving ambivalence associated with commitment to change behaviors.  Yes  Therapist utilized dialectical behavior techniques to teach and model emotional regulation and relaxation.  No   Therapist applied cognitive behavioral strategies to facilitate identification of maladaptive patterns of thinking and behavior.  Yes     PLAN:    [facility]  Aftercare:  [facility]  Outpatient Phase 2      Please note that portions of this note were completed with a voice recognition program. Efforts were made to edit dictation, but occasionally words are mistranscribed.     This document signed by Ben Ortega LCSW, August 13, 2024, 16:16 EDT

## 2024-08-14 ENCOUNTER — OFFICE VISIT (OUTPATIENT)
Dept: PSYCHIATRY | Facility: HOSPITAL | Age: 49
End: 2024-08-14
Payer: MEDICARE

## 2024-08-14 DIAGNOSIS — F41.9 ANXIETY DISORDER, UNSPECIFIED TYPE: ICD-10-CM

## 2024-08-14 DIAGNOSIS — F10.20 ALCOHOL USE DISORDER, SEVERE, DEPENDENCE: Primary | ICD-10-CM

## 2024-08-14 DIAGNOSIS — G89.4 CHRONIC PAIN DISORDER: ICD-10-CM

## 2024-08-14 PROCEDURE — H0015 ALCOHOL AND/OR DRUG SERVICES: HCPCS | Performed by: SOCIAL WORKER

## 2024-08-14 NOTE — PROGRESS NOTES
"   NAME: Taiwo Pierce  DATE: 08/14/2024    [facility] Phase I  [id number]    Services Provided    Group Psychotherapy x 2  Education/Training x 1  Activity Therapy  x 0  Individual Therapy x 0 0 minutes  Family Therapy x 0  MD Initial Visit  x 0  MD Follow-Up   x 0    Number of participants: 3    DATA:  Patent reported he was doing well, he guessed. He had thoroughly completed the worksheet on finding meaning. He shared some good insights about this during group. It seemed to resonate with him that \"work\" was not exclusively meaningful when it resulted in monetary income; rather, it was meaningful if it contributed to something greater. He considered his woodworking projects to be meaningful, even though they did not produce income. He also reported that his hobbies were meaningful when he used them to spend time with his family or connect with family that he had lost.     Individual: No    Homework: completed Finding Meaning worksheet     Group 1 (Psychotherapy: Check-ins): Therapist continued facilitation of rapport building strategies between group members. Therapist asked that each patient check in with home life and recovery efforts and identify triggers, cravings, and high risk situations that arise between group sessions.  Group members discussed barriers and benefits of attending recovery meetings.  Therapist provided empathy and support during group session. Daily Reading: Man's Search for Meaning (1946) by Francisco Javier Alvarez    Group 2  (Existential Therapy) Reviewed the participants' completed  homework assignments. Expounded on what it might look like to pursue what is meaningful and not what is expedient.     Group 3 (Psychoeducation) This hour of group was [facility].     ASSESSMENT:    Personal Assessment 0-10 Scale (10 worst)    Anxiety:  5 (no comment)  Depression:  0 (no comment)  Cravings: 0 (no comment)     MSE within normal limits? Yes Affect: somber Mood: calm  Pt Response:  " open/receptive  Engaged in activity/Process and self-disclosed: adequate  Applies today's group topics to self: adequate  Able to give and receive feedback: adequate  Demonstrated insightful thinking: consistent and adequate  Other pt response comments:  Patient was a positive participant. They demonstrated a relatively positive attitude, a strong degree of motivation, and adequate insight, as evidenced by his level of engagement combined with his continued commitment to cessation of caffeine at 7 PM each day. They seemed interested and attentive. They appeared to comprehend well the concepts being discussed. The patient seemed receptive of, and willing to implement, the ideas being discussed. Their thought process appeared linear and goal directed, and their speech was regular rate and rhythm.       Community Group Engagement:  Yes: [facility]    Reported relapse since last meeting? No: no lapse reported    .  Office Visit on 08/12/2024   Component Date Value Ref Range Status    External Amphetamine Screen Urine 08/12/2024 Negative   Final    External Benzodiazepine Screen Uri* 08/12/2024 Negative   Final    External Cocaine Screen Urine 08/12/2024 Negative   Final    External THC Screen Urine 08/12/2024 Negative   Final    External Methadone Screen Urine 08/12/2024 Negative   Final    External Methamphetamine Screen Ur* 08/12/2024 Negative   Final    External Oxycodone Screen Urine 08/12/2024 Positive (A)   Final    External Buprenorphine Screen Urine 08/12/2024 Negative   Final    External MDMA 08/12/2024 Negative   Final    External Opiates Screen Urine 08/12/2024 Positive (A)   Final   Office Visit on 08/05/2024   Component Date Value Ref Range Status    External Amphetamine Screen Urine 08/05/2024 Negative   Final    External Benzodiazepine Screen Uri* 08/05/2024 Negative   Final    External Cocaine Screen Urine 08/05/2024 Negative   Final    External THC Screen Urine 08/05/2024 Negative   Final    External  Methadone Screen Urine 08/05/2024 Negative   Final    External Methamphetamine Screen Ur* 08/05/2024 Negative   Final    External Oxycodone Screen Urine 08/05/2024 Positive (A)   Final    External Buprenorphine Screen Urine 08/05/2024 Negative   Final    External MDMA 08/05/2024 Negative   Final    External Opiates Screen Urine 08/05/2024 Positive (A)   Final   Office Visit on 07/29/2024   Component Date Value Ref Range Status    External Amphetamine Screen Urine 07/29/2024 Negative   Final    External Benzodiazepine Screen Uri* 07/29/2024 Negative   Final    External Cocaine Screen Urine 07/29/2024 Negative   Final    External THC Screen Urine 07/29/2024 Negative   Final    External Methadone Screen Urine 07/29/2024 Negative   Final    External Methamphetamine Screen Ur* 07/29/2024 Negative   Final    External Oxycodone Screen Urine 07/29/2024 Positive (A)   Final    External Buprenorphine Screen Urine 07/29/2024 Negative   Final    External MDMA 07/29/2024 Negative   Final    External Opiates Screen Urine 07/29/2024 Positive (A)   Final   Orders Only on 07/25/2024   Component Date Value Ref Range Status    External Amphetamine Screen Urine 07/25/2024 Negative   Final    External Benzodiazepine Screen Uri* 07/25/2024 Negative   Final    External Cocaine Screen Urine 07/25/2024 Negative   Final    External THC Screen Urine 07/25/2024 Negative   Final    External Methadone Screen Urine 07/25/2024 Negative   Final    External Methamphetamine Screen Ur* 07/25/2024 Negative   Final    External Oxycodone Screen Urine 07/25/2024 Positive (A)   Final    External Buprenorphine Screen Urine 07/25/2024 Negative   Final    External MDMA 07/25/2024 Negative   Final    External Opiates Screen Urine 07/25/2024 Positive (A)   Final       Impression/Formulation:    ICD-10-CM ICD-9-CM   1. Alcohol use disorder, severe, dependence  F10.20 303.90   2. Anxiety disorder, unspecified type  F41.9 300.00   3. Chronic pain disorder  G89.4  338.4        CLINICAL MANEUVERING/INTERVENTIONS: Therapist utilized a person-centered approach to build and maintain rapport with group member.   Therapist promoted safe nonjudgmental environment by providing group members with unconditional positive regard.  Assisted member in processing above session content; acknowledged and normalized patient's thoughts, feelings and concerns.  Allowed patient to freely discuss issues without interruption or judgment. Provided safe, confidential environment to facilitate the development of positive therapeutic relationship and encourage open, honest communication. Therapist assisted group member with identifying and implementing healthier coping strategies and maintaining healthier boundaries. Assisted patient in identifying risk factors which would indicate the need for higher level of care including thoughts to harm self or others and/or self-harming behavior and encouraged patient to contact this office, call 911, or present to the nearest emergency room should any of these events occur. Pt agreeable to adhere to medication regimen as prescribed and report any side effects.  Discussed crisis intervention services and means to access.  Patient adamantly and convincingly denies current suicidal or homicidal ideation or perceptual disturbance.      Therapist implemented motivational interviewing techniques to assist client with exploring and resolving ambivalence associated with commitment to change behaviors.  Yes  Therapist utilized dialectical behavior techniques to teach and model emotional regulation and relaxation.  No   Therapist applied cognitive behavioral strategies to facilitate identification of maladaptive patterns of thinking and behavior.  Yes     PLAN:    Continue [facility] I  Aftercare:  [facility] 2      Please note that portions of this note were completed with a voice recognition program. Efforts were  made to edit dictation, but occasionally words are mistranscribed.     This document signed by Ben Ortega LCSW, August 14, 2024, 14:22 EDT

## 2024-08-15 ENCOUNTER — APPOINTMENT (OUTPATIENT)
Dept: PSYCHIATRY | Facility: HOSPITAL | Age: 49
End: 2024-08-15
Payer: MEDICARE

## 2024-08-19 ENCOUNTER — OFFICE VISIT (OUTPATIENT)
Dept: PSYCHIATRY | Facility: HOSPITAL | Age: 49
End: 2024-08-19
Payer: MEDICARE

## 2024-08-19 DIAGNOSIS — F10.20 ALCOHOL USE DISORDER, SEVERE, DEPENDENCE: Primary | ICD-10-CM

## 2024-08-19 DIAGNOSIS — G89.4 CHRONIC PAIN DISORDER: ICD-10-CM

## 2024-08-19 DIAGNOSIS — Z79.899 MEDICATION MANAGEMENT: ICD-10-CM

## 2024-08-19 DIAGNOSIS — E78.2 MIXED HYPERLIPIDEMIA: ICD-10-CM

## 2024-08-19 DIAGNOSIS — F41.9 ANXIETY DISORDER, UNSPECIFIED TYPE: ICD-10-CM

## 2024-08-19 PROCEDURE — H0015 ALCOHOL AND/OR DRUG SERVICES: HCPCS | Performed by: SOCIAL WORKER

## 2024-08-19 RX ORDER — HYDROXYZINE HYDROCHLORIDE 25 MG/1
25 TABLET, FILM COATED ORAL EVERY 8 HOURS PRN
Qty: 90 TABLET | Refills: 0 | Status: CANCELLED | OUTPATIENT
Start: 2024-08-19

## 2024-08-19 NOTE — PROGRESS NOTES
NAME: Taiwo Pierce  DATE: 08/19/2024    [facility] Phase I  [id number]    Services Provided    Group Psychotherapy x 1  Education/Training x 2  Activity Therapy  x 0  Individual Therapy x 0 0 minutes  Family Therapy x 0  MD Initial Visit  x 0  MD Follow-Up   x 0    Number of participants: 3    DATA:  Patient reported he had been doing a lot better. The weather system was creating some unpleasant symptoms. He felt his anxiety had been higher lately, too. He had had some occasions this weekend in which he had been irritable. He and his spouse had argued about some things this weekend, and he reported they had been trivial things. However, he was sleeping better.     Later, he disclosed that their argument had been about his stepson. He had asserted to his stepson that he needed to put his phone away and go to sleep at 10:00 PM, and if he did not comply he would take the phone away for the night. His wife had felt this was too harsh.     Individual: No    Homework: None assigned    Group 1 (Psychotherapy: Check-ins): Therapist continued facilitation of rapport building strategies between group members. Therapist asked that each patient check in with home life and recovery efforts and identify triggers, cravings, and high risk situations that arise between group sessions.  Group members discussed barriers and benefits of attending recovery meetings.  Therapist provided empathy and support during group session. Daily Reading: None    Group 2  (Mindfulness) Discussed the basic concept of mindfulness and the practice of mindfulness meditation.     Group 3 (Mindfulness) Began viewing and discussing the video, Learn mindfulness in seconds, not hours (10 Simple Exercises) (2019), by Dr. Reed on [employer].     ASSESSMENT:    Personal Assessment 0-10 Scale (10 worst)    Anxiety:  7 (no comment)  Depression:  3 (no comment)  Cravings: 0 (no comment)     MSE within normal limits? Yes Affect: euthymic Mood: calm  Pt Response:   open/receptive  Engaged in activity/Process and self-disclosed: adequate  Applies today's group topics to self: adequate  Able to give and receive feedback: adequate  Demonstrated insightful thinking: consistent and adequate  Other pt response comments:  Patient was a positive participant. They demonstrated a relatively positive attitude, a strong degree of motivation, and adequate insight, as evidenced by his level of engagement combined with his level of disclosure about the argument with his spouse this weekend. They seemed interested and attentive. They appeared to comprehend well the concepts being discussed. The patient seemed receptive of, and willing to implement, the ideas being discussed. Their thought process appeared linear and goal directed, and their speech was regular rate and rhythm.       Community Group Engagement:  Yes: [facility]    Reported relapse since last meeting? No: no lapse    .  Office Visit on 08/19/2024   Component Date Value Ref Range Status    External Amphetamine Screen Urine 08/19/2024 Negative   Final    External Benzodiazepine Screen Uri* 08/19/2024 Negative   Final    External Cocaine Screen Urine 08/19/2024 Negative   Final    External THC Screen Urine 08/19/2024 Negative   Final    External Methadone Screen Urine 08/19/2024 Negative   Final    External Methamphetamine Screen Ur* 08/19/2024 Negative   Final    External Oxycodone Screen Urine 08/19/2024 Positive (A)   Final    External Buprenorphine Screen Urine 08/19/2024 Negative   Final    External MDMA 08/19/2024 Negative   Final    External Opiates Screen Urine 08/19/2024 Positive (A)   Final   Office Visit on 08/12/2024   Component Date Value Ref Range Status    External Amphetamine Screen Urine 08/12/2024 Negative   Final    External Benzodiazepine Screen Uri* 08/12/2024 Negative   Final    External Cocaine Screen Urine 08/12/2024 Negative   Final    External THC Screen Urine 08/12/2024 Negative   Final    External Methadone  Screen Urine 08/12/2024 Negative   Final    External Methamphetamine Screen Ur* 08/12/2024 Negative   Final    External Oxycodone Screen Urine 08/12/2024 Positive (A)   Final    External Buprenorphine Screen Urine 08/12/2024 Negative   Final    External MDMA 08/12/2024 Negative   Final    External Opiates Screen Urine 08/12/2024 Positive (A)   Final   Office Visit on 08/05/2024   Component Date Value Ref Range Status    External Amphetamine Screen Urine 08/05/2024 Negative   Final    External Benzodiazepine Screen Uri* 08/05/2024 Negative   Final    External Cocaine Screen Urine 08/05/2024 Negative   Final    External THC Screen Urine 08/05/2024 Negative   Final    External Methadone Screen Urine 08/05/2024 Negative   Final    External Methamphetamine Screen Ur* 08/05/2024 Negative   Final    External Oxycodone Screen Urine 08/05/2024 Positive (A)   Final    External Buprenorphine Screen Urine 08/05/2024 Negative   Final    External MDMA 08/05/2024 Negative   Final    External Opiates Screen Urine 08/05/2024 Positive (A)   Final       Impression/Formulation:    ICD-10-CM ICD-9-CM   1. Alcohol use disorder, severe, dependence  F10.20 303.90   2. Anxiety disorder, unspecified type  F41.9 300.00   3. Chronic pain disorder  G89.4 338.4   4. Medication management  Z79.899 V58.69        CLINICAL MANEUVERING/INTERVENTIONS: Therapist utilized a person-centered approach to build and maintain rapport with group member.   Therapist promoted safe nonjudgmental environment by providing group members with unconditional positive regard.  Assisted member in processing above session content; acknowledged and normalized patient's thoughts, feelings and concerns.  Allowed patient to freely discuss issues without interruption or judgment. Provided safe, confidential environment to facilitate the development of positive therapeutic relationship and encourage open, honest communication. Therapist assisted group member with identifying  and implementing healthier coping strategies and maintaining healthier boundaries. Assisted patient in identifying risk factors which would indicate the need for higher level of care including thoughts to harm self or others and/or self-harming behavior and encouraged patient to contact this office, call 911, or present to the nearest emergency room should any of these events occur. Pt agreeable to adhere to medication regimen as prescribed and report any side effects.  Discussed crisis intervention services and means to access.  Patient adamantly and convincingly denies current suicidal or homicidal ideation or perceptual disturbance.      Therapist implemented motivational interviewing techniques to assist client with exploring and resolving ambivalence associated with commitment to change behaviors.  Yes  Therapist utilized dialectical behavior techniques to teach and model emotional regulation and relaxation.  No   Therapist applied cognitive behavioral strategies to facilitate identification of maladaptive patterns of thinking and behavior.  Yes     PLAN:    Continue [facility] Phase I  Aftercare:  [facility] Phase 2      Please note that portions of this note were completed with a voice recognition program. Efforts were made to edit dictation, but occasionally words are mistranscribed.     This document signed by Ben Ortega LCSW, August 19, 2024, 15:23 EDT

## 2024-08-20 ENCOUNTER — OFFICE VISIT (OUTPATIENT)
Dept: PSYCHIATRY | Facility: HOSPITAL | Age: 49
End: 2024-08-20
Payer: MEDICARE

## 2024-08-20 DIAGNOSIS — F10.20 ALCOHOL USE DISORDER, SEVERE, DEPENDENCE: Primary | ICD-10-CM

## 2024-08-20 DIAGNOSIS — G89.4 CHRONIC PAIN DISORDER: ICD-10-CM

## 2024-08-20 DIAGNOSIS — F41.9 ANXIETY DISORDER, UNSPECIFIED TYPE: ICD-10-CM

## 2024-08-20 PROCEDURE — H0015 ALCOHOL AND/OR DRUG SERVICES: HCPCS | Performed by: SOCIAL WORKER

## 2024-08-20 RX ORDER — PRAVASTATIN SODIUM 20 MG
20 TABLET ORAL DAILY
Qty: 90 TABLET | Refills: 0 | OUTPATIENT
Start: 2024-08-20

## 2024-08-21 ENCOUNTER — OFFICE VISIT (OUTPATIENT)
Dept: PSYCHIATRY | Facility: HOSPITAL | Age: 49
End: 2024-08-21
Payer: MEDICARE

## 2024-08-21 DIAGNOSIS — G89.4 CHRONIC PAIN DISORDER: ICD-10-CM

## 2024-08-21 DIAGNOSIS — Z79.899 MEDICATION MANAGEMENT: Primary | ICD-10-CM

## 2024-08-21 DIAGNOSIS — F41.9 ANXIETY DISORDER, UNSPECIFIED TYPE: ICD-10-CM

## 2024-08-21 DIAGNOSIS — F10.20 ALCOHOL USE DISORDER, SEVERE, DEPENDENCE: Primary | ICD-10-CM

## 2024-08-21 PROCEDURE — H0015 ALCOHOL AND/OR DRUG SERVICES: HCPCS | Performed by: SOCIAL WORKER

## 2024-08-21 NOTE — PROGRESS NOTES
"   NAME: Taiwo Pierce  DATE: 08/21/2024    [facility] Phase I  [id number]    Services Provided    Group Psychotherapy x 2  Education/Training x 1  Activity Therapy  x 0  Individual Therapy x 0 0 minutes  Family Therapy x 0  MD Initial Visit  x 0  MD Follow-Up   x 0    Number of participants: 3    DATA:  Patient reported his back was hurting him today. He asserted that \"they\" would have to do something about his back. He had been given exercises to do to strengthen his muscles, and he had tried them for two weeks. However, they did not work. He felt like two weeks should have been long enough, but it only made his back hurt worse. Therapist shared his observations about self-limiting beliefs. The patient was receptive. He understood the rationale in pointing them out.     Individual: No    Homework: None assigned    Group 1 (Psychotherapy: Check-ins): Therapist continued facilitation of rapport building strategies between group members. Therapist asked that each patient check in with home life and recovery efforts and identify triggers, cravings, and high risk situations that arise between group sessions.  Group members discussed barriers and benefits of attending recovery meetings.  Therapist provided empathy and support during group session. Daily Reading: The Great Divorce (1945) by DEBBIE Parsons    Group 2  (Communication) Explored the worksheet, \"Fair Fighting Rules,\" from [employer].     Group 3 (Psychoeducation) This hour of group was facilitated by [facility].     ASSESSMENT:    Personal Assessment 0-10 Scale (10 worst)    Anxiety:  8 (no comment)  Depression:  4 (no comment)  Cravings: 0 (no comment)     MSE within normal limits? Yes Affect: somber Mood: anxious  Pt Response:  open/receptive  Engaged in activity/Process and self-disclosed: adequate  Applies today's group topics to self: adequate  Able to give and receive feedback: adequate  Demonstrated insightful thinking: consistent and " adequate  Other pt response comments:  Patient was a positive participant. They demonstrated a relatively pessimistic attitude, ambivalence, and moderate insight, as evidenced by his level of engagement combined with the degree to which he was pessimistic about his back pain. They seemed interested and attentive. They appeared to comprehend well the concepts being discussed. The patient seemed doubtful of, and somewhat willing to implement, the ideas being discussed. Their thought process appeared linear and goal directed, and their speech was regular rate and rhythm.       Community Group Engagement:  Yes: [facility]    Reported relapse since last meeting? No: not engaged    .  Orders Only on 08/21/2024   Component Date Value Ref Range Status    External Amphetamine Screen Urine 08/21/2024 Negative   Final    External Benzodiazepine Screen Uri* 08/21/2024 Negative   Final    External Cocaine Screen Urine 08/21/2024 Negative   Final    External THC Screen Urine 08/21/2024 Negative   Final    External Methadone Screen Urine 08/21/2024 Negative   Final    External Methamphetamine Screen Ur* 08/21/2024 Negative   Final    External Oxycodone Screen Urine 08/21/2024 Positive (A)   Final    External Buprenorphine Screen Urine 08/21/2024 Negative   Final    External MDMA 08/21/2024 Negative   Final    External Opiates Screen Urine 08/21/2024 Positive (A)   Final   Office Visit on 08/19/2024   Component Date Value Ref Range Status    External Amphetamine Screen Urine 08/19/2024 Negative   Final    External Benzodiazepine Screen Uri* 08/19/2024 Negative   Final    External Cocaine Screen Urine 08/19/2024 Negative   Final    External THC Screen Urine 08/19/2024 Negative   Final    External Methadone Screen Urine 08/19/2024 Negative   Final    External Methamphetamine Screen Ur* 08/19/2024 Negative   Final    External Oxycodone Screen Urine 08/19/2024 Positive (A)   Final    External Buprenorphine Screen Urine 08/19/2024 Negative    Final    External MDMA 08/19/2024 Negative   Final    External Opiates Screen Urine 08/19/2024 Positive (A)   Final   Office Visit on 08/12/2024   Component Date Value Ref Range Status    External Amphetamine Screen Urine 08/12/2024 Negative   Final    External Benzodiazepine Screen Uri* 08/12/2024 Negative   Final    External Cocaine Screen Urine 08/12/2024 Negative   Final    External THC Screen Urine 08/12/2024 Negative   Final    External Methadone Screen Urine 08/12/2024 Negative   Final    External Methamphetamine Screen Ur* 08/12/2024 Negative   Final    External Oxycodone Screen Urine 08/12/2024 Positive (A)   Final    External Buprenorphine Screen Urine 08/12/2024 Negative   Final    External MDMA 08/12/2024 Negative   Final    External Opiates Screen Urine 08/12/2024 Positive (A)   Final   Office Visit on 08/05/2024   Component Date Value Ref Range Status    External Amphetamine Screen Urine 08/05/2024 Negative   Final    External Benzodiazepine Screen Uri* 08/05/2024 Negative   Final    External Cocaine Screen Urine 08/05/2024 Negative   Final    External THC Screen Urine 08/05/2024 Negative   Final    External Methadone Screen Urine 08/05/2024 Negative   Final    External Methamphetamine Screen Ur* 08/05/2024 Negative   Final    External Oxycodone Screen Urine 08/05/2024 Positive (A)   Final    External Buprenorphine Screen Urine 08/05/2024 Negative   Final    External MDMA 08/05/2024 Negative   Final    External Opiates Screen Urine 08/05/2024 Positive (A)   Final       Impression/Formulation:    ICD-10-CM ICD-9-CM   1. Alcohol use disorder, severe, dependence  F10.20 303.90   2. Anxiety disorder, unspecified type  F41.9 300.00   3. Chronic pain disorder  G89.4 338.4        CLINICAL MANEUVERING/INTERVENTIONS: Therapist utilized a person-centered approach to build and maintain rapport with group member.   Therapist promoted safe nonjudgmental environment by providing group members with unconditional  positive regard.  Assisted member in processing above session content; acknowledged and normalized patient's thoughts, feelings and concerns.  Allowed patient to freely discuss issues without interruption or judgment. Provided safe, confidential environment to facilitate the development of positive therapeutic relationship and encourage open, honest communication. Therapist assisted group member with identifying and implementing healthier coping strategies and maintaining healthier boundaries. Assisted patient in identifying risk factors which would indicate the need for higher level of care including thoughts to harm self or others and/or self-harming behavior and encouraged patient to contact this office, call 911, or present to the nearest emergency room should any of these events occur. Pt agreeable to adhere to medication regimen as prescribed and report any side effects.  Discussed crisis intervention services and means to access.  Patient adamantly and convincingly denies current suicidal or homicidal ideation or perceptual disturbance.      Therapist implemented motivational interviewing techniques to assist client with exploring and resolving ambivalence associated with commitment to change behaviors.  Yes  Therapist utilized dialectical behavior techniques to teach and model emotional regulation and relaxation.  No   Therapist applied cognitive behavioral strategies to facilitate identification of maladaptive patterns of thinking and behavior.  Yes     PLAN:    [facility]  Aftercare[facility]  [facility]      Please note that portions of this note were completed with a voice recognition program. Efforts were made to edit dictation, but occasionally words are mistranscribed.     This document signed by Ben Ortega LCSW, August 21, 2024, 15:29 EDT

## 2024-08-22 ENCOUNTER — OFFICE VISIT (OUTPATIENT)
Dept: PSYCHIATRY | Facility: HOSPITAL | Age: 49
End: 2024-08-22
Payer: MEDICARE

## 2024-08-22 DIAGNOSIS — G89.4 CHRONIC PAIN DISORDER: ICD-10-CM

## 2024-08-22 DIAGNOSIS — F41.9 ANXIETY DISORDER, UNSPECIFIED TYPE: ICD-10-CM

## 2024-08-22 DIAGNOSIS — F10.20 ALCOHOL USE DISORDER, SEVERE, DEPENDENCE: Primary | ICD-10-CM

## 2024-08-22 PROCEDURE — H0015 ALCOHOL AND/OR DRUG SERVICES: HCPCS | Performed by: SOCIAL WORKER

## 2024-08-22 NOTE — PROGRESS NOTES
"Adult PHP Group      Date: August 22, 2024  Time: [id number] Am    Type of Group:  Peer Support    Group  Content: \"Rock Bottom\" Step 1 Celebrate Recovery    Patient Response: Today we talked about rock bottom and what brought the patient to recovery. Taking a look at step 1 and realizing that his life was unmanageable and we also talked about positive affirmation and did a weekly check in.        Sandhya Coles MA  08/22/24  15:20 EDT   "

## 2024-08-22 NOTE — PROGRESS NOTES
"   NAME: Taiwo Pierce  DATE: 08/22/2024    [facility] Phase I  [id number]    Services Provided    Group Psychotherapy x 2  Education/Training x 1  Activity Therapy  x 0  Individual Therapy x 0 0 minutes  Family Therapy x 0  MD Initial Visit  x 0  MD Follow-Up   x 0    Number of participants: 4    DATA:  Patient reported he was doing pretty good he guessed. His physical pain was slightly less severe last night, but it was still bad. He had found himself tossing and turning last night due to the pain.     Individual: No    Homework: Assigned Thought Log    Group 1 (Psychotherapy: Check-ins): Therapist continued facilitation of rapport building strategies between group members. Therapist asked that each patient check in with home life and recovery efforts and identify triggers, cravings, and high risk situations that arise between group sessions.  Group members discussed barriers and benefits of attending recovery meetings.  Therapist provided empathy and support during group session. Daily Reading: The Weight of Glory (On Forgiveness) by JESSICA Parsons.     Group 2  (CBT) Discussed the cognitive distortion of \"personalization,\" and introduced the pie chart exercise.     Group 3 (Peer Support) Participants explored the 12 Steps of recovery by using the NA 12 Step Working Guide. This hour was facilitated by Sandhya .     ASSESSMENT:    Personal Assessment 0-10 Scale (10 worst)    Anxiety:  6 (no comment)  Depression:  2 (no comment)  Cravings: 0 (no comment)     MSE within normal limits? Yes Affect: euthymic Mood: calm  Pt Response:  open/receptive  Engaged in activity/Process and self-disclosed: suboptimal  Applies today's group topics to self: adequate  Able to give and receive feedback: adequate  Demonstrated insightful thinking: occasional and suboptimal  Other pt response comments:  Patient was a positive participant. They demonstrated a relatively positive attitude, a moderate degree of " motivation, and moderate insight, as evidenced by his level of engagement combined with his occasional distractions today. They seemed interested and distracted. They appeared to comprehend well the concepts being discussed. The patient seemed receptive of, and willing to implement, the ideas being discussed. Their thought process appeared circumstantial , and their speech was regular rate and rhythm.       Community Group Engagement:  Yes: Jew    Reported relapse since last meeting? No: no lapse    .  Orders Only on 08/21/2024   Component Date Value Ref Range Status    External Amphetamine Screen Urine 08/21/2024 Negative   Final    External Benzodiazepine Screen Uri* 08/21/2024 Negative   Final    External Cocaine Screen Urine 08/21/2024 Negative   Final    External THC Screen Urine 08/21/2024 Negative   Final    External Methadone Screen Urine 08/21/2024 Negative   Final    External Methamphetamine Screen Ur* 08/21/2024 Negative   Final    External Oxycodone Screen Urine 08/21/2024 Positive (A)   Final    External Buprenorphine Screen Urine 08/21/2024 Negative   Final    External MDMA 08/21/2024 Negative   Final    External Opiates Screen Urine 08/21/2024 Positive (A)   Final   Office Visit on 08/19/2024   Component Date Value Ref Range Status    External Amphetamine Screen Urine 08/19/2024 Negative   Final    External Benzodiazepine Screen Uri* 08/19/2024 Negative   Final    External Cocaine Screen Urine 08/19/2024 Negative   Final    External THC Screen Urine 08/19/2024 Negative   Final    External Methadone Screen Urine 08/19/2024 Negative   Final    External Methamphetamine Screen Ur* 08/19/2024 Negative   Final    External Oxycodone Screen Urine 08/19/2024 Positive (A)   Final    External Buprenorphine Screen Urine 08/19/2024 Negative   Final    External MDMA 08/19/2024 Negative   Final    External Opiates Screen Urine 08/19/2024 Positive (A)   Final   Office Visit on 08/12/2024   Component Date Value Ref  Range Status    External Amphetamine Screen Urine 08/12/2024 Negative   Final    External Benzodiazepine Screen Uri* 08/12/2024 Negative   Final    External Cocaine Screen Urine 08/12/2024 Negative   Final    External THC Screen Urine 08/12/2024 Negative   Final    External Methadone Screen Urine 08/12/2024 Negative   Final    External Methamphetamine Screen Ur* 08/12/2024 Negative   Final    External Oxycodone Screen Urine 08/12/2024 Positive (A)   Final    External Buprenorphine Screen Urine 08/12/2024 Negative   Final    External MDMA 08/12/2024 Negative   Final    External Opiates Screen Urine 08/12/2024 Positive (A)   Final   Office Visit on 08/05/2024   Component Date Value Ref Range Status    External Amphetamine Screen Urine 08/05/2024 Negative   Final    External Benzodiazepine Screen Uri* 08/05/2024 Negative   Final    External Cocaine Screen Urine 08/05/2024 Negative   Final    External THC Screen Urine 08/05/2024 Negative   Final    External Methadone Screen Urine 08/05/2024 Negative   Final    External Methamphetamine Screen Ur* 08/05/2024 Negative   Final    External Oxycodone Screen Urine 08/05/2024 Positive (A)   Final    External Buprenorphine Screen Urine 08/05/2024 Negative   Final    External MDMA 08/05/2024 Negative   Final    External Opiates Screen Urine 08/05/2024 Positive (A)   Final       Impression/Formulation:    ICD-10-CM ICD-9-CM   1. Alcohol use disorder, severe, dependence  F10.20 303.90   2. Anxiety disorder, unspecified type  F41.9 300.00   3. Chronic pain disorder  G89.4 338.4        CLINICAL MANEUVERING/INTERVENTIONS: Therapist utilized a person-centered approach to build and maintain rapport with group member.   Therapist promoted safe nonjudgmental environment by providing group members with unconditional positive regard.  Assisted member in processing above session content; acknowledged and normalized patient's thoughts, feelings and concerns.  Allowed patient to freely discuss  issues without interruption or judgment. Provided safe, confidential environment to facilitate the development of positive therapeutic relationship and encourage open, honest communication. Therapist assisted group member with identifying and implementing healthier coping strategies and maintaining healthier boundaries. Assisted patient in identifying risk factors which would indicate the need for higher level of care including thoughts to harm self or others and/or self-harming behavior and encouraged patient to contact this office, call 911, or present to the nearest emergency room should any of these events occur. Pt agreeable to adhere to medication regimen as prescribed and report any side effects.  Discussed crisis intervention services and means to access.  Patient adamantly and convincingly denies current suicidal or homicidal ideation or perceptual disturbance.      Therapist implemented motivational interviewing techniques to assist client with exploring and resolving ambivalence associated with commitment to change behaviors.  Yes  Therapist utilized dialectical behavior techniques to teach and model emotional regulation and relaxation.  No   Therapist applied cognitive behavioral strategies to facilitate identification of maladaptive patterns of thinking and behavior.  Yes     PLAN:    Continue [facility] IOP Phase I  Aftercare:  [facility] Outpatient Phase 2      Please note that portions of this note were completed with a voice recognition program. Efforts were made to edit dictation, but occasionally words are mistranscribed.     This document signed by Ben Ortega LCSW, August 22, 2024, 11:18 EDT

## 2024-08-23 ENCOUNTER — TELEPHONE (OUTPATIENT)
Dept: NEUROSURGERY | Facility: CLINIC | Age: 49
End: 2024-08-23
Payer: MEDICARE

## 2024-08-23 NOTE — PROGRESS NOTES
Adult CD IOP Group      Date: August 21, 2024    Time: 1030 - 4580    Type of Group:  Psychoeducation    Group  Content: Group members received education on protective factors. Group members used a scale from weak to strong to identify how well they are performing in each category of protective factors.  Group members identified which protective factor they find to be most valuable and what areas they would like to make improvements.  Group members updated FEI-7 and PHQ-9.    Patient Response: Pt was calm and cooperative in group. Pt completed worksheet on protective factors. Pt ranked himself as strong when it comes to social support. Pt stated that social support had been most valuable to him during difficult times. Pt listed his spouse, parents, and God as his social support.  Pt scored 12 (mod) on FEI-7 and 10 (mod) on PHQ-9.      Zacarias Neil  08/23/24  09:28 EDT

## 2024-08-23 NOTE — TELEPHONE ENCOUNTER
Caller: Taiwo Pierce    Relationship to patient: Self    Best call back number: [phone]    Patient is needing: PATIENT CALLED, STATES HIS LUMBAR IS THROUGH WC. PATIENT WOULD LIKE TO KNOW IF WE HAVE RECEIVED AUTH FOR HIM TO GET MRI COVERED THROUGH WC. WC IS LISTED IN CHART UNDER COVERAGE. PLEASE CALL PATIENT TO ADVISE.    THANK YOU

## 2024-08-26 ENCOUNTER — OFFICE VISIT (OUTPATIENT)
Dept: PSYCHIATRY | Facility: HOSPITAL | Age: 49
End: 2024-08-26
Payer: MEDICARE

## 2024-08-26 DIAGNOSIS — G89.4 CHRONIC PAIN DISORDER: ICD-10-CM

## 2024-08-26 DIAGNOSIS — F41.9 ANXIETY DISORDER, UNSPECIFIED TYPE: ICD-10-CM

## 2024-08-26 DIAGNOSIS — Z79.899 MEDICATION MANAGEMENT: ICD-10-CM

## 2024-08-26 DIAGNOSIS — F10.20 ALCOHOL USE DISORDER, SEVERE, DEPENDENCE: Primary | ICD-10-CM

## 2024-08-26 PROCEDURE — H0015 ALCOHOL AND/OR DRUG SERVICES: HCPCS | Performed by: SOCIAL WORKER

## 2024-08-26 NOTE — PROGRESS NOTES
NAME: Taiwo Pierce  DATE: 08/26/2024    [facility] Phase I  [id number]    Services Provided    Group Psychotherapy x 2  Education/Training x 1  Activity Therapy  x 0  Individual Therapy x 0 0 minutes  Family Therapy x 0  MD Initial Visit  x 0  MD Follow-Up   x 0    Number of participants: 3    DATA:  Patient reported he was doing okay he guessed. He could not sleep this weekend. He had been continue to cut out caffeine at 7:00, and on Friday night he cut it out around 5:30. He still couldn't sleep. He estimated achieving 2-3 hours this weekend. The patient wondered what had changed. Therapist encouraged the patient to journal for 15 minutes each day over the next 4-5 days. Therapist suggested this might help him gain insight into the reasons he was not sleeping well again. Patient agreeable.     Individual: No    Homework: unreturned Thought Log    Group 1 (Psychotherapy: Check-ins): Therapist continued facilitation of rapport building strategies between group members. Therapist asked that each patient check in with home life and recovery efforts and identify triggers, cravings, and high risk situations that arise between group sessions.  Group members discussed barriers and benefits of attending recovery meetings.  Therapist provided empathy and support during group session. Daily Reading: None    Group 2  ( Recovery Story ) Viewed and discussed the recovery story of Tod Sutherland on the [employer] channel on [employer].     Group 3 ( Recovery Story ) Continued viewing and discussing the story of Tod Sutherland.      ASSESSMENT:    Personal Assessment 0-10 Scale (10 worst)    Anxiety:  8 (no comment)  Depression:  3 (no comment)  Cravings: 0 (no comment)     MSE within normal limits? Yes Affect: euthymic Mood: calm  Pt Response:  open/receptive  Engaged in activity/Process and self-disclosed: adequate  Applies today's group topics to self: adequate  Able to give and receive feedback: adequate  Demonstrated insightful  thinking: occasional and adequate  Other pt response comments:  Patient was a positive participant. They demonstrated a relatively positive attitude, ambivalence, and moderate insight, as evidenced by his level of engagement combined with his efforts to maintain sobriety. They seemed interested and attentive. They appeared to comprehend well the concepts being discussed. The patient seemed receptive of, and willing to implement, the ideas being discussed. Their thought process appeared linear and goal directed, and their speech was regular rate and rhythm.       Community Group Engagement:  Yes: Islam    Reported relapse since last meeting? No: no lapse    .  Office Visit on 08/26/2024   Component Date Value Ref Range Status    External Amphetamine Screen Urine 08/26/2024 Negative   Final    External Benzodiazepine Screen Uri* 08/26/2024 Negative   Final    External Cocaine Screen Urine 08/26/2024 Negative   Final    External THC Screen Urine 08/26/2024 Negative   Final    External Methadone Screen Urine 08/26/2024 Negative   Final    External Methamphetamine Screen Ur* 08/26/2024 Negative   Final    External Oxycodone Screen Urine 08/26/2024 Positive (A)   Final    External Buprenorphine Screen Urine 08/26/2024 Negative   Final    External MDMA 08/26/2024 Negative   Final    External Opiates Screen Urine 08/26/2024 Positive (A)   Final   Orders Only on 08/21/2024   Component Date Value Ref Range Status    External Amphetamine Screen Urine 08/21/2024 Negative   Final    External Benzodiazepine Screen Uri* 08/21/2024 Negative   Final    External Cocaine Screen Urine 08/21/2024 Negative   Final    External THC Screen Urine 08/21/2024 Negative   Final    External Methadone Screen Urine 08/21/2024 Negative   Final    External Methamphetamine Screen Ur* 08/21/2024 Negative   Final    External Oxycodone Screen Urine 08/21/2024 Positive (A)   Final    External Buprenorphine Screen Urine 08/21/2024 Negative   Final     External MDMA 08/21/2024 Negative   Final    External Opiates Screen Urine 08/21/2024 Positive (A)   Final   Office Visit on 08/19/2024   Component Date Value Ref Range Status    External Amphetamine Screen Urine 08/19/2024 Negative   Final    External Benzodiazepine Screen Uri* 08/19/2024 Negative   Final    External Cocaine Screen Urine 08/19/2024 Negative   Final    External THC Screen Urine 08/19/2024 Negative   Final    External Methadone Screen Urine 08/19/2024 Negative   Final    External Methamphetamine Screen Ur* 08/19/2024 Negative   Final    External Oxycodone Screen Urine 08/19/2024 Positive (A)   Final    External Buprenorphine Screen Urine 08/19/2024 Negative   Final    External MDMA 08/19/2024 Negative   Final    External Opiates Screen Urine 08/19/2024 Positive (A)   Final   Office Visit on 08/12/2024   Component Date Value Ref Range Status    External Amphetamine Screen Urine 08/12/2024 Negative   Final    External Benzodiazepine Screen Uri* 08/12/2024 Negative   Final    External Cocaine Screen Urine 08/12/2024 Negative   Final    External THC Screen Urine 08/12/2024 Negative   Final    External Methadone Screen Urine 08/12/2024 Negative   Final    External Methamphetamine Screen Ur* 08/12/2024 Negative   Final    External Oxycodone Screen Urine 08/12/2024 Positive (A)   Final    External Buprenorphine Screen Urine 08/12/2024 Negative   Final    External MDMA 08/12/2024 Negative   Final    External Opiates Screen Urine 08/12/2024 Positive (A)   Final       Impression/Formulation:    ICD-10-CM ICD-9-CM   1. Alcohol use disorder, severe, dependence  F10.20 303.90   2. Anxiety disorder, unspecified type  F41.9 300.00   3. Chronic pain disorder  G89.4 338.4   4. Medication management  Z79.899 V58.69        CLINICAL MANEUVERING/INTERVENTIONS: Therapist utilized a person-centered approach to build and maintain rapport with group member.   Therapist promoted safe nonjudgmental environment by providing  group members with unconditional positive regard.  Assisted member in processing above session content; acknowledged and normalized patient's thoughts, feelings and concerns.  Allowed patient to freely discuss issues without interruption or judgment. Provided safe, confidential environment to facilitate the development of positive therapeutic relationship and encourage open, honest communication. Therapist assisted group member with identifying and implementing healthier coping strategies and maintaining healthier boundaries. Assisted patient in identifying risk factors which would indicate the need for higher level of care including thoughts to harm self or others and/or self-harming behavior and encouraged patient to contact this office, call 911, or present to the nearest emergency room should any of these events occur. Pt agreeable to adhere to medication regimen as prescribed and report any side effects.  Discussed crisis intervention services and means to access.  Patient adamantly and convincingly denies current suicidal or homicidal ideation or perceptual disturbance.      Therapist implemented motivational interviewing techniques to assist client with exploring and resolving ambivalence associated with commitment to change behaviors.  Yes  Therapist utilized dialectical behavior techniques to teach and model emotional regulation and relaxation.  No   Therapist applied cognitive behavioral strategies to facilitate identification of maladaptive patterns of thinking and behavior.  Yes     [facility]AN:    [facility]  Aftercare:  [facility] Phase 2      Please note that portions of this note were completed with a voice recognition program. Efforts were made to edit dictation, but occasionally words are mistranscribed.     This document signed by Ben Ortega LCSW, August 26, 2024, 14:42 EDT

## 2024-08-27 ENCOUNTER — OFFICE VISIT (OUTPATIENT)
Dept: PSYCHIATRY | Facility: HOSPITAL | Age: 49
End: 2024-08-27
Payer: MEDICARE

## 2024-08-27 DIAGNOSIS — F10.20 ALCOHOL USE DISORDER, SEVERE, DEPENDENCE: Primary | ICD-10-CM

## 2024-08-27 DIAGNOSIS — F41.9 ANXIETY DISORDER, UNSPECIFIED TYPE: ICD-10-CM

## 2024-08-27 DIAGNOSIS — G89.4 CHRONIC PAIN DISORDER: ICD-10-CM

## 2024-08-27 PROCEDURE — H0015 ALCOHOL AND/OR DRUG SERVICES: HCPCS | Performed by: SOCIAL WORKER

## 2024-08-27 PROCEDURE — 1160F RVW MEDS BY RX/DR IN RCRD: CPT | Performed by: PSYCHIATRY & NEUROLOGY

## 2024-08-27 PROCEDURE — 1159F MED LIST DOCD IN RCRD: CPT | Performed by: PSYCHIATRY & NEUROLOGY

## 2024-08-27 RX ORDER — MIRTAZAPINE 7.5 MG/1
7.5 TABLET, FILM COATED ORAL NIGHTLY
Qty: 30 TABLET | Refills: 0 | OUTPATIENT
Start: 2024-08-27

## 2024-08-27 RX ORDER — MIRTAZAPINE 7.5 MG/1
7.5 TABLET, FILM COATED ORAL NIGHTLY
Qty: 30 TABLET | Refills: 0 | Status: SHIPPED | OUTPATIENT
Start: 2024-08-27

## 2024-08-27 NOTE — PROGRESS NOTES
NAME: Taiwo Pierce  DATE: 08/27/2024    [facility] Phase I  [id number]    Services Provided    Group Psychotherapy x 1  Education/Training x 2  Activity Therapy  x 0  Individual Therapy x 0 0 minutes  Family Therapy x 0  MD Initial Visit  x 0  MD Follow-Up   x 1    Number of participants: 4    DATA:  Patient reported having slept about three hours last night. He had not yet tried the journaling that had been suggested yesterday. Part of what had been keeping him awake was the pain, and he concluded that no one had gone this long without getting some help from a surgeon. He had never before had a sleep study done. They (the physicians) had wanted him to do one, but he had lost a large amount of weight and had stopped snoring.     Individual: No    Homework: None assigned    Group 1 (Psychotherapy: Check-ins): Therapist continued facilitation of rapport building strategies between group members. Therapist asked that each patient check in with home life and recovery efforts and identify triggers, cravings, and high risk situations that arise between group sessions.  Group members discussed barriers and benefits of attending recovery meetings.  Therapist provided empathy and support during group session. Daily Reading: None    Group 2  (Recreation Therapy) Patients enjoyed a trivia board game    Group 3 (Psychoeducation) Viewed and discussed a video on different styles of communication by [employer] on [employer].     ASSESSMENT:    Personal Assessment 0-10 Scale (10 worst)    Anxiety:  6 (no comment)  Depression:  1 (no comment)  Cravings: 0 (no comment)     MSE within normal limits? Yes Affect: somber Mood: calm  Pt Response:  open/receptive  Engaged in activity/Process and self-disclosed: adequate  Applies today's group topics to self: adequate  Able to give and receive feedback: adequate  Demonstrated insightful thinking: occasional and suboptimal  Other pt response comments:  Patient was a positive  participant. They demonstrated a relatively optimistic attitude, a moderate degree of motivation, and moderate insight, as evidenced by his level of engagement combined with his efforts to maintain sobriety. They seemed interested and attentive. They appeared to comprehend well the concepts being discussed. The patient seemed receptive of, and willing to implement, the ideas being discussed. Their thought process appeared linear and goal directed, and their speech was regular rate and rhythm.       Community Group Engagement:  Yes: Voodoo    Reported relapse since last meeting? No: no lapse    .  Office Visit on 08/26/2024   Component Date Value Ref Range Status    External Amphetamine Screen Urine 08/26/2024 Negative   Final    External Benzodiazepine Screen Uri* 08/26/2024 Negative   Final    External Cocaine Screen Urine 08/26/2024 Negative   Final    External THC Screen Urine 08/26/2024 Negative   Final    External Methadone Screen Urine 08/26/2024 Negative   Final    External Methamphetamine Screen Ur* 08/26/2024 Negative   Final    External Oxycodone Screen Urine 08/26/2024 Positive (A)   Final    External Buprenorphine Screen Urine 08/26/2024 Negative   Final    External MDMA 08/26/2024 Negative   Final    External Opiates Screen Urine 08/26/2024 Positive (A)   Final   Orders Only on 08/21/2024   Component Date Value Ref Range Status    External Amphetamine Screen Urine 08/21/2024 Negative   Final    External Benzodiazepine Screen Uri* 08/21/2024 Negative   Final    External Cocaine Screen Urine 08/21/2024 Negative   Final    External THC Screen Urine 08/21/2024 Negative   Final    External Methadone Screen Urine 08/21/2024 Negative   Final    External Methamphetamine Screen Ur* 08/21/2024 Negative   Final    External Oxycodone Screen Urine 08/21/2024 Positive (A)   Final    External Buprenorphine Screen Urine 08/21/2024 Negative   Final    External MDMA 08/21/2024 Negative   Final    External Opiates Screen  Urine 08/21/2024 Positive (A)   Final   Office Visit on 08/19/2024   Component Date Value Ref Range Status    External Amphetamine Screen Urine 08/19/2024 Negative   Final    External Benzodiazepine Screen Uri* 08/19/2024 Negative   Final    External Cocaine Screen Urine 08/19/2024 Negative   Final    External THC Screen Urine 08/19/2024 Negative   Final    External Methadone Screen Urine 08/19/2024 Negative   Final    External Methamphetamine Screen Ur* 08/19/2024 Negative   Final    External Oxycodone Screen Urine 08/19/2024 Positive (A)   Final    External Buprenorphine Screen Urine 08/19/2024 Negative   Final    External MDMA 08/19/2024 Negative   Final    External Opiates Screen Urine 08/19/2024 Positive (A)   Final   Office Visit on 08/12/2024   Component Date Value Ref Range Status    External Amphetamine Screen Urine 08/12/2024 Negative   Final    External Benzodiazepine Screen Uri* 08/12/2024 Negative   Final    External Cocaine Screen Urine 08/12/2024 Negative   Final    External THC Screen Urine 08/12/2024 Negative   Final    External Methadone Screen Urine 08/12/2024 Negative   Final    External Methamphetamine Screen Ur* 08/12/2024 Negative   Final    External Oxycodone Screen Urine 08/12/2024 Positive (A)   Final    External Buprenorphine Screen Urine 08/12/2024 Negative   Final    External MDMA 08/12/2024 Negative   Final    External Opiates Screen Urine 08/12/2024 Positive (A)   Final       Impression/Formulation:    ICD-10-CM ICD-9-CM   1. Alcohol use disorder, severe, dependence  F10.20 303.90   2. Anxiety disorder, unspecified type  F41.9 300.00   3. Chronic pain disorder  G89.4 338.4        CLINICAL MANEUVERING/INTERVENTIONS: Therapist utilized a person-centered approach to build and maintain rapport with group member.   Therapist promoted safe nonjudgmental environment by providing group members with unconditional positive regard.  Assisted member in processing above session content;  acknowledged and normalized patient's thoughts, feelings and concerns.  Allowed patient to freely discuss issues without interruption or judgment. Provided safe, confidential environment to facilitate the development of positive therapeutic relationship and encourage open, honest communication. Therapist assisted group member with identifying and implementing healthier coping strategies and maintaining healthier boundaries. Assisted patient in identifying risk factors which would indicate the need for higher level of care including thoughts to harm self or others and/or self-harming behavior and encouraged patient to contact this office, call 911, or present to the nearest emergency room should any of these events occur. Pt agreeable to adhere to medication regimen as prescribed and report any side effects.  Discussed crisis intervention services and means to access.  Patient adamantly and convincingly denies current suicidal or homicidal ideation or perceptual disturbance.      Therapist implemented motivational interviewing techniques to assist client with exploring and resolving ambivalence associated with commitment to change behaviors.  Yes  Therapist utilized dialectical behavior techniques to teach and model emotional regulation and relaxation.  No   Therapist applied cognitive behavioral strategies to facilitate identification of maladaptive patterns of thinking and behavior.  Yes     PLAN:    Continue [facility]  IOP Phase I  Aftercare:  [facility]  Outpatient Phase 2      Please note that portions of this note were completed with a voice recognition program. Efforts were made to edit dictation, but occasionally words are mistranscribed.     This document signed by Ben Ortega LCSW, August 27, 2024, 17:22 EDT

## 2024-08-27 NOTE — PROGRESS NOTES
Subjective   Patient ID: Taiwo Pierce is a 49 y.o. male is here today for follow-up..     CC: alcohol use, anxiety    There were no vitals taken for this visit.    Augmentin [amoxicillin-pot clavulanate] and Metoprolol    History of Present Illness  The patient is seen for a follow-up. He states he has been able to stay away from alcohol and has been sober for the last nine weeks. He states he is feeling anxious and is not sleeping good. He was previously prescribed mirtazapine and he states he has not been taking it.   He denies any thoughts of harm to self or others.     Review of Systems  Gen: No fever, chills  Resp; No soa  GI: No nvd    Objective   Mental Status Exam  Appearance:  clean and casually dressed, appropriate  Attitude toward clinician:  cooperative and agreeable   Speech:    Rate:  regular rate and rhythm   Volume:  normal  Motor:  no abnormal movements present  Mood:  Good  Affect:  euthymic  Thought Processes:  linear, logical, and goal directed  Thought Content:  normal  Suicidal Thoughts:  absent  Homicidal Thoughts:  absent  Perceptual Disturbance: no perceptual disturbance  Attention and Concentration:  good  Insight and Judgement:  good  Memory:  memory appears to be intact    MEDICATION ISSUES:  Current Outpatient Medications on File Prior to Visit   Medication Sig Dispense Refill    amLODIPine (NORVASC) 10 MG tablet Take 1 tablet by mouth Daily. Indications: High Blood Pressure Disorder      DULoxetine (CYMBALTA) 60 MG capsule Take 1 capsule by mouth Daily. 30 capsule 0    gabapentin (NEURONTIN) 800 MG tablet Take 1 tablet by mouth 3 (Three) Times a Day. Indications: Neuropathic Pain      hydrOXYzine (ATARAX) 25 MG tablet Take 1 tablet by mouth Every 8 (Eight) Hours As Needed for Anxiety.      hydrOXYzine (ATARAX) 25 MG tablet Take 1 tablet by mouth Every 8 (Eight) Hours As Needed for anxiety 90 tablet 0    ibuprofen (ADVIL,MOTRIN) 200 MG tablet Take 4 tablets by mouth Every 6 (Six)  Hours As Needed for Mild Pain.      lansoprazole (PREVACID) 30 MG capsule Take 1 capsule by mouth Daily. Indications: Heartburn      metaxalone (SKELAXIN) 800 MG tablet Take 1 tablet by mouth 3 (Three) Times a Day As Needed for Muscle Spasms. Indications: Musculoskeletal Pain      oxyCODONE (ROXICODONE) 10 MG tablet Take 1 tablet by mouth 5 (Five) Times a Day. Indications: Chronic Pain      pravastatin (PRAVACHOL) 20 MG tablet Take 1 tablet by mouth Daily. 90 tablet 0    Thiamine Mononitrate 100 MG tablet Take 2 & 1/2 tablets by mouth Daily. 75 tablet 0    traZODone (DESYREL) 50 MG tablet Take 1 tablet by mouth At Night As Needed for Sleep.      valsartan (DIOVAN) 160 MG tablet Take 1 tablet by mouth Daily.      [DISCONTINUED] DULoxetine (CYMBALTA) 60 MG capsule Take 1 capsule by mouth Daily. 30 capsule 0    [DISCONTINUED] mirtazapine (REMERON) 7.5 MG tablet TAKE 1 TABLET BY MOUTH DAILY AT NIGHT 30 tablet 0     No current facility-administered medications on file prior to visit.       Lab Review:   Office Visit on 08/26/2024   Component Date Value Ref Range Status    External Amphetamine Screen Urine 08/26/2024 Negative   Final    External Benzodiazepine Screen Uri* 08/26/2024 Negative   Final    External Cocaine Screen Urine 08/26/2024 Negative   Final    External THC Screen Urine 08/26/2024 Negative   Final    External Methadone Screen Urine 08/26/2024 Negative   Final    External Methamphetamine Screen Ur* 08/26/2024 Negative   Final    External Oxycodone Screen Urine 08/26/2024 Positive (A)   Final    External Buprenorphine Screen Urine 08/26/2024 Negative   Final    External MDMA 08/26/2024 Negative   Final    External Opiates Screen Urine 08/26/2024 Positive (A)   Final   Orders Only on 08/21/2024   Component Date Value Ref Range Status    External Amphetamine Screen Urine 08/21/2024 Negative   Final    External Benzodiazepine Screen Uri* 08/21/2024 Negative   Final    External Cocaine Screen Urine 08/21/2024  Negative   Final    External THC Screen Urine 08/21/2024 Negative   Final    External Methadone Screen Urine 08/21/2024 Negative   Final    External Methamphetamine Screen Ur* 08/21/2024 Negative   Final    External Oxycodone Screen Urine 08/21/2024 Positive (A)   Final    External Buprenorphine Screen Urine 08/21/2024 Negative   Final    External MDMA 08/21/2024 Negative   Final    External Opiates Screen Urine 08/21/2024 Positive (A)   Final   Office Visit on 08/19/2024   Component Date Value Ref Range Status    External Amphetamine Screen Urine 08/19/2024 Negative   Final    External Benzodiazepine Screen Uri* 08/19/2024 Negative   Final    External Cocaine Screen Urine 08/19/2024 Negative   Final    External THC Screen Urine 08/19/2024 Negative   Final    External Methadone Screen Urine 08/19/2024 Negative   Final    External Methamphetamine Screen Ur* 08/19/2024 Negative   Final    External Oxycodone Screen Urine 08/19/2024 Positive (A)   Final    External Buprenorphine Screen Urine 08/19/2024 Negative   Final    External MDMA 08/19/2024 Negative   Final    External Opiates Screen Urine 08/19/2024 Positive (A)   Final   Office Visit on 08/12/2024   Component Date Value Ref Range Status    External Amphetamine Screen Urine 08/12/2024 Negative   Final    External Benzodiazepine Screen Uri* 08/12/2024 Negative   Final    External Cocaine Screen Urine 08/12/2024 Negative   Final    External THC Screen Urine 08/12/2024 Negative   Final    External Methadone Screen Urine 08/12/2024 Negative   Final    External Methamphetamine Screen Ur* 08/12/2024 Negative   Final    External Oxycodone Screen Urine 08/12/2024 Positive (A)   Final    External Buprenorphine Screen Urine 08/12/2024 Negative   Final    External MDMA 08/12/2024 Negative   Final    External Opiates Screen Urine 08/12/2024 Positive (A)   Final   Office Visit on 08/05/2024   Component Date Value Ref Range Status    External Amphetamine Screen Urine  08/05/2024 Negative   Final    External Benzodiazepine Screen Uri* 08/05/2024 Negative   Final    External Cocaine Screen Urine 08/05/2024 Negative   Final    External THC Screen Urine 08/05/2024 Negative   Final    External Methadone Screen Urine 08/05/2024 Negative   Final    External Methamphetamine Screen Ur* 08/05/2024 Negative   Final    External Oxycodone Screen Urine 08/05/2024 Positive (A)   Final    External Buprenorphine Screen Urine 08/05/2024 Negative   Final    External MDMA 08/05/2024 Negative   Final    External Opiates Screen Urine 08/05/2024 Positive (A)   Final      Assessment & Plan   Diagnoses and all orders for this visit:    1. Alcohol use disorder, severe, dependence (Primary)    2. Anxiety disorder, unspecified type    3. Chronic pain disorder    Other orders  -     mirtazapine (REMERON) 7.5 MG tablet; Take 1 tablet by mouth Every Night.  Dispense: 30 tablet; Refill: 0      Return in about 2 weeks (around 9/10/2024).

## 2024-08-28 ENCOUNTER — APPOINTMENT (OUTPATIENT)
Dept: PSYCHIATRY | Facility: HOSPITAL | Age: 49
End: 2024-08-28
Payer: MEDICARE

## 2024-08-29 ENCOUNTER — APPOINTMENT (OUTPATIENT)
Dept: PSYCHIATRY | Facility: HOSPITAL | Age: 49
End: 2024-08-29
Payer: MEDICARE

## 2024-09-02 ENCOUNTER — OFFICE VISIT (OUTPATIENT)
Dept: PSYCHIATRY | Facility: HOSPITAL | Age: 49
End: 2024-09-02
Payer: MEDICARE

## 2024-09-02 DIAGNOSIS — F41.9 ANXIETY DISORDER, UNSPECIFIED TYPE: ICD-10-CM

## 2024-09-02 DIAGNOSIS — F10.20 ALCOHOL USE DISORDER, SEVERE, DEPENDENCE: Primary | ICD-10-CM

## 2024-09-02 DIAGNOSIS — G89.4 CHRONIC PAIN DISORDER: ICD-10-CM

## 2024-09-02 PROCEDURE — H0015 ALCOHOL AND/OR DRUG SERVICES: HCPCS | Performed by: SOCIAL WORKER

## 2024-09-02 PROCEDURE — G0410 GRP PSYCH PARTIAL HOSP 45-50: HCPCS | Performed by: SOCIAL WORKER

## 2024-09-02 PROCEDURE — G0177 OPPS/PHP; TRAIN & EDUC SERV: HCPCS | Performed by: SOCIAL WORKER

## 2024-09-02 NOTE — PROGRESS NOTES
"   NAME: Taiwo Pierce  DATE: 09/02/2024    [facility] Phase I  [id number]    Services Provided    Group Psychotherapy x 1  Education/Training x 2  Activity Therapy  x 0  Individual Therapy x 0 0 minutes  Family Therapy x 0  MD Initial Visit  x 0  MD Follow-Up   x 0    Number of participants: 2    DATA:  Patient shared about his experience of being sick last week. He was feeling better today, but his wife was still feeling sick. He and his wife had rested all weekend. Also, he had fallen this weekend. His left leg had given out. He was okay, other than some scrapes on his right arm.     Individual: No    Homework: Assigned Thought Log    Group 1 (Psychotherapy: Check-ins): Therapist continued facilitation of rapport building strategies between group members. Therapist asked that each patient check in with home life and recovery efforts and identify triggers, cravings, and high risk situations that arise between group sessions.  Group members discussed barriers and benefits of attending recovery meetings.  Therapist provided empathy and support during group session. Daily Reading:  Meditation in a Toolshed, by JESSICA Parsons.     Group 2  ( Boredom ) Viewed and discussed the video, \"Why do we get bored?\" By Yunior on [email or web address].     Group 3 ( Boredom ) Continued viewing and discussing the video. Emphasized the important functions that boredom serves for us.      ASSESSMENT:    Personal Assessment 0-10 Scale (10 worst)    Anxiety:  2 (doctor had given him some medication)  Depression:  0 (no comment)  Cravings: 0 (no comment)     MSE within normal limits? Yes Affect: somber Mood: calm  Pt Response:  open/receptive  Engaged in activity/Process and self-disclosed: adequate  Applies today's group topics to self: adequate  Able to give and receive feedback: adequate  Demonstrated insightful thinking: consistent and adequate  Other pt response comments:  Patient was a positive participant. They demonstrated a relatively positive " attitude, a strong degree of motivation, and adequate insight, as evidenced by his level of engagement combined with his insights today. They seemed interested and attentive. They appeared to comprehend well the concepts being discussed. The patient seemed receptive of, and willing to implement, the ideas being discussed. Their thought process appeared linear and goal directed, and their speech was regular rate and rhythm.       Community Group Engagement:  Yes: watched [email or web address] online    Reported relapse since last meeting? No: no lapse     .  Office Visit on 08/26/2024   Component Date Value Ref Range Status    External Amphetamine Screen Urine 08/26/2024 Negative   Final    External Benzodiazepine Screen Uri* 08/26/2024 Negative   Final    External Cocaine Screen Urine 08/26/2024 Negative   Final    External THC Screen Urine 08/26/2024 Negative   Final    External Methadone Screen Urine 08/26/2024 Negative   Final    External Methamphetamine Screen Ur* 08/26/2024 Negative   Final    External Oxycodone Screen Urine 08/26/2024 Positive (A)   Final    External Buprenorphine Screen Urine 08/26/2024 Negative   Final    External MDMA 08/26/2024 Negative   Final    External Opiates Screen Urine 08/26/2024 Positive (A)   Final   Orders Only on 08/21/2024   Component Date Value Ref Range Status    External Amphetamine Screen Urine 08/21/2024 Negative   Final    External Benzodiazepine Screen Uri* 08/21/2024 Negative   Final    External Cocaine Screen Urine 08/21/2024 Negative   Final    External THC Screen Urine 08/21/2024 Negative   Final    External Methadone Screen Urine 08/21/2024 Negative   Final    External Methamphetamine Screen Ur* 08/21/2024 Negative   Final    External Oxycodone Screen Urine 08/21/2024 Positive (A)   Final    External Buprenorphine Screen Urine 08/21/2024 Negative   Final    External MDMA 08/21/2024 Negative   Final    External Opiates Screen Urine 08/21/2024 Positive (A)   Final   Office Visit on  08/19/2024   Component Date Value Ref Range Status    External Amphetamine Screen Urine 08/19/2024 Negative   Final    External Benzodiazepine Screen Uri* 08/19/2024 Negative   Final    External Cocaine Screen Urine 08/19/2024 Negative   Final    External THC Screen Urine 08/19/2024 Negative   Final    External Methadone Screen Urine 08/19/2024 Negative   Final    External Methamphetamine Screen Ur* 08/19/2024 Negative   Final    External Oxycodone Screen Urine 08/19/2024 Positive (A)   Final    External Buprenorphine Screen Urine 08/19/2024 Negative   Final    External MDMA 08/19/2024 Negative   Final    External Opiates Screen Urine 08/19/2024 Positive (A)   Final       Impression/Formulation:    ICD-10-CM ICD-9-CM   1. Alcohol use disorder, severe, dependence  F10.20 303.90   2. Anxiety disorder, unspecified type  F41.9 300.00   3. Chronic pain disorder  G89.4 338.4        CLINICAL MANEUVERING/INTERVENTIONS: Therapist utilized a person-centered approach to build and maintain rapport with group member.   Therapist promoted safe nonjudgmental environment by providing group members with unconditional positive regard.  Assisted member in processing above session content; acknowledged and normalized patient's thoughts, feelings and concerns.  Allowed patient to freely discuss issues without interruption or judgment. Provided safe, confidential environment to facilitate the development of positive therapeutic relationship and encourage open, honest communication. Therapist assisted group member with identifying and implementing healthier coping strategies and maintaining healthier boundaries. Assisted patient in identifying risk factors which would indicate the need for higher level of care including thoughts to harm self or others and/or self-harming behavior and encouraged patient to contact this office, call 911, or present to the nearest emergency room should any of these events occur. Pt agreeable to adhere to  medication regimen as prescribed and report any side effects.  Discussed crisis intervention services and means to access.  Patient adamantly and convincingly denies current suicidal or homicidal ideation or perceptual disturbance.      Therapist implemented motivational interviewing techniques to assist client with exploring and resolving ambivalence associated with commitment to change behaviors.  Yes  Therapist utilized dialectical behavior techniques to teach and model emotional regulation and relaxation.  No   Therapist applied cognitive behavioral strategies to facilitate identification of maladaptive patterns of thinking and behavior.  Yes     PLAN:    Continue [facility] IOP Phase I  Aftercare:  [facility] Outpatient Phase 2      Please note that portions of this note were completed with a voice recognition program. Efforts were made to edit dictation, but occasionally words are mistranscribed.     This document signed by Ben Ortega LCSW, September 2, 2024, 11:14 EDT

## 2024-09-03 ENCOUNTER — OFFICE VISIT (OUTPATIENT)
Dept: PSYCHIATRY | Facility: HOSPITAL | Age: 49
End: 2024-09-03
Payer: MEDICARE

## 2024-09-03 DIAGNOSIS — G89.4 CHRONIC PAIN DISORDER: ICD-10-CM

## 2024-09-03 DIAGNOSIS — F10.20 ALCOHOL USE DISORDER, SEVERE, DEPENDENCE: ICD-10-CM

## 2024-09-03 DIAGNOSIS — F41.9 ANXIETY DISORDER, UNSPECIFIED TYPE: ICD-10-CM

## 2024-09-03 DIAGNOSIS — Z79.899 MEDICATION MANAGEMENT: Primary | ICD-10-CM

## 2024-09-03 PROCEDURE — G0176 OPPS/PHP;ACTIVITY THERAPY: HCPCS | Performed by: SOCIAL WORKER

## 2024-09-03 PROCEDURE — G0410 GRP PSYCH PARTIAL HOSP 45-50: HCPCS | Performed by: SOCIAL WORKER

## 2024-09-03 PROCEDURE — G0177 OPPS/PHP; TRAIN & EDUC SERV: HCPCS | Performed by: SOCIAL WORKER

## 2024-09-03 PROCEDURE — H0015 ALCOHOL AND/OR DRUG SERVICES: HCPCS | Performed by: SOCIAL WORKER

## 2024-09-03 NOTE — PROGRESS NOTES
NAME: Taiwo Pierce  DATE: 2024    [facility] Phase I  [id number]    Services Provided    Group Psychotherapy x 1  Education/Training x 1  Activity Therapy  x 1  Individual Therapy x 0 0 minutes  Family Therapy x 0  MD Initial Visit  x 0  MD Follow-Up   x 0    Number of participants: 4    DATA:  Patient reported that he had been sober since July. He was still feeling confident about his recovery. He, however, minimized the accomplishment of keeping his doctor's appointments today until the therapist invited him to a different perspective.     Individual: No    Homework: unreturned Thought Log    Group 1 (Psychotherapy: Check-ins): Therapist continued facilitation of rapport building strategies between group members. Therapist asked that each patient check in with home life and recovery efforts and identify triggers, cravings, and high risk situations that arise between group sessions.  Group members discussed barriers and benefits of attending recovery meetings.  Therapist provided empathy and support during group session. Daily Readin Rules for Life (2018) by Jorge Lopez    Group 2  ( Recreation Therapy ) Silvia     Group 3 (CBT) Cognitive distortions      ASSESSMENT:    Personal Assessment 0-10 Scale (10 worst)    Anxiety:  2 (no comment)  Depression:  0 (no comment)  Cravings: 0 (no comment)     MSE within normal limits? Yes Affect: somber Mood: calm  Pt Response:  open/receptive  Engaged in activity/Process and self-disclosed: adequate  Applies today's group topics to self: adequate  Able to give and receive feedback: adequate  Demonstrated insightful thinking: consistent and adequate  Other pt response comments:  Patient was a positive participant. They demonstrated a relatively positive attitude, a moderate degree of motivation, and moderate insight, as evidenced by his level of engagement combined with his willingness to be encouraged today. They seemed interested and attentive. They  appeared to comprehend well the concepts being discussed. The patient seemed receptive of, and willing to implement, the ideas being discussed. Their thought process appeared linear and goal directed, and their speech was regular rate and rhythm.       Community Group Engagement:  Yes: [employer]    Reported relapse since last meeting? No: no lapse reported    .  Office Visit on 08/26/2024   Component Date Value Ref Range Status    External Amphetamine Screen Urine 08/26/2024 Negative   Final    External Benzodiazepine Screen Uri* 08/26/2024 Negative   Final    External Cocaine Screen Urine 08/26/2024 Negative   Final    External THC Screen Urine 08/26/2024 Negative   Final    External Methadone Screen Urine 08/26/2024 Negative   Final    External Methamphetamine Screen Ur* 08/26/2024 Negative   Final    External Oxycodone Screen Urine 08/26/2024 Positive (A)   Final    External Buprenorphine Screen Urine 08/26/2024 Negative   Final    External MDMA 08/26/2024 Negative   Final    External Opiates Screen Urine 08/26/2024 Positive (A)   Final   Orders Only on 08/21/2024   Component Date Value Ref Range Status    External Amphetamine Screen Urine 08/21/2024 Negative   Final    External Benzodiazepine Screen Uri* 08/21/2024 Negative   Final    External Cocaine Screen Urine 08/21/2024 Negative   Final    External THC Screen Urine 08/21/2024 Negative   Final    External Methadone Screen Urine 08/21/2024 Negative   Final    External Methamphetamine Screen Ur* 08/21/2024 Negative   Final    External Oxycodone Screen Urine 08/21/2024 Positive (A)   Final    External Buprenorphine Screen Urine 08/21/2024 Negative   Final    External MDMA 08/21/2024 Negative   Final    External Opiates Screen Urine 08/21/2024 Positive (A)   Final   Office Visit on 08/19/2024   Component Date Value Ref Range Status    External Amphetamine Screen Urine 08/19/2024 Negative   Final    External Benzodiazepine Screen Uri* 08/19/2024 Negative   Final     External Cocaine Screen Urine 08/19/2024 Negative   Final    External THC Screen Urine 08/19/2024 Negative   Final    External Methadone Screen Urine 08/19/2024 Negative   Final    External Methamphetamine Screen Ur* 08/19/2024 Negative   Final    External Oxycodone Screen Urine 08/19/2024 Positive (A)   Final    External Buprenorphine Screen Urine 08/19/2024 Negative   Final    External MDMA 08/19/2024 Negative   Final    External Opiates Screen Urine 08/19/2024 Positive (A)   Final       Impression/Formulation:    ICD-10-CM ICD-9-CM   1. Alcohol use disorder, severe, dependence  F10.20 303.90   2. Anxiety disorder, unspecified type  F41.9 300.00   3. Chronic pain disorder  G89.4 338.4        CLINICAL MANEUVERING/INTERVENTIONS: Therapist utilized a person-centered approach to build and maintain rapport with group member.   Therapist promoted safe nonjudgmental environment by providing group members with unconditional positive regard.  Assisted member in processing above session content; acknowledged and normalized patient's thoughts, feelings and concerns.  Allowed patient to freely discuss issues without interruption or judgment. Provided safe, confidential environment to facilitate the development of positive therapeutic relationship and encourage open, honest communication. Therapist assisted group member with identifying and implementing healthier coping strategies and maintaining healthier boundaries. Assisted patient in identifying risk factors which would indicate the need for higher level of care including thoughts to harm self or others and/or self-harming behavior and encouraged patient to contact this office, call 911, or present to the nearest emergency room should any of these events occur. Pt agreeable to adhere to medication regimen as prescribed and report any side effects.  Discussed crisis intervention services and means to access.  Patient adamantly and convincingly denies current suicidal or  homicidal ideation or perceptual disturbance.      Therapist implemented motivational interviewing techniques to assist client with exploring and resolving ambivalence associated with commitment to change behaviors.  Yes  Therapist utilized dialectical behavior techniques to teach and model emotional regulation and relaxation.  No   Therapist applied cognitive behavioral strategies to facilitate identification of maladaptive patterns of thinking and behavior.  Yes     [facility]  Aftercare:  [facility] Phase 2      Please note that portions of this note were completed with a voice recognition program. Efforts were made to edit dictation, but occasionally words are mistranscribed.     This document signed by Ben Ortega LCSW, September 3, 2024, 09:48 EDT

## 2024-09-04 ENCOUNTER — OFFICE VISIT (OUTPATIENT)
Dept: PSYCHIATRY | Facility: HOSPITAL | Age: 49
End: 2024-09-04
Payer: MEDICARE

## 2024-09-04 DIAGNOSIS — F10.20 ALCOHOL USE DISORDER, SEVERE, DEPENDENCE: Primary | ICD-10-CM

## 2024-09-04 DIAGNOSIS — F41.9 ANXIETY DISORDER, UNSPECIFIED TYPE: ICD-10-CM

## 2024-09-04 DIAGNOSIS — G89.4 CHRONIC PAIN DISORDER: ICD-10-CM

## 2024-09-04 PROCEDURE — G0410 GRP PSYCH PARTIAL HOSP 45-50: HCPCS | Performed by: SOCIAL WORKER

## 2024-09-04 PROCEDURE — G0177 OPPS/PHP; TRAIN & EDUC SERV: HCPCS | Performed by: SOCIAL WORKER

## 2024-09-04 PROCEDURE — H0015 ALCOHOL AND/OR DRUG SERVICES: HCPCS | Performed by: SOCIAL WORKER

## 2024-09-04 NOTE — PROGRESS NOTES
NAME: Taiwo Pierce  DATE: 09/04/2024    [facility] Phase I  [id number]    Services Provided    Group Psychotherapy x 1  Education/Training x 2  Activity Therapy  x 0  Individual Therapy x 0 0 minutes  Family Therapy x 0  MD Initial Visit  x 0  MD Follow-Up   x 0    Number of participants: 5    DATA:  Patient reported that he was pretty good today. He had seen one of the doctors who was helping him with back pain yesterday. The patient was concerned that the doctor was asking him to do procedures that were unnecessary. He was feeling discouraged by their efforts so far.     Individual: No    Homework: Assigned Thought Log and completed thought log    Group 1 (Psychotherapy: Check-ins): Therapist continued facilitation of rapport building strategies between group members. Therapist asked that each patient check in with home life and recovery efforts and identify triggers, cravings, and high risk situations that arise between group sessions.  Group members discussed barriers and benefits of attending recovery meetings.  Therapist provided empathy and support during group session. Daily Reading: None    Group 2  (CBT) Participants worked on their thought logs. Therapist facilitated discussion and guided participants in identifying accurately the cognitive distortions on their worksheets.     Group 3 (Psychoeducation) This hour of group was [facility].     ASSESSMENT:    Personal Assessment 0-10 Scale (10 worst)    Anxiety:  2 (no comment)  Depression:  0 (no comment)  Cravings: 0 (no comment)     MSE within normal limits? Yes Affect: euthymic Mood: calm  Pt Response:  open/receptive  Engaged in activity/Process and self-disclosed: adequate  Applies today's group topics to self: adequate  Able to give and receive feedback: adequate  Demonstrated insightful thinking: consistent and adequate  Other pt response comments:  Patient was a positive participant. They demonstrated a relatively positive  attitude, a strong degree of motivation, and adequate insight, as evidenced by his level of engagement combined with his insights during the CBT hour of group. They seemed interested and attentive. They appeared to comprehend well the concepts being discussed. The patient seemed receptive of, and willing to implement, the ideas being discussed. Their thought process appeared linear and goal directed, and their speech was regular rate and rhythm.       Community Group Engagement:  Yes: [employer]    Reported relapse since last meeting? No: no lapse    .  Office Visit on 09/03/2024   Component Date Value Ref Range Status    External Amphetamine Screen Urine 09/03/2024 Negative   Final    External Benzodiazepine Screen Uri* 09/03/2024 Negative   Final    External Cocaine Screen Urine 09/03/2024 Negative   Final    External THC Screen Urine 09/03/2024 Negative   Final    External Methadone Screen Urine 09/03/2024 Negative   Final    External Methamphetamine Screen Ur* 09/03/2024 Negative   Final    External Oxycodone Screen Urine 09/03/2024 Positive (A)   Final    External Buprenorphine Screen Urine 09/03/2024 Negative   Final    External MDMA 09/03/2024 Negative   Final    External Opiates Screen Urine 09/03/2024 Positive (A)   Final   Office Visit on 08/26/2024   Component Date Value Ref Range Status    External Amphetamine Screen Urine 08/26/2024 Negative   Final    External Benzodiazepine Screen Uri* 08/26/2024 Negative   Final    External Cocaine Screen Urine 08/26/2024 Negative   Final    External THC Screen Urine 08/26/2024 Negative   Final    External Methadone Screen Urine 08/26/2024 Negative   Final    External Methamphetamine Screen Ur* 08/26/2024 Negative   Final    External Oxycodone Screen Urine 08/26/2024 Positive (A)   Final    External Buprenorphine Screen Urine 08/26/2024 Negative   Final    External MDMA 08/26/2024 Negative   Final    External Opiates Screen Urine 08/26/2024 Positive (A)   Final   Orders  Only on 08/21/2024   Component Date Value Ref Range Status    External Amphetamine Screen Urine 08/21/2024 Negative   Final    External Benzodiazepine Screen Uri* 08/21/2024 Negative   Final    External Cocaine Screen Urine 08/21/2024 Negative   Final    External THC Screen Urine 08/21/2024 Negative   Final    External Methadone Screen Urine 08/21/2024 Negative   Final    External Methamphetamine Screen Ur* 08/21/2024 Negative   Final    External Oxycodone Screen Urine 08/21/2024 Positive (A)   Final    External Buprenorphine Screen Urine 08/21/2024 Negative   Final    External MDMA 08/21/2024 Negative   Final    External Opiates Screen Urine 08/21/2024 Positive (A)   Final   Office Visit on 08/19/2024   Component Date Value Ref Range Status    External Amphetamine Screen Urine 08/19/2024 Negative   Final    External Benzodiazepine Screen Uri* 08/19/2024 Negative   Final    External Cocaine Screen Urine 08/19/2024 Negative   Final    External THC Screen Urine 08/19/2024 Negative   Final    External Methadone Screen Urine 08/19/2024 Negative   Final    External Methamphetamine Screen Ur* 08/19/2024 Negative   Final    External Oxycodone Screen Urine 08/19/2024 Positive (A)   Final    External Buprenorphine Screen Urine 08/19/2024 Negative   Final    External MDMA 08/19/2024 Negative   Final    External Opiates Screen Urine 08/19/2024 Positive (A)   Final       Impression/Formulation:    ICD-10-CM ICD-9-CM   1. Alcohol use disorder, severe, dependence  F10.20 303.90   2. Anxiety disorder, unspecified type  F41.9 300.00   3. Chronic pain disorder  G89.4 338.4        CLINICAL MANEUVERING/INTERVENTIONS: Therapist utilized a person-centered approach to build and maintain rapport with group member.   Therapist promoted safe nonjudgmental environment by providing group members with unconditional positive regard.  Assisted member in processing above session content; acknowledged and normalized patient's thoughts, feelings  and concerns.  Allowed patient to freely discuss issues without interruption or judgment. Provided safe, confidential environment to facilitate the development of positive therapeutic relationship and encourage open, honest communication. Therapist assisted group member with identifying and implementing healthier coping strategies and maintaining healthier boundaries. Assisted patient in identifying risk factors which would indicate the need for higher level of care including thoughts to harm self or others and/or self-harming behavior and encouraged patient to contact this office, call 911, or present to the nearest emergency room should any of these events occur. Pt agreeable to adhere to medication regimen as prescribed and report any side effects.  Discussed crisis intervention services and means to access.  Patient adamantly and convincingly denies current suicidal or homicidal ideation or perceptual disturbance.      Therapist implemented motivational interviewing techniques to assist client with exploring and resolving ambivalence associated with commitment to change behaviors.  Yes  Therapist utilized dialectical behavior techniques to teach and model emotional regulation and relaxation.  No   Therapist applied cognitive behavioral strategies to facilitate identification of maladaptive patterns of thinking and behavior.  Yes     PLAN:    Continue [facility] I  Aftercare:  [facility] Phase 2      Please note that portions of this note were completed with a voice recognition program. Efforts were made to edit dictation, but occasionally words are mistranscribed.     This document signed by Ben Ortega LCSW, September 4, 2024, 11:03 EDT

## 2024-09-05 ENCOUNTER — APPOINTMENT (OUTPATIENT)
Dept: PSYCHIATRY | Facility: HOSPITAL | Age: 49
End: 2024-09-05
Payer: MEDICARE

## 2024-09-06 NOTE — PROGRESS NOTES
Adult [facility] IOP Group      Date: September 4, 2024    Time: 1030 - 1130    Type of Group:  Psychoeducation    Group  Content: Group members completed daily reflection. Group members received education on goal setting and set goals for the upcoming week. Pt completed update FEI and PHQ. Pt completed patient satisfaction survey.    Patient Response: Pt was calm and cooperative with group.  Pt scored 2 (min) on FEI-7 and 2 (min) on PHQ-9. Pt was receptive to goal setting. Pt listed his goals as getting his boat cleaned up and tarped before the cold comes in, organizing building, seeing my grand baby eery Friday, getting my dentures, and taking wife out on more dates.  Daily Reflection  Things I loved about yesterday: got the yard mowed and visiting doctor.  Things I am thankful for:  waking up this morning, my salvation, and my family   Tomorrow, I could improve: getting my chores done.        Zacarias Neil  09/06/24  10:50 EDT

## 2024-09-09 ENCOUNTER — OFFICE VISIT (OUTPATIENT)
Dept: PSYCHIATRY | Facility: HOSPITAL | Age: 49
End: 2024-09-09
Payer: MEDICARE

## 2024-09-09 DIAGNOSIS — F10.20 ALCOHOL USE DISORDER, SEVERE, DEPENDENCE: Primary | ICD-10-CM

## 2024-09-09 DIAGNOSIS — Z79.899 MEDICATION MANAGEMENT: ICD-10-CM

## 2024-09-09 DIAGNOSIS — G89.4 CHRONIC PAIN DISORDER: ICD-10-CM

## 2024-09-09 DIAGNOSIS — F41.9 ANXIETY DISORDER, UNSPECIFIED TYPE: ICD-10-CM

## 2024-09-09 PROCEDURE — H0015 ALCOHOL AND/OR DRUG SERVICES: HCPCS | Performed by: SOCIAL WORKER
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
## 2024-09-10 ENCOUNTER — OFFICE VISIT (OUTPATIENT)
Dept: NEUROSURGERY | Facility: CLINIC | Age: 49
End: 2024-09-10
Payer: MEDICARE

## 2024-09-10 ENCOUNTER — HOSPITAL ENCOUNTER (OUTPATIENT)
Dept: MRI IMAGING | Facility: HOSPITAL | Age: 49
Discharge: HOME | End: 2024-09-10
Admitting: NEUROLOGICAL SURGERY
Payer: OTHER MISCELLANEOUS

## 2024-09-10 VITALS — TEMPERATURE: 98 F | WEIGHT: 187 LBS | HEIGHT: 71 IN | BODY MASS INDEX: 26.18 KG/M2 | RESPIRATION RATE: 18 BRPM

## 2024-09-10 DIAGNOSIS — M54.9 MECHANICAL BACK PAIN: Primary | ICD-10-CM

## 2024-09-10 DIAGNOSIS — M51.36 DDD (DEGENERATIVE DISC DISEASE), LUMBAR: ICD-10-CM

## 2024-09-10 DIAGNOSIS — M54.16 LUMBAR RADICULOPATHY: ICD-10-CM

## 2024-09-10 PROCEDURE — 0 GADOBENATE DIMEGLUMINE 529 MG/ML SOLUTION: Performed by: NEUROLOGICAL SURGERY

## 2024-09-10 PROCEDURE — 1160F RVW MEDS BY RX/DR IN RCRD: CPT | Performed by: NEUROLOGICAL SURGERY

## 2024-09-10 PROCEDURE — A9577 INJ MULTIHANCE: HCPCS | Performed by: NEUROLOGICAL SURGERY

## 2024-09-10 PROCEDURE — 1159F MED LIST DOCD IN RCRD: CPT | Performed by: NEUROLOGICAL SURGERY

## 2024-09-10 PROCEDURE — 99213 OFFICE O/P EST LOW 20 MIN: CPT | Performed by: NEUROLOGICAL SURGERY

## 2024-09-10 PROCEDURE — 72158 MRI LUMBAR SPINE W/O & W/DYE: CPT

## 2024-09-10 RX ADMIN — GADOBENATE DIMEGLUMINE 15 ML: 529 INJECTION, SOLUTION INTRAVENOUS at 09:51
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
## 2024-09-11 ENCOUNTER — OFFICE VISIT (OUTPATIENT)
Dept: PSYCHIATRY | Facility: HOSPITAL | Age: 49
End: 2024-09-11
Payer: MEDICARE

## 2024-09-11 DIAGNOSIS — F10.20 ALCOHOL USE DISORDER, SEVERE, DEPENDENCE: Primary | ICD-10-CM

## 2024-09-11 DIAGNOSIS — G89.4 CHRONIC PAIN DISORDER: ICD-10-CM

## 2024-09-11 DIAGNOSIS — F41.9 ANXIETY DISORDER, UNSPECIFIED TYPE: ICD-10-CM

## 2024-09-11 PROCEDURE — H0015 ALCOHOL AND/OR DRUG SERVICES: HCPCS | Performed by: SOCIAL WORKER

## 2024-09-11 NOTE — PROGRESS NOTES
NAME: Taiwo Pierce  DATE: 09/11/2024    [facility] Phase I  [id number]    Services Provided    Group Psychotherapy x 1  Education/Training x 1  Activity Therapy  x 1  Individual Therapy x 0 0 minutes  Family Therapy x 0  MD Initial Visit  x 0  MD Follow-Up   x 0    Number of participants: 5    DATA:  Patient reported he was not sure how long it had been since he last had alcohol. He estimated at least 70 days or more. He believed recovery was work it because he was actually able to enjoy life. Regarding his homework assignment, he reported that he could read the Bible more. He was already praying throughout the day. He reported that he read the Bible about 30 minutes every evening. When asked, however, what he read last night, he reported that he had not read last night.     Individual: No    Homework: None assigned    Group 1 (Psychotherapy: Check-ins): Therapist continued facilitation of rapport building strategies between group members. Therapist asked that each patient check in with home life and recovery efforts and identify triggers, cravings, and high risk situations that arise between group sessions.  Group members discussed barriers and benefits of attending recovery meetings.  Therapist provided empathy and support during group session. Daily Reading: None    Group 2  (Recreation Therapy) Board game    Group 3 (Psychoeducation) This hour of group was facilitated by [facility]. The topic today was grounding techniques.     ASSESSMENT:    Personal Assessment 0-10 Scale (10 worst)    Anxiety:  2 (no comment)  Depression:  1 (no comment)  Cravings: 0 (no comment)     MSE within normal limits? Yes Affect: somber Mood: anxious  Pt Response:  open/receptive  Engaged in activity/Process and self-disclosed: adequate  Applies today's group topics to self: adequate  Able to give and receive feedback: adequate  Demonstrated insightful thinking: consistent and adequate  Other pt response comments:  Patient  was a positive participant. They demonstrated a relatively positive attitude, a strong degree of motivation, and adequate insight, as evidenced by his level of engagement combined with his insights. They seemed interested and attentive. They appeared to comprehend well the concepts being discussed. The patient seemed receptive of, and willing to implement, the ideas being discussed. Their thought process appeared linear and goal directed, and their speech was regular rate and rhythm.       Community Group Engagement:  No: not engaged    Reported relapse since last meeting? No: no lapse    .  Office Visit on 09/09/2024   Component Date Value Ref Range Status    External Amphetamine Screen Urine 09/09/2024 Negative   Final    External Benzodiazepine Screen Uri* 09/09/2024 Negative   Final    External Cocaine Screen Urine 09/09/2024 Negative   Final    External THC Screen Urine 09/09/2024 Negative   Final    External Methadone Screen Urine 09/09/2024 Negative   Final    External Methamphetamine Screen Ur* 09/09/2024 Negative   Final    External Oxycodone Screen Urine 09/09/2024 Positive (A)   Final    External Buprenorphine Screen Urine 09/09/2024 Negative   Final    External MDMA 09/09/2024 Negative   Final    External Opiates Screen Urine 09/09/2024 Positive (A)   Final   Office Visit on 09/03/2024   Component Date Value Ref Range Status    External Amphetamine Screen Urine 09/03/2024 Negative   Final    External Benzodiazepine Screen Uri* 09/03/2024 Negative   Final    External Cocaine Screen Urine 09/03/2024 Negative   Final    External THC Screen Urine 09/03/2024 Negative   Final    External Methadone Screen Urine 09/03/2024 Negative   Final    External Methamphetamine Screen Ur* 09/03/2024 Negative   Final    External Oxycodone Screen Urine 09/03/2024 Positive (A)   Final    External Buprenorphine Screen Urine 09/03/2024 Negative   Final    External MDMA 09/03/2024 Negative   Final    External Opiates Screen  Urine 09/03/2024 Positive (A)   Final   Office Visit on 08/26/2024   Component Date Value Ref Range Status    External Amphetamine Screen Urine 08/26/2024 Negative   Final    External Benzodiazepine Screen Uri* 08/26/2024 Negative   Final    External Cocaine Screen Urine 08/26/2024 Negative   Final    External THC Screen Urine 08/26/2024 Negative   Final    External Methadone Screen Urine 08/26/2024 Negative   Final    External Methamphetamine Screen Ur* 08/26/2024 Negative   Final    External Oxycodone Screen Urine 08/26/2024 Positive (A)   Final    External Buprenorphine Screen Urine 08/26/2024 Negative   Final    External MDMA 08/26/2024 Negative   Final    External Opiates Screen Urine 08/26/2024 Positive (A)   Final       Impression/Formulation:    ICD-10-CM ICD-9-CM   1. Alcohol use disorder, severe, dependence  F10.20 303.90   2. Chronic pain disorder  G89.4 338.4   3. Anxiety disorder, unspecified type  F41.9 300.00        CLINICAL MANEUVERING/INTERVENTIONS: Therapist utilized a person-centered approach to build and maintain rapport with group member.   Therapist promoted safe nonjudgmental environment by providing group members with unconditional positive regard.  Assisted member in processing above session content; acknowledged and normalized patient's thoughts, feelings and concerns.  Allowed patient to freely discuss issues without interruption or judgment. Provided safe, confidential environment to facilitate the development of positive therapeutic relationship and encourage open, honest communication. Therapist assisted group member with identifying and implementing healthier coping strategies and maintaining healthier boundaries. Assisted patient in identifying risk factors which would indicate the need for higher level of care including thoughts to harm self or others and/or self-harming behavior and encouraged patient to contact this office, call 911, or present to the nearest emergency room should  any of these events occur. Pt agreeable to adhere to medication regimen as prescribed and report any side effects.  Discussed crisis intervention services and means to access.  Patient adamantly and convincingly denies current suicidal or homicidal ideation or perceptual disturbance.      Therapist implemented motivational interviewing techniques to assist client with exploring and resolving ambivalence associated with commitment to change behaviors.  Yes  Therapist utilized dialectical behavior techniques to teach and model emotional regulation and relaxation.  No   Therapist applied cognitive behavioral strategies to facilitate identification of maladaptive patterns of thinking and behavior.  Yes     PLAN:    Continue [facility] IOP Phase I  Aftercare:  [facility] Outpatient Phase 2      Please note that portions of this note were completed with a voice recognition program. Efforts were made to edit dictation, but occasionally words are mistranscribed.     This document signed by Ben Ortega LCSW, September 11, 2024, 11:13 EDT

## 2024-09-12 ENCOUNTER — TELEPHONE (OUTPATIENT)
Dept: PSYCHIATRY | Facility: CLINIC | Age: 49
End: 2024-09-12
Payer: MEDICARE

## 2024-09-12 ENCOUNTER — OFFICE VISIT (OUTPATIENT)
Dept: PSYCHIATRY | Facility: HOSPITAL | Age: 49
End: 2024-09-12
Payer: MEDICARE

## 2024-09-12 DIAGNOSIS — F10.20 ALCOHOL USE DISORDER, SEVERE, DEPENDENCE: Primary | ICD-10-CM

## 2024-09-12 DIAGNOSIS — G89.4 CHRONIC PAIN DISORDER: ICD-10-CM

## 2024-09-12 DIAGNOSIS — Z79.899 MEDICATION MANAGEMENT: Primary | ICD-10-CM

## 2024-09-12 DIAGNOSIS — F41.9 ANXIETY DISORDER, UNSPECIFIED TYPE: ICD-10-CM

## 2024-09-12 PROCEDURE — H0015 ALCOHOL AND/OR DRUG SERVICES: HCPCS | Performed by: SOCIAL WORKER

## 2024-09-12 NOTE — PROGRESS NOTES
NAME: Taiwo Pierce  DATE: 09/12/2024    [facility] Phase II  [id number]    Services Provided    Group Psychotherapy x 1  Education/Training x 2  Activity Therapy  x 0  Individual Therapy x 0 0 minutes  Family Therapy x 0  MD Initial Visit  x 0  MD Follow-Up   x 0    Number of participants: 6    DATA:  Patient reported he was okay today. He was ready to graduate. He shared with the group that it had been twice since he had been here in this position. Regarding the difference between this time and last time, he reported being held to account more stringently this time. He also had realized more how much his family loved him. The therapist also pointed out to the patient that one major difference was his commitment to being honest.     Individual: No    Homework: None assigned    Group 1 (Psychotherapy: Check-ins): Therapist continued facilitation of rapport building strategies between group members. Therapist asked that each patient check in with home life and recovery efforts and identify triggers, cravings, and high risk situations that arise between group sessions.  Group members discussed barriers and benefits of attending recovery meetings.  Therapist provided empathy and support during group session. Daily Reading: None    Group 2  (Personal Recovery Plan) Patients who were graduating today presented their personal recovery plans.     Group 3 (12 Steps) Discussed the concept of reservations and encouraged participants to cancel their reservations.     ASSESSMENT:    Personal Assessment 0-10 Scale (10 worst)    Anxiety:  1 (no comment)  Depression:  0 (no comment)  Cravings: 0 (no comment)     MSE within normal limits? Yes Affect: somber Mood: calm  Pt Response:  open/receptive  Engaged in activity/Process and self-disclosed: adequate  Applies today's group topics to self: adequate  Able to give and receive feedback: adequate  Demonstrated insightful thinking: consistent and adequate  Other pt response  comments:  Patient was a positive participant. They demonstrated a relatively positive attitude, a strong degree of motivation, and adequate insight, as evidenced by his level of engagement combined with his efforts on his personal recovery plan. They seemed interested and attentive. They appeared to comprehend well the concepts being discussed. The patient seemed receptive of, and willing to implement, the ideas being discussed. Their thought process appeared linear and goal directed, and their speech was regular rate and rhythm.       Community Group Engagement:  Yes: [employer]    Reported relapse since last meeting? No: no lapse    .  Office Visit on 09/09/2024   Component Date Value Ref Range Status    External Amphetamine Screen Urine 09/09/2024 Negative   Final    External Benzodiazepine Screen Uri* 09/09/2024 Negative   Final    External Cocaine Screen Urine 09/09/2024 Negative   Final    External THC Screen Urine 09/09/2024 Negative   Final    External Methadone Screen Urine 09/09/2024 Negative   Final    External Methamphetamine Screen Ur* 09/09/2024 Negative   Final    External Oxycodone Screen Urine 09/09/2024 Positive (A)   Final    External Buprenorphine Screen Urine 09/09/2024 Negative   Final    External MDMA 09/09/2024 Negative   Final    External Opiates Screen Urine 09/09/2024 Positive (A)   Final   Office Visit on 09/03/2024   Component Date Value Ref Range Status    External Amphetamine Screen Urine 09/03/2024 Negative   Final    External Benzodiazepine Screen Uri* 09/03/2024 Negative   Final    External Cocaine Screen Urine 09/03/2024 Negative   Final    External THC Screen Urine 09/03/2024 Negative   Final    External Methadone Screen Urine 09/03/2024 Negative   Final    External Methamphetamine Screen Ur* 09/03/2024 Negative   Final    External Oxycodone Screen Urine 09/03/2024 Positive (A)   Final    External Buprenorphine Screen Urine 09/03/2024 Negative   Final    External MDMA 09/03/2024  Negative   Final    External Opiates Screen Urine 09/03/2024 Positive (A)   Final   Office Visit on 08/26/2024   Component Date Value Ref Range Status    External Amphetamine Screen Urine 08/26/2024 Negative   Final    External Benzodiazepine Screen Uri* 08/26/2024 Negative   Final    External Cocaine Screen Urine 08/26/2024 Negative   Final    External THC Screen Urine 08/26/2024 Negative   Final    External Methadone Screen Urine 08/26/2024 Negative   Final    External Methamphetamine Screen Ur* 08/26/2024 Negative   Final    External Oxycodone Screen Urine 08/26/2024 Positive (A)   Final    External Buprenorphine Screen Urine 08/26/2024 Negative   Final    External MDMA 08/26/2024 Negative   Final    External Opiates Screen Urine 08/26/2024 Positive (A)   Final       Impression/Formulation:    ICD-10-CM ICD-9-CM   1. Alcohol use disorder, severe, dependence  F10.20 303.90   2. Chronic pain disorder  G89.4 338.4   3. Anxiety disorder, unspecified type  F41.9 300.00        CLINICAL MANEUVERING/INTERVENTIONS: Therapist utilized a person-centered approach to build and maintain rapport with group member.   Therapist promoted safe nonjudgmental environment by providing group members with unconditional positive regard.  Assisted member in processing above session content; acknowledged and normalized patient's thoughts, feelings and concerns.  Allowed patient to freely discuss issues without interruption or judgment. Provided safe, confidential environment to facilitate the development of positive therapeutic relationship and encourage open, honest communication. Therapist assisted group member with identifying and implementing healthier coping strategies and maintaining healthier boundaries. Assisted patient in identifying risk factors which would indicate the need for higher level of care including thoughts to harm self or others and/or self-harming behavior and encouraged patient to contact this office, call 911, or  present to the nearest emergency room should any of these events occur. Pt agreeable to adhere to medication regimen as prescribed and report any side effects.  Discussed crisis intervention services and means to access.  Patient adamantly and convincingly denies current suicidal or homicidal ideation or perceptual disturbance.      Therapist implemented motivational interviewing techniques to assist client with exploring and resolving ambivalence associated with commitment to change behaviors.  Yes  Therapist utilized dialectical behavior techniques to teach and model emotional regulation and relaxation.  No   Therapist applied cognitive behavioral strategies to facilitate identification of maladaptive patterns of thinking and behavior.  Yes     PLAN:    Continue [facility] IOP Phase II  Aftercare:  [facility] Outpatient Phase 3     Please note that portions of this note were completed with a voice recognition program. Efforts were made to edit dictation, but occasionally words are mistranscribed.     This document signed by Ben Ortega LCSW, September 12, 2024, 10:08 EDT

## 2024-09-12 NOTE — PROGRESS NOTES
Adult CD IOP Group      Date: September 12, 2024  Time: 930-1030 AM    Type of Group:  Peer Support    Group  Content: Recovery Planning    Patient Response: Patient graduated phase 1 today with excitement. We went over his recovery plan and the steps , action , behavior change he will take to continue on hs recovery journey. He was a positive participant. He was encouraged to get a sponsor.        Sandhya Coles MA  09/12/24  11:32 EDT

## 2024-09-16 NOTE — PROGRESS NOTES
Adult CD IOP Group      Date: September 11, 2024    Time: 1030 -1870    Type of Group:  Psychoeducation    Group  Content: Group members reviewed daily/weekly goals and discussed progress made toward each goal. Group members completed daily reflection. Group members received education on grounding techniques. Group members focused on four types of grounding techniques (5-4-3-2-1 technique, categories, body awareness, and mental exercise).    Patient Response: Pt was calm and cooperative with group.  Pt make progress toward his goals by taking his wife on a date, seeing his grand baby and cleaning some on his boat.  Pt was engaged and participated in grounding technique activities.  Daily reflection   What are you thankful for today?  That the good Lord allowed me to wake up this morning,and having such a wonderful family.  What emotions have you experienced today? Happy, peaceful, joyful  What brings you nayeli?  Being clean, my family, health, being saved, and friends.        Zacarias Neil  09/16/24  11:05 EDT

## 2024-09-17 ENCOUNTER — OFFICE VISIT (OUTPATIENT)
Dept: PSYCHIATRY | Facility: CLINIC | Age: 49
End: 2024-09-17
Payer: MEDICARE

## 2024-09-17 DIAGNOSIS — F41.9 ANXIETY DISORDER, UNSPECIFIED TYPE: ICD-10-CM

## 2024-09-17 DIAGNOSIS — G89.4 CHRONIC PAIN DISORDER: ICD-10-CM

## 2024-09-17 DIAGNOSIS — F10.20 ALCOHOL USE DISORDER, SEVERE, DEPENDENCE: Primary | ICD-10-CM

## 2024-09-17 DIAGNOSIS — Z79.899 MEDICATION MANAGEMENT: ICD-10-CM

## 2024-09-17 PROCEDURE — 90853 GROUP PSYCHOTHERAPY: CPT | Performed by: SOCIAL WORKER

## 2024-09-19 ENCOUNTER — OFFICE VISIT (OUTPATIENT)
Dept: PSYCHIATRY | Facility: CLINIC | Age: 49
End: 2024-09-19
Payer: MEDICARE

## 2024-09-19 DIAGNOSIS — G89.4 CHRONIC PAIN DISORDER: ICD-10-CM

## 2024-09-19 DIAGNOSIS — F10.20 ALCOHOL USE DISORDER, SEVERE, DEPENDENCE: Primary | ICD-10-CM

## 2024-09-19 DIAGNOSIS — F41.9 ANXIETY DISORDER, UNSPECIFIED TYPE: ICD-10-CM

## 2024-09-19 PROCEDURE — 90853 GROUP PSYCHOTHERAPY: CPT | Performed by: SOCIAL WORKER

## 2024-09-24 ENCOUNTER — OFFICE VISIT (OUTPATIENT)
Dept: PSYCHIATRY | Facility: CLINIC | Age: 49
End: 2024-09-24
Payer: MEDICARE

## 2024-09-24 DIAGNOSIS — G89.4 CHRONIC PAIN DISORDER: ICD-10-CM

## 2024-09-24 DIAGNOSIS — F10.20 ALCOHOL USE DISORDER, SEVERE, DEPENDENCE: Primary | ICD-10-CM

## 2024-09-24 DIAGNOSIS — F41.9 ANXIETY DISORDER, UNSPECIFIED TYPE: ICD-10-CM

## 2024-09-24 DIAGNOSIS — Z79.899 MEDICATION MANAGEMENT: ICD-10-CM

## 2024-09-24 PROCEDURE — 90853 GROUP PSYCHOTHERAPY: CPT | Performed by: SOCIAL WORKER

## 2024-09-24 NOTE — PROGRESS NOTES
NAME: Taiwo Pierce  DATE: 09/24/2024    [facility] Phase II  [id number]    Services Provided    Group Psychotherapy x 1  Education/Training x 0  Activity Therapy  x 0  Individual Therapy x 0 0 minutes  Family Therapy x 0  MD Initial Visit  x 0  MD Follow-Up   x 0    Number of participants: 3    DATA:  Patient reported he was blessed today. Yesterday, he had gone to check his trail cameras. He was also trying to help with household chores like laundry and cooking.     Individual: No    Homework: None assigned    Group 1 (Psychotherapy: Check-ins): Therapist continued facilitation of rapport building strategies between group members. Therapist asked that each patient check in with home life and recovery efforts and identify triggers, cravings, and high risk situations that arise between group sessions.  Group members discussed barriers and benefits of attending recovery meetings.  Therapist provided empathy and support during group session. Daily Reading:  [employer], by JESSICA Parsons     ASSESSMENT:    Personal Assessment 0-10 Scale (10 worst)    Anxiety:  1 (no comment)  Depression:  0 (no comment)  Cravings: 0 (no comment)     MSE within normal limits? Yes Affect: somber Mood: calm  Pt Response:  open/receptive  Engaged in activity/Process and self-disclosed: adequate  Applies today's group topics to self: adequate  Able to give and receive feedback: adequate  Demonstrated insightful thinking: consistent and adequate  Other pt response comments:  Patient was a positive participant. They demonstrated a relatively positive attitude, a strong degree of motivation, and adequate insight, as evidenced by his level of engagement combined with his efforts to encourage his peers. They seemed interested and attentive. They appeared to comprehend well the concepts being discussed. The patient seemed receptive of, and willing to implement, the ideas being discussed. Their thought process appeared linear and goal  directed, and their speech was regular rate and rhythm.       Community Group Engagement:  Yes: Judaism    Reported relapse since last meeting? No: no lapse    .  Office Visit on 09/17/2024   Component Date Value Ref Range Status    External Amphetamine Screen Urine 09/17/2024 Negative   Final    External Benzodiazepine Screen Uri* 09/17/2024 Negative   Final    External Cocaine Screen Urine 09/17/2024 Negative   Final    External THC Screen Urine 09/17/2024 Negative   Final    External Methadone Screen Urine 09/17/2024 Negative   Final    External Methamphetamine Screen Ur* 09/17/2024 Negative   Final    External Oxycodone Screen Urine 09/17/2024 Positive (A)   Corrected    External Buprenorphine Screen Urine 09/17/2024 Negative   Corrected    External MDMA 09/17/2024 Negative   Final    External Opiates Screen Urine 09/17/2024 Positive (A)   Corrected   Telephone on 09/12/2024   Component Date Value Ref Range Status    External Amphetamine Screen Urine 09/12/2024 Negative   Final    External Benzodiazepine Screen Uri* 09/12/2024 Negative   Final    External Cocaine Screen Urine 09/12/2024 Negative   Final    External THC Screen Urine 09/12/2024 Negative   Final    External Methadone Screen Urine 09/12/2024 Negative   Final    External Methamphetamine Screen Ur* 09/12/2024 Negative   Final    External Oxycodone Screen Urine 09/12/2024 Positive (A)   Final    External Buprenorphine Screen Urine 09/12/2024 Negative   Final    External MDMA 09/12/2024 Negative   Final    External Opiates Screen Urine 09/12/2024 Positive (A)   Final   Office Visit on 09/09/2024   Component Date Value Ref Range Status    External Amphetamine Screen Urine 09/09/2024 Negative   Final    External Benzodiazepine Screen Uri* 09/09/2024 Negative   Final    External Cocaine Screen Urine 09/09/2024 Negative   Final    External THC Screen Urine 09/09/2024 Negative   Final    External Methadone Screen Urine 09/09/2024 Negative   Final    External  Methamphetamine Screen Ur* 09/09/2024 Negative   Final    External Oxycodone Screen Urine 09/09/2024 Positive (A)   Final    External Buprenorphine Screen Urine 09/09/2024 Negative   Final    External MDMA 09/09/2024 Negative   Final    External Opiates Screen Urine 09/09/2024 Positive (A)   Final       Impression/Formulation:    ICD-10-CM ICD-9-CM   1. Alcohol use disorder, severe, dependence  F10.20 303.90   2. Chronic pain disorder  G89.4 338.4   3. Anxiety disorder, unspecified type  F41.9 300.00   4. Medication management  Z79.899 V58.69        CLINICAL MANEUVERING/INTERVENTIONS: Therapist utilized a person-centered approach to build and maintain rapport with group member.   Therapist promoted safe nonjudgmental environment by providing group members with unconditional positive regard.  Assisted member in processing above session content; acknowledged and normalized patient's thoughts, feelings and concerns.  Allowed patient to freely discuss issues without interruption or judgment. Provided safe, confidential environment to facilitate the development of positive therapeutic relationship and encourage open, honest communication. Therapist assisted group member with identifying and implementing healthier coping strategies and maintaining healthier boundaries. Assisted patient in identifying risk factors which would indicate the need for higher level of care including thoughts to harm self or others and/or self-harming behavior and encouraged patient to contact this office, call 911, or present to the nearest emergency room should any of these events occur. Pt agreeable to adhere to medication regimen as prescribed and report any side effects.  Discussed crisis intervention services and means to access.  Patient adamantly and convincingly denies current suicidal or homicidal ideation or perceptual disturbance.      Therapist implemented motivational interviewing techniques to assist client with exploring and  resolving ambivalence associated with commitment to change behaviors.  Yes  Therapist utilized dialectical behavior techniques to teach and model emotional regulation and relaxation.  No   Therapist applied cognitive behavioral strategies to facilitate identification of maladaptive patterns of thinking and behavior.  Yes     PLAN:    Continue [facility] IOP Phase I  Aftercare:  [facility] Phase 2      Please note that portions of this note were completed with a voice recognition program. Efforts were made to edit dictation, but occasionally words are mistranscribed.     This document signed by Ben Ortega LCSW, September 24, 2024, 14:59 EDT

## 2024-09-26 ENCOUNTER — OFFICE VISIT (OUTPATIENT)
Dept: PSYCHIATRY | Facility: CLINIC | Age: 49
End: 2024-09-26
Payer: MEDICARE

## 2024-09-26 DIAGNOSIS — G89.4 CHRONIC PAIN DISORDER: ICD-10-CM

## 2024-09-26 DIAGNOSIS — F41.9 ANXIETY DISORDER, UNSPECIFIED TYPE: ICD-10-CM

## 2024-09-26 DIAGNOSIS — F10.20 ALCOHOL USE DISORDER, SEVERE, DEPENDENCE: Primary | ICD-10-CM

## 2024-09-26 PROCEDURE — 90853 GROUP PSYCHOTHERAPY: CPT | Performed by: SOCIAL WORKER

## 2024-10-01 ENCOUNTER — OFFICE VISIT (OUTPATIENT)
Dept: PSYCHIATRY | Facility: CLINIC | Age: 49
End: 2024-10-01
Payer: MEDICARE

## 2024-10-01 DIAGNOSIS — G89.4 CHRONIC PAIN DISORDER: ICD-10-CM

## 2024-10-01 DIAGNOSIS — F10.20 ALCOHOL USE DISORDER, SEVERE, DEPENDENCE: Primary | ICD-10-CM

## 2024-10-01 DIAGNOSIS — F41.9 ANXIETY DISORDER, UNSPECIFIED TYPE: ICD-10-CM

## 2024-10-01 DIAGNOSIS — Z79.899 MEDICATION MANAGEMENT: ICD-10-CM

## 2024-10-01 PROCEDURE — 90853 GROUP PSYCHOTHERAPY: CPT | Performed by: SOCIAL WORKER

## 2024-10-01 NOTE — PROGRESS NOTES
NAME: Taiwo Pierce  DATE: 10/01/2024    [facility] Phase II  [id number]    Services Provided    Group Psychotherapy x 1  Education/Training x 0  Activity Therapy  x 0  Individual Therapy x 0 0 minutes  Family Therapy x 0  MD Initial Visit  x 0  MD Follow-Up   x 0    Number of participants: 5    DATA:  Patient reported that he was doing pretty good today. He was feeling blessed. For the last week and a half he had been working on repairing his washer and refrigerator.     Individual: No    Homework: None assigned    Group 1 (Psychotherapy: Check-ins): Therapist continued facilitation of rapport building strategies between group members. Therapist asked that each patient check in with home life and recovery efforts and identify triggers, cravings, and high risk situations that arise between group sessions.  Group members discussed barriers and benefits of attending recovery meetings.  Therapist provided empathy and support during group session. Daily Reading: None     ASSESSMENT:    Personal Assessment 0-10 Scale (10 worst)    Anxiety:  1 (no comment)  Depression:  0 (no comment)  Cravings: 0 (no comment)     MSE within normal limits? Yes Affect: somber Mood: irritable  Pt Response:  open/receptive  Engaged in activity/Process and self-disclosed: adequate  Applies today's group topics to self: adequate  Able to give and receive feedback: adequate  Demonstrated insightful thinking: consistent and adequate  Other pt response comments:  Patient was a positive participant. They demonstrated a relatively positive attitude, a strong degree of motivation, and adequate insight, as evidenced by his level of engagement combined with his efforts to maintain sobriety. They seemed interested and attentive. They appeared to comprehend well the concepts being discussed. The patient seemed receptive of, and willing to implement, the ideas being discussed. Their thought process appeared linear and goal directed, and their speech  was regular rate and rhythm.       Community Group Engagement:  Yes: Amish    Reported relapse since last meeting? No: no lapse     .  Office Visit on 10/01/2024   Component Date Value Ref Range Status    External Amphetamine Screen Urine 10/01/2024 Negative   Final    External Benzodiazepine Screen Uri* 10/01/2024 Negative   Final    External Cocaine Screen Urine 10/01/2024 Negative   Final    External THC Screen Urine 10/01/2024 Negative   Final    External Methadone Screen Urine 10/01/2024 Negative   Final    External Methamphetamine Screen Ur* 10/01/2024 Negative   Final    External Oxycodone Screen Urine 10/01/2024 Positive (A)   Final    External Buprenorphine Screen Urine 10/01/2024 Negative   Final    External MDMA 10/01/2024 Negative   Final    External Opiates Screen Urine 10/01/2024 Positive (A)   Final   Office Visit on 09/24/2024   Component Date Value Ref Range Status    External Amphetamine Screen Urine 09/24/2024 Negative   Final    External Benzodiazepine Screen Uri* 09/24/2024 Negative   Final    External Cocaine Screen Urine 09/24/2024 Negative   Final    External THC Screen Urine 09/24/2024 Negative   Final    External Methadone Screen Urine 09/24/2024 Negative   Final    External Methamphetamine Screen Ur* 09/24/2024 Negative   Final    External Oxycodone Screen Urine 09/24/2024 Positive (A)   Final    External Buprenorphine Screen Urine 09/24/2024 Negative   Final    External MDMA 09/24/2024 Negative   Final    External Opiates Screen Urine 09/24/2024 Positive (A)   Final   Office Visit on 09/17/2024   Component Date Value Ref Range Status    External Amphetamine Screen Urine 09/17/2024 Negative   Final    External Benzodiazepine Screen Uri* 09/17/2024 Negative   Final    External Cocaine Screen Urine 09/17/2024 Negative   Final    External THC Screen Urine 09/17/2024 Negative   Final    External Methadone Screen Urine 09/17/2024 Negative   Final    External Methamphetamine Screen Ur*  09/17/2024 Negative   Final    External Oxycodone Screen Urine 09/17/2024 Positive (A)   Corrected    External Buprenorphine Screen Urine 09/17/2024 Negative   Corrected    External MDMA 09/17/2024 Negative   Final    External Opiates Screen Urine 09/17/2024 Positive (A)   Corrected   Telephone on 09/12/2024   Component Date Value Ref Range Status    External Amphetamine Screen Urine 09/12/2024 Negative   Final    External Benzodiazepine Screen Uri* 09/12/2024 Negative   Final    External Cocaine Screen Urine 09/12/2024 Negative   Final    External THC Screen Urine 09/12/2024 Negative   Final    External Methadone Screen Urine 09/12/2024 Negative   Final    External Methamphetamine Screen Ur* 09/12/2024 Negative   Final    External Oxycodone Screen Urine 09/12/2024 Positive (A)   Final    External Buprenorphine Screen Urine 09/12/2024 Negative   Final    External MDMA 09/12/2024 Negative   Final    External Opiates Screen Urine 09/12/2024 Positive (A)   Final       Impression/Formulation:    ICD-10-CM ICD-9-CM   1. Alcohol use disorder, severe, dependence  F10.20 303.90   2. Chronic pain disorder  G89.4 338.4   3. Anxiety disorder, unspecified type  F41.9 300.00   4. Medication management  Z79.899 V58.69        CLINICAL MANEUVERING/INTERVENTIONS: Therapist utilized a person-centered approach to build and maintain rapport with group member.   Therapist promoted safe nonjudgmental environment by providing group members with unconditional positive regard.  Assisted member in processing above session content; acknowledged and normalized patient's thoughts, feelings and concerns.  Allowed patient to freely discuss issues without interruption or judgment. Provided safe, confidential environment to facilitate the development of positive therapeutic relationship and encourage open, honest communication. Therapist assisted group member with identifying and implementing healthier coping strategies and maintaining healthier  boundaries. Assisted patient in identifying risk factors which would indicate the need for higher level of care including thoughts to harm self or others and/or self-harming behavior and encouraged patient to contact this office, call 911, or present to the nearest emergency room should any of these events occur. Pt agreeable to adhere to medication regimen as prescribed and report any side effects.  Discussed crisis intervention services and means to access.  Patient adamantly and convincingly denies current suicidal or homicidal ideation or perceptual disturbance.      Therapist implemented motivational interviewing techniques to assist client with exploring and resolving ambivalence associated with commitment to change behaviors.  Yes  Therapist utilized dialectical behavior techniques to teach and model emotional regulation and relaxation.  No   Therapist applied cognitive behavioral strategies to facilitate identification of maladaptive patterns of thinking and behavior.  Yes     PLAN:    Continue [facility] Phase II  Aftercare:  [facility] Phase 3     Please note that portions of this note were completed with a voice recognition program. Efforts were made to edit dictation, but occasionally words are mistranscribed.     This document signed by Ben Ortega LCSW, October 1, 2024, 15:11 EDT

## 2024-10-03 ENCOUNTER — OFFICE VISIT (OUTPATIENT)
Dept: PSYCHIATRY | Facility: CLINIC | Age: 49
End: 2024-10-03
Payer: MEDICARE

## 2024-10-03 DIAGNOSIS — G89.4 CHRONIC PAIN DISORDER: ICD-10-CM

## 2024-10-03 DIAGNOSIS — F10.20 ALCOHOL USE DISORDER, SEVERE, DEPENDENCE: Primary | ICD-10-CM

## 2024-10-03 DIAGNOSIS — F41.9 ANXIETY DISORDER, UNSPECIFIED TYPE: ICD-10-CM

## 2024-10-03 PROCEDURE — 90853 GROUP PSYCHOTHERAPY: CPT | Performed by: SOCIAL WORKER

## 2024-10-03 NOTE — PROGRESS NOTES
NAME: Taiwo Pierce  DATE: 10/03/2024     IOP Phase II  [id number]    Services Provided    Group Psychotherapy x 1  Education/Training x 0  Activity Therapy  x 0  Individual Therapy x 0 0 minutes  Family Therapy x 0  MD Initial Visit  x 0  MD Follow-Up   x 0    Number of participants: 2    DATA:  Patient reported things were going well this week. Alcohol had not been tempting for him lately. He was feeling confident. He was also trusting his higher power in his recovery.     Individual: No    Homework: None assigned    Group 1 (Psychotherapy: Check-ins): Therapist continued facilitation of rapport building strategies between group members. Therapist asked that each patient check in with home life and recovery efforts and identify triggers, cravings, and high risk situations that arise between group sessions.  Group members discussed barriers and benefits of attending recovery meetings.  Therapist provided empathy and support during group session. Daily Reading:  Tr[employer] (2020) by Ilia Rinaldi.      ASSESSMENT:    Personal Assessment 0-10 Scale (10 worst)    Anxiety:  0 (no comment)  Depression:  0 (no comment)  Cravings: 0 (no comment)     MSE within normal limits? Yes Affect: somber Mood: calm  Pt Response:  open/receptive  Engaged in activity/Process and self-disclosed: adequate  Applies today's group topics to self: adequate  Able to give and receive feedback: adequate  Demonstrated insightful thinking: consistent and adequate  Other pt response comments:  Patient was a positive participant. They demonstrated a relatively positive attitude, a strong degree of motivation, and adequate insight, as evidenced by his level of engagement combined with his efforts to continue with IOP. They seemed interested and attentive. They appeared to comprehend well the concepts being discussed. The patient seemed receptive of, and willing to implement, the ideas being discussed. Their thought process appeared  linear and goal directed, and their speech was regular rate and rhythm.       Community Group Engagement:  Yes: Lutheran    Reported relapse since last meeting? No: no lapse    .  Office Visit on 10/01/2024   Component Date Value Ref Range Status    External Amphetamine Screen Urine 10/01/2024 Negative   Final    External Benzodiazepine Screen Uri* 10/01/2024 Negative   Final    External Cocaine Screen Urine 10/01/2024 Negative   Final    External THC Screen Urine 10/01/2024 Negative   Final    External Methadone Screen Urine 10/01/2024 Negative   Final    External Methamphetamine Screen Ur* 10/01/2024 Negative   Final    External Oxycodone Screen Urine 10/01/2024 Positive (A)   Final    External Buprenorphine Screen Urine 10/01/2024 Negative   Final    External MDMA 10/01/2024 Negative   Final    External Opiates Screen Urine 10/01/2024 Positive (A)   Final   Office Visit on 09/24/2024   Component Date Value Ref Range Status    External Amphetamine Screen Urine 09/24/2024 Negative   Final    External Benzodiazepine Screen Uri* 09/24/2024 Negative   Final    External Cocaine Screen Urine 09/24/2024 Negative   Final    External THC Screen Urine 09/24/2024 Negative   Final    External Methadone Screen Urine 09/24/2024 Negative   Final    External Methamphetamine Screen Ur* 09/24/2024 Negative   Final    External Oxycodone Screen Urine 09/24/2024 Positive (A)   Final    External Buprenorphine Screen Urine 09/24/2024 Negative   Final    External MDMA 09/24/2024 Negative   Final    External Opiates Screen Urine 09/24/2024 Positive (A)   Final   Office Visit on 09/17/2024   Component Date Value Ref Range Status    External Amphetamine Screen Urine 09/17/2024 Negative   Final    External Benzodiazepine Screen Uri* 09/17/2024 Negative   Final    External Cocaine Screen Urine 09/17/2024 Negative   Final    External THC Screen Urine 09/17/2024 Negative   Final    External Methadone Screen Urine 09/17/2024 Negative   Final     External Methamphetamine Screen Ur* 09/17/2024 Negative   Final    External Oxycodone Screen Urine 09/17/2024 Positive (A)   Corrected    External Buprenorphine Screen Urine 09/17/2024 Negative   Corrected    External MDMA 09/17/2024 Negative   Final    External Opiates Screen Urine 09/17/2024 Positive (A)   Corrected       Impression/Formulation:    ICD-10-CM ICD-9-CM   1. Alcohol use disorder, severe, dependence  F10.20 303.90   2. Chronic pain disorder  G89.4 338.4   3. Anxiety disorder, unspecified type  F41.9 300.00        CLINICAL MANEUVERING/INTERVENTIONS: Therapist utilized a person-centered approach to build and maintain rapport with group member.   Therapist promoted safe nonjudgmental environment by providing group members with unconditional positive regard.  Assisted member in processing above session content; acknowledged and normalized patient's thoughts, feelings and concerns.  Allowed patient to freely discuss issues without interruption or judgment. Provided safe, confidential environment to facilitate the development of positive therapeutic relationship and encourage open, honest communication. Therapist assisted group member with identifying and implementing healthier coping strategies and maintaining healthier boundaries. Assisted patient in identifying risk factors which would indicate the need for higher level of care including thoughts to harm self or others and/or self-harming behavior and encouraged patient to contact this office, call 911, or present to the nearest emergency room should any of these events occur. Pt agreeable to adhere to medication regimen as prescribed and report any side effects.  Discussed crisis intervention services and means to access.  Patient adamantly and convincingly denies current suicidal or homicidal ideation or perceptual disturbance.      Therapist implemented motivational interviewing techniques to assist client with exploring and resolving ambivalence  associated with commitment to change behaviors.  Yes  Therapist utilized dialectical behavior techniques to teach and model emotional regulation and relaxation.  No   Therapist applied cognitive behavioral strategies to facilitate identification of maladaptive patterns of thinking and behavior.  Yes     PLAN:    [facility]  Aftercare:  [facility] Outpatient Phase 3     Please note that portions of this note were completed with a voice recognition program. Efforts were made to edit dictation, but occasionally words are mistranscribed.     This document signed by Ben Ortega LCSW, October 3, 2024, 14:54 EDT

## 2024-10-08 ENCOUNTER — OFFICE VISIT (OUTPATIENT)
Dept: PSYCHIATRY | Facility: CLINIC | Age: 49
End: 2024-10-08
Payer: MEDICARE

## 2024-10-08 DIAGNOSIS — F41.9 ANXIETY DISORDER, UNSPECIFIED TYPE: ICD-10-CM

## 2024-10-08 DIAGNOSIS — F10.20 ALCOHOL USE DISORDER, SEVERE, DEPENDENCE: Primary | ICD-10-CM

## 2024-10-08 DIAGNOSIS — G89.4 CHRONIC PAIN DISORDER: ICD-10-CM

## 2024-10-08 DIAGNOSIS — Z79.899 MEDICATION MANAGEMENT: ICD-10-CM

## 2024-10-08 PROCEDURE — 90853 GROUP PSYCHOTHERAPY: CPT | Performed by: SOCIAL WORKER

## 2024-10-08 NOTE — PROGRESS NOTES
NAME: Taiwo Pierce  DATE: 10/08/2024    [facility] Phase II  [id number]    Services Provided    Group Psychotherapy x 1  Education/Training x 0  Activity Therapy  x 0  Individual Therapy x 0 0 minutes  Family Therapy x 0  MD Initial Visit  x 0  MD Follow-Up   x 0    Number of participants: 5    DATA:  Patient reported he was doing pretty good. He had taken his truck to the shop, and then he had gone to see his friend who had recently lost his father. It hit home, because his friend's father was close in age to his own father.     Individual: No    Homework: None assigned    Group 1 (Psychotherapy: Check-ins): Therapist continued facilitation of rapport building strategies between group members. Therapist asked that each patient check in with home life and recovery efforts and identify triggers, cravings, and high risk situations that arise between group sessions.  Group members discussed barriers and benefits of attending recovery meetings.  Therapist provided empathy and support during group session. Daily Reading: None     ASSESSMENT:    Personal Assessment 0-10 Scale (10 worst)    Anxiety:  2 (no comment)  Depression:  0 (no comment)  Cravings: 0 (no comment)     MSE within normal limits? Yes Affect: somber Mood: calm  Pt Response:  open/receptive  Engaged in activity/Process and self-disclosed: adequate  Applies today's group topics to self: adequate  Able to give and receive feedback: adequate  Demonstrated insightful thinking: consistent and adequate  Other pt response comments:  Patient was a positive participant. They demonstrated a relatively optimistic attitude, a strong degree of motivation, and adequate insight, as evidenced by his level of engagement combined with his efforts to maintain sobriety. They seemed interested and attentive. They appeared to comprehend well the concepts being discussed. The patient seemed receptive of, and willing to implement, the ideas being discussed. Their thought  process appeared linear and goal directed, and their speech was regular rate and rhythm.       Community Group Engagement:  Yes: Taoism    Reported relapse since last meeting? No: no  lapse     .  Office Visit on 10/08/2024   Component Date Value Ref Range Status    External Amphetamine Screen Urine 10/08/2024 Negative   Final    External Benzodiazepine Screen Uri* 10/08/2024 Negative   Final    External Cocaine Screen Urine 10/08/2024 Negative   Final    External THC Screen Urine 10/08/2024 Negative   Final    External Methadone Screen Urine 10/08/2024 Negative   Final    External Methamphetamine Screen Ur* 10/08/2024 Negative   Final    External Oxycodone Screen Urine 10/08/2024 Positive (A)   Final    External Buprenorphine Screen Urine 10/08/2024 Negative   Final    External MDMA 10/08/2024 Negative   Final    External Opiates Screen Urine 10/08/2024 Positive (A)   Final   Office Visit on 10/01/2024   Component Date Value Ref Range Status    External Amphetamine Screen Urine 10/01/2024 Negative   Final    External Benzodiazepine Screen Uri* 10/01/2024 Negative   Final    External Cocaine Screen Urine 10/01/2024 Negative   Final    External THC Screen Urine 10/01/2024 Negative   Final    External Methadone Screen Urine 10/01/2024 Negative   Final    External Methamphetamine Screen Ur* 10/01/2024 Negative   Final    External Oxycodone Screen Urine 10/01/2024 Positive (A)   Final    External Buprenorphine Screen Urine 10/01/2024 Negative   Final    External MDMA 10/01/2024 Negative   Final    External Opiates Screen Urine 10/01/2024 Positive (A)   Final   Office Visit on 09/24/2024   Component Date Value Ref Range Status    External Amphetamine Screen Urine 09/24/2024 Negative   Final    External Benzodiazepine Screen Uri* 09/24/2024 Negative   Final    External Cocaine Screen Urine 09/24/2024 Negative   Final    External THC Screen Urine 09/24/2024 Negative   Final    External Methadone Screen Urine 09/24/2024  Negative   Final    External Methamphetamine Screen Ur* 09/24/2024 Negative   Final    External Oxycodone Screen Urine 09/24/2024 Positive (A)   Final    External Buprenorphine Screen Urine 09/24/2024 Negative   Final    External MDMA 09/24/2024 Negative   Final    External Opiates Screen Urine 09/24/2024 Positive (A)   Final       Impression/Formulation:    ICD-10-CM ICD-9-CM   1. Alcohol use disorder, severe, dependence  F10.20 303.90   2. Chronic pain disorder  G89.4 338.4   3. Anxiety disorder, unspecified type  F41.9 300.00   4. Medication management  Z79.899 V58.69        CLINICAL MANEUVERING/INTERVENTIONS: Therapist utilized a person-centered approach to build and maintain rapport with group member.   Therapist promoted safe nonjudgmental environment by providing group members with unconditional positive regard.  Assisted member in processing above session content; acknowledged and normalized patient's thoughts, feelings and concerns.  Allowed patient to freely discuss issues without interruption or judgment. Provided safe, confidential environment to facilitate the development of positive therapeutic relationship and encourage open, honest communication. Therapist assisted group member with identifying and implementing healthier coping strategies and maintaining healthier boundaries. Assisted patient in identifying risk factors which would indicate the need for higher level of care including thoughts to harm self or others and/or self-harming behavior and encouraged patient to contact this office, call 911, or present to the nearest emergency room should any of these events occur. Pt agreeable to adhere to medication regimen as prescribed and report any side effects.  Discussed crisis intervention services and means to access.  Patient adamantly and convincingly denies current suicidal or homicidal ideation or perceptual disturbance.      Therapist implemented motivational interviewing techniques to assist  client with exploring and resolving ambivalence associated with commitment to change behaviors.  Yes  Therapist utilized dialectical behavior techniques to teach and model emotional regulation and relaxation.  No   Therapist applied cognitive behavioral strategies to facilitate identification of maladaptive patterns of thinking and behavior.  Yes     PLAN:    Continue [facility] IOP Phase II  Aftercare:  [facility] Outpatient Phase 3     Please note that portions of this note were completed with a voice recognition program. Efforts were made to edit dictation, but occasionally words are mistranscribed.     This document signed by Ben Ortega LCSW, October 8, 2024, 16:15 EDT

## 2024-10-15 ENCOUNTER — OFFICE VISIT (OUTPATIENT)
Dept: PSYCHIATRY | Facility: CLINIC | Age: 49
End: 2024-10-15
Payer: MEDICARE

## 2024-10-15 DIAGNOSIS — G89.4 CHRONIC PAIN DISORDER: ICD-10-CM

## 2024-10-15 DIAGNOSIS — Z79.899 MEDICATION MANAGEMENT: ICD-10-CM

## 2024-10-15 DIAGNOSIS — F10.20 ALCOHOL USE DISORDER, SEVERE, DEPENDENCE: Primary | ICD-10-CM

## 2024-10-15 DIAGNOSIS — F41.9 ANXIETY DISORDER, UNSPECIFIED TYPE: ICD-10-CM

## 2024-10-15 DIAGNOSIS — E78.2 MIXED HYPERLIPIDEMIA: ICD-10-CM

## 2024-10-15 LAB
AMPHET+METHAMPHET UR QL: NEGATIVE
AMPHETAMINES UR QL: NEGATIVE
BARBITURATES UR QL SCN: NEGATIVE
BENZODIAZ UR QL SCN: NEGATIVE
BUPRENORPHINE SERPL-MCNC: NEGATIVE NG/ML
CANNABINOIDS SERPL QL: NEGATIVE
COCAINE UR QL: NEGATIVE
MDMA UR QL SCN: NEGATIVE
METHADONE UR QL SCN: NEGATIVE
MORPHINE/OPIATES SCREEN, URINE: POSITIVE
OXYCODONE UR QL SCN: POSITIVE
PCP UR QL SCN: NEGATIVE

## 2024-10-15 NOTE — PROGRESS NOTES
NAME: Taiwo Pierce  DATE: 10/15/2024    [facility] Phase II  [id number]    Services Provided    Group Psychotherapy x 1  Education/Training x 0  Activity Therapy  x 0  Individual Therapy x 0 0 minutes  Family Therapy x 0  MD Initial Visit  x 0  MD Follow-Up   x 0    Number of participants: 5    DATA:  Patient reported he was doing okay. He had been experiencing multiple doctor's appointments. He was excited about hunting season. This past Friday, he reported having experienced a craving [place] [employer]. He supposed it was social anxiety. He also supposed it was due to the prevalence of alcohol at the festival, too. He stayed close to his wife, but he didn't tell her about it. He had no urges Saturday at the festival. Still,  he learned some things about himself as a result of his experiences this weekend. Therapist advised the patient to share experiences of cravings with his spouse when they happened. He was agreeable.     Individual: No    Homework: None assigned    Group 1 (Psychotherapy: Check-ins): Therapist continued facilitation of rapport building strategies between group members. Therapist asked that each patient check in with home life and recovery efforts and identify triggers, cravings, and high risk situations that arise between group sessions.  Group members discussed barriers and benefits of attending recovery meetings.  Therapist provided empathy and support during group session. Daily Reading: None     ASSESSMENT:    Personal Assessment 0-10 Scale (10 worst)    Anxiety:  3 (no comment)  Depression:  0 (no comment)  Cravings: 0 (no comment)     MSE within normal limits? Yes Affect: somber Mood: calm  Pt Response:  open/receptive  Engaged in activity/Process and self-disclosed: adequate  Applies today's group topics to self: adequate  Able to give and receive feedback: adequate  Demonstrated insightful thinking: consistent and adequate  Other pt response comments:  Patient was a  positive participant. They demonstrated a relatively positive attitude, a strong degree of motivation, and adequate insight, as evidenced by his level of engagement combined with the encouragement he offered his peers. They seemed interested and attentive. They appeared to comprehend well the concepts being discussed. The patient seemed receptive of, and willing to implement, the ideas being discussed. Their thought process appeared linear and goal directed, and their speech was regular rate and rhythm.       Community Group Engagement:  Yes: Anabaptism    Reported relapse since last meeting? No: no lapse reported    .  Office Visit on 10/15/2024   Component Date Value Ref Range Status    Amphetamine Screen, Urine 10/15/2024 Negative  Negative Final    PRELIMINARY RESULTS. REFER TO [facility] CONFIRMATION FOR FINAL RESULTS    Barbiturates Screen, Urine 10/15/2024 Negative  Negative Final    Buprenorphine, Screen, Urine 10/15/2024 Negative  Negative Final    Benzodiazepine Screen, Urine 10/15/2024 Negative  Negative Final    Cocaine Screen, Urine 10/15/2024 Negative  Negative Final    MDMA (ECSTASY) 10/15/2024 Negative  Negative Final    Methamphetamine, Ur 10/15/2024 Negative  Negative Final    Morphine/Opiates Screen, Urine 10/15/2024 Positive (A)  Negative Final    Methadone Screen, Urine 10/15/2024 Negative  Negative Final    Oxycodone Screen, Urine 10/15/2024 Positive (A)  Negative Final    Phencyclidine (PCP), Urine 10/15/2024 Negative  Negative Final    THC, Screen, Urine 10/15/2024 Negative  Negative Final   Office Visit on 10/08/2024   Component Date Value Ref Range Status    External Amphetamine Screen Urine 10/08/2024 Negative   Final    External Benzodiazepine Screen Uri* 10/08/2024 Negative   Final    External Cocaine Screen Urine 10/08/2024 Negative   Final    External THC Screen Urine 10/08/2024 Negative   Final    External Methadone Screen Urine 10/08/2024 Negative   Final    External Methamphetamine Screen Ur*  10/08/2024 Negative   Final    External Oxycodone Screen Urine 10/08/2024 Positive (A)   Final    External Buprenorphine Screen Urine 10/08/2024 Negative   Final    External MDMA 10/08/2024 Negative   Final    External Opiates Screen Urine 10/08/2024 Positive (A)   Final   Office Visit on 10/01/2024   Component Date Value Ref Range Status    External Amphetamine Screen Urine 10/01/2024 Negative   Final    External Benzodiazepine Screen Uri* 10/01/2024 Negative   Final    External Cocaine Screen Urine 10/01/2024 Negative   Final    External THC Screen Urine 10/01/2024 Negative   Final    External Methadone Screen Urine 10/01/2024 Negative   Final    External Methamphetamine Screen Ur* 10/01/2024 Negative   Final    External Oxycodone Screen Urine 10/01/2024 Positive (A)   Final    External Buprenorphine Screen Urine 10/01/2024 Negative   Final    External MDMA 10/01/2024 Negative   Final    External Opiates Screen Urine 10/01/2024 Positive (A)   Final       Impression/Formulation:    ICD-10-CM ICD-9-CM   1. Alcohol use disorder, severe, dependence  F10.20 303.90   2. Chronic pain disorder  G89.4 338.4   3. Anxiety disorder, unspecified type  F41.9 300.00   4. Medication management  Z79.899 V58.69        CLINICAL MANEUVERING/INTERVENTIONS: Therapist utilized a person-centered approach to build and maintain rapport with group member.   Therapist promoted safe nonjudgmental environment by providing group members with unconditional positive regard.  Assisted member in processing above session content; acknowledged and normalized patient's thoughts, feelings and concerns.  Allowed patient to freely discuss issues without interruption or judgment. Provided safe, confidential environment to facilitate the development of positive therapeutic relationship and encourage open, honest communication. Therapist assisted group member with identifying and implementing healthier coping strategies and maintaining healthier boundaries.  Assisted patient in identifying risk factors which would indicate the need for higher level of care including thoughts to harm self or others and/or self-harming behavior and encouraged patient to contact this office, call 911, or present to the nearest emergency room should any of these events occur. Pt agreeable to adhere to medication regimen as prescribed and report any side effects.  Discussed crisis intervention services and means to access.  Patient adamantly and convincingly denies current suicidal or homicidal ideation or perceptual disturbance.      Therapist implemented motivational interviewing techniques to assist client with exploring and resolving ambivalence associated with commitment to change behaviors.  Yes  Therapist utilized dialectical behavior techniques to teach and model emotional regulation and relaxation.  No   Therapist applied cognitive behavioral strategies to facilitate identification of maladaptive patterns of thinking and behavior.  Yes     PLAN:    Continue [facility] Phase I  Aftercare:  [facility] Phase 2      Please note that portions of this note were completed with a voice recognition program. Efforts were made to edit dictation, but occasionally words are mistranscribed.     This document signed by Ben Ortega LCSW, October 15, 2024, 16:32 EDT

## 2024-10-16 ENCOUNTER — OFFICE VISIT (OUTPATIENT)
Dept: FAMILY MEDICINE CLINIC | Facility: CLINIC | Age: 49
End: 2024-10-16
Payer: MEDICARE

## 2024-10-16 VITALS
HEART RATE: 82 BPM | SYSTOLIC BLOOD PRESSURE: 138 MMHG | BODY MASS INDEX: 28.39 KG/M2 | TEMPERATURE: 95 F | DIASTOLIC BLOOD PRESSURE: 88 MMHG | WEIGHT: 202.8 LBS | HEIGHT: 71 IN | OXYGEN SATURATION: 95 %

## 2024-10-16 DIAGNOSIS — I10 ESSENTIAL HYPERTENSION: Primary | ICD-10-CM

## 2024-10-16 DIAGNOSIS — R73.01 IMPAIRED FASTING GLUCOSE: ICD-10-CM

## 2024-10-16 DIAGNOSIS — F32.9 REACTIVE DEPRESSION: ICD-10-CM

## 2024-10-16 DIAGNOSIS — F51.01 PRIMARY INSOMNIA: ICD-10-CM

## 2024-10-16 PROCEDURE — 3079F DIAST BP 80-89 MM HG: CPT | Performed by: FAMILY MEDICINE

## 2024-10-16 PROCEDURE — 1125F AMNT PAIN NOTED PAIN PRSNT: CPT | Performed by: FAMILY MEDICINE

## 2024-10-16 PROCEDURE — 99214 OFFICE O/P EST MOD 30 MIN: CPT | Performed by: FAMILY MEDICINE

## 2024-10-16 PROCEDURE — 3075F SYST BP GE 130 - 139MM HG: CPT | Performed by: FAMILY MEDICINE

## 2024-10-16 RX ORDER — DULOXETIN HYDROCHLORIDE 60 MG/1
60 CAPSULE, DELAYED RELEASE ORAL DAILY
Qty: 30 CAPSULE | Refills: 2 | Status: SHIPPED | OUTPATIENT
Start: 2024-10-16

## 2024-10-16 RX ORDER — LANOLIN ALCOHOL/MO/W.PET/CERES
1000 CREAM (GRAM) TOPICAL DAILY
Qty: 30 TABLET | Refills: 2 | Status: SHIPPED | OUTPATIENT
Start: 2024-10-16

## 2024-10-16 RX ORDER — SILDENAFIL 25 MG/1
25 TABLET, FILM COATED ORAL DAILY PRN
Qty: 30 TABLET | Refills: 2 | Status: SHIPPED | OUTPATIENT
Start: 2024-10-16

## 2024-10-16 RX ORDER — SILDENAFIL 25 MG/1
25 TABLET, FILM COATED ORAL DAILY PRN
COMMUNITY
End: 2024-10-16 | Stop reason: SDUPTHER

## 2024-10-16 RX ORDER — PRAVASTATIN SODIUM 20 MG
20 TABLET ORAL DAILY
Qty: 90 TABLET | Refills: 0 | OUTPATIENT
Start: 2024-10-16

## 2024-10-16 RX ORDER — KETOCONAZOLE 20 MG/ML
SHAMPOO TOPICAL 2 TIMES WEEKLY
Qty: 120 ML | Refills: 1 | Status: SHIPPED | OUTPATIENT
Start: 2024-10-17

## 2024-10-16 RX ORDER — MIRTAZAPINE 15 MG/1
15 TABLET, FILM COATED ORAL NIGHTLY
Qty: 30 TABLET | Refills: 2 | Status: SHIPPED | OUTPATIENT
Start: 2024-10-16

## 2024-10-16 RX ORDER — BUSPIRONE HYDROCHLORIDE 5 MG/1
5 TABLET ORAL 3 TIMES DAILY
COMMUNITY

## 2024-10-16 RX ORDER — IBUPROFEN 800 MG/1
800 TABLET, FILM COATED ORAL EVERY 8 HOURS PRN
Qty: 60 TABLET | Refills: 2 | Status: SHIPPED | OUTPATIENT
Start: 2024-10-16

## 2024-10-16 RX ORDER — BUSPIRONE HYDROCHLORIDE 7.5 MG/1
7.5 TABLET ORAL 3 TIMES DAILY
Qty: 90 TABLET | Refills: 2 | Status: SHIPPED | OUTPATIENT
Start: 2024-10-16

## 2024-10-17 ENCOUNTER — OFFICE VISIT (OUTPATIENT)
Dept: PSYCHIATRY | Facility: CLINIC | Age: 49
End: 2024-10-17
Payer: MEDICARE

## 2024-10-17 DIAGNOSIS — F41.9 ANXIETY DISORDER, UNSPECIFIED TYPE: ICD-10-CM

## 2024-10-17 DIAGNOSIS — F10.20 ALCOHOL USE DISORDER, SEVERE, DEPENDENCE: Primary | ICD-10-CM

## 2024-10-17 DIAGNOSIS — G89.4 CHRONIC PAIN DISORDER: ICD-10-CM

## 2024-10-17 PROCEDURE — 90853 GROUP PSYCHOTHERAPY: CPT | Performed by: SOCIAL WORKER

## 2024-10-17 NOTE — PROGRESS NOTES
NAME: Taiwo Pierce  DATE: 10/17/2024    [facility] Phase II  [id number]    Services Provided    Group Psychotherapy x 1  Education/Training x 0  Activity Therapy  x 0  Individual Therapy x 0 0 minutes  Family Therapy x 0  MD Initial Visit  x 0  MD Follow-Up   x 0    Number of participants: 3    DATA:  Patient reported he was doing really well today. However, a new neighbor had moved in, and he was discovering that he did not like him. The neighbor had owned the property for about a month, but they had not moved in yet. He was asking the patient to move things, believing the patient's things were on his property. This was upsetting to the patient, and he was not anticipating they would have a good relationship.     Individual: No    Homework: None assigned    Group 1 (Psychotherapy: Check-ins): Therapist continued facilitation of rapport building strategies between group members. Therapist asked that each patient check in with home life and recovery efforts and identify triggers, cravings, and high risk situations that arise between group sessions.  Group members discussed barriers and benefits of attending recovery meetings.  Therapist provided empathy and support during group session. Daily Reading: Man's Search for Meaning (1946) by Francisco Javier Alvarez     ASSESSMENT:    Personal Assessment 0-10 Scale (10 worst)    Anxiety:  2 (no comment)  Depression:  0 (no comment)  Cravings: 0 (no comment)     MSE within normal limits? Yes Affect: somber Mood: irritable  Pt Response:  open/receptive  Engaged in activity/Process and self-disclosed: adequate  Applies today's group topics to self: adequate  Able to give and receive feedback: suboptimal  Demonstrated insightful thinking: occasional and suboptimal  Other pt response comments:  Patient was a positive participant. They demonstrated a relatively pessimistic attitude, a strong degree of motivation, and moderate insight, as evidenced by his level of engagement combined  with his attitude about the situation with his neighbor. They seemed interested and attentive. They appeared to comprehend well the concepts being discussed. The patient seemed doubtful of, and somewhat willing to implement, the ideas being discussed. Their thought process appeared linear and goal directed, and their speech was regular rate and rhythm.       Community Group Engagement:  Yes: Quaker    Reported relapse since last meeting? No: no lapse    .  Office Visit on 10/15/2024   Component Date Value Ref Range Status    Amphetamine Screen, Urine 10/15/2024 Negative  Negative Final    PRELIMINARY RESULTS. REFER TO [facility] FOR FINAL RESULTS    Barbiturates Screen, Urine 10/15/2024 Negative  Negative Final    Buprenorphine, Screen, Urine 10/15/2024 Negative  Negative Final    Benzodiazepine Screen, Urine 10/15/2024 Negative  Negative Final    Cocaine Screen, Urine 10/15/2024 Negative  Negative Final    MDMA (ECSTASY) 10/15/2024 Negative  Negative Final    Methamphetamine, Ur 10/15/2024 Negative  Negative Final    Morphine/Opiates Screen, Urine 10/15/2024 Positive (A)  Negative Final    Methadone Screen, Urine 10/15/2024 Negative  Negative Final    Oxycodone Screen, Urine 10/15/2024 Positive (A)  Negative Final    Phencyclidine (PCP), Urine 10/15/2024 Negative  Negative Final    THC, Screen, Urine 10/15/2024 Negative  Negative Final   Office Visit on 10/08/2024   Component Date Value Ref Range Status    External Amphetamine Screen Urine 10/08/2024 Negative   Final    External Benzodiazepine Screen Uri* 10/08/2024 Negative   Final    External Cocaine Screen Urine 10/08/2024 Negative   Final    External THC Screen Urine 10/08/2024 Negative   Final    External Methadone Screen Urine 10/08/2024 Negative   Final    External Methamphetamine Screen Ur* 10/08/2024 Negative   Final    External Oxycodone Screen Urine 10/08/2024 Positive (A)   Final    External Buprenorphine Screen Urine 10/08/2024 Negative   Final     External MDMA 10/08/2024 Negative   Final    External Opiates Screen Urine 10/08/2024 Positive (A)   Final   Office Visit on 10/01/2024   Component Date Value Ref Range Status    External Amphetamine Screen Urine 10/01/2024 Negative   Final    External Benzodiazepine Screen Uri* 10/01/2024 Negative   Final    External Cocaine Screen Urine 10/01/2024 Negative   Final    External THC Screen Urine 10/01/2024 Negative   Final    External Methadone Screen Urine 10/01/2024 Negative   Final    External Methamphetamine Screen Ur* 10/01/2024 Negative   Final    External Oxycodone Screen Urine 10/01/2024 Positive (A)   Final    External Buprenorphine Screen Urine 10/01/2024 Negative   Final    External MDMA 10/01/2024 Negative   Final    External Opiates Screen Urine 10/01/2024 Positive (A)   Final       Impression/Formulation:    ICD-10-CM ICD-9-CM   1. Alcohol use disorder, severe, dependence  F10.20 303.90   2. Chronic pain disorder  G89.4 338.4   3. Anxiety disorder, unspecified type  F41.9 300.00        CLINICAL MANEUVERING/INTERVENTIONS: Therapist utilized a person-centered approach to build and maintain rapport with group member.   Therapist promoted safe nonjudgmental environment by providing group members with unconditional positive regard.  Assisted member in processing above session content; acknowledged and normalized patient's thoughts, feelings and concerns.  Allowed patient to freely discuss issues without interruption or judgment. Provided safe, confidential environment to facilitate the development of positive therapeutic relationship and encourage open, honest communication. Therapist assisted group member with identifying and implementing healthier coping strategies and maintaining healthier boundaries. Assisted patient in identifying risk factors which would indicate the need for higher level of care including thoughts to harm self or others and/or self-harming behavior and encouraged patient to contact  this office, call 911, or present to the nearest emergency room should any of these events occur. Pt agreeable to adhere to medication regimen as prescribed and report any side effects.  Discussed crisis intervention services and means to access.  Patient adamantly and convincingly denies current suicidal or homicidal ideation or perceptual disturbance.      Therapist implemented motivational interviewing techniques to assist client with exploring and resolving ambivalence associated with commitment to change behaviors.  Yes  Therapist utilized dialectical behavior techniques to teach and model emotional regulation and relaxation.  No   Therapist applied cognitive behavioral strategies to facilitate identification of maladaptive patterns of thinking and behavior.  Yes     PLAN:    Continue [facility] Phase II  Aftercare:  [facility] Outpatient Phase 3     Please note that portions of this note were completed with a voice recognition program. Efforts were made to edit dictation, but occasionally words are mistranscribed.     This document signed by Ben Ortega LCSW, October 17, 2024, 16:46 EDT

## 2024-10-17 NOTE — PROGRESS NOTES
"Chief Complaint  Establish Care (Med refill/)    Subjective          Taiwo Pierce presents to [facility]  Insomnia  This is a chronic problem. The current episode started more than 1 year ago. He has tried rest and sleep for the symptoms. The treatment provided mild relief.       Hypertension  This is a chronic problem. The current episode started more than 1 year ago. The problem is controlled. Current antihypertension treatment includes angiotensin blockers, calcium channel blockers and diuretics. The current treatment provides significant improvement.   Hyperlipidemia  This is a chronic problem. The current episode started more than 1 year ago. The problem is controlled. Current antihyperlipidemic treatment includes statins. The current treatment provides significant improvement of lipids.   Heartburn  This is a chronic problem. The current episode started more than 1 year ago. He has tried a PPI for the symptoms. The treatment provided significant relief.   Depression  Visit Type: follow-up  Patient presents with the following symptoms: depressed mood.  Frequency of symptoms: most days   Severity: moderate   Sleep quality: good  Compliance with medications:  %  Review of Systems   Psychiatric/Behavioral:  The patient has insomnia.          Objective   Vital Signs:   /88 (BP Location: Right arm, Patient Position: Sitting, Cuff Size: Adult)   Pulse 82   Temp 95 °F (35 °C) (Temporal)   Ht 180.3 cm (71\")   Wt 92 kg (202 lb 12.8 oz)   SpO2 95%   BMI 28.28 kg/m²     Physical Exam  Constitutional:       General: He is not in acute distress.     Appearance: Normal appearance. He is well-developed and well-groomed. He is not ill-appearing, toxic-appearing or diaphoretic.   HENT:      Head: Normocephalic.      Nose: Nose normal. No congestion or rhinorrhea.      Mouth/Throat:      Mouth: Mucous membranes are moist.      Pharynx: Oropharynx is clear. No " oropharyngeal exudate or posterior oropharyngeal erythema.   Eyes:      General: Lids are normal.         Right eye: No discharge.         Left eye: No discharge.      Extraocular Movements: Extraocular movements intact.      Pupils: Pupils are equal, round, and reactive to light.   Neck:      Vascular: No carotid bruit.   Cardiovascular:      Rate and Rhythm: Normal rate and regular rhythm.      Pulses: Normal pulses.      Heart sounds: Normal heart sounds. No murmur heard.     No friction rub. No gallop.   Pulmonary:      Effort: Pulmonary effort is normal. No respiratory distress.      Breath sounds: Normal breath sounds. No stridor. No wheezing, rhonchi or rales.   Chest:      Chest wall: No tenderness.   Abdominal:      General: Bowel sounds are normal. There is no distension.      Palpations: Abdomen is soft. There is no mass.      Tenderness: There is no abdominal tenderness. There is no right CVA tenderness, left CVA tenderness, guarding or rebound.      Hernia: No hernia is present.   Musculoskeletal:         General: No swelling or tenderness. Normal range of motion.      Cervical back: Normal range of motion and neck supple. No rigidity or tenderness.      Right lower leg: No edema.      Left lower leg: No edema.   Lymphadenopathy:      Cervical: No cervical adenopathy.   Skin:     General: Skin is warm.      Capillary Refill: Capillary refill takes less than 2 seconds.      Coloration: Skin is not jaundiced.      Findings: No bruising, erythema or rash.   Neurological:      General: No focal deficit present.      Mental Status: He is alert and oriented to person, place, and time.      Motor: Motor function is intact. No weakness.      Coordination: Coordination is intact.      Gait: Gait is intact. Gait normal.   Psychiatric:         Attention and Perception: Attention normal.         Mood and Affect: Mood normal.         Speech: Speech normal.         Behavior: Behavior normal.         Cognition and  Memory: Cognition normal.         Judgment: Judgment normal.        Result Review :                 Assessment and Plan    Diagnoses and all orders for this visit:    1. Essential hypertension (Primary)  -     CBC Auto Differential; Future  -     Comprehensive Metabolic Panel; Future  -     Hemoglobin A1c; Future  -     Lipid Panel; Future  -     T4, Free; Future  -     TSH; Future    2. Impaired fasting glucose  -     Hemoglobin A1c; Future    3. Reactive depression  -     busPIRone (BUSPAR) 7.5 MG tablet; Take 1 tablet by mouth 3 (Three) Times a Day.  Dispense: 90 tablet; Refill: 2  -     DULoxetine (CYMBALTA) 60 MG capsule; Take 1 capsule by mouth Daily.  Dispense: 30 capsule; Refill: 2    4. Primary insomnia  -     mirtazapine (Remeron) 15 MG tablet; Take 1 tablet by mouth Every Night.  Dispense: 30 tablet; Refill: 2    Other orders  -     ketoconazole (NIZORAL) 2 % shampoo; Apply  topically to the appropriate area as directed 2 (Two) Times a Week.  Dispense: 120 mL; Refill: 1  -     vitamin B-12 (CYANOCOBALAMIN) 1000 MCG tablet; Take 1 tablet by mouth Daily.  Dispense: 30 tablet; Refill: 2  -     ibuprofen (ADVIL,MOTRIN) 800 MG tablet; Take 1 tablet by mouth Every 8 (Eight) Hours As Needed for Mild Pain.  Dispense: 60 tablet; Refill: 2  -     sildenafil (VIAGRA) 25 MG tablet; Take 1 tablet by mouth Daily As Needed for Erectile Dysfunction.  Dispense: 30 tablet; Refill: 2  -     Blood Glucose Monitoring Suppl w/Device kit; Use 1 each 3 times a day.  Dispense: 1 each; Refill: 12      Patient's Body mass index is 28.28 kg/m². indicating that he is overweight (BMI 25-29.9). Patient's (Body mass index is 28.28 kg/m².) indicates that they are overweight with health conditions that include hypertension and dyslipidemias . Weight is unchanged. BMI is above average; BMI management plan is completed. We discussed low calorie, low carb based diet program, portion control, and increasing exercise. .    Follow Up   No  follow-ups on file.  Patient was given instructions and counseling regarding his condition or for health maintenance advice. Please see specific information pulled into the AVS if appropriate.     This document has been electronically signed by ENRICO Dejesus  October 17, 2024 15:17 EDT

## 2024-10-18 ENCOUNTER — TELEPHONE (OUTPATIENT)
Dept: FAMILY MEDICINE CLINIC | Facility: CLINIC | Age: 49
End: 2024-10-18
Payer: MEDICARE

## 2024-10-18 RX ORDER — LANCETS 30 GAUGE
EACH MISCELLANEOUS
Qty: 100 EACH | Refills: 11 | Status: SHIPPED | OUTPATIENT
Start: 2024-10-18

## 2024-10-18 NOTE — TELEPHONE ENCOUNTER
Pharmacy Name: [facility] [id number] - [place] - [address] AT [facility] [address]  - [phone]  - [phone]      Pharmacy representative name: ANJUM    Pharmacy representative phone number: [phone]     What medication are you calling in regards to: DIABETIC METER WAS CALLED IN BUT NOT THE SUPPLIES    What question does the pharmacy have: MUST HAVE THE PRESCRIPTION FOR THE TEST STRIPS AND LANCETS- CAN PUT THE GENERIC AS THEY WILL GIVE WHATEVER METER HIS INSURANCE WILL COVER.    Who is the provider that prescribed the medication: VERONICA ALCARAZ.    Additional notes: UPDATE THE PCP

## 2024-10-22 ENCOUNTER — LAB (OUTPATIENT)
Dept: FAMILY MEDICINE CLINIC | Facility: CLINIC | Age: 49
End: 2024-10-22
Payer: MEDICARE

## 2024-10-22 ENCOUNTER — OFFICE VISIT (OUTPATIENT)
Dept: PSYCHIATRY | Facility: CLINIC | Age: 49
End: 2024-10-22
Payer: MEDICARE

## 2024-10-22 DIAGNOSIS — F41.9 ANXIETY DISORDER, UNSPECIFIED TYPE: ICD-10-CM

## 2024-10-22 DIAGNOSIS — G89.4 CHRONIC PAIN DISORDER: ICD-10-CM

## 2024-10-22 DIAGNOSIS — I10 ESSENTIAL HYPERTENSION: ICD-10-CM

## 2024-10-22 DIAGNOSIS — R73.01 IMPAIRED FASTING GLUCOSE: ICD-10-CM

## 2024-10-22 DIAGNOSIS — Z79.899 MEDICATION MANAGEMENT: ICD-10-CM

## 2024-10-22 DIAGNOSIS — F10.20 ALCOHOL USE DISORDER, SEVERE, DEPENDENCE: Primary | ICD-10-CM

## 2024-10-22 LAB
AMPHET+METHAMPHET UR QL: NEGATIVE
AMPHETAMINES UR QL: NEGATIVE
BARBITURATES UR QL SCN: NEGATIVE
BENZODIAZ UR QL SCN: NEGATIVE
BUPRENORPHINE SERPL-MCNC: NEGATIVE NG/ML
CANNABINOIDS SERPL QL: NEGATIVE
COCAINE UR QL: NEGATIVE
ETHANOL URINE SCREEN: NEGATIVE
FENTANYL UR-MCNC: NEGATIVE NG/ML
GABAPENTIN UR-MCNC: NEGATIVE UG/ML
MDMA UR QL SCN: NEGATIVE
METHADONE UR QL SCN: NEGATIVE
MORPHINE/OPIATES SCREEN, URINE: POSITIVE
OXYCODONE UR QL SCN: POSITIVE
PCP UR QL SCN: NEGATIVE
PROPOXYPH UR QL SCN: NEGATIVE
TRICYCLICS UR QL SCN: NEGATIVE

## 2024-10-22 PROCEDURE — 84443 ASSAY THYROID STIM HORMONE: CPT | Performed by: FAMILY MEDICINE

## 2024-10-22 PROCEDURE — 83036 HEMOGLOBIN GLYCOSYLATED A1C: CPT | Performed by: FAMILY MEDICINE

## 2024-10-22 PROCEDURE — 85025 COMPLETE CBC W/AUTO DIFF WBC: CPT | Performed by: FAMILY MEDICINE

## 2024-10-22 PROCEDURE — 80061 LIPID PANEL: CPT | Performed by: FAMILY MEDICINE

## 2024-10-22 PROCEDURE — 80053 COMPREHEN METABOLIC PANEL: CPT | Performed by: FAMILY MEDICINE

## 2024-10-22 PROCEDURE — 36415 COLL VENOUS BLD VENIPUNCTURE: CPT

## 2024-10-22 PROCEDURE — 84439 ASSAY OF FREE THYROXINE: CPT | Performed by: FAMILY MEDICINE

## 2024-10-22 RX ORDER — HYDROXYZINE HYDROCHLORIDE 25 MG/1
25 TABLET, FILM COATED ORAL EVERY 8 HOURS PRN
Qty: 90 TABLET | Refills: 0 | Status: SHIPPED | OUTPATIENT
Start: 2024-10-22

## 2024-10-22 NOTE — TELEPHONE ENCOUNTER
Pt was in office getting labs today and requested medication be sent to pharmacy. Medicine sent to requested pharmacy per provider's request.

## 2024-10-22 NOTE — PROGRESS NOTES
NAME: Taiwo Pierce  DATE: 10/22/2024    [facility] Phase II  [id number]    Services Provided    Group Psychotherapy x 1  Education/Training x 0  Activity Therapy  x 0  Individual Therapy x 0 0 minutes  Family Therapy x 0  MD Initial Visit  x 0  MD Follow-Up   x 0    Number of participants: 3    DATA:  Patient reported he was doing pretty well today. He couldn't complain. He had gone to [place] over the weekend. He talked about how expensive everything was these days. It was an enjoyable weekend, but his back muscles were sore.     Individual: No    Homework: None assigned    Group 1 (Psychotherapy: Check-ins): Therapist continued facilitation of rapport building strategies between group members. Therapist asked that each patient check in with home life and recovery efforts and identify triggers, cravings, and high risk situations that arise between group sessions.  Group members discussed barriers and benefits of attending recovery meetings.  Therapist provided empathy and support during group session. Daily Reading: None     ASSESSMENT:    Personal Assessment 0-10 Scale (10 worst)    Anxiety:  3 (no comment)  Depression:  0 (no comment)  Cravings: 0 (no comment)     MSE within normal limits? Yes Affect: euthymic Mood: calm  Pt Response:  open/receptive  Engaged in activity/Process and self-disclosed: adequate  Applies today's group topics to self: adequate  Able to give and receive feedback: adequate  Demonstrated insightful thinking: consistent and adequate  Other pt response comments:  Patient was a positive participant. They demonstrated a relatively positive attitude, a strong degree of motivation, and adequate insight, as evidenced by his level of engagement combined with his efforts to maintain sobriety. They seemed interested and attentive. They appeared to comprehend well the concepts being discussed. The patient seemed receptive of, and willing to implement, the ideas being discussed. Their thought  process appeared linear and goal directed, and their speech was regular rate and rhythm.       Community Group Engagement:  No: no Confucianist attendance this week    Reported relapse since last meeting? No: no lapse    .  Office Visit on 10/22/2024   Component Date Value Ref Range Status    Amphetamine Screen, Urine 10/22/2024 Negative  Negative Final    PRELIMINARY RESULTS. REFER TO SEND OUT CONFRIMATION FROM [facility] FOR FINAL RESULTS.    Barbiturates Screen, Urine 10/22/2024 Negative  Negative Final    Buprenorphine, Screen, Urine 10/22/2024 Negative  Negative Final    Benzodiazepine Screen, Urine 10/22/2024 Negative  Negative Final    Cocaine Screen, Urine 10/22/2024 Negative  Negative Final    MDMA (ECSTASY) 10/22/2024 Negative  Negative Final    Methamphetamine, Ur 10/22/2024 Negative  Negative Final    Methadone Screen, Urine 10/22/2024 Negative  Negative Final    Morphine/Opiates Screen, Urine 10/22/2024 Positive (A)  Negative Final    Oxycodone Screen, Urine 10/22/2024 Positive (A)  Negative Final    Phencyclidine (PCP), Urine 10/22/2024 Negative  Negative Final    Propoxyphene Scree, Urine 10/22/2024 Negative  Negative Final    Tricyclic Antidepressants Screen 10/22/2024 Negative  Negative Final    THC, Screen, Urine 10/22/2024 Negative  Negative Final    ETHANOL URINE SCREEN 10/22/2024 Negative   Final    Fentanyl, Urine 10/22/2024 Negative  Negative Final    Gabapentin, Ur 10/22/2024 negative   Final   Office Visit on 10/15/2024   Component Date Value Ref Range Status    Amphetamine Screen, Urine 10/15/2024 Negative  Negative Final    PRELIMINARY RESULTS. REFER TO [facility] CONFIRMATION FOR FINAL RESULTS    Barbiturates Screen, Urine 10/15/2024 Negative  Negative Final    Buprenorphine, Screen, Urine 10/15/2024 Negative  Negative Final    Benzodiazepine Screen, Urine 10/15/2024 Negative  Negative Final    Cocaine Screen, Urine 10/15/2024 Negative  Negative Final    MDMA (ECSTASY) 10/15/2024 Negative  Negative  Final    Methamphetamine, Ur 10/15/2024 Negative  Negative Final    Morphine/Opiates Screen, Urine 10/15/2024 Positive (A)  Negative Final    Methadone Screen, Urine 10/15/2024 Negative  Negative Final    Oxycodone Screen, Urine 10/15/2024 Positive (A)  Negative Final    Phencyclidine (PCP), Urine 10/15/2024 Negative  Negative Final    THC, Screen, Urine 10/15/2024 Negative  Negative Final   Office Visit on 10/08/2024   Component Date Value Ref Range Status    External Amphetamine Screen Urine 10/08/2024 Negative   Final    External Benzodiazepine Screen Uri* 10/08/2024 Negative   Final    External Cocaine Screen Urine 10/08/2024 Negative   Final    External THC Screen Urine 10/08/2024 Negative   Final    External Methadone Screen Urine 10/08/2024 Negative   Final    External Methamphetamine Screen Ur* 10/08/2024 Negative   Final    External Oxycodone Screen Urine 10/08/2024 Positive (A)   Final    External Buprenorphine Screen Urine 10/08/2024 Negative   Final    External MDMA 10/08/2024 Negative   Final    External Opiates Screen Urine 10/08/2024 Positive (A)   Final       Impression/Formulation:    ICD-10-CM ICD-9-CM   1. Alcohol use disorder, severe, dependence  F10.20 303.90   2. Chronic pain disorder  G89.4 338.4   3. Anxiety disorder, unspecified type  F41.9 300.00   4. Medication management  Z79.899 V58.69        CLINICAL MANEUVERING/INTERVENTIONS: Therapist utilized a person-centered approach to build and maintain rapport with group member.   Therapist promoted safe nonjudgmental environment by providing group members with unconditional positive regard.  Assisted member in processing above session content; acknowledged and normalized patient's thoughts, feelings and concerns.  Allowed patient to freely discuss issues without interruption or judgment. Provided safe, confidential environment to facilitate the development of positive therapeutic relationship and encourage open, honest communication. Therapist  assisted group member with identifying and implementing healthier coping strategies and maintaining healthier boundaries. Assisted patient in identifying risk factors which would indicate the need for higher level of care including thoughts to harm self or others and/or self-harming behavior and encouraged patient to contact this office, call 911, or present to the nearest emergency room should any of these events occur. Pt agreeable to adhere to medication regimen as prescribed and report any side effects.  Discussed crisis intervention services and means to access.  Patient adamantly and convincingly denies current suicidal or homicidal ideation or perceptual disturbance.      Therapist implemented motivational interviewing techniques to assist client with exploring and resolving ambivalence associated with commitment to change behaviors.  Yes  Therapist utilized dialectical behavior techniques to teach and model emotional regulation and relaxation.  No   Therapist applied cognitive behavioral strategies to facilitate identification of maladaptive patterns of thinking and behavior.  Yes     [facility]AN:    [facility]  Aftercare:  [facility]  Outpatient Phase 3     Please note that portions of this note were completed with a voice recognition program. Efforts were made to edit dictation, but occasionally words are mistranscribed.     This document signed by Ben Ortega LCSW, October 22, 2024, 16:27 EDT

## 2024-10-23 ENCOUNTER — TELEPHONE (OUTPATIENT)
Dept: FAMILY MEDICINE CLINIC | Facility: CLINIC | Age: 49
End: 2024-10-23
Payer: MEDICARE

## 2024-10-23 LAB
ALBUMIN SERPL-MCNC: 4.1 G/DL (ref 3.5–5.2)
ALBUMIN/GLOB SERPL: 1.1 G/DL
ALP SERPL-CCNC: 158 U/L (ref 39–117)
ALT SERPL W P-5'-P-CCNC: 26 U/L (ref 1–41)
ANION GAP SERPL CALCULATED.3IONS-SCNC: 15.3 MMOL/L (ref 5–15)
AST SERPL-CCNC: 36 U/L (ref 1–40)
BASOPHILS # BLD AUTO: 0.04 10*3/MM3 (ref 0–0.2)
BASOPHILS NFR BLD AUTO: 0.6 % (ref 0–1.5)
BILIRUB SERPL-MCNC: 0.3 MG/DL (ref 0–1.2)
BUN SERPL-MCNC: 7 MG/DL (ref 6–20)
BUN/CREAT SERPL: 8 (ref 7–25)
CALCIUM SPEC-SCNC: 9.4 MG/DL (ref 8.6–10.5)
CHLORIDE SERPL-SCNC: 96 MMOL/L (ref 98–107)
CHOLEST SERPL-MCNC: 164 MG/DL (ref 0–200)
CO2 SERPL-SCNC: 23.7 MMOL/L (ref 22–29)
CREAT SERPL-MCNC: 0.87 MG/DL (ref 0.76–1.27)
DEPRECATED RDW RBC AUTO: 41 FL (ref 37–54)
EGFRCR SERPLBLD CKD-EPI 2021: 105.8 ML/MIN/1.73
EOSINOPHIL # BLD AUTO: 0.11 10*3/MM3 (ref 0–0.4)
EOSINOPHIL NFR BLD AUTO: 1.7 % (ref 0.3–6.2)
ERYTHROCYTE [DISTWIDTH] IN BLOOD BY AUTOMATED COUNT: 12 % (ref 12.3–15.4)
GLOBULIN UR ELPH-MCNC: 3.8 GM/DL
GLUCOSE SERPL-MCNC: 511 MG/DL (ref 65–99)
HBA1C MFR BLD: 9.8 % (ref 4.8–5.6)
HCT VFR BLD AUTO: 37.6 % (ref 37.5–51)
HDLC SERPL-MCNC: 44 MG/DL (ref 40–60)
HGB BLD-MCNC: 12.6 G/DL (ref 13–17.7)
IMM GRANULOCYTES # BLD AUTO: 0.01 10*3/MM3 (ref 0–0.05)
IMM GRANULOCYTES NFR BLD AUTO: 0.2 % (ref 0–0.5)
LDLC SERPL CALC-MCNC: 91 MG/DL (ref 0–100)
LDLC/HDLC SERPL: 1.96 {RATIO}
LYMPHOCYTES # BLD AUTO: 2.51 10*3/MM3 (ref 0.7–3.1)
LYMPHOCYTES NFR BLD AUTO: 39.9 % (ref 19.6–45.3)
MCH RBC QN AUTO: 31.1 PG (ref 26.6–33)
MCHC RBC AUTO-ENTMCNC: 33.5 G/DL (ref 31.5–35.7)
MCV RBC AUTO: 92.8 FL (ref 79–97)
MONOCYTES # BLD AUTO: 0.62 10*3/MM3 (ref 0.1–0.9)
MONOCYTES NFR BLD AUTO: 9.9 % (ref 5–12)
NEUTROPHILS NFR BLD AUTO: 3 10*3/MM3 (ref 1.7–7)
NEUTROPHILS NFR BLD AUTO: 47.7 % (ref 42.7–76)
NRBC BLD AUTO-RTO: 0 /100 WBC (ref 0–0.2)
PLATELET # BLD AUTO: 196 10*3/MM3 (ref 140–450)
PMV BLD AUTO: 12.8 FL (ref 6–12)
POTASSIUM SERPL-SCNC: 3.6 MMOL/L (ref 3.5–5.2)
PROT SERPL-MCNC: 7.9 G/DL (ref 6–8.5)
RBC # BLD AUTO: 4.05 10*6/MM3 (ref 4.14–5.8)
SODIUM SERPL-SCNC: 135 MMOL/L (ref 136–145)
T4 FREE SERPL-MCNC: 1.18 NG/DL (ref 0.92–1.68)
TRIGL SERPL-MCNC: 168 MG/DL (ref 0–150)
TSH SERPL DL<=0.05 MIU/L-ACNC: 1.75 UIU/ML (ref 0.27–4.2)
VLDLC SERPL-MCNC: 29 MG/DL (ref 5–40)
WBC NRBC COR # BLD AUTO: 6.29 10*3/MM3 (ref 3.4–10.8)

## 2024-10-23 NOTE — TELEPHONE ENCOUNTER
[facility] called with CRITICAL RESULT:    Glucose: 511    I will route to Dr Daniel as Carly is out of office...

## 2024-10-24 ENCOUNTER — OFFICE VISIT (OUTPATIENT)
Dept: PSYCHIATRY | Facility: CLINIC | Age: 49
End: 2024-10-24
Payer: MEDICARE

## 2024-10-24 DIAGNOSIS — F10.20 ALCOHOL USE DISORDER, SEVERE, DEPENDENCE: Primary | ICD-10-CM

## 2024-10-24 DIAGNOSIS — G89.4 CHRONIC PAIN DISORDER: ICD-10-CM

## 2024-10-24 DIAGNOSIS — F41.9 ANXIETY DISORDER, UNSPECIFIED TYPE: ICD-10-CM

## 2024-10-24 PROCEDURE — 90853 GROUP PSYCHOTHERAPY: CPT | Performed by: SOCIAL WORKER

## 2024-10-24 NOTE — PROGRESS NOTES
NAME: Taiwo Pierce  DATE: 10/24/2024    [facility] Phase II  [id number]    Services Provided    Group Psychotherapy x 1  Education/Training x 0  Activity Therapy  x 0  Individual Therapy x 0 0 minutes  Family Therapy x 0  MD Initial Visit  x 0  MD Follow-Up   x 0    Number of participants: 4    DATA:  Patient reported he was doing well today, but his allergies were bothering him.     Individual: No    Homework: None assigned    Group 1 (Psychotherapy: Check-ins): Therapist continued facilitation of rapport building strategies between group members. Therapist asked that each patient check in with home life and recovery efforts and identify triggers, cravings, and high risk situations that arise between group sessions.  Group members discussed barriers and benefits of attending recovery meetings.  Therapist provided empathy and support during group session. Daily Reading: None    (CBT) Explored cognitive distortions. Engaged participants in conversation about the distortions they had been experiencing since beginning Phase II.     ASSESSMENT:    Personal Assessment 0-10 Scale (10 worst)    Anxiety:  2 (no comment)  Depression:  0 (no comment)  Cravings: 1 (no comment)     MSE within normal limits? Yes Affect: somber Mood: calm  Pt Response:  open/receptive  Engaged in activity/Process and self-disclosed: adequate  Applies today's group topics to self: adequate  Able to give and receive feedback: adequate  Demonstrated insightful thinking: consistent and adequate  Other pt response comments:  Patient was a positive participant. They demonstrated a relatively positive attitude, a strong degree of motivation, and adequate insight, as evidenced by his level of engagement combined with his efforts to maintain sobriety. They seemed interested and attentive. They appeared to comprehend well the concepts being discussed. The patient seemed receptive of, and willing to implement, the ideas being discussed. Their thought  process appeared linear and goal directed, and their speech was regular rate and rhythm.       Community Group Engagement:  No: not engaged     Reported relapse since last meeting? No: no lapse reported     .  Lab on 10/22/2024   Component Date Value Ref Range Status    WBC 10/22/2024 6.29  3.40 - 10.80 10*3/mm3 Final    RBC 10/22/2024 4.05 (L)  4.14 - 5.80 10*6/mm3 Final    Hemoglobin 10/22/2024 12.6 (L)  13.0 - 17.7 g/dL Final    Hematocrit 10/22/2024 37.6  37.5 - 51.0 % Final    MCV 10/22/2024 92.8  79.0 - 97.0 fL Final    MCH 10/22/2024 31.1  26.6 - 33.0 pg Final    MCHC 10/22/2024 33.5  31.5 - 35.7 g/dL Final    RDW 10/22/2024 12.0 (L)  12.3 - 15.4 % Final    RDW-SD 10/22/2024 41.0  37.0 - 54.0 fl Final    MPV 10/22/2024 12.8 (H)  6.0 - 12.0 fL Final    Platelets 10/22/2024 196  140 - 450 10*3/mm3 Final    Neutrophil % 10/22/2024 47.7  42.7 - 76.0 % Final    Lymphocyte % 10/22/2024 39.9  19.6 - 45.3 % Final    Monocyte % 10/22/2024 9.9  5.0 - 12.0 % Final    Eosinophil % 10/22/2024 1.7  0.3 - 6.2 % Final    Basophil % 10/22/2024 0.6  0.0 - 1.5 % Final    Immature Grans % 10/22/2024 0.2  0.0 - 0.5 % Final    Neutrophils, Absolute 10/22/2024 3.00  1.70 - 7.00 10*3/mm3 Final    Lymphocytes, Absolute 10/22/2024 2.51  0.70 - 3.10 10*3/mm3 Final    Monocytes, Absolute 10/22/2024 0.62  0.10 - 0.90 10*3/mm3 Final    Eosinophils, Absolute 10/22/2024 0.11  0.00 - 0.40 10*3/mm3 Final    Basophils, Absolute 10/22/2024 0.04  0.00 - 0.20 10*3/mm3 Final    Immature Grans, Absolute 10/22/2024 0.01  0.00 - 0.05 10*3/mm3 Final    nRBC 10/22/2024 0.0  0.0 - 0.2 /100 WBC Final    Glucose 10/22/2024 511 (C)  65 - 99 mg/dL Final    BUN 10/22/2024 7  6 - 20 mg/dL Final    Creatinine 10/22/2024 0.87  0.76 - 1.27 mg/dL Final    Sodium 10/22/2024 135 (L)  136 - 145 mmol/L Final    Potassium 10/22/2024 3.6  3.5 - 5.2 mmol/L Final    Chloride 10/22/2024 96 (L)  98 - 107 mmol/L Final    CO2 10/22/2024 23.7  22.0 - 29.0 mmol/L Final     Calcium 10/22/2024 9.4  8.6 - 10.5 mg/dL Final    Total Protein 10/22/2024 7.9  6.0 - 8.5 g/dL Final    Albumin 10/22/2024 4.1  3.5 - 5.2 g/dL Final    ALT (SGPT) 10/22/2024 26  1 - 41 U/L Final    AST (SGOT) 10/22/2024 36  1 - 40 U/L Final    Alkaline Phosphatase 10/22/2024 158 (H)  39 - 117 U/L Final    Total Bilirubin 10/22/2024 0.3  0.0 - 1.2 mg/dL Final    Globulin 10/22/2024 3.8  gm/dL Final    A/G Ratio 10/22/2024 1.1  g/dL Final    BUN/Creatinine Ratio 10/22/2024 8.0  7.0 - 25.0 Final    Anion Gap 10/22/2024 15.3 (H)  5.0 - 15.0 mmol/L Final    eGFR 10/22/2024 105.8  >60.0 mL/min/1.73 Final    Hemoglobin A1C 10/22/2024 9.80 (H)  4.80 - 5.60 % Final    Total Cholesterol 10/22/2024 164  0 - 200 mg/dL Final    Triglycerides 10/22/2024 168 (H)  0 - 150 mg/dL Final    HDL Cholesterol 10/22/2024 44  40 - 60 mg/dL Final    LDL Cholesterol  10/22/2024 91  0 - 100 mg/dL Final    VLDL Cholesterol 10/22/2024 29  5 - 40 mg/dL Final    LDL/HDL Ratio 10/22/2024 1.96   Final    Free T4 10/22/2024 1.18  0.92 - 1.68 ng/dL Final    TSH 10/22/2024 1.750  0.270 - 4.200 uIU/mL Final   Office Visit on 10/22/2024   Component Date Value Ref Range Status    Amphetamine Screen, Urine 10/22/2024 Negative  Negative Final    PRELIMINARY RESULTS. REFER TO SEND OUT CONFRIMATION FROM [facility] FOR FINAL RESULTS.    Barbiturates Screen, Urine 10/22/2024 Negative  Negative Final    Buprenorphine, Screen, Urine 10/22/2024 Negative  Negative Final    Benzodiazepine Screen, Urine 10/22/2024 Negative  Negative Final    Cocaine Screen, Urine 10/22/2024 Negative  Negative Final    MDMA (ECSTASY) 10/22/2024 Negative  Negative Final    Methamphetamine, Ur 10/22/2024 Negative  Negative Final    Methadone Screen, Urine 10/22/2024 Negative  Negative Final    Morphine/Opiates Screen, Urine 10/22/2024 Positive (A)  Negative Final    Oxycodone Screen, Urine 10/22/2024 Positive (A)  Negative Final    Phencyclidine (PCP), Urine 10/22/2024 Negative  Negative  Final    Propoxyphene Scree, Urine 10/22/2024 Negative  Negative Final    Tricyclic Antidepressants Screen 10/22/2024 Negative  Negative Final    THC, Screen, Urine 10/22/2024 Negative  Negative Final    ETHANOL URINE SCREEN 10/22/2024 Negative   Final    Fentanyl, Urine 10/22/2024 Negative  Negative Final    Gabapentin, Ur 10/22/2024 negative   Final   Office Visit on 10/15/2024   Component Date Value Ref Range Status    Amphetamine Screen, Urine 10/15/2024 Negative  Negative Final    PRELIMINARY RESULTS. REFER TO [facility] CONFIRMATION FOR FINAL RESULTS    Barbiturates Screen, Urine 10/15/2024 Negative  Negative Final    Buprenorphine, Screen, Urine 10/15/2024 Negative  Negative Final    Benzodiazepine Screen, Urine 10/15/2024 Negative  Negative Final    Cocaine Screen, Urine 10/15/2024 Negative  Negative Final    MDMA (ECSTASY) 10/15/2024 Negative  Negative Final    Methamphetamine, Ur 10/15/2024 Negative  Negative Final    Morphine/Opiates Screen, Urine 10/15/2024 Positive (A)  Negative Final    Methadone Screen, Urine 10/15/2024 Negative  Negative Final    Oxycodone Screen, Urine 10/15/2024 Positive (A)  Negative Final    Phencyclidine (PCP), Urine 10/15/2024 Negative  Negative Final    THC, Screen, Urine 10/15/2024 Negative  Negative Final   Office Visit on 10/08/2024   Component Date Value Ref Range Status    External Amphetamine Screen Urine 10/08/2024 Negative   Final    External Benzodiazepine Screen Uri* 10/08/2024 Negative   Final    External Cocaine Screen Urine 10/08/2024 Negative   Final    External THC Screen Urine 10/08/2024 Negative   Final    External Methadone Screen Urine 10/08/2024 Negative   Final    External Methamphetamine Screen Ur* 10/08/2024 Negative   Final    External Oxycodone Screen Urine 10/08/2024 Positive (A)   Final    External Buprenorphine Screen Urine 10/08/2024 Negative   Final    External MDMA 10/08/2024 Negative   Final    External Opiates Screen Urine 10/08/2024 Positive (A)    Final       Impression/Formulation:    ICD-10-CM ICD-9-CM   1. Alcohol use disorder, severe, dependence  F10.20 303.90   2. Chronic pain disorder  G89.4 338.4   3. Anxiety disorder, unspecified type  F41.9 300.00        CLINICAL MANEUVERING/INTERVENTIONS: Therapist utilized a person-centered approach to build and maintain rapport with group member.   Therapist promoted safe nonjudgmental environment by providing group members with unconditional positive regard.  Assisted member in processing above session content; acknowledged and normalized patient's thoughts, feelings and concerns.  Allowed patient to freely discuss issues without interruption or judgment. Provided safe, confidential environment to facilitate the development of positive therapeutic relationship and encourage open, honest communication. Therapist assisted group member with identifying and implementing healthier coping strategies and maintaining healthier boundaries. Assisted patient in identifying risk factors which would indicate the need for higher level of care including thoughts to harm self or others and/or self-harming behavior and encouraged patient to contact this office, call 911, or present to the nearest emergency room should any of these events occur. Pt agreeable to adhere to medication regimen as prescribed and report any side effects.  Discussed crisis intervention services and means to access.  Patient adamantly and convincingly denies current suicidal or homicidal ideation or perceptual disturbance.      Therapist implemented motivational interviewing techniques to assist client with exploring and resolving ambivalence associated with commitment to change behaviors.  Yes  Therapist utilized dialectical behavior techniques to teach and model emotional regulation and relaxation.  No   Therapist applied cognitive behavioral strategies to facilitate identification of maladaptive patterns of thinking and behavior.  Yes     PLAN:     Continue [facility] IOP Phase II  Aftercare:  [facility] Outpatient Phase 3     Please note that portions of this note were completed with a voice recognition program. Efforts were made to edit dictation, but occasionally words are mistranscribed.     This document signed by Ben Ortega LCSW, October 24, 2024, 15:46 EDT

## 2024-10-28 ENCOUNTER — TELEPHONE (OUTPATIENT)
Dept: FAMILY MEDICINE CLINIC | Facility: CLINIC | Age: 49
End: 2024-10-28
Payer: MEDICARE

## 2024-10-28 NOTE — TELEPHONE ENCOUNTER
Patient notified and is willing to do ozempic.He said he had tried some other oral meds but was not sure the names of them.

## 2024-10-28 NOTE — TELEPHONE ENCOUNTER
----- Message from Carly Harp sent at 10/28/2024  8:52 AM EDT -----  Please call the patient regarding his abnormal result. His A1c is up to 9.8. I know he had previously tried metformin, has he tried any other oral medications? Is he agreeable to a once weekly injection such as ozempic? He needs to improve his diet.

## 2024-10-28 NOTE — TELEPHONE ENCOUNTER
Patient called with concerns to which I suggested an in office visit.  His MyChart response was...    This sugar diabetes is dangerous at the level mine is so just wondering could u get me some shots to get it lower because that medicine I have took and I quit taking it because it made me so sick. I stay so thirsty and feel like I have no energy and don’t eat a lot. Please help me I would greatly appreciated it. Thanks Taiwo   October 28, 2024  Me to Taiwo BOSWELL      10/28/24  8:19 AM  Good morning Taiwo,     Please call  and schedule an in office appointment with Carly to discuss your concerns.     Thank you,  Chely       Last read by Taiwo Pierce at  8:20 AM on 10/28/2024.  Taiwo Pierce to [facility] (supporting ENRICO Dejesus)         10/28/24  8:21 AM  I just seen her a little while ago and she said if my sugar was high that she would put me on the meds and I could go get them          Please advise...

## 2024-10-29 ENCOUNTER — TELEPHONE (OUTPATIENT)
Dept: FAMILY MEDICINE CLINIC | Facility: CLINIC | Age: 49
End: 2024-10-29
Payer: MEDICARE

## 2024-10-29 ENCOUNTER — OFFICE VISIT (OUTPATIENT)
Dept: PSYCHIATRY | Facility: CLINIC | Age: 49
End: 2024-10-29
Payer: MEDICARE

## 2024-10-29 DIAGNOSIS — F41.9 ANXIETY DISORDER, UNSPECIFIED TYPE: ICD-10-CM

## 2024-10-29 DIAGNOSIS — G89.4 CHRONIC PAIN DISORDER: ICD-10-CM

## 2024-10-29 DIAGNOSIS — Z79.899 MEDICATION MANAGEMENT: ICD-10-CM

## 2024-10-29 DIAGNOSIS — F10.20 ALCOHOL USE DISORDER, SEVERE, DEPENDENCE: Primary | ICD-10-CM

## 2024-10-29 LAB
AMPHET+METHAMPHET UR QL: NEGATIVE
AMPHETAMINES UR QL: NEGATIVE
BARBITURATES UR QL SCN: NEGATIVE
BENZODIAZ UR QL SCN: NEGATIVE
BUPRENORPHINE SERPL-MCNC: NEGATIVE NG/ML
CANNABINOIDS SERPL QL: NEGATIVE
COCAINE UR QL: NEGATIVE
ETHANOL URINE SCREEN: NEGATIVE
FENTANYL UR-MCNC: NEGATIVE NG/ML
GABAPENTIN UR-MCNC: NEGATIVE UG/ML
MDMA UR QL SCN: NEGATIVE
METHADONE UR QL SCN: NEGATIVE
MORPHINE/OPIATES SCREEN, URINE: NEGATIVE
OXYCODONE UR QL SCN: POSITIVE
PCP UR QL SCN: NEGATIVE
PROPOXYPH UR QL SCN: NEGATIVE
TRICYCLICS UR QL SCN: NEGATIVE

## 2024-10-29 NOTE — PROGRESS NOTES
NAME: Taiwo Pierce  DATE: 10/29/2024    [facility] Phase II  [id number]    Services Provided    Group Psychotherapy x 1  Education/Training x 0  Activity Therapy  x 0  Individual Therapy x 0 0 minutes  Family Therapy x 0  MD Initial Visit  x 0  MD Follow-Up   x 0    Number of participants: 7    DATA:  Patient reported he was doing well. He shared with the group that he had told his wife about the cravings he experienced on the weekend before last. He was pleasantly surprised by her reaction. She was tearful and told him that she was proud of him for being forthcoming. She had noticed a change because prior to that this time she had only experienced the deceitfulness of his alcoholism. The patient was feeling good about his progress today.     Individual: No    Homework: None assigned    Group 1 (Psychotherapy: Check-ins): Therapist continued facilitation of rapport building strategies between group members. Therapist asked that each patient check in with home life and recovery efforts and identify triggers, cravings, and high risk situations that arise between group sessions.  Group members discussed barriers and benefits of attending recovery meetings.  Therapist provided empathy and support during group session. Daily Reading: None       ASSESSMENT:    Personal Assessment 0-10 Scale (10 worst)    Anxiety:  1 (no comment)  Depression:  0 (no comment)  Cravings: 1 (no comment)     MSE within normal limits? Yes Affect: somber Mood: calm  Pt Response:  open/receptive  Engaged in activity/Process and self-disclosed: adequate  Applies today's group topics to self: adequate  Able to give and receive feedback: adequate  Demonstrated insightful thinking: consistent and adequate  Other pt response comments:  Patient was a positive participant. They demonstrated a relatively positive attitude, a strong degree of motivation, and adequate insight, as evidenced by his level of engagement combined with his efforts to  maintain sobriety. They seemed interested and attentive. They appeared to comprehend well the concepts being discussed. The patient seemed receptive of, and willing to implement, the ideas being discussed. Their thought process appeared linear and goal directed, and their speech was regular rate and rhythm.       Community Group Engagement:  Yes: [facility]    Reported relapse since last meeting? No: no lapse    .  Appointment on 10/29/2024   Component Date Value Ref Range Status    Amphetamine Screen, Urine 10/29/2024 Negative  Negative Final    PRELIMINARY RESULTS. REFER TO SEND OUT CONFRIMATION FROM [facility] FOR FINAL RESULTS.    Barbiturates Screen, Urine 10/29/2024 Negative  Negative Final    Buprenorphine, Screen, Urine 10/29/2024 Negative  Negative Final    Benzodiazepine Screen, Urine 10/29/2024 Negative  Negative Final    Cocaine Screen, Urine 10/29/2024 Negative  Negative Final    MDMA (ECSTASY) 10/29/2024 Negative  Negative Final    Methamphetamine, Ur 10/29/2024 Negative  Negative Final    Methadone Screen, Urine 10/29/2024 Negative  Negative Final    Morphine/Opiates Screen, Urine 10/29/2024 Negative  Negative Final    Oxycodone Screen, Urine 10/29/2024 Positive (A)  Negative Final    Phencyclidine (PCP), Urine 10/29/2024 Negative  Negative Final    Propoxyphene Scree, Urine 10/29/2024 Negative  Negative Final    Tricyclic Antidepressants Screen 10/29/2024 Negative  Negative Final    THC, Screen, Urine 10/29/2024 Negative  Negative Final    ETHANOL URINE SCREEN 10/29/2024 Negative   Final    Fentanyl, Urine 10/29/2024 Negative  Negative Final    Gabapentin, Ur 10/29/2024 negative   Final   Lab on 10/22/2024   Component Date Value Ref Range Status    WBC 10/22/2024 6.29  3.40 - 10.80 10*3/mm3 Final    RBC 10/22/2024 4.05 (L)  4.14 - 5.80 10*6/mm3 Final    Hemoglobin 10/22/2024 12.6 (L)  13.0 - 17.7 g/dL Final    Hematocrit 10/22/2024 37.6  37.5 - 51.0 % Final    MCV 10/22/2024 92.8  79.0 - 97.0 fL Final     MCH 10/22/2024 31.1  26.6 - 33.0 pg Final    MCHC 10/22/2024 33.5  31.5 - 35.7 g/dL Final    RDW 10/22/2024 12.0 (L)  12.3 - 15.4 % Final    RDW-SD 10/22/2024 41.0  37.0 - 54.0 fl Final    MPV 10/22/2024 12.8 (H)  6.0 - 12.0 fL Final    Platelets 10/22/2024 196  140 - 450 10*3/mm3 Final    Neutrophil % 10/22/2024 47.7  42.7 - 76.0 % Final    Lymphocyte % 10/22/2024 39.9  19.6 - 45.3 % Final    Monocyte % 10/22/2024 9.9  5.0 - 12.0 % Final    Eosinophil % 10/22/2024 1.7  0.3 - 6.2 % Final    Basophil % 10/22/2024 0.6  0.0 - 1.5 % Final    Immature Grans % 10/22/2024 0.2  0.0 - 0.5 % Final    Neutrophils, Absolute 10/22/2024 3.00  1.70 - 7.00 10*3/mm3 Final    Lymphocytes, Absolute 10/22/2024 2.51  0.70 - 3.10 10*3/mm3 Final    Monocytes, Absolute 10/22/2024 0.62  0.10 - 0.90 10*3/mm3 Final    Eosinophils, Absolute 10/22/2024 0.11  0.00 - 0.40 10*3/mm3 Final    Basophils, Absolute 10/22/2024 0.04  0.00 - 0.20 10*3/mm3 Final    Immature Grans, Absolute 10/22/2024 0.01  0.00 - 0.05 10*3/mm3 Final    nRBC 10/22/2024 0.0  0.0 - 0.2 /100 WBC Final    Glucose 10/22/2024 511 (C)  65 - 99 mg/dL Final    BUN 10/22/2024 7  6 - 20 mg/dL Final    Creatinine 10/22/2024 0.87  0.76 - 1.27 mg/dL Final    Sodium 10/22/2024 135 (L)  136 - 145 mmol/L Final    Potassium 10/22/2024 3.6  3.5 - 5.2 mmol/L Final    Chloride 10/22/2024 96 (L)  98 - 107 mmol/L Final    CO2 10/22/2024 23.7  22.0 - 29.0 mmol/L Final    Calcium 10/22/2024 9.4  8.6 - 10.5 mg/dL Final    Total Protein 10/22/2024 7.9  6.0 - 8.5 g/dL Final    Albumin 10/22/2024 4.1  3.5 - 5.2 g/dL Final    ALT (SGPT) 10/22/2024 26  1 - 41 U/L Final    AST (SGOT) 10/22/2024 36  1 - 40 U/L Final    Alkaline Phosphatase 10/22/2024 158 (H)  39 - 117 U/L Final    Total Bilirubin 10/22/2024 0.3  0.0 - 1.2 mg/dL Final    Globulin 10/22/2024 3.8  gm/dL Final    A/G Ratio 10/22/2024 1.1  g/dL Final    BUN/Creatinine Ratio 10/22/2024 8.0  7.0 - 25.0 Final    Anion Gap 10/22/2024 15.3 (H)  5.0  - 15.0 mmol/L Final    eGFR 10/22/2024 105.8  >60.0 mL/min/1.73 Final    Hemoglobin A1C 10/22/2024 9.80 (H)  4.80 - 5.60 % Final    Total Cholesterol 10/22/2024 164  0 - 200 mg/dL Final    Triglycerides 10/22/2024 168 (H)  0 - 150 mg/dL Final    HDL Cholesterol 10/22/2024 44  40 - 60 mg/dL Final    LDL Cholesterol  10/22/2024 91  0 - 100 mg/dL Final    VLDL Cholesterol 10/22/2024 29  5 - 40 mg/dL Final    LDL/HDL Ratio 10/22/2024 1.96   Final    Free T4 10/22/2024 1.18  0.92 - 1.68 ng/dL Final    TSH 10/22/2024 1.750  0.270 - 4.200 uIU/mL Final   Office Visit on 10/22/2024   Component Date Value Ref Range Status    Amphetamine Screen, Urine 10/22/2024 Negative  Negative Final    PRELIMINARY RESULTS. REFER TO SEND OUT CONFRIMATION FROM [facility] FOR FINAL RESULTS.    Barbiturates Screen, Urine 10/22/2024 Negative  Negative Final    Buprenorphine, Screen, Urine 10/22/2024 Negative  Negative Final    Benzodiazepine Screen, Urine 10/22/2024 Negative  Negative Final    Cocaine Screen, Urine 10/22/2024 Negative  Negative Final    MDMA (ECSTASY) 10/22/2024 Negative  Negative Final    Methamphetamine, Ur 10/22/2024 Negative  Negative Final    Methadone Screen, Urine 10/22/2024 Negative  Negative Final    Morphine/Opiates Screen, Urine 10/22/2024 Positive (A)  Negative Final    Oxycodone Screen, Urine 10/22/2024 Positive (A)  Negative Final    Phencyclidine (PCP), Urine 10/22/2024 Negative  Negative Final    Propoxyphene Scree, Urine 10/22/2024 Negative  Negative Final    Tricyclic Antidepressants Screen 10/22/2024 Negative  Negative Final    THC, Screen, Urine 10/22/2024 Negative  Negative Final    ETHANOL URINE SCREEN 10/22/2024 Negative   Final    Fentanyl, Urine 10/22/2024 Negative  Negative Final    Gabapentin, Ur 10/22/2024 negative   Final   Office Visit on 10/15/2024   Component Date Value Ref Range Status    Amphetamine Screen, Urine 10/15/2024 Negative  Negative Final    PRELIMINARY RESULTS. REFER TO [facility]  CONFIRMATION FOR FINAL RESULTS    Barbiturates Screen, Urine 10/15/2024 Negative  Negative Final    Buprenorphine, Screen, Urine 10/15/2024 Negative  Negative Final    Benzodiazepine Screen, Urine 10/15/2024 Negative  Negative Final    Cocaine Screen, Urine 10/15/2024 Negative  Negative Final    MDMA (ECSTASY) 10/15/2024 Negative  Negative Final    Methamphetamine, Ur 10/15/2024 Negative  Negative Final    Morphine/Opiates Screen, Urine 10/15/2024 Positive (A)  Negative Final    Methadone Screen, Urine 10/15/2024 Negative  Negative Final    Oxycodone Screen, Urine 10/15/2024 Positive (A)  Negative Final    Phencyclidine (PCP), Urine 10/15/2024 Negative  Negative Final    THC, Screen, Urine 10/15/2024 Negative  Negative Final       Impression/Formulation:    ICD-10-CM ICD-9-CM   1. Alcohol use disorder, severe, dependence  F10.20 303.90   2. Chronic pain disorder  G89.4 338.4   3. Anxiety disorder, unspecified type  F41.9 300.00   4. Medication management  Z79.899 V58.69        CLINICAL MANEUVERING/INTERVENTIONS: Therapist utilized a person-centered approach to build and maintain rapport with group member.   Therapist promoted safe nonjudgmental environment by providing group members with unconditional positive regard.  Assisted member in processing above session content; acknowledged and normalized patient's thoughts, feelings and concerns.  Allowed patient to freely discuss issues without interruption or judgment. Provided safe, confidential environment to facilitate the development of positive therapeutic relationship and encourage open, honest communication. Therapist assisted group member with identifying and implementing healthier coping strategies and maintaining healthier boundaries. Assisted patient in identifying risk factors which would indicate the need for higher level of care including thoughts to harm self or others and/or self-harming behavior and encouraged patient to contact this office, call 911,  or present to the nearest emergency room should any of these events occur. Pt agreeable to adhere to medication regimen as prescribed and report any side effects.  Discussed crisis intervention services and means to access.  Patient adamantly and convincingly denies current suicidal or homicidal ideation or perceptual disturbance.      Therapist implemented motivational interviewing techniques to assist client with exploring and resolving ambivalence associated with commitment to change behaviors.  Yes  Therapist utilized dialectical behavior techniques to teach and model emotional regulation and relaxation.  No   Therapist applied cognitive behavioral strategies to facilitate identification of maladaptive patterns of thinking and behavior.  Yes     PLAN:    Continue [facility] CD IOP Phase II  Aftercare:  [facility] Outpatient Phase 3     Please note that portions of this note were completed with a voice recognition program. Efforts were made to edit dictation, but occasionally words are mistranscribed.     This document signed by Ben Ortega LCSW, October 29, 2024, 17:08 EDT

## 2024-10-29 NOTE — TELEPHONE ENCOUNTER
10/29/2024 PA for Ozempic was approved from 07/31/2024 to 10/29/2025. Patient and [facility] notified.

## 2024-10-31 ENCOUNTER — OFFICE VISIT (OUTPATIENT)
Dept: PSYCHIATRY | Facility: CLINIC | Age: 49
End: 2024-10-31
Payer: MEDICARE

## 2024-10-31 DIAGNOSIS — F41.9 ANXIETY DISORDER, UNSPECIFIED TYPE: ICD-10-CM

## 2024-10-31 DIAGNOSIS — F10.20 ALCOHOL USE DISORDER, SEVERE, DEPENDENCE: Primary | ICD-10-CM

## 2024-10-31 DIAGNOSIS — G89.4 CHRONIC PAIN DISORDER: ICD-10-CM

## 2024-10-31 PROCEDURE — 90853 GROUP PSYCHOTHERAPY: CPT | Performed by: SOCIAL WORKER

## 2024-10-31 NOTE — PROGRESS NOTES
NAME: Taiwo Pierce  DATE: 10/31/2024    [facility] Phase II  [id number]    Services Provided    Group Psychotherapy x 1  Education/Training x 0  Activity Therapy  x 0  Individual Therapy x 0 0 minutes  Family Therapy x 0  MD Initial Visit  x 0  MD Follow-Up   x 0    Number of participants: 4    DATA:  Patient reported he was feeling blessed today. He was looking forward to some hunting today. He denied any close calls since the last group session.     Individual: No    Homework: None assigned    Group 1 (Psychotherapy: Check-ins): Therapist continued facilitation of rapport building strategies between group members. Therapist asked that each patient check in with home life and recovery efforts and identify triggers, cravings, and high risk situations that arise between group sessions.  Group members discussed barriers and benefits of attending recovery meetings.  Therapist provided empathy and support during group session. Daily Reading: Mckenna Cardoso, by JESSICA Parsons.     ASSESSMENT:    Personal Assessment 0-10 Scale (10 worst)    Anxiety:  1 (no comment)  Depression:  0 (no comment)  Cravings: 0 (no comment)     MSE within normal limits? Yes Affect: euthymic Mood: calm  Pt Response:  open/receptive  Engaged in activity/Process and self-disclosed: adequate  Applies today's group topics to self: adequate  Able to give and receive feedback: adequate  Demonstrated insightful thinking: consistent and adequate  Other pt response comments:  Patient was a positive participant. They demonstrated a relatively positive attitude, a strong degree of motivation, and adequate insight, as evidenced by his level of engagement combined with his positive attitude today. They seemed interested and attentive. They appeared to comprehend well the concepts being discussed. The patient seemed receptive of, and willing to implement, the ideas being discussed. Their thought process appeared linear and goal directed, and their speech  was regular rate and rhythm.       Community Group Engagement:  Yes: Christianity    Reported relapse since last meeting? No: no lapse    .  Office Visit on 10/29/2024   Component Date Value Ref Range Status    Amphetamine Screen, Urine 10/29/2024 Negative  Negative Final    PRELIMINARY RESULTS. REFER TO SEND OUT CONFRIMATION FROM [facility] FOR FINAL RESULTS.    Barbiturates Screen, Urine 10/29/2024 Negative  Negative Final    Buprenorphine, Screen, Urine 10/29/2024 Negative  Negative Final    Benzodiazepine Screen, Urine 10/29/2024 Negative  Negative Final    Cocaine Screen, Urine 10/29/2024 Negative  Negative Final    MDMA (ECSTASY) 10/29/2024 Negative  Negative Final    Methamphetamine, Ur 10/29/2024 Negative  Negative Final    Methadone Screen, Urine 10/29/2024 Negative  Negative Final    Morphine/Opiates Screen, Urine 10/29/2024 Negative  Negative Final    Oxycodone Screen, Urine 10/29/2024 Positive (A)  Negative Final    Phencyclidine (PCP), Urine 10/29/2024 Negative  Negative Final    Propoxyphene Scree, Urine 10/29/2024 Negative  Negative Final    Tricyclic Antidepressants Screen 10/29/2024 Negative  Negative Final    THC, Screen, Urine 10/29/2024 Negative  Negative Final    ETHANOL URINE SCREEN 10/29/2024 Negative   Final    Fentanyl, Urine 10/29/2024 Negative  Negative Final    Gabapentin, Ur 10/29/2024 negative   Final   Lab on 10/22/2024   Component Date Value Ref Range Status    WBC 10/22/2024 6.29  3.40 - 10.80 10*3/mm3 Final    RBC 10/22/2024 4.05 (L)  4.14 - 5.80 10*6/mm3 Final    Hemoglobin 10/22/2024 12.6 (L)  13.0 - 17.7 g/dL Final    Hematocrit 10/22/2024 37.6  37.5 - 51.0 % Final    MCV 10/22/2024 92.8  79.0 - 97.0 fL Final    MCH 10/22/2024 31.1  26.6 - 33.0 pg Final    MCHC 10/22/2024 33.5  31.5 - 35.7 g/dL Final    RDW 10/22/2024 12.0 (L)  12.3 - 15.4 % Final    RDW-SD 10/22/2024 41.0  37.0 - 54.0 fl Final    MPV 10/22/2024 12.8 (H)  6.0 - 12.0 fL Final    Platelets 10/22/2024 196  140 - 450  10*3/mm3 Final    Neutrophil % 10/22/2024 47.7  42.7 - 76.0 % Final    Lymphocyte % 10/22/2024 39.9  19.6 - 45.3 % Final    Monocyte % 10/22/2024 9.9  5.0 - 12.0 % Final    Eosinophil % 10/22/2024 1.7  0.3 - 6.2 % Final    Basophil % 10/22/2024 0.6  0.0 - 1.5 % Final    Immature Grans % 10/22/2024 0.2  0.0 - 0.5 % Final    Neutrophils, Absolute 10/22/2024 3.00  1.70 - 7.00 10*3/mm3 Final    Lymphocytes, Absolute 10/22/2024 2.51  0.70 - 3.10 10*3/mm3 Final    Monocytes, Absolute 10/22/2024 0.62  0.10 - 0.90 10*3/mm3 Final    Eosinophils, Absolute 10/22/2024 0.11  0.00 - 0.40 10*3/mm3 Final    Basophils, Absolute 10/22/2024 0.04  0.00 - 0.20 10*3/mm3 Final    Immature Grans, Absolute 10/22/2024 0.01  0.00 - 0.05 10*3/mm3 Final    nRBC 10/22/2024 0.0  0.0 - 0.2 /100 WBC Final    Glucose 10/22/2024 511 (C)  65 - 99 mg/dL Final    BUN 10/22/2024 7  6 - 20 mg/dL Final    Creatinine 10/22/2024 0.87  0.76 - 1.27 mg/dL Final    Sodium 10/22/2024 135 (L)  136 - 145 mmol/L Final    Potassium 10/22/2024 3.6  3.5 - 5.2 mmol/L Final    Chloride 10/22/2024 96 (L)  98 - 107 mmol/L Final    CO2 10/22/2024 23.7  22.0 - 29.0 mmol/L Final    Calcium 10/22/2024 9.4  8.6 - 10.5 mg/dL Final    Total Protein 10/22/2024 7.9  6.0 - 8.5 g/dL Final    Albumin 10/22/2024 4.1  3.5 - 5.2 g/dL Final    ALT (SGPT) 10/22/2024 26  1 - 41 U/L Final    AST (SGOT) 10/22/2024 36  1 - 40 U/L Final    Alkaline Phosphatase 10/22/2024 158 (H)  39 - 117 U/L Final    Total Bilirubin 10/22/2024 0.3  0.0 - 1.2 mg/dL Final    Globulin 10/22/2024 3.8  gm/dL Final    A/G Ratio 10/22/2024 1.1  g/dL Final    BUN/Creatinine Ratio 10/22/2024 8.0  7.0 - 25.0 Final    Anion Gap 10/22/2024 15.3 (H)  5.0 - 15.0 mmol/L Final    eGFR 10/22/2024 105.8  >60.0 mL/min/1.73 Final    Hemoglobin A1C 10/22/2024 9.80 (H)  4.80 - 5.60 % Final    Total Cholesterol 10/22/2024 164  0 - 200 mg/dL Final    Triglycerides 10/22/2024 168 (H)  0 - 150 mg/dL Final    HDL Cholesterol 10/22/2024  44  40 - 60 mg/dL Final    LDL Cholesterol  10/22/2024 91  0 - 100 mg/dL Final    VLDL Cholesterol 10/22/2024 29  5 - 40 mg/dL Final    LDL/HDL Ratio 10/22/2024 1.96   Final    Free T4 10/22/2024 1.18  0.92 - 1.68 ng/dL Final    TSH 10/22/2024 1.750  0.270 - 4.200 uIU/mL Final   Office Visit on 10/22/2024   Component Date Value Ref Range Status    Amphetamine Screen, Urine 10/22/2024 Negative  Negative Final    PRELIMINARY RESULTS. REFER TO SEND OUT CONFRIMATION FROM [facility] FOR FINAL RESULTS.    Barbiturates Screen, Urine 10/22/2024 Negative  Negative Final    Buprenorphine, Screen, Urine 10/22/2024 Negative  Negative Final    Benzodiazepine Screen, Urine 10/22/2024 Negative  Negative Final    Cocaine Screen, Urine 10/22/2024 Negative  Negative Final    MDMA (ECSTASY) 10/22/2024 Negative  Negative Final    Methamphetamine, Ur 10/22/2024 Negative  Negative Final    Methadone Screen, Urine 10/22/2024 Negative  Negative Final    Morphine/Opiates Screen, Urine 10/22/2024 Positive (A)  Negative Final    Oxycodone Screen, Urine 10/22/2024 Positive (A)  Negative Final    Phencyclidine (PCP), Urine 10/22/2024 Negative  Negative Final    Propoxyphene Scree, Urine 10/22/2024 Negative  Negative Final    Tricyclic Antidepressants Screen 10/22/2024 Negative  Negative Final    THC, Screen, Urine 10/22/2024 Negative  Negative Final    ETHANOL URINE SCREEN 10/22/2024 Negative   Final    Fentanyl, Urine 10/22/2024 Negative  Negative Final    Gabapentin, Ur 10/22/2024 negative   Final   Office Visit on 10/15/2024   Component Date Value Ref Range Status    Amphetamine Screen, Urine 10/15/2024 Negative  Negative Final    PRELIMINARY RESULTS. REFER TO [facility] CONFIRMATION FOR FINAL RESULTS    Barbiturates Screen, Urine 10/15/2024 Negative  Negative Final    Buprenorphine, Screen, Urine 10/15/2024 Negative  Negative Final    Benzodiazepine Screen, Urine 10/15/2024 Negative  Negative Final    Cocaine Screen, Urine 10/15/2024 Negative   Negative Final    MDMA (ECSTASY) 10/15/2024 Negative  Negative Final    Methamphetamine, Ur 10/15/2024 Negative  Negative Final    Morphine/Opiates Screen, Urine 10/15/2024 Positive (A)  Negative Final    Methadone Screen, Urine 10/15/2024 Negative  Negative Final    Oxycodone Screen, Urine 10/15/2024 Positive (A)  Negative Final    Phencyclidine (PCP), Urine 10/15/2024 Negative  Negative Final    THC, Screen, Urine 10/15/2024 Negative  Negative Final       Impression/Formulation:    ICD-10-CM ICD-9-CM   1. Alcohol use disorder, severe, dependence  F10.20 303.90   2. Chronic pain disorder  G89.4 338.4   3. Anxiety disorder, unspecified type  F41.9 300.00        CLINICAL MANEUVERING/INTERVENTIONS: Therapist utilized a person-centered approach to build and maintain rapport with group member.   Therapist promoted safe nonjudgmental environment by providing group members with unconditional positive regard.  Assisted member in processing above session content; acknowledged and normalized patient's thoughts, feelings and concerns.  Allowed patient to freely discuss issues without interruption or judgment. Provided safe, confidential environment to facilitate the development of positive therapeutic relationship and encourage open, honest communication. Therapist assisted group member with identifying and implementing healthier coping strategies and maintaining healthier boundaries. Assisted patient in identifying risk factors which would indicate the need for higher level of care including thoughts to harm self or others and/or self-harming behavior and encouraged patient to contact this office, call 911, or present to the nearest emergency room should any of these events occur. Pt agreeable to adhere to medication regimen as prescribed and report any side effects.  Discussed crisis intervention services and means to access.  Patient adamantly and convincingly denies current suicidal or homicidal ideation or perceptual  disturbance.      Therapist implemented motivational interviewing techniques to assist client with exploring and resolving ambivalence associated with commitment to change behaviors.  Yes  Therapist utilized dialectical behavior techniques to teach and model emotional regulation and relaxation.  No   Therapist applied cognitive behavioral strategies to facilitate identification of maladaptive patterns of thinking and behavior.  Yes     PLAN:    Continue [facility] Phase II  Aftercare:  [facility] Phase 3     Please note that portions of this note were completed with a voice recognition program. Efforts were made to edit dictation, but occasionally words are mistranscribed.     This document signed by Ben Ortega LCSW, October 31, 2024, 16:47 EDT

## 2024-11-05 ENCOUNTER — OFFICE VISIT (OUTPATIENT)
Dept: PSYCHIATRY | Facility: CLINIC | Age: 49
End: 2024-11-05
Payer: MEDICARE

## 2024-11-05 DIAGNOSIS — F41.9 ANXIETY DISORDER, UNSPECIFIED TYPE: ICD-10-CM

## 2024-11-05 DIAGNOSIS — F10.20 ALCOHOL USE DISORDER, SEVERE, DEPENDENCE: Primary | ICD-10-CM

## 2024-11-05 DIAGNOSIS — G89.4 CHRONIC PAIN DISORDER: ICD-10-CM

## 2024-11-05 PROCEDURE — 90853 GROUP PSYCHOTHERAPY: CPT | Performed by: SOCIAL WORKER

## 2024-11-05 RX ORDER — GLIPIZIDE 5 MG/1
5 TABLET ORAL
Qty: 60 TABLET | Refills: 2 | Status: SHIPPED | OUTPATIENT
Start: 2024-11-05

## 2024-11-05 NOTE — PROGRESS NOTES
NAME: Taiwo Pierce  DATE: 11/05/2024    [facility] Phase II  [id number]    Services Provided    Group Psychotherapy x 1  Education/Training x 0  Activity Therapy  x 0  Individual Therapy x 0 0 minutes  Family Therapy x 0  MD Initial Visit  x 0  MD Follow-Up   x 0    Number of participants: 5    DATA:  Patient reported that he was struggling with his sugar today. He had been watching his diet. The medication was having some side-effects, so he had switched to something different. Otherwise, he was looking forward to hunting soon. He denied having had any close calls this weekend.     Individual: No    Homework: None assigned    Group 1 (Psychotherapy: Check-ins): Therapist continued facilitation of rapport building strategies between group members. Therapist asked that each patient check in with home life and recovery efforts and identify triggers, cravings, and high risk situations that arise between group sessions.  Group members discussed barriers and benefits of attending recovery meetings.  Therapist provided empathy and support during group session. Daily Reading: None     ASSESSMENT:    Personal Assessment 0-10 Scale (10 worst)    Anxiety:  4 (related to ongoing health concerns)  Depression:  0 (no comment)  Cravings: 0 (no comment)     MSE within normal limits? Yes Affect: somber Mood: calm  Pt Response:  open/receptive  Engaged in activity/Process and self-disclosed: adequate  Applies today's group topics to self: adequate  Able to give and receive feedback: adequate  Demonstrated insightful thinking: consistent and adequate  Other pt response comments:  Patient was a positive participant. They demonstrated a relatively positive attitude, a strong degree of motivation, and adequate insight, as evidenced by his level of engagement combined with his efforts to maintain sobriety. They seemed interested and attentive. They appeared to comprehend well the concepts being discussed. The patient seemed  receptive of, and willing to implement, the ideas being discussed. Their thought process appeared linear and goal directed, and their speech was regular rate and rhythm.       Community Group Engagement:  Yes: Scientologist    Reported relapse since last meeting? No: no lapse    .  Office Visit on 10/29/2024   Component Date Value Ref Range Status    Amphetamine Screen, Urine 10/29/2024 Negative  Negative Final    PRELIMINARY RESULTS. REFER TO SEND OUT CONFRIMATION FROM [facility] FOR FINAL RESULTS.    Barbiturates Screen, Urine 10/29/2024 Negative  Negative Final    Buprenorphine, Screen, Urine 10/29/2024 Negative  Negative Final    Benzodiazepine Screen, Urine 10/29/2024 Negative  Negative Final    Cocaine Screen, Urine 10/29/2024 Negative  Negative Final    MDMA (ECSTASY) 10/29/2024 Negative  Negative Final    Methamphetamine, Ur 10/29/2024 Negative  Negative Final    Methadone Screen, Urine 10/29/2024 Negative  Negative Final    Morphine/Opiates Screen, Urine 10/29/2024 Negative  Negative Final    Oxycodone Screen, Urine 10/29/2024 Positive (A)  Negative Final    Phencyclidine (PCP), Urine 10/29/2024 Negative  Negative Final    Propoxyphene Scree, Urine 10/29/2024 Negative  Negative Final    Tricyclic Antidepressants Screen 10/29/2024 Negative  Negative Final    THC, Screen, Urine 10/29/2024 Negative  Negative Final    ETHANOL URINE SCREEN 10/29/2024 Negative   Final    Fentanyl, Urine 10/29/2024 Negative  Negative Final    Gabapentin, Ur 10/29/2024 negative   Final   Lab on 10/22/2024   Component Date Value Ref Range Status    WBC 10/22/2024 6.29  3.40 - 10.80 10*3/mm3 Final    RBC 10/22/2024 4.05 (L)  4.14 - 5.80 10*6/mm3 Final    Hemoglobin 10/22/2024 12.6 (L)  13.0 - 17.7 g/dL Final    Hematocrit 10/22/2024 37.6  37.5 - 51.0 % Final    MCV 10/22/2024 92.8  79.0 - 97.0 fL Final    MCH 10/22/2024 31.1  26.6 - 33.0 pg Final    MCHC 10/22/2024 33.5  31.5 - 35.7 g/dL Final    RDW 10/22/2024 12.0 (L)  12.3 - 15.4 % Final     RDW-SD 10/22/2024 41.0  37.0 - 54.0 fl Final    MPV 10/22/2024 12.8 (H)  6.0 - 12.0 fL Final    Platelets 10/22/2024 196  140 - 450 10*3/mm3 Final    Neutrophil % 10/22/2024 47.7  42.7 - 76.0 % Final    Lymphocyte % 10/22/2024 39.9  19.6 - 45.3 % Final    Monocyte % 10/22/2024 9.9  5.0 - 12.0 % Final    Eosinophil % 10/22/2024 1.7  0.3 - 6.2 % Final    Basophil % 10/22/2024 0.6  0.0 - 1.5 % Final    Immature Grans % 10/22/2024 0.2  0.0 - 0.5 % Final    Neutrophils, Absolute 10/22/2024 3.00  1.70 - 7.00 10*3/mm3 Final    Lymphocytes, Absolute 10/22/2024 2.51  0.70 - 3.10 10*3/mm3 Final    Monocytes, Absolute 10/22/2024 0.62  0.10 - 0.90 10*3/mm3 Final    Eosinophils, Absolute 10/22/2024 0.11  0.00 - 0.40 10*3/mm3 Final    Basophils, Absolute 10/22/2024 0.04  0.00 - 0.20 10*3/mm3 Final    Immature Grans, Absolute 10/22/2024 0.01  0.00 - 0.05 10*3/mm3 Final    nRBC 10/22/2024 0.0  0.0 - 0.2 /100 WBC Final    Glucose 10/22/2024 511 (C)  65 - 99 mg/dL Final    BUN 10/22/2024 7  6 - 20 mg/dL Final    Creatinine 10/22/2024 0.87  0.76 - 1.27 mg/dL Final    Sodium 10/22/2024 135 (L)  136 - 145 mmol/L Final    Potassium 10/22/2024 3.6  3.5 - 5.2 mmol/L Final    Chloride 10/22/2024 96 (L)  98 - 107 mmol/L Final    CO2 10/22/2024 23.7  22.0 - 29.0 mmol/L Final    Calcium 10/22/2024 9.4  8.6 - 10.5 mg/dL Final    Total Protein 10/22/2024 7.9  6.0 - 8.5 g/dL Final    Albumin 10/22/2024 4.1  3.5 - 5.2 g/dL Final    ALT (SGPT) 10/22/2024 26  1 - 41 U/L Final    AST (SGOT) 10/22/2024 36  1 - 40 U/L Final    Alkaline Phosphatase 10/22/2024 158 (H)  39 - 117 U/L Final    Total Bilirubin 10/22/2024 0.3  0.0 - 1.2 mg/dL Final    Globulin 10/22/2024 3.8  gm/dL Final    A/G Ratio 10/22/2024 1.1  g/dL Final    BUN/Creatinine Ratio 10/22/2024 8.0  7.0 - 25.0 Final    Anion Gap 10/22/2024 15.3 (H)  5.0 - 15.0 mmol/L Final    eGFR 10/22/2024 105.8  >60.0 mL/min/1.73 Final    Hemoglobin A1C 10/22/2024 9.80 (H)  4.80 - 5.60 % Final    Total  Cholesterol 10/22/2024 164  0 - 200 mg/dL Final    Triglycerides 10/22/2024 168 (H)  0 - 150 mg/dL Final    HDL Cholesterol 10/22/2024 44  40 - 60 mg/dL Final    LDL Cholesterol  10/22/2024 91  0 - 100 mg/dL Final    VLDL Cholesterol 10/22/2024 29  5 - 40 mg/dL Final    LDL/HDL Ratio 10/22/2024 1.96   Final    Free T4 10/22/2024 1.18  0.92 - 1.68 ng/dL Final    TSH 10/22/2024 1.750  0.270 - 4.200 uIU/mL Final   Office Visit on 10/22/2024   Component Date Value Ref Range Status    Amphetamine Screen, Urine 10/22/2024 Negative  Negative Final    PRELIMINARY RESULTS. REFER TO SEND OUT CONFRIMATION FROM [facility] FOR FINAL RESULTS.    Barbiturates Screen, Urine 10/22/2024 Negative  Negative Final    Buprenorphine, Screen, Urine 10/22/2024 Negative  Negative Final    Benzodiazepine Screen, Urine 10/22/2024 Negative  Negative Final    Cocaine Screen, Urine 10/22/2024 Negative  Negative Final    MDMA (ECSTASY) 10/22/2024 Negative  Negative Final    Methamphetamine, Ur 10/22/2024 Negative  Negative Final    Methadone Screen, Urine 10/22/2024 Negative  Negative Final    Morphine/Opiates Screen, Urine 10/22/2024 Positive (A)  Negative Final    Oxycodone Screen, Urine 10/22/2024 Positive (A)  Negative Final    Phencyclidine (PCP), Urine 10/22/2024 Negative  Negative Final    Propoxyphene Scree, Urine 10/22/2024 Negative  Negative Final    Tricyclic Antidepressants Screen 10/22/2024 Negative  Negative Final    THC, Screen, Urine 10/22/2024 Negative  Negative Final    ETHANOL URINE SCREEN 10/22/2024 Negative   Final    Fentanyl, Urine 10/22/2024 Negative  Negative Final    Gabapentin, Ur 10/22/2024 negative   Final       Impression/Formulation:    ICD-10-CM ICD-9-CM   1. Alcohol use disorder, severe, dependence  F10.20 303.90   2. Chronic pain disorder  G89.4 338.4   3. Anxiety disorder, unspecified type  F41.9 300.00        CLINICAL MANEUVERING/INTERVENTIONS: Therapist utilized a person-centered approach to build and maintain  rapport with group member.   Therapist promoted safe nonjudgmental environment by providing group members with unconditional positive regard.  Assisted member in processing above session content; acknowledged and normalized patient's thoughts, feelings and concerns.  Allowed patient to freely discuss issues without interruption or judgment. Provided safe, confidential environment to facilitate the development of positive therapeutic relationship and encourage open, honest communication. Therapist assisted group member with identifying and implementing healthier coping strategies and maintaining healthier boundaries. Assisted patient in identifying risk factors which would indicate the need for higher level of care including thoughts to harm self or others and/or self-harming behavior and encouraged patient to contact this office, call 911, or present to the nearest emergency room should any of these events occur. Pt agreeable to adhere to medication regimen as prescribed and report any side effects.  Discussed crisis intervention services and means to access.  Patient adamantly and convincingly denies current suicidal or homicidal ideation or perceptual disturbance.      Therapist implemented motivational interviewing techniques to assist client with exploring and resolving ambivalence associated with commitment to change behaviors.  Yes  Therapist utilized dialectical behavior techniques to teach and model emotional regulation and relaxation.  No   Therapist applied cognitive behavioral strategies to facilitate identification of maladaptive patterns of thinking and behavior.  Yes     [facility]  Aftercare:  [facility] Phase 3     Please note that portions of this note were completed with a voice recognition program. Efforts were made to edit dictation, but occasionally words are mistranscribed.     This document signed by Ben Ortega LCSW,  November 5, 2024, 16:24 EST

## 2024-11-07 ENCOUNTER — OFFICE VISIT (OUTPATIENT)
Dept: PSYCHIATRY | Facility: CLINIC | Age: 49
End: 2024-11-07
Payer: MEDICARE

## 2024-11-07 DIAGNOSIS — Z79.899 MEDICATION MANAGEMENT: ICD-10-CM

## 2024-11-07 DIAGNOSIS — G89.4 CHRONIC PAIN DISORDER: ICD-10-CM

## 2024-11-07 DIAGNOSIS — F10.20 ALCOHOL USE DISORDER, SEVERE, DEPENDENCE: Primary | ICD-10-CM

## 2024-11-07 DIAGNOSIS — F41.9 ANXIETY DISORDER, UNSPECIFIED TYPE: ICD-10-CM

## 2024-11-07 LAB
AMPHET+METHAMPHET UR QL: NEGATIVE
AMPHETAMINES UR QL: NEGATIVE
BARBITURATES UR QL SCN: NEGATIVE
BENZODIAZ UR QL SCN: NEGATIVE
BUPRENORPHINE SERPL-MCNC: NEGATIVE NG/ML
CANNABINOIDS SERPL QL: NEGATIVE
COCAINE UR QL: NEGATIVE
ETHANOL UR-MCNC: NEGATIVE MG/DL
FENTANYL+NORFENTANYL UR QL SCN: NEGATIVE
GABAPENTIN UR-MCNC: NEGATIVE UG/ML
MDMA UR QL SCN: NEGATIVE
METHADONE UR QL SCN: NEGATIVE
MORPHINE/OPIATES SCREEN, URINE: POSITIVE
OXYCODONE UR QL SCN: POSITIVE
PCP UR QL SCN: NEGATIVE
PROPOXYPH UR QL SCN: NEGATIVE
TRICYCLICS UR QL SCN: NEGATIVE

## 2024-11-07 NOTE — PROGRESS NOTES
NAME: Taiwo Pierce  DATE: 11/07/2024    [facility] Phase I  [id number]    Services Provided    Group Psychotherapy x 1  Education/Training x 0  Activity Therapy  x 0  Individual Therapy x 0 0 minutes  Family Therapy x 0  MD Initial Visit  x 0  MD Follow-Up   x 0    Number of participants: 3    DATA:  Patient reported he was doing a little bit better today. They had called in medication for him in relation to his sugar. He reported having had a slip up on Tuesday night. He went to an election party. He drank 4-5 beers. His wife knew about it.     We spent some time processing this lapse. The patient reported that he had been anxious about the election, and watching the results come slowly in he chose to drink in order to alleviate the anxiety. It worked in the short term, but as a result he had been more anxious these past two days about the relapse itself. We also explored how obsession over political matters could be a subtle form of his addiction manifesting itself. He agreed with this assessment.    Individual: No    Homework: None assigned    Group 1 (Psychotherapy: Check-ins): Therapist continued facilitation of rapport building strategies between group members. Therapist asked that each patient check in with home life and recovery efforts and identify triggers, cravings, and high risk situations that arise between group sessions.  Group members discussed barriers and benefits of attending recovery meetings.  Therapist provided empathy and support during group session. Daily Reading: None. We discussed how best to respond to a lapse.     ASSESSMENT:    Personal Assessment 0-10 Scale (10 worst)    Anxiety:  2 (no comment)  Depression:  0 (no comment)  Cravings: 1 (no comment)     MSE within normal limits? Yes Affect: somber Mood: calm  Pt Response:  open/receptive  Engaged in activity/Process and self-disclosed: adequate  Applies today's group topics to self: adequate  Able to give and receive feedback:  adequate  Demonstrated insightful thinking: consistent and adequate  Other pt response comments:  Patient was a positive participant. They demonstrated a relatively positive attitude, a strong degree of motivation, and adequate insight, as evidenced by his efforts to engage in group discussion combined with the degree to which he was forthcoming about his lapse. They seemed interested and attentive. They appeared to comprehend well the concepts being discussed. The patient seemed receptive of, and willing to implement, the ideas being discussed. Their thought process appeared linear and goal directed, and their speech was regular rate and rhythm.       Community Group Engagement:  Yes: [employer]    Reported relapse since last meeting? Yes: 5-6 drinks on election night     .  Office Visit on 11/07/2024   Component Date Value Ref Range Status    Amphetamine Screen, Urine 11/07/2024 Negative  Negative Final    PRELIMINARY RESULT. PLEASE FOLLOW UP WITH [facility] CONFIRMATION FROM SEND OUT LAB FINAL RESULTS.    Barbiturates Screen, Urine 11/07/2024 Negative  Negative Final    Buprenorphine, Screen, Urine 11/07/2024 Negative  Negative Final    Benzodiazepine Screen, Urine 11/07/2024 Negative  Negative Final    Cocaine Screen, Urine 11/07/2024 Negative  Negative Final    MDMA (ECSTASY) 11/07/2024 Negative  Negative Final    Methamphetamine, Ur 11/07/2024 Negative  Negative Final    Methadone Screen, Urine 11/07/2024 Negative  Negative Final    Morphine/Opiates Screen, Urine 11/07/2024 Positive (A)  Negative Final    Oxycodone Screen, Urine 11/07/2024 Positive (A)  Negative Final    Phencyclidine (PCP), Urine 11/07/2024 Negative  Negative Final    Propoxyphene Scree, Urine 11/07/2024 Negative  Negative Final    Tricyclic Antidepressants Screen 11/07/2024 Negative  Negative Final    THC, Screen, Urine 11/07/2024 Negative  Negative Final    Gabapentin, Ur 11/07/2024 negative   Final    Ethanol, Urine 11/07/2024 negative   Final     Fentanyl, Urine 11/07/2024 negative   Final   Office Visit on 10/29/2024   Component Date Value Ref Range Status    Amphetamine Screen, Urine 10/29/2024 Negative  Negative Final    PRELIMINARY RESULTS. REFER TO SEND OUT CONFRIMATION FROM [facility] FOR FINAL RESULTS.    Barbiturates Screen, Urine 10/29/2024 Negative  Negative Final    Buprenorphine, Screen, Urine 10/29/2024 Negative  Negative Final    Benzodiazepine Screen, Urine 10/29/2024 Negative  Negative Final    Cocaine Screen, Urine 10/29/2024 Negative  Negative Final    MDMA (ECSTASY) 10/29/2024 Negative  Negative Final    Methamphetamine, Ur 10/29/2024 Negative  Negative Final    Methadone Screen, Urine 10/29/2024 Negative  Negative Final    Morphine/Opiates Screen, Urine 10/29/2024 Negative  Negative Final    Oxycodone Screen, Urine 10/29/2024 Positive (A)  Negative Final    Phencyclidine (PCP), Urine 10/29/2024 Negative  Negative Final    Propoxyphene Scree, Urine 10/29/2024 Negative  Negative Final    Tricyclic Antidepressants Screen 10/29/2024 Negative  Negative Final    THC, Screen, Urine 10/29/2024 Negative  Negative Final    ETHANOL URINE SCREEN 10/29/2024 Negative   Final    Fentanyl, Urine 10/29/2024 Negative  Negative Final    Gabapentin, Ur 10/29/2024 negative   Final   Lab on 10/22/2024   Component Date Value Ref Range Status    WBC 10/22/2024 6.29  3.40 - 10.80 10*3/mm3 Final    RBC 10/22/2024 4.05 (L)  4.14 - 5.80 10*6/mm3 Final    Hemoglobin 10/22/2024 12.6 (L)  13.0 - 17.7 g/dL Final    Hematocrit 10/22/2024 37.6  37.5 - 51.0 % Final    MCV 10/22/2024 92.8  79.0 - 97.0 fL Final    MCH 10/22/2024 31.1  26.6 - 33.0 pg Final    MCHC 10/22/2024 33.5  31.5 - 35.7 g/dL Final    RDW 10/22/2024 12.0 (L)  12.3 - 15.4 % Final    RDW-SD 10/22/2024 41.0  37.0 - 54.0 fl Final    MPV 10/22/2024 12.8 (H)  6.0 - 12.0 fL Final    Platelets 10/22/2024 196  140 - 450 10*3/mm3 Final    Neutrophil % 10/22/2024 47.7  42.7 - 76.0 % Final    Lymphocyte %  10/22/2024 39.9  19.6 - 45.3 % Final    Monocyte % 10/22/2024 9.9  5.0 - 12.0 % Final    Eosinophil % 10/22/2024 1.7  0.3 - 6.2 % Final    Basophil % 10/22/2024 0.6  0.0 - 1.5 % Final    Immature Grans % 10/22/2024 0.2  0.0 - 0.5 % Final    Neutrophils, Absolute 10/22/2024 3.00  1.70 - 7.00 10*3/mm3 Final    Lymphocytes, Absolute 10/22/2024 2.51  0.70 - 3.10 10*3/mm3 Final    Monocytes, Absolute 10/22/2024 0.62  0.10 - 0.90 10*3/mm3 Final    Eosinophils, Absolute 10/22/2024 0.11  0.00 - 0.40 10*3/mm3 Final    Basophils, Absolute 10/22/2024 0.04  0.00 - 0.20 10*3/mm3 Final    Immature Grans, Absolute 10/22/2024 0.01  0.00 - 0.05 10*3/mm3 Final    nRBC 10/22/2024 0.0  0.0 - 0.2 /100 WBC Final    Glucose 10/22/2024 511 (C)  65 - 99 mg/dL Final    BUN 10/22/2024 7  6 - 20 mg/dL Final    Creatinine 10/22/2024 0.87  0.76 - 1.27 mg/dL Final    Sodium 10/22/2024 135 (L)  136 - 145 mmol/L Final    Potassium 10/22/2024 3.6  3.5 - 5.2 mmol/L Final    Chloride 10/22/2024 96 (L)  98 - 107 mmol/L Final    CO2 10/22/2024 23.7  22.0 - 29.0 mmol/L Final    Calcium 10/22/2024 9.4  8.6 - 10.5 mg/dL Final    Total Protein 10/22/2024 7.9  6.0 - 8.5 g/dL Final    Albumin 10/22/2024 4.1  3.5 - 5.2 g/dL Final    ALT (SGPT) 10/22/2024 26  1 - 41 U/L Final    AST (SGOT) 10/22/2024 36  1 - 40 U/L Final    Alkaline Phosphatase 10/22/2024 158 (H)  39 - 117 U/L Final    Total Bilirubin 10/22/2024 0.3  0.0 - 1.2 mg/dL Final    Globulin 10/22/2024 3.8  gm/dL Final    A/G Ratio 10/22/2024 1.1  g/dL Final    BUN/Creatinine Ratio 10/22/2024 8.0  7.0 - 25.0 Final    Anion Gap 10/22/2024 15.3 (H)  5.0 - 15.0 mmol/L Final    eGFR 10/22/2024 105.8  >60.0 mL/min/1.73 Final    Hemoglobin A1C 10/22/2024 9.80 (H)  4.80 - 5.60 % Final    Total Cholesterol 10/22/2024 164  0 - 200 mg/dL Final    Triglycerides 10/22/2024 168 (H)  0 - 150 mg/dL Final    HDL Cholesterol 10/22/2024 44  40 - 60 mg/dL Final    LDL Cholesterol  10/22/2024 91  0 - 100 mg/dL Final     VLDL Cholesterol 10/22/2024 29  5 - 40 mg/dL Final    LDL/HDL Ratio 10/22/2024 1.96   Final    Free T4 10/22/2024 1.18  0.92 - 1.68 ng/dL Final    TSH 10/22/2024 1.750  0.270 - 4.200 uIU/mL Final   Office Visit on 10/22/2024   Component Date Value Ref Range Status    Amphetamine Screen, Urine 10/22/2024 Negative  Negative Final    PRELIMINARY RESULTS. REFER TO SEND OUT CONFRIMATION FROM [facility] FOR FINAL RESULTS.    Barbiturates Screen, Urine 10/22/2024 Negative  Negative Final    Buprenorphine, Screen, Urine 10/22/2024 Negative  Negative Final    Benzodiazepine Screen, Urine 10/22/2024 Negative  Negative Final    Cocaine Screen, Urine 10/22/2024 Negative  Negative Final    MDMA (ECSTASY) 10/22/2024 Negative  Negative Final    Methamphetamine, Ur 10/22/2024 Negative  Negative Final    Methadone Screen, Urine 10/22/2024 Negative  Negative Final    Morphine/Opiates Screen, Urine 10/22/2024 Positive (A)  Negative Final    Oxycodone Screen, Urine 10/22/2024 Positive (A)  Negative Final    Phencyclidine (PCP), Urine 10/22/2024 Negative  Negative Final    Propoxyphene Scree, Urine 10/22/2024 Negative  Negative Final    Tricyclic Antidepressants Screen 10/22/2024 Negative  Negative Final    THC, Screen, Urine 10/22/2024 Negative  Negative Final    ETHANOL URINE SCREEN 10/22/2024 Negative   Final    Fentanyl, Urine 10/22/2024 Negative  Negative Final    Gabapentin, Ur 10/22/2024 negative   Final       Impression/Formulation:    ICD-10-CM ICD-9-CM   1. Alcohol use disorder, severe, dependence  F10.20 303.90   2. Chronic pain disorder  G89.4 338.4   3. Anxiety disorder, unspecified type  F41.9 300.00   4. Medication management  Z79.899 V58.69        CLINICAL MANEUVERING/INTERVENTIONS: Therapist utilized a person-centered approach to build and maintain rapport with group member.   Therapist promoted safe nonjudgmental environment by providing group members with unconditional positive regard.  Assisted member in processing  above session content; acknowledged and normalized patient's thoughts, feelings and concerns.  Allowed patient to freely discuss issues without interruption or judgment. Provided safe, confidential environment to facilitate the development of positive therapeutic relationship and encourage open, honest communication. Therapist assisted group member with identifying and implementing healthier coping strategies and maintaining healthier boundaries. Assisted patient in identifying risk factors which would indicate the need for higher level of care including thoughts to harm self or others and/or self-harming behavior and encouraged patient to contact this office, call 911, or present to the nearest emergency room should any of these events occur. Pt agreeable to adhere to medication regimen as prescribed and report any side effects.  Discussed crisis intervention services and means to access.  Patient adamantly and convincingly denies current suicidal or homicidal ideation or perceptual disturbance.      Therapist implemented motivational interviewing techniques to assist client with exploring and resolving ambivalence associated with commitment to change behaviors.  Yes  Therapist utilized dialectical behavior techniques to teach and model emotional regulation and relaxation.  No   Therapist applied cognitive behavioral strategies to facilitate identification of maladaptive patterns of thinking and behavior.  Yes     PLAN:    Continue [facility] Phase II  Aftercare:  [facility] Out[facility] Phase 3     Please note that portions of this note were completed with a voice recognition program. Efforts were made to edit dictation, but occasionally words are mistranscribed.     This document signed by Ben Ortega LCSW, November 7, 2024, 16:13 EST

## 2024-11-12 ENCOUNTER — OFFICE VISIT (OUTPATIENT)
Dept: PSYCHIATRY | Facility: CLINIC | Age: 49
End: 2024-11-12
Payer: MEDICARE

## 2024-11-12 DIAGNOSIS — F41.9 ANXIETY DISORDER, UNSPECIFIED TYPE: ICD-10-CM

## 2024-11-12 DIAGNOSIS — G89.4 CHRONIC PAIN DISORDER: ICD-10-CM

## 2024-11-12 DIAGNOSIS — F10.20 ALCOHOL USE DISORDER, SEVERE, DEPENDENCE: Primary | ICD-10-CM

## 2024-11-12 DIAGNOSIS — Z79.899 MEDICATION MANAGEMENT: ICD-10-CM

## 2024-11-12 LAB
AMPHET+METHAMPHET UR QL: NEGATIVE
AMPHETAMINES UR QL: NEGATIVE
BARBITURATES UR QL SCN: NEGATIVE
BENZODIAZ UR QL SCN: NEGATIVE
BUPRENORPHINE SERPL-MCNC: NEGATIVE NG/ML
CANNABINOIDS SERPL QL: NEGATIVE
COCAINE UR QL: NEGATIVE
ETHANOL URINE SCREEN: NEGATIVE
FENTANYL+NORFENTANYL UR QL SCN: NEGATIVE
GABAPENTIN SERPLBLD-MCNC: NEGATIVE UG/ML
MDMA UR QL SCN: NEGATIVE
METHADONE UR QL SCN: NEGATIVE
MORPHINE/OPIATES SCREEN, URINE: POSITIVE
OXYCODONE UR QL SCN: POSITIVE
PCP UR QL SCN: NEGATIVE
PROPOXYPH UR QL SCN: NEGATIVE
TRICYCLICS UR QL SCN: NEGATIVE

## 2024-11-12 RX ORDER — GLIPIZIDE 10 MG/1
10 TABLET ORAL
Qty: 60 TABLET | Refills: 2 | Status: SHIPPED | OUTPATIENT
Start: 2024-11-12

## 2024-11-12 NOTE — PROGRESS NOTES
NAME: Taiwo Pierce  DATE: 11/12/2024    [facility] Phase II  [id number]    Services Provided    Group Psychotherapy x 1  Education/Training x 0  Activity Therapy  x 0  Individual Therapy x 0 0 minutes  Family Therapy x 0  MD Initial Visit  x 0  MD Follow-Up   x 0    Number of participants: 5    DATA:  Patient reported having had a good weekend. He had succeeded in hunting a bowman, and he was going to have some meat out of it. He had not yet found a ellis. He was hopeful of getting on this weekend at [place].     Individual: No    Homework: None assigned    Group 1 (Psychotherapy: Check-ins): Therapist continued facilitation of rapport building strategies between group members. Therapist asked that each patient check in with home life and recovery efforts and identify triggers, cravings, and high risk situations that arise between group sessions.  Group members discussed barriers and benefits of attending recovery meetings.  Therapist provided empathy and support during group session. Daily Reading: None     ASSESSMENT:    Personal Assessment 0-10 Scale (10 worst)    Anxiety:  1 (no comment)  Depression:  0 (no comment)  Cravings: 0 (no comment)     MSE within normal limits? Yes Affect: somber Mood: calm  Pt Response:  open/receptive  Engaged in activity/Process and self-disclosed: adequate  Applies today's group topics to self: adequate  Able to give and receive feedback: adequate  Demonstrated insightful thinking: consistent and adequate  Other pt response comments:  Patient was a positive participant. They demonstrated a relatively positive attitude, a strong degree of motivation, and adequate insight, as evidenced by his level of engagement combined with his efforts to maintain sobriety. They seemed interested and attentive. They appeared to comprehend well the concepts being discussed. The patient seemed receptive of, and willing to implement, the ideas being discussed. Their thought process appeared linear  and goal directed, and their speech was regular rate and rhythm.       Community Group Engagement:  Yes: [employer]    Reported relapse since last meeting? No: no lapse     .  Office Visit on 11/12/2024   Component Date Value Ref Range Status    Amphetamine Screen, Urine 11/12/2024 Negative  Negative Final    PRELIMINARY RESULT. PLEASE FOLLOW UP WITH [facility] LAB CONFIRMATION FROM SEND OUT LAB FINAL RESULTS.    Barbiturates Screen, Urine 11/12/2024 Negative  Negative Final    Buprenorphine, Screen, Urine 11/12/2024 Negative  Negative Final    Benzodiazepine Screen, Urine 11/12/2024 Negative  Negative Final    Cocaine Screen, Urine 11/12/2024 Negative  Negative Final    MDMA (ECSTASY) 11/12/2024 Negative  Negative Final    Methamphetamine, Ur 11/12/2024 Negative  Negative Final    Methadone Screen, Urine 11/12/2024 Negative  Negative Final    Morphine/Opiates Screen, Urine 11/12/2024 Positive (A)  Negative Final    Oxycodone Screen, Urine 11/12/2024 Positive (A)  Negative Final    Phencyclidine (PCP), Urine 11/12/2024 Negative  Negative Final    Propoxyphene Scree, Urine 11/12/2024 Negative  Negative Final    Tricyclic Antidepressants Screen 11/12/2024 Negative  Negative Final    THC, Screen, Urine 11/12/2024 Negative  Negative Final    Gabapentin 11/12/2024 negative   Final    ETHANOL URINE SCREEN 11/12/2024 Negative   Final    Fentanyl, Urine 11/12/2024 negative   Final   Office Visit on 11/07/2024   Component Date Value Ref Range Status    Amphetamine Screen, Urine 11/07/2024 Negative  Negative Final    PRELIMINARY RESULT. PLEASE FOLLOW UP WITH [facility] CONFIRMATION FROM SEND OUT LAB FINAL RESULTS.    Barbiturates Screen, Urine 11/07/2024 Negative  Negative Final    Buprenorphine, Screen, Urine 11/07/2024 Negative  Negative Final    Benzodiazepine Screen, Urine 11/07/2024 Negative  Negative Final    Cocaine Screen, Urine 11/07/2024 Negative  Negative Final    MDMA (ECSTASY) 11/07/2024 Negative  Negative Final     Methamphetamine, Ur 11/07/2024 Negative  Negative Final    Methadone Screen, Urine 11/07/2024 Negative  Negative Final    Morphine/Opiates Screen, Urine 11/07/2024 Positive (A)  Negative Final    Oxycodone Screen, Urine 11/07/2024 Positive (A)  Negative Final    Phencyclidine (PCP), Urine 11/07/2024 Negative  Negative Final    Propoxyphene Scree, Urine 11/07/2024 Negative  Negative Final    Tricyclic Antidepressants Screen 11/07/2024 Negative  Negative Final    THC, Screen, Urine 11/07/2024 Negative  Negative Final    Gabapentin, Ur 11/07/2024 negative   Final    Ethanol, Urine 11/07/2024 negative   Final    Fentanyl, Urine 11/07/2024 negative   Final   Office Visit on 10/29/2024   Component Date Value Ref Range Status    Amphetamine Screen, Urine 10/29/2024 Negative  Negative Final    PRELIMINARY RESULTS. REFER TO SEND OUT CONFRIMATION FROM [facility] FOR FINAL RESULTS.    Barbiturates Screen, Urine 10/29/2024 Negative  Negative Final    Buprenorphine, Screen, Urine 10/29/2024 Negative  Negative Final    Benzodiazepine Screen, Urine 10/29/2024 Negative  Negative Final    Cocaine Screen, Urine 10/29/2024 Negative  Negative Final    MDMA (ECSTASY) 10/29/2024 Negative  Negative Final    Methamphetamine, Ur 10/29/2024 Negative  Negative Final    Methadone Screen, Urine 10/29/2024 Negative  Negative Final    Morphine/Opiates Screen, Urine 10/29/2024 Negative  Negative Final    Oxycodone Screen, Urine 10/29/2024 Positive (A)  Negative Final    Phencyclidine (PCP), Urine 10/29/2024 Negative  Negative Final    Propoxyphene Scree, Urine 10/29/2024 Negative  Negative Final    Tricyclic Antidepressants Screen 10/29/2024 Negative  Negative Final    THC, Screen, Urine 10/29/2024 Negative  Negative Final    ETHANOL URINE SCREEN 10/29/2024 Negative   Final    Fentanyl, Urine 10/29/2024 Negative  Negative Final    Gabapentin, Ur 10/29/2024 negative   Final       Impression/Formulation:    ICD-10-CM ICD-9-CM   1. Alcohol use  disorder, severe, dependence  F10.20 303.90   2. Chronic pain disorder  G89.4 338.4   3. Anxiety disorder, unspecified type  F41.9 300.00   4. Medication management  Z79.899 V58.69        CLINICAL MANEUVERING/INTERVENTIONS: Therapist utilized a person-centered approach to build and maintain rapport with group member.   Therapist promoted safe nonjudgmental environment by providing group members with unconditional positive regard.  Assisted member in processing above session content; acknowledged and normalized patient's thoughts, feelings and concerns.  Allowed patient to freely discuss issues without interruption or judgment. Provided safe, confidential environment to facilitate the development of positive therapeutic relationship and encourage open, honest communication. Therapist assisted group member with identifying and implementing healthier coping strategies and maintaining healthier boundaries. Assisted patient in identifying risk factors which would indicate the need for higher level of care including thoughts to harm self or others and/or self-harming behavior and encouraged patient to contact this office, call 911, or present to the nearest emergency room should any of these events occur. Pt agreeable to adhere to medication regimen as prescribed and report any side effects.  Discussed crisis intervention services and means to access.  Patient adamantly and convincingly denies current suicidal or homicidal ideation or perceptual disturbance.      Therapist implemented motivational interviewing techniques to assist client with exploring and resolving ambivalence associated with commitment to change behaviors.  Yes  Therapist utilized dialectical behavior techniques to teach and model emotional regulation and relaxation.  No   Therapist applied cognitive behavioral strategies to facilitate identification of maladaptive patterns of thinking and behavior.  Yes     PLAN:    Continue [facility]  IOP Phase II  Aftercare:  [facility] Out[facility] Phase 3     Please note that portions of this note were completed with a voice recognition program. Efforts were made to edit dictation, but occasionally words are mistranscribed.     This document signed by Ben Ortega LCSW, November 12, 2024, 17:02 EST

## 2024-11-19 ENCOUNTER — OFFICE VISIT (OUTPATIENT)
Dept: PSYCHIATRY | Facility: CLINIC | Age: 49
End: 2024-11-19
Payer: MEDICARE

## 2024-11-19 DIAGNOSIS — F10.20 ALCOHOL USE DISORDER, SEVERE, DEPENDENCE: Primary | ICD-10-CM

## 2024-11-19 DIAGNOSIS — Z79.899 MEDICATION MANAGEMENT: ICD-10-CM

## 2024-11-19 DIAGNOSIS — F41.9 ANXIETY DISORDER, UNSPECIFIED TYPE: ICD-10-CM

## 2024-11-19 DIAGNOSIS — G89.4 CHRONIC PAIN DISORDER: ICD-10-CM

## 2024-11-19 LAB
AMPHET+METHAMPHET UR QL: NEGATIVE
AMPHETAMINES UR QL: NEGATIVE
BARBITURATES UR QL SCN: NEGATIVE
BENZODIAZ UR QL SCN: NEGATIVE
BUPRENORPHINE SERPL-MCNC: NEGATIVE NG/ML
CANNABINOIDS SERPL QL: NEGATIVE
COCAINE UR QL: NEGATIVE
ETHANOL URINE SCREEN: NEGATIVE
FENTANYL UR-MCNC: NEGATIVE NG/ML
GABAPENTIN SERPLBLD-MCNC: NEGATIVE UG/ML
MDMA UR QL SCN: NEGATIVE
METHADONE UR QL SCN: NEGATIVE
MORPHINE/OPIATES SCREEN, URINE: POSITIVE
OXYCODONE UR QL SCN: NEGATIVE
PCP UR QL SCN: NEGATIVE
PROPOXYPH UR QL SCN: NEGATIVE
TRICYCLICS UR QL SCN: NEGATIVE

## 2024-11-19 NOTE — PROGRESS NOTES
NAME: Taiwo Pierce  DATE: 11/19/2024    [facility] Phase III  [id number]    Services Provided    Group Psychotherapy x 1  Education/Training x 0  Activity Therapy  x 0  Individual Therapy x 0 0 minutes  Family Therapy x 0  MD Initial Visit  x 0  MD Follow-Up   x 0    Number of participants: 3    DATA:  Patient reported that he was enjoying his life sober. He felt that he was finally allowed to be himself without the influence of alcohol.     Individual: No    Homework: None assigned    Group 1 (Psychotherapy: Check-ins): Therapist continued facilitation of rapport building strategies between group members. Therapist asked that each patient check in with home life and recovery efforts and identify triggers, cravings, and high risk situations that arise between group sessions.  Group members discussed barriers and benefits of attending recovery meetings.  Therapist provided empathy and support during group session. Daily Reading: None     ASSESSMENT:    Personal Assessment 0-10 Scale (10 worst)    Anxiety:  3 (no comment)  Depression:  0 (no comment)  Cravings: 0 (no comment)     MSE within normal limits? Yes Affect: euthymic Mood: anxious  Pt Response:  open/receptive  Engaged in activity/Process and self-disclosed: adequate  Applies today's group topics to self: adequate  Able to give and receive feedback: adequate  Demonstrated insightful thinking: consistent and adequate  Other pt response comments:  Patient was a positive participant. They demonstrated a relatively positive attitude, a strong degree of motivation, and adequate insight, as evidenced by his level of engagement combined with his positive attitude. They seemed interested and attentive. They appeared to comprehend well the concepts being discussed. The patient seemed receptive of, and willing to implement, the ideas being discussed. Their thought process appeared linear and goal directed, and their speech was regular rate and rhythm.        Community Group Engagement:  Yes: [employer]    Reported relapse since last meeting? No: no lapse     .  Appointment on 11/19/2024   Component Date Value Ref Range Status    Amphetamine Screen, Urine 11/19/2024 Negative  Negative Final    PRELIMINARY RESULTS. PLEASE REFER TO THE [facility] CONFIRMATION FOR FINAL RESULTS.    Barbiturates Screen, Urine 11/19/2024 Negative  Negative Final    Buprenorphine, Screen, Urine 11/19/2024 Negative  Negative Final    Benzodiazepine Screen, Urine 11/19/2024 Negative  Negative Final    Cocaine Screen, Urine 11/19/2024 Negative  Negative Final    MDMA (ECSTASY) 11/19/2024 Negative  Negative Final    Methamphetamine, Ur 11/19/2024 Negative  Negative Final    Methadone Screen, Urine 11/19/2024 Negative  Negative Final    Morphine/Opiates Screen, Urine 11/19/2024 Positive (A)  Negative Final    Oxycodone Screen, Urine 11/19/2024 Negative  Negative Final    Phencyclidine (PCP), Urine 11/19/2024 Negative  Negative Final    Propoxyphene Scree, Urine 11/19/2024 Negative  Negative Final    Tricyclic Antidepressants Screen 11/19/2024 Negative  Negative Final    THC, Screen, Urine 11/19/2024 Negative  Negative Final    Gabapentin 11/19/2024 negative   Final    ETHANOL URINE SCREEN 11/19/2024 Negative   Final    Fentanyl, Urine 11/19/2024 Negative  Negative Final   Office Visit on 11/12/2024   Component Date Value Ref Range Status    Amphetamine Screen, Urine 11/12/2024 Negative  Negative Final    PRELIMINARY RESULT. PLEASE FOLLOW UP WITH [facility] LAB CONFIRMATION FROM SEND OUT LAB FINAL RESULTS.    Barbiturates Screen, Urine 11/12/2024 Negative  Negative Final    Buprenorphine, Screen, Urine 11/12/2024 Negative  Negative Final    Benzodiazepine Screen, Urine 11/12/2024 Negative  Negative Final    Cocaine Screen, Urine 11/12/2024 Negative  Negative Final    MDMA (ECSTASY) 11/12/2024 Negative  Negative Final    Methamphetamine, Ur 11/12/2024 Negative  Negative Final    Methadone Screen, Urine  11/12/2024 Negative  Negative Final    Morphine/Opiates Screen, Urine 11/12/2024 Positive (A)  Negative Final    Oxycodone Screen, Urine 11/12/2024 Positive (A)  Negative Final    Phencyclidine (PCP), Urine 11/12/2024 Negative  Negative Final    Propoxyphene Scree, Urine 11/12/2024 Negative  Negative Final    Tricyclic Antidepressants Screen 11/12/2024 Negative  Negative Final    THC, Screen, Urine 11/12/2024 Negative  Negative Final    Gabapentin 11/12/2024 negative   Final    ETHANOL URINE SCREEN 11/12/2024 Negative   Final    Fentanyl, Urine 11/12/2024 negative   Final   Office Visit on 11/07/2024   Component Date Value Ref Range Status    Amphetamine Screen, Urine 11/07/2024 Negative  Negative Final    PRELIMINARY RESULT. PLEASE FOLLOW UP WITH [facility] CONFIRMATION FROM SEND OUT LAB FINAL RESULTS.    Barbiturates Screen, Urine 11/07/2024 Negative  Negative Final    Buprenorphine, Screen, Urine 11/07/2024 Negative  Negative Final    Benzodiazepine Screen, Urine 11/07/2024 Negative  Negative Final    Cocaine Screen, Urine 11/07/2024 Negative  Negative Final    MDMA (ECSTASY) 11/07/2024 Negative  Negative Final    Methamphetamine, Ur 11/07/2024 Negative  Negative Final    Methadone Screen, Urine 11/07/2024 Negative  Negative Final    Morphine/Opiates Screen, Urine 11/07/2024 Positive (A)  Negative Final    Oxycodone Screen, Urine 11/07/2024 Positive (A)  Negative Final    Phencyclidine (PCP), Urine 11/07/2024 Negative  Negative Final    Propoxyphene Scree, Urine 11/07/2024 Negative  Negative Final    Tricyclic Antidepressants Screen 11/07/2024 Negative  Negative Final    THC, Screen, Urine 11/07/2024 Negative  Negative Final    Gabapentin, Ur 11/07/2024 negative   Final    Ethanol, Urine 11/07/2024 negative   Final    Fentanyl, Urine 11/07/2024 negative   Final       Impression/Formulation:    ICD-10-CM ICD-9-CM   1. Alcohol use disorder, severe, dependence  F10.20 303.90   2. Chronic pain disorder  G89.4 338.4    3. Anxiety disorder, unspecified type  F41.9 300.00   4. Medication management  Z79.899 V58.69        CLINICAL MANEUVERING/INTERVENTIONS: Therapist utilized a person-centered approach to build and maintain rapport with group member.   Therapist promoted safe nonjudgmental environment by providing group members with unconditional positive regard.  Assisted member in processing above session content; acknowledged and normalized patient's thoughts, feelings and concerns.  Allowed patient to freely discuss issues without interruption or judgment. Provided safe, confidential environment to facilitate the development of positive therapeutic relationship and encourage open, honest communication. Therapist assisted group member with identifying and implementing healthier coping strategies and maintaining healthier boundaries. Assisted patient in identifying risk factors which would indicate the need for higher level of care including thoughts to harm self or others and/or self-harming behavior and encouraged patient to contact this office, call 911, or present to the nearest emergency room should any of these events occur. Pt agreeable to adhere to medication regimen as prescribed and report any side effects.  Discussed crisis intervention services and means to access.  Patient adamantly and convincingly denies current suicidal or homicidal ideation or perceptual disturbance.      Therapist implemented motivational interviewing techniques to assist client with exploring and resolving ambivalence associated with commitment to change behaviors.  Yes  Therapist utilized dialectical behavior techniques to teach and model emotional regulation and relaxation.  No   Therapist applied cognitive behavioral strategies to facilitate identification of maladaptive patterns of thinking and behavior.  Yes     PLAN:    Continue [facility] Phase III  Aftercare:  [facility] outpatient therapy      Please note  that portions of this note were completed with a voice recognition program. Efforts were made to edit dictation, but occasionally words are mistranscribed.     This document signed by Ben Ortega LCSW, November 19, 2024, 16:08 EST

## 2024-11-26 ENCOUNTER — OFFICE VISIT (OUTPATIENT)
Dept: PSYCHIATRY | Facility: CLINIC | Age: 49
End: 2024-11-26
Payer: MEDICARE

## 2024-11-26 DIAGNOSIS — G89.4 CHRONIC PAIN DISORDER: ICD-10-CM

## 2024-11-26 DIAGNOSIS — F41.9 ANXIETY DISORDER, UNSPECIFIED TYPE: ICD-10-CM

## 2024-11-26 DIAGNOSIS — F10.20 ALCOHOL USE DISORDER, SEVERE, DEPENDENCE: Primary | ICD-10-CM

## 2024-11-26 DIAGNOSIS — Z79.899 MEDICATION MANAGEMENT: ICD-10-CM

## 2024-11-26 LAB
AMPHET+METHAMPHET UR QL: NEGATIVE
AMPHETAMINES UR QL: NEGATIVE
BARBITURATES UR QL SCN: NEGATIVE
BENZODIAZ UR QL SCN: NEGATIVE
BUPRENORPHINE SERPL-MCNC: NEGATIVE NG/ML
CANNABINOIDS SERPL QL: NEGATIVE
COCAINE UR QL: NEGATIVE
ETHANOL URINE SCREEN: NEGATIVE
FENTANYL UR-MCNC: NEGATIVE NG/ML
GABAPENTIN SERPLBLD-MCNC: NEGATIVE UG/ML
MDMA UR QL SCN: NEGATIVE
METHADONE UR QL SCN: NEGATIVE
MORPHINE/OPIATES SCREEN, URINE: POSITIVE
OXYCODONE UR QL SCN: POSITIVE
PCP UR QL SCN: NEGATIVE
PROPOXYPH UR QL SCN: NEGATIVE
TRICYCLICS UR QL SCN: NEGATIVE

## 2024-11-26 NOTE — PROGRESS NOTES
"   NAME: Taiwo Pierce  DATE: 11/26/2024    [facility] Phase III  [id number]    Services Provided    Group Psychotherapy x 1  Education/Training x 0  Activity Therapy  x 0  Individual Therapy x 0 0 minutes  Family Therapy x 0  MD Initial Visit  x 0  MD Follow-Up   x 0    Number of participants: 3    DATA:  The patient reported having noticed his vision changing last week. He went back to his doctor, and he discovered that his eyesight had improved. He was told that this was related to his blood sugar having been regulated better. The patient also shared about a choice with which had been wrestling. He had been considering changing churches because he was no longer leaving [employer] with a \"blessing.\" This appeared to be a good feeling he was looking for as a byproduct of [employer] attendance. We processed this together as a group. Therapist encouraged the patients to ask the right questions when wrestling with difficult decisions.     Individual: No    Homework: None assigned    Group 1 (Psychotherapy: Check-ins): Therapist continued facilitation of rapport building strategies between group members. Therapist asked that each patient check in with home life and recovery efforts and identify triggers, cravings, and high risk situations that arise between group sessions.  Group members discussed barriers and benefits of attending recovery meetings.  Therapist provided empathy and support during group session. Daily Reading: None       ASSESSMENT:    Personal Assessment 0-10 Scale (10 worst)    Anxiety:  1 (no comment)  Depression:  0 (no comment)  Cravings: 0 (no comment)     MSE within normal limits? Yes Affect: somber Mood: anxious  Pt Response:  open/receptive  Engaged in activity/Process and self-disclosed: adequate  Applies today's group topics to self: adequate  Able to give and receive feedback: adequate  Demonstrated insightful thinking: occasional and moderate  Other pt response comments:  Patient was a positive " participant. They demonstrated a relatively positive attitude, a strong degree of motivation, and moderate insight, as evidenced by his level of engagement combined with his efforts to maintain sobriety. They seemed interested and attentive. They appeared to comprehend well the concepts being discussed. The patient seemed receptive of, and somewhat willing to implement, the ideas being discussed. Their thought process appeared linear and goal directed, and their speech was regular rate and rhythm.       Community Group Engagement:  Yes: Attended his father's [employer] this past Sunday     Reported relapse since last meeting? No: no lapse since election day    .  Office Visit on 11/26/2024   Component Date Value Ref Range Status    Amphetamine Screen, Urine 11/26/2024 Negative  Negative Final    PRELIMINARY RESULTS. PLEASE REFER TO THE [facility] CONFIRMATION FOR FINAL RESULTS.    Barbiturates Screen, Urine 11/26/2024 Negative  Negative Final    Buprenorphine, Screen, Urine 11/26/2024 Negative  Negative Final    Benzodiazepine Screen, Urine 11/26/2024 Negative  Negative Final    Cocaine Screen, Urine 11/26/2024 Negative  Negative Final    MDMA (ECSTASY) 11/26/2024 Negative  Negative Final    Methamphetamine, Ur 11/26/2024 Negative  Negative Final    Methadone Screen, Urine 11/26/2024 Negative  Negative Final    Morphine/Opiates Screen, Urine 11/26/2024 Positive (A)  Negative Final    Oxycodone Screen, Urine 11/26/2024 Positive (A)  Negative Final    Phencyclidine (PCP), Urine 11/26/2024 Negative  Negative Final    Propoxyphene Scree, Urine 11/26/2024 Negative  Negative Final    Tricyclic Antidepressants Screen 11/26/2024 Negative  Negative Final    THC, Screen, Urine 11/26/2024 Negative  Negative Final    Gabapentin 11/26/2024 negative   Final    ETHANOL URINE SCREEN 11/26/2024 Negative   Final    Fentanyl, Urine 11/26/2024 Negative  Negative Final   Office Visit on 11/19/2024   Component Date Value Ref Range Status     Amphetamine Screen, Urine 11/19/2024 Negative  Negative Final    PRELIMINARY RESULTS. PLEASE REFER TO THE [facility] CONFIRMATION FOR FINAL RESULTS.    Barbiturates Screen, Urine 11/19/2024 Negative  Negative Final    Buprenorphine, Screen, Urine 11/19/2024 Negative  Negative Final    Benzodiazepine Screen, Urine 11/19/2024 Negative  Negative Final    Cocaine Screen, Urine 11/19/2024 Negative  Negative Final    MDMA (ECSTASY) 11/19/2024 Negative  Negative Final    Methamphetamine, Ur 11/19/2024 Negative  Negative Final    Methadone Screen, Urine 11/19/2024 Negative  Negative Final    Morphine/Opiates Screen, Urine 11/19/2024 Positive (A)  Negative Final    Oxycodone Screen, Urine 11/19/2024 Negative  Negative Final    Phencyclidine (PCP), Urine 11/19/2024 Negative  Negative Final    Propoxyphene Scree, Urine 11/19/2024 Negative  Negative Final    Tricyclic Antidepressants Screen 11/19/2024 Negative  Negative Final    THC, Screen, Urine 11/19/2024 Negative  Negative Final    Gabapentin 11/19/2024 negative   Final    ETHANOL URINE SCREEN 11/19/2024 Negative   Final    Fentanyl, Urine 11/19/2024 Negative  Negative Final   Office Visit on 11/12/2024   Component Date Value Ref Range Status    Amphetamine Screen, Urine 11/12/2024 Negative  Negative Final    PRELIMINARY RESULT. PLEASE FOLLOW UP WITH [facility] LAB CONFIRMATION FROM SEND OUT LAB FINAL RESULTS.    Barbiturates Screen, Urine 11/12/2024 Negative  Negative Final    Buprenorphine, Screen, Urine 11/12/2024 Negative  Negative Final    Benzodiazepine Screen, Urine 11/12/2024 Negative  Negative Final    Cocaine Screen, Urine 11/12/2024 Negative  Negative Final    MDMA (ECSTASY) 11/12/2024 Negative  Negative Final    Methamphetamine, Ur 11/12/2024 Negative  Negative Final    Methadone Screen, Urine 11/12/2024 Negative  Negative Final    Morphine/Opiates Screen, Urine 11/12/2024 Positive (A)  Negative Final    Oxycodone Screen, Urine 11/12/2024 Positive (A)  Negative Final     Phencyclidine (PCP), Urine 11/12/2024 Negative  Negative Final    Propoxyphene Scree, Urine 11/12/2024 Negative  Negative Final    Tricyclic Antidepressants Screen 11/12/2024 Negative  Negative Final    THC, Screen, Urine 11/12/2024 Negative  Negative Final    Gabapentin 11/12/2024 negative   Final    ETHANOL URINE SCREEN 11/12/2024 Negative   Final    Fentanyl, Urine 11/12/2024 negative   Final   Office Visit on 11/07/2024   Component Date Value Ref Range Status    Amphetamine Screen, Urine 11/07/2024 Negative  Negative Final    PRELIMINARY RESULT. PLEASE FOLLOW UP WITH [facility] CONFIRMATION FROM SEND OUT LAB FINAL RESULTS.    Barbiturates Screen, Urine 11/07/2024 Negative  Negative Final    Buprenorphine, Screen, Urine 11/07/2024 Negative  Negative Final    Benzodiazepine Screen, Urine 11/07/2024 Negative  Negative Final    Cocaine Screen, Urine 11/07/2024 Negative  Negative Final    MDMA (ECSTASY) 11/07/2024 Negative  Negative Final    Methamphetamine, Ur 11/07/2024 Negative  Negative Final    Methadone Screen, Urine 11/07/2024 Negative  Negative Final    Morphine/Opiates Screen, Urine 11/07/2024 Positive (A)  Negative Final    Oxycodone Screen, Urine 11/07/2024 Positive (A)  Negative Final    Phencyclidine (PCP), Urine 11/07/2024 Negative  Negative Final    Propoxyphene Scree, Urine 11/07/2024 Negative  Negative Final    Tricyclic Antidepressants Screen 11/07/2024 Negative  Negative Final    THC, Screen, Urine 11/07/2024 Negative  Negative Final    Gabapentin, Ur 11/07/2024 negative   Final    Ethanol, Urine 11/07/2024 negative   Final    Fentanyl, Urine 11/07/2024 negative   Final       Impression/Formulation:    ICD-10-CM ICD-9-CM   1. Alcohol use disorder, severe, dependence  F10.20 303.90   2. Chronic pain disorder  G89.4 338.4   3. Anxiety disorder, unspecified type  F41.9 300.00   4. Medication management  Z79.899 V58.69        CLINICAL MANEUVERING/INTERVENTIONS: Therapist utilized a person-centered  approach to build and maintain rapport with group member.   Therapist promoted safe nonjudgmental environment by providing group members with unconditional positive regard.  Assisted member in processing above session content; acknowledged and normalized patient's thoughts, feelings and concerns.  Allowed patient to freely discuss issues without interruption or judgment. Provided safe, confidential environment to facilitate the development of positive therapeutic relationship and encourage open, honest communication. Therapist assisted group member with identifying and implementing healthier coping strategies and maintaining healthier boundaries. Assisted patient in identifying risk factors which would indicate the need for higher level of care including thoughts to harm self or others and/or self-harming behavior and encouraged patient to contact this office, call 911, or present to the nearest emergency room should any of these events occur. Pt agreeable to adhere to medication regimen as prescribed and report any side effects.  Discussed crisis intervention services and means to access.  Patient adamantly and convincingly denies current suicidal or homicidal ideation or perceptual disturbance.      Therapist implemented motivational interviewing techniques to assist client with exploring and resolving ambivalence associated with commitment to change behaviors.  Yes  Therapist utilized dialectical behavior techniques to teach and model emotional regulation and relaxation.  No   Therapist applied cognitive behavioral strategies to facilitate identification of maladaptive patterns of thinking and behavior.  Yes     PLAN:    Continue [facility] Phase III  Aftercare:  [facility] outpatient therapy      Please note that portions of this note were completed with a voice recognition program. Efforts were made to edit dictation, but occasionally words are mistranscribed.     This document  signed by Ben Ortega, [facility], November 26, 2024, 14:54 EST

## 2024-12-05 DIAGNOSIS — Z79.899 MEDICATION MANAGEMENT: Primary | ICD-10-CM

## 2024-12-10 ENCOUNTER — OFFICE VISIT (OUTPATIENT)
Dept: PSYCHIATRY | Facility: CLINIC | Age: 49
End: 2024-12-10
Payer: MEDICARE

## 2024-12-10 DIAGNOSIS — F10.20 ALCOHOL USE DISORDER, SEVERE, DEPENDENCE: Primary | ICD-10-CM

## 2024-12-10 DIAGNOSIS — F41.9 ANXIETY DISORDER, UNSPECIFIED TYPE: ICD-10-CM

## 2024-12-10 DIAGNOSIS — G89.4 CHRONIC PAIN DISORDER: ICD-10-CM

## 2024-12-10 PROCEDURE — 90853 GROUP PSYCHOTHERAPY: CPT | Performed by: SOCIAL WORKER

## 2024-12-10 RX ORDER — SILDENAFIL 25 MG/1
25 TABLET, FILM COATED ORAL DAILY PRN
Qty: 30 TABLET | Refills: 2 | OUTPATIENT
Start: 2024-12-10

## 2024-12-10 NOTE — PROGRESS NOTES
NAME: Taiwo Pierce  DATE: 12/10/2024    [facility] Phase III  [id number]    Services Provided    Group Psychotherapy x 1  Education/Training x 0  Activity Therapy  x 0  Individual Therapy x 0 0 minutes  Family Therapy x 0  MD Initial Visit  x 0  MD Follow-Up   x 0    Number of participants: 6    DATA:  Patient reported he had been doing well. He sick last week, but he was feeling better. He denied having used any alcohol since the last time he had been in group therapy. He denied any alcohol use since election day. He reported things between he and his wife were good.     Therapist asked the patient directly about why he had been in [facility] recently. He reported he and his wife had been in a misunderstanding. It had been over her son. The patient's report was vague at first. He shared that he was embarrassed. The therapist probed a bit more. The patient explained that his wife's son had disregarded instruction to clean up his plate after Thanksgiving dinner. He even went so far as to kick the plate, creating a mess with the food that had been left over. The patient spanked him. His wife was not pleased with this, and the argument ensued. She accused him to having consumed alcohol again. She called the police. The patient reported he never laid hands on her, but the police took him to [facility] anyway. He was in [facility] for 3 days. His dad bailed him out of [facility] for $1,000. He denied alcohol having been in involved. He even provided a lab sample to the police to prove he had not been drinking alcohol. As we discussed this, the patient reported he felt better about it. He also began seeing [facility] as a blessing, because it kept him from drinking alcohol that night.     Individual: No    Homework: None assigned    Group 1 (Psychotherapy: Check-ins): Therapist continued facilitation of rapport building strategies between group members. Therapist asked that each patient check in with home life and recovery efforts and identify  triggers, cravings, and high risk situations that arise between group sessions.  Group members discussed barriers and benefits of attending recovery meetings.  Therapist provided empathy and support during group session. Daily Reading: None     ASSESSMENT:    Personal Assessment 0-10 Scale (10 worst)    Anxiety:  2 (no comment)  Depression:  0 (no comment)  Cravings: 0 (no comment)     MSE within normal limits? No Affect: dysthymic Mood: depressed and anxious  Pt Response:  guarded  Engaged in activity/Process and self-disclosed: suboptimal  Applies today's group topics to self: suboptimal  Able to give and receive feedback: adequate  Demonstrated insightful thinking: occasional and adequate  Other pt response comments:  Patient was a positive participant. They demonstrated a relatively optimistic attitude, a moderate degree of motivation, and moderate insight, as evidenced by his level of engagement combined with his initial decision to not be forthcoming with the fact that he had been arrested. They seemed interested and attentive. They appeared to comprehend well the concepts being discussed. The patient seemed receptive of, and somewhat willing to implement, the ideas being discussed. Their thought process appeared linear and goal directed, and their speech was regular rate and rhythm.       Community Group Engagement:  Yes: [facility]    Reported relapse since last meeting? No: denies adamantly     .  Orders Only on 12/05/2024   Component Date Value Ref Range Status    Amphetamine Screen, Urine 12/05/2024 Negative  Negative Final    Preliminary results. Refer to confirmation send out from [facility] for final results.    Barbiturates Screen, Urine 12/05/2024 Negative  Negative Final    Buprenorphine, Screen, Urine 12/05/2024 Negative  Negative Final    Benzodiazepine Screen, Urine 12/05/2024 Negative  Negative Final    Cocaine Screen, Urine 12/05/2024 Negative  Negative Final    MDMA (ECSTASY) 12/05/2024 Negative   Negative Final    Methamphetamine, Ur 12/05/2024 Negative  Negative Final    Morphine/Opiates Screen, Urine 12/05/2024 Positive (A)  Negative Final    Methadone Screen, Urine 12/05/2024 Negative  Negative Final    Oxycodone Screen, Urine 12/05/2024 Positive (A)  Negative Final    Phencyclidine (PCP), Urine 12/05/2024 Negative  Negative Final    THC, Screen, Urine 12/05/2024 Negative  Negative Final   Office Visit on 11/26/2024   Component Date Value Ref Range Status    Amphetamine Screen, Urine 11/26/2024 Negative  Negative Final    PRELIMINARY RESULTS. PLEASE REFER TO THE [facility] CONFIRMATION FOR FINAL RESULTS.    Barbiturates Screen, Urine 11/26/2024 Negative  Negative Final    Buprenorphine, Screen, Urine 11/26/2024 Negative  Negative Final    Benzodiazepine Screen, Urine 11/26/2024 Negative  Negative Final    Cocaine Screen, Urine 11/26/2024 Negative  Negative Final    MDMA (ECSTASY) 11/26/2024 Negative  Negative Final    Methamphetamine, Ur 11/26/2024 Negative  Negative Final    Methadone Screen, Urine 11/26/2024 Negative  Negative Final    Morphine/Opiates Screen, Urine 11/26/2024 Positive (A)  Negative Final    Oxycodone Screen, Urine 11/26/2024 Positive (A)  Negative Final    Phencyclidine (PCP), Urine 11/26/2024 Negative  Negative Final    Propoxyphene Scree, Urine 11/26/2024 Negative  Negative Final    Tricyclic Antidepressants Screen 11/26/2024 Negative  Negative Final    THC, Screen, Urine 11/26/2024 Negative  Negative Final    Gabapentin 11/26/2024 negative   Final    ETHANOL URINE SCREEN 11/26/2024 Negative   Final    Fentanyl, Urine 11/26/2024 Negative  Negative Final       Impression/Formulation:    ICD-10-CM ICD-9-CM   1. Alcohol use disorder, severe, dependence  F10.20 303.90   2. Chronic pain disorder  G89.4 338.4   3. Anxiety disorder, unspecified type  F41.9 300.00        CLINICAL MANEUVERING/INTERVENTIONS: Therapist utilized a person-centered approach to build and maintain rapport with group  member.   Therapist promoted safe nonjudgmental environment by providing group members with unconditional positive regard.  Assisted member in processing above session content; acknowledged and normalized patient's thoughts, feelings and concerns.  Allowed patient to freely discuss issues without interruption or judgment. Provided safe, confidential environment to facilitate the development of positive therapeutic relationship and encourage open, honest communication. Therapist assisted group member with identifying and implementing healthier coping strategies and maintaining healthier boundaries. Assisted patient in identifying risk factors which would indicate the need for higher level of care including thoughts to harm self or others and/or self-harming behavior and encouraged patient to contact this office, call 911, or present to the nearest emergency room should any of these events occur. Pt agreeable to adhere to medication regimen as prescribed and report any side effects.  Discussed crisis intervention services and means to access.  Patient adamantly and convincingly denies current suicidal or homicidal ideation or perceptual disturbance.      Therapist implemented motivational interviewing techniques to assist client with exploring and resolving ambivalence associated with commitment to change behaviors.  Yes  Therapist utilized dialectical behavior techniques to teach and model emotional regulation and relaxation.  No   Therapist applied cognitive behavioral strategies to facilitate identification of maladaptive patterns of thinking and behavior.  Yes     PLAN:    Continue [facility] Phase III  Aftercare:  [facility] outpatient therapy      Please note that portions of this note were completed with a voice recognition program. Efforts were made to edit dictation, but occasionally words are mistranscribed.     This document signed by Ben Ortega LCSW, December 10, 2024,  15:12 EST

## 2024-12-12 DIAGNOSIS — Z79.899 MEDICATION MANAGEMENT: Primary | ICD-10-CM

## 2024-12-17 ENCOUNTER — OFFICE VISIT (OUTPATIENT)
Dept: PSYCHIATRY | Facility: CLINIC | Age: 49
End: 2024-12-17
Payer: MEDICARE

## 2024-12-17 DIAGNOSIS — G89.4 CHRONIC PAIN DISORDER: ICD-10-CM

## 2024-12-17 DIAGNOSIS — F41.9 ANXIETY DISORDER, UNSPECIFIED TYPE: ICD-10-CM

## 2024-12-17 DIAGNOSIS — Z79.899 MEDICATION MANAGEMENT: ICD-10-CM

## 2024-12-17 DIAGNOSIS — F10.20 ALCOHOL USE DISORDER, SEVERE, DEPENDENCE: Primary | ICD-10-CM

## 2024-12-17 NOTE — PROGRESS NOTES
NAME: Taiwo Pierce  DATE: 12/17/2024    [facility] Phase III  [id number]    Services Provided    Group Psychotherapy x 1  Education/Training x 0  Activity Therapy  x 0  Individual Therapy x 0 0 minutes  Family Therapy x 0  MD Initial Visit  x 0  MD Follow-Up   x 0    Number of participants: 5    DATA:  Patient reported he was doing okay today. He had been [place] shopping a little, but he was not finished.     Individual: No    Homework: None assigned    Group 1 (Psychotherapy: Check-ins): Therapist continued facilitation of rapport building strategies between group members. Therapist asked that each patient check in with home life and recovery efforts and identify triggers, cravings, and high risk situations that arise between group sessions.  Group members discussed barriers and benefits of attending recovery meetings.  Therapist provided empathy and support during group session. Daily Reading: None     ASSESSMENT:    Personal Assessment 0-10 Scale (10 worst)    Anxiety:  2 (no comment)  Depression:  0 (no comment)  Cravings: 1 (no comment)     MSE within normal limits? Yes Affect: somber Mood: calm  Pt Response:  open/receptive  Engaged in activity/Process and self-disclosed: adequate  Applies today's group topics to self: adequate  Able to give and receive feedback: adequate  Demonstrated insightful thinking: consistent and adequate  Other pt response comments:  Patient was a positive participant. They demonstrated a relatively positive attitude, a strong degree of motivation, and adequate insight, as evidenced by his level of engagement combined with her efforts to maintain sobriety. They seemed interested and attentive. They appeared to comprehend well the concepts being discussed. The patient seemed receptive of, and willing to implement, the ideas being discussed. Their thought process appeared linear and goal directed, and their speech was regular rate and rhythm.       Community Group Engagement:   Yes: [facility]     Reported relapse since last meeting? No: no lapse     .  Appointment on 12/17/2024   Component Date Value Ref Range Status    Amphetamine Screen, Urine 12/17/2024 Negative  Negative Final    Barbiturates Screen, Urine 12/17/2024 Negative  Negative Final    Buprenorphine, Screen, Urine 12/17/2024 Negative  Negative Final    Benzodiazepine Screen, Urine 12/17/2024 Negative  Negative Final    Cocaine Screen, Urine 12/17/2024 Negative  Negative Final    MDMA (ECSTASY) 12/17/2024 Negative  Negative Final    Methamphetamine, Ur 12/17/2024 Negative  Negative Final    Methadone Screen, Urine 12/17/2024 Negative  Negative Final    Morphine/Opiates Screen, Urine 12/17/2024 Positive (A)  Negative Final    Oxycodone Screen, Urine 12/17/2024 Positive (A)  Negative Final    Phencyclidine (PCP), Urine 12/17/2024 Negative  Negative Final    Propoxyphene Scree, Urine 12/17/2024 Negative  Negative Final    Tricyclic Antidepressants Screen 12/17/2024 Negative  Negative Final    THC, Screen, Urine 12/17/2024 Negative  Negative Final    Gabapentin 12/17/2024 negative   Final    ETHANOL URINE SCREEN 12/17/2024 Negative   Final    Fentanyl, Urine 12/17/2024 Negative  Negative Final   Orders Only on 12/12/2024   Component Date Value Ref Range Status    Amphetamine Screen, Urine 12/12/2024 Negative  Negative Final    Barbiturates Screen, Urine 12/12/2024 Negative  Negative Final    Buprenorphine, Screen, Urine 12/12/2024 Negative  Negative Final    Benzodiazepine Screen, Urine 12/12/2024 Negative  Negative Final    Cocaine Screen, Urine 12/12/2024 Negative  Negative Final    MDMA (ECSTASY) 12/12/2024 Negative  Negative Final    Methamphetamine, Ur 12/12/2024 Negative  Negative Final    Methadone Screen, Urine 12/12/2024 Negative  Negative Final    Morphine/Opiates Screen, Urine 12/12/2024 Positive (A)  Negative Final    Oxycodone Screen, Urine 12/12/2024 Positive (A)  Negative Final    Phencyclidine (PCP), Urine 12/12/2024  Negative  Negative Final    Propoxyphene Scree, Urine 12/12/2024 Negative  Negative Final    Tricyclic Antidepressants Screen 12/12/2024 Negative  Negative Final    THC, Screen, Urine 12/12/2024 Negative  Negative Final    Gabapentin 12/12/2024 NEGATIVE   Final    ETHANOL URINE SCREEN 12/12/2024 Negative   Final    Fentanyl, Urine 12/12/2024 Negative  Negative Final   Orders Only on 12/05/2024   Component Date Value Ref Range Status    Amphetamine Screen, Urine 12/05/2024 Negative  Negative Final    Preliminary results. Refer to confirmation send out from [facility] for final results.    Barbiturates Screen, Urine 12/05/2024 Negative  Negative Final    Buprenorphine, Screen, Urine 12/05/2024 Negative  Negative Final    Benzodiazepine Screen, Urine 12/05/2024 Negative  Negative Final    Cocaine Screen, Urine 12/05/2024 Negative  Negative Final    MDMA (ECSTASY) 12/05/2024 Negative  Negative Final    Methamphetamine, Ur 12/05/2024 Negative  Negative Final    Morphine/Opiates Screen, Urine 12/05/2024 Positive (A)  Negative Final    Methadone Screen, Urine 12/05/2024 Negative  Negative Final    Oxycodone Screen, Urine 12/05/2024 Positive (A)  Negative Final    Phencyclidine (PCP), Urine 12/05/2024 Negative  Negative Final    THC, Screen, Urine 12/05/2024 Negative  Negative Final       Impression/Formulation:    ICD-10-CM ICD-9-CM   1. Alcohol use disorder, severe, dependence  F10.20 303.90   2. Chronic pain disorder  G89.4 338.4   3. Anxiety disorder, unspecified type  F41.9 300.00   4. Medication management  Z79.899 V58.69        CLINICAL MANEUVERING/INTERVENTIONS: Therapist utilized a person-centered approach to build and maintain rapport with group member.   Therapist promoted safe nonjudgmental environment by providing group members with unconditional positive regard.  Assisted member in processing above session content; acknowledged and normalized patient's thoughts, feelings and concerns.  Allowed patient to freely  discuss issues without interruption or judgment. Provided safe, confidential environment to facilitate the development of positive therapeutic relationship and encourage open, honest communication. Therapist assisted group member with identifying and implementing healthier coping strategies and maintaining healthier boundaries. Assisted patient in identifying risk factors which would indicate the need for higher level of care including thoughts to harm self or others and/or self-harming behavior and encouraged patient to contact this office, call 911, or present to the nearest emergency room should any of these events occur. Pt agreeable to adhere to medication regimen as prescribed and report any side effects.  Discussed crisis intervention services and means to access.  Patient adamantly and convincingly denies current suicidal or homicidal ideation or perceptual disturbance.      Therapist implemented motivational interviewing techniques to assist client with exploring and resolving ambivalence associated with commitment to change behaviors.  Yes  Therapist utilized dialectical behavior techniques to teach and model emotional regulation and relaxation.  No   Therapist applied cognitive behavioral strategies to facilitate identification of maladaptive patterns of thinking and behavior.  Yes     PLAN:    Continue [facility] Phase III  Aftercare:  [facility] outpatient therapy      Please note that portions of this note were completed with a voice recognition program. Efforts were made to edit dictation, but occasionally words are mistranscribed.     This document signed by Ben Ortega LCSW, December 17, 2024, 16:55 EST

## 2024-12-26 RX ORDER — HYDROXYZINE HYDROCHLORIDE 25 MG/1
25 TABLET, FILM COATED ORAL EVERY 8 HOURS PRN
Qty: 90 TABLET | Refills: 0 | Status: SHIPPED | OUTPATIENT
Start: 2024-12-26

## 2025-01-02 DIAGNOSIS — Z79.899 MEDICATION MANAGEMENT: Primary | ICD-10-CM

## 2025-01-07 ENCOUNTER — OFFICE VISIT (OUTPATIENT)
Dept: PSYCHIATRY | Facility: CLINIC | Age: 50
End: 2025-01-07
Payer: MEDICARE

## 2025-01-07 DIAGNOSIS — G89.4 CHRONIC PAIN DISORDER: ICD-10-CM

## 2025-01-07 DIAGNOSIS — F41.9 ANXIETY DISORDER, UNSPECIFIED TYPE: ICD-10-CM

## 2025-01-07 DIAGNOSIS — F10.20 ALCOHOL USE DISORDER, SEVERE, DEPENDENCE: Primary | ICD-10-CM

## 2025-01-07 PROCEDURE — 90853 GROUP PSYCHOTHERAPY: CPT | Performed by: SOCIAL WORKER

## 2025-01-07 NOTE — PROGRESS NOTES
This provider is located at [facility] located at [address] [place] [address].  The Patient is seen remotely at his/her home, using Zoom. Patient is being seen via telehealth and confirm that they are in a secure environment for this session. The patient's condition being diagnosed/treated is appropriate for telemedicine. The provider identified herself as well as her credentials.   The patient gave consent to be seen remotely, and when consent is given they understand that the consent allows for patient identifiable information to be sent to a third party as needed.   They may refuse to be seen remotely at any time. The electronic data is encrypted and password protected, and the patient has been advised of the potential risks to privacy not withstanding such measures.      NAME: Taiwo Pierce  DATE: 01/07/2025    [facility] Phase III  [id number]    Services Provided    Group Psychotherapy x 1  Education/Training x 0  Activity Therapy  x 0  Individual Therapy x 0 0 minutes  Family Therapy x 0  MD Initial Visit  x 0  MD Follow-Up   x 0    Number of participants: 6    DATA:  Patient reported he missed last week because he could not find his car keys. He apologized.     Otherwise, he reported thing had been doing decently. Last week, he was wanting to drink alcohol. He kept himself from doing it by getting busy to get his mind off of it. He told his wife and father about it. As he recalled, it was on Wednesday or Thursday of last week. His father and spouse were supportive.     He talked also about how the winter time was more challenging to him. He struggled to find things to do that were stimulating in a healthy way.     Individual: No    Homework: None assigned    Group 1 (Psychotherapy: Check-ins): Therapist continued facilitation of rapport building strategies between group members. Therapist asked that each patient check in with home life and recovery efforts and identify triggers,  cravings, and high risk situations that arise between group sessions.  Group members discussed barriers and benefits of attending recovery meetings.  Therapist provided empathy and support during group session. Daily Reading: None     ASSESSMENT:    Personal Assessment 0-10 Scale (10 worst)    Anxiety:  2 (no comment)  Depression:  0 (no comment)  Cravings: 1 (no comment)     MSE within normal limits? Yes Affect: euthymic Mood: calm  Pt Response:  open/receptive  Engaged in activity/Process and self-disclosed: adequate  Applies today's group topics to self: adequate  Able to give and receive feedback: adequate  Demonstrated insightful thinking: consistent and adequate  Other pt response comments:  Patient was a positive participant. They demonstrated a relatively positive attitude, a strong degree of motivation, and adequate insight, as evidenced by his level of engagement combined with his efforts to maintain sobriety. They seemed interested and attentive. They appeared to comprehend well the concepts being discussed. The patient seemed receptive of, and willing to implement, the ideas being discussed. Their thought process appeared linear and goal directed, and their speech was regular rate and rhythm.       Community Group Engagement:  Yes: [employer]     Reported relapse since last meeting? No: no lapse     .  Orders Only on 01/02/2025   Component Date Value Ref Range Status    Amphetamine Screen, Urine 01/02/2025 Negative  Negative Final    Barbiturates Screen, Urine 01/02/2025 Negative  Negative Final    Buprenorphine, Screen, Urine 01/02/2025 Negative  Negative Final    Benzodiazepine Screen, Urine 01/02/2025 Negative  Negative Final    Cocaine Screen, Urine 01/02/2025 Negative  Negative Final    MDMA (ECSTASY) 01/02/2025 Negative  Negative Final    Methamphetamine, Ur 01/02/2025 Negative  Negative Final    Methadone Screen, Urine 01/02/2025 Negative  Negative Final    Morphine/Opiates Screen, Urine 01/02/2025  Positive (A)  Negative Final    Oxycodone Screen, Urine 01/02/2025 Positive (A)  Negative Final    Phencyclidine (PCP), Urine 01/02/2025 Negative  Negative Final    Propoxyphene Scree, Urine 01/02/2025 Negative  Negative Final    Tricyclic Antidepressants Screen 01/02/2025 Negative  Negative Final    THC, Screen, Urine 01/02/2025 Negative  Negative Final    Gabapentin 01/02/2025 negative   Final    Fentanyl, Urine 01/02/2025 Negative  Negative Final    ETHANOL URINE SCREEN 01/02/2025 Negative   Final       Impression/Formulation:    ICD-10-CM ICD-9-CM   1. Alcohol use disorder, severe, dependence  F10.20 303.90   2. Chronic pain disorder  G89.4 338.4   3. Anxiety disorder, unspecified type  F41.9 300.00        CLINICAL MANEUVERING/INTERVENTIONS: Therapist utilized a person-centered approach to build and maintain rapport with group member.   Therapist promoted safe nonjudgmental environment by providing group members with unconditional positive regard.  Assisted member in processing above session content; acknowledged and normalized patient's thoughts, feelings and concerns.  Allowed patient to freely discuss issues without interruption or judgment. Provided safe, confidential environment to facilitate the development of positive therapeutic relationship and encourage open, honest communication. Therapist assisted group member with identifying and implementing healthier coping strategies and maintaining healthier boundaries. Assisted patient in identifying risk factors which would indicate the need for higher level of care including thoughts to harm self or others and/or self-harming behavior and encouraged patient to contact this office, call 911, or present to the nearest emergency room should any of these events occur. Pt agreeable to adhere to medication regimen as prescribed and report any side effects.  Discussed crisis intervention services and means to access.  Patient adamantly and convincingly denies  current suicidal or homicidal ideation or perceptual disturbance.      Therapist implemented motivational interviewing techniques to assist client with exploring and resolving ambivalence associated with commitment to change behaviors.  Yes  Therapist utilized dialectical behavior techniques to teach and model emotional regulation and relaxation.  No   Therapist applied cognitive behavioral strategies to facilitate identification of maladaptive patterns of thinking and behavior.  Yes     PLAN:    Continue [facility] Phase III  Aftercare:  [facility] outpatient therapy      Please note that portions of this note were completed with a voice recognition program. Efforts were made to edit dictation, but occasionally words are mistranscribed.     This document signed by Ben Ortega LCSW, January 7, 2025, 13:52 EST

## 2025-01-13 DIAGNOSIS — Z79.899 MEDICATION MANAGEMENT: Primary | ICD-10-CM

## 2025-01-15 DIAGNOSIS — F51.01 PRIMARY INSOMNIA: ICD-10-CM

## 2025-01-15 DIAGNOSIS — F32.9 REACTIVE DEPRESSION: ICD-10-CM

## 2025-01-16 RX ORDER — BUSPIRONE HYDROCHLORIDE 7.5 MG/1
7.5 TABLET ORAL 3 TIMES DAILY
Qty: 90 TABLET | Refills: 2 | Status: SHIPPED | OUTPATIENT
Start: 2025-01-16

## 2025-01-16 RX ORDER — MIRTAZAPINE 15 MG/1
15 TABLET, FILM COATED ORAL NIGHTLY
Qty: 30 TABLET | Refills: 2 | Status: SHIPPED | OUTPATIENT
Start: 2025-01-16

## 2025-01-28 ENCOUNTER — OFFICE VISIT (OUTPATIENT)
Dept: PSYCHIATRY | Facility: CLINIC | Age: 50
End: 2025-01-28
Payer: MEDICARE

## 2025-01-28 DIAGNOSIS — F41.9 ANXIETY DISORDER, UNSPECIFIED TYPE: ICD-10-CM

## 2025-01-28 DIAGNOSIS — Z79.899 MEDICATION MANAGEMENT: ICD-10-CM

## 2025-01-28 DIAGNOSIS — G89.4 CHRONIC PAIN DISORDER: ICD-10-CM

## 2025-01-28 DIAGNOSIS — F10.20 ALCOHOL USE DISORDER, SEVERE, DEPENDENCE: Primary | ICD-10-CM

## 2025-01-28 PROCEDURE — 90853 GROUP PSYCHOTHERAPY: CPT | Performed by: SOCIAL WORKER

## 2025-01-28 NOTE — PROGRESS NOTES
NAME: Taiwo Pierce  DATE: 01/28/2025    [facility] Phase III  [id number]    Services Provided    Group Psychotherapy x 1  Education/Training x 0  Activity Therapy  x 0  Individual Therapy x 0 0 minutes  Family Therapy x 0  MD Initial Visit  x 0  MD Follow-Up   x 0    Number of participants: 6    DATA:  Patient reported that 2-3 weeks ago, he and his spouse decided to get a divorce. His wife left to stay with her daughter for a while. However, her work was closet to the home they shared together. The patient offered to stay with his father while his wife stayed at the house. After 4-5 days of her staying there, the patient went to get some clothes and discovered that everything was gone. The patient had called the police, and when they arrived they arrested him without asking any questions. He stayed 13 days in long term. His father bailed him out on a $5,000 bond. The patient obtained a . At this time, he was on house arrest and was staying at his father's house. He would go back to court on the 21st of February.     Individual: No    Homework: None assigned    Group 1 (Psychotherapy: Check-ins): Therapist continued facilitation of rapport building strategies between group members. Therapist asked that each patient check in with home life and recovery efforts and identify triggers, cravings, and high risk situations that arise between group sessions.  Group members discussed barriers and benefits of attending recovery meetings.  Therapist provided empathy and support during group session. Daily Reading: None     ASSESSMENT:    Personal Assessment 0-10 Scale (10 worst)    Anxiety:  3 (no comment)  Depression:  1 (no comment)  Cravings: 0 (no comment)     MSE within normal limits? Yes Affect: somber Mood: anxious  Pt Response:  open/receptive  Engaged in activity/Process and self-disclosed: adequate  Applies today's group topics to self: adequate  Able to give and receive feedback: adequate  Demonstrated  insightful thinking: occasional and suboptimal  Other pt response comments:  Patient was a positive participant. They demonstrated a relatively optimistic attitude, a moderate degree of motivation, and moderate insight, as evidenced by his level of engagement combined with his positive attitude in light of such adverse circumstances as he was experiencing. They seemed interested and attentive. They appeared to comprehend well the concepts being discussed. The patient seemed receptive of, and willing to implement, the ideas being discussed. Their thought process appeared linear and goal directed, and their speech was regular rate and rhythm.       Community Group Engagement:  No: not engaged    Reported relapse since last meeting? No: no lapse reported     .  Office Visit on 01/28/2025   Component Date Value Ref Range Status    Amphetamine Screen, Urine 01/28/2025 Negative  Negative Final    Barbiturates Screen, Urine 01/28/2025 Negative  Negative Final    Buprenorphine, Screen, Urine 01/28/2025 Negative  Negative Final    Benzodiazepine Screen, Urine 01/28/2025 Negative  Negative Final    Cocaine Screen, Urine 01/28/2025 Negative  Negative Final    MDMA (ECSTASY) 01/28/2025 Negative  Negative Final    Methamphetamine, Ur 01/28/2025 Negative  Negative Final    Methadone Screen, Urine 01/28/2025 Negative  Negative Final    Morphine/Opiates Screen, Urine 01/28/2025 Positive (A)  Negative Final    Oxycodone Screen, Urine 01/28/2025 Positive (A)  Negative Final    Phencyclidine (PCP), Urine 01/28/2025 Negative  Negative Final    Propoxyphene Scree, Urine 01/28/2025 Negative  Negative Final    Tricyclic Antidepressants Screen 01/28/2025 Negative  Negative Final    THC, Screen, Urine 01/28/2025 Negative  Negative Final    Gabapentin 01/28/2025 negative   Final    ETHANOL URINE SCREEN 01/28/2025 Negative   Final    Fentanyl, Urine 01/28/2025 Negative  Negative Final   Orders Only on 01/13/2025   Component Date Value Ref  Range Status    Amphetamine Screen, Urine 01/10/2025 Negative  Negative Final    Barbiturates Screen, Urine 01/10/2025 Negative  Negative Final    Buprenorphine, Screen, Urine 01/10/2025 Negative  Negative Final    Benzodiazepine Screen, Urine 01/10/2025 Negative  Negative Final    Cocaine Screen, Urine 01/10/2025 Negative  Negative Final    MDMA (ECSTASY) 01/10/2025 Negative  Negative Final    Methamphetamine, Ur 01/10/2025 Negative  Negative Final    Methadone Screen, Urine 01/10/2025 Negative  Negative Final    Morphine/Opiates Screen, Urine 01/10/2025 Positive (A)  Negative Final    Oxycodone Screen, Urine 01/10/2025 Positive (A)  Negative Final    Phencyclidine (PCP), Urine 01/10/2025 Negative  Negative Final    Propoxyphene Scree, Urine 01/10/2025 Negative  Negative Final    Tricyclic Antidepressants Screen 01/10/2025 Negative  Negative Final    THC, Screen, Urine 01/10/2025 Negative  Negative Final    Gabapentin 01/10/2025 negative   Final    ETHANOL URINE SCREEN 01/10/2025 Negative   Final    Fentanyl, Urine 01/10/2025 negative   Final       Impression/Formulation:    ICD-10-CM ICD-9-CM   1. Alcohol use disorder, severe, dependence  F10.20 303.90   2. Chronic pain disorder  G89.4 338.4   3. Anxiety disorder, unspecified type  F41.9 300.00   4. Medication management  Z79.899 V58.69        CLINICAL MANEUVERING/INTERVENTIONS: Therapist utilized a person-centered approach to build and maintain rapport with group member.   Therapist promoted safe nonjudgmental environment by providing group members with unconditional positive regard.  Assisted member in processing above session content; acknowledged and normalized patient's thoughts, feelings and concerns.  Allowed patient to freely discuss issues without interruption or judgment. Provided safe, confidential environment to facilitate the development of positive therapeutic relationship and encourage open, honest communication. Therapist assisted group member with  identifying and implementing healthier coping strategies and maintaining healthier boundaries. Assisted patient in identifying risk factors which would indicate the need for higher level of care including thoughts to harm self or others and/or self-harming behavior and encouraged patient to contact this office, call 911, or present to the nearest emergency room should any of these events occur. Pt agreeable to adhere to medication regimen as prescribed and report any side effects.  Discussed crisis intervention services and means to access.  Patient adamantly and convincingly denies current suicidal or homicidal ideation or perceptual disturbance.      Therapist implemented motivational interviewing techniques to assist client with exploring and resolving ambivalence associated with commitment to change behaviors.  Yes  Therapist utilized dialectical behavior techniques to teach and model emotional regulation and relaxation.  No   Therapist applied cognitive behavioral strategies to facilitate identification of maladaptive patterns of thinking and behavior.  Yes     PLAN:    Continue [facility]  IOP Phase III  Aftercare:  [facility] outpatient therapy      Please note that portions of this note were completed with a voice recognition program. Efforts were made to edit dictation, but occasionally words are mistranscribed.     This document signed by Ben Ortega LCSW, January 28, 2025, 16:03 EST

## 2025-02-03 DIAGNOSIS — E78.2 MIXED HYPERLIPIDEMIA: ICD-10-CM

## 2025-02-03 DIAGNOSIS — F32.9 REACTIVE DEPRESSION: ICD-10-CM

## 2025-02-03 DIAGNOSIS — F51.01 PRIMARY INSOMNIA: ICD-10-CM

## 2025-02-04 ENCOUNTER — OFFICE VISIT (OUTPATIENT)
Dept: PSYCHIATRY | Facility: CLINIC | Age: 50
End: 2025-02-04
Payer: MEDICARE

## 2025-02-04 DIAGNOSIS — G89.4 CHRONIC PAIN DISORDER: ICD-10-CM

## 2025-02-04 DIAGNOSIS — F41.9 ANXIETY DISORDER, UNSPECIFIED TYPE: ICD-10-CM

## 2025-02-04 DIAGNOSIS — Z79.899 MEDICATION MANAGEMENT: Primary | ICD-10-CM

## 2025-02-04 DIAGNOSIS — F10.20 ALCOHOL USE DISORDER, SEVERE, DEPENDENCE: Primary | ICD-10-CM

## 2025-02-04 LAB
AMPHET+METHAMPHET UR QL: NEGATIVE
AMPHETAMINES UR QL: NEGATIVE
BARBITURATES UR QL SCN: NEGATIVE
BENZODIAZ UR QL SCN: NEGATIVE
BUPRENORPHINE SERPL-MCNC: NEGATIVE NG/ML
CANNABINOIDS SERPL QL: NEGATIVE
COCAINE UR QL: NEGATIVE
MDMA UR QL SCN: NEGATIVE
METHADONE UR QL SCN: NEGATIVE
MORPHINE/OPIATES SCREEN, URINE: POSITIVE
OXYCODONE UR QL SCN: POSITIVE
PCP UR QL SCN: NEGATIVE
PROPOXYPH UR QL SCN: NEGATIVE
TRICYCLICS UR QL SCN: NEGATIVE

## 2025-02-04 PROCEDURE — 90853 GROUP PSYCHOTHERAPY: CPT | Performed by: SOCIAL WORKER

## 2025-02-04 PROCEDURE — 80305 DRUG TEST PRSMV DIR OPT OBS: CPT | Performed by: PSYCHIATRY & NEUROLOGY

## 2025-02-04 RX ORDER — BUSPIRONE HYDROCHLORIDE 7.5 MG/1
7.5 TABLET ORAL 3 TIMES DAILY
Qty: 90 TABLET | Refills: 2 | OUTPATIENT
Start: 2025-02-04

## 2025-02-04 RX ORDER — MIRTAZAPINE 15 MG/1
15 TABLET, FILM COATED ORAL NIGHTLY
Qty: 30 TABLET | Refills: 2 | OUTPATIENT
Start: 2025-02-04

## 2025-02-04 NOTE — PROGRESS NOTES
NAME: Taiwo Pierce  DATE: 2025    [facility] Phase III  [id number]    Services Provided    Group Psychotherapy x 1  Education/Training x 0  Activity Therapy  x 0  Individual Therapy x 0 0 minutes  Family Therapy x 0  MD Initial Visit  x 0  MD Follow-Up   x 0    Number of participants: 5    DATA:  Patient reported having been to a  this weekend. His uncle's wife had passed away. What's more, the patient had been trying to get through the divorce. He was still making house payments by himself, but when they sold the house after the divorce, she wanted half. He was maintaining no communication due to the EPO. He could not make sense of how the divorce came about. It seemed sudden even though things had been tense for a while.     Individual: No    Homework: None assigned    Group 1 (Psychotherapy: Check-ins): Therapist continued facilitation of rapport building strategies between group members. Therapist asked that each patient check in with home life and recovery efforts and identify triggers, cravings, and high risk situations that arise between group sessions.  Group members discussed barriers and benefits of attending recovery meetings.  Therapist provided empathy and support during group session. Daily Reading: None     ASSESSMENT:    Personal Assessment 0-10 Scale (10 worst)    Anxiety:  2 (no comment)  Depression:  0 (no comment)  Cravings: 1 (no comment)     MSE within normal limits? Yes Affect: somber Mood: calm  Pt Response:  open/receptive  Engaged in activity/Process and self-disclosed: adequate  Applies today's group topics to self: adequate  Able to give and receive feedback: adequate  Demonstrated insightful thinking: consistent and adequate  Other pt response comments:  Patient was a positive participant. They demonstrated a relatively positive attitude, a strong degree of motivation, and adequate insight, as evidenced by his level of engagement combined with his attitude about his  divorce. They seemed interested and attentive. They appeared to comprehend well the concepts being discussed. The patient seemed receptive of, and willing to implement, the ideas being discussed. Their thought process appeared linear and goal directed, and their speech was regular rate and rhythm.       Community Group Engagement:  Yes: [employer]    Reported relapse since last meeting? No: no lapse     .  Orders Only on 02/04/2025   Component Date Value Ref Range Status    Amphetamine Screen, Urine 02/04/2025 Negative  Negative Final    Preliminary results. Refer to confirmation send out from [facility] for final results.    Barbiturates Screen, Urine 02/04/2025 Negative  Negative Final    Buprenorphine, Screen, Urine 02/04/2025 Negative  Negative Final    Benzodiazepine Screen, Urine 02/04/2025 Negative  Negative Final    Cocaine Screen, Urine 02/04/2025 Negative  Negative Final    MDMA (ECSTASY) 02/04/2025 Negative  Negative Final    Methamphetamine, Ur 02/04/2025 Negative  Negative Final    Methadone Screen, Urine 02/04/2025 Negative  Negative Final    Morphine/Opiates Screen, Urine 02/04/2025 Positive (A)  Negative Final    Oxycodone Screen, Urine 02/04/2025 Positive (A)  Negative Final    Phencyclidine (PCP), Urine 02/04/2025 Negative  Negative Final    Propoxyphene Scree, Urine 02/04/2025 Negative  Negative Final    Tricyclic Antidepressants Screen 02/04/2025 Negative  Negative Final    THC, Screen, Urine 02/04/2025 Negative  Negative Final   Office Visit on 01/28/2025   Component Date Value Ref Range Status    Amphetamine Screen, Urine 01/28/2025 Negative  Negative Final    Barbiturates Screen, Urine 01/28/2025 Negative  Negative Final    Buprenorphine, Screen, Urine 01/28/2025 Negative  Negative Final    Benzodiazepine Screen, Urine 01/28/2025 Negative  Negative Final    Cocaine Screen, Urine 01/28/2025 Negative  Negative Final    MDMA (ECSTASY) 01/28/2025 Negative  Negative Final    Methamphetamine, Ur  01/28/2025 Negative  Negative Final    Methadone Screen, Urine 01/28/2025 Negative  Negative Final    Morphine/Opiates Screen, Urine 01/28/2025 Positive (A)  Negative Final    Oxycodone Screen, Urine 01/28/2025 Positive (A)  Negative Final    Phencyclidine (PCP), Urine 01/28/2025 Negative  Negative Final    Propoxyphene Scree, Urine 01/28/2025 Negative  Negative Final    Tricyclic Antidepressants Screen 01/28/2025 Negative  Negative Final    THC, Screen, Urine 01/28/2025 Negative  Negative Final    Gabapentin 01/28/2025 negative   Final    ETHANOL URINE SCREEN 01/28/2025 Negative   Final    Fentanyl, Urine 01/28/2025 Negative  Negative Final       Impression/Formulation:    ICD-10-CM ICD-9-CM   1. Alcohol use disorder, severe, dependence  F10.20 303.90   2. Chronic pain disorder  G89.4 338.4   3. Anxiety disorder, unspecified type  F41.9 300.00        CLINICAL MANEUVERING/INTERVENTIONS: Therapist utilized a person-centered approach to build and maintain rapport with group member.   Therapist promoted safe nonjudgmental environment by providing group members with unconditional positive regard.  Assisted member in processing above session content; acknowledged and normalized patient's thoughts, feelings and concerns.  Allowed patient to freely discuss issues without interruption or judgment. Provided safe, confidential environment to facilitate the development of positive therapeutic relationship and encourage open, honest communication. Therapist assisted group member with identifying and implementing healthier coping strategies and maintaining healthier boundaries. Assisted patient in identifying risk factors which would indicate the need for higher level of care including thoughts to harm self or others and/or self-harming behavior and encouraged patient to contact this office, call 911, or present to the nearest emergency room should any of these events occur. Pt agreeable to adhere to medication regimen as  prescribed and report any side effects.  Discussed crisis intervention services and means to access.  Patient adamantly and convincingly denies current suicidal or homicidal ideation or perceptual disturbance.      Therapist implemented motivational interviewing techniques to assist client with exploring and resolving ambivalence associated with commitment to change behaviors.  Yes  Therapist utilized dialectical behavior techniques to teach and model emotional regulation and relaxation.  No   Therapist applied cognitive behavioral strategies to facilitate identification of maladaptive patterns of thinking and behavior.  Yes     PLAN:    Continue [facility] Phase III  Aftercare:  [facility] outpatient therapy      Please note that portions of this note were completed with a voice recognition program. Efforts were made to edit dictation, but occasionally words are mistranscribed.     This document signed by Ben Ortega LCSW, February 4, 2025, 15:18 EST

## 2025-02-05 RX ORDER — PRAVASTATIN SODIUM 20 MG
20 TABLET ORAL DAILY
Qty: 90 TABLET | Refills: 0 | OUTPATIENT
Start: 2025-02-05

## 2025-02-05 RX ORDER — IBUPROFEN 800 MG/1
800 TABLET, FILM COATED ORAL EVERY 8 HOURS PRN
Qty: 60 TABLET | Refills: 1 | OUTPATIENT
Start: 2025-02-05

## 2025-02-05 RX ORDER — DULOXETIN HYDROCHLORIDE 60 MG/1
60 CAPSULE, DELAYED RELEASE ORAL DAILY
Qty: 30 CAPSULE | Refills: 1 | OUTPATIENT
Start: 2025-02-05

## 2025-02-05 RX ORDER — SILDENAFIL 25 MG/1
25 TABLET, FILM COATED ORAL DAILY PRN
Qty: 30 TABLET | Refills: 1 | OUTPATIENT
Start: 2025-02-05

## 2025-02-05 RX ORDER — LANOLIN ALCOHOL/MO/W.PET/CERES
1000 CREAM (GRAM) TOPICAL DAILY
Qty: 30 TABLET | Refills: 1 | OUTPATIENT
Start: 2025-02-05

## 2025-02-05 RX ORDER — KETOCONAZOLE 20 MG/ML
SHAMPOO, SUSPENSION TOPICAL 2 TIMES WEEKLY
Qty: 120 ML | Refills: 1 | OUTPATIENT
Start: 2025-02-06

## 2025-02-06 NOTE — TELEPHONE ENCOUNTER
Spoke with patient he stated ill be in that area Tuesday so I have already scheduled an appointment,reports his blood sugars have been running in the 500's.Offered an earlier appointment but he declined.

## 2025-02-11 ENCOUNTER — LAB (OUTPATIENT)
Dept: FAMILY MEDICINE CLINIC | Facility: CLINIC | Age: 50
End: 2025-02-11
Payer: MEDICARE

## 2025-02-11 ENCOUNTER — OFFICE VISIT (OUTPATIENT)
Dept: PSYCHIATRY | Facility: CLINIC | Age: 50
End: 2025-02-11
Payer: MEDICARE

## 2025-02-11 ENCOUNTER — DOCUMENTATION (OUTPATIENT)
Dept: PSYCHIATRY | Facility: CLINIC | Age: 50
End: 2025-02-11

## 2025-02-11 ENCOUNTER — OFFICE VISIT (OUTPATIENT)
Dept: FAMILY MEDICINE CLINIC | Facility: CLINIC | Age: 50
End: 2025-02-11
Payer: MEDICARE

## 2025-02-11 VITALS
TEMPERATURE: 97.3 F | SYSTOLIC BLOOD PRESSURE: 138 MMHG | DIASTOLIC BLOOD PRESSURE: 88 MMHG | BODY MASS INDEX: 27.72 KG/M2 | HEART RATE: 110 BPM | HEIGHT: 71 IN | WEIGHT: 198 LBS | OXYGEN SATURATION: 97 %

## 2025-02-11 DIAGNOSIS — E78.2 MIXED HYPERLIPIDEMIA: Primary | ICD-10-CM

## 2025-02-11 DIAGNOSIS — N52.9 ERECTILE DYSFUNCTION, UNSPECIFIED ERECTILE DYSFUNCTION TYPE: ICD-10-CM

## 2025-02-11 DIAGNOSIS — Z79.899 MEDICATION MANAGEMENT: ICD-10-CM

## 2025-02-11 DIAGNOSIS — F41.9 ANXIETY DISORDER, UNSPECIFIED TYPE: ICD-10-CM

## 2025-02-11 DIAGNOSIS — G89.4 CHRONIC PAIN DISORDER: ICD-10-CM

## 2025-02-11 DIAGNOSIS — F10.20 ALCOHOL USE DISORDER, SEVERE, DEPENDENCE: Primary | ICD-10-CM

## 2025-02-11 DIAGNOSIS — I10 ESSENTIAL HYPERTENSION: ICD-10-CM

## 2025-02-11 DIAGNOSIS — E78.2 MIXED HYPERLIPIDEMIA: ICD-10-CM

## 2025-02-11 DIAGNOSIS — K21.9 GASTROESOPHAGEAL REFLUX DISEASE, UNSPECIFIED WHETHER ESOPHAGITIS PRESENT: ICD-10-CM

## 2025-02-11 DIAGNOSIS — E11.65 TYPE 2 DIABETES MELLITUS WITH HYPERGLYCEMIA, WITHOUT LONG-TERM CURRENT USE OF INSULIN: ICD-10-CM

## 2025-02-11 PROCEDURE — 83036 HEMOGLOBIN GLYCOSYLATED A1C: CPT | Performed by: FAMILY MEDICINE

## 2025-02-11 PROCEDURE — 80061 LIPID PANEL: CPT | Performed by: FAMILY MEDICINE

## 2025-02-11 PROCEDURE — 3075F SYST BP GE 130 - 139MM HG: CPT | Performed by: FAMILY MEDICINE

## 2025-02-11 PROCEDURE — 3046F HEMOGLOBIN A1C LEVEL >9.0%: CPT | Performed by: FAMILY MEDICINE

## 2025-02-11 PROCEDURE — 1160F RVW MEDS BY RX/DR IN RCRD: CPT | Performed by: FAMILY MEDICINE

## 2025-02-11 PROCEDURE — 85025 COMPLETE CBC W/AUTO DIFF WBC: CPT | Performed by: FAMILY MEDICINE

## 2025-02-11 PROCEDURE — 1125F AMNT PAIN NOTED PAIN PRSNT: CPT | Performed by: FAMILY MEDICINE

## 2025-02-11 PROCEDURE — 84439 ASSAY OF FREE THYROXINE: CPT | Performed by: FAMILY MEDICINE

## 2025-02-11 PROCEDURE — 3079F DIAST BP 80-89 MM HG: CPT | Performed by: FAMILY MEDICINE

## 2025-02-11 PROCEDURE — 1159F MED LIST DOCD IN RCRD: CPT | Performed by: FAMILY MEDICINE

## 2025-02-11 PROCEDURE — 84443 ASSAY THYROID STIM HORMONE: CPT | Performed by: FAMILY MEDICINE

## 2025-02-11 PROCEDURE — 36415 COLL VENOUS BLD VENIPUNCTURE: CPT

## 2025-02-11 PROCEDURE — G2211 COMPLEX E/M VISIT ADD ON: HCPCS | Performed by: FAMILY MEDICINE

## 2025-02-11 PROCEDURE — 80053 COMPREHEN METABOLIC PANEL: CPT | Performed by: FAMILY MEDICINE

## 2025-02-11 PROCEDURE — 99214 OFFICE O/P EST MOD 30 MIN: CPT | Performed by: FAMILY MEDICINE

## 2025-02-11 RX ORDER — LANSOPRAZOLE 30 MG/1
30 CAPSULE, DELAYED RELEASE ORAL DAILY
Qty: 30 CAPSULE | Refills: 2 | Status: SHIPPED | OUTPATIENT
Start: 2025-02-11

## 2025-02-11 RX ORDER — GLIPIZIDE 10 MG/1
10 TABLET ORAL
Qty: 60 TABLET | Refills: 2 | Status: SHIPPED | OUTPATIENT
Start: 2025-02-11

## 2025-02-11 RX ORDER — SILDENAFIL CITRATE 20 MG/1
20 TABLET ORAL DAILY
Qty: 30 TABLET | Refills: 2 | Status: SHIPPED | OUTPATIENT
Start: 2025-02-11

## 2025-02-11 NOTE — PROGRESS NOTES
[address] [address]  [phone]      HOSPITAL-BASED PROGRAMS   (IOP/PHP/OUTPATIENT MH and CD)  DISCHARGE SUMMARY      Patient Name: Taiwo Pierce  Patient : 1975  Program: [facility] IOP  Attending Psychiatric Provider: Dr. Deon Parson  Therapist: Ben Ortega LCSW    Date of Admission: 2024  Last Date Attended: 2025  Date of Discharge: 2025  Total Number of Days: 48  Reason for Discharge: Graduation from program  Aftercare Plan (including next appointment date and time, and facility name, if possible): Patient will follow up with his primary care physician.     Discharge Medications:    Current Outpatient Medications:     amLODIPine (NORVASC) 10 MG tablet, Take 1 tablet by mouth Daily. Indications: High Blood Pressure, Disp: , Rfl:     Blood Glucose Monitoring Suppl w/Device kit, Use 1 each 3 times a day., Disp: 1 each, Rfl: 12    busPIRone (BUSPAR) 7.5 MG tablet, TAKE 1 TABLET BY MOUTH 3 TIMES A DAY, Disp: 90 tablet, Rfl: 2    DULoxetine (CYMBALTA) 60 MG capsule, Take 1 capsule by mouth Daily., Disp: 30 capsule, Rfl: 2    empagliflozin (Jardiance) 10 MG tablet tablet, Take 1 tablet by mouth Daily., Disp: 30 tablet, Rfl: 2    gabapentin (NEURONTIN) 800 MG tablet, Take 1 tablet by mouth 3 (Three) Times a Day. Indications: Neuropathic Pain, Disp: , Rfl:     glipizide (GLUCOTROL) 10 MG tablet, Take 1 tablet by mouth 2 (Two) Times a Day Before Meals., Disp: 60 tablet, Rfl: 2    glucose blood test strip, For tid testing   R73.01, Disp: 100 each, Rfl: 11    hydrOXYzine (ATARAX) 25 MG tablet, Take 1 tablet by mouth Every 8 (Eight) Hours As Needed for anxiety, Disp: 90 tablet, Rfl: 0    ibuprofen (ADVIL,MOTRIN) 800 MG tablet, Take 1 tablet by mouth Every 8 (Eight) Hours As Needed for Mild Pain., Disp: 60 tablet, Rfl: 2    ketoconazole (NIZORAL) 2 % shampoo, Apply  topically to the appropriate area as directed 2  (Two) Times a Week., Disp: 120 mL, Rfl: 1    Lancets misc, For tid testing  R73.01, Disp: 100 each, Rfl: 11    lansoprazole (PREVACID) 30 MG capsule, Take 1 capsule by mouth Daily. Indications: Heartburn, Disp: 30 capsule, Rfl: 2    metaxalone (SKELAXIN) 800 MG tablet, Take 1 tablet by mouth 3 (Three) Times a Day As Needed for Muscle Spasms. Indications: Musculoskeletal Pain, Disp: , Rfl:     mirtazapine (REMERON) 15 MG tablet, TAKE ONE TABLET BY MOUTH ONCE NIGHTLY, Disp: 30 tablet, Rfl: 2    oxyCODONE (ROXICODONE) 10 MG tablet, Take 1 tablet by mouth 5 (Five) Times a Day. Indications: Chronic Pain, Disp: , Rfl:     pravastatin (PRAVACHOL) 20 MG tablet, Take 1 tablet by mouth Daily., Disp: 90 tablet, Rfl: 0    sildenafil (REVATIO) 20 MG tablet, Take 1 tablet by mouth Daily., Disp: 30 tablet, Rfl: 2    Thiamine Mononitrate 100 MG tablet, Take 2 & 1/2 tablets by mouth Daily., Disp: 75 tablet, Rfl: 0    Tirzepatide 2.5 MG/0.5ML solution auto-injector, Inject 2.5 mg under the skin into the appropriate area as directed 1 (One) Time Per Week., Disp: 2 mL, Rfl: 1    traZODone (DESYREL) 50 MG tablet, Take 1 tablet by mouth At Night As Needed for Sleep., Disp: , Rfl:     valsartan (DIOVAN) 160 MG tablet, Take 1 tablet by mouth Daily., Disp: , Rfl:     vitamin B-12 (CYANOCOBALAMIN) 1000 MCG tablet, Take 1 tablet by mouth Daily., Disp: 30 tablet, Rfl: 2    Patient Diagnosis(es):  1. Alcohol use disorder, severe, dependence  F10.20 303.90   2. Anxiety disorder, unspecified type  F41.9 300.00   3. Chronic pain disorder  G89.4 338.4       This document signed by Ben Ortega [facility], February 11, 2025, 16:48 EST

## 2025-02-11 NOTE — PROGRESS NOTES
NAME: Taiwo Pierce  DATE: 02/11/2025     IOP Phase III  [id number]    Services Provided    Group Psychotherapy x 1  Education/Training x 0  Activity Therapy  x 0  Individual Therapy x 0 0 minutes  Family Therapy x 0  MD Initial Visit  x 0  MD Follow-Up   x 0    Number of participants: 4    DATA:  Patient reported he didn't have much to talk about today. He had been laying around his dad's house. Next week, he would go to court next week. He wasn't worried about it too much at this time. Patient was excited to be graduating today. The therapist engaged the patient in conversation, along with the group, about his experience in IOP. His next step would be to share his testimony in [facility], per his report.     Individual: No    Homework: None assigned    Group 1 (Psychotherapy: Check-ins): Therapist continued facilitation of rapport building strategies between group members. Therapist asked that each patient check in with home life and recovery efforts and identify triggers, cravings, and high risk situations that arise between group sessions.  Group members discussed barriers and benefits of attending recovery meetings.  Therapist provided empathy and support during group session. Daily Reading: None     ASSESSMENT:    Personal Assessment 0-10 Scale (10 worst)    Anxiety:  0 (no comment)  Depression:  0 (no comment)  Cravings: 0 (no comment)     MSE within normal limits? Yes Affect: somber Mood: calm  Pt Response:  open/receptive  Engaged in activity/Process and self-disclosed: adequate  Applies today's group topics to self: adequate  Able to give and receive feedback: adequate  Demonstrated insightful thinking: consistent and adequate  Other pt response comments:  Patient was a positive participant. They demonstrated a relatively positive attitude, a moderate degree of motivation, and adequate insight, as evidenced by his level of engagement combined with his efforts to complete IOP. They seemed interested  and attentive. They appeared to comprehend well the concepts being discussed. The patient seemed receptive of, and willing to implement, the ideas being discussed. Their thought process appeared linear and goal directed, and their speech was regular rate and rhythm.       Community Group Engagement:  Yes: [facility]     Reported relapse since last meeting? No: no lapse     .  Office Visit on 02/11/2025   Component Date Value Ref Range Status    Amphetamine Screen, Urine 02/11/2025 Negative  Negative Final    Preliminary results. Refer to confirmation send out from [facility] for final results.    Barbiturates Screen, Urine 02/11/2025 Negative  Negative Final    Buprenorphine, Screen, Urine 02/11/2025 Negative  Negative Final    Benzodiazepine Screen, Urine 02/11/2025 Negative  Negative Final    Cocaine Screen, Urine 02/11/2025 Negative  Negative Final    MDMA (ECSTASY) 02/11/2025 Negative  Negative Final    Methamphetamine, Ur 02/11/2025 Negative  Negative Final    Methadone Screen, Urine 02/11/2025 Negative  Negative Final    Morphine/Opiates Screen, Urine 02/11/2025 Positive (A)  Negative Final    Oxycodone Screen, Urine 02/11/2025 Positive (A)  Negative Final    Phencyclidine (PCP), Urine 02/11/2025 Negative  Negative Final    Propoxyphene Scree, Urine 02/11/2025 Negative  Negative Final    Tricyclic Antidepressants Screen 02/11/2025 Negative  Negative Final    THC, Screen, Urine 02/11/2025 Negative  Negative Final   Orders Only on 02/04/2025   Component Date Value Ref Range Status    Amphetamine Screen, Urine 02/04/2025 Negative  Negative Final    Preliminary results. Refer to confirmation send out from [facility] for final results.    Barbiturates Screen, Urine 02/04/2025 Negative  Negative Final    Buprenorphine, Screen, Urine 02/04/2025 Negative  Negative Final    Benzodiazepine Screen, Urine 02/04/2025 Negative  Negative Final    Cocaine Screen, Urine 02/04/2025 Negative  Negative Final    MDMA (ECSTASY) 02/04/2025  Negative  Negative Final    Methamphetamine, Ur 02/04/2025 Negative  Negative Final    Methadone Screen, Urine 02/04/2025 Negative  Negative Final    Morphine/Opiates Screen, Urine 02/04/2025 Positive (A)  Negative Final    Oxycodone Screen, Urine 02/04/2025 Positive (A)  Negative Final    Phencyclidine (PCP), Urine 02/04/2025 Negative  Negative Final    Propoxyphene Scree, Urine 02/04/2025 Negative  Negative Final    Tricyclic Antidepressants Screen 02/04/2025 Negative  Negative Final    THC, Screen, Urine 02/04/2025 Negative  Negative Final   Office Visit on 01/28/2025   Component Date Value Ref Range Status    Amphetamine Screen, Urine 01/28/2025 Negative  Negative Final    Barbiturates Screen, Urine 01/28/2025 Negative  Negative Final    Buprenorphine, Screen, Urine 01/28/2025 Negative  Negative Final    Benzodiazepine Screen, Urine 01/28/2025 Negative  Negative Final    Cocaine Screen, Urine 01/28/2025 Negative  Negative Final    MDMA (ECSTASY) 01/28/2025 Negative  Negative Final    Methamphetamine, Ur 01/28/2025 Negative  Negative Final    Methadone Screen, Urine 01/28/2025 Negative  Negative Final    Morphine/Opiates Screen, Urine 01/28/2025 Positive (A)  Negative Final    Oxycodone Screen, Urine 01/28/2025 Positive (A)  Negative Final    Phencyclidine (PCP), Urine 01/28/2025 Negative  Negative Final    Propoxyphene Scree, Urine 01/28/2025 Negative  Negative Final    Tricyclic Antidepressants Screen 01/28/2025 Negative  Negative Final    THC, Screen, Urine 01/28/2025 Negative  Negative Final    Gabapentin 01/28/2025 negative   Final    ETHANOL URINE SCREEN 01/28/2025 Negative   Final    Fentanyl, Urine 01/28/2025 Negative  Negative Final       Impression/Formulation:    ICD-10-CM ICD-9-CM   1. Alcohol use disorder, severe, dependence  F10.20 303.90   2. Chronic pain disorder  G89.4 338.4   3. Anxiety disorder, unspecified type  F41.9 300.00   4. Medication management  Z79.899 V58.69        CLINICAL  MANEUVERING/INTERVENTIONS: Therapist utilized a person-centered approach to build and maintain rapport with group member.   Therapist promoted safe nonjudgmental environment by providing group members with unconditional positive regard.  Assisted member in processing above session content; acknowledged and normalized patient's thoughts, feelings and concerns.  Allowed patient to freely discuss issues without interruption or judgment. Provided safe, confidential environment to facilitate the development of positive therapeutic relationship and encourage open, honest communication. Therapist assisted group member with identifying and implementing healthier coping strategies and maintaining healthier boundaries. Assisted patient in identifying risk factors which would indicate the need for higher level of care including thoughts to harm self or others and/or self-harming behavior and encouraged patient to contact this office, call 911, or present to the nearest emergency room should any of these events occur. Pt agreeable to adhere to medication regimen as prescribed and report any side effects.  Discussed crisis intervention services and means to access.  Patient adamantly and convincingly denies current suicidal or homicidal ideation or perceptual disturbance.      Therapist implemented motivational interviewing techniques to assist client with exploring and resolving ambivalence associated with commitment to change behaviors.  Yes  Therapist utilized dialectical behavior techniques to teach and model emotional regulation and relaxation.  No   Therapist applied cognitive behavioral strategies to facilitate identification of maladaptive patterns of thinking and behavior.  Yes     PLAN:    Continue [facility] Phase III  Aftercare:  Primary Care     Please note that portions of this note were completed with a voice recognition program. Efforts were made to edit dictation, but occasionally words are  mistranscribed.     This document signed by Ben Ortega LCSW, February 11, 2025, 16:26 EST

## 2025-02-12 ENCOUNTER — TELEPHONE (OUTPATIENT)
Dept: FAMILY MEDICINE CLINIC | Facility: CLINIC | Age: 50
End: 2025-02-12
Payer: MEDICARE

## 2025-02-12 LAB
ALBUMIN SERPL-MCNC: 4.2 G/DL (ref 3.5–5.2)
ALBUMIN/GLOB SERPL: 1.1 G/DL
ALP SERPL-CCNC: 197 U/L (ref 39–117)
ALT SERPL W P-5'-P-CCNC: 39 U/L (ref 1–41)
ANION GAP SERPL CALCULATED.3IONS-SCNC: 11 MMOL/L (ref 5–15)
AST SERPL-CCNC: 39 U/L (ref 1–40)
BASOPHILS # BLD AUTO: 0.06 10*3/MM3 (ref 0–0.2)
BASOPHILS NFR BLD AUTO: 0.7 % (ref 0–1.5)
BILIRUB SERPL-MCNC: 0.3 MG/DL (ref 0–1.2)
BUN SERPL-MCNC: 7 MG/DL (ref 6–20)
BUN/CREAT SERPL: 8.6 (ref 7–25)
CALCIUM SPEC-SCNC: 9.9 MG/DL (ref 8.6–10.5)
CHLORIDE SERPL-SCNC: 97 MMOL/L (ref 98–107)
CHOLEST SERPL-MCNC: 168 MG/DL (ref 0–200)
CO2 SERPL-SCNC: 25 MMOL/L (ref 22–29)
CREAT SERPL-MCNC: 0.81 MG/DL (ref 0.76–1.27)
DEPRECATED RDW RBC AUTO: 48.1 FL (ref 37–54)
EGFRCR SERPLBLD CKD-EPI 2021: 108.1 ML/MIN/1.73
EOSINOPHIL # BLD AUTO: 0.15 10*3/MM3 (ref 0–0.4)
EOSINOPHIL NFR BLD AUTO: 1.7 % (ref 0.3–6.2)
ERYTHROCYTE [DISTWIDTH] IN BLOOD BY AUTOMATED COUNT: 14.3 % (ref 12.3–15.4)
GLOBULIN UR ELPH-MCNC: 3.9 GM/DL
GLUCOSE SERPL-MCNC: 355 MG/DL (ref 65–99)
HBA1C MFR BLD: 9.2 % (ref 4.8–5.6)
HCT VFR BLD AUTO: 41.7 % (ref 37.5–51)
HDLC SERPL-MCNC: 41 MG/DL (ref 40–60)
HGB BLD-MCNC: 14 G/DL (ref 13–17.7)
IMM GRANULOCYTES # BLD AUTO: 0.03 10*3/MM3 (ref 0–0.05)
IMM GRANULOCYTES NFR BLD AUTO: 0.3 % (ref 0–0.5)
LDLC SERPL CALC-MCNC: 101 MG/DL (ref 0–100)
LDLC/HDLC SERPL: 2.39 {RATIO}
LYMPHOCYTES # BLD AUTO: 2.21 10*3/MM3 (ref 0.7–3.1)
LYMPHOCYTES NFR BLD AUTO: 25.5 % (ref 19.6–45.3)
MCH RBC QN AUTO: 31 PG (ref 26.6–33)
MCHC RBC AUTO-ENTMCNC: 33.6 G/DL (ref 31.5–35.7)
MCV RBC AUTO: 92.5 FL (ref 79–97)
MONOCYTES # BLD AUTO: 0.73 10*3/MM3 (ref 0.1–0.9)
MONOCYTES NFR BLD AUTO: 8.4 % (ref 5–12)
NEUTROPHILS NFR BLD AUTO: 5.48 10*3/MM3 (ref 1.7–7)
NEUTROPHILS NFR BLD AUTO: 63.4 % (ref 42.7–76)
NRBC BLD AUTO-RTO: 0 /100 WBC (ref 0–0.2)
PLATELET # BLD AUTO: 276 10*3/MM3 (ref 140–450)
PMV BLD AUTO: 12.9 FL (ref 6–12)
POTASSIUM SERPL-SCNC: 4 MMOL/L (ref 3.5–5.2)
PROT SERPL-MCNC: 8.1 G/DL (ref 6–8.5)
RBC # BLD AUTO: 4.51 10*6/MM3 (ref 4.14–5.8)
SODIUM SERPL-SCNC: 133 MMOL/L (ref 136–145)
T4 FREE SERPL-MCNC: 1.1 NG/DL (ref 0.92–1.68)
TRIGL SERPL-MCNC: 145 MG/DL (ref 0–150)
TSH SERPL DL<=0.05 MIU/L-ACNC: 2.35 UIU/ML (ref 0.27–4.2)
VLDLC SERPL-MCNC: 26 MG/DL (ref 5–40)
WBC NRBC COR # BLD AUTO: 8.66 10*3/MM3 (ref 3.4–10.8)

## 2025-02-12 NOTE — TELEPHONE ENCOUNTER
----- Message from Carly Harp sent at 2/12/2025  3:07 PM EST -----  Please call the patient regarding his abnormal result. His A1c was still 9.2. was he able to get the jardiance and mounjaro? All other labs stable.

## 2025-02-12 NOTE — PROGRESS NOTES
"Chief Complaint  Hyperglycemia (Glucose 438 am )    Subjective          Taiwo Pierce presents to [facility]  History of Present Illness  History of Present Illness  The patient is a 49-year-old male who presents for a follow-up on diabetes.    He reports persistent hyperglycemia, with blood glucose levels occasionally decreasing to the 300s but subsequently rebounding. He has experienced weight gain, which he attributes to overeating. His current medication regimen includes Ozempic 0.25, which he has been taking consistently. He has previously tried metformin but discontinued it due to adverse gastrointestinal effects. He is also on glipizide 100 mg twice daily. He has not yet tried Jardiance. He expresses interest in trying Wegovy. He reports visual disturbances, including blurriness when his blood glucose levels are elevated. He has not yet met his insurance deductible.    He continues to take Prevacid and requests a refill.    He is currently on sildenafil 25 mg and requests a dosage reduction to 20 mg.    Supplemental Information  He reports occasional dizziness, occurring approximately twice daily.    MEDICATIONS  Current: Ozempic, glipizide, Prevacid, sildenafil  Discontinued: metformin    Review of Systems      Objective   Vital Signs:   /88 (BP Location: Right arm, Patient Position: Sitting, Cuff Size: Large Adult)   Pulse 110   Temp 97.3 °F (36.3 °C) (Temporal)   Ht 180.3 cm (71\")   Wt 89.8 kg (198 lb)   SpO2 97%   BMI 27.62 kg/m²     Physical Exam      Physical Exam   Result Review :          Results                Assessment and Plan    Diagnoses and all orders for this visit:    1. Mixed hyperlipidemia (Primary)  -     Lipid Panel; Future    2. Type 2 diabetes mellitus with hyperglycemia, without long-term current use of insulin  -     Tirzepatide 2.5 MG/0.5ML solution auto-injector; Inject 2.5 mg under the skin into the appropriate area as directed 1 " (One) Time Per Week.  Dispense: 2 mL; Refill: 1  -     empagliflozin (Jardiance) 10 MG tablet tablet; Take 1 tablet by mouth Daily.  Dispense: 30 tablet; Refill: 2  -     glipizide (GLUCOTROL) 10 MG tablet; Take 1 tablet by mouth 2 (Two) Times a Day Before Meals.  Dispense: 60 tablet; Refill: 2  -     CBC Auto Differential; Future  -     Comprehensive Metabolic Panel; Future  -     Hemoglobin A1c; Future  -     T4, Free; Future  -     TSH; Future    3. Gastroesophageal reflux disease, unspecified whether esophagitis present  -     lansoprazole (PREVACID) 30 MG capsule; Take 1 capsule by mouth Daily. Indications: Heartburn  Dispense: 30 capsule; Refill: 2    4. Essential hypertension    5. Erectile dysfunction, unspecified erectile dysfunction type  -     sildenafil (REVATIO) 20 MG tablet; Take 1 tablet by mouth Daily.  Dispense: 30 tablet; Refill: 2      Assessment & Plan  1. Diabetes Mellitus.  His blood glucose levels remain elevated despite current treatment. He will discontinue Ozempic and start Mounjaro, pending preauthorization. Jardiance will be added to his regimen, to be taken once daily. He will continue taking glipizide 100 mg twice a day. Laboratory tests will be conducted to monitor his A1c levels. The prescription for Mounjaro will be sent to [facility] pharmacy.    2. Gastroesophageal Reflux Disease.  He will continue taking Prevacid. A refill for Prevacid will be provided.    3. Erectile Dysfunction.  His sildenafil dosage will be adjusted from 25 mg to 20 mg as per his request.    Patient's Body mass index is 27.62 kg/m². indicating that he is overweight (BMI 25-29.9). Patient's (Body mass index is 27.62 kg/m².) indicates that they are overweight with health conditions that include hypertension, diabetes mellitus, and dyslipidemias . Weight is unchanged. BMI is above average; BMI management plan is completed. We discussed low calorie, low carb based diet program, portion control, and increasing  exercise. .    Follow Up   Return in about 3 months (around 5/11/2025), or labs today.  Patient was given instructions and counseling regarding his condition or for health maintenance advice. Please see specific information pulled into the AVS if appropriate.     This document has been electronically signed by ENRICO Dejesus  February 12, 2025 11:12 EST     Patient or patient representative verbalized consent for the use of Ambient Listening during the visit with  ENRICO Dejesus for chart documentation. 2/12/2025  11:12 EST

## 2025-02-12 NOTE — TELEPHONE ENCOUNTER
Spoke with patient he has not been to the pharmacy yet,instructed to obtain from pharmacy & start taking as soon as he can.

## 2025-02-14 ENCOUNTER — TELEPHONE (OUTPATIENT)
Dept: FAMILY MEDICINE CLINIC | Facility: CLINIC | Age: 50
End: 2025-02-14
Payer: MEDICARE

## 2025-02-14 NOTE — TELEPHONE ENCOUNTER
Caller: [facility] - [place], [place] - [address] - [phone]  - [phone]     Relationship: Pharmacy    Best call back number: [phone]     What test/procedure requested: TIER EXCEPTION FOR MOUNJARO    When is it needed: AS SOON AS POSSIBLE       Additional information or concerns: THIS MEDICATION IS TOO EXPENSIVE FOR PATIENT AND PATIENT IS REQUESTING THIS TO LOWER COST OF THE MOUNJARO.

## 2025-02-14 NOTE — TELEPHONE ENCOUNTER
Caller: [facility]. - [facility] [address] [place] [address] C - [phone] [facility] [phone]     Relationship to patient: Pharmacy      Best call back number: [phone]     Provider: VERONICA ALCARAZ    Medication PA needed: SILDENAFIL 20 MG    Reason for call/Prior Auth: INSURANCE DOES NOT COVER.

## 2025-02-17 NOTE — TELEPHONE ENCOUNTER
Caller: [facility] - [facility] [address] [place] [address] - [phone] [facility] [phone]     Relationship: Avinash GARCIA    Best call back number: [phone]     What is the best time to reach you: ANYTIME    Who are you requesting to speak with (clinical staff, provider,  specific staff member): CLINICAL - PRIOR AUTHORIZATIONS      What was the call regarding: REF #: [id number]    THERE ARE CLINICAL QUESTIONS THAT NEED TO BE ANSWERED IN ORDER TO PROCESS THIS PRIOR AUTHORIZATION (SILDENAFIL). PHARMACY REP HAS ASKED FOR A CALL BACK TO ANSWER THOSE QUESTIONS

## 2025-02-17 NOTE — TELEPHONE ENCOUNTER
02/17/2025 Called patient's insurance and tier exception for mounjaro was approved from 01/01/2025 to 12/31/2025 with a -0- copay. I called [facility] and had them to rerun the patient's medication through his insurance and he can get his 207.00 dollars copay refunded back to him. (he paid this when he picked up his medication). The approval number is [id number].

## 2025-02-18 RX ORDER — HYDROXYZINE HYDROCHLORIDE 25 MG/1
25 TABLET, FILM COATED ORAL EVERY 8 HOURS PRN
Qty: 90 TABLET | Refills: 0 | Status: SHIPPED | OUTPATIENT
Start: 2025-02-18

## 2025-04-19 DIAGNOSIS — F51.01 PRIMARY INSOMNIA: ICD-10-CM

## 2025-04-19 DIAGNOSIS — F32.9 REACTIVE DEPRESSION: ICD-10-CM

## 2025-04-21 RX ORDER — BUSPIRONE HYDROCHLORIDE 7.5 MG/1
7.5 TABLET ORAL 3 TIMES DAILY
Qty: 90 TABLET | Refills: 2 | Status: SHIPPED | OUTPATIENT
Start: 2025-04-21

## 2025-04-21 RX ORDER — MIRTAZAPINE 15 MG/1
15 TABLET, FILM COATED ORAL NIGHTLY
Qty: 30 TABLET | Refills: 2 | Status: SHIPPED | OUTPATIENT
Start: 2025-04-21

## 2025-04-22 DIAGNOSIS — E11.65 TYPE 2 DIABETES MELLITUS WITH HYPERGLYCEMIA, WITHOUT LONG-TERM CURRENT USE OF INSULIN: ICD-10-CM

## 2025-04-22 RX ORDER — TIRZEPATIDE 2.5 MG/.5ML
INJECTION, SOLUTION SUBCUTANEOUS
Qty: 2 ML | Refills: 1 | Status: SHIPPED | OUTPATIENT
Start: 2025-04-22

## 2025-05-10 DIAGNOSIS — K21.9 GASTROESOPHAGEAL REFLUX DISEASE, UNSPECIFIED WHETHER ESOPHAGITIS PRESENT: ICD-10-CM

## 2025-05-10 DIAGNOSIS — E11.65 TYPE 2 DIABETES MELLITUS WITH HYPERGLYCEMIA, WITHOUT LONG-TERM CURRENT USE OF INSULIN: ICD-10-CM

## 2025-05-12 ENCOUNTER — OFFICE VISIT (OUTPATIENT)
Dept: FAMILY MEDICINE CLINIC | Facility: CLINIC | Age: 50
End: 2025-05-12
Payer: MEDICARE

## 2025-05-12 VITALS
HEIGHT: 71 IN | TEMPERATURE: 97.8 F | WEIGHT: 186.8 LBS | DIASTOLIC BLOOD PRESSURE: 80 MMHG | SYSTOLIC BLOOD PRESSURE: 138 MMHG | HEART RATE: 105 BPM | BODY MASS INDEX: 26.15 KG/M2 | OXYGEN SATURATION: 100 %

## 2025-05-12 DIAGNOSIS — E11.65 TYPE 2 DIABETES MELLITUS WITH HYPERGLYCEMIA, UNSPECIFIED WHETHER LONG TERM INSULIN USE: Primary | ICD-10-CM

## 2025-05-12 DIAGNOSIS — K21.9 GASTROESOPHAGEAL REFLUX DISEASE, UNSPECIFIED WHETHER ESOPHAGITIS PRESENT: ICD-10-CM

## 2025-05-12 DIAGNOSIS — F32.9 REACTIVE DEPRESSION: ICD-10-CM

## 2025-05-12 DIAGNOSIS — S02.2XXD CLOSED FRACTURE OF NASAL BONE WITH ROUTINE HEALING, SUBSEQUENT ENCOUNTER: ICD-10-CM

## 2025-05-12 DIAGNOSIS — N52.9 ERECTILE DYSFUNCTION, UNSPECIFIED ERECTILE DYSFUNCTION TYPE: ICD-10-CM

## 2025-05-12 DIAGNOSIS — E78.2 MIXED HYPERLIPIDEMIA: ICD-10-CM

## 2025-05-12 DIAGNOSIS — F51.01 PRIMARY INSOMNIA: ICD-10-CM

## 2025-05-12 DIAGNOSIS — R56.9 SEIZURE: ICD-10-CM

## 2025-05-12 DIAGNOSIS — E11.65 TYPE 2 DIABETES MELLITUS WITH HYPERGLYCEMIA, WITHOUT LONG-TERM CURRENT USE OF INSULIN: ICD-10-CM

## 2025-05-12 RX ORDER — DULOXETIN HYDROCHLORIDE 60 MG/1
60 CAPSULE, DELAYED RELEASE ORAL DAILY
Qty: 30 CAPSULE | Refills: 2 | Status: SHIPPED | OUTPATIENT
Start: 2025-05-12

## 2025-05-12 RX ORDER — GLIPIZIDE 10 MG/1
10 TABLET ORAL
Qty: 60 TABLET | Refills: 2 | Status: SHIPPED | OUTPATIENT
Start: 2025-05-12

## 2025-05-12 RX ORDER — HYDROXYZINE HYDROCHLORIDE 25 MG/1
25 TABLET, FILM COATED ORAL EVERY 8 HOURS PRN
Qty: 90 TABLET | Refills: 0 | Status: SHIPPED | OUTPATIENT
Start: 2025-05-12

## 2025-05-12 RX ORDER — HYDROXYZINE HYDROCHLORIDE 50 MG/1
50 TABLET, FILM COATED ORAL EVERY 8 HOURS PRN
Qty: 90 TABLET | Refills: 0 | Status: SHIPPED | OUTPATIENT
Start: 2025-05-12

## 2025-05-12 RX ORDER — BUSPIRONE HYDROCHLORIDE 7.5 MG/1
7.5 TABLET ORAL 3 TIMES DAILY
Qty: 90 TABLET | Refills: 2 | Status: SHIPPED | OUTPATIENT
Start: 2025-05-12

## 2025-05-12 RX ORDER — LANSOPRAZOLE 30 MG/1
30 CAPSULE, DELAYED RELEASE ORAL DAILY
Qty: 90 CAPSULE | Refills: 0 | Status: SHIPPED | OUTPATIENT
Start: 2025-05-12

## 2025-05-12 RX ORDER — DOXYCYCLINE 100 MG/1
100 CAPSULE ORAL 2 TIMES DAILY
COMMUNITY
Start: 2025-05-11 | End: 2025-05-12

## 2025-05-12 RX ORDER — LANSOPRAZOLE 30 MG/1
30 CAPSULE, DELAYED RELEASE ORAL DAILY
Qty: 90 CAPSULE | Refills: 0 | Status: SHIPPED | OUTPATIENT
Start: 2025-05-12 | End: 2025-05-12 | Stop reason: SDUPTHER

## 2025-05-12 RX ORDER — SILDENAFIL CITRATE 20 MG/1
20 TABLET ORAL DAILY
Qty: 30 TABLET | Refills: 2 | Status: SHIPPED | OUTPATIENT
Start: 2025-05-12

## 2025-05-12 RX ORDER — LEVETIRACETAM 500 MG/1
500 TABLET ORAL 2 TIMES DAILY
COMMUNITY
Start: 2025-05-11

## 2025-05-12 RX ORDER — EMPAGLIFLOZIN 10 MG/1
10 TABLET, FILM COATED ORAL DAILY
Qty: 30 TABLET | Refills: 2 | Status: SHIPPED | OUTPATIENT
Start: 2025-05-12 | End: 2025-05-12 | Stop reason: SDUPTHER

## 2025-05-12 RX ORDER — PRAVASTATIN SODIUM 20 MG
20 TABLET ORAL DAILY
Qty: 90 TABLET | Refills: 0 | Status: SHIPPED | OUTPATIENT
Start: 2025-05-12

## 2025-05-12 RX ORDER — LANOLIN ALCOHOL/MO/W.PET/CERES
1000 CREAM (GRAM) TOPICAL DAILY
Qty: 90 TABLET | Refills: 0 | Status: SHIPPED | OUTPATIENT
Start: 2025-05-12

## 2025-05-12 RX ORDER — MIRTAZAPINE 15 MG/1
15 TABLET, FILM COATED ORAL NIGHTLY
Qty: 90 TABLET | Refills: 0 | Status: SHIPPED | OUTPATIENT
Start: 2025-05-12

## 2025-05-13 NOTE — PROGRESS NOTES
"Chief Complaint  Hypertension (Was seen in er yesterday for a seizure)    Subjective          Taiwo Pierce presents to [facility]  Hypertension    History of Present Illness  The patient is a 49-year-old male who presents for follow-up on hypertension and diabetes.    He experienced a seizure episode yesterday while at [employer], which resulted in a fall on the blacktop. He was subsequently hospitalized and initiated on Keppra. This is his second seizure episode, with the first one occurring previously. He has not yet consulted with a neurologist but plans to do so. He was advised against driving until further notice. A scan was performed, and he has a disc of the same. The seizure lasted approximately 3 to 4 minutes, during which he was intermittently conscious. His son-in-law assisted in breaking his fall, but he ceased breathing momentarily, causing distress. Prior to the seizure, he felt unwell and suspected hypoglycemia. His wife attempted to administer candy, but he declined. His blood glucose level was recorded as 148 in the ambulance.    He sustained a nasal fracture during the fall, resulting in impaired breathing through one nostril. He also suffered a burn on his forehead due to contact with the blacktop.    He reports poor sleep quality and is currently on hydroxyzine and Remeron.    He is currently on Mounjaro 2.5 mg and believes an increase in dosage may be beneficial as his blood glucose levels have been slightly elevated.    Review of Systems      Objective   Vital Signs:   /80 (BP Location: Right arm, Patient Position: Sitting, Cuff Size: Adult)   Pulse 105   Temp 97.8 °F (36.6 °C) (Temporal)   Ht 180.3 cm (71\")   Wt 84.7 kg (186 lb 12.8 oz)   SpO2 100%   BMI 26.05 kg/m²     Physical Exam  Respiratory: Clear to auscultation, no wheezing, rales or rhonchi    Physical Exam  Constitutional:       General: He is not in acute distress.     Appearance: " Normal appearance. He is well-developed and well-groomed. He is not ill-appearing, toxic-appearing or diaphoretic.   HENT:      Head: Normocephalic.      Nose: Nose normal. No congestion or rhinorrhea.      Mouth/Throat:      Mouth: Mucous membranes are moist.      Pharynx: Oropharynx is clear. No oropharyngeal exudate or posterior oropharyngeal erythema.   Eyes:      General: Lids are normal.         Right eye: No discharge.         Left eye: No discharge.      Extraocular Movements: Extraocular movements intact.      Pupils: Pupils are equal, round, and reactive to light.   Neck:      Vascular: No carotid bruit.   Cardiovascular:      Rate and Rhythm: Normal rate and regular rhythm.      Pulses: Normal pulses.      Heart sounds: Normal heart sounds. No murmur heard.     No friction rub. No gallop.   Pulmonary:      Effort: Pulmonary effort is normal. No respiratory distress.      Breath sounds: Normal breath sounds. No stridor. No wheezing, rhonchi or rales.   Chest:      Chest wall: No tenderness.   Abdominal:      General: Bowel sounds are normal. There is no distension.      Palpations: Abdomen is soft. There is no mass.      Tenderness: There is no abdominal tenderness. There is no right CVA tenderness, left CVA tenderness, guarding or rebound.      Hernia: No hernia is present.   Musculoskeletal:         General: No swelling or tenderness. Normal range of motion.      Cervical back: Normal range of motion and neck supple. No rigidity or tenderness.      Right lower leg: No edema.      Left lower leg: No edema.   Lymphadenopathy:      Cervical: No cervical adenopathy.   Skin:     General: Skin is warm.      Capillary Refill: Capillary refill takes less than 2 seconds.      Coloration: Skin is not jaundiced.      Findings: Erythema and wound present. No bruising or rash.   Neurological:      General: No focal deficit present.      Mental Status: He is alert and oriented to person, place, and time.      Motor:  Motor function is intact. No weakness.      Coordination: Coordination is intact.      Gait: Gait is intact. Gait normal.   Psychiatric:         Attention and Perception: Attention normal.         Mood and Affect: Mood normal.         Speech: Speech normal.         Behavior: Behavior normal.         Cognition and Memory: Cognition normal.         Judgment: Judgment normal.        Result Review :          Results  Labs   - Blood glucose test: 148 mg/dL              Assessment and Plan    Diagnoses and all orders for this visit:    1. Type 2 diabetes mellitus with hyperglycemia, unspecified whether long term insulin use (Primary)  -     Microalbumin / Creatinine Urine Ratio - Urine, Clean Catch; Future    2. Seizure  -     Ambulatory Referral to Neurology    3. Reactive depression  -     busPIRone (BUSPAR) 7.5 MG tablet; Take 1 tablet by mouth 3 (Three) Times a Day.  Dispense: 90 tablet; Refill: 2  -     DULoxetine (CYMBALTA) 60 MG capsule; Take 1 capsule by mouth Daily.  Dispense: 30 capsule; Refill: 2    4. Type 2 diabetes mellitus with hyperglycemia, without long-term current use of insulin  -     glipizide (GLUCOTROL) 10 MG tablet; Take 1 tablet by mouth 2 (Two) Times a Day Before Meals.  Dispense: 60 tablet; Refill: 2  -     empagliflozin (Jardiance) 10 MG tablet tablet; Take 1 tablet by mouth Daily.  Dispense: 90 tablet; Refill: 0    5. Gastroesophageal reflux disease, unspecified whether esophagitis present  -     lansoprazole (PREVACID) 30 MG capsule; Take 1 capsule by mouth Daily.  Dispense: 90 capsule; Refill: 0    6. Primary insomnia  -     mirtazapine (REMERON) 15 MG tablet; Take 1 tablet by mouth Every Night.  Dispense: 90 tablet; Refill: 0    7. Mixed hyperlipidemia  -     pravastatin (PRAVACHOL) 20 MG tablet; Take 1 tablet by mouth Daily. Indications: High Amount of Fats in the Blood  Dispense: 90 tablet; Refill: 0    8. Erectile dysfunction, unspecified erectile dysfunction type  -     sildenafil  (REVATIO) 20 MG tablet; Take 1 tablet by mouth Daily.  Dispense: 30 tablet; Refill: 2    9. Closed fracture of nasal bone with routine healing, subsequent encounter  -     Ambulatory Referral to ENT (Otolaryngology)    Other orders  -     LABS SCANNED  -     glucose blood test strip; For tid testing   R73.01  Dispense: 100 each; Refill: 11  -     hydrOXYzine (ATARAX) 25 MG tablet; Take 1 tablet by mouth Every 8 (Eight) Hours As Needed for Anxiety. for anxiety  Dispense: 90 tablet; Refill: 0  -     Tirzepatide 5 MG/0.5ML solution auto-injector; Inject 5 mg under the skin into the appropriate area as directed 1 (One) Time Per Week.  Dispense: 2 mL; Refill: 2  -     vitamin B-12 (CYANOCOBALAMIN) 1000 MCG tablet; Take 1 tablet by mouth Daily.  Dispense: 90 tablet; Refill: 0  -     hydrOXYzine (ATARAX) 50 MG tablet; Take 1 tablet by mouth Every 8 (Eight) Hours As Needed for Itching.  Dispense: 90 tablet; Refill: 0      Assessment & Plan  1. Seizure disorder.  - Experienced a seizure at [employer], resulting in a fall and a broken nose.  - Started on Keppra at the hospital.  - Referral to Neurology will be made for further evaluation and management.  - Advised not to drive until cleared by Neurology.    2. Nasal fracture.  - Sustained a nasal fracture during the seizure episode, leading to difficulty breathing through one side of his nose.  - Physical examination confirms difficulty breathing through the affected side.  - Referral to an Ear, Nose, and Throat (ENT) specialist will be arranged for further assessment and potential treatment.  - ENT appointment will be scheduled promptly.    3. Insomnia.  - Reports difficulty sleeping at night.  - Currently taking hydroxyzine and Remeron for sleep.  - Dosage of hydroxyzine will be increased to 50 mg to be taken at night.  - Can take two 25 mg tablets if needed.    4. Diabetes mellitus.  - Blood sugar levels have been slightly elevated.  - Last recorded blood sugar was 148.  -  Dosage of Mounjaro will be increased to 5 mg to better control blood sugar levels.  - Prescription for the increased dosage has been sent to the pharmacy.    Patient's Body mass index is 26.05 kg/m². indicating that he is overweight (BMI 25-29.9). Patient's (Body mass index is 26.05 kg/m².) indicates that they are overweight with health conditions that include hypertension, diabetes mellitus, and dyslipidemias . Weight is unchanged. BMI is above average; BMI management plan is completed. We discussed low calorie, low carb based diet program, portion control, and increasing exercise. .    Follow Up   No follow-ups on file.  Patient was given instructions and counseling regarding his condition or for health maintenance advice. Please see specific information pulled into the AVS if appropriate.     This document has been electronically signed by ENRICO Dejesus  May 13, 2025 08:48 EDT     Patient or patient representative verbalized consent for the use of Ambient Listening during the visit with  ENRICO Dejesus for chart documentation. 5/13/2025  08:49 EDT

## 2025-06-16 RX ORDER — LEVETIRACETAM 500 MG/1
500 TABLET ORAL 2 TIMES DAILY
Qty: 60 TABLET | Refills: 1 | Status: SHIPPED | OUTPATIENT
Start: 2025-06-16

## 2025-06-16 RX ORDER — LEVETIRACETAM 500 MG/1
500 TABLET ORAL 2 TIMES DAILY
Status: CANCELLED | OUTPATIENT
Start: 2025-06-16

## 2025-06-30 RX ORDER — HYDROXYZINE HYDROCHLORIDE 50 MG/1
50 TABLET, FILM COATED ORAL EVERY 8 HOURS PRN
Qty: 90 TABLET | Refills: 0 | Status: SHIPPED | OUTPATIENT
Start: 2025-06-30

## 2025-06-30 RX ORDER — HYDROXYZINE HYDROCHLORIDE 25 MG/1
25 TABLET, FILM COATED ORAL EVERY 8 HOURS PRN
Qty: 90 TABLET | Refills: 0 | Status: SHIPPED | OUTPATIENT
Start: 2025-06-30

## 2025-07-08 ENCOUNTER — APPOINTMENT (OUTPATIENT)
Dept: CT IMAGING | Facility: HOSPITAL | Age: 50
End: 2025-07-08
Payer: MEDICARE

## 2025-07-08 ENCOUNTER — HOSPITAL ENCOUNTER (EMERGENCY)
Facility: HOSPITAL | Age: 50
Discharge: LEFT BEFORE DISCHARGE | End: 2025-07-08
Payer: MEDICARE

## 2025-07-08 VITALS
DIASTOLIC BLOOD PRESSURE: 97 MMHG | BODY MASS INDEX: 26.04 KG/M2 | OXYGEN SATURATION: 93 % | WEIGHT: 186 LBS | HEIGHT: 71 IN | TEMPERATURE: 98.8 F | HEART RATE: 73 BPM | SYSTOLIC BLOOD PRESSURE: 146 MMHG | RESPIRATION RATE: 18 BRPM

## 2025-07-08 DIAGNOSIS — F10.10 ALCOHOL ABUSE: ICD-10-CM

## 2025-07-08 DIAGNOSIS — R79.89 ELEVATED LFTS: ICD-10-CM

## 2025-07-08 DIAGNOSIS — K80.20 CALCULUS OF GALLBLADDER WITHOUT CHOLECYSTITIS WITHOUT OBSTRUCTION: ICD-10-CM

## 2025-07-08 DIAGNOSIS — K85.20 ALCOHOL-INDUCED ACUTE PANCREATITIS WITHOUT INFECTION OR NECROSIS: Primary | ICD-10-CM

## 2025-07-08 LAB
ALBUMIN SERPL-MCNC: 4.5 G/DL (ref 3.5–5.2)
ALBUMIN/GLOB SERPL: 1 G/DL
ALP SERPL-CCNC: 200 U/L (ref 39–117)
ALT SERPL W P-5'-P-CCNC: 149 U/L (ref 1–41)
AMPHET+METHAMPHET UR QL: NEGATIVE
AMPHETAMINES UR QL: NEGATIVE
ANION GAP SERPL CALCULATED.3IONS-SCNC: 20.9 MMOL/L (ref 5–15)
AST SERPL-CCNC: 187 U/L (ref 1–40)
BACTERIA UR QL AUTO: NORMAL /HPF
BARBITURATES UR QL SCN: NEGATIVE
BASOPHILS # BLD AUTO: 0.02 10*3/MM3 (ref 0–0.2)
BASOPHILS NFR BLD AUTO: 0.3 % (ref 0–1.5)
BENZODIAZ UR QL SCN: NEGATIVE
BILIRUB CONJ SERPL-MCNC: 0.9 MG/DL (ref 0–0.3)
BILIRUB SERPL-MCNC: 1.8 MG/DL (ref 0–1.2)
BILIRUB UR QL STRIP: ABNORMAL
BUN SERPL-MCNC: 14.4 MG/DL (ref 6–20)
BUN/CREAT SERPL: 20.3 (ref 7–25)
BUPRENORPHINE SERPL-MCNC: NEGATIVE NG/ML
CALCIUM SPEC-SCNC: 10.3 MG/DL (ref 8.6–10.5)
CANNABINOIDS SERPL QL: NEGATIVE
CHLORIDE SERPL-SCNC: 95 MMOL/L (ref 98–107)
CLARITY UR: CLEAR
CO2 SERPL-SCNC: 20.1 MMOL/L (ref 22–29)
COCAINE UR QL: NEGATIVE
COLOR UR: ABNORMAL
CREAT SERPL-MCNC: 0.71 MG/DL (ref 0.76–1.27)
DEPRECATED RDW RBC AUTO: 49.9 FL (ref 37–54)
EGFRCR SERPLBLD CKD-EPI 2021: 111.8 ML/MIN/1.73
EOSINOPHIL # BLD AUTO: 0 10*3/MM3 (ref 0–0.4)
EOSINOPHIL NFR BLD AUTO: 0 % (ref 0.3–6.2)
ERYTHROCYTE [DISTWIDTH] IN BLOOD BY AUTOMATED COUNT: 15 % (ref 12.3–15.4)
ETHANOL BLD-MCNC: <10 MG/DL (ref 0–10)
ETHANOL UR QL: <0.01 %
FENTANYL UR-MCNC: NEGATIVE NG/ML
GLOBULIN UR ELPH-MCNC: 4.3 GM/DL
GLUCOSE SERPL-MCNC: 220 MG/DL (ref 65–99)
GLUCOSE UR STRIP-MCNC: ABNORMAL MG/DL
HAV IGM SERPL QL IA: NORMAL
HBV CORE IGM SERPL QL IA: NORMAL
HBV SURFACE AG SERPL QL IA: NORMAL
HCT VFR BLD AUTO: 41.7 % (ref 37.5–51)
HCV AB SER QL: NORMAL
HGB BLD-MCNC: 14 G/DL (ref 13–17.7)
HGB UR QL STRIP.AUTO: NEGATIVE
HOLD SPECIMEN: NORMAL
HOLD SPECIMEN: NORMAL
HYALINE CASTS UR QL AUTO: NORMAL /LPF
IMM GRANULOCYTES # BLD AUTO: 0.04 10*3/MM3 (ref 0–0.05)
IMM GRANULOCYTES NFR BLD AUTO: 0.6 % (ref 0–0.5)
KETONES UR QL STRIP: ABNORMAL
LEUKOCYTE ESTERASE UR QL STRIP.AUTO: ABNORMAL
LIPASE SERPL-CCNC: 358 U/L (ref 13–60)
LYMPHOCYTES # BLD AUTO: 0.6 10*3/MM3 (ref 0.7–3.1)
LYMPHOCYTES NFR BLD AUTO: 9.6 % (ref 19.6–45.3)
MAGNESIUM SERPL-MCNC: 2 MG/DL (ref 1.6–2.6)
MCH RBC QN AUTO: 30.5 PG (ref 26.6–33)
MCHC RBC AUTO-ENTMCNC: 33.6 G/DL (ref 31.5–35.7)
MCV RBC AUTO: 90.8 FL (ref 79–97)
METHADONE UR QL SCN: NEGATIVE
MONOCYTES # BLD AUTO: 0.62 10*3/MM3 (ref 0.1–0.9)
MONOCYTES NFR BLD AUTO: 9.9 % (ref 5–12)
NEUTROPHILS NFR BLD AUTO: 4.97 10*3/MM3 (ref 1.7–7)
NEUTROPHILS NFR BLD AUTO: 79.6 % (ref 42.7–76)
NITRITE UR QL STRIP: POSITIVE
NRBC BLD AUTO-RTO: 0 /100 WBC (ref 0–0.2)
OPIATES UR QL: NEGATIVE
OXYCODONE UR QL SCN: POSITIVE
PCP UR QL SCN: NEGATIVE
PH UR STRIP.AUTO: 7 [PH] (ref 5–8)
PLATELET # BLD AUTO: 100 10*3/MM3 (ref 140–450)
PMV BLD AUTO: 10.8 FL (ref 6–12)
POTASSIUM SERPL-SCNC: 3.6 MMOL/L (ref 3.5–5.2)
PROT SERPL-MCNC: 8.8 G/DL (ref 6–8.5)
PROT UR QL STRIP: ABNORMAL
RBC # BLD AUTO: 4.59 10*6/MM3 (ref 4.14–5.8)
RBC # UR STRIP: NORMAL /HPF
REF LAB TEST METHOD: NORMAL
SODIUM SERPL-SCNC: 136 MMOL/L (ref 136–145)
SP GR UR STRIP: >1.03 (ref 1–1.03)
SQUAMOUS #/AREA URNS HPF: NORMAL /HPF
TRICYCLICS UR QL SCN: NEGATIVE
UROBILINOGEN UR QL STRIP: ABNORMAL
WBC # UR STRIP: NORMAL /HPF
WBC NRBC COR # BLD AUTO: 6.25 10*3/MM3 (ref 3.4–10.8)
WHOLE BLOOD HOLD COAG: NORMAL
WHOLE BLOOD HOLD SPECIMEN: NORMAL

## 2025-07-08 PROCEDURE — 25810000003 SODIUM CHLORIDE 0.9 % SOLUTION: Performed by: NURSE PRACTITIONER

## 2025-07-08 PROCEDURE — 83690 ASSAY OF LIPASE: CPT | Performed by: NURSE PRACTITIONER

## 2025-07-08 PROCEDURE — 25010000002 VANCOMYCIN 5 G RECONSTITUTED SOLUTION: Performed by: NURSE PRACTITIONER

## 2025-07-08 PROCEDURE — 96367 TX/PROPH/DG ADDL SEQ IV INF: CPT

## 2025-07-08 PROCEDURE — 83735 ASSAY OF MAGNESIUM: CPT | Performed by: NURSE PRACTITIONER

## 2025-07-08 PROCEDURE — 96376 TX/PRO/DX INJ SAME DRUG ADON: CPT

## 2025-07-08 PROCEDURE — 80074 ACUTE HEPATITIS PANEL: CPT | Performed by: NURSE PRACTITIONER

## 2025-07-08 PROCEDURE — 25010000002 FOLIC ACID 5 MG/ML SOLUTION 10 ML VIAL: Performed by: NURSE PRACTITIONER

## 2025-07-08 PROCEDURE — 25010000002 AZTREONAM PER 500 MG: Performed by: NURSE PRACTITIONER

## 2025-07-08 PROCEDURE — 96375 TX/PRO/DX INJ NEW DRUG ADDON: CPT

## 2025-07-08 PROCEDURE — 82077 ASSAY SPEC XCP UR&BREATH IA: CPT | Performed by: NURSE PRACTITIONER

## 2025-07-08 PROCEDURE — 25010000002 THIAMINE PER 100 MG: Performed by: NURSE PRACTITIONER

## 2025-07-08 PROCEDURE — 25010000002 LORAZEPAM PER 2 MG: Performed by: NURSE PRACTITIONER

## 2025-07-08 PROCEDURE — 85025 COMPLETE CBC W/AUTO DIFF WBC: CPT | Performed by: NURSE PRACTITIONER

## 2025-07-08 PROCEDURE — 96365 THER/PROPH/DIAG IV INF INIT: CPT

## 2025-07-08 PROCEDURE — 25010000002 LORAZEPAM PER 2 MG

## 2025-07-08 PROCEDURE — 74176 CT ABD & PELVIS W/O CONTRAST: CPT

## 2025-07-08 PROCEDURE — 81001 URINALYSIS AUTO W/SCOPE: CPT | Performed by: NURSE PRACTITIONER

## 2025-07-08 PROCEDURE — 36415 COLL VENOUS BLD VENIPUNCTURE: CPT

## 2025-07-08 PROCEDURE — 80053 COMPREHEN METABOLIC PANEL: CPT | Performed by: NURSE PRACTITIONER

## 2025-07-08 PROCEDURE — 80307 DRUG TEST PRSMV CHEM ANLYZR: CPT | Performed by: NURSE PRACTITIONER

## 2025-07-08 PROCEDURE — 99284 EMERGENCY DEPT VISIT MOD MDM: CPT

## 2025-07-08 PROCEDURE — 25010000002 ONDANSETRON PER 1 MG: Performed by: NURSE PRACTITIONER

## 2025-07-08 PROCEDURE — 87086 URINE CULTURE/COLONY COUNT: CPT | Performed by: NURSE PRACTITIONER

## 2025-07-08 PROCEDURE — 96366 THER/PROPH/DIAG IV INF ADDON: CPT

## 2025-07-08 PROCEDURE — 82248 BILIRUBIN DIRECT: CPT | Performed by: NURSE PRACTITIONER

## 2025-07-08 RX ORDER — CLONIDINE HYDROCHLORIDE 0.1 MG/1
0.2 TABLET ORAL ONCE
Status: DISCONTINUED | OUTPATIENT
Start: 2025-07-08 | End: 2025-07-08 | Stop reason: HOSPADM

## 2025-07-08 RX ORDER — THIAMINE HYDROCHLORIDE 100 MG/ML
200 INJECTION, SOLUTION INTRAMUSCULAR; INTRAVENOUS ONCE
Status: COMPLETED | OUTPATIENT
Start: 2025-07-08 | End: 2025-07-08

## 2025-07-08 RX ORDER — LORAZEPAM 2 MG/ML
1 INJECTION INTRAMUSCULAR ONCE
Status: COMPLETED | OUTPATIENT
Start: 2025-07-08 | End: 2025-07-08

## 2025-07-08 RX ORDER — ONDANSETRON 2 MG/ML
4 INJECTION INTRAMUSCULAR; INTRAVENOUS ONCE
Status: COMPLETED | OUTPATIENT
Start: 2025-07-08 | End: 2025-07-08

## 2025-07-08 RX ORDER — SODIUM CHLORIDE 9 MG/ML
1000 INJECTION, SOLUTION INTRAVENOUS ONCE
Status: COMPLETED | OUTPATIENT
Start: 2025-07-08 | End: 2025-07-08

## 2025-07-08 RX ORDER — CLONIDINE HYDROCHLORIDE 0.1 MG/1
0.2 TABLET ORAL ONCE
Status: COMPLETED | OUTPATIENT
Start: 2025-07-08 | End: 2025-07-08

## 2025-07-08 RX ADMIN — FOLIC ACID 1 MG: 5 INJECTION, SOLUTION INTRAMUSCULAR; INTRAVENOUS; SUBCUTANEOUS at 15:11

## 2025-07-08 RX ADMIN — SODIUM CHLORIDE 1000 ML: 9 INJECTION, SOLUTION INTRAVENOUS at 14:40

## 2025-07-08 RX ADMIN — LORAZEPAM 1 MG: 2 INJECTION INTRAMUSCULAR; INTRAVENOUS at 14:43

## 2025-07-08 RX ADMIN — SODIUM CHLORIDE, POTASSIUM CHLORIDE, SODIUM LACTATE AND CALCIUM CHLORIDE 2532 ML: 600; 310; 30; 20 INJECTION, SOLUTION INTRAVENOUS at 16:41

## 2025-07-08 RX ADMIN — AZTREONAM 2 G: 2 INJECTION, POWDER, LYOPHILIZED, FOR SOLUTION INTRAMUSCULAR; INTRAVENOUS at 18:12

## 2025-07-08 RX ADMIN — VANCOMYCIN HYDROCHLORIDE 1750 MG: 5 INJECTION, POWDER, LYOPHILIZED, FOR SOLUTION INTRAVENOUS at 18:49

## 2025-07-08 RX ADMIN — LORAZEPAM 1 MG: 2 INJECTION INTRAMUSCULAR; INTRAVENOUS at 18:12

## 2025-07-08 RX ADMIN — CLONIDINE HYDROCHLORIDE 0.2 MG: 0.1 TABLET ORAL at 15:28

## 2025-07-08 RX ADMIN — THIAMINE HYDROCHLORIDE 200 MG: 100 INJECTION, SOLUTION INTRAMUSCULAR; INTRAVENOUS at 14:43

## 2025-07-08 RX ADMIN — ONDANSETRON 4 MG: 2 INJECTION, SOLUTION INTRAMUSCULAR; INTRAVENOUS at 14:43

## 2025-07-08 NOTE — NURSING NOTE
Presented pt to DR. BAILEY Erwin he states that we can not legally hold pt and to educate pt on the benefits of staying and the risks of leaving.RBOTX2

## 2025-07-08 NOTE — NURSING NOTE
"Pt assessment complete   ED provider wanted a psychiatric assessment for medical detox but pt does not wish to be admitted to our medical detox unit.  Pt brought to ED seeking help with ETOH withdraw \"Patient reports he has been on and off drinking for about 3 years but has been drinking again recently. Pt reports his last drink was Sunday in the evening. Patient is having nausea, vomiting, headaches, vision changes, auditory disturbances, tremors, and dilated pupils.\"    Pt denies si/hi  Pt reports auditory hallucinations of hearing a bird chirping   Pt reports drinking 0.5, a fifth  daily stating \"I drink for 2 days then don't drink. For two days.\"  Pt reports poor sleep/appetite   Ciwa 16   Pt is alert and oriented x4     "

## 2025-07-09 NOTE — ED PROVIDER NOTES
Subjective   History of Present Illness  Patient is a 50-year-old male with significant past medical history positive for alcohol abuse, arthritis, GERD, hyperlipidemia, hypertension, low back pain presenting to the ER for psychiatric evaluation for alcohol detox.  Patient reports that he has been trying to detox at home for the past day.  Last drink was Sunday night.  Patient reports he drinks about 1/5 of liquor a day.  Patient has reported this daily for the past 7 months.  Prior to that he had a stint of sobriety.  Patient denies drug use.  Patient reports hallucinations at this time.  Patient also reports abdominal pain and vomiting.  Patient has no additional symptoms today.    History provided by:  Patient   used: No        Review of Systems   Constitutional: Negative.  Negative for fever.   HENT: Negative.     Respiratory: Negative.     Cardiovascular: Negative.  Negative for chest pain.   Gastrointestinal:  Positive for abdominal pain, nausea and vomiting.   Endocrine: Negative.    Genitourinary: Negative.  Negative for dysuria.   Skin: Negative.    Neurological: Negative.    Psychiatric/Behavioral: Negative.     All other systems reviewed and are negative.      Past Medical History:   Diagnosis Date    Alcohol abuse     AMS (altered mental status)     Arthritis October 2007    Arthritis in back and neck    GERD (gastroesophageal reflux disease)     Headache 2007    Headaches due to disc in neck.    Hyperlipidemia     Hypertension     Low back pain 10/05/09    Hurt back and had back surgery 04/16/2014. Hit a nerve    Pancreatitis        Allergies   Allergen Reactions    Augmentin [Amoxicillin-Pot Clavulanate] Hives and Swelling     Throat swelling    Metoprolol Unknown - Low Severity       Past Surgical History:   Procedure Laterality Date    ANTERIOR CERVICAL DISCECTOMY W/ FUSION N/A 8/21/2023    Procedure: CERVICAL DISCECTOMY ANTERIOR WITH FUSION C5-7;  Surgeon: Julio C Bryson MD;   Location: [facility] OR;  Service: Neurosurgery;  Laterality: N/A;    FRACTURE SURGERY Left     Foot    KNEE ARTHROSCOPY Left     LUMBAR DISCECTOMY  04/16/2014    LT L5-S1 Discectomy, L4-5 Lami with Disc - Dr. Esequiel Joshi       Family History   Problem Relation Age of Onset    Hyperlipidemia Father     Hypertension Father     Breast cancer Mother     Cancer Mother         Breast cancer    Depression Mother         Depression nerve problems    Prostate cancer Paternal Grandfather     Cancer Paternal Grandfather         Prostate cancer    Hyperlipidemia Paternal Grandfather     Prostate cancer Maternal Grandfather     Arthritis Maternal Grandfather     Cancer Maternal Grandfather         Prostate cancer    Heart disease Maternal Grandfather         Heart attack@90    Hyperlipidemia Maternal Grandfather     Cancer Maternal Grandmother         Lung cancer    Depression Maternal Grandmother         Depression nerve problems    Stroke Maternal Grandmother         Brain anurism    Arthritis Paternal Uncle     Early death Sister         Brain didnt develop completely       Social History     Socioeconomic History    Marital status:    Tobacco Use    Smoking status: Never    Smokeless tobacco: Current     Types: Snuff   Vaping Use    Vaping status: Never Used   Substance and Sexual Activity    Alcohol use: Yes     Comment: vodka . a 5th over three days.    Drug use: Yes     Comment: etoh    Sexual activity: Yes     Partners: Female     Birth control/protection: None           Objective   Physical Exam  Vitals and nursing note reviewed.   Constitutional:       General: He is not in acute distress.     Appearance: He is well-developed. He is ill-appearing. He is not diaphoretic.   HENT:      Head: Normocephalic and atraumatic.      Right Ear: External ear normal.      Left Ear: External ear normal.      Nose: Nose normal.   Eyes:      Conjunctiva/sclera: Conjunctivae normal.      Pupils: Pupils are equal, round, and reactive  to light.   Neck:      Vascular: No JVD.      Trachea: No tracheal deviation.   Cardiovascular:      Rate and Rhythm: Normal rate and regular rhythm.      Heart sounds: Normal heart sounds. No murmur heard.  Pulmonary:      Effort: Pulmonary effort is normal. No respiratory distress.      Breath sounds: Normal breath sounds. No wheezing.   Abdominal:      General: Bowel sounds are normal.      Palpations: Abdomen is soft.      Tenderness: There is abdominal tenderness.   Musculoskeletal:         General: No deformity. Normal range of motion.      Cervical back: Normal range of motion and neck supple.   Skin:     General: Skin is warm and dry.      Capillary Refill: Capillary refill takes 2 to 3 seconds.      Coloration: Skin is not pale.      Findings: No erythema or rash.   Neurological:      Mental Status: He is alert and oriented to person, place, and time.      Cranial Nerves: No cranial nerve deficit.   Psychiatric:         Attention and Perception: He perceives auditory and visual hallucinations.         Mood and Affect: Mood is anxious.         Behavior: Behavior normal.         Thought Content: Thought content normal.         Procedures         Results for orders placed or performed during the hospital encounter of 07/08/25   Comprehensive Metabolic Panel    Collection Time: 07/08/25  2:38 PM    Specimen: Blood   Result Value Ref Range    Glucose 220 (H) 65 - 99 mg/dL    BUN 14.4 6.0 - 20.0 mg/dL    Creatinine 0.71 (L) 0.76 - 1.27 mg/dL    Sodium 136 136 - 145 mmol/L    Potassium 3.6 3.5 - 5.2 mmol/L    Chloride 95 (L) 98 - 107 mmol/L    CO2 20.1 (L) 22.0 - 29.0 mmol/L    Calcium 10.3 8.6 - 10.5 mg/dL    Total Protein 8.8 (H) 6.0 - 8.5 g/dL    Albumin 4.5 3.5 - 5.2 g/dL    ALT (SGPT) 149 (H) 1 - 41 U/L    AST (SGOT) 187 (H) 1 - 40 U/L    Alkaline Phosphatase 200 (H) 39 - 117 U/L    Total Bilirubin 1.8 (H) 0.0 - 1.2 mg/dL    Globulin 4.3 gm/dL    A/G Ratio 1.0 g/dL    BUN/Creatinine Ratio 20.3 7.0 - 25.0     Anion Gap 20.9 (H) 5.0 - 15.0 mmol/L    eGFR 111.8 >60.0 mL/min/1.73   Ethanol    Collection Time: 07/08/25  2:38 PM    Specimen: Blood   Result Value Ref Range    Ethanol <10 0 - 10 mg/dL    Ethanol % <0.010 %   Magnesium    Collection Time: 07/08/25  2:38 PM    Specimen: Blood   Result Value Ref Range    Magnesium 2.0 1.6 - 2.6 mg/dL   CBC Auto Differential    Collection Time: 07/08/25  2:38 PM    Specimen: Blood   Result Value Ref Range    WBC 6.25 3.40 - 10.80 10*3/mm3    RBC 4.59 4.14 - 5.80 10*6/mm3    Hemoglobin 14.0 13.0 - 17.7 g/dL    Hematocrit 41.7 37.5 - 51.0 %    MCV 90.8 79.0 - 97.0 fL    MCH 30.5 26.6 - 33.0 pg    MCHC 33.6 31.5 - 35.7 g/dL    RDW 15.0 12.3 - 15.4 %    RDW-SD 49.9 37.0 - 54.0 fl    MPV 10.8 6.0 - 12.0 fL    Platelets 100 (L) 140 - 450 10*3/mm3    Neutrophil % 79.6 (H) 42.7 - 76.0 %    Lymphocyte % 9.6 (L) 19.6 - 45.3 %    Monocyte % 9.9 5.0 - 12.0 %    Eosinophil % 0.0 (L) 0.3 - 6.2 %    Basophil % 0.3 0.0 - 1.5 %    Immature Grans % 0.6 (H) 0.0 - 0.5 %    Neutrophils, Absolute 4.97 1.70 - 7.00 10*3/mm3    Lymphocytes, Absolute 0.60 (L) 0.70 - 3.10 10*3/mm3    Monocytes, Absolute 0.62 0.10 - 0.90 10*3/mm3    Eosinophils, Absolute 0.00 0.00 - 0.40 10*3/mm3    Basophils, Absolute 0.02 0.00 - 0.20 10*3/mm3    Immature Grans, Absolute 0.04 0.00 - 0.05 10*3/mm3    nRBC 0.0 0.0 - 0.2 /100 WBC   Hepatitis Panel, Acute    Collection Time: 07/08/25  2:38 PM    Specimen: Blood   Result Value Ref Range    Hepatitis B Surface Ag Non-Reactive Non-Reactive    Hep A IgM Non-Reactive Non-Reactive    Hep B C IgM Non-Reactive Non-Reactive    Hepatitis C Ab Non-Reactive Non-Reactive   Bilirubin, Direct    Collection Time: 07/08/25  2:38 PM    Specimen: Blood   Result Value Ref Range    Bilirubin, Direct 0.9 (H) 0.0 - 0.3 mg/dL   Lipase    Collection Time: 07/08/25  2:38 PM    Specimen: Blood   Result Value Ref Range    Lipase 358 (H) 13 - 60 U/L   Green Top (Gel)    Collection Time: 07/08/25  2:38 PM    Result Value Ref Range    Extra Tube Hold for add-ons.    Lavender Top    Collection Time: 07/08/25  2:38 PM   Result Value Ref Range    Extra Tube hold for add-on    Gold Top - SST    Collection Time: 07/08/25  2:38 PM   Result Value Ref Range    Extra Tube Hold for add-ons.    Light Blue Top    Collection Time: 07/08/25  2:38 PM   Result Value Ref Range    Extra Tube Hold for add-ons.    Urinalysis With Microscopic If Indicated (No Culture) - Urine, Clean Catch    Collection Time: 07/08/25  3:32 PM    Specimen: Urine, Clean Catch   Result Value Ref Range    Color, UA Orange (A) Yellow, Straw    Appearance, UA Clear Clear    pH, UA 7.0 5.0 - 8.0    Specific Gravity, UA >1.030 (H) 1.005 - 1.030    Glucose,  mg/dL (2+) (A) Negative    Ketones, UA 80 mg/dL (3+) (A) Negative    Bilirubin, UA Moderate (2+) (A) Negative    Blood, UA Negative Negative    Protein,  mg/dL (2+) (A) Negative    Leuk Esterase, UA Trace (A) Negative    Nitrite, UA Positive (A) Negative    Urobilinogen, UA 1.0 E.U./dL 0.2 - 1.0 E.U./dL   Urine Drug Screen - Urine, Clean Catch    Collection Time: 07/08/25  3:32 PM    Specimen: Urine, Clean Catch   Result Value Ref Range    THC, Screen, Urine Negative Negative    Phencyclidine (PCP), Urine Negative Negative    Cocaine Screen, Urine Negative Negative    Methamphetamine, Ur Negative Negative    Opiate Screen Negative Negative    Amphetamine Screen, Urine Negative Negative    Benzodiazepine Screen, Urine Negative Negative    Tricyclic Antidepressants Screen Negative Negative    Methadone Screen, Urine Negative Negative    Barbiturates Screen, Urine Negative Negative    Oxycodone Screen, Urine Positive (A) Negative    Buprenorphine, Screen, Urine Negative Negative   Fentanyl, Urine - Urine, Clean Catch    Collection Time: 07/08/25  3:32 PM    Specimen: Urine, Clean Catch   Result Value Ref Range    Fentanyl, Urine Negative Negative   Urinalysis, Microscopic Only - Urine, Clean Catch     Collection Time: 07/08/25  3:32 PM    Specimen: Urine, Clean Catch   Result Value Ref Range    RBC, UA 0-2 None Seen, 0-2 /HPF    WBC, UA 0-2 None Seen, 0-2 /HPF    Bacteria, UA None Seen None Seen /HPF    Squamous Epithelial Cells, UA 0-2 None Seen, 0-2 /HPF    Hyaline Casts, UA 0-2 None Seen /LPF    Methodology Automated Microscopy       CT Abdomen Pelvis Without Contrast   Final Result   1.  Mild haziness in the fat surrounding the pancreas. Correlate for   pancreatitis.   2.  Moderate hepatic steatosis.   3.  Cholelithiasis.   4.  Mild diffuse bladder wall thickening.       This report was finalized on 7/8/2025 5:52 PM by Alex Pallas, DO.             ED Course  ED Course as of 07/08/25 2056 Tue Jul 08, 2025   1634 Nitrite, UA(!): Positive [SS]   1634 Leukocytes, UA(!): Trace [SS]   1634 Ketones, UA(!): 80 mg/dL (3+) [SS]   1800 CT Abdomen Pelvis Without Contrast [SS]   2050 Oxycodone Screen, Urine(!): Positive [RA]   2051 Nitrite, UA(!): Positive [RA]   2051 RBC, UA: 0-2 [RA]   2051 WBC, UA: 0-2 [RA]   2051 Bacteria, UA: None Seen [RA]   2051 Leukocytes, UA(!): Trace [RA]   2051 Protein, UA(!): 100 mg/dL (2+) [RA]   2051 Bilirubin, UA(!): Moderate (2+) [RA]   2051 Ketones, UA(!): 80 mg/dL (3+) [RA]   2051 Glucose(!): 500 mg/dL (2+) [RA]   2051 Lipase(!): 358 [RA]      ED Course User Index  [RA] Ugo Cason MD  [SS] Tiffanie Gould APRN                                                       Medical Decision Making  Patient is a 50-year-old male with significant past medical history positive for alcohol abuse, arthritis, GERD, hyperlipidemia, hypertension, low back pain presenting to the ER for psychiatric evaluation for alcohol detox.  Patient reports that he has been trying to detox at home for the past day.  Last drink was Sunday night.  Patient reports he drinks about 1/5 of liquor a day.  Patient has reported this daily for the past 7 months.  Prior to that he had a stint of sobriety.  Patient  denies drug use.  Patient reports hallucinations at this time.  Patient also reports abdominal pain and vomiting.  Patient has no additional symptoms today.    Patient evaluated by psychiatric and offered inpatient detox and patient declined. Resources were given patient for outpatient. Lengthy discussion regarding pancreatitis and gallstones and referral made to general surgery for evaluation of cholecystectomy. Advised patient to return to the ER with new or worsening symptoms. Patient verbalized understanding. Patient is alert and oriented x 4 and declines any additional treatments at this time.    Problems Addressed:  Alcohol abuse: complicated acute illness or injury  Alcohol-induced acute pancreatitis without infection or necrosis: complicated acute illness or injury  Calculus of gallbladder without cholecystitis without obstruction: complicated acute illness or injury  Elevated LFTs: complicated acute illness or injury    Amount and/or Complexity of Data Reviewed  Labs: ordered. Decision-making details documented in ED Course.  Radiology: ordered. Decision-making details documented in ED Course.    Risk  Prescription drug management.        Final diagnoses:   Alcohol-induced acute pancreatitis without infection or necrosis   Calculus of gallbladder without cholecystitis without obstruction   Alcohol abuse   Elevated LFTs       ED Disposition  ED Disposition       ED Disposition   AMA    Condition   --    Comment   --               Carly Harp, APRN  [address] [place] [address]  [phone]    Schedule an appointment as soon as possible for a visit       Zofia Hairston MD  [address] [place] [address]  [place] [address]  [phone]    Schedule an appointment as soon as possible for a visit            Medication List      No changes were made to your prescriptions during this visit.            Tiffanie Gould, APRN  07/08/25 2051

## 2025-07-10 LAB — BACTERIA SPEC AEROBE CULT: NO GROWTH

## 2025-07-16 ENCOUNTER — OFFICE VISIT (OUTPATIENT)
Dept: FAMILY MEDICINE CLINIC | Facility: CLINIC | Age: 50
End: 2025-07-16
Payer: MEDICARE

## 2025-07-16 VITALS
TEMPERATURE: 97.5 F | SYSTOLIC BLOOD PRESSURE: 138 MMHG | HEART RATE: 98 BPM | WEIGHT: 188.2 LBS | BODY MASS INDEX: 26.35 KG/M2 | OXYGEN SATURATION: 96 % | DIASTOLIC BLOOD PRESSURE: 88 MMHG | HEIGHT: 71 IN

## 2025-07-16 DIAGNOSIS — E11.65 TYPE 2 DIABETES MELLITUS WITH HYPERGLYCEMIA, UNSPECIFIED WHETHER LONG TERM INSULIN USE: Primary | ICD-10-CM

## 2025-07-16 DIAGNOSIS — R79.89 ELEVATED LFTS: ICD-10-CM

## 2025-07-16 DIAGNOSIS — F51.01 PRIMARY INSOMNIA: ICD-10-CM

## 2025-07-16 RX ORDER — LEVETIRACETAM 500 MG/1
500 TABLET ORAL 2 TIMES DAILY
Qty: 60 TABLET | Refills: 0 | Status: SHIPPED | OUTPATIENT
Start: 2025-07-16

## 2025-07-16 RX ORDER — TRAZODONE HYDROCHLORIDE 100 MG/1
100 TABLET ORAL NIGHTLY
Qty: 90 TABLET | Refills: 0 | Status: SHIPPED | OUTPATIENT
Start: 2025-07-16

## 2025-07-17 NOTE — PROGRESS NOTES
"Chief Complaint  Dehydration (Was seen in ER 07/08/2024)    Subjective          Taiwo Pierce presents to [facility]  Dehydration    History of Present Illness  The patient is a 50-year-old male who presents for an ER follow-up.    He was prescribed seizure medication during his hospital stay and needs a refill. He has an upcoming appointment with neurology on 08/04/2025. He experienced a fall while exiting the [employer], resulting in facial injuries and a broken nose. He did not seek ENT care for this incident.    He reports improved stomach condition. He was informed of an infection and the need for antibiotics, but he did not receive the prescription. He believes the infection has resolved and does not wish to consult a surgeon for his gallbladder. He reports abstaining from alcohol consumption, which he acknowledges exacerbates pancreatitis.    He continues to take Cymbalta and hydroxyzine without any changes. He has previously tried Wellbutrin, which was ineffective. He is currently on trazodone, which he feels is not providing relief. He also reports difficulty sleeping and feeling nervous.    Social History:  Alcohol: He reports abstaining from alcohol consumption.  Sleep: He reports difficulty sleeping and feeling nervous.    Review of Systems      Objective   Vital Signs:   /88 (BP Location: Right arm, Patient Position: Sitting, Cuff Size: Adult)   Pulse 98   Temp 97.5 °F (36.4 °C) (Temporal)   Ht 180.3 cm (71\")   Wt 85.4 kg (188 lb 3.2 oz)   SpO2 96%   BMI 26.25 kg/m²     Physical Exam  Respiratory: Clear to auscultation, no wheezing, rales or rhonchi    Physical Exam  Constitutional:       General: He is not in acute distress.     Appearance: Normal appearance. He is well-developed and well-groomed. He is not ill-appearing, toxic-appearing or diaphoretic.   HENT:      Head: Normocephalic.      Nose: Nose normal. No congestion or rhinorrhea.      " Mouth/Throat:      Mouth: Mucous membranes are moist.      Pharynx: Oropharynx is clear. No oropharyngeal exudate or posterior oropharyngeal erythema.   Eyes:      General: Lids are normal.         Right eye: No discharge.         Left eye: No discharge.      Extraocular Movements: Extraocular movements intact.      Pupils: Pupils are equal, round, and reactive to light.   Neck:      Vascular: No carotid bruit.   Cardiovascular:      Rate and Rhythm: Normal rate and regular rhythm.      Pulses: Normal pulses.      Heart sounds: Normal heart sounds. No murmur heard.     No friction rub. No gallop.   Pulmonary:      Effort: Pulmonary effort is normal. No respiratory distress.      Breath sounds: Normal breath sounds. No stridor. No wheezing, rhonchi or rales.   Chest:      Chest wall: No tenderness.   Abdominal:      General: Bowel sounds are normal. There is no distension.      Palpations: Abdomen is soft. There is no mass.      Tenderness: There is no abdominal tenderness. There is no right CVA tenderness, left CVA tenderness, guarding or rebound.      Hernia: No hernia is present.   Musculoskeletal:         General: No swelling or tenderness. Normal range of motion.      Cervical back: Normal range of motion and neck supple. No rigidity or tenderness.      Right lower leg: No edema.      Left lower leg: No edema.   Lymphadenopathy:      Cervical: No cervical adenopathy.   Skin:     General: Skin is warm.      Capillary Refill: Capillary refill takes less than 2 seconds.      Coloration: Skin is not jaundiced.      Findings: No bruising, erythema or rash.   Neurological:      General: No focal deficit present.      Mental Status: He is alert and oriented to person, place, and time.      Motor: Motor function is intact. No weakness.      Coordination: Coordination is intact.      Gait: Gait is intact. Gait normal.   Psychiatric:         Attention and Perception: Attention normal.         Mood and Affect: Mood normal.          Speech: Speech normal.         Behavior: Behavior normal.         Cognition and Memory: Cognition normal.         Judgment: Judgment normal.        Result Review :          Results  Labs   - Liver Enzymes: High   - Urine Test: Normal   - White Blood Cell Count: Normal              Assessment and Plan    Diagnoses and all orders for this visit:    1. Type 2 diabetes mellitus with hyperglycemia, unspecified whether long term insulin use (Primary)  -     Microalbumin / Creatinine Urine Ratio - Urine, Clean Catch; Future  -     Tirzepatide 7.5 MG/0.5ML solution auto-injector; Inject 7.5 mg under the skin into the appropriate area as directed 1 (One) Time Per Week.  Dispense: 2 mL; Refill: 2    2. Elevated LFTs  -     CBC Auto Differential; Future  -     Comprehensive Metabolic Panel; Future    3. Primary insomnia  -     traZODone (DESYREL) 100 MG tablet; Take 1 tablet by mouth Every Night.  Dispense: 90 tablet; Refill: 0    Other orders  -     levETIRAcetam (KEPPRA) 500 MG tablet; Take 1 tablet by mouth 2 (Two) Times a Day.  Dispense: 60 tablet; Refill: 0      Assessment & Plan  1. Seizure disorder.  - Prescribed seizure medication during hospital stay.  - Upcoming neurology appointment on 08/04/2025.  - Patient reported a recent fall resulting in a broken nose.  - Medication sent to pharmacy.    2. Anxiety.  - Continues to take Cymbalta and hydroxyzine without changes.  - Previously tried Wellbutrin, which was ineffective.  - Dosage of BuSpar will be increased to 7.5 mg.  - Medication sent to pharmacy.    3. Insomnia.  - Currently on trazodone, which is not providing relief.  - Dosage of trazodone will be increased to 100 mg.  - Medication sent to pharmacy.  - Patient reports difficulty sleeping and a restless mind.    4. Gallbladder dysfunction.  - Gallbladder is not functioning optimally.  - Experienced pancreatitis.  - Advised to abstain from alcohol to prevent exacerbation of pancreatitis.  - Declined  referral to a surgeon for gallbladder evaluation.    5. Elevated liver enzymes.  - Liver enzymes were elevated during the last visit.  - Recheck of liver enzymes will be conducted today.  - Patient reports improved stomach symptoms.  - No evidence of infection based on previous urine and white count results.    Patient's Body mass index is 26.25 kg/m². indicating that he is overweight (BMI 25-29.9). Patient's (Body mass index is 26.25 kg/m².) indicates that they are overweight with health conditions that include hypertension, diabetes mellitus, and dyslipidemias . Weight is unchanged. BMI is above average; BMI management plan is completed. We discussed low calorie, low carb based diet program, portion control, and increasing exercise. .    Follow Up   Return in about 3 months (around 10/16/2025), or if symptoms worsen or fail to improve.  Patient was given instructions and counseling regarding his condition or for health maintenance advice. Please see specific information pulled into the AVS if appropriate.     This document has been electronically signed by ENRICO Dejesus  July 17, 2025 10:57 EDT     Patient or patient representative verbalized consent for the use of Ambient Listening during the visit with  ENRICO Dejesus for chart documentation. 7/17/2025  11:00 EDT

## 2025-08-04 RX ORDER — TIRZEPATIDE 5 MG/.5ML
INJECTION, SOLUTION SUBCUTANEOUS
Qty: 2 ML | Refills: 2 | OUTPATIENT
Start: 2025-08-04

## 2025-08-07 DIAGNOSIS — F32.9 REACTIVE DEPRESSION: ICD-10-CM

## 2025-08-07 RX ORDER — HYDROXYZINE HYDROCHLORIDE 25 MG/1
25 TABLET, FILM COATED ORAL EVERY 8 HOURS PRN
Qty: 90 TABLET | Refills: 0 | Status: SHIPPED | OUTPATIENT
Start: 2025-08-07

## 2025-08-07 RX ORDER — DULOXETIN HYDROCHLORIDE 60 MG/1
60 CAPSULE, DELAYED RELEASE ORAL DAILY
Qty: 30 CAPSULE | Refills: 2 | Status: SHIPPED | OUTPATIENT
Start: 2025-08-07

## 2025-08-09 DIAGNOSIS — K21.9 GASTROESOPHAGEAL REFLUX DISEASE, UNSPECIFIED WHETHER ESOPHAGITIS PRESENT: ICD-10-CM

## 2025-08-09 DIAGNOSIS — E11.65 TYPE 2 DIABETES MELLITUS WITH HYPERGLYCEMIA, WITHOUT LONG-TERM CURRENT USE OF INSULIN: ICD-10-CM

## 2025-08-09 DIAGNOSIS — E78.2 MIXED HYPERLIPIDEMIA: ICD-10-CM

## 2025-08-11 RX ORDER — LANSOPRAZOLE 30 MG/1
30 CAPSULE, DELAYED RELEASE ORAL DAILY
Qty: 90 CAPSULE | Refills: 0 | Status: SHIPPED | OUTPATIENT
Start: 2025-08-11

## 2025-08-11 RX ORDER — PRAVASTATIN SODIUM 20 MG
20 TABLET ORAL DAILY
Qty: 90 TABLET | Refills: 0 | Status: SHIPPED | OUTPATIENT
Start: 2025-08-11

## 2025-08-11 RX ORDER — EMPAGLIFLOZIN 10 MG/1
10 TABLET, FILM COATED ORAL DAILY
Qty: 30 TABLET | Refills: 0 | Status: SHIPPED | OUTPATIENT
Start: 2025-08-11

## 2025-08-14 RX ORDER — LEVETIRACETAM 500 MG/1
500 TABLET ORAL EVERY 12 HOURS SCHEDULED
Qty: 60 TABLET | Refills: 0 | Status: SHIPPED | OUTPATIENT
Start: 2025-08-14

## 2025-08-20 DIAGNOSIS — F51.01 PRIMARY INSOMNIA: ICD-10-CM

## 2025-08-20 RX ORDER — MIRTAZAPINE 15 MG/1
15 TABLET, FILM COATED ORAL NIGHTLY
Qty: 90 TABLET | Refills: 0 | Status: SHIPPED | OUTPATIENT
Start: 2025-08-20

## (undated) DEVICE — PCH INST SURG INVISISHIELD 2PCKT

## (undated) DEVICE — STRAP POSTN KN/BDY FM 5X72IN DISP

## (undated) DEVICE — ANTIBACTERIAL UNDYED BRAIDED (POLYGLACTIN 910), SYNTHETIC ABSORBABLE SUTURE: Brand: COATED VICRYL

## (undated) DEVICE — SPONGE,DISSECTOR,K,XRAY,9/16"X1/4",STRL: Brand: MEDLINE

## (undated) DEVICE — DRILL BIT 7080513 STERILE 13MM

## (undated) DEVICE — ADHS SKIN PREMIERPRO EXOFIN TOPICAL HI/VISC .5ML

## (undated) DEVICE — PATIENT RETURN ELECTRODE, SINGLE-USE, CONTACT QUALITY MONITORING, ADULT, WITH 9FT CORD, FOR PATIENTS WEIGING OVER 33LBS. (15KG): Brand: MEGADYNE

## (undated) DEVICE — INSTRUMENT 7080902 PLATE HOLDING PIN

## (undated) DEVICE — PK NEURO DISC 10

## (undated) DEVICE — SUT VIC PLS CTD ANTIB BR 3/0 8/18IN 45CM

## (undated) DEVICE — GLV SURG PREMIERPRO MIC LTX PF SZ7.5 BRN

## (undated) DEVICE — BANDAGE,GAUZE,BULKEE II,4.5"X4.1YD,STRL: Brand: MEDLINE

## (undated) DEVICE — Device

## (undated) DEVICE — SUT SILK 2/0 TIES 18IN A185H

## (undated) DEVICE — BLD KNIF KARLIN 46 3178

## (undated) DEVICE — TOOL MR8-12BA30 MR8 12CM BALL 3MM DIAM: Brand: MIDAS REX MR8

## (undated) DEVICE — INSTRUMENT 875-088 14MM DSTRCT PIN STRL: Brand: MEDTRONIC REUSABLE INSTRUMENT

## (undated) DEVICE — SNAP KOVER: Brand: UNBRANDED

## (undated) DEVICE — NEEDLE, QUINCKE, 20GX3.5": Brand: MEDLINE

## (undated) DEVICE — GLV SURG PREMIERPRO MIC LTX PF SZ6.5 BRN

## (undated) DEVICE — BLANKT WARM LOWR/BDY 100X120CM

## (undated) DEVICE — DRP MICROSCOPE 4 BINOCULAR CV 54X150IN